# Patient Record
Sex: MALE | Race: OTHER | Employment: OTHER | URBAN - METROPOLITAN AREA
[De-identification: names, ages, dates, MRNs, and addresses within clinical notes are randomized per-mention and may not be internally consistent; named-entity substitution may affect disease eponyms.]

---

## 2017-01-31 ENCOUNTER — APPOINTMENT (OUTPATIENT)
Dept: GENERAL RADIOLOGY | Facility: HOSPITAL | Age: 37
End: 2017-01-31
Attending: EMERGENCY MEDICINE
Payer: MEDICAID

## 2017-01-31 ENCOUNTER — HOSPITAL ENCOUNTER (EMERGENCY)
Facility: HOSPITAL | Age: 37
Discharge: HOME | End: 2017-01-31
Attending: EMERGENCY MEDICINE
Payer: MEDICAID

## 2017-01-31 VITALS
HEIGHT: 68 IN | SYSTOLIC BLOOD PRESSURE: 104 MMHG | TEMPERATURE: 99 F | HEART RATE: 88 BPM | BODY MASS INDEX: 27.28 KG/M2 | WEIGHT: 180 LBS | OXYGEN SATURATION: 95 % | DIASTOLIC BLOOD PRESSURE: 75 MMHG | RESPIRATION RATE: 16 BRPM

## 2017-01-31 DIAGNOSIS — K21.00 ESOPHAGITIS, REFLUX: ICD-10-CM

## 2017-01-31 DIAGNOSIS — R06.6 SINGULTUS: ICD-10-CM

## 2017-01-31 DIAGNOSIS — R07.9 ACUTE CHEST PAIN: Primary | ICD-10-CM

## 2017-01-31 DIAGNOSIS — K29.20 CHRONIC ALCOHOLIC GASTRITIS WITHOUT HEMORRHAGE: ICD-10-CM

## 2017-01-31 LAB
ALBUMIN SERPL BCP-MCNC: 4.2 G/DL (ref 3.5–4.8)
ALBUMIN/GLOB SERPL: 1.2 {RATIO} (ref 1–2)
ALP SERPL-CCNC: 71 U/L (ref 32–100)
ALT SERPL-CCNC: 27 U/L (ref 17–63)
ANION GAP SERPL CALC-SCNC: 13 MMOL/L (ref 0–18)
AST SERPL-CCNC: 31 U/L (ref 15–41)
BASOPHILS # BLD: 0.1 K/UL (ref 0–0.2)
BASOPHILS NFR BLD: 1 %
BILIRUB SERPL-MCNC: 0.7 MG/DL (ref 0.3–1.2)
BUN SERPL-MCNC: 8 MG/DL (ref 8–20)
BUN/CREAT SERPL: 9.6 (ref 10–20)
CALCIUM SERPL-MCNC: 8.7 MG/DL (ref 8.5–10.5)
CHLORIDE SERPL-SCNC: 98 MMOL/L (ref 95–110)
CO2 SERPL-SCNC: 27 MMOL/L (ref 22–32)
CREAT SERPL-MCNC: 0.83 MG/DL (ref 0.5–1.5)
D DIMER PPP FEU-MCNC: 0.36 MCG/ML
EOSINOPHIL # BLD: 0 K/UL (ref 0–0.7)
EOSINOPHIL NFR BLD: 0 %
ERYTHROCYTE [DISTWIDTH] IN BLOOD BY AUTOMATED COUNT: 18.5 % (ref 11–15)
GLOBULIN PLAS-MCNC: 3.6 G/DL (ref 2.5–3.7)
GLUCOSE SERPL-MCNC: 136 MG/DL (ref 70–99)
HCT VFR BLD AUTO: 41.8 % (ref 41–52)
HGB BLD-MCNC: 13.9 G/DL (ref 13.5–17.5)
LIPASE SERPL-CCNC: 20 U/L (ref 22–51)
LYMPHOCYTES # BLD: 2.5 K/UL (ref 1–4)
LYMPHOCYTES NFR BLD: 27 %
MAGNESIUM SERPL-MCNC: 1.7 MG/DL (ref 1.8–2.5)
MCH RBC QN AUTO: 26.1 PG (ref 27–32)
MCHC RBC AUTO-ENTMCNC: 33.3 G/DL (ref 32–37)
MCV RBC AUTO: 78.4 FL (ref 80–100)
MONOCYTES # BLD: 0.5 K/UL (ref 0–1)
MONOCYTES NFR BLD: 6 %
NEUTROPHILS # BLD AUTO: 6 K/UL (ref 1.8–7.7)
NEUTROPHILS NFR BLD: 66 %
OSMOLALITY UR CALC.SUM OF ELEC: 286 MOSM/KG (ref 275–295)
PLATELET # BLD AUTO: 152 K/UL (ref 140–400)
PMV BLD AUTO: 8.9 FL (ref 7.4–10.3)
POTASSIUM SERPL-SCNC: 3.5 MMOL/L (ref 3.3–5.1)
PROT SERPL-MCNC: 7.8 G/DL (ref 5.9–8.4)
RBC # BLD AUTO: 5.33 M/UL (ref 4.5–5.9)
SODIUM SERPL-SCNC: 138 MMOL/L (ref 136–144)
TROPONIN I SERPL-MCNC: 0 NG/ML (ref ?–0.03)
WBC # BLD AUTO: 9.1 K/UL (ref 4–11)

## 2017-01-31 PROCEDURE — 96361 HYDRATE IV INFUSION ADD-ON: CPT

## 2017-01-31 PROCEDURE — 83690 ASSAY OF LIPASE: CPT | Performed by: EMERGENCY MEDICINE

## 2017-01-31 PROCEDURE — 71020 XR CHEST PA + LAT CHEST (CPT=71020): CPT

## 2017-01-31 PROCEDURE — 99285 EMERGENCY DEPT VISIT HI MDM: CPT

## 2017-01-31 PROCEDURE — 96375 TX/PRO/DX INJ NEW DRUG ADDON: CPT

## 2017-01-31 PROCEDURE — S0028 INJECTION, FAMOTIDINE, 20 MG: HCPCS | Performed by: EMERGENCY MEDICINE

## 2017-01-31 PROCEDURE — 93010 ELECTROCARDIOGRAM REPORT: CPT | Performed by: EMERGENCY MEDICINE

## 2017-01-31 PROCEDURE — 83735 ASSAY OF MAGNESIUM: CPT | Performed by: EMERGENCY MEDICINE

## 2017-01-31 PROCEDURE — 84484 ASSAY OF TROPONIN QUANT: CPT | Performed by: EMERGENCY MEDICINE

## 2017-01-31 PROCEDURE — 80053 COMPREHEN METABOLIC PANEL: CPT | Performed by: EMERGENCY MEDICINE

## 2017-01-31 PROCEDURE — 85379 FIBRIN DEGRADATION QUANT: CPT | Performed by: EMERGENCY MEDICINE

## 2017-01-31 PROCEDURE — 96374 THER/PROPH/DIAG INJ IV PUSH: CPT

## 2017-01-31 PROCEDURE — 93005 ELECTROCARDIOGRAM TRACING: CPT

## 2017-01-31 PROCEDURE — 85025 COMPLETE CBC W/AUTO DIFF WBC: CPT | Performed by: EMERGENCY MEDICINE

## 2017-01-31 RX ORDER — METOCLOPRAMIDE HYDROCHLORIDE 5 MG/ML
10 INJECTION INTRAMUSCULAR; INTRAVENOUS ONCE
Status: COMPLETED | OUTPATIENT
Start: 2017-01-31 | End: 2017-01-31

## 2017-01-31 RX ORDER — CHLORPROMAZINE HYDROCHLORIDE 25 MG/1
25 TABLET, FILM COATED ORAL ONCE
Status: COMPLETED | OUTPATIENT
Start: 2017-01-31 | End: 2017-01-31

## 2017-01-31 RX ORDER — DIPHENHYDRAMINE HYDROCHLORIDE 50 MG/ML
25 INJECTION INTRAMUSCULAR; INTRAVENOUS ONCE
Status: COMPLETED | OUTPATIENT
Start: 2017-01-31 | End: 2017-01-31

## 2017-01-31 RX ORDER — KETOROLAC TROMETHAMINE 30 MG/ML
15 INJECTION, SOLUTION INTRAMUSCULAR; INTRAVENOUS ONCE
Status: COMPLETED | OUTPATIENT
Start: 2017-01-31 | End: 2017-01-31

## 2017-01-31 RX ORDER — SUCRALFATE 1 G/1
1 TABLET ORAL
Qty: 120 TABLET | Refills: 0 | Status: SHIPPED | OUTPATIENT
Start: 2017-01-31 | End: 2017-03-02

## 2017-01-31 RX ORDER — ORPHENADRINE CITRATE 30 MG/ML
60 INJECTION INTRAMUSCULAR; INTRAVENOUS ONCE
Status: COMPLETED | OUTPATIENT
Start: 2017-01-31 | End: 2017-01-31

## 2017-01-31 RX ORDER — FAMOTIDINE 10 MG/ML
20 INJECTION, SOLUTION INTRAVENOUS ONCE
Status: COMPLETED | OUTPATIENT
Start: 2017-01-31 | End: 2017-01-31

## 2017-01-31 RX ORDER — FAMOTIDINE 20 MG/1
20 TABLET ORAL 2 TIMES DAILY PRN
Qty: 30 TABLET | Refills: 0 | Status: SHIPPED | OUTPATIENT
Start: 2017-01-31 | End: 2017-03-02

## 2017-01-31 NOTE — ED PROVIDER NOTES
Patient Seen in: [facility] AND [facility] Emergency Department    History   No chief complaint on file.     Stated Complaint: chest pain    HPI    20-year-old male with history of alcohol abuse, last alcohol intake was just prior to arrival, he states he has no Musculoskeletal: Negative. Skin: Negative. Neurological: Negative. All other systems reviewed and are negative. Positive for stated complaint: chest pain  Other systems are as noted in HPI. Constitutional and vital signs reviewed.       All for the following:     Magnesium 1.7 (*)     All other components within normal limits   COMP METABOLIC PANEL (14) - Abnormal; Notable for the following:     Glucose 136 (*)     BUN/CREA Ratio 9.6 (*)     All other components within normal limits   LIPASE home with follow-up and return precautions. He does not wish to quit drinking at this time. He is clinically sober. He is discharged home in the care of his mother. CBC and CMP were also unremarkable.   Chest x-ray revealed no acute pathology     Imagin needed      Medications Prescribed:  Current Discharge Medication List    START taking these medications    sucralfate (CARAFATE) 1 g Oral Tab  Take 1 tablet (1 g total) by mouth 4 (four) times daily before meals and nightly.   Qty: 120 tablet Refills: 0

## 2017-01-31 NOTE — ED INITIAL ASSESSMENT (HPI)
Pt woke up with anterior chest pain that started at 530am today. Having hiccups.  Denies nausea or sweating

## 2017-04-10 ENCOUNTER — HOSPITAL ENCOUNTER (EMERGENCY)
Facility: HOSPITAL | Age: 37
Discharge: HOME | End: 2017-04-10
Attending: EMERGENCY MEDICINE
Payer: MEDICAID

## 2017-04-10 VITALS
OXYGEN SATURATION: 96 % | BODY MASS INDEX: 25.18 KG/M2 | WEIGHT: 170 LBS | HEIGHT: 69 IN | DIASTOLIC BLOOD PRESSURE: 78 MMHG | HEART RATE: 100 BPM | RESPIRATION RATE: 16 BRPM | TEMPERATURE: 97 F | SYSTOLIC BLOOD PRESSURE: 131 MMHG

## 2017-04-10 DIAGNOSIS — F10.10 ALCOHOL ABUSE: ICD-10-CM

## 2017-04-10 DIAGNOSIS — R06.6 INTRACTABLE HICCUPS: Primary | ICD-10-CM

## 2017-04-10 PROCEDURE — 93005 ELECTROCARDIOGRAM TRACING: CPT

## 2017-04-10 PROCEDURE — 83690 ASSAY OF LIPASE: CPT | Performed by: EMERGENCY MEDICINE

## 2017-04-10 PROCEDURE — 96374 THER/PROPH/DIAG INJ IV PUSH: CPT

## 2017-04-10 PROCEDURE — 96375 TX/PRO/DX INJ NEW DRUG ADDON: CPT

## 2017-04-10 PROCEDURE — 80076 HEPATIC FUNCTION PANEL: CPT | Performed by: EMERGENCY MEDICINE

## 2017-04-10 PROCEDURE — 80048 BASIC METABOLIC PNL TOTAL CA: CPT

## 2017-04-10 PROCEDURE — 99284 EMERGENCY DEPT VISIT MOD MDM: CPT

## 2017-04-10 PROCEDURE — 85025 COMPLETE CBC W/AUTO DIFF WBC: CPT

## 2017-04-10 PROCEDURE — 96372 THER/PROPH/DIAG INJ SC/IM: CPT

## 2017-04-10 PROCEDURE — C9113 INJ PANTOPRAZOLE SODIUM, VIA: HCPCS | Performed by: EMERGENCY MEDICINE

## 2017-04-10 PROCEDURE — 93010 ELECTROCARDIOGRAM REPORT: CPT | Performed by: EMERGENCY MEDICINE

## 2017-04-10 RX ORDER — METOCLOPRAMIDE HYDROCHLORIDE 5 MG/ML
10 INJECTION INTRAMUSCULAR; INTRAVENOUS ONCE
Status: COMPLETED | OUTPATIENT
Start: 2017-04-10 | End: 2017-04-10

## 2017-04-10 RX ORDER — CHLORPROMAZINE HYDROCHLORIDE 25 MG/ML
50 INJECTION INTRAMUSCULAR ONCE
Status: COMPLETED | OUTPATIENT
Start: 2017-04-10 | End: 2017-04-10

## 2017-04-10 RX ORDER — DIPHENHYDRAMINE HYDROCHLORIDE 50 MG/ML
12.5 INJECTION INTRAMUSCULAR; INTRAVENOUS ONCE
Status: COMPLETED | OUTPATIENT
Start: 2017-04-10 | End: 2017-04-10

## 2017-04-10 NOTE — ED NOTES
Pt with hx alcoholism had 3 day drinking binge, last drink 0700 today. Present with c/o hiccups for 3 hours with stomach and chest pain. Hx of gerd, on medication.

## 2017-04-10 NOTE — ED INITIAL ASSESSMENT (HPI)
Pt reports that he has not been able to stop hiccupping for the past 3 hours   Pt also reports dizziness, abd pain and chest pain as well  Pt with a hx of gastritis and ulcer, recently admitted for similar symptoms  Pt reports last drink of alcohol was 2 h

## 2017-04-10 NOTE — DISCHARGE PLANNING
Spoke with Pt regarding out of network status of insurance. Pt states he understands his insurance is out of network. Pt has a primary care physician that is in network and is able to follow up with her.  Verified that Pt follows up with [facility]

## 2017-08-20 ENCOUNTER — APPOINTMENT (OUTPATIENT)
Dept: GENERAL RADIOLOGY | Facility: HOSPITAL | Age: 37
End: 2017-08-20
Attending: EMERGENCY MEDICINE
Payer: MEDICAID

## 2017-08-20 ENCOUNTER — APPOINTMENT (OUTPATIENT)
Dept: ULTRASOUND IMAGING | Facility: HOSPITAL | Age: 37
End: 2017-08-20
Attending: EMERGENCY MEDICINE
Payer: MEDICAID

## 2017-08-20 ENCOUNTER — HOSPITAL ENCOUNTER (EMERGENCY)
Facility: HOSPITAL | Age: 37
Discharge: HOME | End: 2017-08-20
Attending: EMERGENCY MEDICINE
Payer: MEDICAID

## 2017-08-20 VITALS
SYSTOLIC BLOOD PRESSURE: 139 MMHG | DIASTOLIC BLOOD PRESSURE: 88 MMHG | RESPIRATION RATE: 17 BRPM | TEMPERATURE: 98 F | HEART RATE: 85 BPM | BODY MASS INDEX: 27.92 KG/M2 | HEIGHT: 70 IN | WEIGHT: 195 LBS | OXYGEN SATURATION: 99 %

## 2017-08-20 DIAGNOSIS — S86.812A STRAIN OF CALF MUSCLE, LEFT, INITIAL ENCOUNTER: ICD-10-CM

## 2017-08-20 DIAGNOSIS — M77.12 LATERAL EPICONDYLITIS OF LEFT ELBOW: Primary | ICD-10-CM

## 2017-08-20 PROCEDURE — 99284 EMERGENCY DEPT VISIT MOD MDM: CPT

## 2017-08-20 PROCEDURE — 73080 X-RAY EXAM OF ELBOW: CPT | Performed by: EMERGENCY MEDICINE

## 2017-08-20 PROCEDURE — 93971 EXTREMITY STUDY: CPT | Performed by: EMERGENCY MEDICINE

## 2017-08-20 RX ORDER — IBUPROFEN 600 MG/1
600 TABLET ORAL EVERY 8 HOURS PRN
Qty: 30 TABLET | Refills: 0 | Status: SHIPPED | OUTPATIENT
Start: 2017-08-20 | End: 2017-08-27

## 2017-08-20 NOTE — ED NOTES
Pt feels like his left forearm is popping/numb. Feels like his left calf is week, just noticed today. Denies recent travel by plane or plane. No chest pain or sob.  No trauma

## 2017-08-20 NOTE — ED PROVIDER NOTES
Patient Seen in: [facility] AND [facility] Emergency Department    History   Patient presents with:  Muscle Pain    Stated Complaint: Patient c/o \"left foot vein popping out\" and \"cannot make a muscle in left\"    HPI    Patient is a 58-year-old male with no well-nourished in no acute distress  Head: Normocephalic, no swelling or tenderness  Eyes: Nonicteric sclera, no conjunctival injection  ENT: TMs are clear and flat bilaterally.   There is no posterior pharyngeal erythema  Chest: Clear to auscultation, no t

## 2017-08-20 NOTE — ED NOTES
Discharged home with plan to follow up with PCP as indicated. Alert and interactive. Hemodynamically stable. Agrees with pain management plan.  Ambulates to exit with steady gait

## 2017-08-20 NOTE — ED INITIAL ASSESSMENT (HPI)
Patient c/o pain to left forearm x 1 month with \"vein in left hand popping out more than norm. \" Patient also c/o vein in left foot \"popping out more than usual and unable to flex left calf. \" patient denies any injury.

## 2017-11-23 NOTE — ED PROVIDER NOTES
Patient Seen in: [facility] AND [facility] Emergency Department    History   Patient presents with:  Eval-P (psychiatric)      HPI    Patient presents stating that he has been drinking for the past 7 days and wants to stop drinking alcohol.   States he has a hist tracheal deviation present. Cardiovascular: Normal rate, regular rhythm and intact distal pulses. No murmur heard. Pulmonary/Chest: Effort normal and breath sounds normal. No stridor. No respiratory distress. Abdominal: Soft.  He exhibits no distens the patient and/or caregiver.     Condition upon leaving the department: Stable    Disposition and Plan     Clinical Impression:  Alcohol abuse  (primary encounter diagnosis)    Disposition:  Discharge    Follow-up:  Kalpana Patel  [address] [facility]

## 2018-01-28 ENCOUNTER — APPOINTMENT (OUTPATIENT)
Dept: GENERAL RADIOLOGY | Facility: HOSPITAL | Age: 38
End: 2018-01-28
Attending: EMERGENCY MEDICINE
Payer: MEDICAID

## 2018-01-28 ENCOUNTER — HOSPITAL ENCOUNTER (EMERGENCY)
Facility: HOSPITAL | Age: 38
Discharge: HOME | End: 2018-01-28
Attending: EMERGENCY MEDICINE
Payer: MEDICAID

## 2018-01-28 VITALS
SYSTOLIC BLOOD PRESSURE: 138 MMHG | OXYGEN SATURATION: 98 % | WEIGHT: 200 LBS | TEMPERATURE: 99 F | BODY MASS INDEX: 29.62 KG/M2 | DIASTOLIC BLOOD PRESSURE: 88 MMHG | RESPIRATION RATE: 18 BRPM | HEIGHT: 69 IN | HEART RATE: 82 BPM

## 2018-01-28 DIAGNOSIS — B34.9 VIRAL SYNDROME: Primary | ICD-10-CM

## 2018-01-28 DIAGNOSIS — R19.7 NAUSEA VOMITING AND DIARRHEA: ICD-10-CM

## 2018-01-28 DIAGNOSIS — R11.2 NAUSEA VOMITING AND DIARRHEA: ICD-10-CM

## 2018-01-28 PROCEDURE — 99283 EMERGENCY DEPT VISIT LOW MDM: CPT

## 2018-01-28 PROCEDURE — 71045 X-RAY EXAM CHEST 1 VIEW: CPT | Performed by: EMERGENCY MEDICINE

## 2018-01-28 RX ORDER — IBUPROFEN 600 MG/1
600 TABLET ORAL ONCE
Status: DISCONTINUED | OUTPATIENT
Start: 2018-01-28 | End: 2018-01-28

## 2018-01-28 RX ORDER — ACETAMINOPHEN 500 MG
1000 TABLET ORAL ONCE
Status: COMPLETED | OUTPATIENT
Start: 2018-01-28 | End: 2018-01-28

## 2018-01-28 RX ORDER — DICYCLOMINE HCL 20 MG
20 TABLET ORAL 4 TIMES DAILY PRN
Qty: 30 TABLET | Refills: 0 | Status: SHIPPED | OUTPATIENT
Start: 2018-01-28

## 2018-01-28 RX ORDER — ONDANSETRON 4 MG/1
4 TABLET, ORALLY DISINTEGRATING ORAL ONCE
Status: DISCONTINUED | OUTPATIENT
Start: 2018-01-28 | End: 2018-01-28

## 2018-01-28 RX ORDER — ONDANSETRON 4 MG/1
4 TABLET, ORALLY DISINTEGRATING ORAL EVERY 4 HOURS PRN
Qty: 15 TABLET | Refills: 0 | Status: SHIPPED | OUTPATIENT
Start: 2018-01-28

## 2018-01-28 RX ORDER — ONDANSETRON 4 MG/1
4 TABLET, ORALLY DISINTEGRATING ORAL ONCE
Status: COMPLETED | OUTPATIENT
Start: 2018-01-28 | End: 2018-01-28

## 2018-01-28 NOTE — ED PROVIDER NOTES
Patient Seen in: [facility] AND [facility] Emergency Department    History   Patient presents with:  Dyspnea NANO SOB (respiratory)      HPI    Patient presents to the ED complaining of a cough productive of yellow sputum for the past 4 days with associated diarr Right eye exhibits no discharge. Left eye exhibits no discharge. Neck: No tracheal deviation present. Cardiovascular: Normal rate and intact distal pulses. Pulmonary/Chest: Effort normal and breath sounds normal. No stridor. No respiratory distress. Monitor Interpretation:   normal sinus rhythm    Differential Diagnosis/ Diagnostic Considerations: Viral syndrome, dehydration, pneumonia, bronchitis    Medical Record Review: I personally reviewed available prior medical records for any recent pertin

## 2018-12-03 ENCOUNTER — HOSPITAL ENCOUNTER (EMERGENCY)
Facility: HOSPITAL | Age: 38
Discharge: HOME | End: 2018-12-03
Attending: EMERGENCY MEDICINE

## 2018-12-03 ENCOUNTER — APPOINTMENT (OUTPATIENT)
Dept: CT IMAGING | Facility: HOSPITAL | Age: 38
End: 2018-12-03
Attending: EMERGENCY MEDICINE

## 2018-12-03 VITALS
HEIGHT: 68 IN | DIASTOLIC BLOOD PRESSURE: 98 MMHG | OXYGEN SATURATION: 97 % | HEART RATE: 88 BPM | BODY MASS INDEX: 28.79 KG/M2 | WEIGHT: 190 LBS | RESPIRATION RATE: 17 BRPM | SYSTOLIC BLOOD PRESSURE: 137 MMHG | TEMPERATURE: 98 F

## 2018-12-03 DIAGNOSIS — K40.90 LEFT INGUINAL HERNIA: Primary | ICD-10-CM

## 2018-12-03 PROCEDURE — 74176 CT ABD & PELVIS W/O CONTRAST: CPT | Performed by: EMERGENCY MEDICINE

## 2018-12-03 PROCEDURE — 99284 EMERGENCY DEPT VISIT MOD MDM: CPT

## 2018-12-03 NOTE — ED INITIAL ASSESSMENT (HPI)
Pt reports to ED with complaints of hernia pain in left groin, pt unsure of type of hernia. Pt states the pain is too severe to handle, hernia diagnosed in September.

## 2018-12-03 NOTE — ED PROVIDER NOTES
Patient Seen in: [facility] Emergency Department    History   Patient presents with:  Hernia    Stated Complaint: hernia    HPI    Patient is a 18-year-old male patient is a 18-year-old male that complains of pain in his left groin for the last 3 breath sounds normal. No respiratory distress. Abdominal: Soft. Bowel sounds are normal. Exhibits no distension and no mass. There is no tenderness. There is no rebound and no guarding. There is a palpable left inguinal hernia when patient strains.   Scr

## 2019-03-13 ENCOUNTER — HOSPITAL ENCOUNTER (EMERGENCY)
Facility: HOSPITAL | Age: 39
Discharge: HOME | End: 2019-03-13
Attending: EMERGENCY MEDICINE

## 2019-03-13 ENCOUNTER — APPOINTMENT (OUTPATIENT)
Dept: GENERAL RADIOLOGY | Facility: HOSPITAL | Age: 39
End: 2019-03-13
Attending: EMERGENCY MEDICINE

## 2019-03-13 VITALS
RESPIRATION RATE: 18 BRPM | HEART RATE: 92 BPM | WEIGHT: 190 LBS | SYSTOLIC BLOOD PRESSURE: 144 MMHG | HEIGHT: 69 IN | BODY MASS INDEX: 28.14 KG/M2 | DIASTOLIC BLOOD PRESSURE: 94 MMHG | TEMPERATURE: 98 F | OXYGEN SATURATION: 98 %

## 2019-03-13 DIAGNOSIS — M79.672 LEFT FOOT PAIN: Primary | ICD-10-CM

## 2019-03-13 PROCEDURE — 99284 EMERGENCY DEPT VISIT MOD MDM: CPT

## 2019-03-13 PROCEDURE — 73630 X-RAY EXAM OF FOOT: CPT | Performed by: EMERGENCY MEDICINE

## 2019-03-13 RX ORDER — IBUPROFEN 600 MG/1
600 TABLET ORAL ONCE
Status: COMPLETED | OUTPATIENT
Start: 2019-03-13 | End: 2019-03-13

## 2019-03-13 NOTE — ED INITIAL ASSESSMENT (HPI)
Pt c/o left foot pain and swelling x 4 days. Pt states he has not been able to work d/t pain and swelling.

## 2019-03-15 NOTE — ED PROVIDER NOTES
Patient Seen in: [facility] AND [facility] Emergency Department    History   Patient presents with:  Lower Extremity Injury (musculoskeletal)    Stated Complaint: Left foot pain    HPI    39-year-old male presents for evaluation of foot pain.   Patient reports pa data to display       Imaging Results Available and Reviewed while in ED: XR FOOT, COMPLETE (MIN 3 VIEWS), LEFT (CPT=73630)   Final Result    PROCEDURE: XR FOOT, COMPLETE (MIN 3 VIEWS), LEFT (CPT=73630)         COMPARISON: [facility], X DELIA Sign-Outs: None    Impression:  After review and interpretation of the above emergency department workup, the patient was found to have Left foot pain  (primary encounter diagnosis)    Plan: Well-appearing patient unremarkable evaluation, unremarkable imag

## 2019-05-03 ENCOUNTER — APPOINTMENT (OUTPATIENT)
Dept: CT IMAGING | Facility: HOSPITAL | Age: 39
End: 2019-05-03
Attending: EMERGENCY MEDICINE

## 2019-05-03 ENCOUNTER — HOSPITAL ENCOUNTER (EMERGENCY)
Facility: HOSPITAL | Age: 39
Discharge: HOME | End: 2019-05-03
Attending: EMERGENCY MEDICINE

## 2019-05-03 VITALS
RESPIRATION RATE: 18 BRPM | DIASTOLIC BLOOD PRESSURE: 89 MMHG | BODY MASS INDEX: 29 KG/M2 | WEIGHT: 194 LBS | OXYGEN SATURATION: 100 % | TEMPERATURE: 99 F | SYSTOLIC BLOOD PRESSURE: 129 MMHG | HEART RATE: 80 BPM

## 2019-05-03 DIAGNOSIS — K57.92 ACUTE DIVERTICULITIS: Primary | ICD-10-CM

## 2019-05-03 PROCEDURE — 99284 EMERGENCY DEPT VISIT MOD MDM: CPT

## 2019-05-03 PROCEDURE — 85025 COMPLETE CBC W/AUTO DIFF WBC: CPT | Performed by: EMERGENCY MEDICINE

## 2019-05-03 PROCEDURE — 36415 COLL VENOUS BLD VENIPUNCTURE: CPT

## 2019-05-03 PROCEDURE — 74177 CT ABD & PELVIS W/CONTRAST: CPT | Performed by: EMERGENCY MEDICINE

## 2019-05-03 PROCEDURE — 80048 BASIC METABOLIC PNL TOTAL CA: CPT | Performed by: EMERGENCY MEDICINE

## 2019-05-03 PROCEDURE — 81001 URINALYSIS AUTO W/SCOPE: CPT | Performed by: EMERGENCY MEDICINE

## 2019-05-03 RX ORDER — CIPROFLOXACIN 500 MG/1
500 TABLET, FILM COATED ORAL 2 TIMES DAILY
Qty: 14 TABLET | Refills: 0 | Status: SHIPPED | OUTPATIENT
Start: 2019-05-03 | End: 2019-05-10

## 2019-05-03 RX ORDER — METRONIDAZOLE 500 MG/1
500 TABLET ORAL 3 TIMES DAILY
Qty: 21 TABLET | Refills: 0 | Status: SHIPPED | OUTPATIENT
Start: 2019-05-03 | End: 2019-05-10

## 2019-05-03 NOTE — ED PROVIDER NOTES
Patient Seen in: [facility] AND [facility] Emergency Department    History   Patient presents with:  Hernia  Constipation (gastrointestinal): x 1 wk    Stated Complaint: hernia    HPI    Patient is a 61-year-old male who states he has a left inguinal hernia.   He tenderness. There is no rebound and no guarding. No evidence of incarcerated left inguinal hernia. Musculoskeletal: Normal range of motion. Exhibits no edema or tenderness. Lymphadenopathy: No cervical adenopathy.    Neurological: Alert and oriented to measurable fluid collection. Follow-up CT imaging assessment in 7-10 days is recommended if patient's symptoms do not improve after initial treatment. 2.  Small left fat containing inguinal hernia, which is marginally larger since 12/3/18.   There is mild

## 2019-05-03 NOTE — ED INITIAL ASSESSMENT (HPI)
Pt states he was told he had a hernia last summer and it is more painful today with more protrusion. Pt states he feels nauseous and has been unable to have a bowel movement since Tuesday. When he bears down, the hernia feels more painful.  Denies other sym

## 2020-02-29 ENCOUNTER — APPOINTMENT (OUTPATIENT)
Dept: GENERAL RADIOLOGY | Facility: HOSPITAL | Age: 40
End: 2020-02-29
Attending: EMERGENCY MEDICINE
Payer: MEDICAID

## 2020-02-29 PROCEDURE — 71045 X-RAY EXAM CHEST 1 VIEW: CPT | Performed by: EMERGENCY MEDICINE

## 2020-03-01 NOTE — ED NOTES
Requested by MD to speak with pt regarding detox and substance abuse resources. Pt declined a full assessment for detox. Pt declined contacting [place] for possible placement this evening.      Pt given substance abuse resources including a substance

## 2020-03-01 NOTE — ED PROVIDER NOTES
Patient Seen in: [facility] AND [facility] Emergency Department    History   Patient presents with:  Eval-D    Stated Complaint: [id number]     HPI    40-year-old male past medical history of peptic ulcer disease, alcohol abuse presenting for evaluation of approxima Musculoskeletal: Negative for joint swelling and arthralgias. Positive for stated complaint: [id number]  Other systems are as noted in HPI. Constitutional and vital signs reviewed. All other systems reviewed and negative except as noted above. DIFFERENTIAL[[id number]]          Abnormal            Final result                 Please view results for these tests on the individual orders. EKG    Rate, intervals and axes as noted on EKG Report.   Rate: 84  Rhythm: Sinus Rhythm  Reading: Normal sin report and destroy any copies or printouts.          MDM     DIFFERENTIAL DIAGNOSIS: After history and physical exam differential diagnosis includes but is not limited to alcohol abuse, alcohol withdrawal, pancreatitis, alcoholic hepatitis/gastritis, pneumo (six) hours as needed for Nausea. , Ramón, Disp-12 tablet, R-0    Pantoprazole Sodium 40 MG Oral Tab EC  Take 1 tablet (40 mg total) by mouth daily. , Ramón, Disp-30 tablet, R-0    !! chlordiazePOXIDE HCl 25 MG Oral Cap  Take 1 capsule (25 mg total) by mouth

## 2020-03-01 NOTE — ED NOTES
Discharge instructions reviewed with pt. Pt denies any further questions at this time. Pt friend at bedside to take pt home.

## 2021-09-20 ENCOUNTER — APPOINTMENT (OUTPATIENT)
Dept: GENERAL RADIOLOGY | Facility: HOSPITAL | Age: 41
End: 2021-09-20
Attending: NURSE PRACTITIONER
Payer: MEDICAID

## 2021-09-20 ENCOUNTER — HOSPITAL ENCOUNTER (EMERGENCY)
Facility: HOSPITAL | Age: 41
Discharge: HOME | End: 2021-09-20
Payer: MEDICAID

## 2021-09-20 VITALS
TEMPERATURE: 99 F | HEART RATE: 88 BPM | SYSTOLIC BLOOD PRESSURE: 141 MMHG | DIASTOLIC BLOOD PRESSURE: 79 MMHG | HEIGHT: 69 IN | OXYGEN SATURATION: 99 % | BODY MASS INDEX: 25.92 KG/M2 | WEIGHT: 175 LBS | RESPIRATION RATE: 16 BRPM

## 2021-09-20 DIAGNOSIS — M25.572 ACUTE LEFT ANKLE PAIN: Primary | ICD-10-CM

## 2021-09-20 PROCEDURE — 73610 X-RAY EXAM OF ANKLE: CPT | Performed by: NURSE PRACTITIONER

## 2021-09-20 PROCEDURE — 99283 EMERGENCY DEPT VISIT LOW MDM: CPT

## 2021-09-20 RX ORDER — PREDNISONE 20 MG/1
40 TABLET ORAL DAILY
Qty: 10 TABLET | Refills: 0 | Status: SHIPPED | OUTPATIENT
Start: 2021-09-20 | End: 2021-09-25

## 2021-09-20 RX ORDER — IBUPROFEN 600 MG/1
600 TABLET ORAL EVERY 8 HOURS PRN
Qty: 30 TABLET | Refills: 0 | Status: SHIPPED | OUTPATIENT
Start: 2021-09-20 | End: 2021-09-27

## 2021-09-20 NOTE — ED QUICK NOTES
Pt reports atraumatic pain to left ankle x 2 weeks, and swelling which started 1 day ago. CMS intact.

## 2021-09-20 NOTE — ED INITIAL ASSESSMENT (HPI)
Pt reports atraumatic left ankle pain starting one week ago, pt reports this Saturday his left ankle he noticed his left ankle was swollen and was unable to work his job. Pt denies redness but pt reports his ankle is warm.

## 2021-09-20 NOTE — ED PROVIDER NOTES
Patient Seen in: [facility] AND [facility] Emergency Department      History   Patient presents with:   Ankle Pain    Stated Complaint: left ankle swelling     Subjective:   41yo/m with no chronic medical problems reports to the ED with complaints of left lateral regular rhythm. Heart sounds: Normal heart sounds. Pulmonary:      Effort: Pulmonary effort is normal.      Breath sounds: Normal breath sounds. Abdominal:      General: Bowel sounds are normal.      Palpations: Abdomen is soft.    Musculoskeletal: Disposition and Plan     Clinical Impression:  Acute left ankle pain  (primary encounter diagnosis)     Disposition:  Discharge  9/20/2021  3:32 pm    Follow-up:  Manuela Domingo, [address] [facility] [place] [address]  [phone]

## 2021-10-01 ENCOUNTER — HOSPITAL ENCOUNTER (EMERGENCY)
Facility: HOSPITAL | Age: 41
Discharge: HOME | End: 2021-10-01
Attending: EMERGENCY MEDICINE
Payer: MEDICAID

## 2021-10-01 VITALS
SYSTOLIC BLOOD PRESSURE: 125 MMHG | BODY MASS INDEX: 27 KG/M2 | OXYGEN SATURATION: 98 % | DIASTOLIC BLOOD PRESSURE: 88 MMHG | TEMPERATURE: 98 F | WEIGHT: 185 LBS | RESPIRATION RATE: 18 BRPM | HEART RATE: 95 BPM

## 2021-10-01 DIAGNOSIS — M25.572 LEFT ANKLE PAIN, UNSPECIFIED CHRONICITY: Primary | ICD-10-CM

## 2021-10-01 PROCEDURE — 85025 COMPLETE CBC W/AUTO DIFF WBC: CPT | Performed by: EMERGENCY MEDICINE

## 2021-10-01 PROCEDURE — 84550 ASSAY OF BLOOD/URIC ACID: CPT | Performed by: EMERGENCY MEDICINE

## 2021-10-01 PROCEDURE — 36415 COLL VENOUS BLD VENIPUNCTURE: CPT

## 2021-10-01 PROCEDURE — 99283 EMERGENCY DEPT VISIT LOW MDM: CPT

## 2021-10-01 PROCEDURE — 85652 RBC SED RATE AUTOMATED: CPT | Performed by: EMERGENCY MEDICINE

## 2021-10-01 PROCEDURE — 80048 BASIC METABOLIC PNL TOTAL CA: CPT | Performed by: EMERGENCY MEDICINE

## 2021-10-01 RX ORDER — INDOMETHACIN 50 MG/1
50 CAPSULE ORAL
Qty: 15 CAPSULE | Refills: 0 | Status: SHIPPED | OUTPATIENT
Start: 2021-10-01 | End: 2021-10-06

## 2021-10-01 NOTE — ED PROVIDER NOTES
Patient Seen in: [facility] AND [facility] Emergency Department      History   Patient presents with:  Leg or Foot Injury    Stated Complaint: Left foot pain     Subjective:   HPI    Patient is a 43-year-old male that complains of pain to his left ankle.   Is bee normal. Pupils are equal, round, and reactive to light. Neck: Neck supple. Cardiovascular: Normal rate, regular rhythm, normal heart sounds and intact distal pulses. Pulmonary/Chest: Effort normal and breath sounds normal. No respiratory distress. ---------                               -----------         ------                     CBC W/ DIFFERENTIAL[[id number]]          Abnormal            Final result                 Please view results for these tests on the individual orders.

## 2021-10-01 NOTE — ED INITIAL ASSESSMENT (HPI)
Left foot pain x 3 weeks, atraumatic. Seen here last week for same XR neg. Reports \"being on feet a lot\" for new job.

## 2022-02-18 ENCOUNTER — HOSPITAL ENCOUNTER (EMERGENCY)
Facility: HOSPITAL | Age: 42
Discharge: HOME | End: 2022-02-18
Attending: EMERGENCY MEDICINE
Payer: MEDICAID

## 2022-02-18 VITALS
HEIGHT: 69 IN | OXYGEN SATURATION: 100 % | HEART RATE: 92 BPM | WEIGHT: 175 LBS | RESPIRATION RATE: 16 BRPM | DIASTOLIC BLOOD PRESSURE: 89 MMHG | SYSTOLIC BLOOD PRESSURE: 140 MMHG | TEMPERATURE: 98 F | BODY MASS INDEX: 25.92 KG/M2

## 2022-02-18 DIAGNOSIS — M10.9 GOUTY ARTHRITIS OF RIGHT ANKLE: Primary | ICD-10-CM

## 2022-02-18 PROCEDURE — 99283 EMERGENCY DEPT VISIT LOW MDM: CPT

## 2022-02-18 RX ORDER — PREDNISONE 20 MG/1
40 TABLET ORAL ONCE
Status: COMPLETED | OUTPATIENT
Start: 2022-02-18 | End: 2022-02-18

## 2022-02-18 RX ORDER — INDOMETHACIN 50 MG/1
50 CAPSULE ORAL ONCE
Status: COMPLETED | OUTPATIENT
Start: 2022-02-18 | End: 2022-02-18

## 2022-02-18 RX ORDER — PREDNISONE 20 MG/1
40 TABLET ORAL DAILY
Qty: 8 TABLET | Refills: 0 | Status: SHIPPED | OUTPATIENT
Start: 2022-02-18 | End: 2022-02-22

## 2022-02-18 RX ORDER — INDOMETHACIN 25 MG/1
25 CAPSULE ORAL EVERY 6 HOURS
Qty: 20 CAPSULE | Refills: 0 | Status: SHIPPED | OUTPATIENT
Start: 2022-02-18

## 2022-02-19 NOTE — ED QUICK NOTES
Patient discharged home in no acute distress. A&Ox4, skin p/w/d, denies cp/sob. Ambulating with steady gait and verbalized understanding of d/c instructions and prescriptions.  Wheelchair provided to ED exit

## 2022-02-19 NOTE — ED INITIAL ASSESSMENT (HPI)
Pt present to the ER with right lateral ankle pain for 1 week. Stated that pain became progressively worse throughout the week.      Denies trauma

## 2022-03-03 ENCOUNTER — APPOINTMENT (OUTPATIENT)
Dept: GENERAL RADIOLOGY | Age: 42
End: 2022-03-03
Attending: NURSE PRACTITIONER
Payer: MEDICAID

## 2022-03-03 ENCOUNTER — HOSPITAL ENCOUNTER (OUTPATIENT)
Age: 42
Discharge: HOME | End: 2022-03-03
Payer: MEDICAID

## 2022-03-03 VITALS
OXYGEN SATURATION: 98 % | RESPIRATION RATE: 18 BRPM | TEMPERATURE: 97 F | SYSTOLIC BLOOD PRESSURE: 140 MMHG | DIASTOLIC BLOOD PRESSURE: 90 MMHG | HEART RATE: 92 BPM

## 2022-03-03 DIAGNOSIS — J06.9 UPPER RESPIRATORY TRACT INFECTION, UNSPECIFIED TYPE: Primary | ICD-10-CM

## 2022-03-03 LAB
POCT INFLUENZA A: NEGATIVE
POCT INFLUENZA B: NEGATIVE
S PYO AG THROAT QL: NEGATIVE

## 2022-03-03 PROCEDURE — 87880 STREP A ASSAY W/OPTIC: CPT

## 2022-03-03 PROCEDURE — 99214 OFFICE O/P EST MOD 30 MIN: CPT

## 2022-03-03 PROCEDURE — 71046 X-RAY EXAM CHEST 2 VIEWS: CPT | Performed by: NURSE PRACTITIONER

## 2022-03-03 PROCEDURE — 87502 INFLUENZA DNA AMP PROBE: CPT | Performed by: NURSE PRACTITIONER

## 2022-03-03 RX ORDER — ONDANSETRON 4 MG/1
4 TABLET, ORALLY DISINTEGRATING ORAL ONCE
Status: COMPLETED | OUTPATIENT
Start: 2022-03-03 | End: 2022-03-03

## 2022-03-03 NOTE — ED INITIAL ASSESSMENT (HPI)
2 days of sore throat, cough, nausea, and diarrhea. Negative covid test done yesterday. Possible fever, c/o night sweats and generalized weakness.

## 2022-07-01 ENCOUNTER — APPOINTMENT (OUTPATIENT)
Dept: GENERAL RADIOLOGY | Facility: HOSPITAL | Age: 42
End: 2022-07-01
Attending: EMERGENCY MEDICINE
Payer: MEDICAID

## 2022-07-01 ENCOUNTER — HOSPITAL ENCOUNTER (EMERGENCY)
Facility: HOSPITAL | Age: 42
Discharge: HOME | End: 2022-07-01
Attending: EMERGENCY MEDICINE
Payer: MEDICAID

## 2022-07-01 VITALS
RESPIRATION RATE: 20 BRPM | HEIGHT: 68 IN | TEMPERATURE: 97 F | HEART RATE: 104 BPM | SYSTOLIC BLOOD PRESSURE: 132 MMHG | OXYGEN SATURATION: 97 % | BODY MASS INDEX: 28.79 KG/M2 | DIASTOLIC BLOOD PRESSURE: 93 MMHG | WEIGHT: 190 LBS

## 2022-07-01 DIAGNOSIS — L03.011 CELLULITIS OF RIGHT THUMB: ICD-10-CM

## 2022-07-01 DIAGNOSIS — S67.01XA CRUSHING INJURY OF RIGHT THUMB, INITIAL ENCOUNTER: Primary | ICD-10-CM

## 2022-07-01 PROCEDURE — 73140 X-RAY EXAM OF FINGER(S): CPT | Performed by: EMERGENCY MEDICINE

## 2022-07-01 PROCEDURE — 99283 EMERGENCY DEPT VISIT LOW MDM: CPT

## 2022-07-01 RX ORDER — DOXYCYCLINE HYCLATE 100 MG/1
100 CAPSULE ORAL ONCE
Status: COMPLETED | OUTPATIENT
Start: 2022-07-01 | End: 2022-07-01

## 2022-07-01 RX ORDER — DOXYCYCLINE HYCLATE 100 MG/1
100 CAPSULE ORAL 2 TIMES DAILY
Qty: 20 CAPSULE | Refills: 0 | Status: SHIPPED | OUTPATIENT
Start: 2022-07-01 | End: 2022-07-11

## 2022-07-01 NOTE — ED INITIAL ASSESSMENT (HPI)
Pt reports closing a door onto his right thumb a month ago, c/o swelling that has progressed to his hand.

## 2022-07-17 ENCOUNTER — HOSPITAL ENCOUNTER (EMERGENCY)
Facility: HOSPITAL | Age: 42
Discharge: HOME | End: 2022-07-17
Attending: STUDENT IN AN ORGANIZED HEALTH CARE EDUCATION/TRAINING PROGRAM
Payer: MEDICAID

## 2022-07-17 VITALS
HEIGHT: 68 IN | HEART RATE: 72 BPM | WEIGHT: 180 LBS | DIASTOLIC BLOOD PRESSURE: 82 MMHG | OXYGEN SATURATION: 95 % | SYSTOLIC BLOOD PRESSURE: 103 MMHG | RESPIRATION RATE: 15 BRPM | BODY MASS INDEX: 27.28 KG/M2 | TEMPERATURE: 98 F

## 2022-07-17 DIAGNOSIS — T63.441A ALLERGIC REACTION TO BEE STING: Primary | ICD-10-CM

## 2022-07-17 PROCEDURE — S0028 INJECTION, FAMOTIDINE, 20 MG: HCPCS | Performed by: STUDENT IN AN ORGANIZED HEALTH CARE EDUCATION/TRAINING PROGRAM

## 2022-07-17 PROCEDURE — 96375 TX/PRO/DX INJ NEW DRUG ADDON: CPT

## 2022-07-17 PROCEDURE — 96374 THER/PROPH/DIAG INJ IV PUSH: CPT

## 2022-07-17 PROCEDURE — 99284 EMERGENCY DEPT VISIT MOD MDM: CPT

## 2022-07-17 RX ORDER — EPINEPHRINE 0.3 MG/.3ML
0.3 INJECTION SUBCUTANEOUS
Qty: 2 EACH | Refills: 0 | Status: SHIPPED | OUTPATIENT
Start: 2022-07-17 | End: 2022-08-16

## 2022-07-17 RX ORDER — DIPHENHYDRAMINE HYDROCHLORIDE 50 MG/ML
25 INJECTION INTRAMUSCULAR; INTRAVENOUS ONCE
Status: COMPLETED | OUTPATIENT
Start: 2022-07-17 | End: 2022-07-17

## 2022-07-17 RX ORDER — METHYLPREDNISOLONE SODIUM SUCCINATE 125 MG/2ML
125 INJECTION, POWDER, LYOPHILIZED, FOR SOLUTION INTRAMUSCULAR; INTRAVENOUS ONCE
Status: COMPLETED | OUTPATIENT
Start: 2022-07-17 | End: 2022-07-17

## 2022-07-17 RX ORDER — DIPHENHYDRAMINE HCL 25 MG
25 CAPSULE ORAL EVERY 6 HOURS PRN
Qty: 12 CAPSULE | Refills: 0 | Status: SHIPPED | OUTPATIENT
Start: 2022-07-17 | End: 2022-07-20

## 2022-07-17 RX ORDER — FAMOTIDINE 10 MG/ML
20 INJECTION, SOLUTION INTRAVENOUS ONCE
Status: COMPLETED | OUTPATIENT
Start: 2022-07-17 | End: 2022-07-17

## 2022-07-17 RX ORDER — PREDNISONE 50 MG/1
50 TABLET ORAL DAILY
Qty: 3 TABLET | Refills: 0 | Status: SHIPPED | OUTPATIENT
Start: 2022-07-17 | End: 2022-07-20

## 2022-07-17 NOTE — ED INITIAL ASSESSMENT (HPI)
Pt got stung by wasp on the right ankle causing an allergic rx. Pt has hives on extremities and swelling with itchiness to face and neck. Pt denies throat swelling.  Sting happened around 11a

## 2022-07-31 ENCOUNTER — HOSPITAL ENCOUNTER (EMERGENCY)
Facility: HOSPITAL | Age: 42
Discharge: LEFT BEFORE DISCHARGE | End: 2022-07-31
Payer: MEDICAID

## 2022-07-31 VITALS
WEIGHT: 190 LBS | BODY MASS INDEX: 28.79 KG/M2 | SYSTOLIC BLOOD PRESSURE: 134 MMHG | DIASTOLIC BLOOD PRESSURE: 90 MMHG | OXYGEN SATURATION: 97 % | HEIGHT: 68 IN | TEMPERATURE: 98 F | RESPIRATION RATE: 20 BRPM | HEART RATE: 119 BPM

## 2022-07-31 PROCEDURE — 93005 ELECTROCARDIOGRAM TRACING: CPT

## 2022-07-31 NOTE — ED INITIAL ASSESSMENT (HPI)
Pt c/o hiccups x 2 days. Per pt he is an alcoholic and get's hiccups. Last drink was 30mins. Pt states he has developed chest pain due to the hiccups.

## 2022-11-12 ENCOUNTER — HOSPITAL ENCOUNTER (EMERGENCY)
Facility: HOSPITAL | Age: 42
Discharge: HOME | End: 2022-11-12
Attending: EMERGENCY MEDICINE
Payer: OTHER MISCELLANEOUS

## 2022-11-12 VITALS
HEIGHT: 68 IN | TEMPERATURE: 99 F | WEIGHT: 180 LBS | HEART RATE: 107 BPM | SYSTOLIC BLOOD PRESSURE: 143 MMHG | DIASTOLIC BLOOD PRESSURE: 96 MMHG | BODY MASS INDEX: 27.28 KG/M2 | OXYGEN SATURATION: 98 % | RESPIRATION RATE: 20 BRPM

## 2022-11-12 DIAGNOSIS — S61.217A LACERATION OF LEFT LITTLE FINGER WITHOUT FOREIGN BODY, NAIL DAMAGE STATUS UNSPECIFIED, INITIAL ENCOUNTER: Primary | ICD-10-CM

## 2022-11-12 PROCEDURE — 12002 RPR S/N/AX/GEN/TRNK2.6-7.5CM: CPT

## 2022-11-12 PROCEDURE — 99283 EMERGENCY DEPT VISIT LOW MDM: CPT

## 2022-11-12 PROCEDURE — 99282 EMERGENCY DEPT VISIT SF MDM: CPT

## 2022-11-12 NOTE — ED INITIAL ASSESSMENT (HPI)
Pt states while at work roughly around [id number] was cleaning out vacuum at work and was cut by unknown object on left pinky. Pt still able to move pinky with full sensation. Roughly one inch deep laceration with bleeding controlled. Pt denies all other complaints at the moment.    A&O*4, steady gait

## 2022-11-23 ENCOUNTER — OFFICE VISIT (OUTPATIENT)
Dept: FAMILY MEDICINE CLINIC | Facility: CLINIC | Age: 42
End: 2022-11-23
Payer: MEDICAID

## 2022-11-23 VITALS
BODY MASS INDEX: 31.22 KG/M2 | DIASTOLIC BLOOD PRESSURE: 80 MMHG | WEIGHT: 206 LBS | HEART RATE: 105 BPM | HEIGHT: 68 IN | SYSTOLIC BLOOD PRESSURE: 122 MMHG

## 2022-11-23 DIAGNOSIS — Z48.02 ENCOUNTER FOR REMOVAL OF SUTURES: ICD-10-CM

## 2022-11-23 DIAGNOSIS — S61.217D LACERATION OF LEFT LITTLE FINGER WITHOUT FOREIGN BODY WITHOUT DAMAGE TO NAIL, SUBSEQUENT ENCOUNTER: Primary | ICD-10-CM

## 2022-11-23 PROCEDURE — 3074F SYST BP LT 130 MM HG: CPT | Performed by: PHYSICIAN ASSISTANT

## 2022-11-23 PROCEDURE — 99202 OFFICE O/P NEW SF 15 MIN: CPT | Performed by: PHYSICIAN ASSISTANT

## 2022-11-23 PROCEDURE — 3008F BODY MASS INDEX DOCD: CPT | Performed by: PHYSICIAN ASSISTANT

## 2022-11-23 PROCEDURE — 3079F DIAST BP 80-89 MM HG: CPT | Performed by: PHYSICIAN ASSISTANT

## 2022-11-23 NOTE — PROCEDURES
Encounter for removal of sutures     Site cleaned with alcohol before and after removal,. 4 Sutures removed on the left 5th finger, tolerated well. Site remained well approximated. No open areas or drainage, no signs of infection. Advise to keep wound clean and dry,may apply OTC abx cream to site until fully healed.

## 2022-12-19 ENCOUNTER — HOSPITAL ENCOUNTER (EMERGENCY)
Facility: HOSPITAL | Age: 42
Discharge: HOME | End: 2022-12-19
Attending: EMERGENCY MEDICINE
Payer: MEDICAID

## 2022-12-19 ENCOUNTER — APPOINTMENT (OUTPATIENT)
Dept: CT IMAGING | Facility: HOSPITAL | Age: 42
End: 2022-12-19
Attending: EMERGENCY MEDICINE
Payer: MEDICAID

## 2022-12-19 ENCOUNTER — OFFICE VISIT (OUTPATIENT)
Dept: FAMILY MEDICINE CLINIC | Facility: CLINIC | Age: 42
End: 2022-12-19
Payer: MEDICAID

## 2022-12-19 VITALS
BODY MASS INDEX: 29 KG/M2 | DIASTOLIC BLOOD PRESSURE: 83 MMHG | OXYGEN SATURATION: 99 % | TEMPERATURE: 98 F | SYSTOLIC BLOOD PRESSURE: 123 MMHG | HEART RATE: 90 BPM | WEIGHT: 190 LBS | RESPIRATION RATE: 18 BRPM

## 2022-12-19 VITALS
HEIGHT: 68 IN | DIASTOLIC BLOOD PRESSURE: 79 MMHG | HEART RATE: 108 BPM | WEIGHT: 206 LBS | RESPIRATION RATE: 17 BRPM | SYSTOLIC BLOOD PRESSURE: 122 MMHG | BODY MASS INDEX: 31.22 KG/M2

## 2022-12-19 DIAGNOSIS — K40.90 NON-RECURRENT UNILATERAL INGUINAL HERNIA WITHOUT OBSTRUCTION OR GANGRENE: Primary | ICD-10-CM

## 2022-12-19 DIAGNOSIS — R10.32 LEFT INGUINAL PAIN: ICD-10-CM

## 2022-12-19 DIAGNOSIS — N50.812 PAIN IN LEFT TESTICLE: Primary | ICD-10-CM

## 2022-12-19 LAB
ANION GAP SERPL CALC-SCNC: 6 MMOL/L (ref 0–18)
BASOPHILS # BLD AUTO: 0.06 X10(3) UL (ref 0–0.2)
BASOPHILS NFR BLD AUTO: 0.9 %
BUN BLD-MCNC: 6 MG/DL (ref 7–18)
BUN/CREAT SERPL: 6.1 (ref 10–20)
CALCIUM BLD-MCNC: 8.4 MG/DL (ref 8.5–10.1)
CHLORIDE SERPL-SCNC: 101 MMOL/L (ref 98–112)
CO2 SERPL-SCNC: 30 MMOL/L (ref 21–32)
CREAT BLD-MCNC: 0.98 MG/DL
DEPRECATED RDW RBC AUTO: 47.7 FL (ref 35.1–46.3)
EOSINOPHIL # BLD AUTO: 0.08 X10(3) UL (ref 0–0.7)
EOSINOPHIL NFR BLD AUTO: 1.2 %
ERYTHROCYTE [DISTWIDTH] IN BLOOD BY AUTOMATED COUNT: 17.7 % (ref 11–15)
GFR SERPLBLD BASED ON 1.73 SQ M-ARVRAT: 98 ML/MIN/1.73M2 (ref 60–?)
GLUCOSE BLD-MCNC: 114 MG/DL (ref 70–99)
HCT VFR BLD AUTO: 36.5 %
HGB BLD-MCNC: 10.6 G/DL
IMM GRANULOCYTES # BLD AUTO: 0.02 X10(3) UL (ref 0–1)
IMM GRANULOCYTES NFR BLD: 0.3 %
LACTATE SERPL-SCNC: 1.7 MMOL/L (ref 0.4–2)
LYMPHOCYTES # BLD AUTO: 1.78 X10(3) UL (ref 1–4)
LYMPHOCYTES NFR BLD AUTO: 27.2 %
MCH RBC QN AUTO: 21.9 PG (ref 26–34)
MCHC RBC AUTO-ENTMCNC: 29 G/DL (ref 31–37)
MCV RBC AUTO: 75.6 FL
MONOCYTES # BLD AUTO: 0.57 X10(3) UL (ref 0.1–1)
MONOCYTES NFR BLD AUTO: 8.7 %
NEUTROPHILS # BLD AUTO: 4.03 X10 (3) UL (ref 1.5–7.7)
NEUTROPHILS # BLD AUTO: 4.03 X10(3) UL (ref 1.5–7.7)
NEUTROPHILS NFR BLD AUTO: 61.7 %
OSMOLALITY SERPL CALC.SUM OF ELEC: 282 MOSM/KG (ref 275–295)
PLATELET # BLD AUTO: 239 10(3)UL (ref 150–450)
POTASSIUM SERPL-SCNC: 3.8 MMOL/L (ref 3.5–5.1)
RBC # BLD AUTO: 4.83 X10(6)UL
SODIUM SERPL-SCNC: 137 MMOL/L (ref 136–145)
WBC # BLD AUTO: 6.5 X10(3) UL (ref 4–11)

## 2022-12-19 PROCEDURE — 3008F BODY MASS INDEX DOCD: CPT | Performed by: PHYSICIAN ASSISTANT

## 2022-12-19 PROCEDURE — 99284 EMERGENCY DEPT VISIT MOD MDM: CPT

## 2022-12-19 PROCEDURE — 74177 CT ABD & PELVIS W/CONTRAST: CPT | Performed by: EMERGENCY MEDICINE

## 2022-12-19 PROCEDURE — 3074F SYST BP LT 130 MM HG: CPT | Performed by: PHYSICIAN ASSISTANT

## 2022-12-19 PROCEDURE — 96360 HYDRATION IV INFUSION INIT: CPT

## 2022-12-19 PROCEDURE — 96361 HYDRATE IV INFUSION ADD-ON: CPT

## 2022-12-19 PROCEDURE — 85025 COMPLETE CBC W/AUTO DIFF WBC: CPT | Performed by: EMERGENCY MEDICINE

## 2022-12-19 PROCEDURE — 80048 BASIC METABOLIC PNL TOTAL CA: CPT | Performed by: EMERGENCY MEDICINE

## 2022-12-19 PROCEDURE — 83605 ASSAY OF LACTIC ACID: CPT | Performed by: EMERGENCY MEDICINE

## 2022-12-19 PROCEDURE — 3078F DIAST BP <80 MM HG: CPT | Performed by: PHYSICIAN ASSISTANT

## 2022-12-19 NOTE — DISCHARGE INSTRUCTIONS
Follow-up with general surgery for further evaluation and treatment. Take Tylenol or ibuprofen as needed for pain. Return to the emergency department if repeated vomiting, increasing pain, or other new symptoms develop.

## 2022-12-28 ENCOUNTER — OFFICE VISIT (OUTPATIENT)
Dept: SURGERY | Facility: CLINIC | Age: 42
End: 2022-12-28
Payer: MEDICAID

## 2022-12-28 VITALS — WEIGHT: 190 LBS | HEIGHT: 68 IN | BODY MASS INDEX: 28.79 KG/M2

## 2022-12-28 DIAGNOSIS — K40.90 UNILATERAL INGUINAL HERNIA WITHOUT OBSTRUCTION OR GANGRENE, RECURRENCE NOT SPECIFIED: Primary | ICD-10-CM

## 2022-12-28 DIAGNOSIS — K40.90 NON-RECURRENT UNILATERAL INGUINAL HERNIA WITHOUT OBSTRUCTION OR GANGRENE: ICD-10-CM

## 2022-12-28 PROCEDURE — 3008F BODY MASS INDEX DOCD: CPT | Performed by: SURGERY

## 2022-12-28 PROCEDURE — 99204 OFFICE O/P NEW MOD 45 MIN: CPT | Performed by: SURGERY

## 2023-01-15 ENCOUNTER — LAB ENCOUNTER (OUTPATIENT)
Dept: LAB | Facility: HOSPITAL | Age: 43
End: 2023-01-15
Attending: SURGERY
Payer: MEDICAID

## 2023-01-15 DIAGNOSIS — Z01.818 PREOP TESTING: ICD-10-CM

## 2023-01-15 LAB — SARS-COV-2 RNA RESP QL NAA+PROBE: NOT DETECTED

## 2023-01-16 NOTE — DISCHARGE INSTRUCTIONS
HOME INSTRUCTIONS  OK to shower  Diet as tolerated  Athletic supporter and ice packs for 72 hours  Tylenol or motrin or norco as needed  Dr. Pooja Gibson office in 2 weeks. [facility] HOME CARE INSTRUCTIONS: POST-OP ANESTHESIA  The medication that you received for sedation or general anesthesia can last up to 24 hours. Your judgment and reflexes may be altered, even if you feel like your normal self. We Recommend:   Do not drive any motor vehicle or bicycle   Avoid mowing the lawn, playing sports, or working with power tools/applicances (power saws, electric knives or mixers)   That you have someone stay with you on your first night home   Do not drink alcohol or take sleeping pills or tranquilizers   Do not sign legal documents within 24 hours of your procedure   If you had a nerve block for your surgery, take extra care not to put any pressure on your arm or hand for 24 hours    It is normal:  For you to have a sore throat if you had a breathing tube during surgery (while you were asleep!). The sore throat should get better within 48 hours. You can gargle with warm salt water (1/2 tsp in 4 oz warm water) or use a throat lozenge for comfort  To feel muscle aches or soreness especially in the abdomen, chest or neck. The achy feeling should go away in the next 24 hours  To feel weak, sleepy or \"wiped out\". Your should start feeling better in the next 24 hours. To experience mild discomforts such as sore lip or tongue, headache, cramps, gas pains or a bloated feeling in your abdomen. To experience mild back pain or soreness for a day or two if you had spinal or epidural anesthesia. If you had laparoscopic surgery, to feel shoulder pain or discomfort on the day of surgery. For some patients to have nausea after surgery/anesthesia    If you feel nausea or experience vomiting:   Try to move around less.    Eat less than usual or drink only liquids until the next morning   Nausea should resolve in about 24 hours    If you have a problem when you are at home:    Call your surgeons office

## 2023-01-17 ENCOUNTER — ANESTHESIA EVENT (OUTPATIENT)
Dept: SURGERY | Facility: HOSPITAL | Age: 43
End: 2023-01-17
Payer: MEDICAID

## 2023-01-17 ENCOUNTER — HOSPITAL ENCOUNTER (OUTPATIENT)
Facility: HOSPITAL | Age: 43
Setting detail: HOSPITAL OUTPATIENT SURGERY
Discharge: HOME | End: 2023-01-17
Attending: SURGERY | Admitting: SURGERY
Payer: MEDICAID

## 2023-01-17 ENCOUNTER — ANESTHESIA (OUTPATIENT)
Dept: SURGERY | Facility: HOSPITAL | Age: 43
End: 2023-01-17
Payer: MEDICAID

## 2023-01-17 VITALS
HEIGHT: 68 IN | TEMPERATURE: 98 F | RESPIRATION RATE: 16 BRPM | WEIGHT: 200 LBS | BODY MASS INDEX: 30.31 KG/M2 | DIASTOLIC BLOOD PRESSURE: 81 MMHG | SYSTOLIC BLOOD PRESSURE: 127 MMHG | OXYGEN SATURATION: 94 % | HEART RATE: 78 BPM

## 2023-01-17 DIAGNOSIS — Z01.818 PREOP TESTING: Primary | ICD-10-CM

## 2023-01-17 PROCEDURE — 0YU60JZ SUPPLEMENT LEFT INGUINAL REGION WITH SYNTHETIC SUBSTITUTE, OPEN APPROACH: ICD-10-PCS | Performed by: SURGERY

## 2023-01-17 PROCEDURE — 49505 PRP I/HERN INIT REDUC >5 YR: CPT | Performed by: SURGERY

## 2023-01-17 DEVICE — POLYPROPLYLENE NONABSORBABLE SYNTHETIC SURGICAL MESH
Type: IMPLANTABLE DEVICE | Site: GROIN | Status: FUNCTIONAL
Brand: PROLENE

## 2023-01-17 RX ORDER — DEXAMETHASONE SODIUM PHOSPHATE 4 MG/ML
VIAL (ML) INJECTION AS NEEDED
Status: DISCONTINUED | OUTPATIENT
Start: 2023-01-17 | End: 2023-01-17 | Stop reason: SURG

## 2023-01-17 RX ORDER — HYDROCODONE BITARTRATE AND ACETAMINOPHEN 5; 325 MG/1; MG/1
1 TABLET ORAL EVERY 6 HOURS PRN
Qty: 20 TABLET | Refills: 0 | Status: SHIPPED | OUTPATIENT
Start: 2023-01-17

## 2023-01-17 RX ORDER — HYDROMORPHONE HYDROCHLORIDE 1 MG/ML
0.2 INJECTION, SOLUTION INTRAMUSCULAR; INTRAVENOUS; SUBCUTANEOUS EVERY 5 MIN PRN
Status: DISCONTINUED | OUTPATIENT
Start: 2023-01-17 | End: 2023-01-17

## 2023-01-17 RX ORDER — MORPHINE SULFATE 4 MG/ML
4 INJECTION, SOLUTION INTRAMUSCULAR; INTRAVENOUS EVERY 10 MIN PRN
Status: DISCONTINUED | OUTPATIENT
Start: 2023-01-17 | End: 2023-01-17

## 2023-01-17 RX ORDER — MORPHINE SULFATE 10 MG/ML
6 INJECTION, SOLUTION INTRAMUSCULAR; INTRAVENOUS EVERY 10 MIN PRN
Status: DISCONTINUED | OUTPATIENT
Start: 2023-01-17 | End: 2023-01-17

## 2023-01-17 RX ORDER — BUPIVACAINE HYDROCHLORIDE AND EPINEPHRINE 5; 5 MG/ML; UG/ML
INJECTION, SOLUTION PERINEURAL AS NEEDED
Status: DISCONTINUED | OUTPATIENT
Start: 2023-01-17 | End: 2023-01-17 | Stop reason: HOSPADM

## 2023-01-17 RX ORDER — SODIUM CHLORIDE, SODIUM LACTATE, POTASSIUM CHLORIDE, CALCIUM CHLORIDE 600; 310; 30; 20 MG/100ML; MG/100ML; MG/100ML; MG/100ML
INJECTION, SOLUTION INTRAVENOUS CONTINUOUS
Status: DISCONTINUED | OUTPATIENT
Start: 2023-01-17 | End: 2023-01-17

## 2023-01-17 RX ORDER — MORPHINE SULFATE 4 MG/ML
2 INJECTION, SOLUTION INTRAMUSCULAR; INTRAVENOUS EVERY 10 MIN PRN
Status: DISCONTINUED | OUTPATIENT
Start: 2023-01-17 | End: 2023-01-17

## 2023-01-17 RX ORDER — MIDAZOLAM HYDROCHLORIDE 1 MG/ML
INJECTION INTRAMUSCULAR; INTRAVENOUS AS NEEDED
Status: DISCONTINUED | OUTPATIENT
Start: 2023-01-17 | End: 2023-01-17 | Stop reason: SURG

## 2023-01-17 RX ORDER — NALOXONE HYDROCHLORIDE 0.4 MG/ML
80 INJECTION, SOLUTION INTRAMUSCULAR; INTRAVENOUS; SUBCUTANEOUS AS NEEDED
Status: DISCONTINUED | OUTPATIENT
Start: 2023-01-17 | End: 2023-01-17

## 2023-01-17 RX ORDER — HYDROMORPHONE HYDROCHLORIDE 1 MG/ML
0.6 INJECTION, SOLUTION INTRAMUSCULAR; INTRAVENOUS; SUBCUTANEOUS EVERY 5 MIN PRN
Status: DISCONTINUED | OUTPATIENT
Start: 2023-01-17 | End: 2023-01-17

## 2023-01-17 RX ORDER — LIDOCAINE HYDROCHLORIDE 10 MG/ML
INJECTION, SOLUTION EPIDURAL; INFILTRATION; INTRACAUDAL; PERINEURAL AS NEEDED
Status: DISCONTINUED | OUTPATIENT
Start: 2023-01-17 | End: 2023-01-17 | Stop reason: SURG

## 2023-01-17 RX ORDER — CEFAZOLIN SODIUM/WATER 2 G/20 ML
2 SYRINGE (ML) INTRAVENOUS ONCE
Status: COMPLETED | OUTPATIENT
Start: 2023-01-17 | End: 2023-01-17

## 2023-01-17 RX ORDER — HYDROMORPHONE HYDROCHLORIDE 1 MG/ML
0.4 INJECTION, SOLUTION INTRAMUSCULAR; INTRAVENOUS; SUBCUTANEOUS EVERY 5 MIN PRN
Status: DISCONTINUED | OUTPATIENT
Start: 2023-01-17 | End: 2023-01-17

## 2023-01-17 RX ORDER — ONDANSETRON 2 MG/ML
INJECTION INTRAMUSCULAR; INTRAVENOUS AS NEEDED
Status: DISCONTINUED | OUTPATIENT
Start: 2023-01-17 | End: 2023-01-17 | Stop reason: SURG

## 2023-01-17 RX ORDER — ACETAMINOPHEN 500 MG
1000 TABLET ORAL ONCE
Status: COMPLETED | OUTPATIENT
Start: 2023-01-17 | End: 2023-01-17

## 2023-01-17 RX ORDER — HYDROCODONE BITARTRATE AND ACETAMINOPHEN 5; 325 MG/1; MG/1
1 TABLET ORAL ONCE AS NEEDED
Status: COMPLETED | OUTPATIENT
Start: 2023-01-17 | End: 2023-01-17

## 2023-01-17 RX ORDER — ROCURONIUM BROMIDE 10 MG/ML
INJECTION, SOLUTION INTRAVENOUS AS NEEDED
Status: DISCONTINUED | OUTPATIENT
Start: 2023-01-17 | End: 2023-01-17 | Stop reason: SURG

## 2023-01-17 RX ORDER — GLYCOPYRROLATE 0.2 MG/ML
INJECTION, SOLUTION INTRAMUSCULAR; INTRAVENOUS AS NEEDED
Status: DISCONTINUED | OUTPATIENT
Start: 2023-01-17 | End: 2023-01-17 | Stop reason: SURG

## 2023-01-17 RX ADMIN — ROCURONIUM BROMIDE 20 MG: 10 INJECTION, SOLUTION INTRAVENOUS at 09:34:00

## 2023-01-17 RX ADMIN — SODIUM CHLORIDE, SODIUM LACTATE, POTASSIUM CHLORIDE, CALCIUM CHLORIDE: 600; 310; 30; 20 INJECTION, SOLUTION INTRAVENOUS at 09:19:00

## 2023-01-17 RX ADMIN — LIDOCAINE HYDROCHLORIDE 50 MG: 10 INJECTION, SOLUTION EPIDURAL; INFILTRATION; INTRACAUDAL; PERINEURAL at 09:26:00

## 2023-01-17 RX ADMIN — ONDANSETRON 4 MG: 2 INJECTION INTRAMUSCULAR; INTRAVENOUS at 10:19:00

## 2023-01-17 RX ADMIN — CEFAZOLIN SODIUM/WATER 2 G: 2 G/20 ML SYRINGE (ML) INTRAVENOUS at 09:21:00

## 2023-01-17 RX ADMIN — DEXAMETHASONE SODIUM PHOSPHATE 4 MG: 4 MG/ML VIAL (ML) INJECTION at 09:34:00

## 2023-01-17 RX ADMIN — MIDAZOLAM HYDROCHLORIDE 2 MG: 1 INJECTION INTRAMUSCULAR; INTRAVENOUS at 09:19:00

## 2023-01-17 RX ADMIN — ROCURONIUM BROMIDE 5 MG: 10 INJECTION, SOLUTION INTRAVENOUS at 10:09:00

## 2023-01-17 RX ADMIN — SODIUM CHLORIDE, SODIUM LACTATE, POTASSIUM CHLORIDE, CALCIUM CHLORIDE: 600; 310; 30; 20 INJECTION, SOLUTION INTRAVENOUS at 10:23:00

## 2023-01-17 RX ADMIN — GLYCOPYRROLATE 0.1 MG: 0.2 INJECTION, SOLUTION INTRAMUSCULAR; INTRAVENOUS at 09:19:00

## 2023-01-17 RX ADMIN — GLYCOPYRROLATE 0.1 MG: 0.2 INJECTION, SOLUTION INTRAMUSCULAR; INTRAVENOUS at 10:10:00

## 2023-01-17 NOTE — BRIEF OP NOTE
Pre-Operative Diagnosis: Unilateral inguinal hernia without obstruction or gangrene, recurrence not specified [K40.90]     Post-Operative Diagnosis: Unilateral inguinal hernia without obstruction or gangrene, recurrence not specified [K40.90]      Procedure Performed:   LEFT INGUINAL HERNIA REPAIR WITH MESH    Surgeon(s) and Role:     Horacio Simon MD - Primary    Assistant(s):  Surgical Assistant.: Kate Medrano     Surgical Findings: same     Specimen: none     Estimated Blood Loss: Blood Output: 5 mL (1/17/2023 10:23 AM)      Dictation Number:  [id number]    Claudette Mendoza MD  1/17/2023  10:39 AM

## 2023-01-17 NOTE — INTERVAL H&P NOTE
Pre-op Diagnosis: Unilateral inguinal hernia without obstruction or gangrene, recurrence not specified [K40.90]    The above referenced H&P was reviewed by Kalli Randall MD on 1/17/2023, the patient was examined and no significant changes have occurred in the patient's condition since the H&P was performed. I discussed with the patient and/or legal representative the potential benefits, risks and side effects of this procedure; the likelihood of the patient achieving goals; and potential problems that might occur during recuperation. I discussed reasonable alternatives to the procedure, including risks, benefits and side effects related to the alternatives and risks related to not receiving this procedure. We will proceed with procedure as planned.

## 2023-01-17 NOTE — OPERATIVE REPORT
[facility]    PATIENT'S NAME: NASIM TOBIAS PHYSICIAN: Nael Molina MD   OPERATING PHYSICIAN: Nael Molina MD   PATIENT ACCOUNT#:   [id number]    LOCATION:  [address]  MEDICAL RECORD #:   [id number]       YOB: 1980  ADMISSION DATE:       01/17/2023      OPERATION DATE:  01/17/2023    OPERATIVE REPORT      PREOPERATIVE DIAGNOSIS:  Left inguinal hernia. POSTOPERATIVE DIAGNOSIS:  Left inguinal hernia, large, direct. PROCEDURE:  Left inguinal hernia repair with mesh. ASSISTANT:  DENNISE Cerna     COMPLICATIONS:  None. ESTIMATED BLOOD LOSS:  5 mL. PATHOLOGY SPECIMENS:  None. INDICATIONS:  This 39-year-old man has had a very large and enlarging left inguinal hernia for many years. This was difficult to reduce in the office. He desires repair in hopes of relieving his symptoms and avoiding the complications from incarceration and strangulation. OPERATIVE TECHNIQUE:  With the patient in supine position, a general anesthetic was induced. The abdomen and inguinal areas were prepped and draped in the usual aseptic fashion. Skin and subcutaneous tissue in the left inguinal area was infiltrated with 0.5% Marcaine with epinephrine, and a transverse incision made along lines of skin tension. This was extended down through deep subcutaneous tissue using electrocautery for hemostasis. The external oblique aponeurosis was divided along the length of its fibers through the external ring and cord structures isolated at the pubic tubercle. There was a small amount of fatty tissue extruding through the internal ring, and this was excised and discarded. The large scrotal hernia was able to be reduced with the patient under general anesthesia, and then the hernia sac dissected away from cord structures. An extended PHS mesh was placed into the preperitoneal space medially and the posterior disc deployed.   Posterior wall fibers were then secured around the connector using 2-0 Vicryl suture. The onlay mesh was secured across the entire inguinal floor using interrupted 0 Vicryl suture. Good coverage of all potential defects was achieved and adequate hemostasis noted. The wound was closed in layers with 2-0, 3-0, on 4-0 Vicryl suture. Surgical glue was applied. Overall, the patient tolerated the procedure well. He was extubated and taken to the recovery room in stable condition.      Dictated By Ronita Najjar, MD  d: 01/17/2023 10:49:55  t: 01/17/2023 16:03:31  Job [id number]  [facility]/

## 2023-01-17 NOTE — ANESTHESIA PROCEDURE NOTES
Airway  Date/Time: 1/17/2023 9:28 AM  Urgency: Elective      General Information and Staff    Patient location during procedure: OR  Anesthesiologist: Lilli Perez MD  Resident/CRNA: Sara Gupta CRNA  Performed: CRNA     Indications and Patient Condition  Indications for airway management: anesthesia  Sedation level: deep  Preoxygenated: yes  Patient position: sniffing  Mask difficulty assessment: 1 - vent by mask    Final Airway Details  Final airway type: endotracheal airway      Successful airway: ETT  Cuffed: yes   Successful intubation technique: Video laryngoscopy  Endotracheal tube insertion site: oral  Blade: GlideScope  ETT size (mm): 8.0    Cormack-Lehane Classification: grade I - full view of glottis  Placement verified by: chest auscultation and capnometry   Measured from: lips  ETT to lips (cm): 22  Number of attempts at approach: 1    Additional Comments  Dental exam unchanged from pre op. Unable to visualize cords with MAC 4. Atraumatic intubation on 1st attempt with glide scope.   Vocal cords fully open at time of intubation

## 2023-01-25 ENCOUNTER — OFFICE VISIT (OUTPATIENT)
Dept: SURGERY | Facility: CLINIC | Age: 43
End: 2023-01-25

## 2023-01-25 DIAGNOSIS — K40.90 NON-RECURRENT UNILATERAL INGUINAL HERNIA WITHOUT OBSTRUCTION OR GANGRENE: Primary | ICD-10-CM

## 2023-01-25 PROCEDURE — 99024 POSTOP FOLLOW-UP VISIT: CPT | Performed by: SURGERY

## 2023-01-26 NOTE — PROGRESS NOTES
Postoperative Patient Follow-up      1/26/2023    Lalito Ayon. 38year old      HPI  Patient presents with:  Post-Op: Pt here for post op s/p LIH repair on 1/17/2023. Pt denies fever, dif. W/ bm's or urination. Pt c/o mild discomfort w/ long periods of standing. Still pretty sore. Exam  Abd soft and nt, mild swelling at incision, repair intact. Assessment/Plan  Assessment   Non-recurrent unilateral inguinal hernia without obstruction or gangrene  (primary encounter diagnosis)    Steady progress, early after mesh repair of a large L direct inguinal hernia. Limited activity for one month postop, note for work, routine medical screening.          Olivia Byrd MD

## 2023-04-23 ENCOUNTER — APPOINTMENT (OUTPATIENT)
Dept: GENERAL RADIOLOGY | Facility: HOSPITAL | Age: 43
End: 2023-04-23
Attending: EMERGENCY MEDICINE
Payer: MEDICAID

## 2023-04-23 ENCOUNTER — HOSPITAL ENCOUNTER (EMERGENCY)
Facility: HOSPITAL | Age: 43
Discharge: HOME | End: 2023-04-23
Attending: EMERGENCY MEDICINE
Payer: MEDICAID

## 2023-04-23 VITALS
OXYGEN SATURATION: 99 % | HEART RATE: 106 BPM | TEMPERATURE: 99 F | SYSTOLIC BLOOD PRESSURE: 122 MMHG | RESPIRATION RATE: 18 BRPM | DIASTOLIC BLOOD PRESSURE: 76 MMHG

## 2023-04-23 DIAGNOSIS — M10.9 GOUTY ARTHRITIS: Primary | ICD-10-CM

## 2023-04-23 PROCEDURE — 99284 EMERGENCY DEPT VISIT MOD MDM: CPT

## 2023-04-23 PROCEDURE — 73610 X-RAY EXAM OF ANKLE: CPT | Performed by: EMERGENCY MEDICINE

## 2023-04-23 PROCEDURE — 99283 EMERGENCY DEPT VISIT LOW MDM: CPT

## 2023-04-23 RX ORDER — COLCHICINE 0.6 MG/1
0.6 TABLET ORAL 2 TIMES DAILY
Qty: 6 TABLET | Refills: 0 | Status: SHIPPED | OUTPATIENT
Start: 2023-04-23 | End: 2023-04-26

## 2023-04-23 RX ORDER — HYDROCODONE BITARTRATE AND ACETAMINOPHEN 10; 325 MG/1; MG/1
0.5 TABLET ORAL EVERY 6 HOURS PRN
Qty: 5 TABLET | Refills: 0 | Status: SHIPPED | OUTPATIENT
Start: 2023-04-23 | End: 2023-05-03

## 2023-04-23 RX ORDER — METHYLPREDNISOLONE 4 MG/1
TABLET ORAL
Qty: 1 EACH | Refills: 0 | Status: SHIPPED | OUTPATIENT
Start: 2023-04-23

## 2023-04-23 NOTE — ED INITIAL ASSESSMENT (HPI)
Patient arrives ambulatory through triage with c/o of r. Ankle pain. Patient denies trauma, believes it is gout.

## 2023-04-24 NOTE — ED QUICK NOTES
Pt discharged home in stable condition. Pt is A&O x 4 speaking in complete coherent sentences. RR even and unlabored. Pt educated on new home medications to begin taking as well as f/u appointments needed. Pt denies any further questions or concerns and ambulated out of ED with even steady gait.

## 2023-05-31 ENCOUNTER — HOSPITAL ENCOUNTER (EMERGENCY)
Facility: HOSPITAL | Age: 43
Discharge: HOME | End: 2023-05-31
Attending: EMERGENCY MEDICINE
Payer: MEDICAID

## 2023-05-31 VITALS
BODY MASS INDEX: 28.04 KG/M2 | WEIGHT: 185 LBS | SYSTOLIC BLOOD PRESSURE: 122 MMHG | HEIGHT: 68.11 IN | TEMPERATURE: 98 F | RESPIRATION RATE: 19 BRPM | OXYGEN SATURATION: 96 % | DIASTOLIC BLOOD PRESSURE: 93 MMHG | HEART RATE: 84 BPM

## 2023-05-31 DIAGNOSIS — T67.8XXA: Primary | ICD-10-CM

## 2023-05-31 LAB
ALBUMIN SERPL-MCNC: 3.5 G/DL (ref 3.4–5)
ALBUMIN/GLOB SERPL: 0.8 {RATIO} (ref 1–2)
ALP LIVER SERPL-CCNC: 213 U/L
ALT SERPL-CCNC: 42 U/L
ANION GAP SERPL CALC-SCNC: 6 MMOL/L (ref 0–18)
AST SERPL-CCNC: 104 U/L (ref 15–37)
BASOPHILS # BLD AUTO: 0.07 X10(3) UL (ref 0–0.2)
BASOPHILS NFR BLD AUTO: 0.7 %
BILIRUB SERPL-MCNC: 0.8 MG/DL (ref 0.1–2)
BUN BLD-MCNC: 2 MG/DL (ref 7–18)
BUN/CREAT SERPL: 2.1 (ref 10–20)
CALCIUM BLD-MCNC: 8.6 MG/DL (ref 8.5–10.1)
CHLORIDE SERPL-SCNC: 101 MMOL/L (ref 98–112)
CO2 SERPL-SCNC: 29 MMOL/L (ref 21–32)
CREAT BLD-MCNC: 0.94 MG/DL
DEPRECATED RDW RBC AUTO: 52.3 FL (ref 35.1–46.3)
EOSINOPHIL # BLD AUTO: 0.1 X10(3) UL (ref 0–0.7)
EOSINOPHIL NFR BLD AUTO: 0.9 %
ERYTHROCYTE [DISTWIDTH] IN BLOOD BY AUTOMATED COUNT: 19.5 % (ref 11–15)
GFR SERPLBLD BASED ON 1.73 SQ M-ARVRAT: 103 ML/MIN/1.73M2 (ref 60–?)
GLOBULIN PLAS-MCNC: 4.4 G/DL (ref 2.8–4.4)
GLUCOSE BLD-MCNC: 124 MG/DL (ref 70–99)
HCT VFR BLD AUTO: 36.4 %
HGB BLD-MCNC: 11.1 G/DL
IMM GRANULOCYTES # BLD AUTO: 0.02 X10(3) UL (ref 0–1)
IMM GRANULOCYTES NFR BLD: 0.2 %
LYMPHOCYTES # BLD AUTO: 2.13 X10(3) UL (ref 1–4)
LYMPHOCYTES NFR BLD AUTO: 20.2 %
MCH RBC QN AUTO: 23 PG (ref 26–34)
MCHC RBC AUTO-ENTMCNC: 30.5 G/DL (ref 31–37)
MCV RBC AUTO: 75.4 FL
MONOCYTES # BLD AUTO: 0.64 X10(3) UL (ref 0.1–1)
MONOCYTES NFR BLD AUTO: 6.1 %
NEUTROPHILS # BLD AUTO: 7.61 X10 (3) UL (ref 1.5–7.7)
NEUTROPHILS # BLD AUTO: 7.61 X10(3) UL (ref 1.5–7.7)
NEUTROPHILS NFR BLD AUTO: 71.9 %
OSMOLALITY SERPL CALC.SUM OF ELEC: 280 MOSM/KG (ref 275–295)
PLATELET # BLD AUTO: 223 10(3)UL (ref 150–450)
POTASSIUM SERPL-SCNC: 3.7 MMOL/L (ref 3.5–5.1)
PROT SERPL-MCNC: 7.9 G/DL (ref 6.4–8.2)
RBC # BLD AUTO: 4.83 X10(6)UL
SODIUM SERPL-SCNC: 136 MMOL/L (ref 136–145)
WBC # BLD AUTO: 10.6 X10(3) UL (ref 4–11)

## 2023-05-31 PROCEDURE — 36415 COLL VENOUS BLD VENIPUNCTURE: CPT

## 2023-05-31 PROCEDURE — 85025 COMPLETE CBC W/AUTO DIFF WBC: CPT | Performed by: EMERGENCY MEDICINE

## 2023-05-31 PROCEDURE — 99283 EMERGENCY DEPT VISIT LOW MDM: CPT

## 2023-05-31 PROCEDURE — 93010 ELECTROCARDIOGRAM REPORT: CPT

## 2023-05-31 PROCEDURE — 93005 ELECTROCARDIOGRAM TRACING: CPT

## 2023-05-31 PROCEDURE — 80053 COMPREHEN METABOLIC PANEL: CPT | Performed by: EMERGENCY MEDICINE

## 2023-05-31 PROCEDURE — 99284 EMERGENCY DEPT VISIT MOD MDM: CPT

## 2023-06-01 LAB
ATRIAL RATE: 97 BPM
P AXIS: 30 DEGREES
P-R INTERVAL: 154 MS
Q-T INTERVAL: 344 MS
QRS DURATION: 84 MS
QTC CALCULATION (BEZET): 436 MS
R AXIS: 3 DEGREES
T AXIS: -4 DEGREES
VENTRICULAR RATE: 97 BPM

## 2023-06-01 NOTE — ED INITIAL ASSESSMENT (HPI)
Patient was at work today working in the heat and felt very dizzy / lightheaded; states \"was not all up there\". Denies LOC. Patient states just felt very weird and something was abnormal. Only c/o is a small headache at this time. Fast is negative.

## 2023-08-13 ENCOUNTER — APPOINTMENT (OUTPATIENT)
Dept: GENERAL RADIOLOGY | Facility: HOSPITAL | Age: 43
End: 2023-08-13
Attending: EMERGENCY MEDICINE
Payer: MEDICAID

## 2023-08-13 ENCOUNTER — APPOINTMENT (OUTPATIENT)
Dept: CT IMAGING | Facility: HOSPITAL | Age: 43
End: 2023-08-13
Attending: EMERGENCY MEDICINE
Payer: MEDICAID

## 2023-08-13 ENCOUNTER — HOSPITAL ENCOUNTER (INPATIENT)
Facility: HOSPITAL | Age: 43
LOS: 13 days | Discharge: HOME HEALTH | End: 2023-08-27
Attending: EMERGENCY MEDICINE | Admitting: INTERNAL MEDICINE
Payer: MEDICAID

## 2023-08-13 DIAGNOSIS — K92.2 UGIB (UPPER GASTROINTESTINAL BLEED): ICD-10-CM

## 2023-08-13 DIAGNOSIS — R07.9 CHEST PAIN OF UNCERTAIN ETIOLOGY: Primary | ICD-10-CM

## 2023-08-13 DIAGNOSIS — K44.9 HIATAL HERNIA: ICD-10-CM

## 2023-08-13 LAB
ALBUMIN SERPL-MCNC: 2.9 G/DL (ref 3.4–5)
ALP LIVER SERPL-CCNC: 216 U/L
ALT SERPL-CCNC: 48 U/L
ANION GAP SERPL CALC-SCNC: 7 MMOL/L (ref 0–18)
AST SERPL-CCNC: 132 U/L (ref 15–37)
BASOPHILS # BLD AUTO: 0.04 X10(3) UL (ref 0–0.2)
BASOPHILS NFR BLD AUTO: 0.3 %
BILIRUB DIRECT SERPL-MCNC: 1 MG/DL (ref 0–0.2)
BILIRUB SERPL-MCNC: 1.6 MG/DL (ref 0.1–2)
BUN BLD-MCNC: 2 MG/DL (ref 7–18)
BUN/CREAT SERPL: 2.4 (ref 10–20)
CALCIUM BLD-MCNC: 8.3 MG/DL (ref 8.5–10.1)
CHLORIDE SERPL-SCNC: 100 MMOL/L (ref 98–112)
CO2 SERPL-SCNC: 30 MMOL/L (ref 21–32)
CREAT BLD-MCNC: 0.82 MG/DL
D DIMER PPP FEU-MCNC: 0.4 UG/ML FEU (ref ?–0.5)
DEPRECATED RDW RBC AUTO: 68.8 FL (ref 35.1–46.3)
EGFRCR SERPLBLD CKD-EPI 2021: 111 ML/MIN/1.73M2 (ref 60–?)
EOSINOPHIL # BLD AUTO: 0.12 X10(3) UL (ref 0–0.7)
EOSINOPHIL NFR BLD AUTO: 0.9 %
ERYTHROCYTE [DISTWIDTH] IN BLOOD BY AUTOMATED COUNT: 22.8 % (ref 11–15)
GLUCOSE BLD-MCNC: 152 MG/DL (ref 70–99)
HCT VFR BLD AUTO: 30.4 %
HGB BLD-MCNC: 9.7 G/DL
IMM GRANULOCYTES # BLD AUTO: 0.07 X10(3) UL (ref 0–1)
IMM GRANULOCYTES NFR BLD: 0.5 %
INR BLD: 1.54 (ref 0.85–1.16)
LIPASE SERPL-CCNC: 63 U/L (ref 13–75)
LYMPHOCYTES # BLD AUTO: 1.67 X10(3) UL (ref 1–4)
LYMPHOCYTES NFR BLD AUTO: 12.1 %
MCH RBC QN AUTO: 26.8 PG (ref 26–34)
MCHC RBC AUTO-ENTMCNC: 31.9 G/DL (ref 31–37)
MCV RBC AUTO: 84 FL
MONOCYTES # BLD AUTO: 1.04 X10(3) UL (ref 0.1–1)
MONOCYTES NFR BLD AUTO: 7.5 %
NEUTROPHILS # BLD AUTO: 10.87 X10 (3) UL (ref 1.5–7.7)
NEUTROPHILS # BLD AUTO: 10.87 X10(3) UL (ref 1.5–7.7)
NEUTROPHILS NFR BLD AUTO: 78.7 %
OSMOLALITY SERPL CALC.SUM OF ELEC: 283 MOSM/KG (ref 275–295)
PLATELET # BLD AUTO: 220 10(3)UL (ref 150–450)
PLATELET MORPHOLOGY: NORMAL
POTASSIUM SERPL-SCNC: 3.1 MMOL/L (ref 3.5–5.1)
PROT SERPL-MCNC: 7.3 G/DL (ref 6.4–8.2)
PROTHROMBIN TIME: 18.3 SECONDS (ref 11.6–14.8)
RBC # BLD AUTO: 3.62 X10(6)UL
SODIUM SERPL-SCNC: 137 MMOL/L (ref 136–145)
TROPONIN I HIGH SENSITIVITY: 4 NG/L
WBC # BLD AUTO: 13.8 X10(3) UL (ref 4–11)

## 2023-08-13 PROCEDURE — 71275 CT ANGIOGRAPHY CHEST: CPT | Performed by: EMERGENCY MEDICINE

## 2023-08-13 PROCEDURE — 74175 CTA ABDOMEN W/CONTRAST: CPT | Performed by: EMERGENCY MEDICINE

## 2023-08-13 PROCEDURE — 71045 X-RAY EXAM CHEST 1 VIEW: CPT | Performed by: EMERGENCY MEDICINE

## 2023-08-13 RX ORDER — MAGNESIUM HYDROXIDE/ALUMINUM HYDROXICE/SIMETHICONE 120; 1200; 1200 MG/30ML; MG/30ML; MG/30ML
30 SUSPENSION ORAL ONCE
Status: COMPLETED | OUTPATIENT
Start: 2023-08-13 | End: 2023-08-13

## 2023-08-13 RX ORDER — FAMOTIDINE 10 MG/ML
20 INJECTION, SOLUTION INTRAVENOUS ONCE
Status: COMPLETED | OUTPATIENT
Start: 2023-08-13 | End: 2023-08-13

## 2023-08-14 PROBLEM — R07.9 CHEST PAIN OF UNCERTAIN ETIOLOGY: Status: ACTIVE | Noted: 2023-08-14

## 2023-08-14 PROBLEM — K92.2 UGIB (UPPER GASTROINTESTINAL BLEED): Status: ACTIVE | Noted: 2023-08-14

## 2023-08-14 PROBLEM — F39 EPISODIC MOOD DISORDER (HCC): Status: ACTIVE | Noted: 2023-08-14

## 2023-08-14 PROBLEM — F39 EPISODIC MOOD DISORDER: Status: ACTIVE | Noted: 2023-08-14

## 2023-08-14 PROBLEM — F10.20 ALCOHOL DEPENDENCE, CONTINUOUS (HCC): Status: ACTIVE | Noted: 2023-08-14

## 2023-08-14 PROBLEM — F10.932 ALCOHOL WITHDRAWAL SYNDROME WITH PERCEPTUAL DISTURBANCE (HCC): Status: ACTIVE | Noted: 2023-08-14

## 2023-08-14 PROBLEM — K20.90 ESOPHAGITIS: Status: ACTIVE | Noted: 2023-08-14

## 2023-08-14 PROBLEM — K44.9 HIATAL HERNIA: Status: ACTIVE | Noted: 2023-08-14

## 2023-08-14 PROBLEM — Z78.9 ALCOHOL USE: Status: ACTIVE | Noted: 2023-08-14

## 2023-08-14 PROBLEM — F10.90 ALCOHOL USE: Status: ACTIVE | Noted: 2023-08-14

## 2023-08-14 LAB
ALBUMIN SERPL-MCNC: 2.7 G/DL (ref 3.4–5)
ALBUMIN/GLOB SERPL: 0.8 {RATIO} (ref 1–2)
ALP LIVER SERPL-CCNC: 185 U/L
ALT SERPL-CCNC: 35 U/L
ANION GAP SERPL CALC-SCNC: 10 MMOL/L (ref 0–18)
ANTIBODY SCREEN: NEGATIVE
AST SERPL-CCNC: 115 U/L (ref 15–37)
ATRIAL RATE: 109 BPM
ATRIAL RATE: 110 BPM
BASOPHILS # BLD AUTO: 0.04 X10(3) UL (ref 0–0.2)
BASOPHILS NFR BLD AUTO: 0.4 %
BILIRUB SERPL-MCNC: 1.9 MG/DL (ref 0.1–2)
BUN BLD-MCNC: 5 MG/DL (ref 7–18)
BUN/CREAT SERPL: 7.2 (ref 10–20)
CALCIUM BLD-MCNC: 8 MG/DL (ref 8.5–10.1)
CHLORIDE SERPL-SCNC: 108 MMOL/L (ref 98–112)
CO2 SERPL-SCNC: 24 MMOL/L (ref 21–32)
CREAT BLD-MCNC: 0.69 MG/DL
DEPRECATED RDW RBC AUTO: 71.4 FL (ref 35.1–46.3)
EGFRCR SERPLBLD CKD-EPI 2021: 117 ML/MIN/1.73M2 (ref 60–?)
EOSINOPHIL # BLD AUTO: 0.11 X10(3) UL (ref 0–0.7)
EOSINOPHIL NFR BLD AUTO: 1 %
ERYTHROCYTE [DISTWIDTH] IN BLOOD BY AUTOMATED COUNT: 23.3 % (ref 11–15)
GLOBULIN PLAS-MCNC: 3.6 G/DL (ref 2.8–4.4)
GLUCOSE BLD-MCNC: 101 MG/DL (ref 70–99)
HCT VFR BLD AUTO: 28.8 %
HGB BLD-MCNC: 8.9 G/DL
IMM GRANULOCYTES # BLD AUTO: 0.04 X10(3) UL (ref 0–1)
IMM GRANULOCYTES NFR BLD: 0.4 %
LYMPHOCYTES # BLD AUTO: 1.07 X10(3) UL (ref 1–4)
LYMPHOCYTES NFR BLD AUTO: 9.7 %
MAGNESIUM SERPL-MCNC: 1.8 MG/DL (ref 1.6–2.6)
MAGNESIUM SERPL-MCNC: 2 MG/DL (ref 1.6–2.6)
MCH RBC QN AUTO: 26.3 PG (ref 26–34)
MCHC RBC AUTO-ENTMCNC: 30.9 G/DL (ref 31–37)
MCV RBC AUTO: 85.2 FL
MONOCYTES # BLD AUTO: 0.71 X10(3) UL (ref 0.1–1)
MONOCYTES NFR BLD AUTO: 6.4 %
NEUTROPHILS # BLD AUTO: 9.08 X10 (3) UL (ref 1.5–7.7)
NEUTROPHILS # BLD AUTO: 9.08 X10(3) UL (ref 1.5–7.7)
NEUTROPHILS NFR BLD AUTO: 82.1 %
OSMOLALITY SERPL CALC.SUM OF ELEC: 291 MOSM/KG (ref 275–295)
P AXIS: 33 DEGREES
P AXIS: 54 DEGREES
P-R INTERVAL: 118 MS
P-R INTERVAL: 132 MS
PHOSPHATE SERPL-MCNC: 2 MG/DL (ref 2.5–4.9)
PLATELET # BLD AUTO: 185 10(3)UL (ref 150–450)
POTASSIUM SERPL-SCNC: 3.5 MMOL/L (ref 3.5–5.1)
PROT SERPL-MCNC: 6.3 G/DL (ref 6.4–8.2)
Q-T INTERVAL: 334 MS
Q-T INTERVAL: 334 MS
QRS DURATION: 78 MS
QRS DURATION: 92 MS
QTC CALCULATION (BEZET): 449 MS
QTC CALCULATION (BEZET): 452 MS
R AXIS: -4 DEGREES
R AXIS: 25 DEGREES
RBC # BLD AUTO: 3.38 X10(6)UL
RH BLOOD TYPE: POSITIVE
SODIUM SERPL-SCNC: 142 MMOL/L (ref 136–145)
T AXIS: 14 DEGREES
T AXIS: 9 DEGREES
TROPONIN I HIGH SENSITIVITY: 4 NG/L
VENTRICULAR RATE: 109 BPM
VENTRICULAR RATE: 110 BPM
WBC # BLD AUTO: 11.1 X10(3) UL (ref 4–11)

## 2023-08-14 PROCEDURE — 99223 1ST HOSP IP/OBS HIGH 75: CPT | Performed by: INTERNAL MEDICINE

## 2023-08-14 PROCEDURE — 90792 PSYCH DIAG EVAL W/MED SRVCS: CPT | Performed by: OTHER

## 2023-08-14 PROCEDURE — HZ2ZZZZ DETOXIFICATION SERVICES FOR SUBSTANCE ABUSE TREATMENT: ICD-10-PCS | Performed by: INTERNAL MEDICINE

## 2023-08-14 PROCEDURE — 99291 CRITICAL CARE FIRST HOUR: CPT | Performed by: INTERNAL MEDICINE

## 2023-08-14 RX ORDER — PHENOBARBITAL SODIUM 65 MG/ML
60 INJECTION INTRAMUSCULAR 2 TIMES DAILY
Status: DISCONTINUED | OUTPATIENT
Start: 2023-08-14 | End: 2023-08-17

## 2023-08-14 RX ORDER — MELATONIN
100 DAILY
Status: DISCONTINUED | OUTPATIENT
Start: 2023-08-14 | End: 2023-08-14

## 2023-08-14 RX ORDER — LORAZEPAM 2 MG/ML
4 INJECTION INTRAMUSCULAR
Status: DISCONTINUED | OUTPATIENT
Start: 2023-08-14 | End: 2023-08-25

## 2023-08-14 RX ORDER — CHLORPROMAZINE HYDROCHLORIDE 25 MG/1
25 TABLET, FILM COATED ORAL 3 TIMES DAILY PRN
Status: DISCONTINUED | OUTPATIENT
Start: 2023-08-14 | End: 2023-08-15

## 2023-08-14 RX ORDER — DEXMEDETOMIDINE HYDROCHLORIDE 4 UG/ML
INJECTION, SOLUTION INTRAVENOUS CONTINUOUS PRN
Status: DISCONTINUED | OUTPATIENT
Start: 2023-08-14 | End: 2023-08-19

## 2023-08-14 RX ORDER — LORAZEPAM 2 MG/ML
3 INJECTION INTRAMUSCULAR EVERY 30 MIN PRN
Status: DISCONTINUED | OUTPATIENT
Start: 2023-08-14 | End: 2023-08-25

## 2023-08-14 RX ORDER — LORAZEPAM 2 MG/ML
1 INJECTION INTRAMUSCULAR
Status: DISCONTINUED | OUTPATIENT
Start: 2023-08-14 | End: 2023-08-14

## 2023-08-14 RX ORDER — LORAZEPAM 2 MG/1
2 TABLET ORAL
Status: DISCONTINUED | OUTPATIENT
Start: 2023-08-14 | End: 2023-08-14

## 2023-08-14 RX ORDER — THIAMINE HYDROCHLORIDE 100 MG/ML
100 INJECTION, SOLUTION INTRAMUSCULAR; INTRAVENOUS 3 TIMES DAILY
Status: DISCONTINUED | OUTPATIENT
Start: 2023-08-14 | End: 2023-08-21

## 2023-08-14 RX ORDER — METOPROLOL TARTRATE 5 MG/5ML
INJECTION INTRAVENOUS
Status: COMPLETED
Start: 2023-08-14 | End: 2023-08-14

## 2023-08-14 RX ORDER — LORAZEPAM 2 MG/ML
2 INJECTION INTRAMUSCULAR
Status: DISCONTINUED | OUTPATIENT
Start: 2023-08-14 | End: 2023-08-14

## 2023-08-14 RX ORDER — HALOPERIDOL 5 MG/ML
2 INJECTION INTRAMUSCULAR EVERY 4 HOURS
Status: DISCONTINUED | OUTPATIENT
Start: 2023-08-14 | End: 2023-08-15

## 2023-08-14 RX ORDER — LORAZEPAM 2 MG/ML
1 INJECTION INTRAMUSCULAR ONCE
Status: COMPLETED | OUTPATIENT
Start: 2023-08-14 | End: 2023-08-14

## 2023-08-14 RX ORDER — LORAZEPAM 1 MG/1
1 TABLET ORAL
Status: DISCONTINUED | OUTPATIENT
Start: 2023-08-14 | End: 2023-08-14

## 2023-08-14 RX ORDER — LORAZEPAM 2 MG/ML
1 INJECTION INTRAMUSCULAR
Status: DISCONTINUED | OUTPATIENT
Start: 2023-08-14 | End: 2023-08-25

## 2023-08-14 RX ORDER — PHENOBARBITAL SODIUM 65 MG/ML
65 INJECTION INTRAMUSCULAR 2 TIMES DAILY
Status: DISCONTINUED | OUTPATIENT
Start: 2023-08-14 | End: 2023-08-14

## 2023-08-14 RX ORDER — FOLIC ACID 1 MG/1
1 TABLET ORAL DAILY
Status: DISCONTINUED | OUTPATIENT
Start: 2023-08-14 | End: 2023-08-27

## 2023-08-14 RX ORDER — METOPROLOL TARTRATE 5 MG/5ML
5 INJECTION INTRAVENOUS EVERY 6 HOURS
Status: DISCONTINUED | OUTPATIENT
Start: 2023-08-14 | End: 2023-08-14

## 2023-08-14 RX ORDER — LORAZEPAM 2 MG/ML
2 INJECTION INTRAMUSCULAR EVERY 30 MIN PRN
Status: DISCONTINUED | OUTPATIENT
Start: 2023-08-14 | End: 2023-08-25

## 2023-08-14 RX ORDER — METOPROLOL TARTRATE 5 MG/5ML
5 INJECTION INTRAVENOUS ONCE
Status: COMPLETED | OUTPATIENT
Start: 2023-08-14 | End: 2023-08-14

## 2023-08-14 RX ORDER — CHLORPROMAZINE HYDROCHLORIDE 25 MG/ML
25 INJECTION INTRAMUSCULAR ONCE
Status: COMPLETED | OUTPATIENT
Start: 2023-08-14 | End: 2023-08-14

## 2023-08-14 RX ORDER — POTASSIUM CHLORIDE 20 MEQ/1
40 TABLET, EXTENDED RELEASE ORAL ONCE
Status: DISCONTINUED | OUTPATIENT
Start: 2023-08-14 | End: 2023-08-14

## 2023-08-14 RX ORDER — PANTOPRAZOLE SODIUM 40 MG/1
TABLET, DELAYED RELEASE ORAL
Qty: 30 TABLET | Refills: 0 | Status: SHIPPED | OUTPATIENT
Start: 2023-08-14 | End: 2023-08-14

## 2023-08-14 RX ORDER — LORAZEPAM 2 MG/ML
2 INJECTION INTRAMUSCULAR EVERY 4 HOURS
Status: DISCONTINUED | OUTPATIENT
Start: 2023-08-14 | End: 2023-08-17

## 2023-08-14 RX ORDER — HALOPERIDOL 2 MG/ML
2 SOLUTION ORAL EVERY 4 HOURS
Status: DISCONTINUED | OUTPATIENT
Start: 2023-08-14 | End: 2023-08-14

## 2023-08-14 RX ORDER — SODIUM CHLORIDE, SODIUM LACTATE, POTASSIUM CHLORIDE, CALCIUM CHLORIDE 600; 310; 30; 20 MG/100ML; MG/100ML; MG/100ML; MG/100ML
INJECTION, SOLUTION INTRAVENOUS CONTINUOUS
Status: DISCONTINUED | OUTPATIENT
Start: 2023-08-14 | End: 2023-08-18

## 2023-08-14 RX ORDER — POTASSIUM CHLORIDE 14.9 MG/ML
20 INJECTION INTRAVENOUS ONCE
Status: COMPLETED | OUTPATIENT
Start: 2023-08-14 | End: 2023-08-14

## 2023-08-14 NOTE — ED QUICK NOTES
Orders for admission, patient is aware of plan and ready to go upstairs. Any questions, please call ED RN Janis Galo at extension [phone]. Patient Covid vaccination status: Fully vaccinated     COVID Test Ordered in ED: None    COVID Suspicion at Admission: N/A    Running Infusions:  None    Mental Status/LOC at time of transport: a. ox4    Other pertinent information:   CIWA score: N/A   NIH score:  N/A

## 2023-08-14 NOTE — PLAN OF CARE
Admission and med rec completed with patient to best of ability. MD paged via [employer]! to obtain admission orders- awaiting call back. Care endorsed to primary RN.

## 2023-08-14 NOTE — SIGNIFICANT EVENT
RRT    *See RRT Documentation Record*    Reason the RRT was called: HR 140s sustaining  Assessment of patient leading up to RRT: detox protocol, IV ativan and bolus given  Interventions/Testing: EKG, labs, IV metoprolol  Patient Outcome/Disposition: stay on unit  Family Notified: yes  Name of family notified: life partner at bedside

## 2023-08-14 NOTE — PLAN OF CARE
Received patient to the unit from the ER. Admission navigator completed by the relief RN. Patient reports having withdrawal symptoms that started at home including hallucinations, nausea and vomiting, tremors, and sweating. Patient reports that he has been having hiccups frequently over the last month. Unable to reach Dr. Lew Barillas for admission orders. Hospitalist Dr Evan Paz provided orders for CIWA protocol and for thorazine. Fall and seizure precautions maintained for the patient. Problem: Patient Centered Care  Goal: Patient preferences are identified and integrated in the patient's plan of care  Description: Interventions:  - What would you like us to know as we care for you? I live at home with my parents and girlfriend.   - Provide timely, complete, and accurate information to patient/family  - Incorporate patient and family knowledge, values, beliefs, and cultural backgrounds into the planning and delivery of care  - Encourage patient/family to participate in care and decision-making at the level they choose  - Honor patient and family perspectives and choices  Outcome: Progressing     Problem: Patient/Family Goals  Goal: Patient/Family Long Term Goal  Description: Patient's Long Term Goal: To go home    Interventions:  - GI consult  - monitor CIWA scores and treat according to [facility]  - monitor vitals  - monitor oral intake tolera  - See additional Care Plan goals for specific interventions  Outcome: Progressing  Goal: Patient/Family Short Term Goal  Description: Patient's Short Term Goal: To feel better    Interventions:   - GI consult  - monitor CIWA scores and treat according to [facility]  - monitor vitals  - monitor oral intake tolerance  - Psych liaison consult  - See additional Care Plan goals for specific interventions  Outcome: Progressing     Problem: SAFETY ADULT - FALL  Goal: Free from fall injury  Description: INTERVENTIONS:  - Assess pt frequently for physical needs  - Identify cognitive and physical deficits and behaviors that affect risk of falls.   - [place] fall precautions as indicated by assessment.  - Educate pt/family on patient safety including physical limitations  - Instruct pt to call for assistance with activity based on assessment  - Modify environment to reduce risk of injury  - Provide assistive devices as appropriate  - Consider OT/PT consult to assist with strengthening/mobility  - Encourage toileting schedule  Outcome: Progressing     Problem: DISCHARGE PLANNING  Goal: Discharge to home or other facility with appropriate resources  Description: INTERVENTIONS:  - Identify barriers to discharge w/pt and caregiver  - Include patient/family/discharge partner in discharge planning  - Arrange for needed discharge resources and transportation as appropriate  - Identify discharge learning needs (meds, wound care, etc)  - Arrange for interpreters to assist at discharge as needed  - Consider post-discharge preferences of patient/family/discharge partner  - Complete POLST form as appropriate  - Assess patient's ability to be responsible for managing their own health  - Refer to Case Management Department for coordinating discharge planning if the patient needs post-hospital services based on physician/LIP order or complex needs related to functional status, cognitive ability or social support system  Outcome: Progressing     Problem: METABOLIC/FLUID AND ELECTROLYTES - ADULT  Goal: Electrolytes maintained within normal limits  Description: INTERVENTIONS:  - Monitor labs and rhythm and assess patient for signs and symptoms of electrolyte imbalances  - Administer electrolyte replacement as ordered  - Monitor response to electrolyte replacements, including rhythm and repeat lab results as appropriate  - Fluid restriction as ordered  - Instruct patient on fluid and nutrition restrictions as appropriate  Outcome: Progressing     Problem: NEUROLOGICAL - ADULT  Goal: Absence of seizures  Description: INTERVENTIONS  - Monitor for seizure activity  - Administer anti-seizure medications as ordered  - Monitor neurological status  Outcome: Progressing     Problem: GASTROINTESTINAL - ADULT  Goal: Minimal or absence of nausea and vomiting  Description: INTERVENTIONS:  - Maintain adequate hydration with IV or PO as ordered and tolerated  - Nasogastric tube to low intermittent suction as ordered  - Evaluate effectiveness of ordered antiemetic medications  - Provide nonpharmacologic comfort measures as appropriate  - Advance diet as tolerated, if ordered  - Obtain nutritional consult as needed  - Evaluate fluid balance  Outcome: Progressing

## 2023-08-14 NOTE — PLAN OF CARE
CIWA per order. Psych updated with new symptoms of withdrawal. Transferring to  CCU for closer monitoring d/t elevated CIWA and elevated heart rate. Pt partner at bedside updated on plan of care. Problem: Patient Centered Care  Goal: Patient preferences are identified and integrated in the patient's plan of care  Description: Interventions:  - What would you like us to know as we care for you? I live at home with my parents and girlfriend. - Provide timely, complete, and accurate information to patient/family  - Incorporate patient and family knowledge, values, beliefs, and cultural backgrounds into the planning and delivery of care  - Encourage patient/family to participate in care and decision-making at the level they choose  - Honor patient and family perspectives and choices  Outcome: Not Progressing     Problem: Patient/Family Goals  Goal: Patient/Family Long Term Goal  Description: Patient's Long Term Goal: To go home    Interventions:  - GI consult  - monitor CIWA scores and treat according to [facility]  - monitor vitals  - monitor oral intake tolera  - See additional Care Plan goals for specific interventions  Outcome: Not Progressing  Goal: Patient/Family Short Term Goal  Description: Patient's Short Term Goal: To feel better    Interventions:   - GI consult  - monitor CIWA scores and treat according to [facility]  - monitor vitals  - monitor oral intake tolerance  - Psych liaison consult  - See additional Care Plan goals for specific interventions  Outcome: Not Progressing     Problem: SAFETY ADULT - FALL  Goal: Free from fall injury  Description: INTERVENTIONS:  - Assess pt frequently for physical needs  - Identify cognitive and physical deficits and behaviors that affect risk of falls.   - [place] fall precautions as indicated by assessment.  - Educate pt/family on patient safety including physical limitations  - Instruct pt to call for assistance with activity based on assessment  - Modify environment to reduce risk of injury  - Provide assistive devices as appropriate  - Consider OT/PT consult to assist with strengthening/mobility  - Encourage toileting schedule  Outcome: Not Progressing     Problem: DISCHARGE PLANNING  Goal: Discharge to home or other facility with appropriate resources  Description: INTERVENTIONS:  - Identify barriers to discharge w/pt and caregiver  - Include patient/family/discharge partner in discharge planning  - Arrange for needed discharge resources and transportation as appropriate  - Identify discharge learning needs (meds, wound care, etc)  - Arrange for interpreters to assist at discharge as needed  - Consider post-discharge preferences of patient/family/discharge partner  - Complete POLST form as appropriate  - Assess patient's ability to be responsible for managing their own health  - Refer to Case Management Department for coordinating discharge planning if the patient needs post-hospital services based on physician/LIP order or complex needs related to functional status, cognitive ability or social support system  Outcome: Not Progressing     Problem: METABOLIC/FLUID AND ELECTROLYTES - ADULT  Goal: Electrolytes maintained within normal limits  Description: INTERVENTIONS:  - Monitor labs and rhythm and assess patient for signs and symptoms of electrolyte imbalances  - Administer electrolyte replacement as ordered  - Monitor response to electrolyte replacements, including rhythm and repeat lab results as appropriate  - Fluid restriction as ordered  - Instruct patient on fluid and nutrition restrictions as appropriate  Outcome: Not Progressing     Problem: NEUROLOGICAL - ADULT  Goal: Absence of seizures  Description: INTERVENTIONS  - Monitor for seizure activity  - Administer anti-seizure medications as ordered  - Monitor neurological status  Outcome: Not Progressing     Problem: GASTROINTESTINAL - ADULT  Goal: Minimal or absence of nausea and vomiting  Description: INTERVENTIONS:  - Maintain adequate hydration with IV or PO as ordered and tolerated  - Nasogastric tube to low intermittent suction as ordered  - Evaluate effectiveness of ordered antiemetic medications  - Provide nonpharmacologic comfort measures as appropriate  - Advance diet as tolerated, if ordered  - Obtain nutritional consult as needed  - Evaluate fluid balance  Outcome: Not Progressing

## 2023-08-14 NOTE — ED INITIAL ASSESSMENT (HPI)
Pt c/o 2.5 weeks of abdominal bloating, followed by shortness of breath and also reports pain radiating to L side of chest all for same duration

## 2023-08-14 NOTE — DISCHARGE INSTRUCTIONS
Sometimes managing your health at home requires assistance. The [place] team has recognized your preference to use [address]. They can be reached at [phone]. The fax number for your reference is [phone]. A representative from the home health agency will contact you or your family to schedule your first visit. The patient decline all services provided by this writer, but accepted business and resources. The  follow up with the nurse regarding outcome of the assessment. Lester Ortega, [address], [address]  [phone]/ work cell    [facility]  [phone]  [place], [facility]

## 2023-08-14 NOTE — ED QUICK NOTES
Pt to ED with c/o generalized abdominal discomfort and bloating for about 2 weeks. +intermittent belching and vomiting for last 2 weeks. Denies cough or fever. +intermittent dyspnea. No respiratory distress noted. Lungs clear bilaterally. Pt is alert and oriented x4. Pt skin and sclera appear jaundice in appearance. Pt ambulating by self with steady gait. Pt states +etoh every day with last intake prior to arrival. Pt states black stool when he drinks cherry mikes hard lemonade only. Abdomen appears distended. +active bowel sounds noted, IV established to LAC #[id number] infusing per order. Awaiting labs. Will continue to monitor.

## 2023-08-14 NOTE — SPIRITUAL CARE NOTE
Spiritual Care Visit Note    Visited With: Patient not available    Writer report responding to RRT. No need at this time. Follow up as requested.     Spiritual Care Taxonomy:    Interventions: Silent prayer     [address]   [id number]     On call  services [phone]

## 2023-08-15 LAB
AMPHET UR QL SCN: NEGATIVE
ANION GAP SERPL CALC-SCNC: 6 MMOL/L (ref 0–18)
BASOPHILS # BLD AUTO: 0.05 X10(3) UL (ref 0–0.2)
BASOPHILS NFR BLD AUTO: 0.5 %
BENZODIAZ UR QL SCN: NEGATIVE
BUN BLD-MCNC: 6 MG/DL (ref 7–18)
BUN/CREAT SERPL: 10.2 (ref 10–20)
CALCIUM BLD-MCNC: 8.2 MG/DL (ref 8.5–10.1)
CANNABINOIDS UR QL SCN: NEGATIVE
CHLORIDE SERPL-SCNC: 108 MMOL/L (ref 98–112)
CO2 SERPL-SCNC: 26 MMOL/L (ref 21–32)
COCAINE UR QL: NEGATIVE
CREAT BLD-MCNC: 0.59 MG/DL
CREAT UR-SCNC: 82 MG/DL
DEPRECATED RDW RBC AUTO: 69.4 FL (ref 35.1–46.3)
EGFRCR SERPLBLD CKD-EPI 2021: 123 ML/MIN/1.73M2 (ref 60–?)
EOSINOPHIL # BLD AUTO: 0.25 X10(3) UL (ref 0–0.7)
EOSINOPHIL NFR BLD AUTO: 2.6 %
ERYTHROCYTE [DISTWIDTH] IN BLOOD BY AUTOMATED COUNT: 23 % (ref 11–15)
GLUCOSE BLD-MCNC: 86 MG/DL (ref 70–99)
HAV AB SER QL IA: REACTIVE
HAV IGM SER QL: REACTIVE
HBV CORE AB SERPL QL IA: NONREACTIVE
HBV SURFACE AB SER QL: NONREACTIVE
HBV SURFACE AB SERPL IA-ACNC: <3.1 MIU/ML
HBV SURFACE AG SERPL QL IA: NONREACTIVE
HCT VFR BLD AUTO: 25.1 %
HCV AB SERPL QL IA: NONREACTIVE
HGB BLD-MCNC: 7.9 G/DL
IMM GRANULOCYTES # BLD AUTO: 0.04 X10(3) UL (ref 0–1)
IMM GRANULOCYTES NFR BLD: 0.4 %
LYMPHOCYTES # BLD AUTO: 1.37 X10(3) UL (ref 1–4)
LYMPHOCYTES NFR BLD AUTO: 14.1 %
MAGNESIUM SERPL-MCNC: 1.7 MG/DL (ref 1.6–2.6)
MCH RBC QN AUTO: 26.4 PG (ref 26–34)
MCHC RBC AUTO-ENTMCNC: 31.5 G/DL (ref 31–37)
MCV RBC AUTO: 83.9 FL
MDMA UR QL SCN: NEGATIVE
MONOCYTES # BLD AUTO: 0.58 X10(3) UL (ref 0.1–1)
MONOCYTES NFR BLD AUTO: 6 %
NEUTROPHILS # BLD AUTO: 7.44 X10 (3) UL (ref 1.5–7.7)
NEUTROPHILS # BLD AUTO: 7.44 X10(3) UL (ref 1.5–7.7)
NEUTROPHILS NFR BLD AUTO: 76.4 %
OPIATES UR QL SCN: NEGATIVE
OSMOLALITY SERPL CALC.SUM OF ELEC: 287 MOSM/KG (ref 275–295)
OXYCODONE UR QL SCN: NEGATIVE
PHOSPHATE SERPL-MCNC: 2.1 MG/DL (ref 2.5–4.9)
PLATELET # BLD AUTO: 184 10(3)UL (ref 150–450)
POTASSIUM SERPL-SCNC: 3.6 MMOL/L (ref 3.5–5.1)
RBC # BLD AUTO: 2.99 X10(6)UL
SODIUM SERPL-SCNC: 140 MMOL/L (ref 136–145)
WBC # BLD AUTO: 9.7 X10(3) UL (ref 4–11)

## 2023-08-15 PROCEDURE — 99233 SBSQ HOSP IP/OBS HIGH 50: CPT | Performed by: OTHER

## 2023-08-15 PROCEDURE — 99233 SBSQ HOSP IP/OBS HIGH 50: CPT | Performed by: INTERNAL MEDICINE

## 2023-08-15 RX ORDER — MAGNESIUM SULFATE HEPTAHYDRATE 40 MG/ML
2 INJECTION, SOLUTION INTRAVENOUS ONCE
Status: COMPLETED | OUTPATIENT
Start: 2023-08-15 | End: 2023-08-15

## 2023-08-15 RX ORDER — POTASSIUM CHLORIDE 14.9 MG/ML
20 INJECTION INTRAVENOUS ONCE
Status: COMPLETED | OUTPATIENT
Start: 2023-08-15 | End: 2023-08-15

## 2023-08-15 RX ORDER — HALOPERIDOL 5 MG/ML
1 INJECTION INTRAMUSCULAR EVERY 4 HOURS
Status: DISCONTINUED | OUTPATIENT
Start: 2023-08-15 | End: 2023-08-16

## 2023-08-15 RX ORDER — CLONIDINE 0.2 MG/24H
1 PATCH, EXTENDED RELEASE TRANSDERMAL WEEKLY
Status: DISCONTINUED | OUTPATIENT
Start: 2023-08-15 | End: 2023-08-17

## 2023-08-15 RX ORDER — CHLORPROMAZINE HYDROCHLORIDE 25 MG/1
25 TABLET, FILM COATED ORAL 3 TIMES DAILY PRN
Status: DISCONTINUED | OUTPATIENT
Start: 2023-08-15 | End: 2023-08-16

## 2023-08-15 NOTE — PLAN OF CARE
Patient transferred to PCCU at change of shift for tachycardia and elevated CIWA scores. Pt with reports of visual hallucinations, sweating, tachycardia, and slight agitation. Scheduled ativan and Haldol given. Urine drug screen negative. Patient still having continuous hiccups. Patient educated on safety precautions, bed alarm on, and call light within reach. Patient and partner at bedside updated on plan of care, all questions answered. Problem: Patient Centered Care  Goal: Patient preferences are identified and integrated in the patient's plan of care  Description: Interventions:  - What would you like us to know as we care for you? I live at home with my parents and girlfriend.   - Provide timely, complete, and accurate information to patient/family  - Incorporate patient and family knowledge, values, beliefs, and cultural backgrounds into the planning and delivery of care  - Encourage patient/family to participate in care and decision-making at the level they choose  - Honor patient and family perspectives and choices  Outcome: Progressing     Problem: Patient/Family Goals  Goal: Patient/Family Long Term Goal  Description: Patient's Long Term Goal: To go home    Interventions:  - GI consult  - monitor CIWA scores and treat according to [facility]  - monitor vitals  - monitor oral intake tolera  - See additional Care Plan goals for specific interventions  Outcome: Progressing  Goal: Patient/Family Short Term Goal  Description: Patient's Short Term Goal: To feel better    Interventions:   - GI consult  - monitor CIWA scores and treat according to [facility]  - monitor vitals  - monitor oral intake tolerance  - Psych liaison consult  - See additional Care Plan goals for specific interventions  Outcome: Progressing     Problem: SAFETY ADULT - FALL  Goal: Free from fall injury  Description: INTERVENTIONS:  - Assess pt frequently for physical needs  - Identify cognitive and physical deficits and behaviors that affect risk of falls.  - [place] fall precautions as indicated by assessment.  - Educate pt/family on patient safety including physical limitations  - Instruct pt to call for assistance with activity based on assessment  - Modify environment to reduce risk of injury  - Provide assistive devices as appropriate  - Consider OT/PT consult to assist with strengthening/mobility  - Encourage toileting schedule  Outcome: Progressing     Problem: DISCHARGE PLANNING  Goal: Discharge to home or other facility with appropriate resources  Description: INTERVENTIONS:  - Identify barriers to discharge w/pt and caregiver  - Include patient/family/discharge partner in discharge planning  - Arrange for needed discharge resources and transportation as appropriate  - Identify discharge learning needs (meds, wound care, etc)  - Arrange for interpreters to assist at discharge as needed  - Consider post-discharge preferences of patient/family/discharge partner  - Complete POLST form as appropriate  - Assess patient's ability to be responsible for managing their own health  - Refer to Case Management Department for coordinating discharge planning if the patient needs post-hospital services based on physician/LIP order or complex needs related to functional status, cognitive ability or social support system  Outcome: Progressing     Problem: METABOLIC/FLUID AND ELECTROLYTES - ADULT  Goal: Electrolytes maintained within normal limits  Description: INTERVENTIONS:  - Monitor labs and rhythm and assess patient for signs and symptoms of electrolyte imbalances  - Administer electrolyte replacement as ordered  - Monitor response to electrolyte replacements, including rhythm and repeat lab results as appropriate  - Fluid restriction as ordered  - Instruct patient on fluid and nutrition restrictions as appropriate  Outcome: Progressing     Problem: NEUROLOGICAL - ADULT  Goal: Absence of seizures  Description: INTERVENTIONS  - Monitor for seizure activity  - Administer anti-seizure medications as ordered  - Monitor neurological status  Outcome: Progressing     Problem: GASTROINTESTINAL - ADULT  Goal: Minimal or absence of nausea and vomiting  Description: INTERVENTIONS:  - Maintain adequate hydration with IV or PO as ordered and tolerated  - Nasogastric tube to low intermittent suction as ordered  - Evaluate effectiveness of ordered antiemetic medications  - Provide nonpharmacologic comfort measures as appropriate  - Advance diet as tolerated, if ordered  - Obtain nutritional consult as needed  - Evaluate fluid balance  Outcome: Progressing

## 2023-08-15 NOTE — PLAN OF CARE
Pt is lethargic and orientx3. Pt on RA. Pt started on precedex gtt for restlessness, see eMAR. Ativan given PRN for CIWA scores, see eMAR. Pt safety maintained, bed in lowest position and call light within reach. Problem: Patient Centered Care  Goal: Patient preferences are identified and integrated in the patient's plan of care  Description: Interventions:  - What would you like us to know as we care for you? I live at home with my parents and girlfriend. - Provide timely, complete, and accurate information to patient/family  - Incorporate patient and family knowledge, values, beliefs, and cultural backgrounds into the planning and delivery of care  - Encourage patient/family to participate in care and decision-making at the level they choose  - Honor patient and family perspectives and choices  Outcome: Progressing     Problem: Patient/Family Goals  Goal: Patient/Family Long Term Goal  Description: Patient's Long Term Goal: To go home    Interventions:  - GI consult  - monitor CIWA scores and treat according to [facility]  - monitor vitals  - monitor oral intake tolera  - See additional Care Plan goals for specific interventions  Outcome: Progressing  Goal: Patient/Family Short Term Goal  Description: Patient's Short Term Goal: To feel better    Interventions:   - GI consult  - monitor CIWA scores and treat according to [facility]  - monitor vitals  - monitor oral intake tolerance  - Psych liaison consult  - See additional Care Plan goals for specific interventions  Outcome: Progressing     Problem: SAFETY ADULT - FALL  Goal: Free from fall injury  Description: INTERVENTIONS:  - Assess pt frequently for physical needs  - Identify cognitive and physical deficits and behaviors that affect risk of falls.   - [place] fall precautions as indicated by assessment.  - Educate pt/family on patient safety including physical limitations  - Instruct pt to call for assistance with activity based on assessment  - Modify environment to reduce risk of injury  - Provide assistive devices as appropriate  - Consider OT/PT consult to assist with strengthening/mobility  - Encourage toileting schedule  Outcome: Progressing     Problem: DISCHARGE PLANNING  Goal: Discharge to home or other facility with appropriate resources  Description: INTERVENTIONS:  - Identify barriers to discharge w/pt and caregiver  - Include patient/family/discharge partner in discharge planning  - Arrange for needed discharge resources and transportation as appropriate  - Identify discharge learning needs (meds, wound care, etc)  - Arrange for interpreters to assist at discharge as needed  - Consider post-discharge preferences of patient/family/discharge partner  - Complete POLST form as appropriate  - Assess patient's ability to be responsible for managing their own health  - Refer to Case Management Department for coordinating discharge planning if the patient needs post-hospital services based on physician/LIP order or complex needs related to functional status, cognitive ability or social support system  Outcome: Progressing     Problem: METABOLIC/FLUID AND ELECTROLYTES - ADULT  Goal: Electrolytes maintained within normal limits  Description: INTERVENTIONS:  - Monitor labs and rhythm and assess patient for signs and symptoms of electrolyte imbalances  - Administer electrolyte replacement as ordered  - Monitor response to electrolyte replacements, including rhythm and repeat lab results as appropriate  - Fluid restriction as ordered  - Instruct patient on fluid and nutrition restrictions as appropriate  Outcome: Progressing     Problem: NEUROLOGICAL - ADULT  Goal: Absence of seizures  Description: INTERVENTIONS  - Monitor for seizure activity  - Administer anti-seizure medications as ordered  - Monitor neurological status  Outcome: Progressing     Problem: GASTROINTESTINAL - ADULT  Goal: Minimal or absence of nausea and vomiting  Description: INTERVENTIONS:  - Maintain adequate hydration with IV or PO as ordered and tolerated  - Nasogastric tube to low intermittent suction as ordered  - Evaluate effectiveness of ordered antiemetic medications  - Provide nonpharmacologic comfort measures as appropriate  - Advance diet as tolerated, if ordered  - Obtain nutritional consult as needed  - Evaluate fluid balance  Outcome: Progressing     Problem: Delirium  Goal: Minimize duration of delirium  Description: Interventions:  - Encourage use of hearing aids, eye glasses  - Promote highest level of mobility daily  - Provide frequent reorientation  - Promote wakefulness i.e. lights on, blinds open  - Promote sleep, encourage patient's normal rest cycle i.e. lights off, TV off, minimize noise and interruptions  - Encourage family to assist in orientation and promotion of home routines  Outcome: Progressing

## 2023-08-15 NOTE — H&P
[facility]    IM assessment and H& P    Marilyn Loots.  Patient Status:  Inpatient    10/11/1980 MRN [id number]   Location [facility] 2W/SW Attending Chloé Nava MD   Hosp Day # 0 PCP Terence Harris MD       Subjective:   Marilyn Loots. is a(n) 38year old male AMS, Alcohol withdrawal    Objective:      23  1845   BP: (!) 130/91   Pulse: (!) 125   Resp:    Temp:        Intake/Output Summary (Last 24 hours) at 2023 2144  Last data filed at 2023 1521  Gross per 24 hour   Intake 5255 ml   Output 400 ml   Net 4855 ml     Wt Readings from Last 2 Encounters:  23 : 202 lb 12.8 oz (92 kg)  23 : 185 lb (83.9 kg)      General appearance:  appears stated age and cooperative  Head: Normocephalic, without obvious abnormality, atraumatic  Eyes: negative  Neck: no adenopathy, no carotid bruit, no JVD, supple, symmetrical, trachea midline, and thyroid not enlarged, symmetric, no tenderness/mass/nodules  Pulmonary: clear to auscultation bilaterally  Cardiovascular: S1, S2 normal, no murmur, click, rub or gallop, regular rate and rhythm  Abdominal: soft, non-tender; bowel sounds normal; no masses,  no organomegaly  Extremities: extremities normal, atraumatic, no cyanosis or edema  Pulses: 2+ and symmetric  Neurologic: Grossly normal  Genital Exam: defer exam    Current Medications:    Current Facility-Administered Medications:     chlorproMAZINE (Thorazine) tab 25 mg, 25 mg, Oral, TID PRN    pantoprazole (Protonix) 40 mg in sodium chloride 0.9% PF 10 mL IV push, 40 mg, Intravenous, Q12H    metoprolol tartrate (Lopressor) tab 25 mg, 25 mg, Oral, BID PRN    lactated ringers infusion, , Intravenous, Continuous    thiamine (Vitamin B1) tab 100 mg, 100 mg, Oral, Daily    folic acid (Folvite) tab 1 mg, 1 mg, Oral, Daily    LORazepam (Ativan) 2 mg/mL injection 2 mg, 2 mg, Intravenous, q4h    haloperidol lactate (Haldol) 5 MG/ML injection 2 mg, 2 mg, Intravenous, q4h valproate (Depacon) 250 mg in sodium chloride 0.9% 50 mL IVPB, 250 mg, Intravenous, Q8H    dexmedeTOMIDine in sodium chloride 0.9% (Precedex) 400 mcg/100mL infusion premix, 0.2-1.5 mcg/kg/hr (Dosing Weight), Intravenous, Continuous PRN    LORazepam (Ativan) 2 mg/mL injection 1 mg, 1 mg, Intravenous, Q1H PRN    LORazepam (Ativan) 2 mg/mL injection 2 mg, 2 mg, Intravenous, Q30 Min PRN    LORazepam (Ativan) 2 mg/mL injection 3 mg, 3 mg, Intravenous, Q30 Min PRN    LORazepam (Ativan) 2 mg/mL injection 4 mg, 4 mg, Intravenous, Q15 Min PRN    Allergies    Morphine                SWELLING    Comment:SHORTNESS OF BREATH    Assessment and Plan:     Chest pain of uncertain etiology      Hiatal hernia      UGIB (upper gastrointestinal bleed)      Esophagitis      Alcohol use      Patient seen and evaluated, Full assessment dictated. Admitted with Alcohol withdrawal and is scoring high on CIWA scale. Transfer to ICU. DVT Prophylaxis:    GI Prophylaxis:    Discharge Plan:    Results:     Lab Results   Component Value Date    WBC 11.1 (H) 08/14/2023    HGB 8.9 (L) 08/14/2023    HCT 28.8 (L) 08/14/2023    .0 08/14/2023     08/14/2023    K 3.5 08/14/2023     08/14/2023    CO2 24.0 08/14/2023    CREATSERUM 0.69 (L) 08/14/2023    BUN 5 (L) 08/14/2023     (H) 08/14/2023    CA 8.0 (L) 08/14/2023    ALB 2.7 (L) 08/14/2023    ALKPHO 185 (H) 08/14/2023    BILT 1.9 08/14/2023    TP 6.3 (L) 08/14/2023     (H) 08/14/2023    ALT 35 08/14/2023    INR 1.54 (H) 08/13/2023    LIP 63 08/13/2023    DDIMER 0.40 08/13/2023    ESRML 27 (H) 10/01/2021    MG 1.8 08/14/2023    PHOS 2.0 (L) 08/13/2023    TROP 0.00 01/31/2017     No results found for this visit on 08/13/23. CTA CHEST+CTA ABDOMEN DISSECT SET (CPT=71275/81350)    Result Date: 8/14/2023  CONCLUSION:  1. No evidence of acute aortic dissection.   2. Nonspecific distal esophageal wall thickening with periesophageal infiltration and adjacent periesophageal lymph nodes, concerning for potential underlying esophagitis. Consider further endoscopic assessment if clinically warranted. 3. No acute intra-abdominal process is identified. The etiology of the patient's symptoms is unclear from this study. 4. Partially due distal colonic diverticulosis without CT evidence acute complication in the imaged volume. 5. Lesser incidental findings as above. A preliminary report was issued by the [address] [place] [address] Radiology teleradiology service. There are no major discrepancies. Dictated by (CST): Liana Nguyen MD on 8/14/2023 at 8:28 AM     Finalized by (CST): Liana Nguyen MD on 8/14/2023 at 8:36 AM          XR CHEST AP PORTABLE  (CPT=71045)    Result Date: 8/14/2023  CONCLUSION:  Negative for radiographically evident acute intrathoracic process. A preliminary report was issued by the [address] [place] [address] Radiology teleradiology service. There are no major discrepancies.    Dictated by (CST): Liana Nguyen MD on 8/14/2023 at 6:55 AM     Finalized by (CST): Liana Nguyen MD on 8/14/2023 at 6:55 AM         EKG 12 Lead    Result Date: 8/14/2023  Sinus tachycardia Inferior infarct , age undetermined Abnormal ECG When compared with ECG of 13-AUG-2023 20:58, No significant change was found Confirmed by Eder Salmeron DO, Asif ([id number]) on 8/14/2023 8:52:51 PM    EKG 12 Lead    Result Date: 8/14/2023  Sinus tachycardia T wave abnormality, consider inferior ischemia Abnormal ECG When compared with ECG of 31-MAY-2023 21:01, No significant change was found Confirmed by Army Surinder M.D., Raulito Diss ([id number]) on 8/14/2023 1:59:41 PM             Frankie Goldmann, MD  8/14/2023

## 2023-08-16 ENCOUNTER — APPOINTMENT (OUTPATIENT)
Dept: GENERAL RADIOLOGY | Facility: HOSPITAL | Age: 43
End: 2023-08-16
Attending: Other
Payer: MEDICAID

## 2023-08-16 LAB
EST. AVERAGE GLUCOSE BLD GHB EST-MCNC: 100 MG/DL (ref 68–126)
HBA1C MFR BLD: 5.1 % (ref ?–5.7)
MAGNESIUM SERPL-MCNC: 2.4 MG/DL (ref 1.6–2.6)
PHENOBARB SERPL-MCNC: 6.3 UG/ML (ref 15–40)
PHOSPHATE SERPL-MCNC: 3.3 MG/DL (ref 2.5–4.9)
POTASSIUM SERPL-SCNC: 4.4 MMOL/L (ref 3.5–5.1)
URATE SERPL-MCNC: 5.6 MG/DL

## 2023-08-16 PROCEDURE — 99233 SBSQ HOSP IP/OBS HIGH 50: CPT | Performed by: OTHER

## 2023-08-16 PROCEDURE — 99232 SBSQ HOSP IP/OBS MODERATE 35: CPT | Performed by: INTERNAL MEDICINE

## 2023-08-16 PROCEDURE — 71045 X-RAY EXAM CHEST 1 VIEW: CPT | Performed by: OTHER

## 2023-08-16 RX ORDER — HALOPERIDOL 5 MG/ML
2 INJECTION INTRAMUSCULAR EVERY 4 HOURS
Status: DISCONTINUED | OUTPATIENT
Start: 2023-08-16 | End: 2023-08-19

## 2023-08-16 RX ORDER — ONDANSETRON 2 MG/ML
4 INJECTION INTRAMUSCULAR; INTRAVENOUS EVERY 6 HOURS PRN
Status: DISCONTINUED | OUTPATIENT
Start: 2023-08-16 | End: 2023-08-27

## 2023-08-16 NOTE — H&P
[facility]    PATIENT'S NAME: NASIM TOBIAS JR.   ATTENDING PHYSICIAN: Raul Ivy MD   PATIENT ACCOUNT#:   [id number]    LOCATION:  [address] [place] [address] Avenue #:   [id number]       YOB: 1980  ADMISSION DATE:       08/13/2023    HISTORY AND PHYSICAL EXAMINATION    DATE OF SERVICE:  08/14/2023    CHIEF COMPLAINT:  Altered mental status, alcohol withdrawal, delirium tremens, sinus tachycardia. HISTORY OF PRESENT ILLNESS:  The patient is a 78-year-old gentleman who was admitted from the ER when he had originally presented with chest pain, abdominal pain, flank pain, and a variety of complaints to the abdomen with heartburn. The patient was evaluated and did not have any hematochezia or melena. The patient did not have any shortness of breath and denied any chest pain on exertion. Upon further evaluation, the patient was noted to have severe alcohol abuse history. PAST MEDICAL HISTORY:  Relevant for history of gastroesophageal reflux disease, history of obesity, history of hypertension, history of gout, history of hyperlipidemia. PAST SURGICAL HISTORY:  Relevant for EGD, history of elbow fracture surgery, history of inguinal hernia repair. MEDICATIONS:  Currently at the time of admission were none. ALLERGIES:  Morphine. FAMILY HISTORY:  Significant for father with diabetes and mother with liver cancer and father with hypertension. SOCIAL HISTORY:  The patient does not smoke. Drinks a significant amount. No history of illicit drug use but does use Cannibis. The patient is in a same-sex relationship with a partner who was present at the bedside. REVIEW OF SYSTEMS:  A 13-point review of systems was performed and was negative except as per HPI. PHYSICAL EXAMINATION:    VITAL SIGNS:  Blood pressure 133/79, pulse rate 119, respiratory rate 20, temperature 97.8, pulse ox 97%. NECK:  No JVD. No bruit. No lymphadenopathy.   LUNGS:  Clear to auscultation. HEART:  S1, S2 heard. No S3, no S4. No murmur, no rub, no gallop. ABDOMEN:  Soft, obese. No hepatosplenomegaly. EXTREMITIES:  No edema. NEUROLOGIC:  Alert. Appears slightly agitated. Otherwise no focal deficits. LABORATORY DATA:  Sodium 142, potassium 3.5, chloride 108, bicarb 24, BUN 5, creatinine 0.69, glucose 101, calcium 8, albumin 2.7, . Total protein 6.3. INR 1.54. Lipase 63. Magnesium 1.8, phosphorus 2.0. CT of the chest, abdomen, and pelvis was performed and shows no evidence of aortic dissection, nonspecific distal esophageal wall thickening with possible periesophageal infiltration and adjacent periesophageal lymph nodes, potential underlying esophagitis. EKG was performed and shows sinus tachycardia. No ST-T changes. ASSESSMENT AND PLAN:    1. Chest pain. The patient has chest pain which is gastrointestinal in origin and has CT evidence of esophagitis. The patient will be consulted by Gastroenterology. For the time being, the patient will be on PPI. We will monitor for any GI bleed in the making. 2.   Alcohol withdrawal and delirium tremens. The patient seems very agitated and has significant high alcohol intake history, which is likely causing him to enter into DTs. The patient will receive CIWA protocol, and we will appropriately consult to ICU as needed. 3.   Elevated liver enzymes. This is likely secondary to alcohol abuse. We will start on CIWA protocol and also supportive care. 4.   Hypophosphatemia. This is secondary to poor nutritional intake. We will correct as per protocol. 5.   Hyperuricemia. The patient has previous history of hyperuricemia. We will recheck his uric acid. Currently does not have any evidence of gout. 6.   Diverticulosis. The patient has diverticulosis, but does not have diverticulitis currently. We will manage conservatively. 7.   DVT prophylaxis. The patient will be on SCD boots. 8.   GI prophylaxis.   The patient will be on Protonix for GI bleed workup. Total time spent seeing the patient was 70 minutes. The patient was critically ill.     Dictated By Roderick Shelby MD  d: 08/16/2023 08:23:33  t: 08/16/2023 10:11:48  Job [id number]  MV/

## 2023-08-16 NOTE — PROGRESS NOTES
[facility]    Progress Note    Cameron Rees. Patient Status:  Inpatient    10/11/1980 MRN [id number]   Location [facility] 2W/SW Attending Denis Ko MD   [facility] Day # 1 PCP Rodell Skiff, MD       Subjective:   Cameron Rees. is a(n) 38year old male Alcohol withdrawal DT's Psychosis. Objective:      08/15/23  2300   BP: 95/66   Pulse: 78   Resp: 22   Temp:        Intake/Output Summary (Last 24 hours) at 8/15/2023 2314  Last data filed at 8/15/2023 1600  Gross per 24 hour   Intake 2314.7 ml   Output 1375 ml   Net 939.7 ml     Wt Readings from Last 2 Encounters:  08/15/23 : 206 lb 2.1 oz (93.5 kg)  23 : 185 lb (83.9 kg)      General appearance:  alert, appears stated age, and cooperative  Head: Normocephalic, without obvious abnormality, atraumatic  Eyes: conjunctivae/corneas clear. PERRL, EOM's intact. Fundi benign.   Neck: no adenopathy, no carotid bruit, no JVD, supple, symmetrical, trachea midline, and thyroid not enlarged, symmetric, no tenderness/mass/nodules  Pulmonary: clear to auscultation bilaterally  Cardiovascular: S1, S2 normal, no murmur, click, rub or gallop, regular rate and rhythm  Abdominal: soft, non-tender; bowel sounds normal; no masses,  no organomegaly  Extremities: extremities normal, atraumatic, no cyanosis or edema  Pulses: 2+ and symmetric  Neurologic: Grossly normal  Genital Exam: defer exam    Current Medications:    Current Facility-Administered Medications:     haloperidol lactate (Haldol) 5 MG/ML injection 1 mg, 1 mg, Intravenous, q4h    cloNIDine (Catapres) 0.2 MG/24HR patch 1 patch, 1 patch, Transdermal, Weekly    chlorproMAZINE (Thorazine) tab 25 mg, 25 mg, Oral, TID PRN    pantoprazole (Protonix) 40 mg in sodium chloride 0.9% PF 10 mL IV push, 40 mg, Intravenous, Q12H    metoprolol tartrate (Lopressor) tab 25 mg, 25 mg, Oral, BID PRN    lactated ringers infusion, , Intravenous, Continuous    folic acid (Folvite) tab 1 mg, 1 mg, Oral, Daily    LORazepam (Ativan) 2 mg/mL injection 2 mg, 2 mg, Intravenous, q4h    dexmedeTOMIDine in sodium chloride 0.9% (Precedex) 400 mcg/100mL infusion premix, 0.2-1.5 mcg/kg/hr (Dosing Weight), Intravenous, Continuous PRN    LORazepam (Ativan) 2 mg/mL injection 1 mg, 1 mg, Intravenous, Q1H PRN    LORazepam (Ativan) 2 mg/mL injection 2 mg, 2 mg, Intravenous, Q30 Min PRN    LORazepam (Ativan) 2 mg/mL injection 3 mg, 3 mg, Intravenous, Q30 Min PRN    LORazepam (Ativan) 2 mg/mL injection 4 mg, 4 mg, Intravenous, Q15 Min PRN    PHENobarbital (Luminal) 65 mg/mL injection 60 mg, 60 mg, Intravenous, BID    thiamine 100 mg/mL injection 100 mg, 100 mg, Intravenous, TID    Allergies    Morphine                SWELLING    Comment:SHORTNESS OF BREATH    Assessment and Plan:     Chest pain of uncertain etiology      Hiatal hernia      UGIB (upper gastrointestinal bleed)      Esophagitis      Alcohol use      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 Has been very somnolent and requiring large doses of IV Ativan. Discussed with both parents at bedside. Patient will need formalised alcohol rehab services once stable. Requiring ICU stabilisation and on benzos and barbiturates. Tachycardia: Due to alcohol withdrawal. Monitor and correct electrolytes as needed. Received IV fluid resuscitation. GI Bleed: Currently seems stable. Appreciate GI consult and await EGD once out of DT's and is clinically stable. DVT Prophylaxis: ON SCD's    GI Prophylaxis: ON PPI BID due to GI bleed    Discharge Plan: Transfer to floor when stable.     Results:     Lab Results   Component Value Date    WBC 9.7 08/15/2023    HGB 7.9 (L) 08/15/2023    HCT 25.1 (L) 08/15/2023    .0 08/15/2023     08/15/2023    K 3.6 08/15/2023     08/15/2023    CO2 26.0 08/15/2023    CREATSERUM 0.59 (L) 08/15/2023    BUN 6 (L) 08/15/2023    GLU 86 08/15/2023    CA 8.2 (L) 08/15/2023    ALB 2.7 (L) 08/14/2023    ALKPHO 185 (H) 08/14/2023    BILT 1.9 08/14/2023    TP 6.3 (L) 08/14/2023     (H) 08/14/2023    ALT 35 08/14/2023    INR 1.54 (H) 08/13/2023    LIP 63 08/13/2023    DDIMER 0.40 08/13/2023    ESRML 27 (H) 10/01/2021    MG 1.7 08/15/2023    PHOS 2.1 (L) 08/15/2023    TROP 0.00 01/31/2017     No results found for this visit on 08/13/23. No results found.   EKG 12 Lead    Result Date: 8/14/2023  Sinus tachycardia Inferior infarct , age undetermined Abnormal ECG When compared with ECG of 13-AUG-2023 20:58, No significant change was found Confirmed by Naldo Smiley DO, Gregg ([id number]) on 8/14/2023 8:52:51 PM             Sriram Clayton MD  8/15/2023

## 2023-08-16 NOTE — PLAN OF CARE
Problem: Patient Centered Care  Goal: Patient preferences are identified and integrated in the patient's plan of care  Description: Interventions:  - What would you like us to know as we care for you? I live at home with my parents and girlfriend. - Provide timely, complete, and accurate information to patient/family  - Incorporate patient and family knowledge, values, beliefs, and cultural backgrounds into the planning and delivery of care  - Encourage patient/family to participate in care and decision-making at the level they choose  - Honor patient and family perspectives and choices  Outcome: Progressing     Problem: Patient/Family Goals  Goal: Patient/Family Long Term Goal  Description: Patient's Long Term Goal: To go home    Interventions:  - GI consult  - monitor CIWA scores and treat according to [facility]  - monitor vitals  - monitor oral intake tolera  - See additional Care Plan goals for specific interventions  Outcome: Progressing  Goal: Patient/Family Short Term Goal  Description: Patient's Short Term Goal: To feel better    Interventions:   - GI consult  - monitor CIWA scores and treat according to [facility]  - monitor vitals  - monitor oral intake tolerance  - Psych liaison consult  - See additional Care Plan goals for specific interventions  Outcome: Progressing     Problem: SAFETY ADULT - FALL  Goal: Free from fall injury  Description: INTERVENTIONS:  - Assess pt frequently for physical needs  - Identify cognitive and physical deficits and behaviors that affect risk of falls.   - [place] fall precautions as indicated by assessment.  - Educate pt/family on patient safety including physical limitations  - Instruct pt to call for assistance with activity based on assessment  - Modify environment to reduce risk of injury  - Provide assistive devices as appropriate  - Consider OT/PT consult to assist with strengthening/mobility  - Encourage toileting schedule  Outcome: Progressing     Problem: DISCHARGE PLANNING  Goal: Discharge to home or other facility with appropriate resources  Description: INTERVENTIONS:  - Identify barriers to discharge w/pt and caregiver  - Include patient/family/discharge partner in discharge planning  - Arrange for needed discharge resources and transportation as appropriate  - Identify discharge learning needs (meds, wound care, etc)  - Arrange for interpreters to assist at discharge as needed  - Consider post-discharge preferences of patient/family/discharge partner  - Complete POLST form as appropriate  - Assess patient's ability to be responsible for managing their own health  - Refer to Case Management Department for coordinating discharge planning if the patient needs post-hospital services based on physician/LIP order or complex needs related to functional status, cognitive ability or social support system  Outcome: Progressing     Problem: METABOLIC/FLUID AND ELECTROLYTES - ADULT  Goal: Electrolytes maintained within normal limits  Description: INTERVENTIONS:  - Monitor labs and rhythm and assess patient for signs and symptoms of electrolyte imbalances  - Administer electrolyte replacement as ordered  - Monitor response to electrolyte replacements, including rhythm and repeat lab results as appropriate  - Fluid restriction as ordered  - Instruct patient on fluid and nutrition restrictions as appropriate  Outcome: Progressing     Problem: NEUROLOGICAL - ADULT  Goal: Absence of seizures  Description: INTERVENTIONS  - Monitor for seizure activity  - Administer anti-seizure medications as ordered  - Monitor neurological status  Outcome: Progressing     Problem: GASTROINTESTINAL - ADULT  Goal: Minimal or absence of nausea and vomiting  Description: INTERVENTIONS:  - Maintain adequate hydration with IV or PO as ordered and tolerated  - Nasogastric tube to low intermittent suction as ordered  - Evaluate effectiveness of ordered antiemetic medications  - Provide nonpharmacologic comfort measures as appropriate  - Advance diet as tolerated, if ordered  - Obtain nutritional consult as needed  - Evaluate fluid balance  Outcome: Progressing     Problem: Delirium  Goal: Minimize duration of delirium  Description: Interventions:  - Encourage use of hearing aids, eye glasses  - Promote highest level of mobility daily  - Provide frequent reorientation  - Promote wakefulness i.e. lights on, blinds open  - Promote sleep, encourage patient's normal rest cycle i.e. lights off, TV off, minimize noise and interruptions  - Encourage family to assist in orientation and promotion of home routines  Outcome: Progressing

## 2023-08-16 NOTE — PLAN OF CARE
Pt is somnolent and able to follow commands. Pt on 2L NC. Pt safety maintained, bed in lowest position and call light within reach. Pt family updated on plan care and all questions answered. Problem: Patient Centered Care  Goal: Patient preferences are identified and integrated in the patient's plan of care  Description: Interventions:  - What would you like us to know as we care for you? I live at home with my parents and girlfriend. - Provide timely, complete, and accurate information to patient/family  - Incorporate patient and family knowledge, values, beliefs, and cultural backgrounds into the planning and delivery of care  - Encourage patient/family to participate in care and decision-making at the level they choose  - Honor patient and family perspectives and choices  Outcome: Progressing     Problem: Patient/Family Goals  Goal: Patient/Family Long Term Goal  Description: Patient's Long Term Goal: To go home    Interventions:  - GI consult  - monitor CIWA scores and treat according to [facility]  - monitor vitals  - monitor oral intake tolera  - See additional Care Plan goals for specific interventions  Outcome: Progressing  Goal: Patient/Family Short Term Goal  Description: Patient's Short Term Goal: To feel better    Interventions:   - GI consult  - monitor CIWA scores and treat according to [facility]  - monitor vitals  - monitor oral intake tolerance  - Psych liaison consult  - See additional Care Plan goals for specific interventions  Outcome: Progressing     Problem: SAFETY ADULT - FALL  Goal: Free from fall injury  Description: INTERVENTIONS:  - Assess pt frequently for physical needs  - Identify cognitive and physical deficits and behaviors that affect risk of falls.   - [place] fall precautions as indicated by assessment.  - Educate pt/family on patient safety including physical limitations  - Instruct pt to call for assistance with activity based on assessment  - Modify environment to reduce risk of injury  - Provide assistive devices as appropriate  - Consider OT/PT consult to assist with strengthening/mobility  - Encourage toileting schedule  Outcome: Progressing     Problem: DISCHARGE PLANNING  Goal: Discharge to home or other facility with appropriate resources  Description: INTERVENTIONS:  - Identify barriers to discharge w/pt and caregiver  - Include patient/family/discharge partner in discharge planning  - Arrange for needed discharge resources and transportation as appropriate  - Identify discharge learning needs (meds, wound care, etc)  - Arrange for interpreters to assist at discharge as needed  - Consider post-discharge preferences of patient/family/discharge partner  - Complete POLST form as appropriate  - Assess patient's ability to be responsible for managing their own health  - Refer to Case Management Department for coordinating discharge planning if the patient needs post-hospital services based on physician/LIP order or complex needs related to functional status, cognitive ability or social support system  Outcome: Progressing     Problem: METABOLIC/FLUID AND ELECTROLYTES - ADULT  Goal: Electrolytes maintained within normal limits  Description: INTERVENTIONS:  - Monitor labs and rhythm and assess patient for signs and symptoms of electrolyte imbalances  - Administer electrolyte replacement as ordered  - Monitor response to electrolyte replacements, including rhythm and repeat lab results as appropriate  - Fluid restriction as ordered  - Instruct patient on fluid and nutrition restrictions as appropriate  Outcome: Progressing     Problem: NEUROLOGICAL - ADULT  Goal: Absence of seizures  Description: INTERVENTIONS  - Monitor for seizure activity  - Administer anti-seizure medications as ordered  - Monitor neurological status  Outcome: Progressing     Problem: GASTROINTESTINAL - ADULT  Goal: Minimal or absence of nausea and vomiting  Description: INTERVENTIONS:  - Maintain adequate hydration with IV or PO as ordered and tolerated  - Nasogastric tube to low intermittent suction as ordered  - Evaluate effectiveness of ordered antiemetic medications  - Provide nonpharmacologic comfort measures as appropriate  - Advance diet as tolerated, if ordered  - Obtain nutritional consult as needed  - Evaluate fluid balance  Outcome: Progressing     Problem: Delirium  Goal: Minimize duration of delirium  Description: Interventions:  - Encourage use of hearing aids, eye glasses  - Promote highest level of mobility daily  - Provide frequent reorientation  - Promote wakefulness i.e. lights on, blinds open  - Promote sleep, encourage patient's normal rest cycle i.e. lights off, TV off, minimize noise and interruptions  - Encourage family to assist in orientation and promotion of home routines  Outcome: Progressing

## 2023-08-17 LAB
ANION GAP SERPL CALC-SCNC: 7 MMOL/L (ref 0–18)
BUN BLD-MCNC: 7 MG/DL (ref 7–18)
BUN/CREAT SERPL: 8.9 (ref 10–20)
CALCIUM BLD-MCNC: 8.2 MG/DL (ref 8.5–10.1)
CHLORIDE SERPL-SCNC: 108 MMOL/L (ref 98–112)
CO2 SERPL-SCNC: 25 MMOL/L (ref 21–32)
CREAT BLD-MCNC: 0.79 MG/DL
DEPRECATED RDW RBC AUTO: 69.7 FL (ref 35.1–46.3)
EGFRCR SERPLBLD CKD-EPI 2021: 112 ML/MIN/1.73M2 (ref 60–?)
ERYTHROCYTE [DISTWIDTH] IN BLOOD BY AUTOMATED COUNT: 23.4 % (ref 11–15)
GLUCOSE BLD-MCNC: 119 MG/DL (ref 70–99)
HCT VFR BLD AUTO: 27.4 %
HGB BLD-MCNC: 8.7 G/DL
MCH RBC QN AUTO: 26.9 PG (ref 26–34)
MCHC RBC AUTO-ENTMCNC: 31.8 G/DL (ref 31–37)
MCV RBC AUTO: 84.6 FL
OSMOLALITY SERPL CALC.SUM OF ELEC: 289 MOSM/KG (ref 275–295)
PLATELET # BLD AUTO: 239 10(3)UL (ref 150–450)
POTASSIUM SERPL-SCNC: 4 MMOL/L (ref 3.5–5.1)
RBC # BLD AUTO: 3.24 X10(6)UL
SODIUM SERPL-SCNC: 140 MMOL/L (ref 136–145)
WBC # BLD AUTO: 10.3 X10(3) UL (ref 4–11)

## 2023-08-17 PROCEDURE — 76937 US GUIDE VASCULAR ACCESS: CPT | Performed by: NURSE PRACTITIONER

## 2023-08-17 PROCEDURE — 36410 VNPNXR 3YR/> PHY/QHP DX/THER: CPT | Performed by: NURSE PRACTITIONER

## 2023-08-17 PROCEDURE — 99232 SBSQ HOSP IP/OBS MODERATE 35: CPT | Performed by: INTERNAL MEDICINE

## 2023-08-17 PROCEDURE — 99233 SBSQ HOSP IP/OBS HIGH 50: CPT | Performed by: OTHER

## 2023-08-17 RX ORDER — DIAZEPAM 5 MG/ML
5 INJECTION, SOLUTION INTRAMUSCULAR; INTRAVENOUS EVERY 8 HOURS SCHEDULED
Status: DISCONTINUED | OUTPATIENT
Start: 2023-08-17 | End: 2023-08-20

## 2023-08-17 RX ORDER — QUETIAPINE FUMARATE 25 MG/1
50 TABLET, FILM COATED ORAL NIGHTLY
Status: DISCONTINUED | OUTPATIENT
Start: 2023-08-17 | End: 2023-08-21

## 2023-08-17 NOTE — PLAN OF CARE
Problem: Patient Centered Care  Goal: Patient preferences are identified and integrated in the patient's plan of care  Description: Interventions:  - What would you like us to know as we care for you? I live at home with my parents and girlfriend. - Provide timely, complete, and accurate information to patient/family  - Incorporate patient and family knowledge, values, beliefs, and cultural backgrounds into the planning and delivery of care  - Encourage patient/family to participate in care and decision-making at the level they choose  - Honor patient and family perspectives and choices  Outcome: Progressing     Problem: Patient/Family Goals  Goal: Patient/Family Long Term Goal  Description: Patient's Long Term Goal: To go home    Interventions:  - GI consult  - monitor CIWA scores and treat according to [facility]  - monitor vitals  - monitor oral intake tolera  - See additional Care Plan goals for specific interventions  Outcome: Progressing  Goal: Patient/Family Short Term Goal  Description: Patient's Short Term Goal: To feel better    Interventions:   - GI consult  - monitor CIWA scores and treat according to [facility]  - monitor vitals  - monitor oral intake tolerance  - Psych liaison consult  - See additional Care Plan goals for specific interventions  Outcome: Progressing     Problem: SAFETY ADULT - FALL  Goal: Free from fall injury  Description: INTERVENTIONS:  - Assess pt frequently for physical needs  - Identify cognitive and physical deficits and behaviors that affect risk of falls.   - [place] fall precautions as indicated by assessment.  - Educate pt/family on patient safety including physical limitations  - Instruct pt to call for assistance with activity based on assessment  - Modify environment to reduce risk of injury  - Provide assistive devices as appropriate  - Consider OT/PT consult to assist with strengthening/mobility  - Encourage toileting schedule  Outcome: Progressing     Problem: DISCHARGE PLANNING  Goal: Discharge to home or other facility with appropriate resources  Description: INTERVENTIONS:  - Identify barriers to discharge w/pt and caregiver  - Include patient/family/discharge partner in discharge planning  - Arrange for needed discharge resources and transportation as appropriate  - Identify discharge learning needs (meds, wound care, etc)  - Arrange for interpreters to assist at discharge as needed  - Consider post-discharge preferences of patient/family/discharge partner  - Complete POLST form as appropriate  - Assess patient's ability to be responsible for managing their own health  - Refer to Case Management Department for coordinating discharge planning if the patient needs post-hospital services based on physician/LIP order or complex needs related to functional status, cognitive ability or social support system  Outcome: Progressing     Problem: METABOLIC/FLUID AND ELECTROLYTES - ADULT  Goal: Electrolytes maintained within normal limits  Description: INTERVENTIONS:  - Monitor labs and rhythm and assess patient for signs and symptoms of electrolyte imbalances  - Administer electrolyte replacement as ordered  - Monitor response to electrolyte replacements, including rhythm and repeat lab results as appropriate  - Fluid restriction as ordered  - Instruct patient on fluid and nutrition restrictions as appropriate  Outcome: Progressing     Problem: NEUROLOGICAL - ADULT  Goal: Absence of seizures  Description: INTERVENTIONS  - Monitor for seizure activity  - Administer anti-seizure medications as ordered  - Monitor neurological status  Outcome: Progressing     Problem: GASTROINTESTINAL - ADULT  Goal: Minimal or absence of nausea and vomiting  Description: INTERVENTIONS:  - Maintain adequate hydration with IV or PO as ordered and tolerated  - Nasogastric tube to low intermittent suction as ordered  - Evaluate effectiveness of ordered antiemetic medications  - Provide nonpharmacologic comfort measures as appropriate  - Advance diet as tolerated, if ordered  - Obtain nutritional consult as needed  - Evaluate fluid balance  Outcome: Progressing     Problem: Delirium  Goal: Minimize duration of delirium  Description: Interventions:  - Encourage use of hearing aids, eye glasses  - Promote highest level of mobility daily  - Provide frequent reorientation  - Promote wakefulness i.e. lights on, blinds open  - Promote sleep, encourage patient's normal rest cycle i.e. lights off, TV off, minimize noise and interruptions  - Encourage family to assist in orientation and promotion of home routines  Outcome: Progressing     Problem: Safety Risk - Non-Violent Restraints  Goal: Patient will remain free from self-harm  Description: INTERVENTIONS:  - Apply the least restrictive restraint to prevent harm  - Notify patient and family of reasons restraints applied  - Assess for any contributing factors to confusion (electrolyte disturbances, delirium, medications)  - Discontinue any unnecessary medical devices as soon as possible  - Assess the patient's physical comfort, circulation, skin condition, hydration, nutrition and elimination needs   - Reorient and redirection as needed  - Assess for the need to continue restraints  8/17/2023 1833 by Cecy Madrid RN  Outcome: Progressing   Pt agitated this morning and removed IV line. New IV placed. Precedex gtt restarted. Soft wrist restraints placed. Pt's sister at bedside and notified of restraints. 2L NC. No home O2. Skin check with two RNs completed. No skin issues. Pt's family updated about plan of care.

## 2023-08-17 NOTE — CM/SW NOTE
08/17/23 1400   CM/SW Referral Data   Referral Source    Reason for Referral Discharge planning   Informant Clinical Staff Member;EMR   Medical Hx   Does patient have an established PCP? Yes  Elroy Copeland)   Patient Info   Patient's Current Mental Status at Time of Assessment Confused or unable to complete assessment   Patient's [address]   Patient lives with Spouse/Significant other;Parent(s)   Patient Status Prior to Admission   Independent with ADLs and Mobility Yes   Discharge Needs   Anticipated D/C needs No anticipated discharge needs     Pt discussed during nursing rounds. Dx GI bleed/esophagitis, hernia, ETOH w/withdrawal. From home w/parents and GF, independent and working at baseline. Precedex drip started in CCU. On 2L O2, no home O2. RN to wean pt off O2 prior to dc. No home care needs anticipated on dc. Plan: Home w/family pending medical clearance. / to remain available for support and/or discharge planning.      TRENT Sanchez    [phone]

## 2023-08-17 NOTE — PLAN OF CARE
Problem: ALTERED NUTRIENT INTAKE - ADULT  Goal: Nutrient intake appropriate for improving, restoring or maintaining nutritional needs  Description: INTERVENTIONS:  - Assess nutritional status and recommend course of action  - Monitor oral intake, labs, and treatment plans  - Recommend appropriate diets, oral nutritional supplements, and vitamin/mineral supplements  - Recommend, monitor, and adjust tube feedings  based on assessed needs  - Provide specific nutrition education as appropriate  Outcome: Not Progressing

## 2023-08-17 NOTE — PROGRESS NOTES
[facility]    Progress Note    Pattie Haynes. Patient Status:  Inpatient    10/11/1980 MRN [id number]   Location [facility] 2W/SW Attending Kahlil Aden MD   [address] Day # 2 PCP Mery Reich MD       Subjective:   Pattie Haynes. is a(n) 38year old male AMS, Alcohol withdrawal DT's.     Objective:      23  2100   BP: (!) 82/56   Pulse: 77   Resp: (!) 30   Temp:        Intake/Output Summary (Last 24 hours) at 2023 2152  Last data filed at 2023 1600  Gross per 24 hour   Intake 3580.3 ml   Output 3050 ml   Net 530.3 ml     Wt Readings from Last 2 Encounters:  08/15/23 : 206 lb 2.1 oz (93.5 kg)  23 : 185 lb (83.9 kg)      General appearance:  appears stated age and cooperative  Head: Normocephalic, without obvious abnormality, atraumatic  Eyes: negative  Neck: no adenopathy, no carotid bruit, no JVD, supple, symmetrical, trachea midline, and thyroid not enlarged, symmetric, no tenderness/mass/nodules  Pulmonary: clear to auscultation bilaterally  Cardiovascular: S1, S2 normal, no murmur, click, rub or gallop, regular rate and rhythm  Abdominal: soft, non-tender; bowel sounds normal; no masses,  no organomegaly  Extremities: extremities normal, atraumatic, no cyanosis or edema  Pulses: 2+ and symmetric  Neurologic: Grossly normal  Genital Exam: defer exam    Current Medications:    Current Facility-Administered Medications:     ondansetron (Zofran) 4 MG/2ML injection 4 mg, 4 mg, Intravenous, Q6H PRN    haloperidol lactate (Haldol) 5 MG/ML injection 2 mg, 2 mg, Intravenous, q4h    cloNIDine (Catapres) 0.2 MG/24HR patch 1 patch, 1 patch, Transdermal, Weekly    pantoprazole (Protonix) 40 mg in sodium chloride 0.9% PF 10 mL IV push, 40 mg, Intravenous, Q12H    metoprolol tartrate (Lopressor) tab 25 mg, 25 mg, Oral, BID PRN    lactated ringers infusion, , Intravenous, Continuous    folic acid (Folvite) tab 1 mg, 1 mg, Oral, Daily    LORazepam (Ativan) 2 mg/mL injection 2 mg, 2 mg, Intravenous, q4h    dexmedeTOMIDine in sodium chloride 0.9% (Precedex) 400 mcg/100mL infusion premix, 0.2-1.5 mcg/kg/hr (Dosing Weight), Intravenous, Continuous PRN    LORazepam (Ativan) 2 mg/mL injection 1 mg, 1 mg, Intravenous, Q1H PRN    LORazepam (Ativan) 2 mg/mL injection 2 mg, 2 mg, Intravenous, Q30 Min PRN    LORazepam (Ativan) 2 mg/mL injection 3 mg, 3 mg, Intravenous, Q30 Min PRN    LORazepam (Ativan) 2 mg/mL injection 4 mg, 4 mg, Intravenous, Q15 Min PRN    PHENobarbital (Luminal) 65 mg/mL injection 60 mg, 60 mg, Intravenous, BID    thiamine 100 mg/mL injection 100 mg, 100 mg, Intravenous, TID    Allergies    Morphine                SWELLING    Comment:SHORTNESS OF BREATH    Assessment and Plan:     Chest pain of uncertain etiology      Hiatal hernia      UGIB (upper gastrointestinal bleed)      Esophagitis      Alcohol use      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 Chest pain. The patient has chest pain which is gastrointestinal in origin and has CT evidence of esophagitis. The patient will be consulted by Gastroenterology. For the time being, the patient will be on PPI. We will monitor for any GI bleed in the making. 2.       Alcohol withdrawal and delirium tremens. The patient seems very agitated and has significant high alcohol intake history, which is likely causing him to enter into DTs. The patient will receive CIWA protocol, and we will appropriately consult to ICU as needed. 3.       Elevated liver enzymes. This is likely secondary to alcohol abuse. We will start on CIWA protocol and also supportive care. 4.       Hypophosphatemia. This is secondary to poor nutritional intake. We will correct as per protocol. 5.       Hyperuricemia. The patient has previous history of hyperuricemia. We will recheck his uric acid.   Currently does not have any evidence of gout. 6.       Diverticulosis. The patient has diverticulosis, but does not have diverticulitis currently. We will manage conservatively. 7.       DVT prophylaxis. The patient will be on SCD boots. 8.       GI prophylaxis. The patient will be on Protonix for GI bleed workup.    08/16/2023  Patient was in deep sedation. Met with multiple family members and also D/W RN and discontinued precedex. Has received multiple large doses of ativan and also was on scheduled halodol and phenobarbital. Ordered phenobarbital level. Patient also will receive RL bolus of 500 ml. Will transfer out of ICU when stable. DVT Prophylaxis:    GI Prophylaxis:    Discharge Plan:    Results:     Lab Results   Component Value Date    WBC 9.7 08/15/2023    HGB 7.9 (L) 08/15/2023    HCT 25.1 (L) 08/15/2023    .0 08/15/2023     08/15/2023    K 4.4 08/16/2023     08/15/2023    CO2 26.0 08/15/2023    CREATSERUM 0.59 (L) 08/15/2023    BUN 6 (L) 08/15/2023    GLU 86 08/15/2023    CA 8.2 (L) 08/15/2023    ALB 2.7 (L) 08/14/2023    ALKPHO 185 (H) 08/14/2023    BILT 1.9 08/14/2023    TP 6.3 (L) 08/14/2023     (H) 08/14/2023    ALT 35 08/14/2023    INR 1.54 (H) 08/13/2023    LIP 63 08/13/2023    DDIMER 0.40 08/13/2023    ESRML 27 (H) 10/01/2021    MG 2.4 08/16/2023    PHOS 3.3 08/16/2023    TROP 0.00 01/31/2017     No results found for this visit on 08/13/23. XR CHEST AP PORTABLE  (CPT=71045)    Result Date: 8/16/2023  CONCLUSION:  1. Bibasilar airspace opacification and/or superimposed atelectasis has developed. 2. Suspect bibasilar pneumonitis. Follow-up to resolution.     Dictated by (CST): Mile Lowe MD on 8/16/2023 at 12:43 PM     Finalized by (CST): Mile Lowe MD on 8/16/2023 at 12:45 PM                       Kyree Cool MD  8/16/2023

## 2023-08-17 NOTE — PLAN OF CARE
Problem: Patient Centered Care  Goal: Patient preferences are identified and integrated in the patient's plan of care  Description: Interventions:  - What would you like us to know as we care for you? I live at home with my parents and girlfriend. - Provide timely, complete, and accurate information to patient/family  - Incorporate patient and family knowledge, values, beliefs, and cultural backgrounds into the planning and delivery of care  - Encourage patient/family to participate in care and decision-making at the level they choose  - Honor patient and family perspectives and choices  Outcome: Progressing     Problem: Patient/Family Goals  Goal: Patient/Family Long Term Goal  Description: Patient's Long Term Goal: To go home    Interventions:  - GI consult  - monitor CIWA scores and treat according to [facility]  - monitor vitals  - monitor oral intake tolera  - See additional Care Plan goals for specific interventions  Outcome: Progressing  Goal: Patient/Family Short Term Goal  Description: Patient's Short Term Goal: To feel better    Interventions:   - GI consult  - monitor CIWA scores and treat according to [facility]  - monitor vitals  - monitor oral intake tolerance  - Psych liaison consult  - See additional Care Plan goals for specific interventions  Outcome: Progressing     Problem: SAFETY ADULT - FALL  Goal: Free from fall injury  Description: INTERVENTIONS:  - Assess pt frequently for physical needs  - Identify cognitive and physical deficits and behaviors that affect risk of falls.   - [place] fall precautions as indicated by assessment.  - Educate pt/family on patient safety including physical limitations  - Instruct pt to call for assistance with activity based on assessment  - Modify environment to reduce risk of injury  - Provide assistive devices as appropriate  - Consider OT/PT consult to assist with strengthening/mobility  - Encourage toileting schedule  Outcome: Progressing     Problem: DISCHARGE PLANNING  Goal: Discharge to home or other facility with appropriate resources  Description: INTERVENTIONS:  - Identify barriers to discharge w/pt and caregiver  - Include patient/family/discharge partner in discharge planning  - Arrange for needed discharge resources and transportation as appropriate  - Identify discharge learning needs (meds, wound care, etc)  - Arrange for interpreters to assist at discharge as needed  - Consider post-discharge preferences of patient/family/discharge partner  - Complete POLST form as appropriate  - Assess patient's ability to be responsible for managing their own health  - Refer to Case Management Department for coordinating discharge planning if the patient needs post-hospital services based on physician/LIP order or complex needs related to functional status, cognitive ability or social support system  Outcome: Progressing     Problem: METABOLIC/FLUID AND ELECTROLYTES - ADULT  Goal: Electrolytes maintained within normal limits  Description: INTERVENTIONS:  - Monitor labs and rhythm and assess patient for signs and symptoms of electrolyte imbalances  - Administer electrolyte replacement as ordered  - Monitor response to electrolyte replacements, including rhythm and repeat lab results as appropriate  - Fluid restriction as ordered  - Instruct patient on fluid and nutrition restrictions as appropriate  Outcome: Progressing     Problem: NEUROLOGICAL - ADULT  Goal: Absence of seizures  Description: INTERVENTIONS  - Monitor for seizure activity  - Administer anti-seizure medications as ordered  - Monitor neurological status  Outcome: Progressing     Problem: GASTROINTESTINAL - ADULT  Goal: Minimal or absence of nausea and vomiting  Description: INTERVENTIONS:  - Maintain adequate hydration with IV or PO as ordered and tolerated  - Nasogastric tube to low intermittent suction as ordered  - Evaluate effectiveness of ordered antiemetic medications  - Provide nonpharmacologic comfort measures as appropriate  - Advance diet as tolerated, if ordered  - Obtain nutritional consult as needed  - Evaluate fluid balance  Outcome: Progressing     Problem: Delirium  Goal: Minimize duration of delirium  Description: Interventions:  - Encourage use of hearing aids, eye glasses  - Promote highest level of mobility daily  - Provide frequent reorientation  - Promote wakefulness i.e. lights on, blinds open  - Promote sleep, encourage patient's normal rest cycle i.e. lights off, TV off, minimize noise and interruptions  - Encourage family to assist in orientation and promotion of home routines  Outcome: Progressing

## 2023-08-18 PROCEDURE — 99232 SBSQ HOSP IP/OBS MODERATE 35: CPT | Performed by: INTERNAL MEDICINE

## 2023-08-18 PROCEDURE — 99232 SBSQ HOSP IP/OBS MODERATE 35: CPT | Performed by: NURSE PRACTITIONER

## 2023-08-18 RX ORDER — SODIUM CHLORIDE 9 MG/ML
INJECTION, SOLUTION INTRAVENOUS CONTINUOUS
Status: DISCONTINUED | OUTPATIENT
Start: 2023-08-18 | End: 2023-08-19

## 2023-08-18 NOTE — PLAN OF CARE
Non-violent restraints applied as pt is confused and trying to remove equipment.  Frequent nursing rounds and all safety precautions in place  Problem: Safety Risk - Non-Violent Restraints  Goal: Patient will remain free from self-harm  Description: INTERVENTIONS:  - Apply the least restrictive restraint to prevent harm  - Notify patient and family of reasons restraints applied  - Assess for any contributing factors to confusion (electrolyte disturbances, delirium, medications)  - Discontinue any unnecessary medical devices as soon as possible  - Assess the patient's physical comfort, circulation, skin condition, hydration, nutrition and elimination needs   - Reorient and redirection as needed  - Assess for the need to continue restraints  Outcome: Progressing

## 2023-08-18 NOTE — PLAN OF CARE
Patient is A&Ox4, on 2L NC, lethargic. On restraints. Weaned off of precedex. Partner at bedside. Partner updated on care plan. Bed is locked and in lowest position. Safety measures maintained. Call light is within reach. Problem: Patient Centered Care  Goal: Patient preferences are identified and integrated in the patient's plan of care  Description: Interventions:  - What would you like us to know as we care for you? I live at home with my parents and girlfriend. - Provide timely, complete, and accurate information to patient/family  - Incorporate patient and family knowledge, values, beliefs, and cultural backgrounds into the planning and delivery of care  - Encourage patient/family to participate in care and decision-making at the level they choose  - Honor patient and family perspectives and choices  Outcome: Progressing     Problem: Patient/Family Goals  Goal: Patient/Family Long Term Goal  Description: Patient's Long Term Goal: To go home    Interventions:  - GI consult  - monitor CIWA scores and treat according to [facility]  - monitor vitals  - monitor oral intake tolera  - See additional Care Plan goals for specific interventions  Outcome: Progressing  Goal: Patient/Family Short Term Goal  Description: Patient's Short Term Goal: To feel better    Interventions:   - GI consult  - monitor CIWA scores and treat according to [facility]  - monitor vitals  - monitor oral intake tolerance  - Psych liaison consult  - See additional Care Plan goals for specific interventions  Outcome: Progressing     Problem: SAFETY ADULT - FALL  Goal: Free from fall injury  Description: INTERVENTIONS:  - Assess pt frequently for physical needs  - Identify cognitive and physical deficits and behaviors that affect risk of falls.   - [place] fall precautions as indicated by assessment.  - Educate pt/family on patient safety including physical limitations  - Instruct pt to call for assistance with activity based on assessment  - Modify environment to reduce risk of injury  - Provide assistive devices as appropriate  - Consider OT/PT consult to assist with strengthening/mobility  - Encourage toileting schedule  Outcome: Progressing     Problem: DISCHARGE PLANNING  Goal: Discharge to home or other facility with appropriate resources  Description: INTERVENTIONS:  - Identify barriers to discharge w/pt and caregiver  - Include patient/family/discharge partner in discharge planning  - Arrange for needed discharge resources and transportation as appropriate  - Identify discharge learning needs (meds, wound care, etc)  - Arrange for interpreters to assist at discharge as needed  - Consider post-discharge preferences of patient/family/discharge partner  - Complete POLST form as appropriate  - Assess patient's ability to be responsible for managing their own health  - Refer to Case Management Department for coordinating discharge planning if the patient needs post-hospital services based on physician/LIP order or complex needs related to functional status, cognitive ability or social support system  Outcome: Progressing     Problem: METABOLIC/FLUID AND ELECTROLYTES - ADULT  Goal: Electrolytes maintained within normal limits  Description: INTERVENTIONS:  - Monitor labs and rhythm and assess patient for signs and symptoms of electrolyte imbalances  - Administer electrolyte replacement as ordered  - Monitor response to electrolyte replacements, including rhythm and repeat lab results as appropriate  - Fluid restriction as ordered  - Instruct patient on fluid and nutrition restrictions as appropriate  Outcome: Progressing     Problem: NEUROLOGICAL - ADULT  Goal: Absence of seizures  Description: INTERVENTIONS  - Monitor for seizure activity  - Administer anti-seizure medications as ordered  - Monitor neurological status  Outcome: Progressing     Problem: GASTROINTESTINAL - ADULT  Goal: Minimal or absence of nausea and vomiting  Description: INTERVENTIONS:  - Maintain adequate hydration with IV or PO as ordered and tolerated  - Nasogastric tube to low intermittent suction as ordered  - Evaluate effectiveness of ordered antiemetic medications  - Provide nonpharmacologic comfort measures as appropriate  - Advance diet as tolerated, if ordered  - Obtain nutritional consult as needed  - Evaluate fluid balance  Outcome: Progressing     Problem: Delirium  Goal: Minimize duration of delirium  Description: Interventions:  - Encourage use of hearing aids, eye glasses  - Promote highest level of mobility daily  - Provide frequent reorientation  - Promote wakefulness i.e. lights on, blinds open  - Promote sleep, encourage patient's normal rest cycle i.e. lights off, TV off, minimize noise and interruptions  - Encourage family to assist in orientation and promotion of home routines  Outcome: Progressing     Problem: Safety Risk - Non-Violent Restraints  Goal: Patient will remain free from self-harm  Description: INTERVENTIONS:  - Apply the least restrictive restraint to prevent harm  - Notify patient and family of reasons restraints applied  - Assess for any contributing factors to confusion (electrolyte disturbances, delirium, medications)  - Discontinue any unnecessary medical devices as soon as possible  - Assess the patient's physical comfort, circulation, skin condition, hydration, nutrition and elimination needs   - Reorient and redirection as needed  - Assess for the need to continue restraints  Outcome: Progressing

## 2023-08-18 NOTE — PROGRESS NOTES
[facility]    Progress Note    Monique Resendiz. Patient Status:  Inpatient    10/11/1980 MRN [id number]   Location [facility] 2W/SW Attending Lauren Herron MD   [facility] Day # 3 PCP Myriam Damian MD       Subjective:   Monique Resendiz. is a(n) 38year old male AMS, alcohol withdrawal    Objective:      23  2200   BP: 95/57   Pulse: 74   Resp: 20   Temp:        Intake/Output Summary (Last 24 hours) at 2023 2346  Last data filed at 2023 1800  Gross per 24 hour   Intake 1721 ml   Output 675 ml   Net 1046 ml     Wt Readings from Last 2 Encounters:  08/15/23 : 206 lb 2.1 oz (93.5 kg)  23 : 185 lb (83.9 kg)        General appearance:  alert, appears stated age, and cooperative  Head: Normocephalic, without obvious abnormality, atraumatic  Eyes: conjunctivae/corneas clear. PERRL, EOM's intact. Fundi benign.   Neck: no adenopathy, no carotid bruit, no JVD, supple, symmetrical, trachea midline, and thyroid not enlarged, symmetric, no tenderness/mass/nodules  Pulmonary: clear to auscultation bilaterally  Cardiovascular: S1, S2 normal, no murmur, click, rub or gallop, regular rate and rhythm  Abdominal: soft, non-tender; bowel sounds normal; no masses,  no organomegaly  Extremities: extremities normal, atraumatic, no cyanosis or edema  Pulses: 2+ and symmetric  Neurologic: Grossly normal  Genital Exam: defer exam    Current Medications:    Current Facility-Administered Medications:     diazepam (Valium) 5 mg/mL injection 5 mg, 5 mg, Intravenous, Q8H SHARDA    valproate (Depacon) 250 mg in sodium chloride 0.9% 50 mL IVPB, 250 mg, Intravenous, Q8H    QUEtiapine (SEROquel) tab 50 mg, 50 mg, Oral, Nightly    ondansetron (Zofran) 4 MG/2ML injection 4 mg, 4 mg, Intravenous, Q6H PRN    haloperidol lactate (Haldol) 5 MG/ML injection 2 mg, 2 mg, Intravenous, q4h    pantoprazole (Protonix) 40 mg in sodium chloride 0.9% PF 10 mL IV push, 40 mg, Intravenous, Q12H    metoprolol tartrate (Lopressor) tab 25 mg, 25 mg, Oral, BID PRN    lactated ringers infusion, , Intravenous, Continuous    folic acid (Folvite) tab 1 mg, 1 mg, Oral, Daily    dexmedeTOMIDine in sodium chloride 0.9% (Precedex) 400 mcg/100mL infusion premix, 0.2-1.5 mcg/kg/hr (Dosing Weight), Intravenous, Continuous PRN    LORazepam (Ativan) 2 mg/mL injection 1 mg, 1 mg, Intravenous, Q1H PRN    LORazepam (Ativan) 2 mg/mL injection 2 mg, 2 mg, Intravenous, Q30 Min PRN    LORazepam (Ativan) 2 mg/mL injection 3 mg, 3 mg, Intravenous, Q30 Min PRN    LORazepam (Ativan) 2 mg/mL injection 4 mg, 4 mg, Intravenous, Q15 Min PRN    thiamine 100 mg/mL injection 100 mg, 100 mg, Intravenous, TID    Allergies    Morphine                SWELLING    Comment:SHORTNESS OF BREATH    Laboratory Results:     Lab Results   Component Value Date    WBC 10.3 08/17/2023    HGB 8.7 (L) 08/17/2023    HCT 27.4 (L) 08/17/2023    .0 08/17/2023     08/17/2023    K 4.0 08/17/2023     08/17/2023    CO2 25.0 08/17/2023    CREATSERUM 0.79 08/17/2023    BUN 7 08/17/2023     (H) 08/17/2023    CA 8.2 (L) 08/17/2023    ALB 2.7 (L) 08/14/2023    ALKPHO 185 (H) 08/14/2023    BILT 1.9 08/14/2023    TP 6.3 (L) 08/14/2023     (H) 08/14/2023    ALT 35 08/14/2023    INR 1.54 (H) 08/13/2023    LIP 63 08/13/2023    DDIMER 0.40 08/13/2023    ESRML 27 (H) 10/01/2021    MG 2.4 08/16/2023    PHOS 3.3 08/16/2023    TROP 0.00 01/31/2017     No results found for this visit on 08/13/23. 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 Chest pain. The patient has chest pain which is gastrointestinal in origin and has CT evidence of esophagitis.   The patient will be consulted by Gastroenterology. For the time being, the patient will be on PPI. We will monitor for any GI bleed in the making. 2.       Alcohol withdrawal and delirium tremens. The patient seems very agitated and has significant high alcohol intake history, which is likely causing him to enter into DTs. The patient will receive CIWA protocol, and we will appropriately consult to ICU as needed. 3.       Elevated liver enzymes. This is likely secondary to alcohol abuse. We will start on CIWA protocol and also supportive care. 4.       Hypophosphatemia. This is secondary to poor nutritional intake. We will correct as per protocol. 5.       Hyperuricemia. The patient has previous history of hyperuricemia. We will recheck his uric acid. Currently does not have any evidence of gout. 6.       Diverticulosis. The patient has diverticulosis, but does not have diverticulitis currently. We will manage conservatively. 7.       DVT prophylaxis. The patient will be on SCD boots. 8.       GI prophylaxis. The patient will be on Protonix for GI bleed workup.     08/16/2023  Patient was in deep sedation. Met with multiple family members and also D/W RN and discontinued precedex. Has received multiple large doses of ativan and also was on scheduled halodol and phenobarbital. Ordered phenobarbital level. Patient also will receive RL bolus of 500 ml. Will transfer out of ICU when stable. 08/17/23  Patient slightly more alert after tapering sedation a little. Still on precedex and ativan. Continue monitoring and supportive care. Imaging Results:   XR CHEST AP PORTABLE  (CPT=71045)    Result Date: 8/16/2023  CONCLUSION:  1. Bibasilar airspace opacification and/or superimposed atelectasis has developed. 2. Suspect bibasilar pneumonitis. Follow-up to resolution.     Dictated by (CST): Gerber Purcell MD on 8/16/2023 at 12:43 PM     Finalized by (CST): Gerber Purcell MD on 8/16/2023 at 12:45 PM Parish Nieves MD  8/17/2023  [email or web address]

## 2023-08-18 NOTE — PLAN OF CARE
Pt is A/O*3. Saturating well on 2L NC. Off precedex, was drowsy in the AM. Poor appetite. Family at bedside, participated in care, updated on plan of care. All safety measures in place, patient needs met at this time. Problem: Patient Centered Care  Goal: Patient preferences are identified and integrated in the patient's plan of care  Description: Interventions:  - What would you like us to know as we care for you? I live at home with my parents and girlfriend. - Provide timely, complete, and accurate information to patient/family  - Incorporate patient and family knowledge, values, beliefs, and cultural backgrounds into the planning and delivery of care  - Encourage patient/family to participate in care and decision-making at the level they choose  - Honor patient and family perspectives and choices  Outcome: Progressing     Problem: Patient/Family Goals  Goal: Patient/Family Long Term Goal  Description: Patient's Long Term Goal: To go home    Interventions:  - GI consult  - monitor CIWA scores and treat according to [facility]  - monitor vitals  - monitor oral intake tolera  - See additional Care Plan goals for specific interventions  Outcome: Progressing  Goal: Patient/Family Short Term Goal  Description: Patient's Short Term Goal: To feel better    Interventions:   - GI consult  - monitor CIWA scores and treat according to [facility]  - monitor vitals  - monitor oral intake tolerance  - Psych liaison consult  - See additional Care Plan goals for specific interventions  Outcome: Progressing     Problem: SAFETY ADULT - FALL  Goal: Free from fall injury  Description: INTERVENTIONS:  - Assess pt frequently for physical needs  - Identify cognitive and physical deficits and behaviors that affect risk of falls.   - [place] fall precautions as indicated by assessment.  - Educate pt/family on patient safety including physical limitations  - Instruct pt to call for assistance with activity based on assessment  - Modify environment to reduce risk of injury  - Provide assistive devices as appropriate  - Consider OT/PT consult to assist with strengthening/mobility  - Encourage toileting schedule  Outcome: Progressing     Problem: DISCHARGE PLANNING  Goal: Discharge to home or other facility with appropriate resources  Description: INTERVENTIONS:  - Identify barriers to discharge w/pt and caregiver  - Include patient/family/discharge partner in discharge planning  - Arrange for needed discharge resources and transportation as appropriate  - Identify discharge learning needs (meds, wound care, etc)  - Arrange for interpreters to assist at discharge as needed  - Consider post-discharge preferences of patient/family/discharge partner  - Complete POLST form as appropriate  - Assess patient's ability to be responsible for managing their own health  - Refer to Case Management Department for coordinating discharge planning if the patient needs post-hospital services based on physician/LIP order or complex needs related to functional status, cognitive ability or social support system  Outcome: Progressing     Problem: METABOLIC/FLUID AND ELECTROLYTES - ADULT  Goal: Electrolytes maintained within normal limits  Description: INTERVENTIONS:  - Monitor labs and rhythm and assess patient for signs and symptoms of electrolyte imbalances  - Administer electrolyte replacement as ordered  - Monitor response to electrolyte replacements, including rhythm and repeat lab results as appropriate  - Fluid restriction as ordered  - Instruct patient on fluid and nutrition restrictions as appropriate  Outcome: Progressing     Problem: NEUROLOGICAL - ADULT  Goal: Absence of seizures  Description: INTERVENTIONS  - Monitor for seizure activity  - Administer anti-seizure medications as ordered  - Monitor neurological status  Outcome: Progressing     Problem: GASTROINTESTINAL - ADULT  Goal: Minimal or absence of nausea and vomiting  Description: INTERVENTIONS:  - Maintain adequate hydration with IV or PO as ordered and tolerated  - Nasogastric tube to low intermittent suction as ordered  - Evaluate effectiveness of ordered antiemetic medications  - Provide nonpharmacologic comfort measures as appropriate  - Advance diet as tolerated, if ordered  - Obtain nutritional consult as needed  - Evaluate fluid balance  Outcome: Progressing     Problem: Delirium  Goal: Minimize duration of delirium  Description: Interventions:  - Encourage use of hearing aids, eye glasses  - Promote highest level of mobility daily  - Provide frequent reorientation  - Promote wakefulness i.e. lights on, blinds open  - Promote sleep, encourage patient's normal rest cycle i.e. lights off, TV off, minimize noise and interruptions  - Encourage family to assist in orientation and promotion of home routines  Outcome: Progressing     Problem: Safety Risk - Non-Violent Restraints  Goal: Patient will remain free from self-harm  Description: INTERVENTIONS:  - Apply the least restrictive restraint to prevent harm  - Notify patient and family of reasons restraints applied  - Assess for any contributing factors to confusion (electrolyte disturbances, delirium, medications)  - Discontinue any unnecessary medical devices as soon as possible  - Assess the patient's physical comfort, circulation, skin condition, hydration, nutrition and elimination needs   - Reorient and redirection as needed  - Assess for the need to continue restraints  Outcome: Progressing

## 2023-08-19 RX ORDER — HALOPERIDOL 5 MG/ML
1 INJECTION INTRAMUSCULAR EVERY 4 HOURS
Status: DISCONTINUED | OUTPATIENT
Start: 2023-08-19 | End: 2023-08-20

## 2023-08-19 NOTE — PLAN OF CARE
Patient is A&Ox4, on 2L NC, no bowel movements during this shift. No complaints of pain. Bed is locked and in lowest position. Safety measures maintained. Call light is within reach. Partner at bedside. Problem: Patient Centered Care  Goal: Patient preferences are identified and integrated in the patient's plan of care  Description: Interventions:  - What would you like us to know as we care for you? I live at home with my parents and girlfriend. - Provide timely, complete, and accurate information to patient/family  - Incorporate patient and family knowledge, values, beliefs, and cultural backgrounds into the planning and delivery of care  - Encourage patient/family to participate in care and decision-making at the level they choose  - Honor patient and family perspectives and choices  Outcome: Progressing     Problem: Patient/Family Goals  Goal: Patient/Family Long Term Goal  Description: Patient's Long Term Goal: To go home    Interventions:  - GI consult  - monitor CIWA scores and treat according to [facility]  - monitor vitals  - monitor oral intake tolera  - See additional Care Plan goals for specific interventions  Outcome: Progressing  Goal: Patient/Family Short Term Goal  Description: Patient's Short Term Goal: To feel better    Interventions:   - GI consult  - monitor CIWA scores and treat according to [facility]  - monitor vitals  - monitor oral intake tolerance  - Psych liaison consult  - See additional Care Plan goals for specific interventions  Outcome: Progressing     Problem: SAFETY ADULT - FALL  Goal: Free from fall injury  Description: INTERVENTIONS:  - Assess pt frequently for physical needs  - Identify cognitive and physical deficits and behaviors that affect risk of falls.   - [place] fall precautions as indicated by assessment.  - Educate pt/family on patient safety including physical limitations  - Instruct pt to call for assistance with activity based on assessment  - Modify environment to reduce risk of injury  - Provide assistive devices as appropriate  - Consider OT/PT consult to assist with strengthening/mobility  - Encourage toileting schedule  Outcome: Progressing     Problem: DISCHARGE PLANNING  Goal: Discharge to home or other facility with appropriate resources  Description: INTERVENTIONS:  - Identify barriers to discharge w/pt and caregiver  - Include patient/family/discharge partner in discharge planning  - Arrange for needed discharge resources and transportation as appropriate  - Identify discharge learning needs (meds, wound care, etc)  - Arrange for interpreters to assist at discharge as needed  - Consider post-discharge preferences of patient/family/discharge partner  - Complete POLST form as appropriate  - Assess patient's ability to be responsible for managing their own health  - Refer to Case Management Department for coordinating discharge planning if the patient needs post-hospital services based on physician/LIP order or complex needs related to functional status, cognitive ability or social support system  Outcome: Progressing     Problem: METABOLIC/FLUID AND ELECTROLYTES - ADULT  Goal: Electrolytes maintained within normal limits  Description: INTERVENTIONS:  - Monitor labs and rhythm and assess patient for signs and symptoms of electrolyte imbalances  - Administer electrolyte replacement as ordered  - Monitor response to electrolyte replacements, including rhythm and repeat lab results as appropriate  - Fluid restriction as ordered  - Instruct patient on fluid and nutrition restrictions as appropriate  Outcome: Progressing     Problem: NEUROLOGICAL - ADULT  Goal: Absence of seizures  Description: INTERVENTIONS  - Monitor for seizure activity  - Administer anti-seizure medications as ordered  - Monitor neurological status  Outcome: Progressing     Problem: GASTROINTESTINAL - ADULT  Goal: Minimal or absence of nausea and vomiting  Description: INTERVENTIONS:  - Maintain adequate hydration with IV or PO as ordered and tolerated  - Nasogastric tube to low intermittent suction as ordered  - Evaluate effectiveness of ordered antiemetic medications  - Provide nonpharmacologic comfort measures as appropriate  - Advance diet as tolerated, if ordered  - Obtain nutritional consult as needed  - Evaluate fluid balance  Outcome: Progressing     Problem: Delirium  Goal: Minimize duration of delirium  Description: Interventions:  - Encourage use of hearing aids, eye glasses  - Promote highest level of mobility daily  - Provide frequent reorientation  - Promote wakefulness i.e. lights on, blinds open  - Promote sleep, encourage patient's normal rest cycle i.e. lights off, TV off, minimize noise and interruptions  - Encourage family to assist in orientation and promotion of home routines  Outcome: Progressing     Problem: Safety Risk - Non-Violent Restraints  Goal: Patient will remain free from self-harm  Description: INTERVENTIONS:  - Apply the least restrictive restraint to prevent harm  - Notify patient and family of reasons restraints applied  - Assess for any contributing factors to confusion (electrolyte disturbances, delirium, medications)  - Discontinue any unnecessary medical devices as soon as possible  - Assess the patient's physical comfort, circulation, skin condition, hydration, nutrition and elimination needs   - Reorient and redirection as needed  - Assess for the need to continue restraints  Outcome: Progressing

## 2023-08-19 NOTE — PLAN OF CARE
Problem: SAFETY ADULT - FALL  Goal: Free from fall injury  Description: INTERVENTIONS:  - Assess pt frequently for physical needs  - Identify cognitive and physical deficits and behaviors that affect risk of falls.   - [place] fall precautions as indicated by assessment.  - Educate pt/family on patient safety including physical limitations  - Instruct pt to call for assistance with activity based on assessment  - Modify environment to reduce risk of injury  - Provide assistive devices as appropriate  - Consider OT/PT consult to assist with strengthening/mobility  - Encourage toileting schedule  Outcome: Progressing     Problem: METABOLIC/FLUID AND ELECTROLYTES - ADULT  Goal: Electrolytes maintained within normal limits  Description: INTERVENTIONS:  - Monitor labs and rhythm and assess patient for signs and symptoms of electrolyte imbalances  - Administer electrolyte replacement as ordered  - Monitor response to electrolyte replacements, including rhythm and repeat lab results as appropriate  - Fluid restriction as ordered  - Instruct patient on fluid and nutrition restrictions as appropriate  Outcome: Progressing     Problem: NEUROLOGICAL - ADULT  Goal: Absence of seizures  Description: INTERVENTIONS  - Monitor for seizure activity  - Administer anti-seizure medications as ordered  - Monitor neurological status  Outcome: Progressing     Problem: GASTROINTESTINAL - ADULT  Goal: Minimal or absence of nausea and vomiting  Description: INTERVENTIONS:  - Maintain adequate hydration with IV or PO as ordered and tolerated  - Nasogastric tube to low intermittent suction as ordered  - Evaluate effectiveness of ordered antiemetic medications  - Provide nonpharmacologic comfort measures as appropriate  - Advance diet as tolerated, if ordered  - Obtain nutritional consult as needed  - Evaluate fluid balance  Outcome: Progressing     Problem: Safety Risk - Non-Violent Restraints  Goal: Patient will remain free from self-harm  Description: INTERVENTIONS:  - Apply the least restrictive restraint to prevent harm  - Notify patient and family of reasons restraints applied  - Assess for any contributing factors to confusion (electrolyte disturbances, delirium, medications)  - Discontinue any unnecessary medical devices as soon as possible  - Assess the patient's physical comfort, circulation, skin condition, hydration, nutrition and elimination needs   - Reorient and redirection as needed  - Assess for the need to continue restraints  Outcome: Progressing

## 2023-08-19 NOTE — PROGRESS NOTES
[facility]    Progress Note    Janes Doan. Patient Status:  Inpatient    10/11/1980 MRN [id number]   Location [facility] 2W/SW Attending Anthony Liu, [address] [facility] Se Day # 4 PCP Shadi Argueta MD       Subjective:   Janes Doan. is a(n) 38year old male Alcohol withdrawal    Objective:      23  1900   BP: 106/64   Pulse: 98   Resp: (!) 28   Temp:        Intake/Output Summary (Last 24 hours) at 2023 2349  Last data filed at 2023 1700  Gross per 24 hour   Intake 1987.8 ml   Output 1900 ml   Net 87.8 ml     Wt Readings from Last 2 Encounters:  23 : 206 lb 2.1 oz (93.5 kg)  23 : 185 lb (83.9 kg)      General appearance:  alert, appears stated age, and cooperative  Head: Normocephalic, without obvious abnormality, atraumatic  Eyes: conjunctivae/corneas clear. PERRL, EOM's intact. Fundi benign.   Neck: no adenopathy, no carotid bruit, no JVD, supple, symmetrical, trachea midline, and thyroid not enlarged, symmetric, no tenderness/mass/nodules  Pulmonary: clear to auscultation bilaterally  Cardiovascular: S1, S2 normal, no murmur, click, rub or gallop, regular rate and rhythm  Abdominal: soft, non-tender; bowel sounds normal; no masses,  no organomegaly  Extremities: extremities normal, atraumatic, no cyanosis or edema  Pulses: 2+ and symmetric  Neurologic: Grossly normal  Genital Exam: defer exam    Current Medications:    Current Facility-Administered Medications:     sodium chloride 0.9% infusion, , Intravenous, Continuous    diazepam (Valium) 5 mg/mL injection 5 mg, 5 mg, Intravenous, Q8H SHARDA    valproate (Depacon) 250 mg in sodium chloride 0.9% 50 mL IVPB, 250 mg, Intravenous, Q8H    QUEtiapine (SEROquel) tab 50 mg, 50 mg, Oral, Nightly    ondansetron (Zofran) 4 MG/2ML injection 4 mg, 4 mg, Intravenous, Q6H PRN    haloperidol lactate (Haldol) 5 MG/ML injection 2 mg, 2 mg, Intravenous, q4h    pantoprazole (Protonix) 40 mg in sodium chloride 0.9% PF 10 mL IV push, 40 mg, Intravenous, Q12H    metoprolol tartrate (Lopressor) tab 25 mg, 25 mg, Oral, BID PRN    folic acid (Folvite) tab 1 mg, 1 mg, Oral, Daily    dexmedeTOMIDine in sodium chloride 0.9% (Precedex) 400 mcg/100mL infusion premix, 0.2-1.5 mcg/kg/hr (Dosing Weight), Intravenous, Continuous PRN    LORazepam (Ativan) 2 mg/mL injection 1 mg, 1 mg, Intravenous, Q1H PRN    LORazepam (Ativan) 2 mg/mL injection 2 mg, 2 mg, Intravenous, Q30 Min PRN    LORazepam (Ativan) 2 mg/mL injection 3 mg, 3 mg, Intravenous, Q30 Min PRN    LORazepam (Ativan) 2 mg/mL injection 4 mg, 4 mg, Intravenous, Q15 Min PRN    thiamine 100 mg/mL injection 100 mg, 100 mg, Intravenous, TID    Allergies    Morphine                SWELLING    Comment:SHORTNESS OF BREATH    Assessment and Plan:     Chest pain of uncertain etiology      Hiatal hernia      UGIB (upper gastrointestinal bleed)      Esophagitis      Alcohol use      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 Chest pain. The patient has chest pain which is gastrointestinal in origin and has CT evidence of esophagitis. The patient will be consulted by Gastroenterology. For the time being, the patient will be on PPI. We will monitor for any GI bleed in the making. 2.       Alcohol withdrawal and delirium tremens. The patient seems very agitated and has significant high alcohol intake history, which is likely causing him to enter into DTs. The patient will receive CIWA protocol, and we will appropriately consult to ICU as needed. 3.       Elevated liver enzymes. This is likely secondary to alcohol abuse. We will start on CIWA protocol and also supportive care. 4.       Hypophosphatemia. This is secondary to poor nutritional intake. We will correct as per protocol. 5.       Hyperuricemia. The patient has previous history of hyperuricemia.   We will recheck his uric acid. Currently does not have any evidence of gout. 6.       Diverticulosis. The patient has diverticulosis, but does not have diverticulitis currently. We will manage conservatively. 7.       DVT prophylaxis. The patient will be on SCD boots. 8.       GI prophylaxis. The patient will be on Protonix for GI bleed workup.     08/16/2023  Patient was in deep sedation. Met with multiple family members and also D/W RN and discontinued precedex. Has received multiple large doses of ativan and also was on scheduled halodol and phenobarbital. Ordered phenobarbital level. Patient also will receive RL bolus of 500 ml. Will transfer out of ICU when stable. 08/17/23  Patient slightly more alert after tapering sedation a little. Still on precedex and ativan. Continue monitoring and supportive care. 08/18/23  Patient is more alert. Titrating precedex and ativan. D/W partner and sister at bedside. Will continue supportive care. W/U GI bleed once stable and per GI service. DVT Prophylaxis:    GI Prophylaxis:    Discharge Plan:    Results:     Lab Results   Component Value Date    WBC 10.3 08/17/2023    HGB 8.7 (L) 08/17/2023    HCT 27.4 (L) 08/17/2023    .0 08/17/2023     08/17/2023    K 4.0 08/17/2023     08/17/2023    CO2 25.0 08/17/2023    CREATSERUM 0.79 08/17/2023    BUN 7 08/17/2023     (H) 08/17/2023    CA 8.2 (L) 08/17/2023    ALB 2.7 (L) 08/14/2023    ALKPHO 185 (H) 08/14/2023    BILT 1.9 08/14/2023    TP 6.3 (L) 08/14/2023     (H) 08/14/2023    ALT 35 08/14/2023    INR 1.54 (H) 08/13/2023    LIP 63 08/13/2023    DDIMER 0.40 08/13/2023    ESRML 27 (H) 10/01/2021    MG 2.4 08/16/2023    PHOS 3.3 08/16/2023    TROP 0.00 01/31/2017     No results found for this visit on 08/13/23. No results found.                 Kymberly Zarate MD  8/18/2023

## 2023-08-20 LAB
ALBUMIN SERPL-MCNC: 2.1 G/DL (ref 3.4–5)
ALBUMIN/GLOB SERPL: 0.6 {RATIO} (ref 1–2)
ALP LIVER SERPL-CCNC: 183 U/L
ALT SERPL-CCNC: 23 U/L
ANION GAP SERPL CALC-SCNC: 6 MMOL/L (ref 0–18)
AST SERPL-CCNC: 81 U/L (ref 15–37)
BASOPHILS # BLD AUTO: 0.03 X10(3) UL (ref 0–0.2)
BASOPHILS NFR BLD AUTO: 0.3 %
BILIRUB SERPL-MCNC: 1.7 MG/DL (ref 0.1–2)
BUN BLD-MCNC: 5 MG/DL (ref 7–18)
BUN/CREAT SERPL: 5.4 (ref 10–20)
CALCIUM BLD-MCNC: 8.1 MG/DL (ref 8.5–10.1)
CHLORIDE SERPL-SCNC: 109 MMOL/L (ref 98–112)
CO2 SERPL-SCNC: 26 MMOL/L (ref 21–32)
CREAT BLD-MCNC: 0.93 MG/DL
DEPRECATED HBV CORE AB SER IA-ACNC: 43.2 NG/ML
DEPRECATED RDW RBC AUTO: 67.7 FL (ref 35.1–46.3)
EGFRCR SERPLBLD CKD-EPI 2021: 104 ML/MIN/1.73M2 (ref 60–?)
EOSINOPHIL # BLD AUTO: 0.16 X10(3) UL (ref 0–0.7)
EOSINOPHIL NFR BLD AUTO: 1.5 %
ERYTHROCYTE [DISTWIDTH] IN BLOOD BY AUTOMATED COUNT: 23 % (ref 11–15)
GLOBULIN PLAS-MCNC: 3.6 G/DL (ref 2.8–4.4)
GLUCOSE BLD-MCNC: 86 MG/DL (ref 70–99)
HCT VFR BLD AUTO: 25.5 %
HGB BLD-MCNC: 8.2 G/DL
IMM GRANULOCYTES # BLD AUTO: 0.06 X10(3) UL (ref 0–1)
IMM GRANULOCYTES NFR BLD: 0.5 %
IRON SATN MFR SERPL: 9 %
IRON SERPL-MCNC: 23 UG/DL
LYMPHOCYTES # BLD AUTO: 1.18 X10(3) UL (ref 1–4)
LYMPHOCYTES NFR BLD AUTO: 10.7 %
MAGNESIUM SERPL-MCNC: 2 MG/DL (ref 1.6–2.6)
MCH RBC QN AUTO: 26.5 PG (ref 26–34)
MCHC RBC AUTO-ENTMCNC: 32.2 G/DL (ref 31–37)
MCV RBC AUTO: 82.5 FL
MONOCYTES # BLD AUTO: 1.08 X10(3) UL (ref 0.1–1)
MONOCYTES NFR BLD AUTO: 9.8 %
NEUTROPHILS # BLD AUTO: 8.48 X10 (3) UL (ref 1.5–7.7)
NEUTROPHILS # BLD AUTO: 8.48 X10(3) UL (ref 1.5–7.7)
NEUTROPHILS NFR BLD AUTO: 77.2 %
OSMOLALITY SERPL CALC.SUM OF ELEC: 289 MOSM/KG (ref 275–295)
PHOSPHATE SERPL-MCNC: 2.8 MG/DL (ref 2.5–4.9)
PLATELET # BLD AUTO: 289 10(3)UL (ref 150–450)
POTASSIUM SERPL-SCNC: 3.4 MMOL/L (ref 3.5–5.1)
POTASSIUM SERPL-SCNC: 3.9 MMOL/L (ref 3.5–5.1)
PROT SERPL-MCNC: 5.7 G/DL (ref 6.4–8.2)
RBC # BLD AUTO: 3.09 X10(6)UL
SODIUM SERPL-SCNC: 141 MMOL/L (ref 136–145)
TIBC SERPL-MCNC: 244 UG/DL (ref 240–450)
TRANSFERRIN SERPL-MCNC: 164 MG/DL (ref 200–360)
WBC # BLD AUTO: 11 X10(3) UL (ref 4–11)

## 2023-08-20 PROCEDURE — 99232 SBSQ HOSP IP/OBS MODERATE 35: CPT | Performed by: NURSE PRACTITIONER

## 2023-08-20 RX ORDER — DIAZEPAM 5 MG/ML
5 INJECTION, SOLUTION INTRAMUSCULAR; INTRAVENOUS EVERY 6 HOURS PRN
Status: DISCONTINUED | OUTPATIENT
Start: 2023-08-20 | End: 2023-08-27

## 2023-08-20 RX ORDER — POTASSIUM CHLORIDE 1.5 G/1.58G
40 POWDER, FOR SOLUTION ORAL EVERY 4 HOURS
Status: COMPLETED | OUTPATIENT
Start: 2023-08-20 | End: 2023-08-20

## 2023-08-20 RX ORDER — HALOPERIDOL 5 MG/ML
1 INJECTION INTRAMUSCULAR EVERY 4 HOURS PRN
Status: DISCONTINUED | OUTPATIENT
Start: 2023-08-20 | End: 2023-08-21

## 2023-08-20 NOTE — PLAN OF CARE
Problem: NEUROLOGICAL - ADULT  Goal: Absence of seizures  Description: INTERVENTIONS  - Monitor for seizure activity  - Administer anti-seizure medications as ordered  - Monitor neurological status  Outcome: Progressing     Problem: GASTROINTESTINAL - ADULT  Goal: Minimal or absence of nausea and vomiting  Description: INTERVENTIONS:  - Maintain adequate hydration with IV or PO as ordered and tolerated  - Nasogastric tube to low intermittent suction as ordered  - Evaluate effectiveness of ordered antiemetic medications  - Provide nonpharmacologic comfort measures as appropriate  - Advance diet as tolerated, if ordered  - Obtain nutritional consult as needed  - Evaluate fluid balance  Outcome: Progressing     Problem: SAFETY ADULT - FALL  Goal: Free from fall injury  Description: INTERVENTIONS:  - Assess pt frequently for physical needs  - Identify cognitive and physical deficits and behaviors that affect risk of falls.   - [place] fall precautions as indicated by assessment.  - Educate pt/family on patient safety including physical limitations  - Instruct pt to call for assistance with activity based on assessment  - Modify environment to reduce risk of injury  - Provide assistive devices as appropriate  - Consider OT/PT consult to assist with strengthening/mobility  - Encourage toileting schedule  Outcome: Progressing     Problem: Delirium  Goal: Minimize duration of delirium  Description: Interventions:  - Encourage use of hearing aids, eye glasses  - Promote highest level of mobility daily  - Provide frequent reorientation  - Promote wakefulness i.e. lights on, blinds open  - Promote sleep, encourage patient's normal rest cycle i.e. lights off, TV off, minimize noise and interruptions  - Encourage family to assist in orientation and promotion of home routines  Outcome: Progressing

## 2023-08-21 LAB
ALBUMIN SERPL-MCNC: 2.4 G/DL (ref 3.4–5)
ALBUMIN/GLOB SERPL: 0.6 {RATIO} (ref 1–2)
ALP LIVER SERPL-CCNC: 219 U/L
ALT SERPL-CCNC: 29 U/L
ANION GAP SERPL CALC-SCNC: 10 MMOL/L (ref 0–18)
AST SERPL-CCNC: 93 U/L (ref 15–37)
BASOPHILS # BLD AUTO: 0.08 X10(3) UL (ref 0–0.2)
BASOPHILS NFR BLD AUTO: 0.7 %
BILIRUB SERPL-MCNC: 2.2 MG/DL (ref 0.1–2)
BUN BLD-MCNC: 4 MG/DL (ref 7–18)
BUN/CREAT SERPL: 4.3 (ref 10–20)
CALCIUM BLD-MCNC: 8.5 MG/DL (ref 8.5–10.1)
CHLORIDE SERPL-SCNC: 110 MMOL/L (ref 98–112)
CO2 SERPL-SCNC: 20 MMOL/L (ref 21–32)
CREAT BLD-MCNC: 0.93 MG/DL
DEPRECATED RDW RBC AUTO: 69.1 FL (ref 35.1–46.3)
EGFRCR SERPLBLD CKD-EPI 2021: 104 ML/MIN/1.73M2 (ref 60–?)
EOSINOPHIL # BLD AUTO: 0.22 X10(3) UL (ref 0–0.7)
EOSINOPHIL NFR BLD AUTO: 1.9 %
ERYTHROCYTE [DISTWIDTH] IN BLOOD BY AUTOMATED COUNT: 23.1 % (ref 11–15)
GLOBULIN PLAS-MCNC: 4.1 G/DL (ref 2.8–4.4)
GLUCOSE BLD-MCNC: 92 MG/DL (ref 70–99)
HCT VFR BLD AUTO: 29.5 %
HGB BLD-MCNC: 9.3 G/DL
IMM GRANULOCYTES # BLD AUTO: 0.07 X10(3) UL (ref 0–1)
IMM GRANULOCYTES NFR BLD: 0.6 %
LYMPHOCYTES # BLD AUTO: 1.13 X10(3) UL (ref 1–4)
LYMPHOCYTES NFR BLD AUTO: 9.6 %
MCH RBC QN AUTO: 26.2 PG (ref 26–34)
MCHC RBC AUTO-ENTMCNC: 31.5 G/DL (ref 31–37)
MCV RBC AUTO: 83.1 FL
MONOCYTES # BLD AUTO: 1.17 X10(3) UL (ref 0.1–1)
MONOCYTES NFR BLD AUTO: 10 %
NEUTROPHILS # BLD AUTO: 9.04 X10 (3) UL (ref 1.5–7.7)
NEUTROPHILS # BLD AUTO: 9.04 X10(3) UL (ref 1.5–7.7)
NEUTROPHILS NFR BLD AUTO: 77.2 %
OSMOLALITY SERPL CALC.SUM OF ELEC: 287 MOSM/KG (ref 275–295)
PLATELET # BLD AUTO: 302 10(3)UL (ref 150–450)
PLATELET MORPHOLOGY: NORMAL
POTASSIUM SERPL-SCNC: 3.6 MMOL/L (ref 3.5–5.1)
POTASSIUM SERPL-SCNC: 3.7 MMOL/L (ref 3.5–5.1)
PROT SERPL-MCNC: 6.5 G/DL (ref 6.4–8.2)
RBC # BLD AUTO: 3.55 X10(6)UL
SODIUM SERPL-SCNC: 140 MMOL/L (ref 136–145)
WBC # BLD AUTO: 11.7 X10(3) UL (ref 4–11)

## 2023-08-21 PROCEDURE — 99233 SBSQ HOSP IP/OBS HIGH 50: CPT | Performed by: OTHER

## 2023-08-21 PROCEDURE — 99232 SBSQ HOSP IP/OBS MODERATE 35: CPT | Performed by: REGISTERED NURSE

## 2023-08-21 RX ORDER — CHLORDIAZEPOXIDE HYDROCHLORIDE 10 MG/1
10 CAPSULE, GELATIN COATED ORAL 3 TIMES DAILY
Status: DISCONTINUED | OUTPATIENT
Start: 2023-08-21 | End: 2023-08-22

## 2023-08-21 RX ORDER — DIVALPROEX SODIUM 250 MG/1
250 TABLET, DELAYED RELEASE ORAL EVERY 8 HOURS SCHEDULED
Status: DISCONTINUED | OUTPATIENT
Start: 2023-08-21 | End: 2023-08-23

## 2023-08-21 RX ORDER — MELATONIN
100 DAILY
Status: DISCONTINUED | OUTPATIENT
Start: 2023-08-21 | End: 2023-08-23

## 2023-08-21 RX ORDER — POTASSIUM CHLORIDE 20 MEQ/1
40 TABLET, EXTENDED RELEASE ORAL EVERY 4 HOURS
Status: COMPLETED | OUTPATIENT
Start: 2023-08-21 | End: 2023-08-21

## 2023-08-21 RX ORDER — HALOPERIDOL 5 MG/ML
2 INJECTION INTRAMUSCULAR EVERY 4 HOURS PRN
Status: DISCONTINUED | OUTPATIENT
Start: 2023-08-21 | End: 2023-08-27

## 2023-08-21 RX ORDER — CLONIDINE 0.2 MG/24H
1 PATCH, EXTENDED RELEASE TRANSDERMAL WEEKLY
Status: DISCONTINUED | OUTPATIENT
Start: 2023-08-21 | End: 2023-08-23

## 2023-08-21 RX ORDER — POTASSIUM CHLORIDE 20 MEQ/1
40 TABLET, EXTENDED RELEASE ORAL ONCE
Status: COMPLETED | OUTPATIENT
Start: 2023-08-21 | End: 2023-08-21

## 2023-08-21 RX ORDER — QUETIAPINE FUMARATE 25 MG/1
100 TABLET, FILM COATED ORAL NIGHTLY
Status: DISCONTINUED | OUTPATIENT
Start: 2023-08-21 | End: 2023-08-23

## 2023-08-21 NOTE — PLAN OF CARE
Problem: SAFETY ADULT - FALL  Goal: Free from fall injury  Description: INTERVENTIONS:  - Assess pt frequently for physical needs  - Identify cognitive and physical deficits and behaviors that affect risk of falls.   - [place] fall precautions as indicated by assessment.  - Educate pt/family on patient safety including physical limitations  - Instruct pt to call for assistance with activity based on assessment  - Modify environment to reduce risk of injury  - Provide assistive devices as appropriate  - Consider OT/PT consult to assist with strengthening/mobility  - Encourage toileting schedule  Outcome: Progressing     Problem: METABOLIC/FLUID AND ELECTROLYTES - ADULT  Goal: Electrolytes maintained within normal limits  Description: INTERVENTIONS:  - Monitor labs and rhythm and assess patient for signs and symptoms of electrolyte imbalances  - Administer electrolyte replacement as ordered  - Monitor response to electrolyte replacements, including rhythm and repeat lab results as appropriate  - Fluid restriction as ordered  - Instruct patient on fluid and nutrition restrictions as appropriate  Outcome: Progressing     Problem: NEUROLOGICAL - ADULT  Goal: Absence of seizures  Description: INTERVENTIONS  - Monitor for seizure activity  - Administer anti-seizure medications as ordered  - Monitor neurological status  Outcome: Progressing     Problem: GASTROINTESTINAL - ADULT  Goal: Minimal or absence of nausea and vomiting  Description: INTERVENTIONS:  - Maintain adequate hydration with IV or PO as ordered and tolerated  - Nasogastric tube to low intermittent suction as ordered  - Evaluate effectiveness of ordered antiemetic medications  - Provide nonpharmacologic comfort measures as appropriate  - Advance diet as tolerated, if ordered  - Obtain nutritional consult as needed  - Evaluate fluid balance  Outcome: Progressing     Problem: Delirium  Goal: Minimize duration of delirium  Description: Interventions:  - Encourage use of hearing aids, eye glasses  - Promote highest level of mobility daily  - Provide frequent reorientation  - Promote wakefulness i.e. lights on, blinds open  - Promote sleep, encourage patient's normal rest cycle i.e. lights off, TV off, minimize noise and interruptions  - Encourage family to assist in orientation and promotion of home routines  Outcome: Progressing    Received drowsy from CCU. On IV depacon,Valium prn. Plans for possible GI consult today. Bed alarm activated,side rails padded. Partner stayed overnight. Will continue to monitor patient.

## 2023-08-21 NOTE — PROGRESS NOTES
[facility]    Progress Note    Dominique Choi. Patient Status:  Inpatient    10/11/1980 MRN [id number]   Location [facility] 3W/SW Attending Michaela Galvez, [address] [facility] Se Day # 6 PCP Tyrese Martin MD       Subjective:   Dominique Choi. is a(n) 38year old male Alcohol abuse    Objective:      23   BP:    Pulse: 114   Resp:    Temp:        Intake/Output Summary (Last 24 hours) at 2023 231  Last data filed at 2023  Gross per 24 hour   Intake 240 ml   Output 1150 ml   Net -910 ml     Wt Readings from Last 2 Encounters:  23 : 206 lb 2.1 oz (93.5 kg)  23 : 185 lb (83.9 kg)      General appearance:  alert, appears stated age, and cooperative  Head: Normocephalic, without obvious abnormality, atraumatic  Eyes: conjunctivae/corneas clear. PERRL, EOM's intact. Fundi benign.   Neck: no adenopathy, no carotid bruit, no JVD, supple, symmetrical, trachea midline, and thyroid not enlarged, symmetric, no tenderness/mass/nodules  Pulmonary: clear to auscultation bilaterally  Cardiovascular: S1, S2 normal, no murmur, click, rub or gallop, regular rate and rhythm  Abdominal: soft, non-tender; bowel sounds normal; no masses,  no organomegaly  Extremities: extremities normal, atraumatic, no cyanosis or edema  Pulses: 2+ and symmetric  Neurologic: Grossly normal  Genital Exam: defer exam    Current Medications:    Current Facility-Administered Medications:     haloperidol lactate (Haldol) 5 MG/ML injection 1 mg, 1 mg, Intravenous, Q4H PRN    diazepam (Valium) 5 mg/mL injection 5 mg, 5 mg, Intravenous, Q6H PRN    valproate (Depacon) 250 mg in sodium chloride 0.9% 50 mL IVPB, 250 mg, Intravenous, Q8H    QUEtiapine (SEROquel) tab 50 mg, 50 mg, Oral, Nightly    ondansetron (Zofran) 4 MG/2ML injection 4 mg, 4 mg, Intravenous, Q6H PRN    pantoprazole (Protonix) 40 mg in sodium chloride 0.9% PF 10 mL IV push, 40 mg, Intravenous, Q12H    metoprolol tartrate (Lopressor) tab 25 mg, 25 mg, Oral, BID PRN    folic acid (Folvite) tab 1 mg, 1 mg, Oral, Daily    LORazepam (Ativan) 2 mg/mL injection 1 mg, 1 mg, Intravenous, Q1H PRN    LORazepam (Ativan) 2 mg/mL injection 2 mg, 2 mg, Intravenous, Q30 Min PRN    LORazepam (Ativan) 2 mg/mL injection 3 mg, 3 mg, Intravenous, Q30 Min PRN    LORazepam (Ativan) 2 mg/mL injection 4 mg, 4 mg, Intravenous, Q15 Min PRN    thiamine 100 mg/mL injection 100 mg, 100 mg, Intravenous, TID    Allergies    Morphine                SWELLING    Comment:SHORTNESS OF BREATH    Assessment and Plan:     Chest pain of uncertain etiology      Hiatal hernia      UGIB (upper gastrointestinal bleed)      Esophagitis      Alcohol use      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 Chest pain. The patient has chest pain which is gastrointestinal in origin and has CT evidence of esophagitis. The patient will be consulted by Gastroenterology. For the time being, the patient will be on PPI. We will monitor for any GI bleed in the making. 2.       Alcohol withdrawal and delirium tremens. The patient seems very agitated and has significant high alcohol intake history, which is likely causing him to enter into DTs. The patient will receive CIWA protocol, and we will appropriately consult to ICU as needed. 3.       Elevated liver enzymes. This is likely secondary to alcohol abuse. We will start on CIWA protocol and also supportive care. 4.       Hypophosphatemia. This is secondary to poor nutritional intake. We will correct as per protocol. 5.       Hyperuricemia. The patient has previous history of hyperuricemia. We will recheck his uric acid. Currently does not have any evidence of gout. 6.       Diverticulosis. The patient has diverticulosis, but does not have diverticulitis currently. We will manage conservatively.   7.       DVT prophylaxis. The patient will be on SCD boots. 8.       GI prophylaxis. The patient will be on Protonix for GI bleed workup.     08/16/2023  Patient was in deep sedation. Met with multiple family members and also D/W RN and discontinued precedex. Has received multiple large doses of ativan and also was on scheduled halodol and phenobarbital. Ordered phenobarbital level. Patient also will receive RL bolus of 500 ml. Will transfer out of ICU when stable. 08/17/23  Patient slightly more alert after tapering sedation a little. Still on precedex and ativan. Continue monitoring and supportive care. 08/18/23  Patient is more alert. Titrating precedex and ativan. D/W partner and sister at bedside. Will continue supportive care. W/U GI bleed once stable and per GI service. 08/19/23  Patient doing well. Now in cardiac chair, decreased sedation but still drowsy. No active DT's. No active GI bleed. Plans for discharge when cleared per GI. Transfer out of ICU once bed available. 08/20/23  Patient is more alert. Now moved to regular floor. D/W partner and encouraged oral intake. Discharge after evaluation by dietary  and GI. DVT Prophylaxis:    GI Prophylaxis:    Discharge Plan:    Results:     Lab Results   Component Value Date    WBC 11.0 08/19/2023    HGB 8.2 (L) 08/19/2023    HCT 25.5 (L) 08/19/2023    .0 08/19/2023     08/20/2023    K 3.9 08/20/2023     08/20/2023    CO2 26.0 08/20/2023    CREATSERUM 0.93 08/20/2023    BUN 5 (L) 08/20/2023    GLU 86 08/20/2023    CA 8.1 (L) 08/20/2023    ALB 2.1 (L) 08/20/2023    ALKPHO 183 (H) 08/20/2023    BILT 1.7 08/20/2023    TP 5.7 (L) 08/20/2023    AST 81 (H) 08/20/2023    ALT 23 08/20/2023    INR 1.54 (H) 08/13/2023    LIP 63 08/13/2023    DDIMER 0.40 08/13/2023    ESRML 27 (H) 10/01/2021    MG 2.0 08/20/2023    PHOS 2.8 08/20/2023    TROP 0.00 01/31/2017     No results found for this visit on 08/13/23.     No results found.                Julietta Dakin, MD  8/20/2023

## 2023-08-21 NOTE — OCCUPATIONAL THERAPY NOTE
OT order received, chart reviewed. Per RN, patient restless and just received haldol, requesting therapy re-attempt later today. OT will f/u as appropriate/able.       Suraj Close, [address] [facility].  #[phone]

## 2023-08-21 NOTE — PHYSICAL THERAPY NOTE
PHYSICAL THERAPY EVALUATION - INPATIENT     Room Number: 341/341-A  Evaluation Date: 8/21/2023  Type of Evaluation: Initial   Physician Order: PT Eval and Treat    Presenting Problem: chest pain  Reason for Therapy: Mobility Dysfunction and Discharge Planning    PHYSICAL THERAPY ASSESSMENT   Orders received and chart reviewed. NICOLASA Pesron approved participation in physical therapy. Session coordinated with OT, gait belt donned. PPE worn by therapist: gloves. Patient was not wearing a mask during session. Patient is a 38year old male admitted 8/13/2023 for Presenting Problem: chest pain. Patient presented in bed with 0/10 pain. Education provided on Physical therapy plan of care and physiological benefits of out of bed mobility. Patient with good carryover. Patient on room air. Heart rate 120, up to 141 after ambulation. Patient currently with min A x 2 for mobility during the session with rolling walker. Patient with cues and tactile assist provided for guiding the rolling walker. Patient is currently functioning below baseline with bed mobility, transfers, gait, and stair negotiation as a result of the following impairments: decreased functional strength and medical status. Next session anticipate to progress bed mobility, transfers, gait, and stair negotiation. Was given Haldol. Patient history and/or personal factors that may impact the plan of care include home accessibility concerns. Based on the physical therapy examination of the noted systems and functional activity/participation limitations, the patient presentation is evolving given the patient presents with symptoms that are unchanging/predictable and presents with unstable vital signs. Patient would benefit from continued skilled physical therapy interventions to maximize patient safety and functional independence. Updated nurse on results of session, patient left in bed with alarm set, all lines intact, all needs met with call light in reach. Bed mobility: Mod assist  Transfers: Min assist and assist of 2  Gait Assistance: Minimum assistance (assist of 2) wide base of support   Distance (ft): 20ft x 2  Assistive Device: Rolling walker             Patient was left in bed and alarm activated at end of session with all needs in reach. Patient's current functional deficits include bed mobility, transfers, gait and stair navigation. Patient may have the physical skills to return to prior living environment upon D/C from the hospital. Anticipate patient will benefit from continued skilled physical therapy while in the hospital and upon D/C from the hospital with home health versus [facility] pending progress. The patient's Approx Degree of Impairment: 54.16% has been calculated based on documentation in the [facility] '6 clicks' Inpatient Basic Mobility Short Form. Research supports that patients with this level of impairment may benefit from Subacute Rehab. RN aware of patient status post session. Patient will benefit from continued IP PT services to address these deficits in preparation for discharge. 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)    Alcohol dependence, continuous (HCC)    Episodic mood disorder (HCC)    Past Medical History  Past Medical History:   Diagnosis Date    Esophageal reflux     Gout     Hernia      Past Surgical History  Past Surgical History:   Procedure Laterality Date    EGD  02/15/2016    EH ND REPAIR COMPLEX SCALP ARMS LEGS 1.1 TO 2.5 CM     ELBOW FRACTURE SURGERY Right     HERNIA SURGERY      INGUINAL HERNIA REPAIR Left 2023     HOME SITUATION  Type of Home: House   Home Layout: Two level  Stairs to Enter : 2  Railing: Yes  Stairs to Bedroom: 12  Railing: No  Lives With: Significant other  Drives: Yes  Patient Owned Equipment: None  Patient Regularly Uses: None    Prior Level of [place]: Patient reports being independent in ADL's and ambulation with no assistive device prior to admission to the hospital.     SUBJECTIVE  \"I have to use the bathroom\"    PHYSICAL THERAPY EXAMINATION     OBJECTIVE  Precautions: None  Fall Risk: High fall risk    WEIGHT BEARING RESTRICTION  Weight Bearing Restriction: None                PAIN ASSESSMENT  Ratin          COGNITION  Overall Cognitive Status:  WFL - within functional limits    RANGE OF MOTION AND STRENGTH ASSESSMENT    Lower extremity ROM is within functional limits  BLE WNL    Lower extremity strength is within functional limits  BLE WNL    BALANCE  Static Sitting: Fair +  Dynamic Sitting: Fair  Static Standing: Fair -  Dynamic Standing: Poor +    AM-PAC '6-Clicks' INPATIENT SHORT FORM - BASIC MOBILITY  How much difficulty does the patient currently have. .. Patient Difficulty: Turning over in bed (including adjusting bedclothes, sheets and blankets)?: A Little   Patient Difficulty: Sitting down on and standing up from a chair with arms (e.g., wheelchair, bedside commode, etc.): A Little   Patient Difficulty: Moving from lying on back to sitting on the side of the bed?: A Little   How much help from another person does the patient currently need. ..    Help from Another: Moving to and from a bed to a chair (including a wheelchair)?: A Little   Help from Another: Need to walk in hospital room?: A Lot   Help from Another: Climbing 3-5 steps with a railing?: A Lot     AM-PAC Score:  Raw Score: 16   Approx Degree of Impairment: 54.16%   Standardized Score (AM-PAC Scale): 40.78   CMS Modifier (G-Code): CK    Exercise/Education Provided:  Bed mobility  Body mechanics  Gait training  Transfer training    Patient End of Session: Needs met;Call light within reach;RN aware of session/findings; All patient questions and concerns addressed; In bed;Family present; Alarm set    CURRENT GOALS  Goals to be met by: 8/30/23  Patient Goal Patient's self-stated goal is: to go home   Goal #1 Patient is able to demonstrate supine - sit EOB @ level: supervision     Goal #1   Current Status    Goal #2 Patient is able to demonstrate transfers Sit to/from Stand at assistance level: supervision with walker - rolling     Goal #2  Current Status    Goal #3 Patient is able to ambulate 100 feet with assist device: walker - rolling at assistance level: supervision   Goal #3   Current Status    Goal #4 Patient will negotiate 12 stairs/one curb w/ assistive device and supervision   Goal #4   Current Status    Goal #5 Patient to demonstrate independence with home activity/exercise instructions provided to patient in preparation for discharge.    Goal #5   Current Status    Goal #6    Goal #6  Current Status     Patient Evaluation Complexity Level:  History Low - no personal factors and/or co-morbidities   Examination of body systems Low - addressing 1-2 elements   Clinical Presentation Moderate - Evolving   Clinical Decision Making Low Complexity     Gait Training: 10 minutes  Therapeutic Activity: 13 minutes

## 2023-08-21 NOTE — PLAN OF CARE
Pt A/Ox4 but can be forgetful at times. Restless and anxious today- haldol and valium given. Psych aware- med adjustments. Depacon infusion switched to PO. Potassium replaced. HR's elevated in 120-130's- cards consulted. GI plan to do egd when withdrawal symptoms resolve and HR under control- plan NPO at midnight. PT/OT saw pt- recommendations will be determined based on withdrawal symptoms. Safety precautions in place and bed alarm on. Frequent rounding. Family updated. Problem: Patient Centered Care  Goal: Patient preferences are identified and integrated in the patient's plan of care  Description: Interventions:  - What would you like us to know as we care for you? I live at home with my parents and girlfriend.   - Provide timely, complete, and accurate information to patient/family  - Incorporate patient and family knowledge, values, beliefs, and cultural backgrounds into the planning and delivery of care  - Encourage patient/family to participate in care and decision-making at the level they choose  - Honor patient and family perspectives and choices  Outcome: Progressing     Problem: Patient/Family Goals  Goal: Patient/Family Long Term Goal  Description: Patient's Long Term Goal: To go home    Interventions:  - GI consult  - monitor CIWA scores and treat according to [facility]  - monitor vitals  - monitor oral intake tolera  - See additional Care Plan goals for specific interventions  Outcome: Progressing  Goal: Patient/Family Short Term Goal  Description: Patient's Short Term Goal: To feel better    Interventions:   - GI consult  - monitor CIWA scores and treat according to [facility]  - monitor vitals  - monitor oral intake tolerance  - Psych liaison consult  - See additional Care Plan goals for specific interventions  Outcome: Progressing     Problem: SAFETY ADULT - FALL  Goal: Free from fall injury  Description: INTERVENTIONS:  - Assess pt frequently for physical needs  - Identify cognitive and physical deficits and behaviors that affect risk of falls.   - [place] fall precautions as indicated by assessment.  - Educate pt/family on patient safety including physical limitations  - Instruct pt to call for assistance with activity based on assessment  - Modify environment to reduce risk of injury  - Provide assistive devices as appropriate  - Consider OT/PT consult to assist with strengthening/mobility  - Encourage toileting schedule  Outcome: Progressing     Problem: DISCHARGE PLANNING  Goal: Discharge to home or other facility with appropriate resources  Description: INTERVENTIONS:  - Identify barriers to discharge w/pt and caregiver  - Include patient/family/discharge partner in discharge planning  - Arrange for needed discharge resources and transportation as appropriate  - Identify discharge learning needs (meds, wound care, etc)  - Arrange for interpreters to assist at discharge as needed  - Consider post-discharge preferences of patient/family/discharge partner  - Complete POLST form as appropriate  - Assess patient's ability to be responsible for managing their own health  - Refer to Case Management Department for coordinating discharge planning if the patient needs post-hospital services based on physician/LIP order or complex needs related to functional status, cognitive ability or social support system  Outcome: Progressing     Problem: METABOLIC/FLUID AND ELECTROLYTES - ADULT  Goal: Electrolytes maintained within normal limits  Description: INTERVENTIONS:  - Monitor labs and rhythm and assess patient for signs and symptoms of electrolyte imbalances  - Administer electrolyte replacement as ordered  - Monitor response to electrolyte replacements, including rhythm and repeat lab results as appropriate  - Fluid restriction as ordered  - Instruct patient on fluid and nutrition restrictions as appropriate  Outcome: Progressing     Problem: NEUROLOGICAL - ADULT  Goal: Absence of seizures  Description: INTERVENTIONS  - Monitor for seizure activity  - Administer anti-seizure medications as ordered  - Monitor neurological status  Outcome: Progressing     Problem: GASTROINTESTINAL - ADULT  Goal: Minimal or absence of nausea and vomiting  Description: INTERVENTIONS:  - Maintain adequate hydration with IV or PO as ordered and tolerated  - Nasogastric tube to low intermittent suction as ordered  - Evaluate effectiveness of ordered antiemetic medications  - Provide nonpharmacologic comfort measures as appropriate  - Advance diet as tolerated, if ordered  - Obtain nutritional consult as needed  - Evaluate fluid balance  Outcome: Progressing     Problem: Delirium  Goal: Minimize duration of delirium  Description: Interventions:  - Encourage use of hearing aids, eye glasses  - Promote highest level of mobility daily  - Provide frequent reorientation  - Promote wakefulness i.e. lights on, blinds open  - Promote sleep, encourage patient's normal rest cycle i.e. lights off, TV off, minimize noise and interruptions  - Encourage family to assist in orientation and promotion of home routines  Outcome: Progressing     Problem: Safety Risk - Non-Violent Restraints  Goal: Patient will remain free from self-harm  Description: INTERVENTIONS:  - Apply the least restrictive restraint to prevent harm  - Notify patient and family of reasons restraints applied  - Assess for any contributing factors to confusion (electrolyte disturbances, delirium, medications)  - Discontinue any unnecessary medical devices as soon as possible  - Assess the patient's physical comfort, circulation, skin condition, hydration, nutrition and elimination needs   - Reorient and redirection as needed  - Assess for the need to continue restraints  Outcome: Progressing

## 2023-08-22 ENCOUNTER — APPOINTMENT (OUTPATIENT)
Dept: CV DIAGNOSTICS | Facility: HOSPITAL | Age: 43
End: 2023-08-22
Attending: INTERNAL MEDICINE
Payer: MEDICAID

## 2023-08-22 LAB
ALBUMIN SERPL-MCNC: 2.4 G/DL (ref 3.4–5)
ALBUMIN/GLOB SERPL: 0.6 {RATIO} (ref 1–2)
ALP LIVER SERPL-CCNC: 209 U/L
ALT SERPL-CCNC: 24 U/L
ANION GAP SERPL CALC-SCNC: 8 MMOL/L (ref 0–18)
AST SERPL-CCNC: 87 U/L (ref 15–37)
BILIRUB SERPL-MCNC: 2.2 MG/DL (ref 0.1–2)
BUN BLD-MCNC: 4 MG/DL (ref 7–18)
BUN/CREAT SERPL: 4.5 (ref 10–20)
CALCIUM BLD-MCNC: 8.7 MG/DL (ref 8.5–10.1)
CHLORIDE SERPL-SCNC: 109 MMOL/L (ref 98–112)
CO2 SERPL-SCNC: 23 MMOL/L (ref 21–32)
CREAT BLD-MCNC: 0.89 MG/DL
EGFRCR SERPLBLD CKD-EPI 2021: 108 ML/MIN/1.73M2 (ref 60–?)
GLOBULIN PLAS-MCNC: 3.9 G/DL (ref 2.8–4.4)
GLUCOSE BLD-MCNC: 91 MG/DL (ref 70–99)
GLUCOSE BLDC GLUCOMTR-MCNC: 87 MG/DL (ref 70–99)
MAGNESIUM SERPL-MCNC: 2.2 MG/DL (ref 1.6–2.6)
OSMOLALITY SERPL CALC.SUM OF ELEC: 286 MOSM/KG (ref 275–295)
POTASSIUM SERPL-SCNC: 4 MMOL/L (ref 3.5–5.1)
POTASSIUM SERPL-SCNC: 4 MMOL/L (ref 3.5–5.1)
PROT SERPL-MCNC: 6.3 G/DL (ref 6.4–8.2)
SODIUM SERPL-SCNC: 140 MMOL/L (ref 136–145)
TROPONIN I HIGH SENSITIVITY: 4 NG/L

## 2023-08-22 PROCEDURE — 93306 TTE W/DOPPLER COMPLETE: CPT | Performed by: INTERNAL MEDICINE

## 2023-08-22 PROCEDURE — 99233 SBSQ HOSP IP/OBS HIGH 50: CPT | Performed by: NURSE PRACTITIONER

## 2023-08-22 PROCEDURE — 99233 SBSQ HOSP IP/OBS HIGH 50: CPT | Performed by: OTHER

## 2023-08-22 RX ORDER — CHLORDIAZEPOXIDE HYDROCHLORIDE 5 MG/1
5 CAPSULE, GELATIN COATED ORAL 3 TIMES DAILY
Status: DISCONTINUED | OUTPATIENT
Start: 2023-08-23 | End: 2023-08-23

## 2023-08-22 NOTE — INTERVAL H&P NOTE
Pre-op Diagnosis: abnormal CT esophagus    The above referenced H&P was reviewed by Zulma Granados MD on 8/22/2023, the patient was examined and no significant changes have occurred in the patient's condition since the H&P was performed. I discussed with the patient and/or legal representative the potential benefits, risks and side effects of this procedure; the likelihood of the patient achieving goals; and potential problems that might occur during recuperation. I discussed reasonable alternatives to the procedure, including risks, benefits and side effects related to the alternatives and risks related to not receiving this procedure. We will proceed with procedure as planned.

## 2023-08-22 NOTE — PROGRESS NOTES
[facility]    Progress Note    Gilbert Camacho. Patient Status:  Inpatient    10/11/1980 MRN [id number]   Location [facility] 3W/SW Attending Ana Valles, [address] 7 PCP Julieth Dhillon MD       Subjective:   Gilbert Camacho. is a(n) 38year old male alcohol withdrawal    Objective:      23   BP:    Pulse: 112   Resp:    Temp:        Intake/Output Summary (Last 24 hours) at 2023 2347  Last data filed at 2023 1856  Gross per 24 hour   Intake 460 ml   Output 1200 ml   Net -740 ml     Wt Readings from Last 2 Encounters:  23 : 205 lb 3.2 oz (93.1 kg)  23 : 185 lb (83.9 kg)      General appearance:  alert, appears stated age, and cooperative  Head: Normocephalic, without obvious abnormality, atraumatic  Eyes: conjunctivae/corneas clear. PERRL, EOM's intact. Fundi benign.   Neck: no adenopathy, no carotid bruit, no JVD, supple, symmetrical, trachea midline, and thyroid not enlarged, symmetric, no tenderness/mass/nodules  Pulmonary: clear to auscultation bilaterally  Cardiovascular: S1, S2 normal, no murmur, click, rub or gallop, regular rate and rhythm  Abdominal: soft, non-tender; bowel sounds normal; no masses,  no organomegaly  Extremities: extremities normal, atraumatic, no cyanosis or edema  Pulses: 2+ and symmetric  Neurologic: Grossly normal  Genital Exam: defer exam    Current Medications:    Current Facility-Administered Medications:     QUEtiapine (SEROquel) tab 100 mg, 100 mg, Oral, Nightly    haloperidol lactate (Haldol) 5 MG/ML injection 2 mg, 2 mg, Intravenous, Q4H PRN    divalproex DR (Depakote) tab 250 mg, 250 mg, Oral, Q8H SHARDA    thiamine (Vitamin B1) tab 100 mg, 100 mg, Oral, Daily    cloNIDine (Catapres) 0.2 MG/24HR patch 1 patch, 1 patch, Transdermal, Weekly    metoprolol tartrate (Lopressor) partial tab 12.5 mg, 12.5 mg, Oral, 2x Daily(Beta Blocker)    chlordiazePOXIDE (Librium) cap 10 mg, 10 mg, Oral, TID    diazepam (Valium) 5 mg/mL injection 5 mg, 5 mg, Intravenous, Q6H PRN    ondansetron (Zofran) 4 MG/2ML injection 4 mg, 4 mg, Intravenous, Q6H PRN    pantoprazole (Protonix) 40 mg in sodium chloride 0.9% PF 10 mL IV push, 40 mg, Intravenous, Q12H    metoprolol tartrate (Lopressor) tab 25 mg, 25 mg, Oral, BID PRN    folic acid (Folvite) tab 1 mg, 1 mg, Oral, Daily    LORazepam (Ativan) 2 mg/mL injection 1 mg, 1 mg, Intravenous, Q1H PRN    LORazepam (Ativan) 2 mg/mL injection 2 mg, 2 mg, Intravenous, Q30 Min PRN    LORazepam (Ativan) 2 mg/mL injection 3 mg, 3 mg, Intravenous, Q30 Min PRN    LORazepam (Ativan) 2 mg/mL injection 4 mg, 4 mg, Intravenous, Q15 Min PRN    Allergies    Morphine                SWELLING    Comment:SHORTNESS OF BREATH    Assessment and Plan:     Chest pain of uncertain etiology      Hiatal hernia      UGIB (upper gastrointestinal bleed)      Esophagitis      Alcohol use      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 Chest pain. The patient has chest pain which is gastrointestinal in origin and has CT evidence of esophagitis. The patient will be consulted by Gastroenterology. For the time being, the patient will be on PPI. We will monitor for any GI bleed in the making. 2.       Alcohol withdrawal and delirium tremens. The patient seems very agitated and has significant high alcohol intake history, which is likely causing him to enter into DTs. The patient will receive CIWA protocol, and we will appropriately consult to ICU as needed. 3.       Elevated liver enzymes. This is likely secondary to alcohol abuse. We will start on CIWA protocol and also supportive care. 4.       Hypophosphatemia. This is secondary to poor nutritional intake. We will correct as per protocol. 5.       Hyperuricemia. The patient has previous history of hyperuricemia.   We will recheck his uric acid.  Currently does not have any evidence of gout. 6.       Diverticulosis. The patient has diverticulosis, but does not have diverticulitis currently. We will manage conservatively. 7.       DVT prophylaxis. The patient will be on SCD boots. 8.       GI prophylaxis. The patient will be on Protonix for GI bleed workup.     08/16/2023  Patient was in deep sedation. Met with multiple family members and also D/W RN and discontinued precedex. Has received multiple large doses of ativan and also was on scheduled halodol and phenobarbital. Ordered phenobarbital level. Patient also will receive RL bolus of 500 ml. Will transfer out of ICU when stable. 08/17/23  Patient slightly more alert after tapering sedation a little. Still on precedex and ativan. Continue monitoring and supportive care. 08/18/23  Patient is more alert. Titrating precedex and ativan. D/W partner and sister at bedside. Will continue supportive care. W/U GI bleed once stable and per GI service. 08/19/23  Patient doing well. Now in cardiac chair, decreased sedation but still drowsy. No active DT's. No active GI bleed. Plans for discharge when cleared per GI. Transfer out of ICU once bed available. 08/20/23  Patient is more alert. Now moved to regular floor. D/W partner and encouraged oral intake. Discharge after evaluation by dietary  and GI.       08/21/23  Patient is more alert remains tachycardic and on CIWA protocol. Continue supportive care. Tachycardia noted and cardiology consulted. Not stable for discharge.       DVT Prophylaxis:    GI Prophylaxis:    Discharge Plan:    Results:     Lab Results   Component Value Date    WBC 11.7 (H) 08/21/2023    HGB 9.3 (L) 08/21/2023    HCT 29.5 (L) 08/21/2023    .0 08/21/2023     08/21/2023    K 3.7 08/21/2023     08/21/2023    CO2 20.0 (L) 08/21/2023    CREATSERUM 0.93 08/21/2023    BUN 4 (L) 08/21/2023    GLU 92 08/21/2023    CA 8.5 08/21/2023    ALB 2.4 (L) 08/21/2023    ALKPHO 219 (H) 08/21/2023    BILT 2.2 (H) 08/21/2023    TP 6.5 08/21/2023    AST 93 (H) 08/21/2023    ALT 29 08/21/2023    INR 1.54 (H) 08/13/2023    LIP 63 08/13/2023    DDIMER 0.40 08/13/2023    ESRML 27 (H) 10/01/2021    MG 2.0 08/20/2023    PHOS 2.8 08/20/2023    TROP 0.00 01/31/2017     No results found for this visit on 08/13/23. No results found.                 Kymberly Zarate MD  8/21/2023

## 2023-08-22 NOTE — PLAN OF CARE
Patients CIWA's remain stable and low. EGD cancelled this morning as he was unable to give consent d/t being confused. Family present at bedside throughout the day. BP this morning was 88/53, 500cc 0.9NS bolus given and BP improved to 1 teens systolic. Echo done this morning, EF 55-60%. Problem: Patient Centered Care  Goal: Patient preferences are identified and integrated in the patient's plan of care  Description: Interventions:  - What would you like us to know as we care for you? I live at home with my parents and girlfriend. - Provide timely, complete, and accurate information to patient/family  - Incorporate patient and family knowledge, values, beliefs, and cultural backgrounds into the planning and delivery of care  - Encourage patient/family to participate in care and decision-making at the level they choose  - Honor patient and family perspectives and choices  Outcome: Progressing     Problem: Patient/Family Goals  Goal: Patient/Family Long Term Goal  Description: Patient's Long Term Goal: To go home    Interventions:  - GI consult  - monitor CIWA scores and treat according to [facility]  - monitor vitals  - monitor oral intake tolera  - See additional Care Plan goals for specific interventions  Outcome: Progressing  Goal: Patient/Family Short Term Goal  Description: Patient's Short Term Goal: To feel better    Interventions:   - GI consult  - monitor CIWA scores and treat according to [facility]  - monitor vitals  - monitor oral intake tolerance  - Psych liaison consult  - See additional Care Plan goals for specific interventions  Outcome: Progressing     Problem: SAFETY ADULT - FALL  Goal: Free from fall injury  Description: INTERVENTIONS:  - Assess pt frequently for physical needs  - Identify cognitive and physical deficits and behaviors that affect risk of falls.   - [place] fall precautions as indicated by assessment.  - Educate pt/family on patient safety including physical limitations  - Instruct pt to call for assistance with activity based on assessment  - Modify environment to reduce risk of injury  - Provide assistive devices as appropriate  - Consider OT/PT consult to assist with strengthening/mobility  - Encourage toileting schedule  Outcome: Progressing     Problem: DISCHARGE PLANNING  Goal: Discharge to home or other facility with appropriate resources  Description: INTERVENTIONS:  - Identify barriers to discharge w/pt and caregiver  - Include patient/family/discharge partner in discharge planning  - Arrange for needed discharge resources and transportation as appropriate  - Identify discharge learning needs (meds, wound care, etc)  - Arrange for interpreters to assist at discharge as needed  - Consider post-discharge preferences of patient/family/discharge partner  - Complete POLST form as appropriate  - Assess patient's ability to be responsible for managing their own health  - Refer to Case Management Department for coordinating discharge planning if the patient needs post-hospital services based on physician/LIP order or complex needs related to functional status, cognitive ability or social support system  Outcome: Progressing     Problem: METABOLIC/FLUID AND ELECTROLYTES - ADULT  Goal: Electrolytes maintained within normal limits  Description: INTERVENTIONS:  - Monitor labs and rhythm and assess patient for signs and symptoms of electrolyte imbalances  - Administer electrolyte replacement as ordered  - Monitor response to electrolyte replacements, including rhythm and repeat lab results as appropriate  - Fluid restriction as ordered  - Instruct patient on fluid and nutrition restrictions as appropriate  Outcome: Progressing     Problem: NEUROLOGICAL - ADULT  Goal: Absence of seizures  Description: INTERVENTIONS  - Monitor for seizure activity  - Administer anti-seizure medications as ordered  - Monitor neurological status  Outcome: Progressing     Problem: GASTROINTESTINAL - ADULT  Goal: Minimal or absence of nausea and vomiting  Description: INTERVENTIONS:  - Maintain adequate hydration with IV or PO as ordered and tolerated  - Nasogastric tube to low intermittent suction as ordered  - Evaluate effectiveness of ordered antiemetic medications  - Provide nonpharmacologic comfort measures as appropriate  - Advance diet as tolerated, if ordered  - Obtain nutritional consult as needed  - Evaluate fluid balance  Outcome: Progressing     Problem: Delirium  Goal: Minimize duration of delirium  Description: Interventions:  - Encourage use of hearing aids, eye glasses  - Promote highest level of mobility daily  - Provide frequent reorientation  - Promote wakefulness i.e. lights on, blinds open  - Promote sleep, encourage patient's normal rest cycle i.e. lights off, TV off, minimize noise and interruptions  - Encourage family to assist in orientation and promotion of home routines  Outcome: Progressing     Problem: Safety Risk - Non-Violent Restraints  Goal: Patient will remain free from self-harm  Description: INTERVENTIONS:  - Apply the least restrictive restraint to prevent harm  - Notify patient and family of reasons restraints applied  - Assess for any contributing factors to confusion (electrolyte disturbances, delirium, medications)  - Discontinue any unnecessary medical devices as soon as possible  - Assess the patient's physical comfort, circulation, skin condition, hydration, nutrition and elimination needs   - Reorient and redirection as needed  - Assess for the need to continue restraints  Outcome: Progressing

## 2023-08-22 NOTE — PLAN OF CARE
Problem: SAFETY ADULT - FALL  Goal: Free from fall injury  Description: INTERVENTIONS:  - Assess pt frequently for physical needs  - Identify cognitive and physical deficits and behaviors that affect risk of falls.   - [place] fall precautions as indicated by assessment.  - Educate pt/family on patient safety including physical limitations  - Instruct pt to call for assistance with activity based on assessment  - Modify environment to reduce risk of injury  - Provide assistive devices as appropriate  - Consider OT/PT consult to assist with strengthening/mobility  - Encourage toileting schedule  Outcome: Progressing     Problem: METABOLIC/FLUID AND ELECTROLYTES - ADULT  Goal: Electrolytes maintained within normal limits  Description: INTERVENTIONS:  - Monitor labs and rhythm and assess patient for signs and symptoms of electrolyte imbalances  - Administer electrolyte replacement as ordered  - Monitor response to electrolyte replacements, including rhythm and repeat lab results as appropriate  - Fluid restriction as ordered  - Instruct patient on fluid and nutrition restrictions as appropriate  Outcome: Progressing     Problem: NEUROLOGICAL - ADULT  Goal: Absence of seizures  Description: INTERVENTIONS  - Monitor for seizure activity  - Administer anti-seizure medications as ordered  - Monitor neurological status  Outcome: Progressing     Problem: GASTROINTESTINAL - ADULT  Goal: Minimal or absence of nausea and vomiting  Description: INTERVENTIONS:  - Maintain adequate hydration with IV or PO as ordered and tolerated  - Nasogastric tube to low intermittent suction as ordered  - Evaluate effectiveness of ordered antiemetic medications  - Provide nonpharmacologic comfort measures as appropriate  - Advance diet as tolerated, if ordered  - Obtain nutritional consult as needed  - Evaluate fluid balance  Outcome: Progressing     Problem: Delirium  Goal: Minimize duration of delirium  Description: Interventions:  - Encourage use of hearing aids, eye glasses  - Promote highest level of mobility daily  - Provide frequent reorientation  - Promote wakefulness i.e. lights on, blinds open  - Promote sleep, encourage patient's normal rest cycle i.e. lights off, TV off, minimize noise and interruptions  - Encourage family to assist in orientation and promotion of home routines  Outcome: Progressing    Received drowsy,oriented x 3. Slept fairly during the night. Plans for EGD,ECHO today. Kept NPO post midnight. No complaints of pain. HR in the low 100's. Will continue to monitor patient.

## 2023-08-23 ENCOUNTER — ANESTHESIA (OUTPATIENT)
Dept: ENDOSCOPY | Facility: HOSPITAL | Age: 43
End: 2023-08-23
Payer: MEDICAID

## 2023-08-23 ENCOUNTER — ANESTHESIA EVENT (OUTPATIENT)
Dept: ENDOSCOPY | Facility: HOSPITAL | Age: 43
End: 2023-08-23
Payer: MEDICAID

## 2023-08-23 LAB
ANION GAP SERPL CALC-SCNC: 6 MMOL/L (ref 0–18)
BASOPHILS # BLD AUTO: 0.04 X10(3) UL (ref 0–0.2)
BASOPHILS NFR BLD AUTO: 0.3 %
BUN BLD-MCNC: 8 MG/DL (ref 7–18)
BUN/CREAT SERPL: 9 (ref 10–20)
CALCIUM BLD-MCNC: 8 MG/DL (ref 8.5–10.1)
CHLORIDE SERPL-SCNC: 112 MMOL/L (ref 98–112)
CO2 SERPL-SCNC: 23 MMOL/L (ref 21–32)
CREAT BLD-MCNC: 0.89 MG/DL
DEPRECATED RDW RBC AUTO: 68.1 FL (ref 35.1–46.3)
EGFRCR SERPLBLD CKD-EPI 2021: 108 ML/MIN/1.73M2 (ref 60–?)
EOSINOPHIL # BLD AUTO: 0.27 X10(3) UL (ref 0–0.7)
EOSINOPHIL NFR BLD AUTO: 2.3 %
ERYTHROCYTE [DISTWIDTH] IN BLOOD BY AUTOMATED COUNT: 23.1 % (ref 11–15)
GLUCOSE BLD-MCNC: 110 MG/DL (ref 70–99)
HCT VFR BLD AUTO: 24.6 %
HGB BLD-MCNC: 7.8 G/DL
IMM GRANULOCYTES # BLD AUTO: 0.12 X10(3) UL (ref 0–1)
IMM GRANULOCYTES NFR BLD: 1 %
LYMPHOCYTES # BLD AUTO: 1.27 X10(3) UL (ref 1–4)
LYMPHOCYTES NFR BLD AUTO: 10.7 %
MCH RBC QN AUTO: 26.4 PG (ref 26–34)
MCHC RBC AUTO-ENTMCNC: 31.7 G/DL (ref 31–37)
MCV RBC AUTO: 83.1 FL
MONOCYTES # BLD AUTO: 1.04 X10(3) UL (ref 0.1–1)
MONOCYTES NFR BLD AUTO: 8.8 %
NEUTROPHILS # BLD AUTO: 9.1 X10 (3) UL (ref 1.5–7.7)
NEUTROPHILS # BLD AUTO: 9.1 X10(3) UL (ref 1.5–7.7)
NEUTROPHILS NFR BLD AUTO: 76.9 %
OSMOLALITY SERPL CALC.SUM OF ELEC: 291 MOSM/KG (ref 275–295)
PLATELET # BLD AUTO: 259 10(3)UL (ref 150–450)
POTASSIUM SERPL-SCNC: 3.6 MMOL/L (ref 3.5–5.1)
RBC # BLD AUTO: 2.96 X10(6)UL
SODIUM SERPL-SCNC: 141 MMOL/L (ref 136–145)
WBC # BLD AUTO: 11.8 X10(3) UL (ref 4–11)

## 2023-08-23 PROCEDURE — 99232 SBSQ HOSP IP/OBS MODERATE 35: CPT | Performed by: REGISTERED NURSE

## 2023-08-23 PROCEDURE — 43235 EGD DIAGNOSTIC BRUSH WASH: CPT | Performed by: INTERNAL MEDICINE

## 2023-08-23 PROCEDURE — 0DJ08ZZ INSPECTION OF UPPER INTESTINAL TRACT, VIA NATURAL OR ARTIFICIAL OPENING ENDOSCOPIC: ICD-10-PCS | Performed by: INTERNAL MEDICINE

## 2023-08-23 PROCEDURE — 99233 SBSQ HOSP IP/OBS HIGH 50: CPT | Performed by: OTHER

## 2023-08-23 RX ORDER — QUETIAPINE FUMARATE 25 MG/1
50 TABLET, FILM COATED ORAL NIGHTLY
Status: DISCONTINUED | OUTPATIENT
Start: 2023-08-23 | End: 2023-08-24

## 2023-08-23 RX ORDER — THIAMINE HYDROCHLORIDE 100 MG/ML
100 INJECTION, SOLUTION INTRAMUSCULAR; INTRAVENOUS 2 TIMES DAILY
Status: DISCONTINUED | OUTPATIENT
Start: 2023-08-23 | End: 2023-08-26

## 2023-08-23 RX ORDER — SODIUM CHLORIDE, SODIUM LACTATE, POTASSIUM CHLORIDE, CALCIUM CHLORIDE 600; 310; 30; 20 MG/100ML; MG/100ML; MG/100ML; MG/100ML
INJECTION, SOLUTION INTRAVENOUS CONTINUOUS PRN
Status: DISCONTINUED | OUTPATIENT
Start: 2023-08-23 | End: 2023-08-23 | Stop reason: SURG

## 2023-08-23 RX ORDER — LIDOCAINE HYDROCHLORIDE 10 MG/ML
INJECTION, SOLUTION EPIDURAL; INFILTRATION; INTRACAUDAL; PERINEURAL AS NEEDED
Status: DISCONTINUED | OUTPATIENT
Start: 2023-08-23 | End: 2023-08-23 | Stop reason: SURG

## 2023-08-23 RX ADMIN — LIDOCAINE HYDROCHLORIDE 100 MG: 10 INJECTION, SOLUTION EPIDURAL; INFILTRATION; INTRACAUDAL; PERINEURAL at 14:17:00

## 2023-08-23 RX ADMIN — SODIUM CHLORIDE, SODIUM LACTATE, POTASSIUM CHLORIDE, CALCIUM CHLORIDE: 600; 310; 30; 20 INJECTION, SOLUTION INTRAVENOUS at 14:16:00

## 2023-08-23 NOTE — PLAN OF CARE
Problem: Patient Centered Care  Goal: Patient preferences are identified and integrated in the patient's plan of care  Description: Interventions:  - What would you like us to know as we care for you? I live at home with my parents and girlfriend. - Provide timely, complete, and accurate information to patient/family  - Incorporate patient and family knowledge, values, beliefs, and cultural backgrounds into the planning and delivery of care  - Encourage patient/family to participate in care and decision-making at the level they choose  - Honor patient and family perspectives and choices  Outcome: Not Progressing     Problem: Patient/Family Goals  Goal: Patient/Family Long Term Goal  Description: Patient's Long Term Goal: To go home    Interventions:  - GI consult  - monitor CIWA scores and treat according to [facility]  - monitor vitals  - monitor oral intake tolera  - See additional Care Plan goals for specific interventions  Outcome: Not Progressing  Goal: Patient/Family Short Term Goal  Description: Patient's Short Term Goal: To feel better    Interventions:   - GI consult  - monitor CIWA scores and treat according to [facility]  - monitor vitals  - monitor oral intake tolerance  - Psych liaison consult  - See additional Care Plan goals for specific interventions  Outcome: Not Progressing     Problem: SAFETY ADULT - FALL  Goal: Free from fall injury  Description: INTERVENTIONS:  - Assess pt frequently for physical needs  - Identify cognitive and physical deficits and behaviors that affect risk of falls.   - [place] fall precautions as indicated by assessment.  - Educate pt/family on patient safety including physical limitations  - Instruct pt to call for assistance with activity based on assessment  - Modify environment to reduce risk of injury  - Provide assistive devices as appropriate  - Consider OT/PT consult to assist with strengthening/mobility  - Encourage toileting schedule  Outcome: Not Progressing     Problem: DISCHARGE PLANNING  Goal: Discharge to home or other facility with appropriate resources  Description: INTERVENTIONS:  - Identify barriers to discharge w/pt and caregiver  - Include patient/family/discharge partner in discharge planning  - Arrange for needed discharge resources and transportation as appropriate  - Identify discharge learning needs (meds, wound care, etc)  - Arrange for interpreters to assist at discharge as needed  - Consider post-discharge preferences of patient/family/discharge partner  - Complete POLST form as appropriate  - Assess patient's ability to be responsible for managing their own health  - Refer to Case Management Department for coordinating discharge planning if the patient needs post-hospital services based on physician/LIP order or complex needs related to functional status, cognitive ability or social support system  Outcome: Not Progressing     Problem: METABOLIC/FLUID AND ELECTROLYTES - ADULT  Goal: Electrolytes maintained within normal limits  Description: INTERVENTIONS:  - Monitor labs and rhythm and assess patient for signs and symptoms of electrolyte imbalances  - Administer electrolyte replacement as ordered  - Monitor response to electrolyte replacements, including rhythm and repeat lab results as appropriate  - Fluid restriction as ordered  - Instruct patient on fluid and nutrition restrictions as appropriate  Outcome: Not Progressing     Problem: NEUROLOGICAL - ADULT  Goal: Absence of seizures  Description: INTERVENTIONS  - Monitor for seizure activity  - Administer anti-seizure medications as ordered  - Monitor neurological status  Outcome: Not Progressing     Problem: GASTROINTESTINAL - ADULT  Goal: Minimal or absence of nausea and vomiting  Description: INTERVENTIONS:  - Maintain adequate hydration with IV or PO as ordered and tolerated  - Nasogastric tube to low intermittent suction as ordered  - Evaluate effectiveness of ordered antiemetic medications  - Provide nonpharmacologic comfort measures as appropriate  - Advance diet as tolerated, if ordered  - Obtain nutritional consult as needed  - Evaluate fluid balance  Outcome: Not Progressing     Problem: Delirium  Goal: Minimize duration of delirium  Description: Interventions:  - Encourage use of hearing aids, eye glasses  - Promote highest level of mobility daily  - Provide frequent reorientation  - Promote wakefulness i.e. lights on, blinds open  - Promote sleep, encourage patient's normal rest cycle i.e. lights off, TV off, minimize noise and interruptions  - Encourage family to assist in orientation and promotion of home routines  Outcome: Not Progressing     Problem: Safety Risk - Non-Violent Restraints  Goal: Patient will remain free from self-harm  Description: INTERVENTIONS:  - Apply the least restrictive restraint to prevent harm  - Notify patient and family of reasons restraints applied  - Assess for any contributing factors to confusion (electrolyte disturbances, delirium, medications)  - Discontinue any unnecessary medical devices as soon as possible  - Assess the patient's physical comfort, circulation, skin condition, hydration, nutrition and elimination needs   - Reorient and redirection as needed  - Assess for the need to continue restraints  Outcome: Not Progressing   Patient alert to self and place. CIWA's per protocol. Patient confused and mumbling nonsense at times. Call light within reach, fall and seizure precautions in place. Hypotensive this AM - MD paged and Cardiology aware. Clonidine patch removed and metoprolol held. IV bolus if SBP <80 per cardiology - see orders.

## 2023-08-23 NOTE — OPERATIVE REPORT
ESOPHAGOGASTRODUODENOSCOPY (EGD) REPORT    Jas Reilly.  10/11/1980 Age 38year old   PCP Freya Vincent MD Endoscopist Jennifer Rosario MD     Date of procedure: 23    Procedure: EGD     Pre-operative diagnosis: n/v, abnormal CT esophagus    Post-operative diagnosis: see impression    Medications: MAC    Complications: none    Procedure:  Informed consent was obtained from the patient after the risks of the procedure were discussed, including but not limited to bleeding, perforation, aspiration, infection, or possibility of a missed lesion. After discussions of the risks/benefits and alternatives to this procedure, as well as the planned sedation, the patient was placed in the left lateral decubitus position and begun on continuous blood pressure pulse oximetry and EKG monitoring and this was maintained throughout the procedure. Once an adequate level of sedation was obtained a bite block was placed. Then the lubricated tip of the [id number] diagnostic video upper endoscope was inserted and advanced using direct visualization into the posterior pharynx and ultimately into the esophagus, stomach, and duodenum. Complications: None    Findings:      1. Esophagus: The diaphragmatic pinch was noted noted at 39 cm from the incisors. The GE junction was at 35 cm with circumferential columnar extension proximally to 32 cm suspicious for Kimball's esophagus. Thus a 4 cm hiatal hernia with associated 3 cm Kimball's esophagus ([place] classification C3M3). LA grade C erosive esophagitis in the mid/distal esophagus. Normal appearing proximal esophagus. 2. Stomach: We then entered the stomach. Gastric mucosa appeared normal in the forward view with no evidence of erythema, erosions, or ulcerations. There was no evidence of any luminal or submucosal masses. A retroflexed view allowed examination of the angularis, cardia and fundus and these areas also appeared normal with a non-patulous cardia.  No hiatal hernia seen.     3. Duodenum: The duodenal mucosa appeared normal in the 1st and 2nd portion of the duodenum. We then withdrew the instrument from the patient who tolerated the procedure well. Impression:   1. LA grade C erosive esophagitis  2. 3 cm hiatal hernia  3. 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 Recommend:  1. Continue ppi 40mg BID  2. Etoh cessation  3. Repeat EGD in 8-12 weeks to assess healing and bx     >>>If tissue was sampled/removed and you have not received your pathology results either by phone or letter within 2 weeks, please call our office at .     Specimens:  none    Blood loss: <1 ml      Jennifer Rosario MD  [facility], [facility]

## 2023-08-23 NOTE — PHYSICAL THERAPY NOTE
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 RN Yancey Bamberger approved participation in physical therapy. Session coordinated with OT, gait belt donned. PPE worn by therapist: gloves. Patient was not wearing a mask during session. Patient presented in bed with 0/10 pain. Patient with fair  progress towards goals during this session. Education provided on Physical therapy plan of care and physiological benefits of out of bed mobility. Patient with fair carryover. Patient on room air. Patient currently with mod A x 1-2 for mobility during the session with rolling walker and without rolling walker. Patient more lethargic today, not completely oriented throughout the session. Patient able to ambulate about 3ft to bedside chair with rolling walker with mod A x 1-2, then back to bed, patient did march in place for a brief period of time and seemed to think he was taking steps. Patient with verbal and tactile cues for rolling walker use and guidance for transfer to bedside chair. Session focused on bed mobility, transfers, and gait training. Patient continues to function below baseline with bed mobility, transfers, and gait. Patient remains most limited by decreased functional strength, impaired motor planning, and medical status. Will continue to follow patient for duration of hospital stay per plan of care, next session anticipate to progress bed mobility, transfers, gait, and stair negotiation. Updated discharge recommendation to acute rehab based on functional deficits noted. Goals remain in progress. Updated nurse on results of session, patient left in bed with alarm set, all lines intact, all needs met with call light in reach. Bed mobility: Mod assist  Transfers: Mod assist  Gait Assistance: Moderate assistance  Distance (ft): 3ft x2  Assistive Device: Rolling walker;None             Patient was left in bed and alarm activated at end of session with all needs in reach. Patient does not have the physical skills to return to prior living environment upon D/C from the hospital. Anticipate patient will benefit from continued skilled physical therapy while in the hospital and upon D/C from the hospital at an acute rehab facility. The patient's Approx Degree of Impairment: 64.91% has been calculated based on documentation in the [facility] '6 clicks' Inpatient Basic Mobility Short Form. Research supports that patients with this level of impairment may benefit from Acute Rehab. RN aware of patient status post session. DISCHARGE RECOMMENDATIONS  PT Discharge Recommendations: Acute rehabilitation     PLAN  PT Treatment Plan: Bed mobility; Body mechanics; Patient education;Gait training;Transfer training;Balance training;Strengthening    SUBJECTIVE  \"They are wasting your time\"    OBJECTIVE  Precautions: Bed/chair alarm    WEIGHT BEARING RESTRICTION  Weight Bearing Restriction: None                PAIN ASSESSMENT   Ratin          BALANCE                                                                                                                       Static Sitting: Fair +  Dynamic Sitting: Fair           Static Standing: Poor +  Dynamic Standing: Poor    AM-PAC '6-Clicks' INPATIENT SHORT FORM - BASIC MOBILITY  How much difficulty does the patient currently have. ..   Patient Difficulty: Turning over in bed (including adjusting bedclothes, sheets and blankets)?: A Little   Patient Difficulty: Sitting down on and standing up from a chair with arms (e.g., wheelchair, bedside commode, etc.): A Lot   Patient Difficulty: Moving from lying on back to sitting on the side of the bed?: A Lot   How much help from another person does the patient currently need. .. Help from Another: Moving to and from a bed to a chair (including a wheelchair)?: A Lot   Help from Another: Need to walk in hospital room?: A Lot   Help from Another: Climbing 3-5 steps with a railing?: A Lot     AM-PAC Score:  Raw Score: 13   Approx Degree of Impairment: 64.91%   Standardized Score (AM-PAC Scale): 36.74   CMS Modifier (G-Code): CL    Patient End of Session: In bed;Needs met;Call light within reach;RN aware of session/findings; All patient questions and concerns addressed; Alarm set    CURRENT GOALS   Goals to be met by: 8/30/23  Patient Goal Patient's self-stated goal is: to go home   Goal #1 Patient is able to demonstrate supine - sit EOB @ level: supervision      Goal #1   Current Status Mod A x 1    Goal #2 Patient is able to demonstrate transfers Sit to/from Stand at assistance level: supervision with walker - rolling      Goal #2  Current Status Mod A x 1-2 posterior lean initially when standing    Goal #3 Patient is able to ambulate 100 feet with assist device: walker - rolling at assistance level: supervision   Goal #3   Current Status 3ft x 2 with rolling walker mod A x 1    Goal #4 Patient will negotiate 12 stairs/one curb w/ assistive device and supervision   Goal #4   Current Status Not tested    Goal #5 Patient to demonstrate independence with home activity/exercise instructions provided to patient in preparation for discharge.    Goal #5   Current Status In progress   Goal #6     Goal #6  Current Status       Therapeutic Activity: 10 minutes  Therapeutic Exercise: 13 minutes

## 2023-08-23 NOTE — PLAN OF CARE
Problem: Patient Centered Care  Goal: Patient preferences are identified and integrated in the patient's plan of care  Description: Interventions:  - What would you like us to know as we care for you? I live at home with my parents and girlfriend. - Provide timely, complete, and accurate information to patient/family  - Incorporate patient and family knowledge, values, beliefs, and cultural backgrounds into the planning and delivery of care  - Encourage patient/family to participate in care and decision-making at the level they choose  - Honor patient and family perspectives and choices  Outcome: Progressing     Problem: Patient/Family Goals  Goal: Patient/Family Long Term Goal  Description: Patient's Long Term Goal: To go home    Interventions:  - GI consult  - monitor CIWA scores and treat according to [facility]  - monitor vitals  - monitor oral intake tolera  - See additional Care Plan goals for specific interventions  Outcome: Progressing  Goal: Patient/Family Short Term Goal  Description: Patient's Short Term Goal: To feel better    Interventions:   - GI consult  - monitor CIWA scores and treat according to [facility]  - monitor vitals  - monitor oral intake tolerance  - Psych liaison consult  - See additional Care Plan goals for specific interventions  Outcome: Progressing     Problem: SAFETY ADULT - FALL  Goal: Free from fall injury  Description: INTERVENTIONS:  - Assess pt frequently for physical needs  - Identify cognitive and physical deficits and behaviors that affect risk of falls.   - [place] fall precautions as indicated by assessment.  - Educate pt/family on patient safety including physical limitations  - Instruct pt to call for assistance with activity based on assessment  - Modify environment to reduce risk of injury  - Provide assistive devices as appropriate  - Consider OT/PT consult to assist with strengthening/mobility  - Encourage toileting schedule  Outcome: Progressing     Problem: DISCHARGE PLANNING  Goal: Discharge to home or other facility with appropriate resources  Description: INTERVENTIONS:  - Identify barriers to discharge w/pt and caregiver  - Include patient/family/discharge partner in discharge planning  - Arrange for needed discharge resources and transportation as appropriate  - Identify discharge learning needs (meds, wound care, etc)  - Arrange for interpreters to assist at discharge as needed  - Consider post-discharge preferences of patient/family/discharge partner  - Complete POLST form as appropriate  - Assess patient's ability to be responsible for managing their own health  - Refer to Case Management Department for coordinating discharge planning if the patient needs post-hospital services based on physician/LIP order or complex needs related to functional status, cognitive ability or social support system  Outcome: Progressing     Problem: METABOLIC/FLUID AND ELECTROLYTES - ADULT  Goal: Electrolytes maintained within normal limits  Description: INTERVENTIONS:  - Monitor labs and rhythm and assess patient for signs and symptoms of electrolyte imbalances  - Administer electrolyte replacement as ordered  - Monitor response to electrolyte replacements, including rhythm and repeat lab results as appropriate  - Fluid restriction as ordered  - Instruct patient on fluid and nutrition restrictions as appropriate  Outcome: Progressing     Problem: NEUROLOGICAL - ADULT  Goal: Absence of seizures  Description: INTERVENTIONS  - Monitor for seizure activity  - Administer anti-seizure medications as ordered  - Monitor neurological status  Outcome: Progressing     Problem: GASTROINTESTINAL - ADULT  Goal: Minimal or absence of nausea and vomiting  Description: INTERVENTIONS:  - Maintain adequate hydration with IV or PO as ordered and tolerated  - Nasogastric tube to low intermittent suction as ordered  - Evaluate effectiveness of ordered antiemetic medications  - Provide nonpharmacologic comfort measures as appropriate  - Advance diet as tolerated, if ordered  - Obtain nutritional consult as needed  - Evaluate fluid balance  Outcome: Progressing     Problem: Delirium  Goal: Minimize duration of delirium  Description: Interventions:  - Encourage use of hearing aids, eye glasses  - Promote highest level of mobility daily  - Provide frequent reorientation  - Promote wakefulness i.e. lights on, blinds open  - Promote sleep, encourage patient's normal rest cycle i.e. lights off, TV off, minimize noise and interruptions  - Encourage family to assist in orientation and promotion of home routines  Outcome: Progressing     Problem: Safety Risk - Non-Violent Restraints  Goal: Patient will remain free from self-harm  Description: INTERVENTIONS:  - Apply the least restrictive restraint to prevent harm  - Notify patient and family of reasons restraints applied  - Assess for any contributing factors to confusion (electrolyte disturbances, delirium, medications)  - Discontinue any unnecessary medical devices as soon as possible  - Assess the patient's physical comfort, circulation, skin condition, hydration, nutrition and elimination needs   - Reorient and redirection as needed  - Assess for the need to continue restraints  Outcome: Progressing     Patient is alert and orientated x 4. Patient is on RA. Potassium replaced per protocol. CIWA protocol in place.

## 2023-08-23 NOTE — INTERVAL H&P NOTE
Pre-op Diagnosis: n/v, abnormal CT esophagus    The above referenced H&P was reviewed by Saint Stacks, MD on 8/23/2023, the patient was examined and no significant changes have occurred in the patient's condition since the H&P was performed. I discussed with the patient and/or legal representative the potential benefits, risks and side effects of this procedure; the likelihood of the patient achieving goals; and potential problems that might occur during recuperation. I discussed reasonable alternatives to the procedure, including risks, benefits and side effects related to the alternatives and risks related to not receiving this procedure. We will proceed with procedure as planned.

## 2023-08-24 ENCOUNTER — APPOINTMENT (OUTPATIENT)
Dept: GENERAL RADIOLOGY | Facility: HOSPITAL | Age: 43
End: 2023-08-24
Attending: INTERNAL MEDICINE
Payer: MEDICAID

## 2023-08-24 ENCOUNTER — TELEPHONE (OUTPATIENT)
Facility: CLINIC | Age: 43
End: 2023-08-24

## 2023-08-24 LAB
ALBUMIN SERPL-MCNC: 2.3 G/DL (ref 3.4–5)
ALBUMIN/GLOB SERPL: 0.6 {RATIO} (ref 1–2)
ALP LIVER SERPL-CCNC: 210 U/L
ALT SERPL-CCNC: 25 U/L
ANION GAP SERPL CALC-SCNC: 5 MMOL/L (ref 0–18)
AST SERPL-CCNC: 104 U/L (ref 15–37)
BASOPHILS # BLD AUTO: 0.08 X10(3) UL (ref 0–0.2)
BASOPHILS NFR BLD AUTO: 0.6 %
BILIRUB SERPL-MCNC: 1.9 MG/DL (ref 0.1–2)
BUN BLD-MCNC: 9 MG/DL (ref 7–18)
BUN/CREAT SERPL: 10.3 (ref 10–20)
CALCIUM BLD-MCNC: 8.3 MG/DL (ref 8.5–10.1)
CHLORIDE SERPL-SCNC: 110 MMOL/L (ref 98–112)
CO2 SERPL-SCNC: 24 MMOL/L (ref 21–32)
CREAT BLD-MCNC: 0.87 MG/DL
DEPRECATED RDW RBC AUTO: 67.9 FL (ref 35.1–46.3)
EGFRCR SERPLBLD CKD-EPI 2021: 109 ML/MIN/1.73M2 (ref 60–?)
EOSINOPHIL # BLD AUTO: 0.22 X10(3) UL (ref 0–0.7)
EOSINOPHIL NFR BLD AUTO: 1.6 %
ERYTHROCYTE [DISTWIDTH] IN BLOOD BY AUTOMATED COUNT: 22.9 % (ref 11–15)
GLOBULIN PLAS-MCNC: 4 G/DL (ref 2.8–4.4)
GLUCOSE BLD-MCNC: 90 MG/DL (ref 70–99)
HCT VFR BLD AUTO: 26.4 %
HGB BLD-MCNC: 8.5 G/DL
IMM GRANULOCYTES # BLD AUTO: 0.15 X10(3) UL (ref 0–1)
IMM GRANULOCYTES NFR BLD: 1.1 %
LYMPHOCYTES # BLD AUTO: 1.32 X10(3) UL (ref 1–4)
LYMPHOCYTES NFR BLD AUTO: 9.7 %
MCH RBC QN AUTO: 26.4 PG (ref 26–34)
MCHC RBC AUTO-ENTMCNC: 32.2 G/DL (ref 31–37)
MCV RBC AUTO: 82 FL
MONOCYTES # BLD AUTO: 1.31 X10(3) UL (ref 0.1–1)
MONOCYTES NFR BLD AUTO: 9.7 %
NEUTROPHILS # BLD AUTO: 10.48 X10 (3) UL (ref 1.5–7.7)
NEUTROPHILS # BLD AUTO: 10.48 X10(3) UL (ref 1.5–7.7)
NEUTROPHILS NFR BLD AUTO: 77.3 %
OSMOLALITY SERPL CALC.SUM OF ELEC: 286 MOSM/KG (ref 275–295)
PLATELET # BLD AUTO: 242 10(3)UL (ref 150–450)
POTASSIUM SERPL-SCNC: 3.8 MMOL/L (ref 3.5–5.1)
POTASSIUM SERPL-SCNC: 3.8 MMOL/L (ref 3.5–5.1)
PROT SERPL-MCNC: 6.3 G/DL (ref 6.4–8.2)
RBC # BLD AUTO: 3.22 X10(6)UL
SODIUM SERPL-SCNC: 139 MMOL/L (ref 136–145)
WBC # BLD AUTO: 13.6 X10(3) UL (ref 4–11)

## 2023-08-24 PROCEDURE — 99233 SBSQ HOSP IP/OBS HIGH 50: CPT | Performed by: NURSE PRACTITIONER

## 2023-08-24 PROCEDURE — 71045 X-RAY EXAM CHEST 1 VIEW: CPT | Performed by: INTERNAL MEDICINE

## 2023-08-24 PROCEDURE — 99233 SBSQ HOSP IP/OBS HIGH 50: CPT | Performed by: OTHER

## 2023-08-24 RX ORDER — ACETAMINOPHEN 325 MG/1
650 TABLET ORAL EVERY 6 HOURS PRN
Status: DISCONTINUED | OUTPATIENT
Start: 2023-08-24 | End: 2023-08-27

## 2023-08-24 RX ORDER — QUETIAPINE FUMARATE 25 MG/1
50 TABLET, FILM COATED ORAL ONCE
Status: COMPLETED | OUTPATIENT
Start: 2023-08-24 | End: 2023-08-24

## 2023-08-24 RX ORDER — QUETIAPINE FUMARATE 25 MG/1
100 TABLET, FILM COATED ORAL NIGHTLY
Status: DISCONTINUED | OUTPATIENT
Start: 2023-08-25 | End: 2023-08-27

## 2023-08-24 RX ORDER — POTASSIUM CHLORIDE 1.5 G/1.58G
40 POWDER, FOR SOLUTION ORAL ONCE
Status: COMPLETED | OUTPATIENT
Start: 2023-08-24 | End: 2023-08-24

## 2023-08-24 NOTE — PHYSICAL THERAPY NOTE
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 NICOLASA Campos approved participation in physical therapy. PPE worn by therapist: mask and gloves. Patient was not wearing a mask during session. Patient presented in  bathroom with RN  with 0/10 pain. Patient with good  progress towards goals during this session. Education provided on Physical therapy plan of care and physiological benefits of out of bed mobility. Patient with good carryover. Pt agreeable to therapy, gait belt donned for all mobility. Pt with gait progression this session, but demos unsteadiness at times, and highly distractible during ambulation, able to redirect. Benefits from simple one step commands to stay on task. Pt with poor safety awareness during session. Pt on track to discharge to Acute Rehab once medically cleared. Bed mobility:  NT  Transfers: Min assist stand to sit transfer with RW, cues to keep walker closer and to square up with vhair prior to sitting to prevent premature sit. Gait Assistance: Minimum assistance  Distance (ft): 200 ft  Assistive Device: Rolling walker  Pattern:  (variable gait speed and veering towards R, unsteady at times, tactlie cues for RW management)          Patient was left in bedside chair and alarm activated at end of session with all needs in reach. The patient's Approx Degree of Impairment: 54.16% has been calculated based on documentation in the [facility] '6 clicks' Inpatient Basic Mobility Short Form.   Research supports that patients with this level of impairment may benefit from Acute Rehab.     RN aware of patient status post session. DISCHARGE RECOMMENDATIONS  PT Discharge Recommendations: Acute rehabilitation     PLAN  PT Treatment Plan: Bed mobility; Body mechanics; Patient education;Gait training;Transfer training;Balance training;Strengthening    SUBJECTIVE  Does this walker have lights on it? OBJECTIVE  Precautions: Bed/chair alarm    WEIGHT BEARING RESTRICTION  Weight Bearing Restriction: None                PAIN ASSESSMENT   Ratin          BALANCE                                                                                                                       Static Sitting: Fair +  Dynamic Sitting: Fair           Static Standing: Poor +  Dynamic Standing: Poor      AM-PAC '6-Clicks' INPATIENT SHORT FORM - BASIC MOBILITY  How much difficulty does the patient currently have. .. Patient Difficulty: Turning over in bed (including adjusting bedclothes, sheets and blankets)?: A Little   Patient Difficulty: Sitting down on and standing up from a chair with arms (e.g., wheelchair, bedside commode, etc.): A Little   Patient Difficulty: Moving from lying on back to sitting on the side of the bed?: A Lot   How much help from another person does the patient currently need. .. Help from Another: Moving to and from a bed to a chair (including a wheelchair)?: A Little   Help from Another: Need to walk in hospital room?: A Little   Help from Another: Climbing 3-5 steps with a railing?: A Lot     AM-PAC Score:  Raw Score: 16   Approx Degree of Impairment: 54.16%   Standardized Score (AM-PAC Scale): 40.78   CMS Modifier (G-Code): CK        Patient End of Session: Up in chair;Needs met;Call light within reach;RN aware of session/findings; Alarm set    CURRENT GOALS   Goals to be met by: 23  Patient Goal Patient's self-stated goal is: to go home   Goal #1 Patient is able to demonstrate supine - sit EOB @ level: supervision      Goal #1   Current Status NT   Goal #2 Patient is able to demonstrate transfers Sit to/from Stand at assistance level: supervision with walker - rolling      Goal #2  Current Status Min A with RW stand to sit   Goal #3 Patient is able to ambulate 100 feet with assist device: walker - rolling at assistance level: supervision   Goal #3   Current Status 200 ft with RW, Min A    Goal #4 Patient will negotiate 12 stairs/one curb w/ assistive device and supervision   Goal #4   Current Status Not tested    Goal #5 Patient to demonstrate independence with home activity/exercise instructions provided to patient in preparation for discharge.    Goal #5   Current Status In progress   Goal #6     Goal #6  Current Status         Gait Training: 15 minutes  Therapeutic Activity: 8 minutes    [address] [place] [address]  [phone]

## 2023-08-24 NOTE — CM/SW NOTE
Social work received notification that PT/OT is recommending AR. Social work sent [facility] referrals in [facility]. Social work requested PMR consult to be put in the RN or physician. SW will follow up after PMR is complete. SW/CM to remain available for support and/or discharge planning.      Maury Fitzgerald MSW, [facility]  Discharge Planner [id number]

## 2023-08-24 NOTE — PLAN OF CARE
Patient alert and oriented. CIWA Q2. Up to chair and bathroom with one person assist and walker. Significant other at the bedside in the morning. MD notified for fever in the morning, Tylenol given PRN. Resting in bed with fall precautions in place, bed alarm on, and call light in reach. Declining acute rehab so plan to discharge with home therapy when medically clear. Problem: Patient Centered Care  Goal: Patient preferences are identified and integrated in the patient's plan of care  Description: Interventions:  - What would you like us to know as we care for you? I live at home with my parents and girlfriend. - Provide timely, complete, and accurate information to patient/family  - Incorporate patient and family knowledge, values, beliefs, and cultural backgrounds into the planning and delivery of care  - Encourage patient/family to participate in care and decision-making at the level they choose  - Honor patient and family perspectives and choices  Outcome: Progressing     Problem: Patient/Family Goals  Goal: Patient/Family Long Term Goal  Description: Patient's Long Term Goal: To go home    Interventions:  - GI consult  - monitor CIWA scores and treat according to [facility]  - monitor vitals  - monitor oral intake tolera  - See additional Care Plan goals for specific interventions  Outcome: Progressing  Goal: Patient/Family Short Term Goal  Description: Patient's Short Term Goal: To feel better    Interventions:   - GI consult  - monitor CIWA scores and treat according to [facility]  - monitor vitals  - monitor oral intake tolerance  - Psych liaison consult  - See additional Care Plan goals for specific interventions  Outcome: Progressing     Problem: SAFETY ADULT - FALL  Goal: Free from fall injury  Description: INTERVENTIONS:  - Assess pt frequently for physical needs  - Identify cognitive and physical deficits and behaviors that affect risk of falls. - [place] fall precautions as indicated by assessment.   - Educate pt/family on patient safety including physical limitations  - Instruct pt to call for assistance with activity based on assessment  - Modify environment to reduce risk of injury  - Provide assistive devices as appropriate  - Consider OT/PT consult to assist with strengthening/mobility  - Encourage toileting schedule  Outcome: Progressing     Problem: DISCHARGE PLANNING  Goal: Discharge to home or other facility with appropriate resources  Description: INTERVENTIONS:  - Identify barriers to discharge w/pt and caregiver  - Include patient/family/discharge partner in discharge planning  - Arrange for needed discharge resources and transportation as appropriate  - Identify discharge learning needs (meds, wound care, etc)  - Arrange for interpreters to assist at discharge as needed  - Consider post-discharge preferences of patient/family/discharge partner  - Complete POLST form as appropriate  - Assess patient's ability to be responsible for managing their own health  - Refer to Case Management Department for coordinating discharge planning if the patient needs post-hospital services based on physician/LIP order or complex needs related to functional status, cognitive ability or social support system  Outcome: Progressing     Problem: METABOLIC/FLUID AND ELECTROLYTES - ADULT  Goal: Electrolytes maintained within normal limits  Description: INTERVENTIONS:  - Monitor labs and rhythm and assess patient for signs and symptoms of electrolyte imbalances  - Administer electrolyte replacement as ordered  - Monitor response to electrolyte replacements, including rhythm and repeat lab results as appropriate  - Fluid restriction as ordered  - Instruct patient on fluid and nutrition restrictions as appropriate  Outcome: Progressing     Problem: NEUROLOGICAL - ADULT  Goal: Absence of seizures  Description: INTERVENTIONS  - Monitor for seizure activity  - Administer anti-seizure medications as ordered  - Monitor neurological status  Outcome: Progressing     Problem: GASTROINTESTINAL - ADULT  Goal: Minimal or absence of nausea and vomiting  Description: INTERVENTIONS:  - Maintain adequate hydration with IV or PO as ordered and tolerated  - Nasogastric tube to low intermittent suction as ordered  - Evaluate effectiveness of ordered antiemetic medications  - Provide nonpharmacologic comfort measures as appropriate  - Advance diet as tolerated, if ordered  - Obtain nutritional consult as needed  - Evaluate fluid balance  Outcome: Progressing     Problem: Delirium  Goal: Minimize duration of delirium  Description: Interventions:  - Encourage use of hearing aids, eye glasses  - Promote highest level of mobility daily  - Provide frequent reorientation  - Promote wakefulness i.e. lights on, blinds open  - Promote sleep, encourage patient's normal rest cycle i.e. lights off, TV off, minimize noise and interruptions  - Encourage family to assist in orientation and promotion of home routines  Outcome: Progressing

## 2023-08-24 NOTE — CM/SW NOTE
Social work met with the patient at bedside to discuss discharge planning. Social work advised the patient that the therapy team is recommending Acute Rehab. Social work explained to the patient what acute rehab consist of. The patient is declining acute rehab because he does not want to stay overnight in a facility. The patient is agreeable to [facility] PT/OT. Social work sent [facility] referrals in [address] [place] [address]. [place] referral is in incase the patient changes his mind. Social work to upload signed F2F once completed. SW/CM to remain available for support and/or discharge planning.      Mayte Hoff MSW, [facility]  Discharge Planner [id number]

## 2023-08-24 NOTE — TELEPHONE ENCOUNTER
Patient with etoh abuse and admitted for epigastric pain and abnormal CT. Underwent EGD while inpatient = severe esophagitis, possible jimenez's - not biopsied at that time. [address] home on BID PPI will need EGD in 8-12 weeks to assess healing and to biopsy for jimenez's.     DC clinic in 2 weeks please

## 2023-08-25 LAB
BILIRUB UR QL CFM: POSITIVE
CLARITY UR: CLEAR
COLOR UR: YELLOW
D DIMER PPP FEU-MCNC: 1.13 UG/ML FEU (ref ?–0.5)
GLUCOSE UR-MCNC: NORMAL MG/DL
HGB UR QL STRIP.AUTO: NEGATIVE
KETONES UR-MCNC: NEGATIVE MG/DL
LEUKOCYTE ESTERASE UR QL STRIP.AUTO: NEGATIVE
NITRITE UR QL STRIP.AUTO: NEGATIVE
PH UR: 5.5 [PH] (ref 5–8)
POTASSIUM SERPL-SCNC: 3.7 MMOL/L (ref 3.5–5.1)
PROT UR-MCNC: 50 MG/DL
SP GR UR STRIP: 1.02 (ref 1–1.03)
UROBILINOGEN UR STRIP-ACNC: 3

## 2023-08-25 PROCEDURE — 99233 SBSQ HOSP IP/OBS HIGH 50: CPT | Performed by: OTHER

## 2023-08-25 RX ORDER — POTASSIUM CHLORIDE 1.5 G/1.58G
40 POWDER, FOR SOLUTION ORAL ONCE
Status: COMPLETED | OUTPATIENT
Start: 2023-08-25 | End: 2023-08-25

## 2023-08-25 NOTE — PHYSICAL THERAPY NOTE
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 RN  approved participation in physical therapy. PPE worn by therapist: mask and gloves. Patient was not wearing a mask during session. Patient presented in bed with 0/10 pain. Patient with good  progress towards goals during this session. Education provided on Physical therapy plan of care, physiological benefits of out of bed mobility, and fall risk prevention , B AP , fxn mobility training with RW , standing balance and tolerance activity , general strengthening and DC planning  . Patient with fair carryover. Pt is alert oriented to person , place and time. Pt able to follow one steps directions. Pt is cooperative and motivated for participation. Pt with decrease safety awareness and decrease insight into current deficits. Bed mobility:  SBA log roll sequencing encouraged            Pt able to support self at EOB with SBA support for safety   Transfers: Contact guard assist   sit to stand from chair repeated x 5 for strengthening and to reinforce proper sequencing. In standing with use of RW for stability pt completed 10x heel raises and marching activity with need for close CGA to min support for safety. Pt able to stand with no AD close CGA support for  balance activity . Pt with eyes closed note increase sway with min support needed.    Gait Assistance: Minimum assistance  Distance (ft): 200 ft x 1  Assistive Device: Rolling walker  Pattern: R Steppage;L Steppage;R Foot flat;L Foot flat (unstead gait / decrease safety awareness ---vearing right with tactile cues for improvd safety proper use of AD)         Patient was left in bedside chair and alarm activated at end of session with all needs in reach. The patient's Approx Degree of Impairment: 50.57% has been calculated based on documentation in the [facility] '6 clicks' Inpatient Basic Mobility Short Form. Research supports that patients with this level of impairment may benefit from Acute Rehab. No family is present . Pt will require 24hr assisted and supported care at [place]. Pt with high fall risk , decrease safety awareness and decrease insight into current deficits. Pt currently using a RW for ambulation/ fxn mobility with decrease balance noted. Pt is improving . Therapy is recommending acute rehab as next level of care . Pt is motivated for participation. Decrease activity tolerance noted HR to 124-130 during activity . Psychiatry following this admission . No stair training has been completed. Pt admitted from home situation and stairs to bedroom . PMR consult is pending. RN aware of patient status post session. DISCHARGE RECOMMENDATIONS  PT Discharge Recommendations: Acute rehabilitation     PLAN  PT Treatment Plan: Bed mobility; Body mechanics; Energy conservation;Patient education; Family education;Gait training;Balance training;Transfer training;Stair training;Stoop training    SUBJECTIVE  Agreeable to mobility        OBJECTIVE  Precautions: Bed/chair alarm    WEIGHT BEARING RESTRICTION  Weight Bearing Restriction: None                PAIN ASSESSMENT   Ratin          BALANCE                                                                                                                       Static Sitting: Fair +  Dynamic Sitting: Fair           Static Standing: Poor +  Dynamic Standing: Poor    ACTIVITY TOLERANCE  Pulse: (!) 130 (HR with activity 124-130)        BP: 135/38 (post activity   resting HR  113/67)             O2 WALK  Oxygen Therapy  SPO2% Ambulation on Room Air: 869    AM-PAC '6-Clicks' INPATIENT SHORT FORM - BASIC MOBILITY  How much difficulty does the patient currently have. .. Patient Difficulty: Turning over in bed (including adjusting bedclothes, sheets and blankets)?: A Little   Patient Difficulty: Sitting down on and standing up from a chair with arms (e.g., wheelchair, bedside commode, etc.): A Little   Patient Difficulty: Moving from lying on back to sitting on the side of the bed?: A Little   How much help from another person does the patient currently need. .. Help from Another: Moving to and from a bed to a chair (including a wheelchair)?: A Little   Help from Another: Need to walk in hospital room?: A Little   Help from Another: Climbing 3-5 steps with a railing?: A Lot     AM-PAC Score:  Raw Score: 17   Approx Degree of Impairment: 50.57%   Standardized Score (AM-PAC Scale): 42.13   CMS Modifier (G-Code): CK    Patient End of Session: Up in chair;Needs met;Call light within reach;RN aware of session/findings; All patient questions and concerns addressed; Alarm set    CURRENT GOALS   Goals to be met by: 8/30/23  Patient Goal Patient's self-stated goal is: to go home   Goal #1 Patient is able to demonstrate supine - sit EOB @ level: supervision      Goal #1   Current Status NT   Goal #2 Patient is able to demonstrate transfers Sit to/from Stand at assistance level: supervision with walker - rolling      Goal #2  Current Status CGA with RW    Goal #3 Patient is able to ambulate 100 feet with assist device: walker - rolling at assistance level: supervision   Goal #3   Current Status 200 ft with RW, Min A    Goal #4 Patient will negotiate 12 stairs/one curb w/ assistive device and supervision   Goal #4   Current Status Not tested    Goal #5 Patient to demonstrate independence with home activity/exercise instructions provided to patient in preparation for discharge.    Goal #5   Current Status In progress   Goal #6     Goal #6  Current Status        Therapy activity 2 units

## 2023-08-25 NOTE — PLAN OF CARE
Pt remained afebrile; no acute changes overnight. Problem: Patient Centered Care  Goal: Patient preferences are identified and integrated in the patient's plan of care  Description: Interventions:  - What would you like us to know as we care for you? I live at home with my parents and girlfriend. - Provide timely, complete, and accurate information to patient/family  - Incorporate patient and family knowledge, values, beliefs, and cultural backgrounds into the planning and delivery of care  - Encourage patient/family to participate in care and decision-making at the level they choose  - Honor patient and family perspectives and choices  Outcome: Progressing     Problem: Patient/Family Goals  Goal: Patient/Family Long Term Goal  Description: Patient's Long Term Goal: To go home    Interventions:  - GI consult  - monitor CIWA scores and treat according to [facility]  - monitor vitals  - monitor oral intake tolera  - See additional Care Plan goals for specific interventions  Outcome: Progressing  Goal: Patient/Family Short Term Goal  Description: Patient's Short Term Goal: To feel better    Interventions:   - GI consult  - monitor CIWA scores and treat according to [facility]  - monitor vitals  - monitor oral intake tolerance  - Psych liaison consult  - See additional Care Plan goals for specific interventions  Outcome: Progressing     Problem: SAFETY ADULT - FALL  Goal: Free from fall injury  Description: INTERVENTIONS:  - Assess pt frequently for physical needs  - Identify cognitive and physical deficits and behaviors that affect risk of falls.   - [place] fall precautions as indicated by assessment.  - Educate pt/family on patient safety including physical limitations  - Instruct pt to call for assistance with activity based on assessment  - Modify environment to reduce risk of injury  - Provide assistive devices as appropriate  - Consider OT/PT consult to assist with strengthening/mobility  - Encourage toileting schedule  Outcome: Progressing     Problem: DISCHARGE PLANNING  Goal: Discharge to home or other facility with appropriate resources  Description: INTERVENTIONS:  - Identify barriers to discharge w/pt and caregiver  - Include patient/family/discharge partner in discharge planning  - Arrange for needed discharge resources and transportation as appropriate  - Identify discharge learning needs (meds, wound care, etc)  - Arrange for interpreters to assist at discharge as needed  - Consider post-discharge preferences of patient/family/discharge partner  - Complete POLST form as appropriate  - Assess patient's ability to be responsible for managing their own health  - Refer to Case Management Department for coordinating discharge planning if the patient needs post-hospital services based on physician/LIP order or complex needs related to functional status, cognitive ability or social support system  Outcome: Progressing     Problem: METABOLIC/FLUID AND ELECTROLYTES - ADULT  Goal: Electrolytes maintained within normal limits  Description: INTERVENTIONS:  - Monitor labs and rhythm and assess patient for signs and symptoms of electrolyte imbalances  - Administer electrolyte replacement as ordered  - Monitor response to electrolyte replacements, including rhythm and repeat lab results as appropriate  - Fluid restriction as ordered  - Instruct patient on fluid and nutrition restrictions as appropriate  Outcome: Progressing     Problem: NEUROLOGICAL - ADULT  Goal: Absence of seizures  Description: INTERVENTIONS  - Monitor for seizure activity  - Administer anti-seizure medications as ordered  - Monitor neurological status  Outcome: Progressing     Problem: GASTROINTESTINAL - ADULT  Goal: Minimal or absence of nausea and vomiting  Description: INTERVENTIONS:  - Maintain adequate hydration with IV or PO as ordered and tolerated  - Nasogastric tube to low intermittent suction as ordered  - Evaluate effectiveness of ordered antiemetic medications  - Provide nonpharmacologic comfort measures as appropriate  - Advance diet as tolerated, if ordered  - Obtain nutritional consult as needed  - Evaluate fluid balance  Outcome: Progressing     Problem: Delirium  Goal: Minimize duration of delirium  Description: Interventions:  - Encourage use of hearing aids, eye glasses  - Promote highest level of mobility daily  - Provide frequent reorientation  - Promote wakefulness i.e. lights on, blinds open  - Promote sleep, encourage patient's normal rest cycle i.e. lights off, TV off, minimize noise and interruptions  - Encourage family to assist in orientation and promotion of home routines  Outcome: Progressing

## 2023-08-25 NOTE — CM/SW NOTE
08/25/23 1200   Choice of Post-Acute Provider   Informed patient of right to choose their preferred provider Yes   List of appropriate post-acute services provided to patient/family with quality data Yes   Patient/family choice [facility]    Information given to Patient     [facility] with the [facility]. The [facility] decided to [facility] with [address] [place] [address]. Information is placed in the patients discharge instructions. The patient has no questions or concerns at this time. SW/CM to remain available for support and/or discharge planning.      Marlys LYNN, [place]  Discharge Planner [id number]

## 2023-08-25 NOTE — PROGRESS NOTES
Assessment Date 2023  Javad Suárez  10/11/1980  [id number]    Patient is currently at [facility] was referred to the  for suspected Alcohol Use Disorder by the Mental Health Liaison. The  met with the Patient for the purpose of introduction (identifier- Name &amp; ), assessment, and to provide information on Alcohol Use Disorder services. The patient reports being a 68-year-old  male. The patient decline all services provided by this writer, but accepted business and resources. The  follow up with the nurse regarding outcome of the assessment. Mireya Ornelas, [address], [address] [place] [address] Drive  [phone]/ work cell

## 2023-08-26 ENCOUNTER — APPOINTMENT (OUTPATIENT)
Dept: GENERAL RADIOLOGY | Facility: HOSPITAL | Age: 43
End: 2023-08-26
Attending: INTERNAL MEDICINE
Payer: MEDICAID

## 2023-08-26 LAB
ANION GAP SERPL CALC-SCNC: 8 MMOL/L (ref 0–18)
BASOPHILS # BLD AUTO: 0.06 X10(3) UL (ref 0–0.2)
BASOPHILS NFR BLD AUTO: 0.5 %
BUN BLD-MCNC: 8 MG/DL (ref 7–18)
BUN/CREAT SERPL: 9.3 (ref 10–20)
CALCIUM BLD-MCNC: 8.1 MG/DL (ref 8.5–10.1)
CHLORIDE SERPL-SCNC: 109 MMOL/L (ref 98–112)
CO2 SERPL-SCNC: 25 MMOL/L (ref 21–32)
CREAT BLD-MCNC: 0.86 MG/DL
DEPRECATED RDW RBC AUTO: 69.7 FL (ref 35.1–46.3)
EGFRCR SERPLBLD CKD-EPI 2021: 109 ML/MIN/1.73M2 (ref 60–?)
EOSINOPHIL # BLD AUTO: 0.18 X10(3) UL (ref 0–0.7)
EOSINOPHIL NFR BLD AUTO: 1.4 %
ERYTHROCYTE [DISTWIDTH] IN BLOOD BY AUTOMATED COUNT: 23.6 % (ref 11–15)
GLUCOSE BLD-MCNC: 93 MG/DL (ref 70–99)
HCT VFR BLD AUTO: 24 %
HGB BLD-MCNC: 7.7 G/DL
IMM GRANULOCYTES # BLD AUTO: 0.25 X10(3) UL (ref 0–1)
IMM GRANULOCYTES NFR BLD: 1.9 %
LYMPHOCYTES # BLD AUTO: 1.73 X10(3) UL (ref 1–4)
LYMPHOCYTES NFR BLD AUTO: 13.1 %
MCH RBC QN AUTO: 26.7 PG (ref 26–34)
MCHC RBC AUTO-ENTMCNC: 32.1 G/DL (ref 31–37)
MCV RBC AUTO: 83.3 FL
MONOCYTES # BLD AUTO: 1.38 X10(3) UL (ref 0.1–1)
MONOCYTES NFR BLD AUTO: 10.5 %
NEUTROPHILS # BLD AUTO: 9.56 X10 (3) UL (ref 1.5–7.7)
NEUTROPHILS # BLD AUTO: 9.56 X10(3) UL (ref 1.5–7.7)
NEUTROPHILS NFR BLD AUTO: 72.6 %
OSMOLALITY SERPL CALC.SUM OF ELEC: 292 MOSM/KG (ref 275–295)
PLATELET # BLD AUTO: 227 10(3)UL (ref 150–450)
POTASSIUM SERPL-SCNC: 3.8 MMOL/L (ref 3.5–5.1)
POTASSIUM SERPL-SCNC: 3.8 MMOL/L (ref 3.5–5.1)
RBC # BLD AUTO: 2.88 X10(6)UL
SODIUM SERPL-SCNC: 142 MMOL/L (ref 136–145)
WBC # BLD AUTO: 13.2 X10(3) UL (ref 4–11)

## 2023-08-26 PROCEDURE — 71045 X-RAY EXAM CHEST 1 VIEW: CPT | Performed by: INTERNAL MEDICINE

## 2023-08-26 RX ORDER — MELATONIN
100 DAILY
Status: DISCONTINUED | OUTPATIENT
Start: 2023-08-26 | End: 2023-08-27

## 2023-08-26 RX ORDER — POTASSIUM CHLORIDE 20 MEQ/1
40 TABLET, EXTENDED RELEASE ORAL ONCE
Status: COMPLETED | OUTPATIENT
Start: 2023-08-26 | End: 2023-08-26

## 2023-08-26 NOTE — PLAN OF CARE
Problem: Patient Centered Care  Goal: Patient preferences are identified and integrated in the patient's plan of care  Description: Interventions:  - What would you like us to know as we care for you? I live at home with my parents and girlfriend. - Provide timely, complete, and accurate information to patient/family  - Incorporate patient and family knowledge, values, beliefs, and cultural backgrounds into the planning and delivery of care  - Encourage patient/family to participate in care and decision-making at the level they choose  - Honor patient and family perspectives and choices  Outcome: Adequate for Discharge     Problem: Patient/Family Goals  Goal: Patient/Family Long Term Goal  Description: Patient's Long Term Goal: To go home    Interventions:  - GI consult  - monitor CIWA scores and treat according to [facility]  - monitor vitals  - monitor oral intake tolera  - See additional Care Plan goals for specific interventions  Outcome: Adequate for Discharge  Goal: Patient/Family Short Term Goal  Description: Patient's Short Term Goal: To feel better    Interventions:   - GI consult  - monitor CIWA scores and treat according to [facility]  - monitor vitals  - monitor oral intake tolerance  - Psych liaison consult  - See additional Care Plan goals for specific interventions  Outcome: Adequate for Discharge     Problem: SAFETY ADULT - FALL  Goal: Free from fall injury  Description: INTERVENTIONS:  - Assess pt frequently for physical needs  - Identify cognitive and physical deficits and behaviors that affect risk of falls.   - [place] fall precautions as indicated by assessment.  - Educate pt/family on patient safety including physical limitations  - Instruct pt to call for assistance with activity based on assessment  - Modify environment to reduce risk of injury  - Provide assistive devices as appropriate  - Consider OT/PT consult to assist with strengthening/mobility  - Encourage toileting schedule  Outcome: Adequate for Discharge     Problem: DISCHARGE PLANNING  Goal: Discharge to home or other facility with appropriate resources  Description: INTERVENTIONS:  - Identify barriers to discharge w/pt and caregiver  - Include patient/family/discharge partner in discharge planning  - Arrange for needed discharge resources and transportation as appropriate  - Identify discharge learning needs (meds, wound care, etc)  - Arrange for interpreters to assist at discharge as needed  - Consider post-discharge preferences of patient/family/discharge partner  - Complete POLST form as appropriate  - Assess patient's ability to be responsible for managing their own health  - Refer to Case Management Department for coordinating discharge planning if the patient needs post-hospital services based on physician/LIP order or complex needs related to functional status, cognitive ability or social support system  Outcome: Adequate for Discharge     Problem: METABOLIC/FLUID AND ELECTROLYTES - ADULT  Goal: Electrolytes maintained within normal limits  Description: INTERVENTIONS:  - Monitor labs and rhythm and assess patient for signs and symptoms of electrolyte imbalances  - Administer electrolyte replacement as ordered  - Monitor response to electrolyte replacements, including rhythm and repeat lab results as appropriate  - Fluid restriction as ordered  - Instruct patient on fluid and nutrition restrictions as appropriate  Outcome: Adequate for Discharge     Problem: NEUROLOGICAL - ADULT  Goal: Absence of seizures  Description: INTERVENTIONS  - Monitor for seizure activity  - Administer anti-seizure medications as ordered  - Monitor neurological status  Outcome: Adequate for Discharge   Home today

## 2023-08-26 NOTE — PLAN OF CARE
No acute changes overnight    Problem: Patient Centered Care  Goal: Patient preferences are identified and integrated in the patient's plan of care  Description: Interventions:  - What would you like us to know as we care for you? I live at home with my parents and girlfriend. - Provide timely, complete, and accurate information to patient/family  - Incorporate patient and family knowledge, values, beliefs, and cultural backgrounds into the planning and delivery of care  - Encourage patient/family to participate in care and decision-making at the level they choose  - Honor patient and family perspectives and choices  Outcome: Progressing     Problem: Patient/Family Goals  Goal: Patient/Family Long Term Goal  Description: Patient's Long Term Goal: To go home    Interventions:  - GI consult  - monitor CIWA scores and treat according to [facility]  - monitor vitals  - monitor oral intake tolera  - See additional Care Plan goals for specific interventions  Outcome: Progressing  Goal: Patient/Family Short Term Goal  Description: Patient's Short Term Goal: To feel better    Interventions:   - GI consult  - monitor CIWA scores and treat according to [facility]  - monitor vitals  - monitor oral intake tolerance  - Psych liaison consult  - See additional Care Plan goals for specific interventions  Outcome: Progressing     Problem: SAFETY ADULT - FALL  Goal: Free from fall injury  Description: INTERVENTIONS:  - Assess pt frequently for physical needs  - Identify cognitive and physical deficits and behaviors that affect risk of falls.   - [place] fall precautions as indicated by assessment.  - Educate pt/family on patient safety including physical limitations  - Instruct pt to call for assistance with activity based on assessment  - Modify environment to reduce risk of injury  - Provide assistive devices as appropriate  - Consider OT/PT consult to assist with strengthening/mobility  - Encourage toileting schedule  Outcome: Progressing Problem: DISCHARGE PLANNING  Goal: Discharge to home or other facility with appropriate resources  Description: INTERVENTIONS:  - Identify barriers to discharge w/pt and caregiver  - Include patient/family/discharge partner in discharge planning  - Arrange for needed discharge resources and transportation as appropriate  - Identify discharge learning needs (meds, wound care, etc)  - Arrange for interpreters to assist at discharge as needed  - Consider post-discharge preferences of patient/family/discharge partner  - Complete POLST form as appropriate  - Assess patient's ability to be responsible for managing their own health  - Refer to Case Management Department for coordinating discharge planning if the patient needs post-hospital services based on physician/LIP order or complex needs related to functional status, cognitive ability or social support system  Outcome: Progressing     Problem: METABOLIC/FLUID AND ELECTROLYTES - ADULT  Goal: Electrolytes maintained within normal limits  Description: INTERVENTIONS:  - Monitor labs and rhythm and assess patient for signs and symptoms of electrolyte imbalances  - Administer electrolyte replacement as ordered  - Monitor response to electrolyte replacements, including rhythm and repeat lab results as appropriate  - Fluid restriction as ordered  - Instruct patient on fluid and nutrition restrictions as appropriate  Outcome: Progressing     Problem: NEUROLOGICAL - ADULT  Goal: Absence of seizures  Description: INTERVENTIONS  - Monitor for seizure activity  - Administer anti-seizure medications as ordered  - Monitor neurological status  Outcome: Progressing     Problem: GASTROINTESTINAL - ADULT  Goal: Minimal or absence of nausea and vomiting  Description: INTERVENTIONS:  - Maintain adequate hydration with IV or PO as ordered and tolerated  - Nasogastric tube to low intermittent suction as ordered  - Evaluate effectiveness of ordered antiemetic medications  - Provide nonpharmacologic comfort measures as appropriate  - Advance diet as tolerated, if ordered  - Obtain nutritional consult as needed  - Evaluate fluid balance  Outcome: Progressing     Problem: Delirium  Goal: Minimize duration of delirium  Description: Interventions:  - Encourage use of hearing aids, eye glasses  - Promote highest level of mobility daily  - Provide frequent reorientation  - Promote wakefulness i.e. lights on, blinds open  - Promote sleep, encourage patient's normal rest cycle i.e. lights off, TV off, minimize noise and interruptions  - Encourage family to assist in orientation and promotion of home routines  Outcome: Progressing

## 2023-08-27 ENCOUNTER — APPOINTMENT (OUTPATIENT)
Dept: ULTRASOUND IMAGING | Facility: HOSPITAL | Age: 43
End: 2023-08-27
Attending: INTERNAL MEDICINE
Payer: MEDICAID

## 2023-08-27 VITALS
DIASTOLIC BLOOD PRESSURE: 71 MMHG | BODY MASS INDEX: 30.63 KG/M2 | RESPIRATION RATE: 18 BRPM | HEIGHT: 68 IN | SYSTOLIC BLOOD PRESSURE: 114 MMHG | TEMPERATURE: 99 F | WEIGHT: 202.13 LBS | OXYGEN SATURATION: 98 % | HEART RATE: 105 BPM

## 2023-08-27 LAB
ANION GAP SERPL CALC-SCNC: 7 MMOL/L (ref 0–18)
BASOPHILS # BLD AUTO: 0.09 X10(3) UL (ref 0–0.2)
BASOPHILS NFR BLD AUTO: 0.6 %
BUN BLD-MCNC: 8 MG/DL (ref 7–18)
BUN/CREAT SERPL: 8.7 (ref 10–20)
CALCIUM BLD-MCNC: 8.5 MG/DL (ref 8.5–10.1)
CHLORIDE SERPL-SCNC: 111 MMOL/L (ref 98–112)
CO2 SERPL-SCNC: 24 MMOL/L (ref 21–32)
CREAT BLD-MCNC: 0.92 MG/DL
DEPRECATED RDW RBC AUTO: 71.3 FL (ref 35.1–46.3)
EGFRCR SERPLBLD CKD-EPI 2021: 105 ML/MIN/1.73M2 (ref 60–?)
EOSINOPHIL # BLD AUTO: 0.26 X10(3) UL (ref 0–0.7)
EOSINOPHIL NFR BLD AUTO: 1.8 %
ERYTHROCYTE [DISTWIDTH] IN BLOOD BY AUTOMATED COUNT: 24 % (ref 11–15)
GLUCOSE BLD-MCNC: 94 MG/DL (ref 70–99)
HCT VFR BLD AUTO: 27.7 %
HGB BLD-MCNC: 8.7 G/DL
IMM GRANULOCYTES # BLD AUTO: 0.37 X10(3) UL (ref 0–1)
IMM GRANULOCYTES NFR BLD: 2.5 %
LYMPHOCYTES # BLD AUTO: 2.09 X10(3) UL (ref 1–4)
LYMPHOCYTES NFR BLD AUTO: 14.1 %
MCH RBC QN AUTO: 26.3 PG (ref 26–34)
MCHC RBC AUTO-ENTMCNC: 31.4 G/DL (ref 31–37)
MCV RBC AUTO: 83.7 FL
MONOCYTES # BLD AUTO: 1.49 X10(3) UL (ref 0.1–1)
MONOCYTES NFR BLD AUTO: 10.1 %
NEUTROPHILS # BLD AUTO: 10.48 X10 (3) UL (ref 1.5–7.7)
NEUTROPHILS # BLD AUTO: 10.48 X10(3) UL (ref 1.5–7.7)
NEUTROPHILS NFR BLD AUTO: 70.9 %
OSMOLALITY SERPL CALC.SUM OF ELEC: 292 MOSM/KG (ref 275–295)
PLATELET # BLD AUTO: 233 10(3)UL (ref 150–450)
POTASSIUM SERPL-SCNC: 3.5 MMOL/L (ref 3.5–5.1)
POTASSIUM SERPL-SCNC: 3.5 MMOL/L (ref 3.5–5.1)
POTASSIUM SERPL-SCNC: 4.9 MMOL/L (ref 3.5–5.1)
RBC # BLD AUTO: 3.31 X10(6)UL
SODIUM SERPL-SCNC: 142 MMOL/L (ref 136–145)
WBC # BLD AUTO: 14.8 X10(3) UL (ref 4–11)

## 2023-08-27 PROCEDURE — 93970 EXTREMITY STUDY: CPT | Performed by: INTERNAL MEDICINE

## 2023-08-27 RX ORDER — PANTOPRAZOLE SODIUM 40 MG/1
40 TABLET, DELAYED RELEASE ORAL
Qty: 60 TABLET | Refills: 1 | Status: SHIPPED | OUTPATIENT
Start: 2023-08-27

## 2023-08-27 RX ORDER — QUETIAPINE FUMARATE 100 MG/1
100 TABLET, FILM COATED ORAL NIGHTLY
Qty: 30 TABLET | Refills: 0 | Status: SHIPPED | OUTPATIENT
Start: 2023-08-27

## 2023-08-27 RX ORDER — FOLIC ACID 1 MG/1
1 TABLET ORAL DAILY
Qty: 30 TABLET | Refills: 0 | Status: SHIPPED | OUTPATIENT
Start: 2023-08-28

## 2023-08-27 RX ORDER — POTASSIUM CHLORIDE 20 MEQ/1
40 TABLET, EXTENDED RELEASE ORAL EVERY 4 HOURS
Status: COMPLETED | OUTPATIENT
Start: 2023-08-27 | End: 2023-08-27

## 2023-08-27 RX ORDER — MELATONIN
100 DAILY
Qty: 30 TABLET | Refills: 0 | Status: SHIPPED | OUTPATIENT
Start: 2023-08-28

## 2023-08-27 NOTE — PLAN OF CARE
Pt alert and oriented x4. Denies chest pain/SOB. On RA. Sinus in the monitor. Had fever overnight. Max temp 100.8F. Tylenol administered. Dr. Stout Edu notified. Stat chest xray, blood culture x2, zosyn started, CBC and Bmp ordered. Fall precautions in place. Plan for [facility] within [facility] upon discharge pending medical clearance. Problem: Patient Centered Care  Goal: Patient preferences are identified and integrated in the patient's plan of care  Description: Interventions:  - What would you like us to know as we care for you? I live at home with my parents and girlfriend. - Provide timely, complete, and accurate information to patient/family  - Incorporate patient and family knowledge, values, beliefs, and cultural backgrounds into the planning and delivery of care  - Encourage patient/family to participate in care and decision-making at the level they choose  - Honor patient and family perspectives and choices  Outcome: Progressing     Problem: SAFETY ADULT - FALL  Goal: Free from fall injury  Description: INTERVENTIONS:  - Assess pt frequently for physical needs  - Identify cognitive and physical deficits and behaviors that affect risk of falls.   - [place] fall precautions as indicated by assessment.  - Educate pt/family on patient safety including physical limitations  - Instruct pt to call for assistance with activity based on assessment  - Modify environment to reduce risk of injury  - Provide assistive devices as appropriate  - Consider OT/PT consult to assist with strengthening/mobility  - Encourage toileting schedule  Outcome: Progressing     Problem: METABOLIC/FLUID AND ELECTROLYTES - ADULT  Goal: Electrolytes maintained within normal limits  Description: INTERVENTIONS:  - Monitor labs and rhythm and assess patient for signs and symptoms of electrolyte imbalances  - Administer electrolyte replacement as ordered  - Monitor response to electrolyte replacements, including rhythm and repeat lab results as appropriate  - Fluid restriction as ordered  - Instruct patient on fluid and nutrition restrictions as appropriate  Outcome: Progressing     Problem: NEUROLOGICAL - ADULT  Goal: Absence of seizures  Description: INTERVENTIONS  - Monitor for seizure activity  - Administer anti-seizure medications as ordered  - Monitor neurological status  Outcome: Progressing     Problem: GASTROINTESTINAL - ADULT  Goal: Minimal or absence of nausea and vomiting  Description: INTERVENTIONS:  - Maintain adequate hydration with IV or PO as ordered and tolerated  - Nasogastric tube to low intermittent suction as ordered  - Evaluate effectiveness of ordered antiemetic medications  - Provide nonpharmacologic comfort measures as appropriate  - Advance diet as tolerated, if ordered  - Obtain nutritional consult as needed  - Evaluate fluid balance  Outcome: Progressing     Problem: DISCHARGE PLANNING  Goal: Discharge to home or other facility with appropriate resources  Description: INTERVENTIONS:  - Identify barriers to discharge w/pt and caregiver  - Include patient/family/discharge partner in discharge planning  - Arrange for needed discharge resources and transportation as appropriate  - Identify discharge learning needs (meds, wound care, etc)  - Arrange for interpreters to assist at discharge as needed  - Consider post-discharge preferences of patient/family/discharge partner  - Complete POLST form as appropriate  - Assess patient's ability to be responsible for managing their own health  - Refer to Case Management Department for coordinating discharge planning if the patient needs post-hospital services based on physician/LIP order or complex needs related to functional status, cognitive ability or social support system  Outcome: Progressing

## 2023-08-27 NOTE — PLAN OF CARE
RN and MD want patient to stay overnight for fever control and antibiotics, Patient adamant about going home today otherwise he will go AMA. Pt willing to stay for venous doppler, but wants to leave tonight. Discharge papers completed, just awaiting results. K+ replaced. IV abx given. Patient walking the halls most of the day. Tylenol given for a low grade fever, Dr. Girish Ko aware and notified. Family at bedside. 1604 Preliminary result negative for US doppler- Dr Girish Ko  aware and notified and okay with discharge. Problem: Patient Centered Care  Goal: Patient preferences are identified and integrated in the patient's plan of care  Description: Interventions:  - What would you like us to know as we care for you? I live at home with my parents and girlfriend.   - Provide timely, complete, and accurate information to patient/family  - Incorporate patient and family knowledge, values, beliefs, and cultural backgrounds into the planning and delivery of care  - Encourage patient/family to participate in care and decision-making at the level they choose  - Honor patient and family perspectives and choices  Outcome: Progressing     Problem: Patient/Family Goals  Goal: Patient/Family Long Term Goal  Description: Patient's Long Term Goal: To go home    Interventions:  - GI consult  - monitor CIWA scores and treat according to [facility]  - monitor vitals  - monitor oral intake tolera  - See additional Care Plan goals for specific interventions  Outcome: Progressing  Goal: Patient/Family Short Term Goal  Description: Patient's Short Term Goal: To feel better    Interventions:   - GI consult  - monitor CIWA scores and treat according to [facility]  - monitor vitals  - monitor oral intake tolerance  - Psych liaison consult  - See additional Care Plan goals for specific interventions  Outcome: Progressing     Problem: SAFETY ADULT - FALL  Goal: Free from fall injury  Description: INTERVENTIONS:  - Assess pt frequently for physical needs  - Identify cognitive and physical deficits and behaviors that affect risk of falls.   - [place] fall precautions as indicated by assessment.  - Educate pt/family on patient safety including physical limitations  - Instruct pt to call for assistance with activity based on assessment  - Modify environment to reduce risk of injury  - Provide assistive devices as appropriate  - Consider OT/PT consult to assist with strengthening/mobility  - Encourage toileting schedule  Outcome: Progressing     Problem: DISCHARGE PLANNING  Goal: Discharge to home or other facility with appropriate resources  Description: INTERVENTIONS:  - Identify barriers to discharge w/pt and caregiver  - Include patient/family/discharge partner in discharge planning  - Arrange for needed discharge resources and transportation as appropriate  - Identify discharge learning needs (meds, wound care, etc)  - Arrange for interpreters to assist at discharge as needed  - Consider post-discharge preferences of patient/family/discharge partner  - Complete POLST form as appropriate  - Assess patient's ability to be responsible for managing their own health  - Refer to Case Management Department for coordinating discharge planning if the patient needs post-hospital services based on physician/LIP order or complex needs related to functional status, cognitive ability or social support system  Outcome: Progressing     Problem: METABOLIC/FLUID AND ELECTROLYTES - ADULT  Goal: Electrolytes maintained within normal limits  Description: INTERVENTIONS:  - Monitor labs and rhythm and assess patient for signs and symptoms of electrolyte imbalances  - Administer electrolyte replacement as ordered  - Monitor response to electrolyte replacements, including rhythm and repeat lab results as appropriate  - Fluid restriction as ordered  - Instruct patient on fluid and nutrition restrictions as appropriate  Outcome: Progressing     Problem: NEUROLOGICAL - ADULT  Goal: Absence of seizures  Description: INTERVENTIONS  - Monitor for seizure activity  - Administer anti-seizure medications as ordered  - Monitor neurological status  Outcome: Progressing     Problem: GASTROINTESTINAL - ADULT  Goal: Minimal or absence of nausea and vomiting  Description: INTERVENTIONS:  - Maintain adequate hydration with IV or PO as ordered and tolerated  - Nasogastric tube to low intermittent suction as ordered  - Evaluate effectiveness of ordered antiemetic medications  - Provide nonpharmacologic comfort measures as appropriate  - Advance diet as tolerated, if ordered  - Obtain nutritional consult as needed  - Evaluate fluid balance  Outcome: Progressing     Problem: Delirium  Goal: Minimize duration of delirium  Description: Interventions:  - Encourage use of hearing aids, eye glasses  - Promote highest level of mobility daily  - Provide frequent reorientation  - Promote wakefulness i.e. lights on, blinds open  - Promote sleep, encourage patient's normal rest cycle i.e. lights off, TV off, minimize noise and interruptions  - Encourage family to assist in orientation and promotion of home routines  Outcome: Progressing

## 2023-08-27 NOTE — PROGRESS NOTES
Patient was provided with discharge instructions, education, and follow up information. Patient's mother and father present for discharge instructions with patient's consent. Prescriptions were already sent electronically to patient's pharmacy. Patient verbalizes understanding of follow up information, specifically taking medications and coming  back to ER if becomes febrile at home. Patient has no questions after reviewing all instructions and will be going Home .

## 2023-08-28 NOTE — TELEPHONE ENCOUNTER
Pt called back, name/ verified. Discussed with patient dc clinic appointment per Arlette Jeans below. Offered available appointment. Patient confirmed and accepted appointment. Date, time, office location and contact number verified. Instructed patient to arrive 15 minutes before appt time and bring insurance card. Patient verbalized understanding. Your appointments       Date & Time Appointment Department [facility])    Sep 06, 2023 10:30 AM CDT [facility] [address] [facility], [facility])      [address], [address], [address]  [address] [place] [id number] [phone]        No further action required, TE closed.

## 2023-09-06 ENCOUNTER — OFFICE VISIT (OUTPATIENT)
Facility: CLINIC | Age: 43
End: 2023-09-06

## 2023-09-06 ENCOUNTER — TELEPHONE (OUTPATIENT)
Facility: CLINIC | Age: 43
End: 2023-09-06

## 2023-09-06 VITALS
WEIGHT: 202 LBS | DIASTOLIC BLOOD PRESSURE: 63 MMHG | BODY MASS INDEX: 31.71 KG/M2 | HEART RATE: 85 BPM | HEIGHT: 67 IN | SYSTOLIC BLOOD PRESSURE: 100 MMHG

## 2023-09-06 DIAGNOSIS — K20.90 ESOPHAGITIS: ICD-10-CM

## 2023-09-06 DIAGNOSIS — Z12.11 COLON CANCER SCREENING: ICD-10-CM

## 2023-09-06 DIAGNOSIS — F10.20 ALCOHOL DEPENDENCE, CONTINUOUS (HCC): ICD-10-CM

## 2023-09-06 DIAGNOSIS — Z09 HOSPITAL DISCHARGE FOLLOW-UP: ICD-10-CM

## 2023-09-06 DIAGNOSIS — K21.00 GASTROESOPHAGEAL REFLUX DISEASE WITH ESOPHAGITIS WITHOUT HEMORRHAGE: Primary | ICD-10-CM

## 2023-09-06 DIAGNOSIS — Z12.11 SCREENING FOR COLON CANCER: Primary | ICD-10-CM

## 2023-09-06 PROCEDURE — 99213 OFFICE O/P EST LOW 20 MIN: CPT | Performed by: NURSE PRACTITIONER

## 2023-09-06 PROCEDURE — 3074F SYST BP LT 130 MM HG: CPT | Performed by: NURSE PRACTITIONER

## 2023-09-06 PROCEDURE — 3078F DIAST BP <80 MM HG: CPT | Performed by: NURSE PRACTITIONER

## 2023-09-06 PROCEDURE — 3008F BODY MASS INDEX DOCD: CPT | Performed by: NURSE PRACTITIONER

## 2023-09-06 RX ORDER — POLYETHYLENE GLYCOL 3350, SODIUM CHLORIDE, SODIUM BICARBONATE, POTASSIUM CHLORIDE 420; 11.2; 5.72; 1.48 G/4L; G/4L; G/4L; G/4L
POWDER, FOR SOLUTION ORAL
Qty: 1 EACH | Refills: 0 | Status: SHIPPED | OUTPATIENT
Start: 2023-09-06

## 2023-09-06 RX ORDER — PANTOPRAZOLE SODIUM 40 MG/1
40 TABLET, DELAYED RELEASE ORAL
Qty: 60 TABLET | Refills: 5 | Status: SHIPPED | OUTPATIENT
Start: 2023-09-06

## 2023-09-06 NOTE — PATIENT INSTRUCTIONS
1. Schedule colonoscopy/egd with MAC w/ first avail [Diagnosis: esophagitis, screen for colon cancer ]      2.  bowel prep from pharmacy ([facility] ) Golytely    3. Continue all medications prior to procedure    4. Read all bowel prep instructions carefully    5. AVOID seeds, nuts, popcorn, raw fruits and vegetables (cooked is okay) for 2-3 days before procedure    6. You MAY need to go for COVID testing 72 hours before procedure. The testing team will call you a few days before your procedure to discuss with you if testing is required. If you are asked to go for COVID testing and do not completed the test, the procedure cannot be performed. 7. If you start any NEW medication after your visit today, please notify us. Certain medications will need to be held before the procedure, or the procedure cannot be performed.     >>>Please note: if you were prescribed Suprep for the bowel prep and it is too expensive or not covered by insurance, it is okay to substitute Trilyte (or any similar generic prep). This can be done by notifying the pharmacy or calling our office.

## 2023-09-06 NOTE — TELEPHONE ENCOUNTER
Scheduled for:  Colonoscopy 92124/16611 EGD [id number]  Provider Name:  Dr Maddy Chicas  Date:  11/1/2023  Location:  [facility]  Sedation:  MAC  Time:  8:00 am. (pt is aware to arrive at 7:00 am)  Prep:  Split dose Golytely  Meds/Allergies Reconciled?:  Nancy ISIDRO reviewed  Diagnosis with codes:    Esophagitis  CRC screening Z12.11  Was patient informed to call insurance with codes (Y/N):  Yes   Referral sent?:  Referral was sent at the time of electronic surgical scheduling. [facility] or [address] notified?:  I sent an electronic request to Endo Scheduling and received a confirmation today. Medication Orders:  Pt is aware to NOT take iron pills, herbal meds and diet supplements for 7 days before exam. Also to NOT take any form of alcohol, recreational drugs and any forms of ED meds 24 hours before exam.   Misc Orders:  N/A   Further instructions given by staff:  I discussed the prep instructions with the patient which he verbally understood. I provided patient with prep instruction's sheet in office. Patient was informed about the new cancellation policy for his/her procedure. Patient was also given a copy of the cancellation policy at the time of the appointment and verbalized understanding.

## 2023-10-16 ENCOUNTER — HOSPITAL ENCOUNTER (EMERGENCY)
Facility: HOSPITAL | Age: 43
Discharge: LEFT BEFORE DISCHARGE | End: 2023-10-16
Payer: MEDICAID

## 2023-10-16 VITALS
RESPIRATION RATE: 22 BRPM | OXYGEN SATURATION: 99 % | BODY MASS INDEX: 27.58 KG/M2 | DIASTOLIC BLOOD PRESSURE: 89 MMHG | HEART RATE: 99 BPM | TEMPERATURE: 99 F | SYSTOLIC BLOOD PRESSURE: 138 MMHG | HEIGHT: 68 IN | WEIGHT: 182 LBS

## 2023-10-16 NOTE — ED INITIAL ASSESSMENT (HPI)
Pt c/o of anxiety and tremors. Pt states he is having alcohol withdrawals. Pt states he has been drinking beer for the last couple of weeks. Last drink was 30 mins ago. No headache. Denies hallucinations. Pt states he drinks about 4 cans of 24 oz a can a day. Denies CP.

## 2023-10-27 ENCOUNTER — TELEPHONE (OUTPATIENT)
Facility: CLINIC | Age: 43
End: 2023-10-27

## 2023-10-27 NOTE — TELEPHONE ENCOUNTER
Patient contacted, name/ verified. Prep instructions reviewed with patient in detail, pt stated she was writing down the information. I informed pt that I will be calling his pharmacy for his prep, he needs to follow up with them for med . All questions answered. Patient verbalized understanding, no further concerns, issues at this time.

## 2023-10-27 NOTE — TELEPHONE ENCOUNTER
[address] on file, spoke with Pamela Patricia, pharmacy tech, pt's name//rx verified, she stated they will dispense prep, they will contact patient for . No further action required, TE closed.

## 2023-11-01 ENCOUNTER — HOSPITAL ENCOUNTER (OUTPATIENT)
Facility: HOSPITAL | Age: 43
Setting detail: HOSPITAL OUTPATIENT SURGERY
Discharge: HOME | End: 2023-11-01
Attending: INTERNAL MEDICINE | Admitting: INTERNAL MEDICINE
Payer: MEDICAID

## 2023-11-01 ENCOUNTER — ANESTHESIA (OUTPATIENT)
Dept: ENDOSCOPY | Facility: HOSPITAL | Age: 43
End: 2023-11-01
Payer: MEDICAID

## 2023-11-01 ENCOUNTER — ANESTHESIA EVENT (OUTPATIENT)
Dept: ENDOSCOPY | Facility: HOSPITAL | Age: 43
End: 2023-11-01
Payer: MEDICAID

## 2023-11-01 VITALS
HEART RATE: 90 BPM | RESPIRATION RATE: 18 BRPM | BODY MASS INDEX: 27.28 KG/M2 | WEIGHT: 180 LBS | TEMPERATURE: 98 F | OXYGEN SATURATION: 100 % | DIASTOLIC BLOOD PRESSURE: 86 MMHG | HEIGHT: 68 IN | SYSTOLIC BLOOD PRESSURE: 115 MMHG

## 2023-11-01 DIAGNOSIS — Z12.11 SCREENING FOR COLON CANCER: ICD-10-CM

## 2023-11-01 DIAGNOSIS — K20.90 ESOPHAGITIS: ICD-10-CM

## 2023-11-01 PROCEDURE — 0DB38ZX EXCISION OF LOWER ESOPHAGUS, VIA NATURAL OR ARTIFICIAL OPENING ENDOSCOPIC, DIAGNOSTIC: ICD-10-PCS | Performed by: INTERNAL MEDICINE

## 2023-11-01 PROCEDURE — 0DBN8ZX EXCISION OF SIGMOID COLON, VIA NATURAL OR ARTIFICIAL OPENING ENDOSCOPIC, DIAGNOSTIC: ICD-10-PCS | Performed by: INTERNAL MEDICINE

## 2023-11-01 PROCEDURE — 0DBK8ZX EXCISION OF ASCENDING COLON, VIA NATURAL OR ARTIFICIAL OPENING ENDOSCOPIC, DIAGNOSTIC: ICD-10-PCS | Performed by: INTERNAL MEDICINE

## 2023-11-01 PROCEDURE — 43239 EGD BIOPSY SINGLE/MULTIPLE: CPT | Performed by: INTERNAL MEDICINE

## 2023-11-01 PROCEDURE — 45385 COLONOSCOPY W/LESION REMOVAL: CPT | Performed by: INTERNAL MEDICINE

## 2023-11-01 RX ORDER — SODIUM CHLORIDE, SODIUM LACTATE, POTASSIUM CHLORIDE, CALCIUM CHLORIDE 600; 310; 30; 20 MG/100ML; MG/100ML; MG/100ML; MG/100ML
INJECTION, SOLUTION INTRAVENOUS CONTINUOUS
Status: DISCONTINUED | OUTPATIENT
Start: 2023-11-01 | End: 2023-11-01

## 2023-11-01 RX ORDER — GLYCOPYRROLATE 0.2 MG/ML
INJECTION, SOLUTION INTRAMUSCULAR; INTRAVENOUS AS NEEDED
Status: DISCONTINUED | OUTPATIENT
Start: 2023-11-01 | End: 2023-11-01 | Stop reason: SURG

## 2023-11-01 RX ORDER — LIDOCAINE HYDROCHLORIDE 10 MG/ML
INJECTION, SOLUTION EPIDURAL; INFILTRATION; INTRACAUDAL; PERINEURAL AS NEEDED
Status: DISCONTINUED | OUTPATIENT
Start: 2023-11-01 | End: 2023-11-01 | Stop reason: SURG

## 2023-11-01 RX ORDER — EPHEDRINE SULFATE 50 MG/ML
INJECTION INTRAVENOUS AS NEEDED
Status: DISCONTINUED | OUTPATIENT
Start: 2023-11-01 | End: 2023-11-01 | Stop reason: SURG

## 2023-11-01 RX ORDER — NALOXONE HYDROCHLORIDE 0.4 MG/ML
80 INJECTION, SOLUTION INTRAMUSCULAR; INTRAVENOUS; SUBCUTANEOUS AS NEEDED
Status: DISCONTINUED | OUTPATIENT
Start: 2023-11-01 | End: 2023-11-01

## 2023-11-01 RX ADMIN — EPHEDRINE SULFATE 10 MG: 50 INJECTION INTRAVENOUS at 08:27:00

## 2023-11-01 RX ADMIN — SODIUM CHLORIDE, SODIUM LACTATE, POTASSIUM CHLORIDE, CALCIUM CHLORIDE: 600; 310; 30; 20 INJECTION, SOLUTION INTRAVENOUS at 08:27:00

## 2023-11-01 RX ADMIN — EPHEDRINE SULFATE 10 MG: 50 INJECTION INTRAVENOUS at 08:22:00

## 2023-11-01 RX ADMIN — GLYCOPYRROLATE 0.2 MG: 0.2 INJECTION, SOLUTION INTRAMUSCULAR; INTRAVENOUS at 08:01:00

## 2023-11-01 RX ADMIN — LIDOCAINE HYDROCHLORIDE 100 MG: 10 INJECTION, SOLUTION EPIDURAL; INFILTRATION; INTRACAUDAL; PERINEURAL at 08:02:00

## 2023-11-01 NOTE — OPERATIVE REPORT
ESOPHAGOGASTRODUODENOSCOPY (EGD) & COLONOSCOPY REPORT    Monique Resendiz.  10/11/1980 Age 37year old   PCP Jeannie Frey. Dell Castellanos MD Endoscopist Lesly Connell MD     Date of procedure: 23    Procedure: EGD w/biopsy & Colonoscopy w/snare polypectomy    Pre-operative diagnosis: h/o esophagitis, CRC screening    Post-operative diagnosis: see impression    Medications: MAC    Withdrawal time: 12 minutes    Complications: none    Procedure: Informed consent was obtained from the patient after the risks of the procedure were discussed, including but not limited to bleeding, perforation, aspiration, infection, or possibility of a missed lesion. We discussed the risks/benefits and alternatives to this procedure, as well as the planned sedation. EGD procedure: The patient was placed in the left lateral decubitus position and begun on continuous blood pressure pulse oximetry and EKG monitoring and this was maintained throughout the procedure. Once an adequate level of sedation was obtained a bite block was placed. Then the lubricated tip of the Enplbbz-IFX-649 diagnostic video upper endoscope was inserted and advanced using direct visualization into the posterior pharynx and ultimately into the esophagus. Colonoscopy procedure: Once an adequate level of sedation was obtained a digital rectal exam was completed. Then the lubricated tip of the Wylnwee-SWNBR-110 diagnostic video colonoscope was inserted and advanced without difficulty to the cecum using the CO2 insufflation technique. The cecum was identified by localizing the trifold, the appendix and the ileocecal valve. A routine second examination of the cecum/ascending colon was performed. Withdrawal was begun with thorough washing and careful examination of the colonic walls and folds. Photodocumentation was obtained. BBPS 9 (3/3/3). I then carefully withdrew the instrument from the patient who tolerated the procedure well.      Complications: None    EGD findings:      1. Esophagus: The GE junction was at 35 cm. The diaphragmatic pinch was noted noted at 38 cm from the incisors. Thus there was a 3 cm hiatal hernia. Proximal to the GE junction there was circumferential 3-4 cm salmon colored mucosal extension suggestive of Jimenez's mucosa, biopsied with cold forceps for histology. Remainder of the esophageal mucosa proximal to this was normal appearing. 2. Stomach: large amount of solid food in stomach, decision made to not attempt further assessment given risk of aspiration. Stomach or duodenum was not visualized. Colonoscopy findings:    AIYANA: normal rectal tone, no masses palpated. 2.  4 polyp(s) noted as follows:      A. #2 polyps in the ascending colon each measuring 2-3 mm in size; sessile morphology; cold snare polypectomy and retrieved. B. 3 mm polyp in the sigmoid colon; sessile morphology; cold snare polypectomy and retrieved. C. 10 mm polyp in the sigmoid colon; pedunculated morphology; hot snare polypectomy and retrieved. Two hemoclips placed over stalk base to reduce risk of post polypectomy bleeding. 3. Mild sigmoid diverticulosis  4. A retroflexed view of the rectum revealed no abnormalities. 5. The colonic mucosa throughout the colon showed normal vascular pattern, without evidence of angioectasias or inflammation. Impression:  3 cm hiatal hernia with 3-4 cm jimenez's mucosa suspected, biopsied  Food in stomach  4 polyps in colon resected and retrieved  diverticulosis    Recommend:  Await pathology. Continue pantoprazole 40mg twice daily  Repeat colonoscopy in 3 years pending pathology    >>>If tissue was obtained and you have not received your pathology results either by phone or letter within 2 weeks, please call our office at [phone].     Specimens: esophagus, polyps    Blood loss: <1 ml

## 2023-11-01 NOTE — DISCHARGE INSTRUCTIONS

## 2023-11-01 NOTE — H&P
History & Physical Examination    Patient Name: Felisa Nissen. MRN: [id number]  CSN: [id number]  YOB: 1980    Diagnosis: crc screening, h/o LA grade C erosive esopahgitis    chlordiazePOXIDE 25 MG Oral Cap, Take 1 capsule (25 mg total) by mouth 3 (three) times daily as needed for Anxiety. , Disp: 20 capsule, Rfl: 0  pantoprazole 40 MG Oral Tab EC, Take 1 tablet (40 mg total) by mouth 2 (two) times daily before meals. , Disp: 60 tablet, Rfl: 5  folic acid 1 MG Oral Tab, Take 1 tablet (1 mg total) by mouth daily. , Disp: 30 tablet, Rfl: 0  metoprolol tartrate 25 MG Oral Tab, Take 1 tablet (25 mg total) by mouth 2x Daily(Beta Blocker). , Disp: 60 tablet, Rfl: 0  QUEtiapine 100 MG Oral Tab, Take 1 tablet (100 mg total) by mouth nightly., Disp: 30 tablet, Rfl: 0  thiamine 100 MG Oral Tab, Take 1 tablet (100 mg total) by mouth daily. , Disp: 30 tablet, Rfl: 0  PEG 3350-KCl-Na Bicarb-NaCl 420 g Oral Recon Soln, Split dose, take as directed, Disp: 1 each, Rfl: 0  pantoprazole 40 MG Oral Tab EC, Take 1 tablet (40 mg total) by mouth 2 (two) times daily before meals. (Patient not taking: Reported on 10/30/2023), Disp: 60 tablet, Rfl: 1, Not Taking      No current facility-administered medications for this encounter.       Allergies:   Morphine                SWELLING    Comment:SHORTNESS OF BREATH    Past Medical History:   Diagnosis Date    Esophageal reflux     Gout     Hernia      Past Surgical History:   Procedure Laterality Date    EGD  02/15/2016    EH NH REPAIR COMPLEX SCALP ARMS LEGS 1.1 TO 2.5 CM  2014    ELBOW FRACTURE SURGERY Right 1991    HERNIA SURGERY      INGUINAL HERNIA REPAIR Left 01/17/2023     Family History   Problem Relation Age of Onset    Diabetes Father     Hypertension Father     Cancer Mother         liver     Social History    Tobacco Use      Smoking status: Former        Types: Cigarettes      Smokeless tobacco: Never    Alcohol use: Not Currently      Comment: per pt, DAILY HARD LEMONADE- Norm's hard lemonade 24oz cans, 6 cans daily        SYSTEM Check if Review is Normal Check if Physical Exam is Normal If not normal, please explain:   HEENT [employer] ] [ Ann Mariesarah Ornelas  [employer] ] [ X]    HEART [employer] ] [ Dominique Drought [employer] ] [ Raquel aJck [employer] ] [ Mando Moraes [employer] ] [ X]    OTHER        I have discussed the risks and benefits and alternatives of the procedure with the patient/family. They understand and agree to proceed with plan of care. I have reviewed the History and Physical done within the last 30 days. Any changes noted above.     Kristofer Okeefe MD  [facility], [facility] - Gastroenterology  11/1/2023  7:22 AM

## 2023-11-07 NOTE — PROGRESS NOTES
Colonoscopy recall 3 years   Dear Domnick Sicard,    I reviewed the pathology report from the polyp(s) we completely removed during your recent colonoscopy. The polyps removed were adenomas, which is a benign growth. However, these are the types of polyps that if not removed could become a colon cancer. All of your polyps were completely removed. The current health care guidelines recommend that patients with the size, number, and types of polyps you have should repeat their colonoscopy in 3 years to look for any new polyps that might have grown. The biopsies of the esophagus show benign tissue which is most likely secondary to acid reflux, I would recommend that you continue to take Protonix once a day for acid suppression. If you have further questions please call me at [phone] or message me through [phone]     Sincerely,   Presley Barksdale MD good, to achieve stated therapy goals

## 2023-11-08 ENCOUNTER — TELEPHONE (OUTPATIENT)
Facility: CLINIC | Age: 43
End: 2023-11-08

## 2023-11-08 NOTE — TELEPHONE ENCOUNTER
----- Message from Marli Lacey MD sent at 11/7/2023  9:38 AM CST -----  Colonoscopy recall 3 years   Dear Ang Torres,    I reviewed the pathology report from the polyp(s) we completely removed during your recent colonoscopy. The polyps removed were adenomas, which is a benign growth. However, these are the types of polyps that if not removed could become a colon cancer. All of your polyps were completely removed. The current health care guidelines recommend that patients with the size, number, and types of polyps you have should repeat their colonoscopy in 3 years to look for any new polyps that might have grown. The biopsies of the esophagus show benign tissue which is most likely secondary to acid reflux, I would recommend that you continue to take Protonix once a day for acid suppression. If you have further questions please call me at [phone] or message me through [phone]     Sincerely,   Robb Snow MD

## 2023-11-08 NOTE — TELEPHONE ENCOUNTER
3  year colonoscopy recall entered. Health maintenance updated. Colonoscopy done on 11/1/23 and next due on 11/1/26.

## 2023-12-21 ENCOUNTER — HOSPITAL ENCOUNTER (EMERGENCY)
Facility: HOSPITAL | Age: 43
Discharge: LEFT BEFORE DISCHARGE | End: 2023-12-22
Payer: MEDICAID

## 2023-12-22 VITALS
HEART RATE: 110 BPM | SYSTOLIC BLOOD PRESSURE: 134 MMHG | OXYGEN SATURATION: 100 % | DIASTOLIC BLOOD PRESSURE: 92 MMHG | RESPIRATION RATE: 20 BRPM | TEMPERATURE: 99 F

## 2023-12-22 LAB
ATRIAL RATE: 85 BPM
P AXIS: 15 DEGREES
P-R INTERVAL: 156 MS
Q-T INTERVAL: 384 MS
QRS DURATION: 94 MS
QTC CALCULATION (BEZET): 456 MS
R AXIS: 14 DEGREES
T AXIS: 15 DEGREES
VENTRICULAR RATE: 85 BPM

## 2023-12-22 PROCEDURE — 93005 ELECTROCARDIOGRAM TRACING: CPT

## 2023-12-22 NOTE — ED INITIAL ASSESSMENT (HPI)
Pt arrives ambulatory to the ED for c/o SOB, NVD, after attempting to detox ing from alcohol. Pt daily drinker of 4 Norm's Hard Lemonades, pt's last drink today at 2030pm. Pt actively dry heaving  in triage. CIWA 21. Hx of detox admission. Denies SI/HI. Aox4, speaking in full sentences.

## 2024-10-07 ENCOUNTER — APPOINTMENT (OUTPATIENT)
Dept: GENERAL RADIOLOGY | Facility: HOSPITAL | Age: 44
End: 2024-10-07
Attending: EMERGENCY MEDICINE
Payer: MEDICAID

## 2024-10-07 ENCOUNTER — HOSPITAL ENCOUNTER (EMERGENCY)
Facility: HOSPITAL | Age: 44
Discharge: HOME | End: 2024-10-07
Attending: EMERGENCY MEDICINE
Payer: MEDICAID

## 2024-10-07 VITALS
TEMPERATURE: 98 F | DIASTOLIC BLOOD PRESSURE: 68 MMHG | OXYGEN SATURATION: 93 % | RESPIRATION RATE: 20 BRPM | HEART RATE: 82 BPM | SYSTOLIC BLOOD PRESSURE: 102 MMHG

## 2024-10-07 DIAGNOSIS — R07.9 CHEST PAIN OF UNCERTAIN ETIOLOGY: ICD-10-CM

## 2024-10-07 DIAGNOSIS — R06.6 HICCUPS: Primary | ICD-10-CM

## 2024-10-07 LAB
ANION GAP SERPL CALC-SCNC: 6 MMOL/L (ref 0–18)
ATRIAL RATE: 97 BPM
BASOPHILS # BLD AUTO: 0.05 X10(3) UL (ref 0–0.2)
BASOPHILS NFR BLD AUTO: 1 %
BUN BLD-MCNC: 10 MG/DL (ref 9–23)
BUN/CREAT SERPL: 7.9 (ref 10–20)
CALCIUM BLD-MCNC: 9.3 MG/DL (ref 8.7–10.4)
CHLORIDE SERPL-SCNC: 102 MMOL/L (ref 98–112)
CO2 SERPL-SCNC: 32 MMOL/L (ref 21–32)
CREAT BLD-MCNC: 1.27 MG/DL
DEPRECATED RDW RBC AUTO: 61.8 FL (ref 35.1–46.3)
EGFRCR SERPLBLD CKD-EPI 2021: 71 ML/MIN/1.73M2 (ref 60–?)
EOSINOPHIL # BLD AUTO: 0.04 X10(3) UL (ref 0–0.7)
EOSINOPHIL NFR BLD AUTO: 0.8 %
ERYTHROCYTE [DISTWIDTH] IN BLOOD BY AUTOMATED COUNT: 23.4 % (ref 11–15)
GLUCOSE BLD-MCNC: 119 MG/DL (ref 70–99)
HCT VFR BLD AUTO: 29.4 %
HGB BLD-MCNC: 9.1 G/DL
IMM GRANULOCYTES # BLD AUTO: 0.02 X10(3) UL (ref 0–1)
IMM GRANULOCYTES NFR BLD: 0.4 %
LYMPHOCYTES # BLD AUTO: 1.76 X10(3) UL (ref 1–4)
LYMPHOCYTES NFR BLD AUTO: 35.3 %
MCH RBC QN AUTO: 22.8 PG (ref 26–34)
MCHC RBC AUTO-ENTMCNC: 31 G/DL (ref 31–37)
MCV RBC AUTO: 73.7 FL
MONOCYTES # BLD AUTO: 0.62 X10(3) UL (ref 0.1–1)
MONOCYTES NFR BLD AUTO: 12.4 %
NEUTROPHILS # BLD AUTO: 2.5 X10 (3) UL (ref 1.5–7.7)
NEUTROPHILS # BLD AUTO: 2.5 X10(3) UL (ref 1.5–7.7)
NEUTROPHILS NFR BLD AUTO: 50.1 %
OSMOLALITY SERPL CALC.SUM OF ELEC: 290 MOSM/KG (ref 275–295)
P AXIS: 75 DEGREES
P-R INTERVAL: 142 MS
PLATELET # BLD AUTO: 90 10(3)UL (ref 150–450)
PLATELET MORPHOLOGY: NORMAL
PLATELETS.RETICULATED NFR BLD AUTO: 10.9 % (ref 0–7)
POTASSIUM SERPL-SCNC: 3.5 MMOL/L (ref 3.5–5.1)
Q-T INTERVAL: 344 MS
QRS DURATION: 84 MS
QTC CALCULATION (BEZET): 436 MS
R AXIS: 71 DEGREES
RBC # BLD AUTO: 3.99 X10(6)UL
SODIUM SERPL-SCNC: 140 MMOL/L (ref 136–145)
T AXIS: 61 DEGREES
TROPONIN I SERPL HS-MCNC: <3 NG/L
VENTRICULAR RATE: 97 BPM
WBC # BLD AUTO: 5 X10(3) UL (ref 4–11)

## 2024-10-07 PROCEDURE — 84484 ASSAY OF TROPONIN QUANT: CPT | Performed by: EMERGENCY MEDICINE

## 2024-10-07 PROCEDURE — 85025 COMPLETE CBC W/AUTO DIFF WBC: CPT | Performed by: EMERGENCY MEDICINE

## 2024-10-07 PROCEDURE — 96374 THER/PROPH/DIAG INJ IV PUSH: CPT

## 2024-10-07 PROCEDURE — 99284 EMERGENCY DEPT VISIT MOD MDM: CPT

## 2024-10-07 PROCEDURE — 99285 EMERGENCY DEPT VISIT HI MDM: CPT

## 2024-10-07 PROCEDURE — S0028 INJECTION, FAMOTIDINE, 20 MG: HCPCS | Performed by: EMERGENCY MEDICINE

## 2024-10-07 PROCEDURE — 93010 ELECTROCARDIOGRAM REPORT: CPT

## 2024-10-07 PROCEDURE — 80048 BASIC METABOLIC PNL TOTAL CA: CPT | Performed by: EMERGENCY MEDICINE

## 2024-10-07 PROCEDURE — 93005 ELECTROCARDIOGRAM TRACING: CPT

## 2024-10-07 PROCEDURE — 96375 TX/PRO/DX INJ NEW DRUG ADDON: CPT

## 2024-10-07 PROCEDURE — 71045 X-RAY EXAM CHEST 1 VIEW: CPT | Performed by: EMERGENCY MEDICINE

## 2024-10-07 RX ORDER — PANTOPRAZOLE SODIUM 40 MG/1
TABLET, DELAYED RELEASE ORAL
Qty: 30 TABLET | Refills: 0 | Status: SHIPPED | OUTPATIENT
Start: 2024-10-07

## 2024-10-07 RX ORDER — FAMOTIDINE 10 MG/ML
20 INJECTION, SOLUTION INTRAVENOUS ONCE
Status: COMPLETED | OUTPATIENT
Start: 2024-10-07 | End: 2024-10-07

## 2024-10-07 RX ORDER — DIAZEPAM 10 MG/2ML
5 INJECTION, SOLUTION INTRAMUSCULAR; INTRAVENOUS ONCE
Status: COMPLETED | OUTPATIENT
Start: 2024-10-07 | End: 2024-10-07

## 2024-10-07 NOTE — DISCHARGE INSTRUCTIONS
Return to emergency department for any worsening symptoms including but not limited to worsening chest pain, shortness of breath, worsening symptoms with exertion, lower extremity swelling, weakness, numbness, abdominal pain, etc. Please follow with your primary care provider in the next few days for reevaluation of your symptoms.  The Emergency Department is not intended to be a substitute for an effort to provide complete medical care. The imaging, if any, have often been interpreted on a preliminary basis pending final reading by the radiologist. If your blood pressure was greater than 140/90, please have this blood pressure rechecked by your primary care provider in the next several days.

## 2024-10-07 NOTE — ED PROVIDER NOTES
Patient Seen in: [facility]         EMERGENCY DEPARTMENT NOTE    Dictated. Voice Transcription software has been utilized for this dictation (the reader should be aware that typographical errors are possible with voice transcription software and to please contact the dictating physician if there are questions.)         History     Chief Complaint   Patient presents with    Hiccups       There may be discrepancies from triage note.     HPI    History provided by patient.  43-year-old male, history of alcohol use, history of GERD complaining of intermittent hiccups for 4 days.  States that he now has mild chest pain from the amount of hiccups he has had.  No worsening symptoms with exertion      No personal history of ACS, PE, DVT.  Reports receiving intramuscular antipsychotic for his hiccups in the past which he did not like.  Reports daily drinking and reports having alcohol several hours prior to arrival.  Denies tremulousness.  Denies SI/HI.  No drugs.    No fevers, chills, nausea, vomiting, diarrhea, constipation, cough, cold symptoms, urinary complaints.  No headache, neck pain, neck stiffness, incontinence.  No changes in mentation, no changes in vision, no total/new extremity weakness, no total/new extremity paresthesia, no difficulty speaking.  No alleviating or exacerbating factors.  Denies orthopnea, pnd, change in exercise tolerance limited by chest pain/sob , lower extremity edema/asymmetry.   No sob   No pleuritic pain.  No worsening symptoms with exertion.    History reviewed.   Past Medical History:    Esophageal reflux    Gout    Hernia       History reviewed.   Past Surgical History:   Procedure Laterality Date    Colonoscopy N/A 11/1/2023    Procedure: COLONOSCOPY;  Surgeon: Vandana Austin MD;  Location: [facility] ENDOSCOPY    Egd  02/15/2016    Eh pr repair complex scalp arms legs 1.1 to 2.5 cm  2014    Elbow fracture surgery Right 1991    Hernia surgery      Inguinal hernia repair Left  01/17/2023         Medications :  (Not in a hospital admission)       Family History   Problem Relation Age of Onset    Diabetes Father     Hypertension Father     Cancer Mother         liver       Smoking Status:   Social History     Socioeconomic History    Marital status: Single    Number of children: 0   Occupational History    Occupation: Supervisor, car [employer]   Tobacco Use    Smoking status: Former     Types: Cigarettes    Smokeless tobacco: Never   Vaping Use    Vaping status: Some Days   Substance and Sexual Activity    Alcohol use: Yes     Comment: per pt, DAILY HARD LEMONADE- Norm's hard lemonade 24oz cans, 6 cans daily    Drug use: Yes     Types: Cannabis     Comment: last use, couple months ago per pt report       Review of Systems   Constitutional: Negative.    HENT: Negative.     Eyes: Negative.    Respiratory: Negative.  Negative for cough, hemoptysis, sputum production, shortness of breath and wheezing.    Cardiovascular:  Positive for chest pain. Negative for palpitations, claudication, leg swelling and PND.   Gastrointestinal: Negative.    Genitourinary: Negative.    Musculoskeletal: Negative.    Skin: Negative.    Neurological: Negative.    Endo/Heme/Allergies: Negative.    Psychiatric/Behavioral: Negative.     All other systems reviewed and are negative.    Pertinent positives as listed.  All other organ systems are reviewed and are negative.    Constitutional and vital signs reviewed.      Social History and Family History elements reviewed from today, pertinent positives to the presenting problem noted.    Physical Exam     ED Triage Vitals [10/07/24 0203]   /74   Pulse 106   Resp 20   Temp 98.4 °F (36.9 °C)   Temp src    SpO2 98 %   O2 Device None (Room air)       All measures to prevent infection transmission during my interaction with the patient were taken. The patient was already wearing a droplet mask on my arrival to the room. Personal protective equipment including droplet mask,  eye protection, and gloves were worn throughout the duration of the exam.  Handwashing was performed prior to and after the exam.  Stethoscope and any equipment used during my examination was cleaned with super sani-cloth germicidal wipes following the exam.     Physical Exam  Vitals and nursing note reviewed.   Constitutional:       General: He is not in acute distress.     Appearance: He is not ill-appearing or toxic-appearing.   HENT:      Head: Normocephalic and atraumatic.      Mouth/Throat:      Comments: No tongue fasciculations  Cardiovascular:      Rate and Rhythm: Normal rate and regular rhythm.      Comments: Radial pulses 2+ bilaterally      Pulmonary:      Effort: Pulmonary effort is normal. No respiratory distress.      Breath sounds: No stridor. No wheezing, rhonchi or rales.   Chest:      Chest wall: No tenderness.   Abdominal:      General: There is no distension.      Palpations: Abdomen is soft.      Tenderness: There is no abdominal tenderness. There is no guarding or rebound.   Musculoskeletal:      Cervical back: Normal range of motion.      Right lower leg: No edema.      Left lower leg: No edema.      Comments: No hand tremors   Skin:     Capillary Refill: Capillary refill takes less than 2 seconds.   Neurological:      Mental Status: He is alert.      Comments: Gross motor and sensory function intact symmetrically and bilaterally to upper extremities and lower extremities.   Psychiatric:         Mood and Affect: Mood normal.         Behavior: Behavior normal.           Review of prior notes in Care everywhere/Epic performed by myself:  Upon chart review, patient has seen GI in the past for grade C erosive esophagitis.  Patient had an EGD within the last year revealing probable acid reflux.  Biopsies of the esophagus showed benign tissue.          ED Course     If labs obtained, they are personally reviewed by myself:     Labs Reviewed   BASIC METABOLIC PANEL (8) - Abnormal; Notable for the  following components:       Result Value    Glucose 119 (*)     BUN/CREA Ratio 7.9 (*)     All other components within normal limits   CBC WITH DIFFERENTIAL WITH PLATELET - Abnormal; Notable for the following components:    RBC 3.99 (*)     HGB 9.1 (*)     HCT 29.4 (*)     MCV 73.7 (*)     MCH 22.8 (*)     RDW-SD 61.8 (*)     RDW 23.4 (*)     PLT 90.0 (*)     Immature Platelet Fraction 10.9 (*)     All other components within normal limits   RBC MORPHOLOGY SCAN - Abnormal; Notable for the following components:    RBC Morphology See morphology below (*)     Ovalocytes 2+ (*)     All other components within normal limits   TROPONIN I HIGH SENSITIVITY - Normal       If radiologic studies ordered during today's ER visit, my independent interpretation are seen directly below.  This is awaiting the radiologist's final interpretation.  Chest X-ray, independent interpretation completed by myself and awaiting formal radiology read:  No acute cardiopulmonary process, no obvious mass       Imaging Results read by radiology in ED:   CHEST X-ray (portable AP view)    Comparison: 8/26/2023    IMPRESSION:  No radiographic evidence of acute cardiopulmonary process.          Report finalized at 04:57 AM ET    Dr. Theodore Sampson MD  This report has been electronically signed and verified by the Radiologist whose name is printed above.      ED Medications Administered:   Medications   famotidine (Pepcid) 20 mg/2mL injection 20 mg (20 mg Intravenous Given 10/7/24 0250)   mylanta-dicyclomine-lidocaine 2% (G.I. Cocktail) oral liquid ( Oral Given 10/7/24 0251)   diazepam (Valium) 5 mg/mL injection 5 mg (5 mg Intravenous Given 10/7/24 0445)           Vitals:    10/07/24 0203 10/07/24 0251 10/07/24 0429   BP: 117/74 119/78 105/66   Pulse: 106 97 100   Resp: 20 13 19   Temp: 98.4 °F (36.9 °C)     SpO2: 98% 97% 100%     *I personally reviewed and interpreted all ED vitals.    Pulse Ox interpretation by myself: 98%, Room air, Normal      Monitor Interpretation by myself:   normal sinus rhythm    If Ekg obtained during today's visit, it is independently interpreted by myself directly below:  EKG    Rate, intervals and axes as noted on EKG Report.  Rate: 97  Rhythm: Sinus Rhythm  Reading: no st elevation, no st depression, normal t waves   Occasional PVC             Medical Record Review: I personally reviewed available prior medical records for any recent pertinent discharge summaries, testing, and procedures and reviewed those reports.      [facility]     Medical decision making/ED Course:   43-year-old male who presents to the emergency department for hiccups for 4 days now causing chest discomfort.  No shortness of breath.  No pleuritic pain.  His symptoms seem most consistent with hiccups from probable gastritis/esophagitis.  Reports daily alcohol use.  No signs of withdrawal/DTs today.  BMP normal.  CBC revealing stable hemoglobin of 9.1.  He denies bleeding from any orifice.  Platelet count low-likely from his chronic alcoholism.  Normal white blood cell count.  Troponin negative.  EKG sinus.  Status post Pepcid, GI cocktail and Valium-patient with persistent hiccups.  Patient kindly declines antipsychotic medication for hiccups as he had poor secondary sequelae from the aforementioned in the past.  Will prescribe PPI twice daily.  Patient encouraged to follow with an outpatient alcohol cessation education program.      Low heart score.  If this were true ACS, would anticipate a troponin elevation given duration of symptoms and the use of the high-sensitivity troponin.  No abdominal pain.  No abdominal tenderness elicited.  Extremely well-appearing.  Chest x-ray normal.    Atypical ACS, PE, cholecystitis, AAA, dissection, pancreatitis, mesenteric ischemia, volvulus, bowel obstruction, appendicitis, among other life-threatening medical conditions considered and seems unlikely given patient's history, exam, and appearance.  Strict return  instructions given.  Patient encouraged to follow-up with primary care provider in the next few days.  Advised to return to the emergency department for any worsening symptoms    Patient is non toxic appearing, is in no distress, hemodynamically stable.  Pt agrees and is aware of plan.         Differential Diagnosis:  as listed above in medical decision making.   *Please note that in the presenting to the emergency department, illness/injury that poses a threat to life or function is considered during this patient's initial evaluation.    The complexity of this visit is therefore inherently more complex given the need to consider life threatening pathology prior to any other etiology for this patient's visit.    The differential diagnosis and medical decision above exemplify this rationale.       Medical Decision Making  Problems Addressed:  Chest pain of uncertain etiology: acute illness or injury  Hiccups: acute illness or injury    Amount and/or Complexity of Data Reviewed  External Data Reviewed: notes.  Labs: ordered. Decision-making details documented in ED Course.  Radiology: ordered and independent interpretation performed. Decision-making details documented in ED Course.  ECG/medicine tests: ordered and independent interpretation performed. Decision-making details documented in ED Course.    Risk  Prescription drug management.               Vitals:    10/07/24 0203 10/07/24 0251 10/07/24 0429   BP: 117/74 119/78 105/66   Pulse: 106 97 100   Resp: 20 13 19   Temp: 98.4 °F (36.9 °C)     SpO2: 98% 97% 100%             Complicating Factors: Significant medical problems that contribute to the complexity of this emergency room evaluation is listed above.    Condition upon leaving the department: Stable    Disposition and Plan     Clinical Impression:  1. Hiccups    2. Chest pain of uncertain etiology        Disposition:  Discharge    Medications Prescribed:  Current Discharge Medication List        START taking  these medications    Details   !! pantoprazole 40 MG Oral Tab EC Please take 1 tablet 30 minutes before breakfast and 30 minutes before dinner for the next 14 days.  Qty: 30 tablet, Refills: 0       !! - Potential duplicate medications found. Please discuss with provider.          I have discussed the discharge plan with the patient and/or family or well wisher present in the room with the patient's permission.  They state that they understand and agree with the plan.  All questions regarding their care have been answered prior to discharge.  They are aware: Emergency Department is not intended to be a substitute for an effort to provide complete medical care. The imaging, if any, have often been interpreted on a preliminary basis pending final reading by the radiologist.  Instructed to return immediately to the ED if any changes or worsening of condition should occur.  If patient's blood pressure was greater than 140/90 today, patient encouraged to have this blood pressure rechecked with primary MD and blood pressure education provided.

## 2024-10-07 NOTE — ED INITIAL ASSESSMENT (HPI)
S: pt presents to ED with c/o persistent hiccups that have been going on for days now, but today states that his chest started to hurt because of them. Pt states that he's had this before due to his alcoholism.

## 2025-03-04 ENCOUNTER — APPOINTMENT (OUTPATIENT)
Dept: CT IMAGING | Facility: HOSPITAL | Age: 45
End: 2025-03-04
Attending: EMERGENCY MEDICINE
Payer: MEDICAID

## 2025-03-04 ENCOUNTER — HOSPITAL ENCOUNTER (INPATIENT)
Facility: HOSPITAL | Age: 45
LOS: 29 days | Discharge: NURSING, REHAB OR LONG-TERM CARE | End: 2025-04-02
Attending: EMERGENCY MEDICINE | Admitting: HOSPITALIST
Payer: MEDICAID

## 2025-03-04 DIAGNOSIS — K70.10 ALCOHOLIC HEPATITIS WITHOUT ASCITES (HCC): ICD-10-CM

## 2025-03-04 DIAGNOSIS — N17.9 AKI (ACUTE KIDNEY INJURY): Primary | ICD-10-CM

## 2025-03-04 DIAGNOSIS — F39 EPISODIC MOOD DISORDER: ICD-10-CM

## 2025-03-04 DIAGNOSIS — R17 SCLERAL ICTERUS: ICD-10-CM

## 2025-03-04 PROBLEM — D72.829 LEUKOCYTOSIS: Status: ACTIVE | Noted: 2025-03-04

## 2025-03-04 PROBLEM — F10.20 ALCOHOLIC (HCC): Status: ACTIVE | Noted: 2025-03-04

## 2025-03-04 PROBLEM — D69.6 THROMBOCYTOPENIA: Status: ACTIVE | Noted: 2025-03-04

## 2025-03-04 PROBLEM — E87.1 HYPONATREMIA: Status: ACTIVE | Noted: 2025-03-04

## 2025-03-04 PROBLEM — E87.5 HYPERKALEMIA: Status: ACTIVE | Noted: 2025-03-04

## 2025-03-04 PROBLEM — E87.20 METABOLIC ACIDOSIS: Status: ACTIVE | Noted: 2025-03-04

## 2025-03-04 PROBLEM — D68.9 COAGULOPATHY (HCC): Status: ACTIVE | Noted: 2025-03-04

## 2025-03-04 LAB
ALBUMIN SERPL-MCNC: 4.3 G/DL (ref 3.2–4.8)
ALBUMIN/GLOB SERPL: 1.3 {RATIO} (ref 1–2)
ALP LIVER SERPL-CCNC: 175 U/L
ALT SERPL-CCNC: 40 U/L
ANION GAP SERPL CALC-SCNC: 18 MMOL/L (ref 0–18)
ANION GAP SERPL CALC-SCNC: 26 MMOL/L (ref 0–18)
AST SERPL-CCNC: 105 U/L (ref ?–34)
ATRIAL RATE: 112 BPM
BASOPHILS # BLD AUTO: 0.01 X10(3) UL (ref 0–0.2)
BASOPHILS NFR BLD AUTO: 0.1 %
BILIRUB SERPL-MCNC: 10.5 MG/DL (ref 0.3–1.2)
BILIRUB UR QL: NEGATIVE
BUN BLD-MCNC: 29 MG/DL (ref 9–23)
BUN BLD-MCNC: 30 MG/DL (ref 9–23)
BUN/CREAT SERPL: 7.1 (ref 10–20)
BUN/CREAT SERPL: 7.3 (ref 10–20)
CALCIUM BLD-MCNC: 8.2 MG/DL (ref 8.7–10.4)
CALCIUM BLD-MCNC: 9.6 MG/DL (ref 8.7–10.4)
CHLORIDE SERPL-SCNC: 86 MMOL/L (ref 98–112)
CHLORIDE SERPL-SCNC: 94 MMOL/L (ref 98–112)
CO2 SERPL-SCNC: 15 MMOL/L (ref 21–32)
CO2 SERPL-SCNC: 17 MMOL/L (ref 21–32)
CREAT BLD-MCNC: 3.97 MG/DL
CREAT BLD-MCNC: 4.22 MG/DL
CREAT UR-SCNC: 111.8 MG/DL
DEPRECATED RDW RBC AUTO: 72.2 FL (ref 35.1–46.3)
EGFRCR SERPLBLD CKD-EPI 2021: 17 ML/MIN/1.73M2 (ref 60–?)
EGFRCR SERPLBLD CKD-EPI 2021: 18 ML/MIN/1.73M2 (ref 60–?)
EOSINOPHIL # BLD AUTO: 0 X10(3) UL (ref 0–0.7)
EOSINOPHIL NFR BLD AUTO: 0 %
ERYTHROCYTE [DISTWIDTH] IN BLOOD BY AUTOMATED COUNT: 26.3 % (ref 11–15)
GLOBULIN PLAS-MCNC: 3.2 G/DL (ref 2–3.5)
GLUCOSE BLD-MCNC: 58 MG/DL (ref 70–99)
GLUCOSE BLD-MCNC: 72 MG/DL (ref 70–99)
GLUCOSE BLDC GLUCOMTR-MCNC: 103 MG/DL (ref 70–99)
GLUCOSE UR-MCNC: 30 MG/DL
HCT VFR BLD AUTO: 34.4 %
HGB BLD-MCNC: 10.1 G/DL
HYALINE CASTS #/AREA URNS AUTO: PRESENT /LPF
IMM GRANULOCYTES # BLD AUTO: 0.13 X10(3) UL (ref 0–1)
IMM GRANULOCYTES NFR BLD: 0.7 %
INR BLD: 3.86 (ref 0.8–1.2)
KETONES UR-MCNC: 40 MG/DL
LACTATE SERPL-SCNC: 4.8 MMOL/L (ref 0.5–2)
LACTATE SERPL-SCNC: 6 MMOL/L (ref 0.5–2)
LACTATE SERPL-SCNC: 7.5 MMOL/L (ref 0.5–2)
LACTATE SERPL-SCNC: 7.6 MMOL/L (ref 0.5–2)
LACTATE SERPL-SCNC: 8.6 MMOL/L (ref 0.5–2)
LEUKOCYTE ESTERASE UR QL STRIP.AUTO: 500
LIPASE SERPL-CCNC: 287 U/L (ref 12–53)
LYMPHOCYTES # BLD AUTO: 0.91 X10(3) UL (ref 1–4)
LYMPHOCYTES NFR BLD AUTO: 5.2 %
MAGNESIUM SERPL-MCNC: 1.8 MG/DL (ref 1.6–2.6)
MCH RBC QN AUTO: 23.2 PG (ref 26–34)
MCHC RBC AUTO-ENTMCNC: 29.4 G/DL (ref 31–37)
MCV RBC AUTO: 78.9 FL
MONOCYTES # BLD AUTO: 0.94 X10(3) UL (ref 0.1–1)
MONOCYTES NFR BLD AUTO: 5.4 %
NEUTROPHILS # BLD AUTO: 15.55 X10 (3) UL (ref 1.5–7.7)
NEUTROPHILS # BLD AUTO: 15.55 X10(3) UL (ref 1.5–7.7)
NEUTROPHILS NFR BLD AUTO: 88.6 %
NITRITE UR QL STRIP.AUTO: NEGATIVE
OSMOLALITY SERPL CALC.SUM OF ELEC: 269 MOSM/KG (ref 275–295)
OSMOLALITY SERPL CALC.SUM OF ELEC: 272 MOSM/KG (ref 275–295)
OSMOLALITY UR: 382 MOSM/KG (ref 300–1300)
P AXIS: 51 DEGREES
P-R INTERVAL: 136 MS
PH UR: 5 [PH] (ref 5–8)
PHOSPHATE SERPL-MCNC: 5.9 MG/DL (ref 2.4–5.1)
PLATELET # BLD AUTO: 65 10(3)UL (ref 150–450)
PLATELET MORPHOLOGY: NORMAL
PLATELETS.RETICULATED NFR BLD AUTO: 17.9 % (ref 0–7)
POTASSIUM SERPL-SCNC: 4.9 MMOL/L (ref 3.5–5.1)
POTASSIUM SERPL-SCNC: 5.5 MMOL/L (ref 3.5–5.1)
PROT SERPL-MCNC: 7.5 G/DL (ref 5.7–8.2)
PROT UR-MCNC: 30 MG/DL
PROTHROMBIN TIME: 39.7 SECONDS (ref 11.6–14.8)
Q-T INTERVAL: 336 MS
QRS DURATION: 94 MS
QTC CALCULATION (BEZET): 458 MS
R AXIS: 40 DEGREES
RBC # BLD AUTO: 4.36 X10(6)UL
SODIUM SERPL-SCNC: 127 MMOL/L (ref 136–145)
SODIUM SERPL-SCNC: 129 MMOL/L (ref 136–145)
SODIUM SERPL-SCNC: 18 MMOL/L
SP GR UR STRIP: 1.02 (ref 1–1.03)
T AXIS: 38 DEGREES
UROBILINOGEN UR STRIP-ACNC: 4
VENTRICULAR RATE: 112 BPM
WBC # BLD AUTO: 17.5 X10(3) UL (ref 4–11)

## 2025-03-04 PROCEDURE — 99223 1ST HOSP IP/OBS HIGH 75: CPT | Performed by: INTERNAL MEDICINE

## 2025-03-04 PROCEDURE — 74176 CT ABD & PELVIS W/O CONTRAST: CPT | Performed by: EMERGENCY MEDICINE

## 2025-03-04 PROCEDURE — 99291 CRITICAL CARE FIRST HOUR: CPT | Performed by: INTERNAL MEDICINE

## 2025-03-04 PROCEDURE — 99223 1ST HOSP IP/OBS HIGH 75: CPT | Performed by: HOSPITALIST

## 2025-03-04 RX ORDER — DEXTROSE MONOHYDRATE AND SODIUM CHLORIDE 5; .9 G/100ML; G/100ML
INJECTION, SOLUTION INTRAVENOUS CONTINUOUS
Status: DISCONTINUED | OUTPATIENT
Start: 2025-03-04 | End: 2025-03-04

## 2025-03-04 RX ORDER — LORAZEPAM 2 MG/ML
3 INJECTION INTRAMUSCULAR
Status: DISCONTINUED | OUTPATIENT
Start: 2025-03-04 | End: 2025-03-12

## 2025-03-04 RX ORDER — LORAZEPAM 2 MG/ML
2 INJECTION INTRAMUSCULAR
Status: DISCONTINUED | OUTPATIENT
Start: 2025-03-04 | End: 2025-03-12

## 2025-03-04 RX ORDER — LORAZEPAM 2 MG/ML
6 INJECTION INTRAMUSCULAR EVERY 10 MIN PRN
Status: DISCONTINUED | OUTPATIENT
Start: 2025-03-04 | End: 2025-03-12

## 2025-03-04 RX ORDER — THIAMINE HYDROCHLORIDE 100 MG/ML
100 INJECTION, SOLUTION INTRAMUSCULAR; INTRAVENOUS ONCE
Status: COMPLETED | OUTPATIENT
Start: 2025-03-04 | End: 2025-03-04

## 2025-03-04 RX ORDER — LORAZEPAM 2 MG/ML
5 INJECTION INTRAMUSCULAR
Status: DISCONTINUED | OUTPATIENT
Start: 2025-03-04 | End: 2025-03-12

## 2025-03-04 RX ORDER — FAMOTIDINE 10 MG/ML
20 INJECTION, SOLUTION INTRAVENOUS ONCE
Status: COMPLETED | OUTPATIENT
Start: 2025-03-04 | End: 2025-03-04

## 2025-03-04 RX ORDER — BACLOFEN 10 MG/1
10 TABLET ORAL 3 TIMES DAILY
Status: DISCONTINUED | OUTPATIENT
Start: 2025-03-04 | End: 2025-03-19

## 2025-03-04 RX ORDER — FOLIC ACID 1 MG/1
1 TABLET ORAL DAILY
Status: DISCONTINUED | OUTPATIENT
Start: 2025-03-04 | End: 2025-04-02

## 2025-03-04 RX ORDER — ACETAMINOPHEN 500 MG
500 TABLET ORAL EVERY 4 HOURS PRN
Status: DISCONTINUED | OUTPATIENT
Start: 2025-03-04 | End: 2025-03-19

## 2025-03-04 RX ORDER — LORAZEPAM 2 MG/ML
1 INJECTION INTRAMUSCULAR
Status: DISCONTINUED | OUTPATIENT
Start: 2025-03-04 | End: 2025-03-18

## 2025-03-04 RX ORDER — CHLORDIAZEPOXIDE HYDROCHLORIDE 25 MG/1
25 CAPSULE, GELATIN COATED ORAL EVERY 8 HOURS
Status: DISCONTINUED | OUTPATIENT
Start: 2025-03-05 | End: 2025-03-05

## 2025-03-04 RX ORDER — CHLORDIAZEPOXIDE HYDROCHLORIDE 25 MG/1
25 CAPSULE, GELATIN COATED ORAL EVERY 12 HOURS
Status: DISCONTINUED | OUTPATIENT
Start: 2025-03-06 | End: 2025-03-05

## 2025-03-04 RX ORDER — MAGNESIUM SULFATE HEPTAHYDRATE 40 MG/ML
2 INJECTION, SOLUTION INTRAVENOUS ONCE
Status: COMPLETED | OUTPATIENT
Start: 2025-03-04 | End: 2025-03-04

## 2025-03-04 RX ORDER — LORAZEPAM 1 MG/1
1 TABLET ORAL
Status: DISCONTINUED | OUTPATIENT
Start: 2025-03-04 | End: 2025-03-18

## 2025-03-04 RX ORDER — CHLORDIAZEPOXIDE HYDROCHLORIDE 25 MG/1
25 CAPSULE, GELATIN COATED ORAL EVERY 24 HOURS
Status: DISCONTINUED | OUTPATIENT
Start: 2025-03-07 | End: 2025-03-05

## 2025-03-04 RX ORDER — DOXEPIN HYDROCHLORIDE 50 MG/1
1 CAPSULE ORAL DAILY
Status: DISCONTINUED | OUTPATIENT
Start: 2025-03-04 | End: 2025-04-02

## 2025-03-04 RX ORDER — PROCHLORPERAZINE EDISYLATE 5 MG/ML
5 INJECTION INTRAMUSCULAR; INTRAVENOUS EVERY 8 HOURS PRN
Status: DISCONTINUED | OUTPATIENT
Start: 2025-03-04 | End: 2025-04-02

## 2025-03-04 RX ORDER — SODIUM CHLORIDE 9 MG/ML
INJECTION, SOLUTION INTRAVENOUS CONTINUOUS
Status: DISCONTINUED | OUTPATIENT
Start: 2025-03-04 | End: 2025-03-04

## 2025-03-04 RX ORDER — MELATONIN
100 DAILY
Status: DISCONTINUED | OUTPATIENT
Start: 2025-03-05 | End: 2025-03-05

## 2025-03-04 RX ORDER — LORAZEPAM 1 MG/1
2 TABLET ORAL
Status: DISCONTINUED | OUTPATIENT
Start: 2025-03-04 | End: 2025-03-18

## 2025-03-04 RX ORDER — LORAZEPAM 2 MG/ML
1 INJECTION INTRAMUSCULAR ONCE
Status: COMPLETED | OUTPATIENT
Start: 2025-03-04 | End: 2025-03-04

## 2025-03-04 RX ORDER — ONDANSETRON 2 MG/ML
4 INJECTION INTRAMUSCULAR; INTRAVENOUS EVERY 6 HOURS PRN
Status: DISCONTINUED | OUTPATIENT
Start: 2025-03-04 | End: 2025-03-12

## 2025-03-04 RX ORDER — CHLORDIAZEPOXIDE HYDROCHLORIDE 25 MG/1
25 CAPSULE, GELATIN COATED ORAL EVERY 6 HOURS
Status: DISCONTINUED | OUTPATIENT
Start: 2025-03-04 | End: 2025-03-05

## 2025-03-04 RX ORDER — ONDANSETRON 2 MG/ML
4 INJECTION INTRAMUSCULAR; INTRAVENOUS ONCE
Status: COMPLETED | OUTPATIENT
Start: 2025-03-04 | End: 2025-03-04

## 2025-03-04 RX ORDER — LORAZEPAM 2 MG/ML
4 INJECTION INTRAMUSCULAR EVERY 30 MIN PRN
Status: DISCONTINUED | OUTPATIENT
Start: 2025-03-04 | End: 2025-03-12

## 2025-03-04 NOTE — PLAN OF CARE
Fluid boluses given, CIWA's in place, clear liquid diet, call light in place   Problem: Patient Centered Care  Goal: Patient preferences are identified and integrated in the patient's plan of care  Description: Interventions:  - What would you like us to know as we care for you?   - Provide timely, complete, and accurate information to patient/family  - Incorporate patient and family knowledge, values, beliefs, and cultural backgrounds into the planning and delivery of care  - Encourage patient/family to participate in care and decision-making at the level they choose  - Honor patient and family perspectives and choices  Outcome: Progressing     Problem: PAIN - ADULT  Goal: Verbalizes/displays adequate comfort level or patient's stated pain goal  Description: INTERVENTIONS:  - Encourage pt to monitor pain and request assistance  - Assess pain using appropriate pain scale  - Administer analgesics based on type and severity of pain and evaluate response  - Implement non-pharmacological measures as appropriate and evaluate response  - Consider cultural and social influences on pain and pain management  - Manage/alleviate anxiety  - Utilize distraction and/or relaxation techniques  - Monitor for opioid side effects  - Notify MD/LIP if interventions unsuccessful or patient reports new pain  - Anticipate increased pain with activity and pre-medicate as appropriate  Outcome: Progressing     Problem: RISK FOR INFECTION - ADULT  Goal: Absence of fever/infection during anticipated neutropenic period  Description: INTERVENTIONS  - Monitor WBC  - Administer growth factors as ordered  - Implement neutropenic guidelines  Outcome: Progressing     Problem: SAFETY ADULT - FALL  Goal: Free from fall injury  Description: INTERVENTIONS:  - Assess pt frequently for physical needs  - Identify cognitive and physical deficits and behaviors that affect risk of falls.  - [place] fall precautions as indicated by assessment.  - Educate  pt/family on patient safety including physical limitations  - Instruct pt to call for assistance with activity based on assessment  - Modify environment to reduce risk of injury  - Provide assistive devices as appropriate  - Consider OT/PT consult to assist with strengthening/mobility  - Encourage toileting schedule  Outcome: Progressing     Problem: DISCHARGE PLANNING  Goal: Discharge to home or other facility with appropriate resources  Description: INTERVENTIONS:  - Identify barriers to discharge w/pt and caregiver  - Include patient/family/discharge partner in discharge planning  - Arrange for needed discharge resources and transportation as appropriate  - Identify discharge learning needs (meds, wound care, etc)  - Arrange for interpreters to assist at discharge as needed  - Consider post-discharge preferences of patient/family/discharge partner  - Complete POLST form as appropriate  - Assess patient's ability to be responsible for managing their own health  - Refer to Case Management Department for coordinating discharge planning if the patient needs post-hospital services based on physician/LIP order or complex needs related to functional status, cognitive ability or social support system  Outcome: Progressing

## 2025-03-04 NOTE — ED PROVIDER NOTES
Patient Seen in: [facility] Emergency Department    History     Chief Complaint   Patient presents with    Vomiting    Hiccups    Eval-D       HPI    History is provided by patient/independent historian: patient, patient's son  44 year old male with history of GERD here with complaints of hiccups, nausea, vomiting for the past few days.  He drinks alcohol every day and has not drank since several days ago and is having insomnia and anxiety.  He does report generalized abdominal pain.  He is not vomiting any blood.  Stools are normal.  No dizziness or lightheadedness.  No cough cold symptoms or fevers.  Son denies any known history of cirrhosis    History reviewed.   Past Medical History:    Esophageal reflux    Gout    Hernia         History reviewed.   Past Surgical History:   Procedure Laterality Date    Colonoscopy N/A 11/1/2023    Procedure: COLONOSCOPY;  Surgeon: Vandana Austin MD;  Location: [facility] ENDOSCOPY    Egd  02/15/2016    Eh pr repair complex scalp arms legs 1.1 to 2.5 cm  2014    Elbow fracture surgery Right 1991    Hernia surgery      Inguinal hernia repair Left 01/17/2023         Home Medications reviewed :  Prescriptions Prior to Admission[1]      History reviewed.   Social History     Socioeconomic History    Marital status: Single    Number of children: 0   Occupational History    Occupation: Supervisor, car [employer]   Tobacco Use    Smoking status: Former     Types: Cigarettes    Smokeless tobacco: Never   Vaping Use    Vaping status: Some Days   Substance and Sexual Activity    Alcohol use: Yes     Comment: per pt, DAILY HARD LEMONADE- [employer] hard lemonade 24oz cans, 6 cans daily    Drug use: Yes     Types: Cannabis     Comment: last use, couple months ago per pt report         ROS  Review of Systems   Respiratory:  Negative for shortness of breath.    Cardiovascular:  Negative for chest pain.   Gastrointestinal:  Positive for abdominal pain, nausea and vomiting.   Psychiatric/Behavioral:   Positive for sleep disturbance. The patient is nervous/anxious.    All other systems reviewed and are negative.     All other pertinent organ systems are reviewed and are negative.      Physical Exam     ED Triage Vitals [03/04/25 0951]   BP 97/53   Pulse (!) 122   Resp 20   Temp 97.8 °F (36.6 °C)   Temp src Oral   SpO2 98 %   O2 Device None (Room air)     Vital signs reviewed.      Physical Exam  Vitals and nursing note reviewed.   Eyes:      General: Scleral icterus present.   Cardiovascular:      Rate and Rhythm: Tachycardia present.      Pulses: Normal pulses.   Pulmonary:      Effort: No respiratory distress.   Abdominal:      General: There is no distension.      Tenderness: There is no abdominal tenderness.   Neurological:      Mental Status: He is alert.         ED Course       Labs:     Labs Reviewed   CBC WITH DIFFERENTIAL WITH PLATELET - Abnormal; Notable for the following components:       Result Value    WBC 17.5 (*)     HGB 10.1 (*)     HCT 34.4 (*)     MCV 78.9 (*)     MCH 23.2 (*)     MCHC 29.4 (*)     RDW-SD 72.2 (*)     RDW 26.3 (*)     PLT 65.0 (*)     Immature Platelet Fraction 17.9 (*)     Neutrophil Absolute Prelim 15.55 (*)     Neutrophil Absolute 15.55 (*)     Lymphocyte Absolute 0.91 (*)     All other components within normal limits   COMP METABOLIC PANEL (14) - Abnormal; Notable for the following components:    Sodium 127 (*)     Potassium 5.5 (*)     Chloride 86 (*)     CO2 15.0 (*)     Anion Gap 26 (*)     BUN 30 (*)     Creatinine 4.22 (*)     BUN/CREA Ratio 7.1 (*)     Calculated Osmolality 269 (*)     eGFR-Cr 17 (*)      (*)     Alkaline Phosphatase 175 (*)     Bilirubin, Total 10.5 (*)     All other components within normal limits   URINALYSIS WITH CULTURE REFLEX - Abnormal; Notable for the following components:    Clarity Urine Turbid (*)     Glucose Urine 30 (*)     Ketones Urine 40 (*)     Blood Urine 1+ (*)     Protein Urine 30 (*)     Urobilinogen Urine 4 (*)     Leukocyte  Esterase Urine 500 (*)     WBC Urine 21-50 (*)     RBC Urine 6-10 (*)     Squamous Epi. Cells Few (*)     Transitional Cells Few (*)     Hyaline Casts Present (*)     All other components within normal limits   LIPASE - Abnormal; Notable for the following components:    Lipase 287 (*)     All other components within normal limits   RBC MORPHOLOGY SCAN - Abnormal; Notable for the following components:    RBC Morphology See morphology below (*)     All other components within normal limits   PROTHROMBIN TIME (PT) - Abnormal; Notable for the following components:    PT 39.7 (*)     INR 3.86 (*)     All other components within normal limits   LACTIC ACID, PLASMA - Abnormal; Notable for the following components:    Lactic Acid 8.6 (*)     All other components within normal limits   PHOSPHORUS - Abnormal; Notable for the following components:    Phosphorus 5.9 (*)     All other components within normal limits   MAGNESIUM - Normal   SODIUM, URINE, RANDOM   CREATININE, URINE, RANDOM   BASIC METABOLIC PANEL (8)   OSMOLALITY, URINE   LACTIC ACID REFLEX POST POSTIVE   LACTIC ACID, PLASMA   RAINBOW DRAW LAVENDER   RAINBOW DRAW LIGHT GREEN   RAINBOW DRAW GOLD   RAINBOW DRAW BLUE   URINE CULTURE, ROUTINE   BLOOD CULTURE   BLOOD CULTURE         My EKG Interpretation: EKG    Rate, intervals and axes as noted on EKG Report.  Rate: 112  Rhythm: Sinus Rhythm  Reading: abnormal for rate, normal for rhythm, no acute ST elevation           As reviewed and Interpreted by me      Imaging Results Available and Reviewed while in ED:   No results found.  My review and independent interpretation of CT images: no free air. Radiology report corroborates this in addition to other details as reported by them.      Decision rules/scores evaluated: none      Diagnostic labs/tests considered but not ordered: CT abd/pelvis    ED Medications Administered:   Medications   sodium chloride 0.9 % IV bolus 2,052 mL (0 mL Intravenous Stopped 3/4/25 8489)    sodium chloride 0.9% infusion (has no administration in time range)   acetaminophen (Tylenol Extra Strength) tab 500 mg (has no administration in time range)   ondansetron (Zofran) 4 MG/2ML injection 4 mg (has no administration in time range)   prochlorperazine (Compazine) 10 MG/2ML injection 5 mg (has no administration in time range)   thiamine 100 mg/mL injection 100 mg (has no administration in time range)   thiamine (Vitamin B1) tab 100 mg (has no administration in time range)   multivitamin (Tab-A-Yosef/Beta Carotene) tab 1 tablet (has no administration in time range)   folic acid (Folvite) tab 1 mg (has no administration in time range)   LORazepam (Ativan) tab 1 mg (has no administration in time range)     Or   LORazepam (Ativan) 2 mg/mL injection 1 mg (has no administration in time range)   LORazepam (Ativan) tab 2 mg (has no administration in time range)     Or   LORazepam (Ativan) 2 mg/mL injection 2 mg (has no administration in time range)   LORazepam (Ativan) tab 3 mg (has no administration in time range)     Or   LORazepam (Ativan) 2 mg/mL injection 3 mg (has no administration in time range)   LORazepam (Ativan) 2 mg/mL injection 4 mg (has no administration in time range)   LORazepam (Ativan) 2 mg/mL injection 5 mg (has no administration in time range)   LORazepam (Ativan) 2 mg/mL injection 6 mg (has no administration in time range)   baclofen (Lioresal) tab 10 mg (has no administration in time range)   phytonadione (Aqua-Mephton) 10 mg in sodium chloride 0.9% 50 mL IVPB (has no administration in time range)   pantoprazole (Protonix) 40 mg in sodium chloride 0.9% PF 10 mL IV push (has no administration in time range)   sodium chloride 0.9 % IV bolus 1,000 mL (1,000 mL Intravenous Handoff 3/4/25 1505)   sodium chloride 0.9 % IV bolus 2,000 mL (2,000 mL Intravenous Not Given 3/4/25 1545)   sodium chloride 0.9 % IV bolus 2,000 mL (2,000 mL Intravenous New Bag 3/4/25 1545)   famotidine (Pepcid) 20 mg/2mL  injection 20 mg (20 mg Intravenous Given 3/4/25 1112)   pantoprazole (Protonix) 40 mg in sodium chloride 0.9% PF 10 mL IV push (40 mg Intravenous Given 3/4/25 1113)   LORazepam (Ativan) 2 mg/mL injection 1 mg (1 mg Intravenous Given 3/4/25 1113)   sodium chloride 0.9 % IV bolus 1,000 mL (0 mL Intravenous Stopped 3/4/25 1333)   ondansetron (Zofran) 4 MG/2ML injection 4 mg (4 mg Intravenous Given 3/4/25 1112)   sodium zirconium cyclosilicate (Lokelma) oral packet 10 g (10 g Oral Given 3/4/25 1329)                MDM       Medical Decision Making      Differential Diagnosis: After obtaining the patient's history, performing the physical exam and reviewing the diagnostics, multiple initial diagnoses were considered based on the presenting problem including alcoholic gastritis, withdrawal, PUD, viral syndrome    External document review: I personally reviewed available external medical records for any recent pertinent discharge summaries, testing, and procedures - the findings are as follows: 10/7/24 visit with Dr. Guevara for  hiccups    Complicating Factors: The patient already  has a past medical history of Esophageal reflux, Gout, and Hernia. to contribute to the complexity of this ED evaluation.    Procedures performed: none    Discussed management with physician/appropriate source: Dr. Aftab Queen, Dr. Austin, Dr. Daugherty    Considered admission/deescalation of care for: none    Social determinants of health affecting patient care: none    Prescription medications considered: discussed continuing current medication regimen    The patient requires continuous monitoring for: vomiting, hiccups    Shared decision making: discussed possible admission    Critical Care Time:  Total critical care time for this patient was 30 minutes exclusive of separately billable procedures, but in obtaining history, performing a physical exam, bedside monitoring of interventions, collecting and interpreting test, and discussion with  consultants.        Disposition and Plan     Clinical Impression:  1. USMAN (acute kidney injury)    2. Scleral icterus        Disposition:  Admit    Follow-up:  No follow-up provider specified.    Medications Prescribed:  There are no discharge medications for this patient.      Hospital Problems       Present on Admission  Date Reviewed: 9/6/2023            ICD-10-CM Noted POA    * (Principal) USMAN (acute kidney injury) N17.9 3/4/2025 Unknown    Acute kidney failure N17.9 3/4/2025 Unknown    Alcoholic (HCC) F10.20 3/4/2025 Unknown    Coagulopathy (HCC) D68.9 3/4/2025 Unknown    Hyperkalemia E87.5 3/4/2025 Unknown    Hyponatremia E87.1 3/4/2025 Unknown    Leukocytosis D72.829 3/4/2025 Unknown    Metabolic acidosis E87.20 3/4/2025 Unknown    Scleral icterus R17 3/4/2025 Unknown    Thrombocytopenia D69.6 3/4/2025 Unknown                     [1]   No medications prior to admission.

## 2025-03-04 NOTE — H&P
[facility]    PATIENT'S NAME: NASIM TOBIAS JR.   ATTENDING PHYSICIAN: Guillermo Queen MD   PATIENT ACCOUNT#:   [id number]    LOCATION:  [address]  MEDICAL RECORD #:   [id number]       YOB: 1980  ADMISSION DATE:       03/04/2025    HISTORY AND PHYSICAL EXAMINATION    CHIEF COMPLAINT:  Acute kidney injury, hyperkalemia, and alcoholic hepatitis.    HISTORY OF PRESENT ILLNESS:  The patient is a 44-year-old  male who came in today to the emergency room for evaluation of intractable nausea and vomiting, poor appetite for the last few days, with hiccups.  Chemistry showed potassium 5.5, sodium 127, bicarb 15, chloride 86, anion gap 26, BUN and creatinine of 30 and 4.2, GFR 17, below his baseline.  ALT and AST of 40 and 105, alkaline phosphatase 175, and total bilirubin 10.5.  CBC showed white blood cell count of 17.5 with left shift, hemoglobin 10.1, and platelet count 65.  INR elevated at 3.86.  Urinalysis turbid and dark in color, leukocyte esterase positive.  Cultures were sent.  CT scan of the abdomen and pelvis showed severe fatty liver with subtle undulating contour, stable splenomegaly, tubular structures around the gastroesophageal junction suggestive of lower esophageal varices.  No ascites was identified.  The patient was started on IV fluids, and he will be admitted to the hospital for further management.  Also was given IV Ativan for alcohol withdrawal plus Lokelma.    PAST MEDICAL HISTORY:  Per the record, the patient has history of alcoholism, anxiety, gout, gastroesophageal reflux disease.    PAST SURGICAL HISTORY:  Left inguinal hernia repair and right elbow open reduction, internal fixation.     MEDICATIONS:  Please see medication reconciliation list.     ALLERGIES:  No known drug allergies.     FAMILY HISTORY:  Mother had liver cancer.  Father had diabetes mellitus type 2 and hypertension.    SOCIAL HISTORY:  Ex-tobacco user.  No current drug use.  Drinks alcohol  heavily on a daily basis, three 24-ounce beers daily.    REVIEW OF SYSTEMS:  The patient said he drinks heavily on a daily basis.  A few days ago started having nausea, vomiting, hiccups, and inability to tolerate any oral intake.  For the last 3 days, he did not have any solid meals.  Other 12-point review of systems negative.  Also, family noted that he had yellow discoloration of his eyes.  Denies any fever or chills.  He had low urine output but no dysuria.  The patient also reported tremors and diaphoresis from alcohol withdrawal.      PHYSICAL EXAMINATION:    GENERAL:  Alert, oriented to time, place, and person, anxious, jaundiced.   VITAL SIGNS:  Temperature 97.8.  Pulse 122, sinus tachycardia.  Respiratory rate 22.  Blood pressure 90/62.  Pulse ox 100% on room air.  HEENT:  Atraumatic.  Icteric sclerae.  Pupils equal, round, reactive.  NECK:  Supple.  No lymphadenopathy.  LUNGS:  Clear to auscultation bilaterally.  Normal respiratory effort.  HEART:  Regular rate and rhythm.  S1 and S2 auscultated.  No murmur.  ABDOMEN:  Soft, nondistended.  No tenderness.  Discomfort epigastric area but no guarding or rebound tenderness.  EXTREMITIES:  No peripheral edema, clubbing, or cyanosis.  NEUROLOGIC:  Motor and sensory intact.    ASSESSMENT:    1.   Acute kidney injury.  2.   Alcoholic hepatitis with possible underlying liver cirrhosis.  3.   Thrombocytopenia.  4.   Anion gap metabolic acidosis secondary to intravascular hypovolemia.    PLAN:  The patient will be admitted to general medical floor.  Continue aggressive IV fluid management.  Detox protocol.  IV Protonix.  Gastroenterology and Nephrology consults.  Clear liquid diet.  Monitor his hemodynamic status.  Monitor hemoglobin.  He had microcytosis suggestive of possible iron-deficiency anemia, rule out gastrointestinal bleed.  Also, follow up on urine culture and monitor temperature curve.  The fact that INR is elevated at 3.86 is suggestive of a very poor  prognosis.  Further recommendations to follow.     Dictated By Michael Daugherty MD  d: 03/04/2025 13:31:06  t: 03/04/2025 13:43:10  Job [id number]  FB/

## 2025-03-04 NOTE — CONSULTS
[facility]  part of [facility]    Report of Consultation    Date of Admission:  3/4/2025  Date of Consult:  3/4/2025   Reason for Consultation:     USMAN, hyperkalemia, hyponatremia    History of Present Illness:   Patient is a 44 year old male with history of alcoholism, anxiety, gout who presented to the emergency room for evaluation of intractable nausea and vomiting    Per patient continued to have vomiting since Friday-state last drink was on Saturday.  Continue to drink water and Gatorade.  Denies any abdominal pain or diarrhea.  States felt warm but no documented fever.  Denies any urinary complaint    In ED lab test showed BUNs/creatinine 30/4.2 with sodium 127 bicarb 15 with anion gap 26.  WBC 17.5.  Elevated liver function test      Past Medical History  Past Medical History:    Esophageal reflux    Gout    Hernia       Past Surgical History  Past Surgical History:   Procedure Laterality Date    Colonoscopy N/A 11/1/2023    Procedure: COLONOSCOPY;  Surgeon: Vandana Austin MD;  Location: [facility] ENDOSCOPY    Egd  02/15/2016    Eh pr repair complex scalp arms legs 1.1 to 2.5 cm  2014    Elbow fracture surgery Right 1991    Hernia surgery      Inguinal hernia repair Left 01/17/2023       Family History  Family History   Problem Relation Age of Onset    Diabetes Father     Hypertension Father     Cancer Mother         liver       Social History  Pediatric History   Patient Parents    Rama Grijalva (Mother)     Other Topics Concern    Not on file   Social History Narrative    Not on file           Current Medications:  Current Facility-Administered Medications   Medication Dose Route Frequency    sodium chloride 0.9 % IV bolus 2,052 mL  30 mL/kg (Ideal) Intravenous Continuous    baclofen (Lioresal) tab 10 mg  10 mg Oral TID    phytonadione (Aqua-Mephton) 10 mg in sodium chloride 0.9% 50 mL IVPB  10 mg Intravenous Daily       Allergies  Allergies[1]    Review of Systems:   Constitutional: Jittery  and fatigue  Eyes: negative for irritation, redness and visual disturbance  Ears, nose, mouth, throat, and face: negative for hearing loss and sore throat  Respiratory: negative for cough, hemoptysis and wheezing  Cardiovascular: negative for chest pain, exertional dyspnea, lower extremity edema  Gastrointestinal: see above  Genitourinary:negative for dysuria, frequency and hematuria  Hematologic/lymphatic: negative for bleeding and easy bruising  Musculoskeletal:negative for back pain, bone pain   Behavioral/Psych: +ve anxiety      Physical Exam:   Height: 5' 8\" (172.7 cm) (03/04 0951)  Weight: 179 lb (81.2 kg) (03/04 0951)  BSA (Calculated - sq m): 1.95 sq meters (03/04 0951)  Pulse: 110 (03/04 1315)  BP: 107/58 (03/04 1315)  Temp: 97.8 °F (36.6 °C) (03/04 0951)  Do Not Use - Resp Rate: --  SpO2: 100 % (03/04 1315)  Temp:  [97.8 °F (36.6 °C)] 97.8 °F (36.6 °C)  Pulse:  [] 110  Resp:  [20-28] 22  BP: ()/(53-62) 107/58  SpO2:  [98 %-100 %] 100 %  SpO2: 100 %     Intake/Output Summary (Last 24 hours) at 3/4/2025 1450  Last data filed at 3/4/2025 1333  Gross per 24 hour   Intake 1000 ml   Output --   Net 1000 ml     Wt Readings from Last 5 Encounters:   03/04/25 179 lb (81.2 kg)   03/27/24 170 lb (77.1 kg)   12/26/23 198 lb (89.8 kg)   11/01/23 180 lb (81.6 kg)   10/16/23 180 lb (81.6 kg)       General appearance: ill appearing and jittery  Head: Normocephalic, atraumatic  Eyes: conjunctivae/corneas clear  Throat: lips, mucosa, and tongue normal; teeth and gums normal  Neck:  no JVD, supple, symmetrical  Abdominal: soft, non-tender; non distended  Extremities: extremities normal, no edema  Skin: No rashes or lesions  Lymph nodes: Cervical, supraclavicular normal  Neurologic: Grossly normal-  Psychiatric: calm    Results:     Laboratory Data:  Recent Labs   Lab 03/04/25  1111   RBC 4.36   HGB 10.1*   HCT 34.4*   MCV 78.9*   NEPRELIM 15.55*   WBC 17.5*   PLT 65.0*     Recent Labs   Lab 03/04/25  1112   GLU  72   BUN 30*   CREATSERUM 4.22*   CA 9.6   ALB 4.3   *   K 5.5*   CL 86*   CO2 15.0*   ALKPHO 175*   *   ALT 40   BILT 10.5*   TP 7.5     INR   Date Value Ref Range Status   03/04/2025 3.86 (H) 0.80 - 1.20 Final     Comment:     Therapeutic INR range for patients on warfarin:  2.0-3.0 for most medical conditions and surgical prophylaxis   2.5-3.5 for mechanical heart valves and recurrent thromboembolism    Direct thrombin inhibitors (e.g. argatroban, bivalirudin), factor Xa inhibitors (e.g. apixaban, rivaroxaban), and conditions such as coagulation factor deficiency, vitamin K deficiency, and liver disease will prolong the prothrombin time/INR.    Unfractionated heparin and low molecular weight heparin do not affect the prothrombin time/INR up to a concentration of 1 IU/mL and 1.5 IU/mL, respectively.         No results for input(s): \"BNP\" in the last 168 hours.  Lab Results   Component Value Date    COLORUR Dark-Yellow 03/04/2025    CLARITY Turbid 03/04/2025    SPECGRAVITY 1.021 03/04/2025    GLUUR 30 03/04/2025    BILUR Negative 03/04/2025    KETUR 40 03/04/2025    BLOODURINE 1+ 03/04/2025    PHURINE 5.0 03/04/2025    PROUR 30 03/04/2025    UROBILINOGEN 4 03/04/2025    NITRITE Negative 03/04/2025    LEUUR 500 03/04/2025       Imaging:  No results found.       Impression:     Patient is a 44 year old male with history of alcoholism, anxiety, gout who presented to the emergency room for evaluation of intractable nausea and vomiting    1.USMAN:  Last creatinine 1.27 mg/dL in October 2024.  Serum creatinine 4.22 mg/dL  UA positive for hyaline cast and ketones suggestive of reduced renal perfusion  S/p IV fluid bolus with normal saline-continue normal saline at 100 mL/h  Strict I's and O's  CT abdomen and pelvis unremarkable for any obstructive uropathy  Urine culture pending  Check urine sodium and urine creatinine    2.hyperkalemia:  Secondary to USMAN and metabolic acidosis  S/p medical management in  ED  Repeat later today    3.hyponatremia: Check urine sodium and urine osmolality  Likely secondary to reduced renal perfusion in light of UA positive for ketones and hyaline cast  Recheck later today    4.metabolic acidosis:  In light of hypotension check lactic acid.  Positive anion gap likely secondary to ketoacidosis    5.elevated liver function test and hyperbilirubinemia:  Elevated INR and thrombocytopenia  CT suggestive of esophageal varices  GI input noted-vitamin K and PPI  Liver ultrasound with Doppler    6.alcoholism: Last drink on Saturday  Start alcohol withdrawal protocol    7.leukocytosis:  Lactic acid and blood culture and urine culture pending  Patient was tachycardic and hypotensive-improved BP and heart rate with fluids    Discussed with nursing and Dr. Bowers    Total time >35 mins     Thank you for allowing me to participate in the care of your patient.    Abundio Queen MD           [1]   Allergies  Allergen Reactions    Morphine SWELLING     SHORTNESS OF BREATH

## 2025-03-04 NOTE — PROGRESS NOTES
Assessment Date:3/4/2025  Sami Grijalva  10/11/1980  MRN:[id number]    The patient is currently in the emergency room 503-A at [facility]. The patient was referred to the Care Coordinator for suspected Alcohol Use Disorder by the Mental Health Liaison. The Care Coordinator met with the patient for the purpose of Introduction, assessment, and to provide information on Alcohol Use Disorder services. The patient is currently admitted to the hospital for Scieral icterus.    The patient reports being a 44-year old  male. The patient reports his primary choice of drug is Alcohol. The patient reports drinking three 24 oz beers daily. The patient reports his last drink was a couple of beers on 3/1/2025 at 4pm. The patient has a previous psychiatric diagnosis of Anxiety. The patient has a previous medical diagnosis of; Esophageal reflux, Gout, Hernia, ETOH abuse, Acute Kidney Injury, Hyperkalemia, Alcoholic hepatitis with possible underlying liver cirrhosis, Thrombocytopenia, and Anion gap metabolic acidosis secondary to intravascular hypovolemia. The patient denies any suicidal/homicidal ideation.     The patient Meets ASAM criteria and DSM-V criteria for Alcohol Use Disorder, severe.    The patient declined a referral for Inpatient, but accepted a resource list. The patient reports wanting more time to consider his treatment options. The Care Coordinator will follow up with the patient regarding admission into treatment on 3/5/2025. The patient accepted the Care Coordinators business card and folder with a list of treatment facilities to review. Update:3/21/2025-Pt requested referral for Intensive Outpatient (IOP). The Care Coordinator is currently faxing a referral to [facility], at [address], [address]. [place] [address]. Rep Radha, [phone].     [facility]  Care Coordinator  [address] [place] [address]  [phone]/Work Cell

## 2025-03-04 NOTE — CONSULTS
Is this a shared or split note between Advanced Practice Provider and Physician? Yes       [facility]   Gastroenterology Consultation Note    Sami Grijalva Jr.  Patient Status:    Emergency  Date of Admission:         3/4/2025, Hospital day #0  Attending:   Neetu Bowers MD  PCP:     Kerri Medina MD    Reason for Consultation:  Elevated LFTs, ETOH     History of Present Illness:  Sami Grijalva Jr. is a 44 year old male w/ PMHx of longstanding alcohol use, cannabis use, GERD, who presents with generalized abdominal pain, hiccups and vomiting. GI consulted for acute alcohol hepatitis.  Patient states 4 days ago he started to have nausea, vomiting and hiccups. At first was able to tolerate liquids, however as the days continued was unable to tolerate liquids and that prompted him to come to the ER. He notes his hiccups have been continuous for 3 days and all episodes of emesis are liquid filled no hematemesis or coffee ground emesis. He notes appetite has been decreased. Denies melena, hematochezia. He continues to drink heavily on a daily basis.     In ER, , ALT 40, alk phos 175, t bili 10.5.   Cr 4.22, BUN 30.     WBC 17.5, hgb 10.1. Platelets 65.     Pertinent Family Hx:  - No known history of esophageal, gastric or colon cancers  - No known IBD  - No known liver pathologies    Endoscopy Hx:  -11/1/2023 EGD/cln  Impression:  3 cm hiatal hernia with 3-4 cm jimenez's mucosa suspected, biopsied  Food in stomach  4 polyps in colon resected and retrieved  diverticulosis     Recommend:  Await pathology.  Continue pantoprazole 40mg twice daily  Repeat colonoscopy in 3 years pending pathology  Final Diagnosis:      A. Esophagus; biopsy:   Columnar type mucosa with chronic inflammation.  Negative for intestinal metaplasia.     B. Ascending colon polyps x2:   Tubular adenoma fragments x2.     C. Sigmoid colon polyps x2:   Inflammatory polyp x1.  Hyperplastic polyp x1.          - 8/23/2023  EGD  Impression:   1. LA grade C erosive esophagitis  2. 3 cm hiatal hernia  3. 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     Recommend:  1. Continue ppi 40mg BID  2. Etoh cessation  3. Repeat EGD in 8-12 weeks to assess healing and bx     Social Hx:  - No tobacco use/daily ETOH  - Denies illicit drug use  - Lives with: family     History:  Past Medical History:    Esophageal reflux    Gout    Hernia     Past Surgical History:   Procedure Laterality Date    Colonoscopy N/A 11/1/2023    Procedure: COLONOSCOPY;  Surgeon: Vandana Austin MD;  Location: [facility] ENDOSCOPY    Egd  02/15/2016    Eh pr repair complex scalp arms legs 1.1 to 2.5 cm  2014    Elbow fracture surgery Right 1991    Hernia surgery      Inguinal hernia repair Left 01/17/2023     Family History   Problem Relation Age of Onset    Diabetes Father     Hypertension Father     Cancer Mother         liver      reports that he has quit smoking. His smoking use included cigarettes. He has never used smokeless tobacco. He reports current alcohol use. He reports current drug use. Drug: Cannabis.    Allergies:  Allergies[1]    Medications:    Current Facility-Administered Medications:     sodium chloride 0.9 % IV bolus 2,052 mL, 30 mL/kg (Ideal), Intravenous, Continuous    baclofen (Lioresal) tab 10 mg, 10 mg, Oral, TID    phytonadione (Aqua-Mephton) 10 mg in sodium chloride 0.9% 50 mL IVPB, 10 mg, Intravenous, Daily    Review of Systems:   CONSTITUTIONAL:  negative for fevers, chills, unintentional weight loss   EYES:  negative for diplopia or change in vision   RESPIRATORY:  negative for severe shortness of breath  CARDIOVASCULAR:  negative for crushing sub-sternal chest pain  GASTROINTESTINAL:  see HPI  GENITOURINARY:  negative for dysuria or gross hematuria  INTEGUMENT/BREAST:  SKIN:  negative for jaundice or new rash   ALLERGIC/IMMUNOLOGIC:  negative for hay fever   ENDOCRINE:  negative for cold intolerance and heat  intolerance  MUSCULOSKELETAL:  negative for joint effusion/severe erythema  NEURO: negative for new loss of consciousness or dizziness   BEHAVIOR/PSYCH:  negative for psychotic behavior    Physical Exam:    Blood pressure 107/58, pulse 110, temperature 97.8 °F (36.6 °C), temperature source Oral, resp. rate 22, height 5' 8\" (1.727 m), weight 179 lb (81.2 kg), SpO2 100%. Body mass index is 27.22 kg/m².    Gen: awake, alert patient, NAD  HEENT: EOMI, the sclera appears icteric, oropharynx clear, mucus membranes appear moist  CV: RRR  Lung: no conversational dyspnea   Abdomen: soft NTND abdomen with NABS appreciated   Back: No CVA tenderness   Skin: dry, warm, + jaundice  Ext: no LE edema is evident  Neuro: Alert and interactive  Psych: calm, cooperative    Laboratory Data:  Lab Results   Component Value Date    WBC 17.5 03/04/2025    HGB 10.1 03/04/2025    HCT 34.4 03/04/2025    PLT 65.0 03/04/2025    CREATSERUM 4.22 03/04/2025    BUN 30 03/04/2025     03/04/2025    K 5.5 03/04/2025    CL 86 03/04/2025    CO2 15.0 03/04/2025    GLU 72 03/04/2025    CA 9.6 03/04/2025    ALB 4.3 03/04/2025    ALKPHO 175 03/04/2025    BILT 10.5 03/04/2025    TP 7.5 03/04/2025     03/04/2025    ALT 40 03/04/2025    INR 3.86 03/04/2025     03/04/2025       Imaging:  COMPARISON:   [facility], CT ABDOMEN + PELVIS (CONTRAST ONLY) (CPT=74177), 12/19/2022, 1:25 PM.  [facility], CTA CHEST+CTA ABDOMEN DISSECT SET (CPT=71275/18010), 8/13/2023, 10:16 PM.      INDICATIONS:   n/v, unable to stop hiccups      TECHNIQUE: CT images of the abdomen and pelvis were obtained without intravenous contrast material.  Automated exposure control for dose reduction was used. Adjustment of the mA and/or kV was done based on the patient's size. Use of iterative   reconstruction technique for dose reduction was used.  Dose information is transmitted to the ACR (American College of Radiology) NRDR (National Radiology  Data Registry) which includes the Dose Index Registry.      FINDINGS:   Small hiatal hernia with mildly dilated fluid-filled lower esophagus.  No lung base aspiration      Severely fatty liver with subtle undulating contour.  Normal gallbladder and pancreas      Stable splenomegaly 14.5-15 centimeter.Multiple small tubular structures around the lower esophagus      Normal adrenals.  Normal-size unobstructed kidneys with no calculi      Normal size aorta.  No ascites      Unobstructed bowel.  Normal appendix.  Uncomplicated diverticulosis.  Tiny fat containing umbilical hernia      Urinary bladder is completely decompressed      No aggressive bone lesion.         CONCLUSION:      Severely fatty liver with subtle undulating contour.  Stable splenomegaly.  Tubular structures around the GE junction are suggestive of lower esophageal varices.  No ascites     Assessment & Plan   Sami Grijalva Jr. is a 44 year old male w/ PMHx of longstanding alcohol use, cannabis use, GERD, who presents with generalized abdominal pain, hiccups and vomiting. GI consulted for acute alcohol hepatitis.    #acute alcohol hepatitis   #cirrhosis?- ultrasound ordered to evaluate, like ETOH use   -last drink 3 days ago, has been drinking heavily over last 10 years   -noted daily emesis and poor PO intake, denies overt bleeding such as melena, hematochezia, hematemesis and coffee ground emesis   -T bili upon arrival to ER 10.5, platelet 65, cr 4.22  -CT a/p in ER severe fatty liver, tubular structure around GE junction suggestive of esophageal varices  -last EGD 11/2023 without varices   -Maddrey score of 131.3, plan to rule out infection and likely initiate steroid therapy for acute alcohol hepatitis   -plan at this time, US with dopplers ordered, infectious work up for WBC 17, baclofen 10 mg TID for hiccups, empiric PPI, likely plan for EGD during admission     #USMAN  -nephrology following       Recommend:  -monitor for ETOH withdrawal   -stressed  importance of complete ETOH cessation   -vitamin K 10 mg x 3 days  -empiric PPI BID  -ultrasound with doppler ordered   -baclofen 10 mg TID   -infectious work up  -possible IV prednisolone for acute alc hep pending infectious work up   -nephrology consulted   -EGD during admission pending clinical course      Thank you for the opportunity to participate in the care of this patient.    Case discussed with Vandana De La Garza PA-C  [facility]  3/4/2025         [1]   Allergies  Allergen Reactions    Morphine SWELLING     SHORTNESS OF BREATH

## 2025-03-04 NOTE — PROGRESS NOTES
[facility].  Patient is appropriate for consultation with contracted substance use disorder .    [facility] discussed case with [place] , Rosa Maria , who will see for EVA tx options.

## 2025-03-04 NOTE — ED INITIAL ASSESSMENT (HPI)
Pt states that he has been having hiccups, nausea and vomiting since Friday.  Per pt he has Mild generalized abdominal pain.  Pt is A/Ox 4, breathing unlabored.  History of Esophageal reflux.  Per pt he is alcoholic , last alcohol drink was Saturday around 4 PM.

## 2025-03-04 NOTE — ED QUICK NOTES
Orders for admission, patient is aware of plan and ready to go upstairs. Any questions, please call ED RN Kimberly at extension [phone].     Patient Covid vaccination status: Fully vaccinated     COVID Test Ordered in ED: None    COVID Suspicion at Admission: N/A    Running Infusions:      Mental Status/LOC at time of transport: a/ox3    Other pertinent information:   CIWA score:   NIH score:  N/A

## 2025-03-05 ENCOUNTER — APPOINTMENT (OUTPATIENT)
Dept: ULTRASOUND IMAGING | Facility: HOSPITAL | Age: 45
End: 2025-03-05
Attending: PHYSICIAN ASSISTANT
Payer: MEDICAID

## 2025-03-05 PROBLEM — F10.931 ALCOHOL WITHDRAWAL DELIRIUM (HCC): Status: ACTIVE | Noted: 2025-03-05

## 2025-03-05 PROBLEM — F10.90 ALCOHOL USE DISORDER: Status: ACTIVE | Noted: 2025-03-05

## 2025-03-05 LAB
AFP-TM SERPL-MCNC: 2.7 NG/ML
ALBUMIN SERPL-MCNC: 2.9 G/DL (ref 3.2–4.8)
ALBUMIN/GLOB SERPL: 1.3 {RATIO} (ref 1–2)
ALP LIVER SERPL-CCNC: 115 U/L
ALT SERPL-CCNC: 46 U/L
AMMONIA PLAS-MCNC: 121 UMOL/L (ref 11–32)
ANION GAP SERPL CALC-SCNC: 11 MMOL/L (ref 0–18)
AST SERPL-CCNC: 112 U/L (ref ?–34)
BASE EXCESS BLD CALC-SCNC: -2.3 MMOL/L (ref ?–2)
BASOPHILS # BLD AUTO: 0.01 X10(3) UL (ref 0–0.2)
BASOPHILS NFR BLD AUTO: 0.1 %
BILIRUB SERPL-MCNC: 6.8 MG/DL (ref 0.3–1.2)
BUN BLD-MCNC: 36 MG/DL (ref 9–23)
BUN/CREAT SERPL: 9.7 (ref 10–20)
CALCIUM BLD-MCNC: 7.8 MG/DL (ref 8.7–10.4)
CHLORIDE SERPL-SCNC: 98 MMOL/L (ref 98–112)
CO2 SERPL-SCNC: 21 MMOL/L (ref 21–32)
CREAT BLD-MCNC: 3.7 MG/DL
DEPRECATED RDW RBC AUTO: 68.6 FL (ref 35.1–46.3)
EGFRCR SERPLBLD CKD-EPI 2021: 20 ML/MIN/1.73M2 (ref 60–?)
EOSINOPHIL # BLD AUTO: 0.02 X10(3) UL (ref 0–0.7)
EOSINOPHIL NFR BLD AUTO: 0.3 %
ERYTHROCYTE [DISTWIDTH] IN BLOOD BY AUTOMATED COUNT: 25.2 % (ref 11–15)
ETHANOL SERPL-MCNC: <3 MG/DL (ref ?–3)
GLOBULIN PLAS-MCNC: 2.3 G/DL (ref 2–3.5)
GLUCOSE BLD-MCNC: 98 MG/DL (ref 70–99)
HAV AB SER QL IA: REACTIVE
HAV IGM SER QL: NONREACTIVE
HBV CORE AB SERPL QL IA: NONREACTIVE
HBV SURFACE AB SER QL: NONREACTIVE
HBV SURFACE AB SERPL IA-ACNC: <3.1 MIU/ML
HBV SURFACE AG SERPL QL IA: NONREACTIVE
HCO3 BLDA-SCNC: 23.1 MEQ/L (ref 21–27)
HCT VFR BLD AUTO: 25.6 %
HCV AB SERPL QL IA: NONREACTIVE
HGB BLD-MCNC: 7.8 G/DL
IGA SERPL-MCNC: 327.3 MG/DL (ref 40–350)
IGM SERPL-MCNC: 109.7 MG/DL (ref 50–300)
IMM GRANULOCYTES # BLD AUTO: 0.08 X10(3) UL (ref 0–1)
IMM GRANULOCYTES NFR BLD: 1 %
IMMUNOGLOBULIN PNL SER-MCNC: 1202 MG/DL (ref 650–1600)
INR BLD: 3.75 (ref 0.8–1.2)
IRON SATN MFR SERPL: 102 %
IRON SERPL-MCNC: 376 UG/DL
LACTATE SERPL-SCNC: 1.8 MMOL/L (ref 0.5–2)
LACTATE SERPL-SCNC: 2.3 MMOL/L (ref 0.5–2)
LYMPHOCYTES # BLD AUTO: 0.93 X10(3) UL (ref 1–4)
LYMPHOCYTES NFR BLD AUTO: 11.6 %
MAGNESIUM SERPL-MCNC: 2.1 MG/DL (ref 1.6–2.6)
MCH RBC QN AUTO: 23.8 PG (ref 26–34)
MCHC RBC AUTO-ENTMCNC: 30.5 G/DL (ref 31–37)
MCV RBC AUTO: 78 FL
MODIFIED ALLEN TEST: POSITIVE
MONOCYTES # BLD AUTO: 0.54 X10(3) UL (ref 0.1–1)
MONOCYTES NFR BLD AUTO: 6.8 %
NEUTROPHILS # BLD AUTO: 6.41 X10 (3) UL (ref 1.5–7.7)
NEUTROPHILS # BLD AUTO: 6.41 X10(3) UL (ref 1.5–7.7)
NEUTROPHILS NFR BLD AUTO: 80.2 %
O2 CT BLD-SCNC: 10.9 VOL% (ref 15–23)
OSMOLALITY SERPL CALC.SUM OF ELEC: 278 MOSM/KG (ref 275–295)
OXYGEN LITERS/MINUTE: 3 L/MIN
PCO2 BLDA: 42 MM HG (ref 35–45)
PH BLDA: 7.35 [PH] (ref 7.35–7.45)
PLATELET # BLD AUTO: 37 10(3)UL (ref 150–450)
PLATELETS.RETICULATED NFR BLD AUTO: 12.9 % (ref 0–7)
PO2 BLDA: 73 MM HG (ref 80–100)
POTASSIUM SERPL-SCNC: 4.2 MMOL/L (ref 3.5–5.1)
PROT SERPL-MCNC: 5.2 G/DL (ref 5.7–8.2)
PROTHROMBIN TIME: 38.8 SECONDS (ref 11.6–14.8)
PUNCTURE CHARGE: YES
RBC # BLD AUTO: 3.28 X10(6)UL
SAO2 % BLDA: 96.3 % (ref 94–100)
SODIUM SERPL-SCNC: 130 MMOL/L (ref 136–145)
TOTAL IRON BINDING CAPACITY: 369 UG/DL (ref 250–425)
TRANSFERRIN SERPL-MCNC: 286 MG/DL (ref 215–365)
WBC # BLD AUTO: 8 X10(3) UL (ref 4–11)

## 2025-03-05 PROCEDURE — 99233 SBSQ HOSP IP/OBS HIGH 50: CPT | Performed by: INTERNAL MEDICINE

## 2025-03-05 PROCEDURE — 76705 ECHO EXAM OF ABDOMEN: CPT | Performed by: PHYSICIAN ASSISTANT

## 2025-03-05 PROCEDURE — 99232 SBSQ HOSP IP/OBS MODERATE 35: CPT | Performed by: REGISTERED NURSE

## 2025-03-05 PROCEDURE — 90792 PSYCH DIAG EVAL W/MED SRVCS: CPT | Performed by: NURSE PRACTITIONER

## 2025-03-05 RX ORDER — QUETIAPINE FUMARATE 25 MG/1
50 TABLET, FILM COATED ORAL NIGHTLY
Status: DISCONTINUED | OUTPATIENT
Start: 2025-03-05 | End: 2025-03-08

## 2025-03-05 RX ORDER — LORAZEPAM 2 MG/ML
2 INJECTION INTRAMUSCULAR EVERY 6 HOURS
Status: DISCONTINUED | OUTPATIENT
Start: 2025-03-05 | End: 2025-03-07

## 2025-03-05 RX ORDER — DEXTROSE MONOHYDRATE AND SODIUM CHLORIDE 5; .9 G/100ML; G/100ML
INJECTION, SOLUTION INTRAVENOUS CONTINUOUS
Status: DISCONTINUED | OUTPATIENT
Start: 2025-03-05 | End: 2025-03-08

## 2025-03-05 RX ORDER — LACTULOSE 10 G/15ML
20 SOLUTION ORAL 3 TIMES DAILY
Status: DISCONTINUED | OUTPATIENT
Start: 2025-03-05 | End: 2025-03-05

## 2025-03-05 RX ORDER — THIAMINE HYDROCHLORIDE 100 MG/ML
100 INJECTION, SOLUTION INTRAMUSCULAR; INTRAVENOUS DAILY
Status: DISCONTINUED | OUTPATIENT
Start: 2025-03-05 | End: 2025-03-07

## 2025-03-05 RX ORDER — HALOPERIDOL 5 MG/ML
2 INJECTION INTRAMUSCULAR EVERY 6 HOURS PRN
Status: DISCONTINUED | OUTPATIENT
Start: 2025-03-05 | End: 2025-03-08

## 2025-03-05 NOTE — CONSULTS
[facility]  part of [facility]    Report of Consultation    Sami Grijalva Jr. Patient Status:  Inpatient    10/11/1980 MRN [id number]   Location [facility]5W Attending Alex Diaz MD   Hosp Day # 1 PCP Kerri Medina MD     Date of Admission:  3/4/2025  Date of Consult:  25   Reason for Consultation:   Patient presented with alcohol withdrawal, Alex Tavarez MD requested psychiatric consult for evaluation and advice.    Consult Duration     The patient seen for initial psychiatric consult evaluation.   Record reviewed, communication with attending, communication with RN and patient seen face to face evaluation.    History of Present Illness:   Patient is a 44 year old , single, male with past medical history of GERD, alcohol use disroder, who was admitted to the hospital for USMAN (acute kidney injury): The patient has been demonstrating symptoms of withdrawal.   Patient indicated for psych consult for evaluation and advise.    Per chart review, the patient presented to the hospital with complaint of nausea and vomiting. It was reported that patient drinks alcohol daily otherwise has not had a drink in several days.     The patient received the following psychotropic medications: ativan per [facility] protocol.     Labs and imaging reviewed: mag 2.1, ,  ALT 46  BAL negative    Most recent [facility] 17  Vital signs /66     The patient is well known to the psychiatry team from multiple previous consults. The patient was last seen  for alcohol withdrawal.     The patient seen today laying in hospital bed. Patient is in restraints. He was sleeping prior to my arrival.    The patient opened his eyes to the sound of his name.  The patient otherwise is anxious, restless and confused.    He is not able to answer questions or engage in conversation. Speech is mumbled and incomprehensible.     The patient has been demonstrating severe  withdrawals with confusion, restlessness, agitation and response to internal stimuli.     Past Psychiatric/Medication History:  1. Prior diagnoses: alcohol use disorder  2. Past psychiatric inpatient: Denies, the patients partner reports he has been to rehab 3 times.   3. Past outpatient history: Denies  4. Past suicide history: The patient reports history of attempt 5 years ago. He could not elaborate  5. Medication history: Denies     Social History:   The patient lives at home with his parents and his partner of 10 years. He works at a car wash.   He drinks alcohol daily. Occasional cannabis use. No tobacco or illicit substance abuse.     Family History:  No family history reported      Medical History:   Past Medical History  Past Medical History:    Esophageal reflux    Gout    Hernia       Past Surgical History  Past Surgical History:   Procedure Laterality Date    Colonoscopy N/A 11/1/2023    Procedure: COLONOSCOPY;  Surgeon: Vandana Austin MD;  Location: [facility] ENDOSCOPY    Egd  02/15/2016    Eh pr repair complex scalp arms legs 1.1 to 2.5 cm  2014    Elbow fracture surgery Right 1991    Hernia surgery      Inguinal hernia repair Left 01/17/2023       Family History  Family History   Problem Relation Age of Onset    Diabetes Father     Hypertension Father     Cancer Mother         liver       Social History  Social History     Socioeconomic History    Marital status: Single    Number of children: 0   Occupational History    Occupation: Supervisor, car wash   Tobacco Use    Smoking status: Former     Types: Cigarettes    Smokeless tobacco: Never   Vaping Use    Vaping status: Some Days   Substance and Sexual Activity    Alcohol use: Yes     Comment: per pt, DAILY HARD LEMONADE- [employer] hard lemonade 24oz cans, 6 cans daily    Drug use: Yes     Types: Cannabis     Comment: last use, couple months ago per pt report     Social Drivers of Health     Food Insecurity: No Food Insecurity (3/4/2025)    NCSS - Food  Insecurity     Worried About Running Out of Food in the Last Year: No     Ran Out of Food in the Last Year: No   Transportation Needs: No Transportation Needs (3/4/2025)    NCSS - Transportation     Lack of Transportation: No   Housing Stability: Not At Risk (3/4/2025)    NCSS - Housing/Utilities     Has Housing: Yes     Worried About Losing Housing: No     Unable to Get Utilities: No           Current Medications:  Current Facility-Administered Medications   Medication Dose Route Frequency    acetaminophen (Tylenol Extra Strength) tab 500 mg  500 mg Oral Q4H PRN    ondansetron (Zofran) 4 MG/2ML injection 4 mg  4 mg Intravenous Q6H PRN    prochlorperazine (Compazine) 10 MG/2ML injection 5 mg  5 mg Intravenous Q8H PRN    thiamine (Vitamin B1) tab 100 mg  100 mg Oral Daily    multivitamin (Tab-A-Yosef/Beta Carotene) tab 1 tablet  1 tablet Oral Daily    folic acid (Folvite) tab 1 mg  1 mg Oral Daily    LORazepam (Ativan) tab 1 mg  1 mg Oral Q1H PRN    Or    LORazepam (Ativan) 2 mg/mL injection 1 mg  1 mg Intravenous Q1H PRN    LORazepam (Ativan) tab 2 mg  2 mg Oral Q1H PRN    Or    LORazepam (Ativan) 2 mg/mL injection 2 mg  2 mg Intravenous Q1H PRN    LORazepam (Ativan) tab 3 mg  3 mg Oral Q1H PRN    Or    LORazepam (Ativan) 2 mg/mL injection 3 mg  3 mg Intravenous Q1H PRN    LORazepam (Ativan) 2 mg/mL injection 4 mg  4 mg Intravenous Q30 Min PRN    LORazepam (Ativan) 2 mg/mL injection 5 mg  5 mg Intravenous Q15 Min PRN    LORazepam (Ativan) 2 mg/mL injection 6 mg  6 mg Intravenous Q10 Min PRN    baclofen (Lioresal) tab 10 mg  10 mg Oral TID    phytonadione (Aqua-Mephton) 10 mg in sodium chloride 0.9% 50 mL IVPB  10 mg Intravenous Daily    pantoprazole (Protonix) 40 mg in sodium chloride 0.9% PF 10 mL IV push  40 mg Intravenous Q12H    sodium chloride 0.9 % IV bolus 2,000 mL  2,000 mL Intravenous Once    chlordiazePOXIDE (Librium) cap 25 mg  25 mg Oral Q6H    Followed by    chlordiazePOXIDE (Librium) cap 25 mg  25 mg  Oral Q8H    Followed by    [START ON 3/6/2025] chlordiazePOXIDE (Librium) cap 25 mg  25 mg Oral Q12H    Followed by    [START ON 3/7/2025] chlordiazePOXIDE (Librium) cap 25 mg  25 mg Oral Q24H    sodium bicarbonate 75 mEq in sodium chloride 0.45% 1,075 mL infusion  75 mEq Intravenous Continuous     No medications prior to admission.       Allergies  Allergies[1]    Review of Systems:   As by Admitting/Attending    Results:   Laboratory Data:  Lab Results   Component Value Date    WBC 8.0 03/05/2025    HGB 7.8 (L) 03/05/2025    HCT 25.6 (L) 03/05/2025    PLT 37.0 (L) 03/05/2025    CREATSERUM 3.70 (H) 03/05/2025    BUN 36 (H) 03/05/2025     (L) 03/05/2025    K 4.2 03/05/2025    CL 98 03/05/2025    CO2 21.0 03/05/2025    GLU 98 03/05/2025    CA 7.8 (L) 03/05/2025    ALB 2.9 (L) 03/05/2025    ALKPHO 115 03/05/2025    TP 5.2 (L) 03/05/2025     (H) 03/05/2025    ALT 46 03/05/2025    INR 3.86 (H) 03/04/2025    PTP 39.7 (H) 03/04/2025     (H) 03/04/2025    DDIMER 1.13 (H) 08/25/2023    ESRML 27 (H) 10/01/2021    MG 2.1 03/05/2025    PHOS 5.9 (H) 03/04/2025    TROP 0.00 01/31/2017    ETOH 350 (H) 03/27/2024         Imaging:  US LIVER (CPT=76705)    Result Date: 3/5/2025  CONCLUSION:   Hepatic steatosis.  Gallbladder sludge and cholelithiasis without sonographic evidence of acute cholecystitis.      Dictated by (CST): Elvis Aguilar MD on 3/05/2025 at 8:16 AM     Finalized by (CST): Elvis Aguilar MD on 3/05/2025 at 8:18 AM           Vital Signs:   Blood pressure 106/66, pulse 113, temperature 98.3 °F (36.8 °C), temperature source Axillary, resp. rate 22, height 5' 8\" (1.727 m), weight 82.9 kg (182 lb 12.8 oz), SpO2 92%.    Mental Status Exam:   Appearance: Stated age male, in hospital gown, laying down in hospital bed.  Psychomotor: anxious, restless, agitated, tremulous.   Orientation: Alert and oriented to person. Patient is confused.   Gait: Not evaluated.  Attitude/Coorperation: patient is not able to  cooperate with interview.   Behavior: anxious, restless, agitated  Speech: mumbled and incomprehensible.  Mood: anxious  Affect: restricted  Thought process: confused  Thought content: no reports of suicidal or homicidal ideation.  Perceptions: Patient demonstrating response to internal stimuli  Concentration: impaired  Memory: impaired  Intellect: Average.  Judgment and Insight: Questionable.     Impression:     Alcohol withdrawal Syndrome with delirium.  Episodic mood disorder.  Alcohol Dependence, continuous.    USMAN (acute kidney injury)    Metabolic acidosis    Hyperkalemia    Leukocytosis    Thrombocytopenia    Coagulopathy (HCC)    Hyponatremia    Alcoholic (HCC)    Scleral icterus    Acute kidney failure      Patient is a 44 year old , single, male with past medical history of GERD, alcohol use disroder, who was admitted to the hospital for USMAN (acute kidney injury): The patient has been demonstrating symptoms of withdrawal.     The patient has been demonstrating severe withdrawals with confusion, restlessness, agitation and response to internal stimuli.     Discussed risk and benefit, acknowledging the current symptom and severity.  At this point, I would recommend the following approach:     Focus on safety  Focus on education and support.  Focus on insight orientation helping the patient understand diagnosis and treatment plan.  Continue ativan per ciwa protocol  Start ativan 2 mg IV q 6 hours scheduled  Utilize haldol 2 mg IV q 6 hours PRN agitation  Start Seroquel 50 mg nightly  Start IV thiamine 100 mg Daily  Processed with patient at length, the initiation of the above psychotropic medications I advised the patient of the risks, benefits, alternatives and potential side effects. The patient consents to administration of the medications and understands the right to refuse medications at any time. The patient verbalized understanding.   Coordinate plan with team    Orders This Visit:  Orders  Placed This Encounter   Procedures    CBC With Differential With Platelet    Comp Metabolic Panel (14)    Urinalysis with Culture Reflex    Lipase    RBC Morphology Scan    Prothrombin Time (PT)    Lactic Acid, Plasma    Comp Metabolic Panel (14)    CBC With Differential With Platelet    Magnesium    Sodium, Urine, Random    Creatinine, Urine, Random    Phosphorus    Basic Metabolic Panel (8)    Osmolality, Urine    Lactic Acid Reflex Post Positive    Lactic Acid, Plasma    Lactic Acid Post Positive    Lactic Acid, Plasma    Magnesium    Lactic Acid Reflex Post Positive    Lactic Acid Post Positive    Urine Culture, Routine    Blood Culture       Meds This Visit:  Requested Prescriptions      No prescriptions requested or ordered in this encounter       Kathryn Do, APRN  3/5/2025    Note to Patient: The 21st Century Cures Act makes medical notes like these available to patients in the interest of transparency. However, be advised this is a medical document. It is intended as peer to peer communication. It is written in medical language and may contain abbreviations or verbiage that are unfamiliar. It may appear blunt or direct. Medical documents are intended to carry relevant information, facts as evident, and the clinical opinion of the practitioner. This note may have been transcribed using a voice dictation system. Voice recognition errors may occur. This should not be taken to alter the content or meaning of this note.           [1]   Allergies  Allergen Reactions    Morphine SWELLING     SHORTNESS OF BREATH

## 2025-03-05 NOTE — PAYOR COMM NOTE
--------------  ADMISSION REVIEW     Payor: [facility]  Subscriber #:  [id number]  Authorization Number: [id number]    Admit date: 3/4/25  Admit time:  3:11 PM       Patient Seen in: [facility] [place] Emergency Department    History     Chief Complaint   Patient presents with    Vomiting    Hiccups    Eval-D     History is provided by patient/independent historian: patient, patient's son  44 year old male with history of GERD here with complaints of hiccups, nausea, vomiting for the past few days.  He drinks alcohol every day and has not drank since several days ago and is having insomnia and anxiety.  He does report generalized abdominal pain.  He is not vomiting any blood.  Stools are normal.  No dizziness or lightheadedness.  No cough cold symptoms or fevers.  Son denies any known history of cirrhosis    History reviewed.   Past Medical History:    Esophageal reflux    Gout    Hernia     History reviewed.   Past Surgical History:   Procedure Laterality Date    Colonoscopy N/A 11/1/2023    Procedure: COLONOSCOPY;  Surgeon: Vandana Austin MD;  Location: [facility] ENDOSCOPY    Egd  02/15/2016    Eh pr repair complex scalp arms legs 1.1 to 2.5 cm  2014    Elbow fracture surgery Right 1991    Hernia surgery      Inguinal hernia repair Left 01/17/2023     Physical Exam     ED Triage Vitals [03/04/25 0951]   BP 97/53   Pulse (!) 122   Resp 20   Temp 97.8 °F (36.6 °C)   Temp src Oral   SpO2 98 %   O2 Device None (Room air)     Physical Exam  Vitals and nursing note reviewed.   Eyes:      General: Scleral icterus present.   Cardiovascular:      Rate and Rhythm: Tachycardia present.      Pulses: Normal pulses.   Pulmonary:      Effort: No respiratory distress.   Abdominal:      General: There is no distension.      Tenderness: There is no abdominal tenderness.   Neurological:      Mental Status: He is alert.      Labs Reviewed   CBC WITH DIFFERENTIAL WITH PLATELET - Abnormal; Notable for the  following components:       Result Value    WBC 17.5 (*)     HGB 10.1 (*)     HCT 34.4 (*)     MCV 78.9 (*)     MCH 23.2 (*)     MCHC 29.4 (*)     RDW-SD 72.2 (*)     RDW 26.3 (*)     PLT 65.0 (*)     Immature Platelet Fraction 17.9 (*)     Neutrophil Absolute Prelim 15.55 (*)     Neutrophil Absolute 15.55 (*)     Lymphocyte Absolute 0.91 (*)     All other components within normal limits   COMP METABOLIC PANEL (14) - Abnormal; Notable for the following components:    Sodium 127 (*)     Potassium 5.5 (*)     Chloride 86 (*)     CO2 15.0 (*)     Anion Gap 26 (*)     BUN 30 (*)     Creatinine 4.22 (*)     BUN/CREA Ratio 7.1 (*)     Calculated Osmolality 269 (*)     eGFR-Cr 17 (*)      (*)     Alkaline Phosphatase 175 (*)     Bilirubin, Total 10.5 (*)     All other components within normal limits   URINALYSIS WITH CULTURE REFLEX - Abnormal; Notable for the following components:    Clarity Urine Turbid (*)     Glucose Urine 30 (*)     Ketones Urine 40 (*)     Blood Urine 1+ (*)     Protein Urine 30 (*)     Urobilinogen Urine 4 (*)     Leukocyte Esterase Urine 500 (*)     WBC Urine 21-50 (*)     RBC Urine 6-10 (*)     Squamous Epi. Cells Few (*)     Transitional Cells Few (*)     Hyaline Casts Present (*)     All other components within normal limits   LIPASE - Abnormal; Notable for the following components:    Lipase 287 (*)     All other components within normal limits    PT 39.7 (*)     INR 3.86 (*)     All other components within normal limits   LACTIC ACID, PLASMA - Abnormal; Notable for the following components:    Lactic Acid 8.6 (*)     All other components within normal limits   PHOSPHORUS - Abnormal; Notable for the following components:    Phosphorus 5.9 (*)     All other components within normal limits   MAGNESIUM - Normal   SODIUM, URINE, RANDOM   CREATININE, URINE, RANDOM   BASIC METABOLIC PANEL (8)   OSMOLALITY, URINE   LACTIC ACID REFLEX POST POSTIVE   LACTIC ACID, PLASMA   URINE CULTURE, ROUTINE    BLOOD CULTURE   BLOOD CULTURE        Sinus Rhythm  Reading: abnormal for rate, normal for rhythm, no acute ST elevation      ED Medications Administered:   Medications   sodium chloride 0.9 % IV bolus 2,052 mL (0 mL Intravenous Stopped 3/4/25 1458)   sodium chloride 0.9 % IV bolus 1,000 mL (1,000 mL Intravenous Handoff 3/4/25 1505)   sodium chloride 0.9 % IV bolus 2,000 mL (2,000 mL Intravenous Not Given 3/4/25 1545)   sodium chloride 0.9 % IV bolus 2,000 mL (2,000 mL Intravenous New Bag 3/4/25 1545)   famotidine (Pepcid) 20 mg/2mL injection 20 mg (20 mg Intravenous Given 3/4/25 1112)   pantoprazole (Protonix) 40 mg in sodium chloride 0.9% PF 10 mL IV push (40 mg Intravenous Given 3/4/25 1113)   LORazepam (Ativan) 2 mg/mL injection 1 mg (1 mg Intravenous Given 3/4/25 1113)   sodium chloride 0.9 % IV bolus 1,000 mL (0 mL Intravenous Stopped 3/4/25 1333)   ondansetron (Zofran) 4 MG/2ML injection 4 mg (4 mg Intravenous Given 3/4/25 1112)   sodium zirconium cyclosilicate (Lokelma) oral packet 10 g (10 g Oral Given 3/4/25 1329)         Medical Decision Making    Differential Diagnosis: After obtaining the patient's history, performing the physical exam and reviewing the diagnostics, multiple initial diagnoses were considered based on the presenting problem including alcoholic gastritis, withdrawal, PUD, viral syndrome    Complicating Factors: The patient already  has a past medical history of Esophageal reflux, Gout, and Hernia. to contribute to the complexity of this ED evaluation.    Discussed management with physician/appropriate source: Dr. Aftab Queen, Dr. Austin, Dr. Daugherty    Disposition and Plan     Clinical Impression:  1. USMAN (acute kidney injury)    2. Scleral icterus         Present on Admission  Date Reviewed: 9/6/2023            ICD-10-CM Noted POA    * (Principal) USMAN (acute kidney injury) N17.9 3/4/2025 Unknown    Acute kidney failure N17.9 3/4/2025 Unknown    Alcoholic (HCC) F10.20 3/4/2025 Unknown     Coagulopathy (HCC) D68.9 3/4/2025 Unknown    Hyperkalemia E87.5 3/4/2025 Unknown    Hyponatremia E87.1 3/4/2025 Unknown    Leukocytosis D72.829 3/4/2025 Unknown    Metabolic acidosis E87.20 3/4/2025 Unknown    Scleral icterus R17 3/4/2025 Unknown    Thrombocytopenia D69.6 3/4/2025 Unknown          History and Physical     CHIEF COMPLAINT:  Acute kidney injury, hyperkalemia, and alcoholic hepatitis.     HISTORY OF PRESENT ILLNESS:  The patient is a 44-year-old  male who came in today to the emergency room for evaluation of intractable nausea and vomiting, poor appetite for the last few days, with hiccups.  Chemistry showed potassium 5.5, sodium 127, bicarb 15, chloride 86, anion gap 26, BUN and creatinine of 30 and 4.2, GFR 17, below his baseline.  ALT and AST of 40 and 105, alkaline phosphatase 175, and total bilirubin 10.5.  CBC showed white blood cell count of 17.5 with left shift, hemoglobin 10.1, and platelet count 65.  INR elevated at 3.86.  Urinalysis turbid and dark in color, leukocyte esterase positive.  Cultures were sent.  CT scan of the abdomen and pelvis showed severe fatty liver with subtle undulating contour, stable splenomegaly, tubular structures around the gastroesophageal junction suggestive of lower esophageal varices.  No ascites was identified.  The patient was started on IV fluids, and he will be admitted to the hospital for further management.  Also was given IV Ativan for alcohol withdrawal plus Lokelma.     PAST MEDICAL HISTORY:  Per the record, the patient has history of alcoholism, anxiety, gout, gastroesophageal reflux disease.     PAST SURGICAL HISTORY:  Left inguinal hernia repair and right elbow open reduction, internal fixation.      MEDICATIONS:  Please see medication reconciliation list.      ALLERGIES:  No known drug allergies.      FAMILY HISTORY:  Mother had liver cancer.  Father had diabetes mellitus type 2 and hypertension.     SOCIAL HISTORY:  Ex-tobacco user.  No  current drug use.  Drinks alcohol heavily on a daily basis, three 24-ounce beers daily.     REVIEW OF SYSTEMS:  The patient said he drinks heavily on a daily basis.  A few days ago started having nausea, vomiting, hiccups, and inability to tolerate any oral intake.  For the last 3 days, he did not have any solid meals.  Other 12-point review of systems negative.  Also, family noted that he had yellow discoloration of his eyes.  Denies any fever or chills.  He had low urine output but no dysuria.  The patient also reported tremors and diaphoresis from alcohol withdrawal.       PHYSICAL EXAMINATION:    GENERAL:  Alert, oriented to time, place, and person, anxious, jaundiced.   VITAL SIGNS:  Temperature 97.8.  Pulse 122, sinus tachycardia.  Respiratory rate 22.  Blood pressure 90/62.  Pulse ox 100% on room air.  HEENT:  Atraumatic.  Icteric sclerae.  Pupils equal, round, reactive.  NECK:  Supple.  No lymphadenopathy.  LUNGS:  Clear to auscultation bilaterally.  Normal respiratory effort.  HEART:  Regular rate and rhythm.  S1 and S2 auscultated.  No murmur.  ABDOMEN:  Soft, nondistended.  No tenderness.  Discomfort epigastric area but no guarding or rebound tenderness.  EXTREMITIES:  No peripheral edema, clubbing, or cyanosis.  NEUROLOGIC:  Motor and sensory intact.     ASSESSMENT:    1.       Acute kidney injury.  2.       Alcoholic hepatitis with possible underlying liver cirrhosis.  3.       Thrombocytopenia.  4.       Anion gap metabolic acidosis secondary to intravascular hypovolemia.     PLAN:  The patient will be admitted to general medical floor.  Continue aggressive IV fluid management.  Detox protocol.  IV Protonix.  Gastroenterology and Nephrology consults.  Clear liquid diet.  Monitor his hemodynamic status.  Monitor hemoglobin.  He had microcytosis suggestive of possible iron-deficiency anemia, rule out gastrointestinal bleed.  Also, follow up on urine culture and monitor temperature curve.  The fact that  INR is elevated at 3.86 is suggestive of a very poor prognosis.  Further recommendations to follow.          GI:      Reason for Consultation:  Elevated LFTs, ETOH      History of Present Illness:  Sami Grijalva Jr. is a 44 year old male w/ PMHx of longstanding alcohol use, cannabis use, GERD, who presents with generalized abdominal pain, hiccups and vomiting. GI consulted for acute alcohol hepatitis.  Patient states 4 days ago he started to have nausea, vomiting and hiccups. At first was able to tolerate liquids, however as the days continued was unable to tolerate liquids and that prompted him to come to the ER. He notes his hiccups have been continuous for 3 days and all episodes of emesis are liquid filled no hematemesis or coffee ground emesis. He notes appetite has been decreased. Denies melena, hematochezia. He continues to drink heavily on a daily basis.      In ER, , ALT 40, alk phos 175, t bili 10.5.   Cr 4.22, BUN 30.      WBC 17.5, hgb 10.1. Platelets 65.   '  Pertinent Family Hx:  - No known history of esophageal, gastric or colon cancers  - No known IBD  - No known liver pathologies      Current Facility-Administered Medications:     sodium chloride 0.9 % IV bolus 2,052 mL, 30 mL/kg (Ideal), Intravenous, Continuous    baclofen (Lioresal) tab 10 mg, 10 mg, Oral, TID    phytonadione (Aqua-Mephton) 10 mg in sodium chloride 0.9% 50 mL IVPB, 10 mg, Intravenous, Daily        Physical Exam:    Blood pressure 107/58, pulse 110, temperature 97.8 °F (36.6 °C), temperature source Oral, resp. rate 22, height 5' 8\" (1.727 m), weight 179 lb (81.2 kg), SpO2 100%. Body mass index is 27.22 kg/m².     Gen: awake, alert patient, NAD  HEENT: EOMI, the sclera appears icteric, oropharynx clear, mucus membranes appear moist  CV: RRR  Lung: no conversational dyspnea   Abdomen: soft NTND abdomen with NABS appreciated   Back: No CVA tenderness   Skin: dry, warm, + jaundice  Ext: no LE edema is evident  Neuro: Alert and  interactive  Psych: calm, cooperative    CT ABDOMEN + PELVIS     Severely fatty liver with subtle undulating contour.  Stable splenomegaly.  Tubular structures around the GE junction are suggestive of lower esophageal varices.  No ascites      Assessment & Plan   Sami Grijalva Jr. is a 44 year old male w/ PMHx of longstanding alcohol use, cannabis use, GERD, who presents with generalized abdominal pain, hiccups and vomiting. GI consulted for acute alcohol hepatitis.     #acute alcohol hepatitis   #cirrhosis?- ultrasound ordered to evaluate, like ETOH use   -last drink 3 days ago, has been drinking heavily over last 10 years   -noted daily emesis and poor PO intake, denies overt bleeding such as melena, hematochezia, hematemesis and coffee ground emesis   -T bili upon arrival to ER 10.5, platelet 65, cr 4.22  -CT a/p in ER severe fatty liver, tubular structure around GE junction suggestive of esophageal varices  -last EGD 11/2023 without varices   -Maddrey score of 131.3, plan to rule out infection and likely initiate steroid therapy for acute alcohol hepatitis   -plan at this time, US with dopplers ordered, infectious work up for WBC 17, baclofen 10 mg TID for hiccups, empiric PPI, likely plan for EGD during admission      #USMAN  -nephrology following         Recommend:  -monitor for ETOH withdrawal   -stressed importance of complete ETOH cessation   -vitamin K 10 mg x 3 days  -empiric PPI BID  -ultrasound with doppler ordered   -baclofen 10 mg TID   -infectious work up  -possible IV prednisolone for acute alc hep pending infectious work up   -nephrology consulted   -EGD during admission pending clinical course      Stephania De La Garza PA-C           Attestation signed by Vandana Austin MD at 3/4/2025  3:29 PM     Attending Attestation  I have personally seen and examined the patient independently, reviewed the APPs history, exam. Notes, labs, imaging, chart reviewed.  I agree with the NELIDA's note above with the  following additions.     ETOH use disorder, ETOH liver disease with probable cirrhosis who presents with ETOH intoxication, non bloody emesis. Labs show significant hyperbilirubinemia, INR 3.86. lactate 8.6, CR 4.22.  USMAN likely pre-renal, he is getting IVF. Alcohol ketoacidosis, correct electrolytes. CIWA protocol. From liver stance, suspected acute etoh hepatitis and etoh cirrhosis overlap. Infectious work-up ongoing if negative consider steroids. ETOH cessation re-enforced. Will need EGD for variceal screening at some point.                 RENAL:          USMAN, hyperkalemia, hyponatremia     History of Present Illness:   Patient is a 44 year old male with history of alcoholism, anxiety, gout who presented to the emergency room for evaluation of intractable nausea and vomiting     Per patient continued to have vomiting since Friday-state last drink was on Saturday.  Continue to drink water and Gatorade.  Denies any abdominal pain or diarrhea.  States felt warm but no documented fever.  Denies any urinary complaint     In ED lab test showed BUNs/creatinine 30/4.2 with sodium 127 bicarb 15 with anion gap 26.  WBC 17.5.  Elevated liver function test     Impression:      Patient is a 44 year old male with history of alcoholism, anxiety, gout who presented to the emergency room for evaluation of intractable nausea and vomiting     1.USMAN:  Last creatinine 1.27 mg/dL in October 2024.  Serum creatinine 4.22 mg/dL  UA positive for hyaline cast and ketones suggestive of reduced renal perfusion  S/p IV fluid bolus with normal saline-continue normal saline at 100 mL/h  Strict I's and O's  CT abdomen and pelvis unremarkable for any obstructive uropathy  Urine culture pending  Check urine sodium and urine creatinine     2.hyperkalemia:  Secondary to USMAN and metabolic acidosis  S/p medical management in ED  Repeat later today     3.hyponatremia: Check urine sodium and urine osmolality  Likely secondary to reduced renal perfusion  in light of UA positive for ketones and hyaline cast  Recheck later today     4.metabolic acidosis:  In light of hypotension check lactic acid.  Positive anion gap likely secondary to ketoacidosis     5.elevated liver function test and hyperbilirubinemia:  Elevated INR and thrombocytopenia  CT suggestive of esophageal varices  GI input noted-vitamin K and PPI  Liver ultrasound with Doppler     6.alcoholism: Last drink on Saturday  Start alcohol withdrawal protocol     7.leukocytosis:  Lactic acid and blood culture and urine culture pending  Patient was tachycardic and hypotensive-improved BP and heart rate with fluids    NURSING:    Fluid boluses given, CIWA's in place, clear liquid diet,     3/5:    NURSING:    Aox2  Impulsive, unable to follow directions, confused and difficult to redirect.   Multiple attempts to get OOB unassisted. MD notified- see orders.    Status: Final result        Component  Ref Range & Units 03/05/25 0358   Glucose  70 - 99 mg/dL 98   Sodium  136 - 145 mmol/L 130 Low    Potassium  3.5 - 5.1 mmol/L 4.2   Chloride  98 - 112 mmol/L 98   CO2  21.0 - 32.0 mmol/L 21.0   Anion Gap  0 - 18 mmol/L 11   BUN  9 - 23 mg/dL 36 High    Creatinine  0.70 - 1.30 mg/dL 3.70 High    BUN/CREA Ratio  10.0 - 20.0 9.7 Low    Calcium, Total  8.7 - 10.4 mg/dL 7.8 Low    Calculated Osmolality  275 - 295 mOsm/kg 278   eGFR-Cr  >=60 mL/min/1.73m2 20 Low    ALT  10 - 49 U/L 46   AST  <34 U/L 112 High    Alkaline Phosphatase  45 - 117 U/L 115   Bilirubin, Total  0.3 - 1.2 mg/dL 6.8 High    Total Protein  5.7 - 8.2 g/dL 5.2 Low    Albumin  3.2 - 4.8 g/dL 2.9 Low    Globulin  2.0 - 3.5 g/dL 2.3   A/G Ratio  1.0 - 2.0 1.3              Component  Ref Range & Units 03/05/25 0358   WBC  4.0 - 11.0 x10(3) uL 8.0   RBC  4.30 - 5.70 x10(6)uL 3.28 Low    HGB  13.0 - 17.5 g/dL 7.8 Low    HCT  39.0 - 53.0 % 25.6 Low    MCV  80.0 - 100.0 fL 78.0 Low    MCH  26.0 - 34.0 pg 23.8 Low    MCHC  31.0 - 37.0 g/dL 30.5 Low    RDW-SD  35.1  - 46.3 fL 68.6 High    RDW  11.0 - 15.0 % 25.2 High    PLT  150.0 - 450.0 10(3)uL 37.0 Low    Immature Platelet Fraction  0.0 - 7.0 % 12.9 High    Neutrophil Absolute Prelim  1.50 - 7.70 x10 (3) uL 6.41   Neutrophil Absolute  1.50 - 7.70 x10(3) uL 6.41   Lymphocyte Absolute  1.00 - 4.00 x10(3) uL 0.93 Low    Monocyte Absolute  0.10 - 1.00 x10(3) uL 0.54   Eosinophil Absolute  0.00 - 0.70 x10(3) uL 0.02   Basophil Absolute  0.00 - 0.20 x10(3) uL 0.01   Immature Granulocyte Absolute  0.00 - 1.00 x10(3) uL 0.08   Neutrophil %  % 80.2   Lymphocyte %  % 11.6   Monocyte %  % 6.8   Eosinophil %  % 0.3   Basophil %  % 0.1   Immature Granulocyte %  % 1.0            MEDICATIONS ADMINISTERED IN LAST 1 DAY:  baclofen (Lioresal) tab 10 mg       Date Action Dose Route User    3/4/2025 1557 Given 10 mg Oral Maya, Guido          famotidine (Pepcid) 20 mg/2mL injection 20 mg       Date Action Dose Route User    3/4/2025 1112 Given 20 mg Intravenous Kimberly Roldan RN          folic acid (Folvite) tab 1 mg       Date Action Dose Route User    3/4/2025 1557 Given 1 mg Oral Maya, Guido          LORazepam (Ativan) tab 2 mg       Date Action Dose Route User    3/4/2025 1902 Given 2 mg Oral Maya, Guido    3/4/2025 1803 Given 2 mg Oral Maya, Guido          LORazepam (Ativan) 2 mg/mL injection 1 mg       Date Action Dose Route User    3/4/2025 1113 Given 1 mg Intravenous Kimberly Roldan RN          LORazepam (Ativan) 2 mg/mL injection 1 mg       Date Action Dose Route User    3/5/2025 0804 Given 1 mg Intravenous Maya, Guido    3/5/2025 0600 Given 1 mg Intravenous Victor Manuel Rebolledo RN    3/5/2025 0306 Given 1 mg Intravenous Victor Manuel Rebolledo RN    3/5/2025 0015 Given 1 mg Intravenous Victor Manuel Rebolledo RN    3/4/2025 2201 Given 1 mg Intravenous Victor Manuel Rebolledo RN          LORazepam (Ativan) 2 mg/mL injection 2 mg       Date Action Dose Route User    3/5/2025 1006 Given 2 mg Intravenous Guido Maya    3/5/2025 0605 Given 2 mg  Intravenous Victor Manuel Rebolledo RN    3/4/2025 2000 Given 2 mg Intravenous Victor Manuel Rebolledo RN          LORazepam (Ativan) 2 mg/mL injection 3 mg       Date Action Dose Route User    3/5/2025 0903 Given 3 mg Intravenous Maya, Guido          magnesium sulfate in sterile water for injection 2 g/50mL IVPB premix 2 g       Date Action Dose Route User    3/4/2025 2100 New Bag 2 g Intravenous Victor Manuel Rebolledo RN          ondansetron (Zofran) 4 MG/2ML injection 4 mg       Date Action Dose Route User    3/4/2025 1112 Given 4 mg Intravenous Kimberly Roldan RN          pantoprazole (Protonix) 40 mg in sodium chloride 0.9% PF 10 mL IV push       Date Action Dose Route User    3/4/2025 1113 Given 40 mg Intravenous Kimberly Roldan RN          pantoprazole (Protonix) 40 mg in sodium chloride 0.9% PF 10 mL IV push       Date Action Dose Route User    3/4/2025 2201 Given 40 mg Intravenous Victor Manuel Rebolledo RN          sodium bicarbonate 75 mEq in sodium chloride 0.45% 1,075 mL infusion       Date Action Dose Route User    3/4/2025 2317 New Bag 75 mEq Intravenous Victor Manuel Rebolledo RN          sodium chloride 0.9% infusion       Date Action Dose Route User    3/4/2025 1558 New Bag (none) Intravenous Maya, Guido          sodium chloride 0.9 % IV bolus 1,000 mL       Date Action Dose Route User    3/4/2025 1112 New Bag 1,000 mL Intravenous Kimberly Roldan RN          sodium chloride 0.9 % IV bolus 2,052 mL       Date Action Dose Route User    3/4/2025 1333 New Bag 1,052 mL Intravenous Kimberly Roldan RN          sodium chloride 0.9 % IV bolus 1,000 mL       Date Action Dose Route User    3/4/2025 1459 New Bag 1,000 mL Intravenous Kimberly Roldan RN          sodium chloride 0.9 % IV bolus 2,000 mL       Date Action Dose Route User    3/4/2025 1545 New Bag 2,000 mL Intravenous Maya, Guido          sodium chloride 0.9 % IV bolus 2,487 mL       Date Action Dose Route User    3/4/2025 1800 New Bag 2,487 mL Intravenous Maya, Guido           sodium zirconium cyclosilicate (Lokelma) oral packet 10 g       Date Action Dose Route User    3/4/2025 1329 Given 10 g Oral Kimberly Roldan RN          multivitamin (Tab-A-Yosef/Beta Carotene) tab 1 tablet       Date Action Dose Route User    3/4/2025 1557 Given 1 tablet Oral Maya, Guido          thiamine 100 mg/mL injection 100 mg       Date Action Dose Route User    3/4/2025 1557 Given 100 mg Intravenous Maya, Guido            Vitals (last day)       Date/Time Temp Pulse Resp BP SpO2 Weight O2 Device O2 Flow Rate (L/min) [facility]    03/05/25 1000 -- 108 -- 103/59 -- -- -- --     03/05/25 0900 -- 113 -- 106/66 -- -- -- --     03/05/25 0800 -- 111 -- 104/60 -- -- -- --     03/05/25 0700 -- 108 22 95/58 92 % -- Nasal cannula 5 L/min     03/05/25 0600 -- 117 -- 111/76 -- -- -- --     03/05/25 0500 98.3 °F (36.8 °C) -- 16 112/56 99 % -- None (Room air) --     03/05/25 0400 -- 114 -- 100/58 -- -- -- --     03/05/25 0300 -- 117 -- 98/76 -- -- -- --     03/05/25 0200 -- 112 -- 108/62 -- -- -- --     03/05/25 0100 -- 115 -- 100/59 -- -- -- --     03/05/25 0000 -- 118 -- 96/51 -- -- -- --     03/04/25 2300 97.8 °F (36.6 °C) 110 18 102/45 100 % -- None (Room air) --     03/04/25 2200 -- 116 -- 94/37 -- -- -- --     03/04/25 2100 98.3 °F (36.8 °C) 114 20 91/52 -- -- -- --     03/04/25 2000 -- 117 -- -- -- -- -- -- MP    03/04/25 1900 -- 110 -- 98/46 -- -- -- -- BD    03/04/25 1800 -- 114 -- 89/47 -- -- -- -- BD    03/04/25 1611 -- 111 -- -- -- -- -- -- ST    03/04/25 1600 -- 113 -- 98/55 -- 182 lb 12.8 oz (82.9 kg) -- -- BD    03/04/25 1534 98 °F (36.7 °C) 114 22 113/60 100 % -- None (Room air) -- BD    03/04/25 1500 -- 113 -- 103/49 -- -- -- -- BD    03/04/25 1457 -- 114 22 103/49 100 % -- None (Room air) -- RR    03/04/25 1315 -- 110 22 107/58 100 % -- None (Room air) -- RR    03/04/25 1230 -- 107 24 90/62 99 % -- None (Room air) -- RR    03/04/25 1215 -- 84 28 94/56 99 % -- None (Room  air) -- RR    03/04/25 0951 97.8 °F (36.6 °C) 122 20 97/53 98 % 179 lb (81.2 kg) None (Room air) -- KB          CIWA Scores (since admission)       Date/Time CIWA-Ar Total Who    03/05/25 1000 10 BD    03/05/25 0900 17 BD    03/05/25 0800 9 BD    03/05/25 0700 13 MP    03/05/25 0600 29 MP    03/05/25 0500 7 MP    03/05/25 0400 6 MP    03/05/25 0300 10 MP    03/05/25 0200 6 MP    03/05/25 0100 8 MP    03/05/25 0000 14 MP    03/04/25 2300 8 MP    03/04/25 2200 13 MP    03/04/25 2100 8 MP    03/04/25 2000 24 MP    03/04/25 1900 12 BD    03/04/25 1800 12 BD    03/04/25 1600 5 BD    03/04/25 1500 1 RR    03/04/25 1315 0 RR    03/04/25 1200 1 RR    03/04/25 0951 17 KB

## 2025-03-05 NOTE — PROGRESS NOTES
[facility]  part of [facility]    Progress Note      Subjective:     Patient was transferred from floor to PCCU for high CIWA score.  Patient currently under restrain and groggy      Review of Systems:     Unable to obtain-patient groggy.  Denies any chest pain or shortness of breath    Objective:   Temp:  [97.8 °F (36.6 °C)-98.3 °F (36.8 °C)] 98 °F (36.7 °C)  Pulse:  [101-118] 116  Resp:  [16-26] 20  BP: ()/(37-76) 114/70  SpO2:  [92 %-100 %] 95 %  SpO2: 95 %     Intake/Output Summary (Last 24 hours) at 3/5/2025 1354  Last data filed at 3/5/2025 0700  Gross per 24 hour   Intake 8602.2 ml   Output 200 ml   Net 8402.2 ml     Wt Readings from Last 3 Encounters:   03/04/25 182 lb 12.8 oz (82.9 kg)   03/27/24 170 lb (77.1 kg)   12/26/23 198 lb (89.8 kg)       General appearance: ill appearing and groggy.  Currently under restrain  Head: Normocephalic, atraumatic  Neck:  no JVD, supple, symmetrical  Abdominal: soft, non-tender; -no grimace on palpation  Extremities: extremities normal, no edema  Skin: No rashes or lesions  Neurologic: Unable to examine  Psychiatric: calm    Medications:  Current Facility-Administered Medications   Medication Dose Route Frequency    LORazepam (Ativan) 2 mg/mL injection 2 mg  2 mg Intravenous q6h    haloperidol lactate (Haldol) 5 MG/ML injection 2 mg  2 mg Intravenous Q6H PRN    lactulose (CHRONULAC) 10 GM/15ML 200 g in Sterile Water for Irrigation 1,000 mL retention enema  200 g Rectal 4 times per day    thiamine 100 mg/mL injection 100 mg  100 mg Intravenous Daily    QUEtiapine (SEROquel) tab 50 mg  50 mg Oral Nightly    acetaminophen (Tylenol Extra Strength) tab 500 mg  500 mg Oral Q4H PRN    ondansetron (Zofran) 4 MG/2ML injection 4 mg  4 mg Intravenous Q6H PRN    prochlorperazine (Compazine) 10 MG/2ML injection 5 mg  5 mg Intravenous Q8H PRN    multivitamin (Tab-A-Yosef/Beta Carotene) tab 1 tablet  1 tablet Oral Daily    folic acid (Folvite) tab 1 mg  1 mg  Oral Daily    LORazepam (Ativan) tab 1 mg  1 mg Oral Q1H PRN    Or    LORazepam (Ativan) 2 mg/mL injection 1 mg  1 mg Intravenous Q1H PRN    LORazepam (Ativan) tab 2 mg  2 mg Oral Q1H PRN    Or    LORazepam (Ativan) 2 mg/mL injection 2 mg  2 mg Intravenous Q1H PRN    LORazepam (Ativan) tab 3 mg  3 mg Oral Q1H PRN    Or    LORazepam (Ativan) 2 mg/mL injection 3 mg  3 mg Intravenous Q1H PRN    LORazepam (Ativan) 2 mg/mL injection 4 mg  4 mg Intravenous Q30 Min PRN    LORazepam (Ativan) 2 mg/mL injection 5 mg  5 mg Intravenous Q15 Min PRN    LORazepam (Ativan) 2 mg/mL injection 6 mg  6 mg Intravenous Q10 Min PRN    baclofen (Lioresal) tab 10 mg  10 mg Oral TID    phytonadione (Aqua-Mephton) 10 mg in sodium chloride 0.9% 50 mL IVPB  10 mg Intravenous Daily    pantoprazole (Protonix) 40 mg in sodium chloride 0.9% PF 10 mL IV push  40 mg Intravenous Q12H    sodium chloride 0.9 % IV bolus 2,000 mL  2,000 mL Intravenous Once    sodium bicarbonate 75 mEq in sodium chloride 0.45% 1,075 mL infusion  75 mEq Intravenous Continuous          Results:     Recent Labs   Lab 03/04/25  1111 03/05/25  0358   RBC 4.36 3.28*   HGB 10.1* 7.8*   HCT 34.4* 25.6*   MCV 78.9* 78.0*   NEPRELIM 15.55* 6.41   WBC 17.5* 8.0   PLT 65.0* 37.0*     Recent Labs   Lab 03/04/25  1112 03/04/25  1645 03/05/25  0358   GLU 72 58* 98   BUN 30* 29* 36*   CREATSERUM 4.22* 3.97* 3.70*   CA 9.6 8.2* 7.8*   ALB 4.3  --  2.9*   * 129* 130*   K 5.5* 4.9 4.2   CL 86* 94* 98   CO2 15.0* 17.0* 21.0   ALKPHO 175*  --  115   *  --  112*   ALT 40  --  46   BILT 10.5*  --  6.8*   TP 7.5  --  5.2*     INR   Date Value Ref Range Status   03/04/2025 3.86 (H) 0.80 - 1.20 Final     Comment:     Therapeutic INR range for patients on warfarin:  2.0-3.0 for most medical conditions and surgical prophylaxis   2.5-3.5 for mechanical heart valves and recurrent thromboembolism    Direct thrombin inhibitors (e.g. argatroban, bivalirudin), factor Xa inhibitors (e.g.  apixaban, rivaroxaban), and conditions such as coagulation factor deficiency, vitamin K deficiency, and liver disease will prolong the prothrombin time/INR.    Unfractionated heparin and low molecular weight heparin do not affect the prothrombin time/INR up to a concentration of 1 IU/mL and 1.5 IU/mL, respectively.         No results for input(s): \"BNP\" in the last 168 hours.  Recent Labs   Lab 03/04/25  1112 03/05/25  0358   MG 1.8 2.1   PHOS 5.9*  --         Recent Labs   Lab 03/04/25  1112 03/05/25  0358   PHOS 5.9*  --    ALB 4.3 2.9*       US LIVER (CPT=76705)    Result Date: 3/5/2025  CONCLUSION:   Hepatic steatosis.  Gallbladder sludge and cholelithiasis without sonographic evidence of acute cholecystitis.      Dictated by (CST): Elvis Aguilar MD on 3/05/2025 at 8:16 AM     Finalized by (CST): Elvis Aguilar MD on 3/05/2025 at 8:18 AM         EKG 12 Lead    Result Date: 3/4/2025  Sinus tachycardia Otherwise normal ECG When compared with ECG of 07-OCT-2024 02:11, Nonspecific T wave abnormality no longer evident in Lateral leads Confirmed by KWABENA CALLEJAS ([id number]) on 3/4/2025 12:17:47 PM     Assessment and Plan:     Patient is a 44 year old male with history of alcoholism, anxiety, gout who presented to the emergency room for evaluation of intractable nausea and vomiting     1.USMAN:  Last creatinine 1.27 mg/dL in October 2024.    Serum creatinine 4.22 mg/dL on admit and currently declining-monitor recovery.  Bladder scan at bedside today discussed with nursing  UA positive for hyaline cast and ketones suggestive of reduced renal perfusion  S/p IV fluid bolus with normal saline-currently on bicarb gtt.  Continue till this bag is completed and will transition to D5/0.9  CT abdomen and pelvis unremarkable for any obstructive uropathy  Urine culture negative so far.  FENA 0.5%      2.hyperkalemia:  Secondary to USMAN and metabolic acidosis  S/p medical management in ED  Repeat wnl     3.hyponatremia:   Urine Na low -  urine osm appropriate. FENA <1%   secondary to reduced renal perfusion in light of UA positive for ketones and hyaline cast  Recheck 127 ->130. Cont.to monitor      4.metabolic acidosis:  In light of hypotension check lactic acid.  Positive anion gap likely secondary to ketoacidosis  Improve acidosis-currently on bicarb gtt.  Will continue for current drip     5.alcoholic hepatitis: Elevated liver function test and hyperbilirubinemia:  Elevated INR and thrombocytopenia  CT suggestive of esophageal varices  GI input noted-vitamin K and PPI  Liver ultrasound with Doppler showed hepatic steatosis     6.alcoholism: Last drink on Saturday   alcohol withdrawal with high CIWA score requiring transfer to PCCU      7.leukocytosis: resolved  Urine CS NGT. Blood CS pending    Discussed with nursing.  Will discuss with Dr.Schainker Abundio Queen MD

## 2025-03-05 NOTE — PAYOR COMM NOTE
--------------  3/5 CONTINUED STAY REVIEW    Payor: [facility]  Subscriber #:  [id number]  Authorization Number: [id number]    TRANSFERRED TO ICU    GI:    Pt sedated and restrained with high CIWAs requiring ativan per protocol  Pt's partner at bedside  Objective:   Blood pressure 106/66, pulse 113, temperature 98.3 °F (36.8 °C), temperature source Axillary, resp. rate 22, height 5' 8\" (1.727 m), weight 182 lb 12.8 oz (82.9 kg), SpO2 92%. Body mass index is 27.79 kg/m².     Gen: Sleeping, lethargic patient, NAD  HEENT: EOMI, the sclera appears icteric, oropharynx clear, mucus membranes appear moist  CV: RRR  Lung: no conversational dyspnea   Abdomen: soft NTND abdomen with NABS appreciated   Skin: dry, warm, + jaundice  Ext: no LE edema is evident  Neuro: Drowsy, unable to assess orientation and not interactive  Psych: calm, cooperative     Assessment and Plan:   Sami Grijalva Jr. is a 44 year old male w/ PMHx of longstanding alcohol use, cannabis use, GERD, who presents with generalized abdominal pain, hiccups and vomiting. GI consulted for acute alcohol hepatitis.     #Acute alcohol hepatitis   -last drink 4 days ago, has been drinking heavily over last 10 years   -noted daily emesis and poor PO intake, denies overt bleeding   -T bili upon arrival to ER 10.5, platelet 65, cr 4.22, both improved today  -CT a/p in ER severe fatty liver with subtle undulating contour  -US liver with hepatic steatosis, GB sludge and cholelithiasis without cholecystitis or biliary ductal dilation, hepatic and portal vein patent  -last EGD 11/2023 without varices   -Maddrey score of 131, Bcx in process, urine with leukocytes+, no plans to start steroids  -CIWA high with active withdrawal and AMS, increase acuity of care with transfer to ICU.     #Likely early cirrhosis, ETOH abuse, chronic w/u in process  MELD 3.0 on admission: 44  -PSE: Lethargic/drowsy in setting of ETOH withdrawal treated with CIWA  protocol, Ammonia 121, pt unable to tolerate PO given AMS, initiate lactulose enemas  -Ascites: none on exam or US  -EV: None on last EGD 11/2023, tubular structure around GE junction suggestive of EV.  Will need repeat EGD  -HCC: No lesions on US or CT, AFP in process     #USMAN  -nephrology following      Recommend:  -NPO for AMS  -CIWA per protocol given ETOH withdrawal  -Lactulose enemas until able to take PO  -Vitamin K 10mg x3days  -Daily CMP, CBC, INR  -Chronic liver w/u in process  -Empiric PPI BID  -Bcx in process  -Nephrology following, appreciate recs  -EGD during admission pending clinical course  -ETOH use cessation, consider ETOH abuse counselor once awake and oriented              Lab Results   Component Value Date     WBC 8.0 03/05/2025     HGB 7.8 (L) 03/05/2025     HCT 25.6 (L) 03/05/2025     PLT 37.0 (L) 03/05/2025     CREATSERUM 3.70 (H) 03/05/2025     BUN 36 (H) 03/05/2025      (L) 03/05/2025     K 4.2 03/05/2025     CL 98 03/05/2025     CO2 21.0 03/05/2025     GLU 98 03/05/2025     CA 7.8 (L) 03/05/2025     ALB 2.9 (L) 03/05/2025     ALKPHO 115 03/05/2025     BILT 6.8 (H) 03/05/2025     TP 5.2 (L) 03/05/2025      (H) 03/05/2025     ALT 46 03/05/2025       WBC 8.0 03/05/2025     HGB 7.8 (L) 03/05/2025     HCT 25.6 (L) 03/05/2025     PLT 37.0 (L) 03/05/2025     CREATSERUM 3.70 (H) 03/05/2025     BUN 36 (H) 03/05/2025      (L) 03/05/2025     K 4.2 03/05/2025     CL 98 03/05/2025     CO2 21.0 03/05/2025     GLU 98 03/05/2025     CA 7.8 (L) 03/05/2025     ALB 2.9 (L) 03/05/2025     ALKPHO 115 03/05/2025     BILT 6.8 (H) 03/05/2025     TP 5.2 (L) 03/05/2025      (H) 03/05/2025     ALT 46 03/05/2025       Scheduled Medications    thiamine  100 mg Oral Daily    multivitamin  1 tablet Oral Daily    folic acid  1 mg Oral Daily    baclofen  10 mg Oral TID    phytonadione (Aqua-Mephton) 10 mg in sodium chloride 0.9% 50 mL IVPB  10 mg Intravenous Daily    pantoprazole  40 mg  Intravenous Q12H    sodium chloride  2,000 mL Intravenous Once    chlordiazePOXIDE  25 mg Oral Q6H     Followed by    chlordiazePOXIDE  25 mg Oral Q8H     Followed by    [START ON 3/6/2025] chlordiazePOXIDE  25 mg Oral Q12H     Followed by    [START ON 3/7/2025] chlordiazePOXIDE  25 mg Oral Q24H          dextrose 5%-sodium chloride 0.9% infusion  Intravenous,   50 mL/hr,   Continuous               HOSPITALIST:    Assessment & Plan:  Acute alcohol hepatitis   -last drink reportedly 4 days ago  -Reported daily emesis and poor PO intake, without overt bleeding   -T bili upon arrival to ER 10.5, platelet 65, cr 4.22, both improved today  -CT a/p reviewed, noted severe fatty liver with subtle undulating contour  -US liver reviewed, noted hepatic steatosis, GB sludge and cholelithiasis without cholecystitis or biliary ductal dilation, hepatic and portal vein patent  -Maddrey score of 131, Bcx in process, urine with leukocytes+, no plans to start steroids  -GI consulted, recommend vitamin K, empiric PPI and plan for EGD during admission pending clinical progress.  -Appreciate further recommendations     Alcohol Abuse with withdrawal   -CIWA monitoring with alcohol withdrawal protocol  -thiamine, folic acid, multivitamin  -Psychiatry consulted, appreciate recommendations     Altered mental status  -Likely multifactorial contributing to metabolic encephalopathy  -Elevated ammonia levels noted consistent with hepatic encephalopathy.  -Starting lactulose enemas as patient remains n.p.o.  -Patient also with USMAN and electrolyte abnormalities.  -Continue treating underlying conditions  -Continue to monitor     Acute Kidney Injury   -Continue on bicarb drip  -Plan to transition to D5/0.9  - Avoiding Nephrotoxic agents  - Cont to monitor  -Nephrology on consult, appreciate further recommendations      Discussed management/test result(s) with Rn and GI consultant     Addendum: Patient remains with waxing and waning mental status.   Transferring to higher level of care.       MEDICATIONS ADMINISTERED IN LAST 1 DAY:  dextrose 5%-sodium chloride 0.9% infusion       Date Action Dose Route User    3/5/2025 1548 New Bag (none) Intravenous Leidy Marquez RN          haloperidol lactate (Haldol) 5 MG/ML injection 2 mg       Date Action Dose Route User    3/5/2025 1328 Given 2 mg Intravenous Leidy Marquez RN          lactulose (CHRONULAC) 10 GM/15ML 200 g in Sterile Water for Irrigation 1,000 mL retention enema       Date Action Dose Route User    3/5/2025 1400 Given 200 g Rectal Leidy Marquez RN          LORazepam (Ativan) tab 2 mg       Date Action Dose Route User    3/4/2025 1902 Given 2 mg Oral Maya, Guido    3/4/2025 1803 Given 2 mg Oral Maya, Guido          LORazepam (Ativan) 2 mg/mL injection 1 mg       Date Action Dose Route User    3/5/2025 1423 Given 1 mg Intravenous Leidy Marquez RN    3/5/2025 0804 Given 1 mg Intravenous Maya, Guido    3/5/2025 0600 Given 1 mg Intravenous RebolledoVictor Manuel lopez RN    3/5/2025 0306 Given 1 mg Intravenous Victor Manuel Rebolledo RN    3/5/2025 0015 Given 1 mg Intravenous RebolledoVictor Manuel RN    3/4/2025 2201 Given 1 mg Intravenous RebolledoVictor Manuel RN          LORazepam (Ativan) 2 mg/mL injection 2 mg       Date Action Dose Route User    3/5/2025 1213 Given 2 mg Intravenous Maya, Guido    3/5/2025 1006 Given 2 mg Intravenous Maya, Guido    3/5/2025 0605 Given 2 mg Intravenous RebolledoVictor Manuel RN    3/4/2025 2000 Given 2 mg Intravenous Victor Manuel Rebolledo RN          LORazepam (Ativan) 2 mg/mL injection 3 mg       Date Action Dose Route User    3/5/2025 1545 Given 3 mg Intravenous Leidy Marquez RN    3/5/2025 1316 Given 3 mg Intravenous Leidy Marquez RN    3/5/2025 0903 Given 3 mg Intravenous Maya, Guido          LORazepam (Ativan) 2 mg/mL injection 2 mg       Date Action Dose Route User    3/5/2025 1100 Given 2 mg Intravenous Guido Maya          magnesium sulfate in sterile water for injection 2  g/50mL IVPB premix 2 g       Date Action Dose Route User    3/4/2025 2100 New Bag 2 g Intravenous Victor Manuel Rebolledo RN          pantoprazole (Protonix) 40 mg in sodium chloride 0.9% PF 10 mL IV push       Date Action Dose Route User    3/5/2025 1123 Given 40 mg Intravenous Maya, Guido    3/4/2025 2201 Given 40 mg Intravenous Victor Manuel Rebolledo RN          phytonadione (Aqua-Mephton) 10 mg in sodium chloride 0.9% 50 mL IVPB       Date Action Dose Route User    3/5/2025 1123 New Bag 10 mg Intravenous Maya, Guido          sodium bicarbonate 75 mEq in sodium chloride 0.45% 1,075 mL infusion       Date Action Dose Route User    3/4/2025 2317 New Bag 75 mEq Intravenous Victor Manuel Rebolledo RN          sodium chloride 0.9 % IV bolus 2,487 mL       Date Action Dose Route User    3/4/2025 1800 New Bag 2,487 mL Intravenous Maya, Guido          thiamine 100 mg/mL injection 100 mg       Date Action Dose Route User    3/5/2025 1254 Given 100 mg Intravenous Leidy Marquez, NICOLASA            Vitals (last day)       Date/Time Temp Pulse Resp BP SpO2 Weight O2 Device O2 Flow Rate (L/min) [facility]    03/05/25 1516 -- 86 -- 132/66 -- -- -- --     03/05/25 1416 -- 80 -- 106/67 -- -- -- --     03/05/25 1257 -- 116 -- 114/70 -- -- -- --     03/05/25 1244 -- 101 20 115/68 95 % -- Nasal cannula 3 L/min     03/05/25 1200 -- 102 -- 116/62 97 % -- Nasal cannula 3 L/min     03/05/25 1100 -- 117 26 113/64 96 % -- Nasal cannula 3 L/min     03/05/25 1000 -- 108 -- 103/59 -- -- -- --     03/05/25 0900 98 °F (36.7 °C) 113 24 106/66 95 % -- Nasal cannula 5 L/min     03/05/25 0800 -- 111 -- 104/60 -- -- -- --     03/05/25 0700 -- 108 22 95/58 92 % -- Nasal cannula 5 L/min     03/05/25 0600 -- 117 -- 111/76 -- -- -- --     03/05/25 0500 98.3 °F (36.8 °C) -- 16 112/56 99 % -- None (Room air) --     03/05/25 0400 -- 114 -- 100/58 -- -- -- --     03/05/25 0300 -- 117 -- 98/76 -- -- -- --     03/05/25 0200 -- 112 -- 108/62 -- -- -- --  MP    03/05/25 0100 -- 115 -- 100/59 -- -- -- -- MP    03/05/25 0000 -- 118 -- 96/51 -- -- -- -- MP    03/04/25 2300 97.8 °F (36.6 °C) 110 18 102/45 100 % -- None (Room air) -- MP    03/04/25 2200 -- 116 -- 94/37 -- -- -- -- MP    03/04/25 2100 98.3 °F (36.8 °C) 114 20 91/52 -- -- -- -- MP    03/04/25 2000 -- 117 -- -- -- -- -- -- MP    03/04/25 1900 -- 110 -- 98/46 -- -- -- -- BD    03/04/25 1800 -- 114 -- 89/47 -- -- -- -- BD    03/04/25 1611 -- 111 -- -- -- -- -- -- ST    03/04/25 1600 -- 113 -- 98/55 -- 182 lb 12.8 oz (82.9 kg) -- -- BD    03/04/25 1534 98 °F (36.7 °C) 114 22 113/60 100 % -- None (Room air) -- BD    03/04/25 1500 -- 113 -- 103/49 -- -- -- -- BD    03/04/25 1457 -- 114 22 103/49 100 % -- None (Room air) -- RR    03/04/25 1315 -- 110 22 107/58 100 % -- None (Room air) -- RR    03/04/25 1230 -- 107 24 90/62 99 % -- None (Room air) -- RR    03/04/25 1215 -- 84 28 94/56 99 % -- None (Room air) -- RR    03/04/25 0951 97.8 °F (36.6 °C) 122 20 97/53 98 % 179 lb (81.2 kg) None (Room air) -- KB          CIWA Scores (since admission)       Date/Time CIWA-Ar Total Who    03/05/25 1541 19 LN    03/05/25 1516 6 LN    03/05/25 1416 9 LN    03/05/25 1257 18 LN    03/05/25 1200 14 BD    03/05/25 1100 13 BD    03/05/25 1000 10 BD    03/05/25 0900 17 BD    03/05/25 0800 9 BD    03/05/25 0700 13 MP    03/05/25 0600 29 MP    03/05/25 0500 7 MP    03/05/25 0400 6 MP    03/05/25 0300 10 MP    03/05/25 0200 6 MP    03/05/25 0100 8 MP    03/05/25 0000 14 MP    03/04/25 2300 8 MP    03/04/25 2200 13 MP    03/04/25 2100 8 MP    03/04/25 2000 24 MP    03/04/25 1900 12 BD    03/04/25 1800 12 BD    03/04/25 1600 5 BD    03/04/25 1500 1 RR    03/04/25 1315 0 RR    03/04/25 1200 1 RR    03/04/25 0951 17 KB

## 2025-03-05 NOTE — PLAN OF CARE
Problem: Safety Risk - Non-Violent Restraints  Goal: Patient will remain free from self-harm  Description: INTERVENTIONS:  - Apply the least restrictive restraint to prevent harm  - Notify patient and family of reasons restraints applied  - Assess for any contributing factors to confusion (electrolyte disturbances, delirium, medications)  - Discontinue any unnecessary medical devices as soon as possible  - Assess the patient's physical comfort, circulation, skin condition, hydration, nutrition and elimination needs   - Reorient and redirection as needed  - Assess for the need to continue restraints  Outcome: Not Progressing     Patient continues to require restraints for safety

## 2025-03-05 NOTE — PLAN OF CARE
Problem: Safety Risk - Non-Violent Restraints  Goal: Patient will remain free from self-harm  Description: INTERVENTIONS:  - Apply the least restrictive restraint to prevent harm  - Notify patient and family of reasons restraints applied  - Assess for any contributing factors to confusion (electrolyte disturbances, delirium, medications)  - Discontinue any unnecessary medical devices as soon as possible  - Assess the patient's physical comfort, circulation, skin condition, hydration, nutrition and elimination needs   - Reorient and redirection as needed  - Assess for the need to continue restraints  Outcome: Progressing     Problem: DISCHARGE PLANNING  Goal: Discharge to home or other facility with appropriate resources  Description: INTERVENTIONS:  - Identify barriers to discharge w/pt and caregiver  - Include patient/family/discharge partner in discharge planning  - Arrange for needed discharge resources and transportation as appropriate  - Identify discharge learning needs (meds, wound care, etc)  - Arrange for interpreters to assist at discharge as needed  - Consider post-discharge preferences of patient/family/discharge partner  - Complete POLST form as appropriate  - Assess patient's ability to be responsible for managing their own health  - Refer to Case Management Department for coordinating discharge planning if the patient needs post-hospital services based on physician/LIP order or complex needs related to functional status, cognitive ability or social support system  Outcome: Progressing     Problem: SAFETY ADULT - FALL  Goal: Free from fall injury  Description: INTERVENTIONS:  - Assess pt frequently for physical needs  - Identify cognitive and physical deficits and behaviors that affect risk of falls.  - [place] fall precautions as indicated by assessment.  - Educate pt/family on patient safety including physical limitations  - Instruct pt to call for assistance with activity based on assessment  -  Modify environment to reduce risk of injury  - Provide assistive devices as appropriate  - Consider OT/PT consult to assist with strengthening/mobility  - Encourage toileting schedule  Outcome: Progressing    Aox2  Impulsive, unable to follow directions, confused and difficult to redirect.   Multiple attempts to get OOB unassisted. MD notified- see orders.

## 2025-03-05 NOTE — PROGRESS NOTES
[facility]     Gastroenterology Progress Note    Sami Grijalva Jr. Patient Status:  Inpatient    10/11/1980 MRN [id number]   Location [facility]5W Attending Alex Diaz MD   Hosp Day # 1 PCP Kerri Medina MD       Subjective:   Pt sedated and restrained with high CIWAs requiring ativan per protocol  Pt's partner at bedside  Objective:   Blood pressure 106/66, pulse 113, temperature 98.3 °F (36.8 °C), temperature source Axillary, resp. rate 22, height 5' 8\" (1.727 m), weight 182 lb 12.8 oz (82.9 kg), SpO2 92%. Body mass index is 27.79 kg/m².    Gen: Sleeping, lethargic patient, NAD  HEENT: EOMI, the sclera appears icteric, oropharynx clear, mucus membranes appear moist  CV: RRR  Lung: no conversational dyspnea   Abdomen: soft NTND abdomen with NABS appreciated   Skin: dry, warm, + jaundice  Ext: no LE edema is evident  Neuro: Drowsy, unable to assess orientation and not interactive  Psych: calm, cooperative    Assessment and Plan:   Sami Grijalva Jr. is a 44 year old male w/ PMHx of longstanding alcohol use, cannabis use, GERD, who presents with generalized abdominal pain, hiccups and vomiting. GI consulted for acute alcohol hepatitis.     #Acute alcohol hepatitis   -last drink 4 days ago, has been drinking heavily over last 10 years   -noted daily emesis and poor PO intake, denies overt bleeding   -T bili upon arrival to ER 10.5, platelet 65, cr 4.22, both improved today  -CT a/p in ER severe fatty liver with subtle undulating contour  -US liver with hepatic steatosis, GB sludge and cholelithiasis without cholecystitis or biliary ductal dilation, hepatic and portal vein patent  -last EGD 2023 without varices   -Maddrey score of 131, Bcx in process, urine with leukocytes+, no plans to start steroids  -CIWA high with active withdrawal and AMS, increase acuity of care with transfer to ICU.    #Likely early cirrhosis, ETOH abuse, chronic w/u in process  MELD 3.0 on  admission: 44  -PSE: Lethargic/drowsy in setting of ETOH withdrawal treated with CIWA protocol, Ammonia 121, pt unable to tolerate PO given AMS, initiate lactulose enemas  -Ascites: none on exam or US  -EV: None on last EGD 11/2023, tubular structure around GE junction suggestive of EV.  Will need repeat EGD  -HCC: No lesions on US or CT, AFP in process     #USMAN  -nephrology following      Recommend:  -NPO for AMS  -CIWA per protocol given ETOH withdrawal  -Lactulose enemas until able to take PO  -Vitamin K 10mg x3days  -Daily CMP, CBC, INR  -Chronic liver w/u in process  -Empiric PPI BID  -Bcx in process  -Nephrology following, appreciate recs  -EGD during admission pending clinical course  -ETOH use cessation, consider ETOH abuse counselor once awake and oriented     Case discussed with Vandana Austin MD and Guido BALDWIN.    Annie Morrison DNP, FNP-BC  [facility]enter[facility]  3/5/2025      Results:     Lab Results   Component Value Date    WBC 8.0 03/05/2025    HGB 7.8 (L) 03/05/2025    HCT 25.6 (L) 03/05/2025    PLT 37.0 (L) 03/05/2025    CREATSERUM 3.70 (H) 03/05/2025    BUN 36 (H) 03/05/2025     (L) 03/05/2025    K 4.2 03/05/2025    CL 98 03/05/2025    CO2 21.0 03/05/2025    GLU 98 03/05/2025    CA 7.8 (L) 03/05/2025    ALB 2.9 (L) 03/05/2025    ALKPHO 115 03/05/2025    BILT 6.8 (H) 03/05/2025    TP 5.2 (L) 03/05/2025     (H) 03/05/2025    ALT 46 03/05/2025    INR 3.86 (H) 03/04/2025     (H) 03/04/2025    DDIMER 1.13 (H) 08/25/2023    ESRML 27 (H) 10/01/2021    MG 2.1 03/05/2025    PHOS 5.9 (H) 03/04/2025    TROP 0.00 01/31/2017    ETOH 350 (H) 03/27/2024       US LIVER (CPT=76705)    Result Date: 3/5/2025  CONCLUSION:   Hepatic steatosis.  Gallbladder sludge and cholelithiasis without sonographic evidence of acute cholecystitis.      Dictated by (CST): Elvis Aguilar MD on 3/05/2025 at 8:16 AM     Finalized by (CST): Elvis Aguilar MD on 3/05/2025 at 8:18 AM         EKG 12 Lead    Result Date:  3/4/2025  Sinus tachycardia Otherwise normal ECG When compared with ECG of 07-OCT-2024 02:11, Nonspecific T wave abnormality no longer evident in Lateral leads Confirmed by KWABENA CALLEJAS ([id number]) on 3/4/2025 12:17:47 PM

## 2025-03-05 NOTE — PROGRESS NOTES
Transferred to CCU due to CIWA scores, Report provided to NICOLASA Forbes in CCU, nurse escort provided for transfer, restraints in place

## 2025-03-05 NOTE — PROGRESS NOTES
[facility]  part of [facility] Hospitalist Progress Note     Sami Grijalva Jr. Patient Status:  Inpatient    10/11/1980 MRN [id number]   Location [facility]5W Attending Alex Diaz MD   Hosp Day # 1 PCP Kerri Medina MD     Chief Complaint:   Chief Complaint   Patient presents with    Vomiting    Hiccups    Eval-D        Subjective:     Patient seen lying in bed.  Remains very lethargic.  Minimally responsive.  Attempts to awaken to voice.  Quickly falls back asleep.    Objective:      Vital signs:  Vitals:    25 0700 25 0800 25 0900 25 1000   BP: 95/58 104/60 106/66 103/59   BP Location:       Pulse: 108 111 113 108   Resp: 22      Temp:       TempSrc:       SpO2: 92%      Weight:       Height:           Intake/Output Summary (Last 24 hours) at 3/5/2025 1007  Last data filed at 3/5/2025 0700  Gross per 24 hour   Intake 9602.2 ml   Output 200 ml   Net 9402.2 ml           Physical Exam:    GENERAL: Lethargic, briefly awakens to voice.  In no acute distress.  HEART:  Regular rhythm, regular rate  LUNGS:  Air entry was go minimally decreased od.  No increased work of breathing or wheezes   ABDOMEN: Soft and non-tender.    NEUROLOGICAL: Lethargic, briefly awakens to voice.  Able to state his name, but unable to provide further information.    PSYCHIATRIC: Intermittently agitated.    Diagnostic Data:    Labs:    Recent Labs   Lab 25  1111 25  0358   WBC 17.5* 8.0   HGB 10.1* 7.8*   MCV 78.9* 78.0*   PLT 65.0* 37.0*   INR 3.86*  --        Recent Labs   Lab 25  1112 25  1645 25  0358   GLU 72 58* 98   BUN 30* 29* 36*   CREATSERUM 4.22* 3.97* 3.70*   CA 9.6 8.2* 7.8*   ALB 4.3  --  2.9*   * 129* 130*   K 5.5* 4.9 4.2   CL 86* 94* 98   CO2 15.0* 17.0* 21.0   ALKPHO 175*  --  115   *  --  112*   ALT 40  --  46   BILT 10.5*  --  6.8*   TP 7.5  --  5.2*           Estimated Creatinine Clearance: 24.1 mL/min (A)  (based on SCr of 3.7 mg/dL (H)).    Recent Labs   Lab 03/04/25  1111   PTP 39.7*   INR 3.86*            COVID-19  Lab Results   Component Value Date    COVID19 Not Detected 12/26/2023    COVID19 Not Detected 01/15/2023       Pro-Calcitonin  No results for input(s): \"PCT\" in the last 168 hours.    Cardiac  No results for input(s): \"TROP\", \"PBNP\" in the last 168 hours.    Inflammatory Markers  No results for input(s): \"CRP\", \"CANDICE\", \"LDH\", \"DDIMER\" in the last 168 hours.    Culture:  Hospital Encounter on 03/04/25   1. Urine Culture, Routine     Status: None    Collection Time: 03/04/25 11:11 AM    Specimen: Urine, clean catch   Result Value Ref Range    Urine Culture No Growth at 18-24 hrs. N/A       US LIVER (CPT=76705)    Result Date: 3/5/2025  CONCLUSION:   Hepatic steatosis.  Gallbladder sludge and cholelithiasis without sonographic evidence of acute cholecystitis.      Dictated by (CST): Elvis Aguilar MD on 3/05/2025 at 8:16 AM     Finalized by (CST): Elivs Aguilar MD on 3/05/2025 at 8:18 AM           EKG 12 Lead    Result Date: 3/4/2025  Sinus tachycardia Otherwise normal ECG When compared with ECG of 07-OCT-2024 02:11, Nonspecific T wave abnormality no longer evident in Lateral leads Confirmed by KWABENA CALLEJAS ([id number]) on 3/4/2025 12:17:47 PM     Medications:    thiamine  100 mg Oral Daily    multivitamin  1 tablet Oral Daily    folic acid  1 mg Oral Daily    baclofen  10 mg Oral TID    phytonadione (Aqua-Mephton) 10 mg in sodium chloride 0.9% 50 mL IVPB  10 mg Intravenous Daily    pantoprazole  40 mg Intravenous Q12H    sodium chloride  2,000 mL Intravenous Once    chlordiazePOXIDE  25 mg Oral Q6H    Followed by    chlordiazePOXIDE  25 mg Oral Q8H    Followed by    [START ON 3/6/2025] chlordiazePOXIDE  25 mg Oral Q12H    Followed by    [START ON 3/7/2025] chlordiazePOXIDE  25 mg Oral Q24H       Assessment & Plan:      Acute alcohol hepatitis   -last drink reportedly 4 days ago  -Reported daily emesis and poor  PO intake, without overt bleeding   -T bili upon arrival to ER 10.5, platelet 65, cr 4.22, both improved today  -CT a/p reviewed, noted severe fatty liver with subtle undulating contour  -US liver reviewed, noted hepatic steatosis, GB sludge and cholelithiasis without cholecystitis or biliary ductal dilation, hepatic and portal vein patent  -Maddrey score of 131, Bcx in process, urine with leukocytes+, no plans to start steroids  -GI consulted, recommend vitamin K, empiric PPI and plan for EGD during admission pending clinical progress.  -Appreciate further recommendations    Alcohol Abuse with withdrawal   -CIWA monitoring with alcohol withdrawal protocol  -thiamine, folic acid, multivitamin  -Psychiatry consulted, appreciate recommendations    Altered mental status  -Likely multifactorial contributing to metabolic encephalopathy  -Elevated ammonia levels noted consistent with hepatic encephalopathy.  -Starting lactulose enemas as patient remains n.p.o.  -Patient also with USMAN and electrolyte abnormalities.  -Continue treating underlying conditions  -Continue to monitor    Acute Kidney Injury   -Continue on bicarb drip  -Plan to transition to D5/0.9  - Avoiding Nephrotoxic agents  - Cont to monitor  -Nephrology on consult, appreciate further recommendations     Discussed management/test result(s) with Rn and GI consultant    Addendum: Patient remains with waxing and waning mental status.  Transferring to higher level of care.    Quality:  DVT Prophylaxis: SCDs  CODE status: Full  Estimated date of discharge: TBD  Discharge is dependent on: clinical stability    Alex Diaz MD          This note was prepared using Dragon Medical voice recognition dictation software. As a result errors may occur. When identified these errors have been corrected. While every attempt is made to correct errors during dictation discrepancies may still exist

## 2025-03-06 LAB
A-1-ANTITRYPSIN: 115 MG/DL
ACTIN SMOOTH MUSCLE AB: 3 UNITS
ALBUMIN SERPL-MCNC: 3 G/DL (ref 3.2–4.8)
ALP LIVER SERPL-CCNC: 120 U/L
ALT SERPL-CCNC: 82 U/L
ANION GAP SERPL CALC-SCNC: 9 MMOL/L (ref 0–18)
AST SERPL-CCNC: 139 U/L (ref ?–34)
BASOPHILS # BLD AUTO: 0.01 X10(3) UL (ref 0–0.2)
BASOPHILS NFR BLD AUTO: 0.2 %
BILIRUB DIRECT SERPL-MCNC: 5.4 MG/DL (ref ?–0.3)
BILIRUB SERPL-MCNC: 6.7 MG/DL (ref 0.3–1.2)
BUN BLD-MCNC: 51 MG/DL (ref 9–23)
BUN/CREAT SERPL: 23.7 (ref 10–20)
CALCIUM BLD-MCNC: 7.9 MG/DL (ref 8.7–10.4)
CERULOPLASMIN SERPL-MCNC: 14 MG/DL (ref 20–60)
CHLORIDE SERPL-SCNC: 102 MMOL/L (ref 98–112)
CK SERPL-CCNC: 249 U/L
CO2 SERPL-SCNC: 25 MMOL/L (ref 21–32)
CREAT BLD-MCNC: 2.15 MG/DL
DEPRECATED RDW RBC AUTO: 67.4 FL (ref 35.1–46.3)
EGFRCR SERPLBLD CKD-EPI 2021: 38 ML/MIN/1.73M2 (ref 60–?)
EOSINOPHIL # BLD AUTO: 0.04 X10(3) UL (ref 0–0.7)
EOSINOPHIL NFR BLD AUTO: 0.9 %
ERYTHROCYTE [DISTWIDTH] IN BLOOD BY AUTOMATED COUNT: 25.4 % (ref 11–15)
GLUCOSE BLD-MCNC: 109 MG/DL (ref 70–99)
HCT VFR BLD AUTO: 26.2 %
HGB BLD-MCNC: 8 G/DL
IMM GRANULOCYTES # BLD AUTO: 0.04 X10(3) UL (ref 0–1)
IMM GRANULOCYTES NFR BLD: 0.9 %
LIVER-KIDNEY MICRO AB: <1 UNITS
LYMPHOCYTES # BLD AUTO: 0.49 X10(3) UL (ref 1–4)
LYMPHOCYTES NFR BLD AUTO: 10.9 %
M2 MITOCHONDRIAL AB: <20 UNITS
MCH RBC QN AUTO: 23.3 PG (ref 26–34)
MCHC RBC AUTO-ENTMCNC: 30.5 G/DL (ref 31–37)
MCV RBC AUTO: 76.2 FL
MONOCYTES # BLD AUTO: 0.63 X10(3) UL (ref 0.1–1)
MONOCYTES NFR BLD AUTO: 14 %
MRSA DNA SPEC QL NAA+PROBE: NEGATIVE
NEUTROPHILS # BLD AUTO: 3.3 X10 (3) UL (ref 1.5–7.7)
NEUTROPHILS # BLD AUTO: 3.3 X10(3) UL (ref 1.5–7.7)
NEUTROPHILS NFR BLD AUTO: 73.1 %
OSMOLALITY SERPL CALC.SUM OF ELEC: 296 MOSM/KG (ref 275–295)
PLATELET # BLD AUTO: 29 10(3)UL (ref 150–450)
PLATELETS.RETICULATED NFR BLD AUTO: 13.3 % (ref 0–7)
POTASSIUM SERPL-SCNC: 3.4 MMOL/L (ref 3.5–5.1)
POTASSIUM SERPL-SCNC: 3.8 MMOL/L (ref 3.5–5.1)
PROT SERPL-MCNC: 5.3 G/DL (ref 5.7–8.2)
RBC # BLD AUTO: 3.44 X10(6)UL
SODIUM SERPL-SCNC: 136 MMOL/L (ref 136–145)
WBC # BLD AUTO: 4.5 X10(3) UL (ref 4–11)

## 2025-03-06 PROCEDURE — 99233 SBSQ HOSP IP/OBS HIGH 50: CPT | Performed by: INTERNAL MEDICINE

## 2025-03-06 PROCEDURE — 99232 SBSQ HOSP IP/OBS MODERATE 35: CPT | Performed by: NURSE PRACTITIONER

## 2025-03-06 RX ORDER — METOPROLOL TARTRATE 1 MG/ML
5 INJECTION, SOLUTION INTRAVENOUS EVERY 4 HOURS PRN
Status: DISCONTINUED | OUTPATIENT
Start: 2025-03-06 | End: 2025-03-12

## 2025-03-06 RX ORDER — DIGOXIN 0.25 MG/ML
125 INJECTION INTRAMUSCULAR; INTRAVENOUS ONCE
Status: COMPLETED | OUTPATIENT
Start: 2025-03-06 | End: 2025-03-06

## 2025-03-06 NOTE — PROGRESS NOTES
Patient is a 44 year old , single, male with past medical history of GERD, alcohol use disroder, who was admitted to the hospital for USMAN (acute kidney injury): The patient has been demonstrating symptoms of withdrawal.   Patient indicated for psych consult for evaluation and advise.    Consult Duration   The patient seen for over 50-minute, follow-up evaluation, over 50% counseling and coordinating care addressing alcohol withdrawal..    Record reviewed, communication with attending, communication with RN and patient seen face to face evaluation.    History of Present Illness:     Communicating with the team, patient was transferred to CCU due to elevated CIWA scores. Patient somewhat sedated/lethargic today.     The patient received the following psychotropic medications: ativan per [facility] protocol. Ativan 2 mg IV q 6 hours scheduled.     Labs and imaging reviewed: mag 2.1, ,  ALT 46, ammonia 121.   BAL negative    Most recent CIWA 6  Vital signs BP 94/54     The patient seen today laying in hospital bed.   Patient is in restraints. He was sleeping prior to my arrival.    The patient did not respond to verbal or painful stimuli.     He is not able to answer questions or engage in conversation due to lethargy.     The patient has been demonstrating severe withdrawals with confusion, restlessness, agitation and response to internal stimuli.     Past Psychiatric/Medication History:  1. Prior diagnoses: alcohol use disorder  2. Past psychiatric inpatient: Denies, the patients partner reports he has been to rehab 3 times.   3. Past outpatient history: Denies  4. Past suicide history: The patient reports history of attempt 5 years ago. He could not elaborate  5. Medication history: Denies     Social History:   The patient lives at home with his parents and his partner of 10 years. He works at a car wash.   He drinks alcohol daily. Occasional cannabis use. No tobacco or illicit substance abuse.      Family History:  No family history reported      Medical History:   Past Medical History  Past Medical History:    Esophageal reflux    Gout    Hernia       Past Surgical History  Past Surgical History:   Procedure Laterality Date    Colonoscopy N/A 11/1/2023    Procedure: COLONOSCOPY;  Surgeon: Vandana Austin MD;  Location: [facility] ENDOSCOPY    Egd  02/15/2016    Eh pr repair complex scalp arms legs 1.1 to 2.5 cm  2014    Elbow fracture surgery Right 1991    Hernia surgery      Inguinal hernia repair Left 01/17/2023       Family History  Family History   Problem Relation Age of Onset    Diabetes Father     Hypertension Father     Cancer Mother         liver       Social History  Social History     Socioeconomic History    Marital status: Single    Number of children: 0   Occupational History    Occupation: Supervisor, car wash   Tobacco Use    Smoking status: Former     Types: Cigarettes    Smokeless tobacco: Never   Vaping Use    Vaping status: Some Days   Substance and Sexual Activity    Alcohol use: Yes     Comment: per pt, DAILY HARD LEMONADE- [employer] hard lemonade 24oz cans, 6 cans daily    Drug use: Yes     Types: Cannabis     Comment: last use, couple months ago per pt report     Social Drivers of Health     Food Insecurity: No Food Insecurity (3/4/2025)    NCSS - Food Insecurity     Worried About Running Out of Food in the Last Year: No     Ran Out of Food in the Last Year: No   Transportation Needs: No Transportation Needs (3/4/2025)    NCSS - Transportation     Lack of Transportation: No   Housing Stability: Not At Risk (3/4/2025)    NCSS - Housing/Utilities     Has Housing: Yes     Worried About Losing Housing: No     Unable to Get Utilities: No           Current Medications:  Current Facility-Administered Medications   Medication Dose Route Frequency    potassium chloride 40 mEq in 250mL sodium chloride 0.9% IVPB premix  40 mEq Intravenous Once    LORazepam (Ativan) 2 mg/mL injection 2 mg  2 mg  Intravenous q6h    haloperidol lactate (Haldol) 5 MG/ML injection 2 mg  2 mg Intravenous Q6H PRN    lactulose (CHRONULAC) 10 GM/15ML 200 g in Sterile Water for Irrigation 1,000 mL retention enema  200 g Rectal 4 times per day    thiamine 100 mg/mL injection 100 mg  100 mg Intravenous Daily    QUEtiapine (SEROquel) tab 50 mg  50 mg Oral Nightly    dextrose 5%-sodium chloride 0.9% infusion   Intravenous Continuous    acetaminophen (Tylenol Extra Strength) tab 500 mg  500 mg Oral Q4H PRN    ondansetron (Zofran) 4 MG/2ML injection 4 mg  4 mg Intravenous Q6H PRN    prochlorperazine (Compazine) 10 MG/2ML injection 5 mg  5 mg Intravenous Q8H PRN    multivitamin (Tab-A-Yosef/Beta Carotene) tab 1 tablet  1 tablet Oral Daily    folic acid (Folvite) tab 1 mg  1 mg Oral Daily    LORazepam (Ativan) tab 1 mg  1 mg Oral Q1H PRN    Or    LORazepam (Ativan) 2 mg/mL injection 1 mg  1 mg Intravenous Q1H PRN    LORazepam (Ativan) tab 2 mg  2 mg Oral Q1H PRN    Or    LORazepam (Ativan) 2 mg/mL injection 2 mg  2 mg Intravenous Q1H PRN    LORazepam (Ativan) tab 3 mg  3 mg Oral Q1H PRN    Or    LORazepam (Ativan) 2 mg/mL injection 3 mg  3 mg Intravenous Q1H PRN    LORazepam (Ativan) 2 mg/mL injection 4 mg  4 mg Intravenous Q30 Min PRN    LORazepam (Ativan) 2 mg/mL injection 5 mg  5 mg Intravenous Q15 Min PRN    LORazepam (Ativan) 2 mg/mL injection 6 mg  6 mg Intravenous Q10 Min PRN    baclofen (Lioresal) tab 10 mg  10 mg Oral TID    phytonadione (Aqua-Mephton) 10 mg in sodium chloride 0.9% 50 mL IVPB  10 mg Intravenous Daily    pantoprazole (Protonix) 40 mg in sodium chloride 0.9% PF 10 mL IV push  40 mg Intravenous Q12H    sodium chloride 0.9 % IV bolus 2,000 mL  2,000 mL Intravenous Once     No medications prior to admission.       Allergies  Allergies[1]    Review of Systems:   As by Admitting/Attending    Results:   Laboratory Data:  Lab Results   Component Value Date    WBC 4.5 03/06/2025    HGB 8.0 (L) 03/06/2025    HCT 26.2 (L)  03/06/2025    PLT 29.0 (L) 03/06/2025    CREATSERUM 2.15 (H) 03/06/2025    BUN 51 (H) 03/06/2025     03/06/2025    K 3.4 (L) 03/06/2025     03/06/2025    CO2 25.0 03/06/2025     (H) 03/06/2025    CA 7.9 (L) 03/06/2025    ALB 3.0 (L) 03/06/2025    ALKPHO 120 (H) 03/06/2025    TP 5.3 (L) 03/06/2025     (H) 03/06/2025    ALT 82 (H) 03/06/2025    INR 3.75 (H) 03/05/2025    PTP 38.8 (H) 03/05/2025     (H) 03/04/2025    DDIMER 1.13 (H) 08/25/2023    ESRML 27 (H) 10/01/2021    MG 2.1 03/05/2025    PHOS 5.9 (H) 03/04/2025    TROP 0.00 01/31/2017    ETOH <3 03/04/2025         Imaging:  US LIVER (CPT=76705)    Result Date: 3/5/2025  CONCLUSION:   Hepatic steatosis.  Gallbladder sludge and cholelithiasis without sonographic evidence of acute cholecystitis.      Dictated by (CST): Elvis Aguilar MD on 3/05/2025 at 8:16 AM     Finalized by (CST): Elvis Aguilar MD on 3/05/2025 at 8:18 AM           Vital Signs:   Blood pressure 95/54, pulse 110, temperature 97.7 °F (36.5 °C), temperature source Temporal, resp. rate 23, height 5' 8\" (1.727 m), weight 82.9 kg (182 lb 12.8 oz), SpO2 95%.    Mental Status Exam:   Appearance: Stated age male, in hospital gown, laying down in hospital bed.  Psychomotor: anxious, restless, agitated, tremulous.   Orientation: Alert and oriented to person. Patient is confused.   Gait: Not evaluated.  Attitude/Coorperation: patient is not able to cooperate with interview.   Behavior: anxious, restless, agitated otherwise lethargic this morning.  Speech: mumbled and incomprehensible.  Mood: anxious  Affect: restricted  Thought process: confused  Thought content: no reports of suicidal or homicidal ideation.  Perceptions: Patient demonstrating response to internal stimuli  Concentration: impaired  Memory: impaired  Intellect: Average.  Judgment and Insight: Questionable.     Impression:     Alcohol withdrawal Syndrome with delirium.  Episodic mood disorder.  Alcohol Dependence,  continuous.    USMAN (acute kidney injury)    Metabolic acidosis    Hyperkalemia    Leukocytosis    Thrombocytopenia    Coagulopathy (HCC)    Hyponatremia    Alcoholic (HCC)    Scleral icterus    Acute kidney failure      Patient is a 44 year old , single, male with past medical history of GERD, alcohol use disroder, who was admitted to the hospital for USMAN (acute kidney injury): The patient has been demonstrating symptoms of withdrawal.     The patient has been demonstrating severe withdrawals with confusion, restlessness, agitation and response to internal stimuli.     3/6/2025: patient was transferred to CCU due to increased CIWA scores. Patient lethargic this morning.     Discussed risk and benefit, acknowledging the current symptom and severity.  At this point, I would recommend the following approach:     Focus on safety  Focus on education and support.  Focus on insight orientation helping the patient understand diagnosis and treatment plan.  Continue ativan per ciwa protocol  Continue ativan 2 mg IV q 6 hours scheduled  Utilize haldol 2 mg IV q 6 hours PRN agitation  Continue Seroquel 50 mg nightly  Continue IV thiamine 100 mg Daily  Processed with patient at length, the initiation of the above psychotropic medications I advised the patient of the risks, benefits, alternatives and potential side effects. The patient consents to administration of the medications and understands the right to refuse medications at any time. The patient verbalized understanding.   Coordinate plan with team    Orders This Visit:  Orders Placed This Encounter   Procedures    CBC With Differential With Platelet    Comp Metabolic Panel (14)    Urinalysis with Culture Reflex    Lipase    RBC Morphology Scan    Prothrombin Time (PT)    Lactic Acid, Plasma    Comp Metabolic Panel (14)    CBC With Differential With Platelet    Magnesium    Sodium, Urine, Random    Creatinine, Urine, Random    Phosphorus    Basic Metabolic Panel  (8)    Osmolality, Urine    Lactic Acid Reflex Post Positive    Lactic Acid, Plasma    Lactic Acid Post Positive    Lactic Acid, Plasma    Magnesium    Lactic Acid Reflex Post Positive    Lactic Acid Post Positive    Ethyl Alcohol    Ammonia, Plasma    CBC With Differential With Platelet    Basic Metabolic Panel (8)    Hepatic Function Panel (7)    Arterial blood gas    Prothrombin Time (PT)    AFP, Tumor Marker, Serum    Actin (Smooth Muscle) Antibody    Alpha-1-antirypsin, serum    Ceruloplasmin    Hepatitis A B + C profile    Immunoglobulin A/G/M, Quant    Iron And Tibc    Liver-Kidney Microsomal Antibody    Mitochondrial (M2) Antibody    Hep A AB, IGM    Scan slide    MD BLOOD SMEAR CONSULT    Potassium    Magnesium    Phosphorus    CK (Creatine Kinase) (Not Creatinine)    Urine Culture, Routine    Blood Culture    Clostridium difficile(toxigenic)PCR    MRSA Screen by PCR       Meds This Visit:  Requested Prescriptions      No prescriptions requested or ordered in this encounter       Kathryn Do, MARIA ISABEL  3/6/2025    Note to Patient: The 21st Century Cures Act makes medical notes like these available to patients in the interest of transparency. However, be advised this is a medical document. It is intended as peer to peer communication. It is written in medical language and may contain abbreviations or verbiage that are unfamiliar. It may appear blunt or direct. Medical documents are intended to carry relevant information, facts as evident, and the clinical opinion of the practitioner. This note may have been transcribed using a voice dictation system. Voice recognition errors may occur. This should not be taken to alter the content or meaning of this note.           [1]   Allergies  Allergen Reactions    Morphine SWELLING     SHORTNESS OF BREATH

## 2025-03-06 NOTE — PROGRESS NOTES
[facility]     Gastroenterology Progress Note    Sami Grijalva Jr. Patient Status:  Inpatient    10/11/1980 MRN [id number]   Location [facility]5W Attending Alex Diaz MD   Hosp Day # 2 PCP Kerri Medina MD       Subjective:   Pt sedated and restrained with high CIWAs requiring ativan per protocol    Objective:   Blood pressure 121/68, pulse 108, temperature 97.8 °F (36.6 °C), temperature source Oral, resp. rate (!) 30, height 5' 8\" (1.727 m), weight 182 lb 12.8 oz (82.9 kg), SpO2 90%. Body mass index is 27.79 kg/m².    Gen: Sleeping, lethargic patient, NAD  HEENT: EOMI, the sclera appears icteric, oropharynx clear, mucus membranes appear moist  CV: RRR  Lung: no conversational dyspnea   Abdomen: soft NTND abdomen with NABS appreciated   Skin: dry, warm, + jaundice  Ext: no LE edema is evident  Neuro: Drowsy, unable to assess orientation and not interactive  Psych: lethargic    Assessment and Plan:   Sami Grijalva Jr. is a 44 year old male w/ PMHx of longstanding alcohol use, cannabis use, GERD, who presents with generalized abdominal pain, hiccups and vomiting. GI consulted for acute alcohol hepatitis.     #Acute alcohol hepatitis   -last drink 4 days ago, has been drinking heavily over last 10 years   -noted daily emesis and poor PO intake, denies overt bleeding   -T bili upon arrival to ER 10.5, platelet 65, cr 4.22, both improved today  -CT a/p in ER severe fatty liver with subtle undulating contour  -US liver with hepatic steatosis, GB sludge and cholelithiasis without cholecystitis or biliary ductal dilation, hepatic and portal vein patent  -last EGD 2023 without varices   -Maddrey score of 131, Bcx in process, urine with leukocytes+, no plans to start steroids  -CIWA high with active withdrawal and AMS, increase acuity of care with transfer to ICU.    #Likely early cirrhosis, ETOH abuse, chronic w/u in process  MELD 3.0 on admission: 44  -PSE:  Lethargic/drowsy in setting of ETOH withdrawal treated with CIWA protocol, Ammonia 121, pt unable to tolerate PO given AMS, initiate lactulose enemas  -Ascites: none on exam or US  -EV: None on last EGD 11/2023, tubular structure around GE junction suggestive of EV.  Will need repeat EGD  -HCC: No lesions on US or CT, AFP in process     #USMAN  -nephrology following      Recommend:  -NPO for AMS  -CIWA per protocol given ETOH withdrawal  -Lactulose enemas until able to take PO, goal 3-4 bm/d  -Vitamin K 10mg x3days  -Daily CMP, CBC, INR  -Chronic liver w/u in process  -Empiric PPI BID  -Bcx in process  -Nephrology following, appreciate recs  -EGD during admission pending clinical course  -ETOH use cessation, consider ETOH abuse counselor once awake and oriented     Vandana Austin MD      Results:     Lab Results   Component Value Date    WBC 4.5 03/06/2025    HGB 8.0 (L) 03/06/2025    HCT 26.2 (L) 03/06/2025    PLT 29.0 (L) 03/06/2025    CREATSERUM 2.15 (H) 03/06/2025    BUN 51 (H) 03/06/2025     03/06/2025    K 3.4 (L) 03/06/2025     03/06/2025    CO2 25.0 03/06/2025     (H) 03/06/2025    CA 7.9 (L) 03/06/2025    ALB 3.0 (L) 03/06/2025    ALKPHO 120 (H) 03/06/2025    BILT 6.7 (H) 03/06/2025    TP 5.3 (L) 03/06/2025     (H) 03/06/2025    ALT 82 (H) 03/06/2025    INR 3.75 (H) 03/05/2025     (H) 03/04/2025    DDIMER 1.13 (H) 08/25/2023    ESRML 27 (H) 10/01/2021    MG 2.1 03/05/2025    PHOS 5.9 (H) 03/04/2025    TROP 0.00 01/31/2017     (H) 03/06/2025    ETOH <3 03/04/2025       US LIVER (CPT=76705)    Result Date: 3/5/2025  CONCLUSION:   Hepatic steatosis.  Gallbladder sludge and cholelithiasis without sonographic evidence of acute cholecystitis.      Dictated by (CST): Elvis Aguilar MD on 3/05/2025 at 8:16 AM     Finalized by (CST): Elvis Aguilar MD on 3/05/2025 at 8:18 AM

## 2025-03-06 NOTE — PLAN OF CARE
Problem: PAIN - ADULT  Goal: Verbalizes/displays adequate comfort level or patient's stated pain goal  Description: INTERVENTIONS:  - Encourage pt to monitor pain and request assistance  - Assess pain using appropriate pain scale  - Administer analgesics based on type and severity of pain and evaluate response  - Implement non-pharmacological measures as appropriate and evaluate response  - Consider cultural and social influences on pain and pain management  - Manage/alleviate anxiety  - Utilize distraction and/or relaxation techniques  - Monitor for opioid side effects  - Notify MD/LIP if interventions unsuccessful or patient reports new pain  - Anticipate increased pain with activity and pre-medicate as appropriate  Outcome: Progressing     Problem: RISK FOR INFECTION - ADULT  Goal: Absence of fever/infection during anticipated neutropenic period  Description: INTERVENTIONS  - Monitor WBC  - Administer growth factors as ordered  - Implement neutropenic guidelines  Outcome: Progressing     Problem: SAFETY ADULT - FALL  Goal: Free from fall injury  Description: INTERVENTIONS:  - Assess pt frequently for physical needs  - Identify cognitive and physical deficits and behaviors that affect risk of falls.  - [place] fall precautions as indicated by assessment.  - Educate pt/family on patient safety including physical limitations  - Instruct pt to call for assistance with activity based on assessment  - Modify environment to reduce risk of injury  - Provide assistive devices as appropriate  - Consider OT/PT consult to assist with strengthening/mobility  - Encourage toileting schedule  Outcome: Progressing     Problem: DISCHARGE PLANNING  Goal: Discharge to home or other facility with appropriate resources  Description: INTERVENTIONS:  - Identify barriers to discharge w/pt and caregiver  - Include patient/family/discharge partner in discharge planning  - Arrange for needed discharge resources and transportation as  appropriate  - Identify discharge learning needs (meds, wound care, etc)  - Arrange for interpreters to assist at discharge as needed  - Consider post-discharge preferences of patient/family/discharge partner  - Complete POLST form as appropriate  - Assess patient's ability to be responsible for managing their own health  - Refer to Case Management Department for coordinating discharge planning if the patient needs post-hospital services based on physician/LIP order or complex needs related to functional status, cognitive ability or social support system  Outcome: Progressing     Problem: Safety Risk - Non-Violent Restraints  Goal: Patient will remain free from self-harm  Description: INTERVENTIONS:  - Apply the least restrictive restraint to prevent harm  - Notify patient and family of reasons restraints applied  - Assess for any contributing factors to confusion (electrolyte disturbances, delirium, medications)  - Discontinue any unnecessary medical devices as soon as possible  - Assess the patient's physical comfort, circulation, skin condition, hydration, nutrition and elimination needs   - Reorient and redirection as needed  - Assess for the need to continue restraints  Outcome: Not Progressing

## 2025-03-06 NOTE — PROGRESS NOTES
[facility]  part of [facility] Hospitalist Progress Note     Sami Grijalva  Patient Status:  Inpatient    10/11/1980 MRN [id number]   Location [facility]5W Attending Alex Diaz MD   Hosp Day # 2 PCP Kerri Medina MD     Chief Complaint:   Chief Complaint   Patient presents with    Vomiting    Hiccups    Eval-D        Subjective:     Patient seen lying in bed.  Remains very lethargic.   Attempts to awaken to voice, but unable to open eyes or answer questions.    Objective:      Vital signs:  Vitals:    25 0500 25 0600 25 0800 25 1000   BP: 130/62 123/66 95/54 109/67   BP Location: Right arm Right arm Right arm Right arm   Pulse: 109 111 110 110   Resp:  (!) 28   Temp:   97.7 °F (36.5 °C)    TempSrc:   Temporal    SpO2: 97% 97% 95% 98%   Weight:       Height:           Intake/Output Summary (Last 24 hours) at 3/6/2025 1158  Last data filed at 3/6/2025 0600  Gross per 24 hour   Intake 2619 ml   Output 4500 ml   Net -1881 ml           Physical Exam:    GENERAL: Lethargic.  In no acute distress.  HEART:  Regular rhythm, regular rate  LUNGS:  Air entry was go minimally decreased od.  No increased work of breathing or wheezes   ABDOMEN: Soft and non-tender.    NEUROLOGICAL: Lethargic, attempts to open eyes and move extremities.      Diagnostic Data:    Labs:    Recent Labs   Lab 25  1111 25  0358 25  1426 25  0335   WBC 17.5* 8.0  --  4.5   HGB 10.1* 7.8*  --  8.0*   MCV 78.9* 78.0*  --  76.2*   PLT 65.0* 37.0*  --  29.0*   INR 3.86*  --  3.75*  --        Recent Labs   Lab 25  1112 25  1645 25  0358 25  0335   GLU 72 58* 98 109*   BUN 30* 29* 36* 51*   CREATSERUM 4.22* 3.97* 3.70* 2.15*   CA 9.6 8.2* 7.8* 7.9*   ALB 4.3  --  2.9* 3.0*   * 129* 130* 136   K 5.5* 4.9 4.2 3.4*   CL 86* 94* 98 102   CO2 15.0* 17.0* 21.0 25.0   ALKPHO 175*  --  115 120*   *  --  112* 139*   ALT 40   --  46 82*   BILT 10.5*  --  6.8* 6.7*   TP 7.5  --  5.2* 5.3*           Estimated Creatinine Clearance: 41.5 mL/min (A) (based on SCr of 2.15 mg/dL (H)).    Recent Labs   Lab 03/04/25  1111 03/05/25  1426   PTP 39.7* 38.8*   INR 3.86* 3.75*            COVID-19  Lab Results   Component Value Date    COVID19 Not Detected 12/26/2023    COVID19 Not Detected 01/15/2023       Pro-Calcitonin  No results for input(s): \"PCT\" in the last 168 hours.    Cardiac  No results for input(s): \"TROP\", \"PBNP\" in the last 168 hours.    Inflammatory Markers  No results for input(s): \"CRP\", \"CANDICE\", \"LDH\", \"DDIMER\" in the last 168 hours.    Culture:  Hospital Encounter on 03/04/25   1. Blood Culture     Status: None (Preliminary result)    Collection Time: 03/04/25  2:10 PM    Specimen: Blood,peripheral   Result Value Ref Range    Blood Culture Result No Growth 1 Day N/A   2. Urine Culture, Routine     Status: None    Collection Time: 03/04/25 11:11 AM    Specimen: Urine, clean catch   Result Value Ref Range    Urine Culture No Growth at 18-24 hrs. N/A        LIVER (CPT=76705)    Result Date: 3/5/2025  CONCLUSION:   Hepatic steatosis.  Gallbladder sludge and cholelithiasis without sonographic evidence of acute cholecystitis.      Dictated by (CST): Elvis Aguilar MD on 3/05/2025 at 8:16 AM     Finalized by (CST): Elvis Aguilar MD on 3/05/2025 at 8:18 AM                 Medications:    LORazepam  2 mg Intravenous q6h    lactulose (CHRONULAC) 10 GM/15ML 200 g in Sterile Water for Irrigation 1,000 mL retention enema  200 g Rectal 4 times per day    thiamine  100 mg Intravenous Daily    QUEtiapine  50 mg Oral Nightly    multivitamin  1 tablet Oral Daily    folic acid  1 mg Oral Daily    baclofen  10 mg Oral TID    phytonadione (Aqua-Mephton) 10 mg in sodium chloride 0.9% 50 mL IVPB  10 mg Intravenous Daily    pantoprazole  40 mg Intravenous Q12H    sodium chloride  2,000 mL Intravenous Once       Assessment & Plan:      Acute alcohol hepatitis    -last drink reportedly 4 days ago  -Reported daily emesis and poor PO intake, without overt bleeding   -T bili upon arrival to ER 10.5, platelet 65, cr 4.22, both improved today  -CT a/p reviewed, noted severe fatty liver with subtle undulating contour  -US liver reviewed, noted hepatic steatosis, GB sludge and cholelithiasis without cholecystitis or biliary ductal dilation, hepatic and portal vein patent  -Maddrey score of 131, Bcx in process, urine with leukocytes+, no plans to start steroids  -GI consulted, recommend vitamin K, empiric PPI and plan for EGD during admission pending clinical progress.  -Appreciate further recommendations    Alcohol Abuse with withdrawal   -CIWA monitoring with alcohol withdrawal protocol  -thiamine, folic acid, multivitamin  -Psychiatry consulted, appreciate recommendations    Altered mental status  -Likely multifactorial contributing to metabolic encephalopathy  -Elevated ammonia levels noted consistent with hepatic encephalopathy.  -Continue lactulose enemas as patient remains n.p.o.  -Patient also with USMAN and electrolyte abnormalities.  -Continue treating underlying conditions  -Continue to monitor    Acute Kidney Injury   -Completed bicarb drip  -Continue on D5/0.9  - Avoiding Nephrotoxic agents  - Cont to monitor  -Nephrology on consult, appreciate further recommendations     Discussed management/test result(s) with Rn     Quality:  DVT Prophylaxis: SCDs  CODE status: Full  Estimated date of discharge: TBD  Discharge is dependent on: clinical stability    Alex Diaz MD          This note was prepared using Dragon Medical voice recognition dictation software. As a result errors may occur. When identified these errors have been corrected. While every attempt is made to correct errors during dictation discrepancies may still exist

## 2025-03-06 NOTE — DIETARY NOTE
ADULT NUTRITION INITIAL ASSESSMENT    Pt is at moderate nutrition risk.  Pt does not meet malnutrition criteria.      RECOMMENDATIONS TO MD:   Diet initiation vs nutrition support within 24-48 hrs. NPO / liquid diet x5 days.   See Nutrition Intervention    ADMITTING DIAGNOSIS:  Scleral icterus [R17]  USMAN (acute kidney injury) [N17.9]  PERTINENT PAST MEDICAL HISTORY:   Past Medical History:    Esophageal reflux    Gout    Hernia     PATIENT STATUS:   Initial 03/06/25: Pt assessed r/t screened at risk on initial MST due to unintentional wt loss and decreased/poor appetite/intake. Presented to hospital with intractable N/V and poor appetite for the last few days - ETOH hepatitis and USMAN. PMH as listed above including ETOH abuse. Transferred to CCU for high CIWA scores. Scheduled ativan, soft wrist restraints in place. Very lethargic, minimally responsive. Sleeping but restless at time of visit. Not appropriate for diet hx with no family at bedside. Per H&P pt reported N/V, hiccups and inability to tolerate any solid foods x 3 days PTA. Typically consumes 3 24-oz  beers daily. Appears well nourished.     FOOD/NUTRITION RELATED HISTORY:  Appetite: NPO  Intake:  NPO/CL day 2 this admission, poor PO (no solid meals) x3 days PTA.   Intake Meeting Needs: NPO  Percent Meals Eaten (last 6 days)       Date/Time Percent Meals Eaten (%)    03/04/25 1800 60 %        Food Allergies: No Known Food Allergies (NKFA)  Cultural/Ethnic/Quaker Preferences: Not Obtained    GASTROINTESTINAL: +BM watery, green stools with lactulose enemas QID, rectal tube, and 2650 ml output from rectal tube; abdomen is soft, rounded, non-tender with bowel sounds present  CT A/P 3/4: \"Severely fatty liver with subtle undulating contour.  Stable splenomegaly.  Tubular structures around the GE junction are suggestive of lower esophageal varices.  No ascites \"    UO: 1850 ml output over the past 24 hrs  I/Os: +7,521 ml total this admission    MEDICATIONS:  reviewed; Noted non-cardiac electrolyte replacement protocol ordered; IVF provides 1800 ml, 90 g dextrose and 306 kcal; IV thiamin; PO MVI ordered - held;     dextrose 5%-sodium chloride 0.9% 75 mL/hr at 03/06/25 0931      LORazepam  2 mg Intravenous q6h    lactulose (CHRONULAC) 10 GM/15ML 200 g in Sterile Water for Irrigation 1,000 mL retention enema  200 g Rectal 4 times per day    thiamine  100 mg Intravenous Daily    QUEtiapine  50 mg Oral Nightly    multivitamin  1 tablet Oral Daily    folic acid  1 mg Oral Daily    baclofen  10 mg Oral TID    phytonadione (Aqua-Mephton) 10 mg in sodium chloride 0.9% 50 mL IVPB  10 mg Intravenous Daily    pantoprazole  40 mg Intravenous Q12H    sodium chloride  2,000 mL Intravenous Once     LABS: reviewed; noted hypokalemia today with replacement ordered per protocol; elevated creatinine improving/trending down since admission; BUN elevated/trending up;   Recent Labs     03/04/25  1112 03/04/25  1645 03/05/25  0358 03/06/25  0335   GLU 72 58* 98 109*   BUN 30* 29* 36* 51*   CREATSERUM 4.22* 3.97* 3.70* 2.15*   CA 9.6 8.2* 7.8* 7.9*   MG 1.8  --  2.1  --    * 129* 130* 136   K 5.5* 4.9 4.2 3.4*   CL 86* 94* 98 102   CO2 15.0* 17.0* 21.0 25.0   PHOS 5.9*  --   --   --    OSMOCALC 269* 272* 278 296*     WEIGHT HISTORY:  Patient Weight(s) for the past 336 hrs:   Weight   03/04/25 1600 82.9 kg (182 lb 12.8 oz)   03/04/25 0951 81.2 kg (179 lb)     Wt Readings from Last 10 Encounters:   03/04/25 82.9 kg (182 lb 12.8 oz)   03/27/24 77.1 kg (170 lb)   12/26/23 89.8 kg (198 lb)   11/01/23 81.6 kg (180 lb)   10/16/23 81.6 kg (180 lb)   10/16/23 82.6 kg (182 lb)   09/06/23 91.6 kg (202 lb)   08/27/23 91.7 kg (202 lb 1.6 oz)   05/31/23 83.9 kg (185 lb)   01/17/23 90.7 kg (200 lb)     ANTHROPOMETRICS:  HT: 172.7 cm (5' 8\")  Wt Readings from Last 1 Encounters:   03/04/25 82.9 kg (182 lb 12.8 oz)   Last weight: Likely accurate  Dosing Weight: 82.9 kg (183 lbs) - admission weight taken on  3/4/25, utilized for anthropometric calculations  BMI: Body mass index is 27.79 kg/m².  BMI CLASSIFICATION: 25-29.9 kg/m2 - overweight  IBW/lbs (Calculated) Male: 154 lbs           119% IBW  Usual Body Wt: 170 lbs (per EMR 3/27/24)      108% UBW    NUTRITION RELATED PHYSICAL FINDINGS:  - Nutrition Focused Physical Exam (NFPE): no wasting noted  - Fluid Accumulation: none . See RN documentation for details  - Skin Integrity: at risk and intact. See RN documentation for details    NUTRITION DIAGNOSIS/PROBLEM:   Inadequate protein energy intake related to Decreased ability to consume sufficient energy in the setting of ETOH withdrawals as evidenced by decreased/poor intake x5 days.     NUTRITION INTERVENTION:     NUTRITION PRESCRIPTION:   Estimated Nutrition needs: --dosing wt of 82.9 kg - wt taken on 3/4/25  Calories: 1860 -2025 calories/day (MSJ REE = 1689 kcal x1(AF) x1.1-1.2(SF) or 22 - 24 calories per kg Dosing wt)  Protein:  g protein/day (1.2-1.6 g protein/kg Dosing wt)  Fluid Needs: 2900 ml/day (35 ml/kg chronological age method) - adjust for clinical status (increased losses from high stool output)    - Diet:       Procedures    NPO      - Nutrition Care Plan: Recommend EN (enteral nutrition) support if unable to take adequate PO safely within 1-2 days  - ONS (Oral Nutrition Supplements)/Meals/Snacks: NPO   - Vitamin and mineral supplements: multivitamin/mineral and thiamin  - Feeding assistance: NPO  - Nutrition education: not appropriate at this time  - Coordination of nutrition care: collaboration with other providers  - Discharge and transfer of nutrition care to new setting or provider: monitor plans    MONITOR AND EVALUATE/NUTRITION GOALS:  - Food and Nutrient Intake:      Monitor: for PO initiation  - Food and Nutrient Administration:      Monitor: need for temporary nutrition support  - Anthropometric Measurement:    Monitor weight  - Nutrition Goals:      diet initiation vs nutrition support  within 24-48 hrs and minimize lean body mass loss    DIETITIAN FOLLOW UP: RD to follow and monitor nutrition status    Gilma Martínez MS, RD, LDN, CNSC  [id number]

## 2025-03-06 NOTE — PROGRESS NOTES
[facility]  part of [facility]    Progress Note      Subjective:     Patient remain lethargic-on Ativan per protocol.    Review of Systems:     Unable to obtain-patient groggy.      Objective:   Temp:  [97.1 °F (36.2 °C)-98.2 °F (36.8 °C)] 97.8 °F (36.6 °C)  Pulse:  [] 108  Resp:  [17-32] 30  BP: ()/() 121/68  SpO2:  [89 %-99 %] 90 %  SpO2: 90 %     Intake/Output Summary (Last 24 hours) at 3/6/2025 1352  Last data filed at 3/6/2025 0600  Gross per 24 hour   Intake 2619 ml   Output 4500 ml   Net -1881 ml     Wt Readings from Last 3 Encounters:   03/04/25 182 lb 12.8 oz (82.9 kg)   03/27/24 170 lb (77.1 kg)   12/26/23 198 lb (89.8 kg)       General appearance: ill appearing and groggy.  Currently under restrain  Head: Normocephalic, atraumatic  Neck:  no JVD, supple, symmetrical  Abdominal: soft, non-tender; -no grimace on palpation  Extremities: extremities normal, no edema  Skin: No rashes or lesions  Neurologic: Unable to examine  Psychiatric: calm    Medications:  Current Facility-Administered Medications   Medication Dose Route Frequency    LORazepam (Ativan) 2 mg/mL injection 2 mg  2 mg Intravenous q6h    haloperidol lactate (Haldol) 5 MG/ML injection 2 mg  2 mg Intravenous Q6H PRN    lactulose (CHRONULAC) 10 GM/15ML 200 g in Sterile Water for Irrigation 1,000 mL retention enema  200 g Rectal 4 times per day    thiamine 100 mg/mL injection 100 mg  100 mg Intravenous Daily    QUEtiapine (SEROquel) tab 50 mg  50 mg Oral Nightly    dextrose 5%-sodium chloride 0.9% infusion   Intravenous Continuous    acetaminophen (Tylenol Extra Strength) tab 500 mg  500 mg Oral Q4H PRN    ondansetron (Zofran) 4 MG/2ML injection 4 mg  4 mg Intravenous Q6H PRN    prochlorperazine (Compazine) 10 MG/2ML injection 5 mg  5 mg Intravenous Q8H PRN    multivitamin (Tab-A-Yosef/Beta Carotene) tab 1 tablet  1 tablet Oral Daily    folic acid (Folvite) tab 1 mg  1 mg Oral Daily    LORazepam (Ativan) tab 1  mg  1 mg Oral Q1H PRN    Or    LORazepam (Ativan) 2 mg/mL injection 1 mg  1 mg Intravenous Q1H PRN    LORazepam (Ativan) tab 2 mg  2 mg Oral Q1H PRN    Or    LORazepam (Ativan) 2 mg/mL injection 2 mg  2 mg Intravenous Q1H PRN    LORazepam (Ativan) tab 3 mg  3 mg Oral Q1H PRN    Or    LORazepam (Ativan) 2 mg/mL injection 3 mg  3 mg Intravenous Q1H PRN    LORazepam (Ativan) 2 mg/mL injection 4 mg  4 mg Intravenous Q30 Min PRN    LORazepam (Ativan) 2 mg/mL injection 5 mg  5 mg Intravenous Q15 Min PRN    LORazepam (Ativan) 2 mg/mL injection 6 mg  6 mg Intravenous Q10 Min PRN    baclofen (Lioresal) tab 10 mg  10 mg Oral TID    phytonadione (Aqua-[place]ep[place]) 10 mg in sodium chloride 0.9% 50 mL IVPB  10 mg Intravenous Daily    pantoprazole (Protonix) 40 mg in sodium chloride 0.9% PF 10 mL IV push  40 mg Intravenous Q12H    sodium chloride 0.9 % IV bolus 2,000 mL  2,000 mL Intravenous Once          Results:     Recent Labs   Lab 03/04/25  1111 03/05/25  0358 03/06/25  0335   RBC 4.36 3.28* 3.44*   HGB 10.1* 7.8* 8.0*   HCT 34.4* 25.6* 26.2*   MCV 78.9* 78.0* 76.2*   NEPRELIM 15.55* 6.41 3.30   WBC 17.5* 8.0 4.5   PLT 65.0* 37.0* 29.0*     Recent Labs   Lab 03/04/25  1112 03/04/25  1645 03/05/25  0358 03/06/25  0335 03/06/25  1253   GLU 72 58* 98 109*  --    BUN 30* 29* 36* 51*  --    CREATSERUM 4.22* 3.97* 3.70* 2.15*  --    CA 9.6 8.2* 7.8* 7.9*  --    ALB 4.3  --  2.9* 3.0*  --    * 129* 130* 136  --    K 5.5* 4.9 4.2 3.4* 3.8   CL 86* 94* 98 102  --    CO2 15.0* 17.0* 21.0 25.0  --    ALKPHO 175*  --  115 120*  --    *  --  112* 139*  --    ALT 40  --  46 82*  --    BILT 10.5*  --  6.8* 6.7*  --    TP 7.5  --  5.2* 5.3*  --      INR   Date Value Ref Range Status   03/05/2025 3.75 (H) 0.80 - 1.20 Final     Comment:     Therapeutic INR range for patients on warfarin:  2.0-3.0 for most medical conditions and surgical prophylaxis   2.5-3.5 for mechanical heart valves and recurrent thromboembolism    Direct thrombin  inhibitors (e.g. argatroban, bivalirudin), factor Xa inhibitors (e.g. apixaban, rivaroxaban), and conditions such as coagulation factor deficiency, vitamin K deficiency, and liver disease will prolong the prothrombin time/INR.    Unfractionated heparin and low molecular weight heparin do not affect the prothrombin time/INR up to a concentration of 1 IU/mL and 1.5 IU/mL, respectively.         No results for input(s): \"BNP\" in the last 168 hours.  Recent Labs   Lab 03/04/25  1112 03/05/25  0358   MG 1.8 2.1   PHOS 5.9*  --         Recent Labs   Lab 03/04/25  1112 03/05/25  0358 03/06/25  0335   PHOS 5.9*  --   --    ALB 4.3 2.9* 3.0*       US LIVER (CPT=76705)    Result Date: 3/5/2025  CONCLUSION:   Hepatic steatosis.  Gallbladder sludge and cholelithiasis without sonographic evidence of acute cholecystitis.      Dictated by (CST): Elvis Augilar MD on 3/05/2025 at 8:16 AM     Finalized by (CST): Elvis Aguilar MD on 3/05/2025 at 8:18 AM               Assessment and Plan:     Patient is a 44 year old male with history of alcoholism, anxiety, gout who presented to the emergency room for evaluation of intractable nausea and vomiting     1.USMAN:  Last creatinine 1.27 mg/dL in October 2024.    Serum creatinine 4.22 mg/dL on admit and 2.15 today - monitor recovery.    UA positive for hyaline cast and ketones suggestive of reduced renal perfusion  S/p IV fluid bolus with normal saline  -on D5/0.9 IV fluid-continue  CT abdomen and pelvis unremarkable for any obstructive uropathy  Urine culture negative so far.  FENA 0.5%  Check serum phosphorus and magnesium in a.m.     2.hyperkalemia:  Secondary to USMAN and metabolic acidosis  S/p medical management in ED  Repeat wnl     3.hyponatremia:   Urine Na low - urine osm appropriate. FENA <1%   secondary to reduced renal perfusion in light of UA positive for ketones and hyaline cast  Recheck 127 ->130 ->136.  Appropriately raised      4.metabolic acidosis:  In light of hypotension check  lactic acid.  Positive anion gap likely secondary to ketoacidosis  Acidosis resolved.  Off of bicarb drip     5.alcoholic hepatitis: Elevated liver function test and hyperbilirubinemia: Improved bilirubin level but worsening transaminase level  Elevated INR and thrombocytopenia  CT suggestive of esophageal varices  GI input noted-vitamin K and PPI  Liver ultrasound with Doppler showed hepatic steatosis     6.alcoholism: Last drink on Saturday   alcohol withdrawal with high CIWA score requiring transfer to PCCU      7.leukocytosis: resolved  Urine CS NGT. Blood CS NGT    Discussed with nursing.  Will discuss with Dr.Schainker Abundio Queen MD

## 2025-03-07 ENCOUNTER — APPOINTMENT (OUTPATIENT)
Dept: CT IMAGING | Facility: HOSPITAL | Age: 45
End: 2025-03-07
Attending: INTERNAL MEDICINE
Payer: MEDICAID

## 2025-03-07 ENCOUNTER — APPOINTMENT (OUTPATIENT)
Dept: GENERAL RADIOLOGY | Facility: HOSPITAL | Age: 45
End: 2025-03-07
Attending: NURSE PRACTITIONER
Payer: MEDICAID

## 2025-03-07 ENCOUNTER — APPOINTMENT (OUTPATIENT)
Dept: GENERAL RADIOLOGY | Facility: HOSPITAL | Age: 45
End: 2025-03-07
Attending: INTERNAL MEDICINE
Payer: MEDICAID

## 2025-03-07 ENCOUNTER — APPOINTMENT (OUTPATIENT)
Dept: GENERAL RADIOLOGY | Facility: HOSPITAL | Age: 45
End: 2025-03-07
Attending: STUDENT IN AN ORGANIZED HEALTH CARE EDUCATION/TRAINING PROGRAM
Payer: MEDICAID

## 2025-03-07 PROBLEM — E51.2 WERNICKE'S SYNDROME: Status: ACTIVE | Noted: 2025-03-07

## 2025-03-07 PROBLEM — F10.931 DTS (DELIRIUM TREMENS) (HCC): Status: ACTIVE | Noted: 2025-03-05

## 2025-03-07 PROBLEM — J96.01 ACUTE RESPIRATORY FAILURE WITH HYPOXIA (HCC): Status: ACTIVE | Noted: 2025-03-07

## 2025-03-07 LAB
AMMONIA PLAS-MCNC: 41 UMOL/L (ref 11–32)
ANION GAP SERPL CALC-SCNC: 8 MMOL/L (ref 0–18)
BASE EXCESS BLD CALC-SCNC: 2.4 MMOL/L (ref ?–2)
BASOPHILS # BLD: 0.06 X10(3) UL (ref 0–0.2)
BASOPHILS NFR BLD: 1 %
BUN BLD-MCNC: 49 MG/DL (ref 9–23)
BUN/CREAT SERPL: 32.2 (ref 10–20)
CALCIUM BLD-MCNC: 7.7 MG/DL (ref 8.7–10.4)
CHLORIDE SERPL-SCNC: 108 MMOL/L (ref 98–112)
CO2 SERPL-SCNC: 26 MMOL/L (ref 21–32)
CREAT BLD-MCNC: 1.52 MG/DL
DEPRECATED RDW RBC AUTO: 69.4 FL (ref 35.1–46.3)
EGFRCR SERPLBLD CKD-EPI 2021: 57 ML/MIN/1.73M2 (ref 60–?)
EOSINOPHIL # BLD: 0.06 X10(3) UL (ref 0–0.7)
EOSINOPHIL NFR BLD: 1 %
ERYTHROCYTE [DISTWIDTH] IN BLOOD BY AUTOMATED COUNT: 26.5 % (ref 11–15)
GLUCOSE BLD-MCNC: 127 MG/DL (ref 70–99)
GLUCOSE BLDC GLUCOMTR-MCNC: 107 MG/DL (ref 70–99)
GLUCOSE BLDC GLUCOMTR-MCNC: 116 MG/DL (ref 70–99)
HCO3 BLDA-SCNC: 26.8 MEQ/L (ref 21–27)
HCT VFR BLD AUTO: 23.6 %
HGB BLD-MCNC: 7.6 G/DL
IRON SATN MFR SERPL: 46 %
IRON SERPL-MCNC: 123 UG/DL
L PNEUMO AG UR QL: NEGATIVE
LYMPHOCYTES NFR BLD: 0.34 X10(3) UL (ref 1–4)
LYMPHOCYTES NFR BLD: 6 %
MAGNESIUM SERPL-MCNC: 2.2 MG/DL (ref 1.6–2.6)
MCH RBC QN AUTO: 24.5 PG (ref 26–34)
MCHC RBC AUTO-ENTMCNC: 32.2 G/DL (ref 31–37)
MCV RBC AUTO: 76.1 FL
MODIFIED ALLEN TEST: POSITIVE
MONOCYTES # BLD: 0.63 X10(3) UL (ref 0.1–1)
MONOCYTES NFR BLD: 11 %
MRSA DNA SPEC QL NAA+PROBE: NEGATIVE
NEUTROPHILS # BLD AUTO: 3.85 X10 (3) UL (ref 1.5–7.7)
NEUTROPHILS NFR BLD: 74 %
NEUTS BAND NFR BLD: 7 %
NEUTS HYPERSEG # BLD: 4.62 X10(3) UL (ref 1.5–7.7)
NRBC BLD MANUAL-RTO: 1 %
O2 CT BLD-SCNC: 12.4 VOL% (ref 15–23)
O2/TOTAL GAS SETTING VFR VENT: 85 %
OSMOLALITY SERPL CALC.SUM OF ELEC: 309 MOSM/KG (ref 275–295)
OXYGEN LITERS/MINUTE: 40 L/MIN
PCO2 BLDA: 37 MM HG (ref 35–45)
PH BLDA: 7.46 [PH] (ref 7.35–7.45)
PHOSPHATE SERPL-MCNC: 1.1 MG/DL (ref 2.4–5.1)
PLATELET # BLD AUTO: 34 10(3)UL (ref 150–450)
PLATELET MORPHOLOGY: NORMAL
PLATELETS.RETICULATED NFR BLD AUTO: 9.9 % (ref 0–7)
PO2 BLDA: 87 MM HG (ref 80–100)
POTASSIUM SERPL-SCNC: 3.5 MMOL/L (ref 3.5–5.1)
PUNCTURE CHARGE: YES
RBC # BLD AUTO: 3.1 X10(6)UL
SAO2 % BLDA: >99 % (ref 94–100)
SODIUM SERPL-SCNC: 142 MMOL/L (ref 136–145)
STREP PNEUMO ANTIGEN, URINE: NEGATIVE
TOTAL CELLS COUNTED BLD: 100
TOTAL IRON BINDING CAPACITY: 270 UG/DL (ref 250–425)
TRANSFERRIN SERPL-MCNC: 196 MG/DL (ref 215–365)
WBC # BLD AUTO: 5.7 X10(3) UL (ref 4–11)

## 2025-03-07 PROCEDURE — 71045 X-RAY EXAM CHEST 1 VIEW: CPT | Performed by: NURSE PRACTITIONER

## 2025-03-07 PROCEDURE — 71045 X-RAY EXAM CHEST 1 VIEW: CPT | Performed by: STUDENT IN AN ORGANIZED HEALTH CARE EDUCATION/TRAINING PROGRAM

## 2025-03-07 PROCEDURE — 99291 CRITICAL CARE FIRST HOUR: CPT | Performed by: INTERNAL MEDICINE

## 2025-03-07 PROCEDURE — 99233 SBSQ HOSP IP/OBS HIGH 50: CPT | Performed by: INTERNAL MEDICINE

## 2025-03-07 PROCEDURE — 71250 CT THORAX DX C-: CPT | Performed by: INTERNAL MEDICINE

## 2025-03-07 PROCEDURE — 99233 SBSQ HOSP IP/OBS HIGH 50: CPT | Performed by: OTHER

## 2025-03-07 PROCEDURE — 70450 CT HEAD/BRAIN W/O DYE: CPT | Performed by: INTERNAL MEDICINE

## 2025-03-07 PROCEDURE — 71045 X-RAY EXAM CHEST 1 VIEW: CPT | Performed by: INTERNAL MEDICINE

## 2025-03-07 RX ORDER — LACTULOSE 10 G/15ML
20 SOLUTION ORAL EVERY 6 HOURS SCHEDULED
Status: DISCONTINUED | OUTPATIENT
Start: 2025-03-07 | End: 2025-03-14

## 2025-03-07 RX ORDER — THIAMINE HYDROCHLORIDE 100 MG/ML
200 INJECTION, SOLUTION INTRAMUSCULAR; INTRAVENOUS 3 TIMES DAILY
Status: DISCONTINUED | OUTPATIENT
Start: 2025-03-07 | End: 2025-03-13

## 2025-03-07 RX ORDER — MORPHINE SULFATE 2 MG/ML
1 INJECTION, SOLUTION INTRAMUSCULAR; INTRAVENOUS ONCE
Status: DISCONTINUED | OUTPATIENT
Start: 2025-03-07 | End: 2025-03-07

## 2025-03-07 RX ORDER — SODIUM BICARBONATE 650 MG/1
650 TABLET ORAL AS NEEDED
Status: DISCONTINUED | OUTPATIENT
Start: 2025-03-07 | End: 2025-03-19

## 2025-03-07 RX ORDER — FUROSEMIDE 10 MG/ML
INJECTION INTRAMUSCULAR; INTRAVENOUS
Status: COMPLETED
Start: 2025-03-07 | End: 2025-03-07

## 2025-03-07 RX ORDER — METOCLOPRAMIDE HYDROCHLORIDE 5 MG/ML
5 INJECTION INTRAMUSCULAR; INTRAVENOUS 3 TIMES DAILY PRN
Status: DISCONTINUED | OUTPATIENT
Start: 2025-03-07 | End: 2025-03-07

## 2025-03-07 RX ORDER — LORAZEPAM 2 MG/ML
1 INJECTION INTRAMUSCULAR EVERY 6 HOURS
Status: DISCONTINUED | OUTPATIENT
Start: 2025-03-07 | End: 2025-03-08

## 2025-03-07 RX ORDER — FUROSEMIDE 10 MG/ML
40 INJECTION INTRAMUSCULAR; INTRAVENOUS ONCE
Status: COMPLETED | OUTPATIENT
Start: 2025-03-07 | End: 2025-03-07

## 2025-03-07 RX ORDER — VANCOMYCIN HYDROCHLORIDE 125 MG/1
125 CAPSULE ORAL DAILY
Status: DISCONTINUED | OUTPATIENT
Start: 2025-03-07 | End: 2025-03-08

## 2025-03-07 NOTE — PROGRESS NOTES
[facility]     Gastroenterology Progress Note    Sami Grijalva Jr. Patient Status:  Inpatient    10/11/1980 MRN [id number]   Location [facility]5W Attending Alex Diaz MD   Hosp Day # 3 PCP Kerri Medina MD       Subjective:    Remains lethargic and in restraints. Tachypneic.  Worsening hypoxemia.     Objective:   Blood pressure 105/61, pulse 99, temperature 96.6 °F (35.9 °C), temperature source Temporal, resp. rate (!) 28, height 5' 8\" (1.727 m), weight 182 lb 12.8 oz (82.9 kg), SpO2 96%. Body mass index is 27.79 kg/m².    Gen: Sleeping, lethargic patient, NAD  HEENT: EOMI, the sclera appears icteric, oropharynx clear, mucus membranes appear moist  CV: RRR  Lung: HFNC  Abdomen: soft NTND abdomen with NABS appreciated   Skin: dry, warm, + jaundice  Ext: no LE edema is evident  Neuro: Drowsy, unable to assess orientation and not interactive  Psych: lethargic    Assessment and Plan:   Sami Grijalva Jr. is a 44 year old male w/ PMHx of longstanding alcohol use, cannabis use, GERD, who presents with generalized abdominal pain, hiccups and vomiting. GI consulted for acute alcohol hepatitis.     #Acute alcohol hepatitis with probable underlying cirrhosis  -CT a/p in ER severe fatty liver with subtle undulating contour  -US liver with hepatic steatosis, GB sludge and cholelithiasis without cholecystitis or biliary ductal dilation, hepatic and portal vein patent  -last EGD 2023 without varices   -high maddrey; holding off on steroids since concern for infection   -CIWA high with active withdrawal and AMS   -nutrition  -etoh cessation    #Likely early cirrhosis, ETOH abuse, chronic w/u in process  MELD 3.0 on admission: 44  -PSE: Lethargic/drowsy in setting of ETOH withdrawal treated with CIWA protocol, Ammonia 121, pt unable to tolerate PO given AMS, initiate lactulose enemas  -Ascites: none on exam or US  -EV: None on last EGD 2023, tubular structure around GE  junction suggestive of EV.  Will need repeat EGD electively  -HCC: No lesions on US or CT, AFP in process     #USMAN  -nephrology following      Recommend:  -CIWA per protocol; ETOH withdrawal management  -Lactulose enemas until able to take PO, goal 3-4 bm/d  -Vitamin K 10mg x3days  -Daily CMP, CBC, INR  -Chronic liver w/u in process  -Empiric PPI BID    Vandana Austin MD      Results:     Lab Results   Component Value Date    WBC 5.7 03/07/2025    HGB 7.6 (L) 03/07/2025    HCT 23.6 (L) 03/07/2025    PLT 34.0 (L) 03/07/2025    CREATSERUM 1.52 (H) 03/07/2025    BUN 49 (H) 03/07/2025     03/07/2025    K 3.5 03/07/2025     03/07/2025    CO2 26.0 03/07/2025     (H) 03/07/2025    CA 7.7 (L) 03/07/2025    ALB 3.0 (L) 03/06/2025    ALKPHO 120 (H) 03/06/2025    BILT 6.7 (H) 03/06/2025    TP 5.3 (L) 03/06/2025     (H) 03/06/2025    ALT 82 (H) 03/06/2025    INR 3.75 (H) 03/05/2025     (H) 03/04/2025    DDIMER 1.13 (H) 08/25/2023    ESRML 27 (H) 10/01/2021    MG 2.2 03/07/2025    PHOS 1.1 (L) 03/07/2025    TROP 0.00 01/31/2017     (H) 03/06/2025    ETOH <3 03/04/2025       CT CHEST (FCA=17255)    Result Date: 3/7/2025  CONCLUSION:  1. Extensive bilateral airspace disease, concerning for potential multifocal pneumonia. Continued surveillance is advised.  2. Dense bilateral lower airspace consolidation is evident; aspiration pneumonitis is a differential diagnostic possibility. Follow-up is recommended to document resolution.   3. Dilatation of the main pulmonary artery trunk may relate to underlying pulmonary hypertension.   4. Small hiatal hernia with imaging manifestations that may be indicative underlying esophagitis.  5. Marked hepatic steatosis.  6. Lesser incidental findings as above.    Dictated by (CST): Pavel Lara MD on 3/07/2025 at 11:38 AM     Finalized by (CST): Pavel Lara MD on 3/07/2025 at 11:44 AM          CT BRAIN OR HEAD (CPT=70450)    Result Date:  3/7/2025  CONCLUSION:   Motion limits evaluation.  No evidence of acute intracranial abnormality.    Dictated by (CST): Vishnu Tuttle MD on 3/07/2025 at 11:32 AM     Finalized by (CST): Vishnu Tuttle MD on 3/07/2025 at 11:33 AM          XR CHEST AP PORTABLE  (CPT=71045)    Result Date: 3/7/2025  CONCLUSION:  1. Cardiomegaly.  Tortuous aorta. 2. Extensive multifocal airspace opacification in the bilateral perihilar and basilar regions with left-sided effusion.  Differential includes pulmonary edema congestive failure versus multifocal pneumonitis.    Dictated by (CST): Nolan Dooley MD on 3/07/2025 at 7:49 AM     Finalized by (CST): Nolan Dooley MD on 3/07/2025 at 7:51 AM

## 2025-03-07 NOTE — DISCHARGE INSTRUCTIONS
You have been seen by the Care Coordinator from [facility]. You are being referred to Inpatient treatment at;    [address] [address] [phone]    Check Potassium, Magnesium and cbc in one week

## 2025-03-07 NOTE — PROGRESS NOTES
[facility]  part of [facility]    Progress Note      Subjective:     Patient remain on Ativan-minimally responsive.  Breathing more erratic and requiring more oxygen    Review of Systems:     Unable to obtain-patient groggy.      Objective:   Temp:  [96.6 °F (35.9 °C)-98.2 °F (36.8 °C)] 96.6 °F (35.9 °C)  Pulse:  [] 99  Resp:  [22-51] 28  BP: ()/(50-83) 105/61  SpO2:  [88 %-100 %] 96 %  FiO2 (%):  [75 %-85 %] 75 %  SpO2: 96 %     Intake/Output Summary (Last 24 hours) at 3/7/2025 1152  Last data filed at 3/7/2025 0600  Gross per 24 hour   Intake 3785.17 ml   Output 3050 ml   Net 735.17 ml     Wt Readings from Last 3 Encounters:   03/04/25 182 lb 12.8 oz (82.9 kg)   03/27/24 170 lb (77.1 kg)   12/26/23 198 lb (89.8 kg)       General appearance: ill appearing and groggy.    Head: Normocephalic, atraumatic  Neck:  no JVD, supple, symmetrical  Extremities: extremities normal, no edema  Skin: No rashes or lesions  Neurologic: Unable to examine  Psychiatric: on Ativan    Medications:  Current Facility-Administered Medications   Medication Dose Route Frequency    piperacillin-tazobactam (Zosyn) 3.375 g in dextrose 5% 100 mL IVPB-ADDV  3.375 g Intravenous Q8H    doxycycline hyclate (Vibramycin) 100 mg in sodium chloride 0.9% 100 mL IVPB  100 mg Intravenous Q12H    metoprolol (Lopressor) 5 mg/5mL injection 5 mg  5 mg Intravenous Q4H PRN    LORazepam (Ativan) 2 mg/mL injection 2 mg  2 mg Intravenous q6h    haloperidol lactate (Haldol) 5 MG/ML injection 2 mg  2 mg Intravenous Q6H PRN    lactulose (CHRONULAC) 10 GM/15ML 200 g in Sterile Water for Irrigation 1,000 mL retention enema  200 g Rectal 4 times per day    thiamine 100 mg/mL injection 100 mg  100 mg Intravenous Daily    QUEtiapine (SEROquel) tab 50 mg  50 mg Oral Nightly    dextrose 5%-sodium chloride 0.9% infusion   Intravenous Continuous    acetaminophen (Tylenol Extra Strength) tab 500 mg  500 mg Oral Q4H PRN    ondansetron (Zofran) 4  MG/2ML injection 4 mg  4 mg Intravenous Q6H PRN    prochlorperazine (Compazine) 10 MG/2ML injection 5 mg  5 mg Intravenous Q8H PRN    multivitamin (Tab-A-Yosef/Beta Carotene) tab 1 tablet  1 tablet Oral Daily    folic acid (Folvite) tab 1 mg  1 mg Oral Daily    LORazepam (Ativan) tab 1 mg  1 mg Oral Q1H PRN    Or    LORazepam (Ativan) 2 mg/mL injection 1 mg  1 mg Intravenous Q1H PRN    LORazepam (Ativan) tab 2 mg  2 mg Oral Q1H PRN    Or    LORazepam (Ativan) 2 mg/mL injection 2 mg  2 mg Intravenous Q1H PRN    LORazepam (Ativan) tab 3 mg  3 mg Oral Q1H PRN    Or    LORazepam (Ativan) 2 mg/mL injection 3 mg  3 mg Intravenous Q1H PRN    LORazepam (Ativan) 2 mg/mL injection 4 mg  4 mg Intravenous Q30 Min PRN    LORazepam (Ativan) 2 mg/mL injection 5 mg  5 mg Intravenous Q15 Min PRN    LORazepam (Ativan) 2 mg/mL injection 6 mg  6 mg Intravenous Q10 Min PRN    baclofen (Lioresal) tab 10 mg  10 mg Oral TID    pantoprazole (Protonix) 40 mg in sodium chloride 0.9% PF 10 mL IV push  40 mg Intravenous Q12H          Results:     Recent Labs   Lab 03/05/25 0358 03/06/25  0335 03/07/25  0414   RBC 3.28* 3.44* 3.10*   HGB 7.8* 8.0* 7.6*   HCT 25.6* 26.2* 23.6*   MCV 78.0* 76.2* 76.1*   NEPRELIM 6.41 3.30 3.85   WBC 8.0 4.5 5.7   PLT 37.0* 29.0* 34.0*     Recent Labs   Lab 03/04/25  1112 03/04/25  1645 03/05/25  0358 03/06/25  0335 03/06/25  1253 03/07/25  0414   GLU 72   < > 98 109*  --  127*   BUN 30*   < > 36* 51*  --  49*   CREATSERUM 4.22*   < > 3.70* 2.15*  --  1.52*   CA 9.6   < > 7.8* 7.9*  --  7.7*   ALB 4.3  --  2.9* 3.0*  --   --    *   < > 130* 136  --  142   K 5.5*   < > 4.2 3.4* 3.8 3.5   CL 86*   < > 98 102  --  108   CO2 15.0*   < > 21.0 25.0  --  26.0   ALKPHO 175*  --  115 120*  --   --    *  --  112* 139*  --   --    ALT 40  --  46 82*  --   --    BILT 10.5*  --  6.8* 6.7*  --   --    TP 7.5  --  5.2* 5.3*  --   --     < > = values in this interval not displayed.     INR   Date Value Ref Range  Status   03/05/2025 3.75 (H) 0.80 - 1.20 Final     Comment:     Therapeutic INR range for patients on warfarin:  2.0-3.0 for most medical conditions and surgical prophylaxis   2.5-3.5 for mechanical heart valves and recurrent thromboembolism    Direct thrombin inhibitors (e.g. argatroban, bivalirudin), factor Xa inhibitors (e.g. apixaban, rivaroxaban), and conditions such as coagulation factor deficiency, vitamin K deficiency, and liver disease will prolong the prothrombin time/INR.    Unfractionated heparin and low molecular weight heparin do not affect the prothrombin time/INR up to a concentration of 1 IU/mL and 1.5 IU/mL, respectively.         No results for input(s): \"BNP\" in the last 168 hours.  Recent Labs   Lab 03/04/25  1112 03/05/25  0358 03/07/25  0414   MG 1.8 2.1 2.2   PHOS 5.9*  --  1.1*        Recent Labs   Lab 03/04/25  1112 03/05/25  0358 03/06/25  0335 03/07/25  0414   PHOS 5.9*  --   --  1.1*   ALB 4.3 2.9* 3.0*  --        CT CHEST (RQW=79708)    Result Date: 3/7/2025  CONCLUSION:  1. Extensive bilateral airspace disease, concerning for potential multifocal pneumonia. Continued surveillance is advised.  2. Dense bilateral lower airspace consolidation is evident; aspiration pneumonitis is a differential diagnostic possibility. Follow-up is recommended to document resolution.   3. Dilatation of the main pulmonary artery trunk may relate to underlying pulmonary hypertension.   4. Small hiatal hernia with imaging manifestations that may be indicative underlying esophagitis.  5. Marked hepatic steatosis.  6. Lesser incidental findings as above.    Dictated by (CST): Pavel Lara MD on 3/07/2025 at 11:38 AM     Finalized by (CST): Pavel Lara MD on 3/07/2025 at 11:44 AM          CT BRAIN OR HEAD (CPT=70450)    Result Date: 3/7/2025  CONCLUSION:   Motion limits evaluation.  No evidence of acute intracranial abnormality.    Dictated by (CST): Vishnu Tuttle MD on 3/07/2025 at 11:32 AM      Finalized by (CST): Vishnu Tuttle MD on 3/07/2025 at 11:33 AM          XR CHEST AP PORTABLE  (CPT=71045)    Result Date: 3/7/2025  CONCLUSION:  1. Cardiomegaly.  Tortuous aorta. 2. Extensive multifocal airspace opacification in the bilateral perihilar and basilar regions with left-sided effusion.  Differential includes pulmonary edema congestive failure versus multifocal pneumonitis.    Dictated by (CST): Nolan Dooley MD on 3/07/2025 at 7:49 AM     Finalized by (CST): Nolan Dooley MD on 3/07/2025 at 7:51 AM               Assessment and Plan:     Patient is a 44 year old male with history of alcoholism, anxiety, gout who presented to the emergency room for evaluation of intractable nausea and vomiting     1.USMAN:  Last creatinine 1.27 mg/dL in October 2024.    Serum creatinine 4.22 mg/dL on admit and 1.5 today - monitor recovery.    UA positive for hyaline cast and ketones suggestive of reduced renal perfusion  S/p IV fluid bolus with normal saline  -on D5/0.9 IV fluid-continue  CT abdomen and pelvis unremarkable for any obstructive uropathy  Urine culture negative so far.  FENA 0.5%  Hypophosphatemia-replace.  Repeat in a.m.  Magnesium within normal limit.     3.hyponatremia: resolved  Urine Na low - urine osm appropriate. FENA <1%   secondary to reduced renal perfusion in light of UA positive for ketones and hyaline cast     4.metabolic acidosis:  In light of hypotension check lactic acid.  Positive anion gap likely secondary to ketoacidosis  Acidosis resolved.  Off of bicarb drip     5.alcoholic hepatitis: Elevated liver function test and hyperbilirubinemia: Improved bilirubin level but worsening transaminase level  Elevated INR and thrombocytopenia  CT suggestive of esophageal varices  GI input noted-vitamin K and PPI  Liver ultrasound with Doppler showed hepatic steatosis     6.alcoholism: Last drink on Saturday   alcohol withdrawal with high CIWA score requiring transfer to PCCU   Remain on  ativan      7.  Pneumonia: CT chest noted for extensive bilateral airspace disease  Requiring higher oxygen-hypoxic respiratory failure.  S/p 1 dose of furosemide  On Zosyn and doxy.  Workup order  Pulmonary input noted    Discussed with nursing and  and Dr. Gregg Queen MD

## 2025-03-07 NOTE — CM/SW NOTE
03/07/25 1700   CM/SW Referral Data   Referral Source    Reason for Referral Discharge planning   Informant Spouse/Significant Other   Medical Hx   Does patient have an established PCP? Yes  (Kerri Medina)   Patient Info   Patient's Current Mental Status at Time of Assessment Confused or unable to complete assessment   Patient's Home Environment House   Number of Levels in Home 2   Number of Stair in Home 6 steps to bedroom   Patient lives with Parent(s);Sibling;Spouse/Significant other   Patient Status Prior to Admission   Independent with ADLs and Mobility Yes   Discharge Needs   Anticipated D/C needs To be determined     Pt discussed during nursing rounds. Dx acute pancreatitis, ETOH hepatitis w/withdrawal, probable aspiration PNA, now on [id number]/50%, PULM now following, psych also consulted. Home w/family and life partner Yazdanism. Pt completely independent and driving prior to admission. PT/OT evals will be needed once appropriate. Psych team will be needed for dc planning.    3/11/2025 at 0945: Pt now on 7L O2, RA baseline. PT/OT evals remains pending at this time.    3/14/2025: Pt now intubated on sedation. Requiring pressor support x 1. On NG tube feeds.    Plan: TBD    / to remain available for support and/or discharge planning.     TRENT Andrade    [phone]

## 2025-03-07 NOTE — CONSULTS
Initial Pulmonary/ICU/Critical Care Consultation        Reason for Consultation: resp failure  Referring Physician: Dr. Diaz      Pt is unable to provide info  HPI: 45 yo male with hx of gout, GERD, admitted with acute ETOH hepatitis, ETOH use, emesis, USMAN. Seen by GI.  On CIWA protocol, thiamine, folic acid, MVI, BZDs, lactulose  Has been in the ICU for 2 days  ICU consulted this am for worsening hypoxemia  CXR this am with b/l infiltrates concerning for PNA      REVIEW OF SYSTEMS:  Positives and negatives as noted in HPI. All other review of systems otherwise are either limited (due to pt/family inability to provide) or negative.      PAST MEDICAL HISTORY:  Past Medical History:   Diagnosis Date    Esophageal reflux     Gout     Hernia          PAST SURGICAL HISTORY:  Past Surgical History:   Procedure Laterality Date    Colonoscopy N/A 11/1/2023    Procedure: COLONOSCOPY;  Surgeon: Vandana Austin MD;  Location: [facility] ENDOSCOPY    Egd  02/15/2016    Eh pr repair complex scalp arms legs 1.1 to 2.5 cm  2014    Elbow fracture surgery Right 1991    Hernia surgery      Inguinal hernia repair Left 01/17/2023         PAST SOCIAL HISTORY:  Social History     Socioeconomic History    Marital status: Single    Number of children: 0   Occupational History    Occupation: Supervisor, car wash   Tobacco Use    Smoking status: Former     Types: Cigarettes    Smokeless tobacco: Never   Vaping Use    Vaping status: Some Days   Substance and Sexual Activity    Alcohol use: Yes     Comment: per pt, DAILY HARD LEMONADE- [employer] hard lemonade 24oz cans, 6 cans daily    Drug use: Yes     Types: Cannabis     Comment: last use, couple months ago per pt report         PAST FAMILY HISTORY:  Family History   Problem Relation Age of Onset    Diabetes Father     Hypertension Father     Cancer Mother         liver       ALLERGIES:  Allergies[1]      MEDS:  Home Medications:  Medications Taking[2]    Scheduled Medication:   sodium phosphate   30 mmol Intravenous Once    LORazepam  2 mg Intravenous q6h    lactulose (CHRONULAC) 10 GM/15ML 200 g in Sterile Water for Irrigation 1,000 mL retention enema  200 g Rectal 4 times per day    thiamine  100 mg Intravenous Daily    QUEtiapine  50 mg Oral Nightly    multivitamin  1 tablet Oral Daily    folic acid  1 mg Oral Daily    baclofen  10 mg Oral TID    phytonadione ([place]) 10 mg in sodium chloride 0.9% 50 mL IVPB  10 mg Intravenous Daily    pantoprazole  40 mg Intravenous Q12H     Continuous Infusing Medication:   dextrose 5%-sodium chloride 0.9% 75 mL/hr at 03/07/25 0041     PRN Medications:    metoprolol    haloperidol lactate    acetaminophen    ondansetron    prochlorperazine    LORazepam **OR** LORazepam    LORazepam **OR** LORazepam    LORazepam **OR** LORazepam    LORazepam    LORazepam    LORazepam       PHYSICAL EXAM:  /61 (BP Location: Right arm)   Pulse 99   Temp 96.6 °F (35.9 °C) (Temporal)   Resp 25   Ht 5' 8\" (1.727 m)   Wt 182 lb 12.8 oz (82.9 kg)   SpO2 98%   BMI 27.79 kg/m²   FiO2 (%):  [85 %] 85 %  CONSTITUTIONAL: somnolent min resp  HEENT: atraumatic normocephalic  MOUTH: mucous membranes are moist. No OP exudates  NECK/THROAT: no JVD. Trachea midline. No obvious thyromegaly  LUNG: clear b/l no wheezing, + b/l crackles. Chest symmetric with respiratory motion  HEART: regular rate and rhythm, no obvious murmers or gallops noted  ABD: soft non tender. + bowel sounds. No organomegaly noted  EXT: no clubbing, cyanosis, or edema noted. Pulses intact grossly  NEURO/MUSCULOSKELETAL: somnolent, withdraws  SKIN: warm, dry. No obvious lesions noted  LYMPH: no obvious LAD      IMAGES:   CXR this am with multifocal infiltrates, possible effusion on the left  CONCLUSION:   1. Cardiomegaly.  Tortuous aorta.   2. Extensive multifocal airspace opacification in the bilateral perihilar and basilar regions with left-sided effusion.  Differential includes pulmonary edema congestive failure  versus multifocal pneumonitis.      CT abd/pelvis 3/4/25  CONCLUSION:   Severely fatty liver with subtle undulating contour.  Stable splenomegaly.  Tubular structures around the GE junction are suggestive of lower esophageal varices.  No ascites       LABS:  Recent Labs   Lab 03/05/25 0358 03/06/25  0335 03/07/25  0414   RBC 3.28* 3.44* 3.10*   HGB 7.8* 8.0* 7.6*   HCT 25.6* 26.2* 23.6*   MCV 78.0* 76.2* 76.1*   MCH 23.8* 23.3* 24.5*   MCHC 30.5* 30.5* 32.2   RDW 25.2* 25.4* 26.5*   NEPRELIM 6.41 3.30 3.85   WBC 8.0 4.5 5.7   PLT 37.0* 29.0* 34.0*       Recent Labs   Lab 03/04/25  1112 03/04/25  1645 03/05/25  0358 03/06/25  0335 03/06/25  1253 03/07/25  0414   GLU 72   < > 98 109*  --  127*   BUN 30*   < > 36* 51*  --  49*   CREATSERUM 4.22*   < > 3.70* 2.15*  --  1.52*   EGFRCR 17*   < > 20* 38*  --  57*   CA 9.6   < > 7.8* 7.9*  --  7.7*   ALB 4.3  --  2.9* 3.0*  --   --    *   < > 130* 136  --  142   K 5.5*   < > 4.2 3.4* 3.8 3.5   CL 86*   < > 98 102  --  108   CO2 15.0*   < > 21.0 25.0  --  26.0   ALKPHO 175*  --  115 120*  --   --    *  --  112* 139*  --   --    ALT 40  --  46 82*  --   --    BILT 10.5*  --  6.8* 6.7*  --   --    TP 7.5  --  5.2* 5.3*  --   --     < > = values in this interval not displayed.       ASSESSMENT/PLAN:  Acute hypoxemic resp failure  -secondary to multifocal infiltrates. Given hx of emesis, concern for aspiration  -check non contrast chest CT  -start zosyn, doxy. Check urine leg, strep pneumo, mrsa nares, blood cx  -continue airvo at this time. Wean as able  -aspiration precautions  -keep NPO    Acute ETOH hepatitis  -GI following. Imaging as above  -vit K, PPI, lactulose    ETOH withdrawal  -CIWA protocol  -BZD  -somnolence maybe related to meds and ETOH withdrawal. However, given his neuro exam, low PLTs, and coagulopathy, will also check a CT head today     USMAN  -s/p bicarb infusion  -on D5 0.9 NS  -renal following    Proph  -DVT: elevated INR low PLT. On  SCDS    Dispo  -Full code    Upon my evaluation, this patient had a high probability of imminent or life-threatening deterioration due to respiratory failure, which required my direct attention, intervention, and personal management.    I have personally provided 35 minutes of critical care time, exclusive of time spent on separately billable procedures.  Time includes review of all pertinent laboratory/radiology results, discussion with consultants, and monitoring for potential decompensation.  Performed interventions included  high flow oxygen support (airvo) .      Thank you for the opportunity to care for Sami Grijalva Jr..      JOLYNN Escobedo DO, MPH  Pulmonary Critical Care Medicine  [facility]                         [1]   Allergies  Allergen Reactions    Morphine SWELLING     SHORTNESS OF BREATH   [2]   No outpatient medications have been marked as taking for the 3/4/25 encounter (Hospital Encounter).

## 2025-03-07 NOTE — PROGRESS NOTES
[facility]  part of [facility]     Hospitalist Progress Note     Sami Grijalva . Patient Status:  Inpatient    10/11/1980 MRN [id number]   Location [facility]5W Attending Alex Diaz MD   Hosp Day # 3 PCP Kerri Medina MD     Chief Complaint:   Chief Complaint   Patient presents with    Vomiting    Hiccups    Eval-D        Subjective:     Patient seen lying in bed.  Multiple family numbers at bedside.  Patient remains lethargic.   Attempts to raise head off bed and move extremities, but not opening eyes, following commands or answer questions.    Per sister at bedside, patient reportedly with previous similar episode in past.  Sister states patient at that time required around 1 month of hospitalization.    Objective:      Vital signs:  Vitals:    25 0500 25 0600 25 0725 25 1139   BP: 110/62 105/61     BP Location: Right arm Right arm     Pulse: 112 99     Resp: 26 (!) 30 25 (!) 28   Temp:       TempSrc:       SpO2: 93% 100% 98% 96%   Weight:       Height:           Intake/Output Summary (Last 24 hours) at 3/7/2025 1245  Last data filed at 3/7/2025 0600  Gross per 24 hour   Intake 3785.17 ml   Output 3050 ml   Net 735.17 ml           Physical Exam:    GENERAL: Lethargic.  In no acute distress.  HEART:  Regular rhythm, regular rate  LUNGS:  Air entry was decreased.  Mild increased work of breathing. No wheezes   ABDOMEN: Soft and non-tender.    NEUROLOGICAL: Lethargic, attempts to raise head and move extremities, but does not open eyes .      Diagnostic Data:    Labs:    Recent Labs   Lab 25  1111 25  0358 25  1426 25  0335 25  0414   WBC 17.5* 8.0  --  4.5 5.7   HGB 10.1* 7.8*  --  8.0* 7.6*   MCV 78.9* 78.0*  --  76.2* 76.1*   PLT 65.0* 37.0*  --  29.0* 34.0*   BAND  --   --   --   --  7   INR 3.86*  --  3.75*  --   --        Recent Labs   Lab 25  1112 25  1645 25  0358 25  0335  03/06/25  1253 03/07/25  0414   GLU 72   < > 98 109*  --  127*   BUN 30*   < > 36* 51*  --  49*   CREATSERUM 4.22*   < > 3.70* 2.15*  --  1.52*   CA 9.6   < > 7.8* 7.9*  --  7.7*   ALB 4.3  --  2.9* 3.0*  --   --    *   < > 130* 136  --  142   K 5.5*   < > 4.2 3.4* 3.8 3.5   CL 86*   < > 98 102  --  108   CO2 15.0*   < > 21.0 25.0  --  26.0   ALKPHO 175*  --  115 120*  --   --    *  --  112* 139*  --   --    ALT 40  --  46 82*  --   --    BILT 10.5*  --  6.8* 6.7*  --   --    TP 7.5  --  5.2* 5.3*  --   --     < > = values in this interval not displayed.           Estimated Creatinine Clearance: 58.8 mL/min (A) (based on SCr of 1.52 mg/dL (H)).    Recent Labs   Lab 03/04/25  1111 03/05/25  1426   PTP 39.7* 38.8*   INR 3.86* 3.75*            COVID-19  Lab Results   Component Value Date    COVID19 Not Detected 12/26/2023    COVID19 Not Detected 01/15/2023       Pro-Calcitonin  No results for input(s): \"PCT\" in the last 168 hours.    Cardiac  No results for input(s): \"TROP\", \"PBNP\" in the last 168 hours.    Inflammatory Markers  No results for input(s): \"CRP\", \"CANDICE\", \"LDH\", \"DDIMER\" in the last 168 hours.    Culture:  Hospital Encounter on 03/04/25   1. Blood Culture     Status: None (Preliminary result)    Collection Time: 03/04/25  2:10 PM    Specimen: Blood,peripheral   Result Value Ref Range    Blood Culture Result No Growth 2 Days N/A   2. Urine Culture, Routine     Status: None    Collection Time: 03/04/25 11:11 AM    Specimen: Urine, clean catch   Result Value Ref Range    Urine Culture No Growth at 18-24 hrs. N/A       CT CHEST (SAT=[id number])    Result Date: 3/7/2025  CONCLUSION:  1. Extensive bilateral airspace disease, concerning for potential multifocal pneumonia. Continued surveillance is advised.  2. Dense bilateral lower airspace consolidation is evident; aspiration pneumonitis is a differential diagnostic possibility. Follow-up is recommended to document resolution.   3. Dilatation of the main  pulmonary artery trunk may relate to underlying pulmonary hypertension.   4. Small hiatal hernia with imaging manifestations that may be indicative underlying esophagitis.  5. Marked hepatic steatosis.  6. Lesser incidental findings as above.    Dictated by (CST): Pavel Lara MD on 3/07/2025 at 11:38 AM     Finalized by (CST): Pavel Lara MD on 3/07/2025 at 11:44 AM          CT BRAIN OR HEAD (CPT=70450)    Result Date: 3/7/2025  CONCLUSION:   Motion limits evaluation.  No evidence of acute intracranial abnormality.    Dictated by (CST): Vishnu Tuttle MD on 3/07/2025 at 11:32 AM     Finalized by (CST): Vishnu Tuttle MD on 3/07/2025 at 11:33 AM          XR CHEST AP PORTABLE  (CPT=71045)    Result Date: 3/7/2025  CONCLUSION:  1. Cardiomegaly.  Tortuous aorta. 2. Extensive multifocal airspace opacification in the bilateral perihilar and basilar regions with left-sided effusion.  Differential includes pulmonary edema congestive failure versus multifocal pneumonitis.    Dictated by (CST): Nolan Dooley MD on 3/07/2025 at 7:49 AM     Finalized by (CST): Nolan Dooley MD on 3/07/2025 at 7:51 AM          US LIVER (CPT=76705)    Result Date: 3/5/2025  CONCLUSION:   Hepatic steatosis.  Gallbladder sludge and cholelithiasis without sonographic evidence of acute cholecystitis.      Dictated by (CST): Elvis Aguilar MD on 3/05/2025 at 8:16 AM     Finalized by (CST): Elvis Aguilar MD on 3/05/2025 at 8:18 AM                 Medications:    piperacillin-tazobactam  3.375 g Intravenous Q8H    doxycycline  100 mg Intravenous Q12H    LORazepam  2 mg Intravenous q6h    lactulose (CHRONULAC) 10 GM/15ML 200 g in Sterile Water for Irrigation 1,000 mL retention enema  200 g Rectal 4 times per day    thiamine  100 mg Intravenous Daily    QUEtiapine  50 mg Oral Nightly    multivitamin  1 tablet Oral Daily    folic acid  1 mg Oral Daily    baclofen  10 mg Oral TID    pantoprazole  40 mg Intravenous Q12H        Assessment & Plan:      Acute alcohol hepatitis   -last drink reportedly 4 days prior to admission  -Reported emesis and poor PO intake, without overt bleeding   -T bili upon arrival to ER 10.5, platelet 65, cr 4.22  -CT a/p reviewed, noted severe fatty liver with subtle undulating contour  -US liver reviewed, noted hepatic steatosis, GB sludge and cholelithiasis without cholecystitis or biliary ductal dilation, hepatic and portal vein patent  -Maddrey score of 131, Bcx in process, urine with leukocytes+, no plans to start steroids  -GI consulted, recommend vitamin K, empiric PPI and recommend EGD in future.    -Appreciate further recommendations    Alcohol Abuse with withdrawal   -CIWA monitoring with alcohol withdrawal protocol  -thiamine, folic acid, multivitamin  -Psychiatry consulted, appreciate recommendations    Altered mental status  -Likely multifactorial contributing to metabolic encephalopathy  -Elevated ammonia levels noted consistent with hepatic encephalopathy.  -Continue lactulose enemas as patient remains n.p.o.  -Patient also with USMAN and electrolyte abnormalities.  -Continue treating underlying conditions  -Continue to monitor    Acute hypoxemic resp failure  Possible pneumonia/aspiration  -Likely multifactorial.  -X-ray reviewed.  Noted multifocal infiltrates.   -Concern for possible aspiration given hx of emesis and altered mental status.   -Check non contrast chest CT  -Started on zosyn, doxy.   -Continue supplemental O2, currently on airvo at this time. Wean as able  -aspiration precautions  -keep NPO  -Pulmonology on consult, appreciate further recommendations    Acute Kidney Injury   -Improvement noted  -Completed bicarb drip  -Continue on D5/0.9  - Avoiding Nephrotoxic agents  - Cont to monitor  -Nephrology on consult, appreciate further recommendations    Plan of care discussed with family at bedside.  Discussed management/test result(s) with Rn and nephrology  consultant.    Quality:  DVT Prophylaxis: SCDs  CODE status: Full  Estimated date of discharge: TBD  Discharge is dependent on: clinical stability    Alex Diaz MD          This note was prepared using Dragon Medical voice recognition dictation software. As a result errors may occur. When identified these errors have been corrected. While every attempt is made to correct errors during dictation discrepancies may still exist

## 2025-03-07 NOTE — DIETARY NOTE
ADULT NUTRITION UPDATE NOTE    RECOMMENDATIONS TO MD:   RD ordered EN feeds and FWF per protocol to initiate once GI access in place. High refeeding risk d/t prolonged NPO, ETOH abuse and electrolyte abnormalities. Slow advancement of feeds. Appropriate labs ordered for monitoring.   See Nutrition Intervention for enteral nutrition (EN) and free water flushes (FWF) specifics     PATIENT STATUS:   03/07/25 UPDATE: Pt discussed with MD on unit - plan to initiate EN feeds. Consult received for RD to initiate and manage tube feeds. See full assessment from 3/6. Pt remains minimally responsive on ativan and not appropriate to take PO. NPO/CL x6 days this admission. Increasing O2 requirements. Stable on HFNC at 40 L/min. RN to place NGT. Remains on lactulose x4 daily with rectal pouch in place 1650 ml recorded output over the 24 hrs however noted no documentation from day shift on 3/6. Pt with good UO and improving creatinine level. Noted hypophosphotemia with replacement ordered 30 mmol NaPhos rider per protocol. IVF of D5W 0.9NS @ 75 ml/hr provides 1.8 L, 90 g dextrose and 306 kcal. Lasix x1 today. Messaged nephrology regarding plan for IVF and free water from EN - awaiting response.     NUTRITION INTERVENTION:     NUTRITION PRESCRIPTION:   Estimated Nutrition needs: --dosing wt of 82.9 kg - wt taken on 3/4/25  Calories: 1860 -2025 calories/day (MSJ REE = 1689 kcal x1(AF) x1.1-1.2(SF) or 22 - 24 calories per kg Dosing wt)   2891-3158 kcal/day = 80% estimated requirements 5-7 days critical illness  Protein:  g protein/day (1.2-1.6 g protein/kg Dosing wt)  Fluid Needs: 2900 ml/day (35 ml/kg chronological age method) - adjust for clinical status (increased losses from high stool output)    - Diet:       Procedures    NPO      - Enteral Nutrition:   Vital AF 1.2 at 20 ml/hr per NG tube.   Recommend advance by 10 ml q 12 hrs to goal rate of 60 ml/hr. Based on average 22 hour infusion time Goal rate provides 1320 total  TF volume, 1584 kcal, 99 grams protein, 1069 ml total free water, and 100% RDI's.    Flush with 30 ml H2O q 4 hrs (to provide 180 ml total H2O from FWF daily and 1249 ml total fluids daily with EN).  Meets 100% of estimated energy and 100% of estimated protein needs.      RD to follow per protocol.     Gilma Martínez MS, RD, LDN, CNSC  [phone]

## 2025-03-07 NOTE — PLAN OF CARE
Problem: PAIN - ADULT  Goal: Verbalizes/displays adequate comfort level or patient's stated pain goal  Description: INTERVENTIONS:  - Encourage pt to monitor pain and request assistance  - Assess pain using appropriate pain scale  - Administer analgesics based on type and severity of pain and evaluate response  - Implement non-pharmacological measures as appropriate and evaluate response  - Consider cultural and social influences on pain and pain management  - Manage/alleviate anxiety  - Utilize distraction and/or relaxation techniques  - Monitor for opioid side effects  - Notify MD/LIP if interventions unsuccessful or patient reports new pain  - Anticipate increased pain with activity and pre-medicate as appropriate  Outcome: Progressing     Problem: RISK FOR INFECTION - ADULT  Goal: Absence of fever/infection during anticipated neutropenic period  Description: INTERVENTIONS  - Monitor WBC  - Administer growth factors as ordered  - Implement neutropenic guidelines  Outcome: Progressing     Problem: SAFETY ADULT - FALL  Goal: Free from fall injury  Description: INTERVENTIONS:  - Assess pt frequently for physical needs  - Identify cognitive and physical deficits and behaviors that affect risk of falls.  - Switzer fall precautions as indicated by assessment.  - Educate pt/family on patient safety including physical limitations  - Instruct pt to call for assistance with activity based on assessment  - Modify environment to reduce risk of injury  - Provide assistive devices as appropriate  - Consider OT/PT consult to assist with strengthening/mobility  - Encourage toileting schedule  Outcome: Progressing     Problem: DISCHARGE PLANNING  Goal: Discharge to home or other facility with appropriate resources  Description: INTERVENTIONS:  - Identify barriers to discharge w/pt and caregiver  - Include patient/family/discharge partner in discharge planning  - Arrange for needed discharge resources and transportation as  appropriate  - Identify discharge learning needs (meds, wound care, etc)  - Arrange for interpreters to assist at discharge as needed  - Consider post-discharge preferences of patient/family/discharge partner  - Complete POLST form as appropriate  - Assess patient's ability to be responsible for managing their own health  - Refer to Case Management Department for coordinating discharge planning if the patient needs post-hospital services based on physician/LIP order or complex needs related to functional status, cognitive ability or social support system  Outcome: Progressing     Problem: Safety Risk - Non-Violent Restraints  Goal: Patient will remain free from self-harm  Description: INTERVENTIONS:  - Apply the least restrictive restraint to prevent harm  - Notify patient and family of reasons restraints applied  - Assess for any contributing factors to confusion (electrolyte disturbances, delirium, medications)  - Discontinue any unnecessary medical devices as soon as possible  - Assess the patient's physical comfort, circulation, skin condition, hydration, nutrition and elimination needs   - Reorient and redirection as needed  - Assess for the need to continue restraints  Outcome: Not Progressing

## 2025-03-08 PROBLEM — E83.39 HYPOPHOSPHATEMIA: Status: ACTIVE | Noted: 2025-03-08

## 2025-03-08 PROBLEM — E87.0 HYPERNATREMIA: Status: ACTIVE | Noted: 2025-03-08

## 2025-03-08 LAB
ALBUMIN SERPL-MCNC: 3 G/DL (ref 3.2–4.8)
ALP LIVER SERPL-CCNC: 145 U/L
ALT SERPL-CCNC: 94 U/L
ANION GAP SERPL CALC-SCNC: 9 MMOL/L (ref 0–18)
AST SERPL-CCNC: 122 U/L (ref ?–34)
BASOPHILS # BLD AUTO: 0.03 X10(3) UL (ref 0–0.2)
BASOPHILS NFR BLD AUTO: 0.4 %
BILIRUB DIRECT SERPL-MCNC: 8.7 MG/DL (ref ?–0.3)
BILIRUB SERPL-MCNC: 10.6 MG/DL (ref 0.3–1.2)
BUN BLD-MCNC: 42 MG/DL (ref 9–23)
BUN/CREAT SERPL: 30 (ref 10–20)
CALCIUM BLD-MCNC: 7.9 MG/DL (ref 8.7–10.4)
CHLORIDE SERPL-SCNC: 111 MMOL/L (ref 98–112)
CO2 SERPL-SCNC: 27 MMOL/L (ref 21–32)
CREAT BLD-MCNC: 1.4 MG/DL
DEPRECATED RDW RBC AUTO: 70.1 FL (ref 35.1–46.3)
EGFRCR SERPLBLD CKD-EPI 2021: 63 ML/MIN/1.73M2 (ref 60–?)
EOSINOPHIL # BLD AUTO: 0.02 X10(3) UL (ref 0–0.7)
EOSINOPHIL NFR BLD AUTO: 0.3 %
ERYTHROCYTE [DISTWIDTH] IN BLOOD BY AUTOMATED COUNT: 27.7 % (ref 11–15)
GLUCOSE BLD-MCNC: 123 MG/DL (ref 70–99)
GLUCOSE BLDC GLUCOMTR-MCNC: 112 MG/DL (ref 70–99)
GLUCOSE BLDC GLUCOMTR-MCNC: 118 MG/DL (ref 70–99)
GLUCOSE BLDC GLUCOMTR-MCNC: 119 MG/DL (ref 70–99)
GLUCOSE BLDC GLUCOMTR-MCNC: 127 MG/DL (ref 70–99)
HCT VFR BLD AUTO: 23.6 %
HGB BLD-MCNC: 7.5 G/DL
IMM GRANULOCYTES # BLD AUTO: 0.1 X10(3) UL (ref 0–1)
IMM GRANULOCYTES NFR BLD: 1.3 %
INR BLD: 1.95 (ref 0.8–1.2)
LYMPHOCYTES # BLD AUTO: 0.67 X10(3) UL (ref 1–4)
LYMPHOCYTES NFR BLD AUTO: 8.5 %
MAGNESIUM SERPL-MCNC: 2 MG/DL (ref 1.6–2.6)
MCH RBC QN AUTO: 23.6 PG (ref 26–34)
MCHC RBC AUTO-ENTMCNC: 31.8 G/DL (ref 31–37)
MCV RBC AUTO: 74.2 FL
MONOCYTES # BLD AUTO: 1.14 X10(3) UL (ref 0.1–1)
MONOCYTES NFR BLD AUTO: 14.5 %
NEUTROPHILS # BLD AUTO: 5.89 X10 (3) UL (ref 1.5–7.7)
NEUTROPHILS # BLD AUTO: 5.89 X10(3) UL (ref 1.5–7.7)
NEUTROPHILS NFR BLD AUTO: 75 %
OSMOLALITY SERPL CALC.SUM OF ELEC: 316 MOSM/KG (ref 275–295)
PHOSPHATE SERPL-MCNC: 1 MG/DL (ref 2.4–5.1)
PLATELET # BLD AUTO: 40 10(3)UL (ref 150–450)
PLATELETS.RETICULATED NFR BLD AUTO: 10.2 % (ref 0–7)
POTASSIUM SERPL-SCNC: 3.3 MMOL/L (ref 3.5–5.1)
POTASSIUM SERPL-SCNC: 3.3 MMOL/L (ref 3.5–5.1)
PROT SERPL-MCNC: 5.3 G/DL (ref 5.7–8.2)
PROTHROMBIN TIME: 23.3 SECONDS (ref 11.6–14.8)
RBC # BLD AUTO: 3.18 X10(6)UL
SODIUM SERPL-SCNC: 147 MMOL/L (ref 136–145)
WBC # BLD AUTO: 7.9 X10(3) UL (ref 4–11)

## 2025-03-08 PROCEDURE — 99232 SBSQ HOSP IP/OBS MODERATE 35: CPT | Performed by: OTHER

## 2025-03-08 PROCEDURE — 99233 SBSQ HOSP IP/OBS HIGH 50: CPT | Performed by: INTERNAL MEDICINE

## 2025-03-08 RX ORDER — HALOPERIDOL 5 MG/ML
2 INJECTION INTRAMUSCULAR EVERY 6 HOURS PRN
Status: DISCONTINUED | OUTPATIENT
Start: 2025-03-08 | End: 2025-03-12

## 2025-03-08 RX ORDER — VANCOMYCIN HYDROCHLORIDE 50 MG/ML
125 KIT ORAL DAILY
Status: DISCONTINUED | OUTPATIENT
Start: 2025-03-08 | End: 2025-03-31

## 2025-03-08 RX ORDER — POTASSIUM CHLORIDE 1.5 G/1.58G
40 POWDER, FOR SOLUTION ORAL ONCE
Status: COMPLETED | OUTPATIENT
Start: 2025-03-08 | End: 2025-03-08

## 2025-03-08 NOTE — PROGRESS NOTES
[facility]  [facility] Progress [facility]     Sami Grijalva  Patient Status:  Inpatient    10/11/1980 MRN [id number]   Location [facility]5W Attending Alex Diaz MD   Hosp Day # 4 PCP Kerri Medina MD     Chief Complaint:   Chief Complaint   Patient presents with    Vomiting    Hiccups    Eval-D        Subjective:     Patient seen lying in bed.  No family at bedside.  Patient remains lethargic, but is attempting to open eyes and move extremities.  Patient not following commands or answer questions.      Objective:      Vital signs:  Vitals:    25 0900 25 1000 25 1100 25 1200   BP: 106/64 120/74 120/74 90/55   BP Location:    Right arm   Pulse: 102 109 113 95   Resp: 24 (!) 28 20 23   Temp:    97.4 °F (36.3 °C)   TempSrc:    Temporal   SpO2: 96% 95% 90% 100%   Weight:       Height:           Intake/Output Summary (Last 24 hours) at 3/8/2025 1422  Last data filed at 3/8/2025 1210  Gross per 24 hour   Intake  ml   Output 2950 ml   Net -923 ml           Physical Exam:    GENERAL: Lethargic.  In no acute distress.  HEART:  Regular rhythm, regular rate  LUNGS:  Air entry was decreased.  No increased work of breathing. No wheezes   ABDOMEN: Soft and non-tender.    NEUROLOGICAL: Lethargic, attempts to raise head and move extremities, but does not open eyes .      Diagnostic Data:    Labs:    Recent Labs   Lab 25  1111 25  0358 25  1426 25  0335 25  0414 25  0356 25  1025   WBC 17.5*   < >  --  4.5 5.7 7.9  --    HGB 10.1*   < >  --  8.0* 7.6* 7.5*  --    MCV 78.9*   < >  --  76.2* 76.1* 74.2*  --    PLT 65.0*   < >  --  29.0* 34.0* 40.0*  --    BAND  --   --   --   --  7  --   --    INR 3.86*  --  3.75*  --   --   --  1.95*    < > = values in this interval not displayed.       Recent Labs   Lab 25  0358 25  0335 25  1253 25  0414 25  0356 25  1025    GLU 98 109*  --  127* 123*  --    BUN 36* 51*  --  49* 42*  --    CREATSERUM 3.70* 2.15*  --  1.52* 1.40*  --    CA 7.8* 7.9*  --  7.7* 7.9*  --    ALB 2.9* 3.0*  --   --   --  3.0*   * 136  --  142 147*  --    K 4.2 3.4*   < > 3.5 3.3* 3.3*   CL 98 102  --  108 111  --    CO2 21.0 25.0  --  26.0 27.0  --    ALKPHO 115 120*  --   --   --  145*   * 139*  --   --   --  122*   ALT 46 82*  --   --   --  94*   BILT 6.8* 6.7*  --   --   --  10.6*   TP 5.2* 5.3*  --   --   --  5.3*    < > = values in this interval not displayed.           Estimated Creatinine Clearance: 63.8 mL/min (A) (based on SCr of 1.4 mg/dL (H)).    Recent Labs   Lab 03/04/25  1111 03/05/25  1426 03/08/25  1025   PTP 39.7* 38.8* 23.3*   INR 3.86* 3.75* 1.95*            COVID-19  Lab Results   Component Value Date    COVID19 Not Detected 12/26/2023    COVID19 Not Detected 01/15/2023       Pro-Calcitonin  No results for input(s): \"PCT\" in the last 168 hours.    Cardiac  No results for input(s): \"TROP\", \"PBNP\" in the last 168 hours.    Inflammatory Markers  No results for input(s): \"CRP\", \"CANDICE\", \"LDH\", \"DDIMER\" in the last 168 hours.    Culture:  Hospital Encounter on 03/04/25   1. Blood Culture     Status: None (Preliminary result)    Collection Time: 03/07/25 10:28 AM    Specimen: Blood,peripheral   Result Value Ref Range    Blood Culture Result No Growth 1 Day N/A   2. Urine Culture, Routine     Status: None    Collection Time: 03/04/25 11:11 AM    Specimen: Urine, clean catch   Result Value Ref Range    Urine Culture No Growth at 18-24 hrs. N/A       XR CHEST AP PORTABLE  (CPT=71045)    Result Date: 3/7/2025  CONCLUSION:  1. Enteric tube extends well beyond the gastroesophageal junction; the distal tip is partially coiled but projects at the expected location of the gastric fundus/body (good position). 2. Stable extensive left greater than right basilar opacities, which are concerning for infection/pneumonia in the appropriate clinical  setting.   elm-remote  Dictated by (CST): Arcenio Norman MD on 3/07/2025 at 8:54 PM     Finalized by (CST): Arcenio Norman MD on 3/07/2025 at 8:56 PM          XR CHEST AP PORTABLE  (CPT=71045)    Result Date: 3/7/2025  PROCEDURE: XR CHEST AP PORTABLE  (CPT=71045) TIME: 1332.   COMPARISON: [facility], XR CHEST AP PORTABLE (CPT=71045), 3/07/2025, 7:06 AM.  INDICATIONS: Verify NG tube placement  TECHNIQUE:   Single view.   FINDINGS/IMPRESSION:    1. There is an enteric feeding tube coiled within the gastric fundus in good position.  2. There is redemonstration of patchy interstitial and alveolar opacities throughout both lungs (left worse than right).  The findings could represent a multifocal infectious process, atypical interstitial edema, or a combination.  There is a small to moderate-sized left-sided pleural effusion with probable underlying atelectasis.  3. The heart mediastinal structures are minimally enlarged.      Dictated by (CST): Prateek Mcclelland MD on 3/07/2025 at 1:40 PM     Finalized by (CST): Prateek Mcclelland MD on 3/07/2025 at 1:42 PM          CT CHEST (ENX=52212)    Result Date: 3/7/2025  CONCLUSION:  1. Extensive bilateral airspace disease, concerning for potential multifocal pneumonia. Continued surveillance is advised.  2. Dense bilateral lower airspace consolidation is evident; aspiration pneumonitis is a differential diagnostic possibility. Follow-up is recommended to document resolution.   3. Dilatation of the main pulmonary artery trunk may relate to underlying pulmonary hypertension.   4. Small hiatal hernia with imaging manifestations that may be indicative underlying esophagitis.  5. Marked hepatic steatosis.  6. Lesser incidental findings as above.    Dictated by (CST): Pavel Lara MD on 3/07/2025 at 11:38 AM     Finalized by (CST): Pavel Lara MD on 3/07/2025 at 11:44 AM          CT BRAIN OR HEAD (CPT=70450)    Result Date: 3/7/2025  CONCLUSION:   Motion limits  evaluation.  No evidence of acute intracranial abnormality.    Dictated by (CST): Vishnu Tuttle MD on 3/07/2025 at 11:32 AM     Finalized by (CST): Vishnu Tuttle MD on 3/07/2025 at 11:33 AM          XR CHEST AP PORTABLE  (CPT=71045)    Result Date: 3/7/2025  CONCLUSION:  1. Cardiomegaly.  Tortuous aorta. 2. Extensive multifocal airspace opacification in the bilateral perihilar and basilar regions with left-sided effusion.  Differential includes pulmonary edema congestive failure versus multifocal pneumonitis.    Dictated by (CST): Nolan Dooley MD on 3/07/2025 at 7:49 AM     Finalized by (CST): Nolan Dooley MD on 3/07/2025 at 7:51 AM                 Medications:    sodium phosphate  15 mmol Intravenous Once    vancomycin  125 mg Per NG Tube Daily    potassium chloride  40 mEq Oral Once    piperacillin-tazobactam  3.375 g Intravenous Q8H    doxycycline  100 mg Intravenous Q12H    thiamine  200 mg Intravenous TID    lactulose  20 g Oral 4 times per day    multivitamin  1 tablet Oral Daily    folic acid  1 mg Oral Daily    baclofen  10 mg Oral TID    pantoprazole  40 mg Intravenous Q12H       Assessment & Plan:      Acute alcohol hepatitis   -last drink reportedly 4 days prior to admission  -Reported emesis and poor PO intake, without overt bleeding   -T bili upon arrival to ER 10.5, platelet 65, cr 4.22  -CT a/p reviewed, noted severe fatty liver with subtle undulating contour  -US liver reviewed, noted hepatic steatosis, GB sludge and cholelithiasis without cholecystitis or biliary ductal dilation, hepatic and portal vein patent  -Maddrey score of 131, Bcx in process, urine with leukocytes+  -GI consulted, recommend vitamin K, empiric PPI and recommend EGD in future.    -Appreciate further recommendations    Alcohol Abuse with withdrawal   -CIWA monitoring with alcohol withdrawal protocol  -thiamine, folic acid, multivitamin  -Psychiatry consulted, appreciate recommendations    Altered mental  status  -Likely multifactorial contributing to metabolic encephalopathy  -Initially noted to have elevated ammonia levels noted consistent with hepatic encephalopathy.  -Continue lactulose   -Patient also with USMAN and electrolyte abnormalities.  -Continue treating underlying conditions  -Continue to monitor    Acute hypoxemic resp failure  Possible pneumonia/aspiration  -Likely multifactorial.  -X-ray reviewed.  Noted multifocal infiltrates.   -Concern for possible aspiration given hx of emesis and altered mental status.   -Check non contrast chest CT  -Continue on zosyn, doxy.   -Continue supplemental O2, currently on airvo at this time. Wean as able  -aspiration precautions  -keep NPO  -Pulmonology on consult, appreciate further recommendations    Acute Kidney Injury   -Improvement noted  -IV fluids discontinued as patient was started on tube feeds  - Avoiding Nephrotoxic agents  - Cont to monitor  -Nephrology on consult, appreciate further recommendations      Discussed management/test result(s) with Rn   Quality:  DVT Prophylaxis: SCDs  CODE status: Full  Estimated date of discharge: TBD  Discharge is dependent on: clinical stability    54 minutes spent discussing with other providers, examining patient, obtaining history, reviewing medical records, interpreting and communicating test results/imaging, ordering tests/medications, discussing plan of care and documenting information.      Alex Diaz MD          This note was prepared using Dragon Medical voice recognition dictation software. As a result errors may occur. When identified these errors have been corrected. While every attempt is made to correct errors during dictation discrepancies may still exist

## 2025-03-08 NOTE — RESPIRATORY THERAPY NOTE
Pt received on High Flow: 40L  FiO2: 40%     Pt tolerating well. No acute events overnight. RT to continue to monitor.        03/08/25 0412   Oxygen Utilization   O2 Device High flow/High humidity   O2 Flow Rate (L/min) 40 L/min   FiO2 (%) 40 %   Heater Temperature 87.8 °F (31 °C)   SpO2 100 %

## 2025-03-08 NOTE — PROGRESS NOTES
Patient is a 44 year old , single, male with past medical history of GERD, alcohol use disroder, who was admitted to the hospital for USMAN (acute kidney injury): The patient has been demonstrating symptoms of withdrawal.   Patient indicated for psych consult for evaluation and advise.    Consult Duration   The patient seen for over 50-minute, follow-up evaluation, over 50% counseling and coordinating care addressing alcohol withdrawal..    Record reviewed, communication with attending, communication with RN and patient seen face to face evaluation.    History of Present Illness:     According to the team the patient continued demonstrate lethargy, some confusion with limited intake.    The patient is seen today in his room.  The patient is jaundice and lethargic with difficulty communicating.  Blood pressure 109/67 with heart rate of 113.    Patient is well-known to the service with longstanding alcohol dependency and withdrawal syndrome.  Patient according to the team has been demonstrating altered mental status with confusion and disorientation.  Ammonia level 121 with elevated liver enzyme.  Magnesium level is 2.2 today.    The patient seen today laying in hospital bed.  Patient is a sleepy and restraint.    The patient did not respond to verbal or painful stimuli.     He is not able to answer questions or engage in conversation due to lethargy.     The patient has been demonstrating severe withdrawals with confusion, restlessness, agitation and response to internal stimuli.     Past Psychiatric/Medication History:  1. Prior diagnoses: alcohol use disorder  2. Past psychiatric inpatient: Denies, the patients partner reports he has been to rehab 3 times.   3. Past outpatient history: Denies  4. Past suicide history: The patient reports history of attempt 5 years ago. He could not elaborate  5. Medication history: Denies     Social History:   The patient lives at home with his parents and his partner of 10  years. He works at a car wash.   He drinks alcohol daily. Occasional cannabis use. No tobacco or illicit substance abuse.     Family History:  No family history reported      Medical History:   Past Medical History  Past Medical History:    Esophageal reflux    Gout    Hernia       Past Surgical History  Past Surgical History:   Procedure Laterality Date    Colonoscopy N/A 11/1/2023    Procedure: COLONOSCOPY;  Surgeon: Vandana Austin MD;  Location: [facility] ENDOSCOPY    Egd  02/15/2016    Eh pr repair complex scalp arms legs 1.1 to 2.5 cm  2014    Elbow fracture surgery Right 1991    Hernia surgery      Inguinal hernia repair Left 01/17/2023       Family History  Family History   Problem Relation Age of Onset    Diabetes Father     Hypertension Father     Cancer Mother         liver       Social History  Social History     Socioeconomic History    Marital status: Single    Number of children: 0   Occupational History    Occupation: Supervisor, car wash   Tobacco Use    Smoking status: Former     Types: Cigarettes    Smokeless tobacco: Never   Vaping Use    Vaping status: Some Days   Substance and Sexual Activity    Alcohol use: Yes     Comment: per pt, DAILY HARD LEMONADE- [employer] hard lemonade 24oz cans, 6 cans daily    Drug use: Yes     Types: Cannabis     Comment: last use, couple months ago per pt report     Social Drivers of Health     Food Insecurity: No Food Insecurity (3/4/2025)    NCSS - Food Insecurity     Worried About Running Out of Food in the Last Year: No     Ran Out of Food in the Last Year: No   Transportation Needs: No Transportation Needs (3/4/2025)    NCSS - Transportation     Lack of Transportation: No   Housing Stability: Not At Risk (3/4/2025)    NCSS - Housing/Utilities     Has Housing: Yes     Worried About Losing Housing: No     Unable to Get Utilities: No           Current Medications:  Current Facility-Administered Medications   Medication Dose Route Frequency    piperacillin-tazobactam  (Zosyn) 3.375 g in dextrose 5% 100 mL IVPB-ADDV  3.375 g Intravenous Q8H    doxycycline hyclate (Vibramycin) 100 mg in sodium chloride 0.9% 100 mL IVPB  100 mg Intravenous Q12H    vancomycin (Vancocin) cap 125 mg  125 mg Oral Daily    dextrose 10% infusion (TPN no rate)   Intravenous Continuous PRN    sodium bicarbonate tab 650 mg  650 mg Per G Tube PRN    And    pancrelipase (Lip-Prot-Amyl) (Zenpep) DR particles cap 10,000 Units  10,000 Units Per G Tube PRN    metoprolol (Lopressor) 5 mg/5mL injection 5 mg  5 mg Intravenous Q4H PRN    LORazepam (Ativan) 2 mg/mL injection 2 mg  2 mg Intravenous q6h    haloperidol lactate (Haldol) 5 MG/ML injection 2 mg  2 mg Intravenous Q6H PRN    lactulose (CHRONULAC) 10 GM/15ML 200 g in Sterile Water for Irrigation 1,000 mL retention enema  200 g Rectal 4 times per day    thiamine 100 mg/mL injection 100 mg  100 mg Intravenous Daily    QUEtiapine (SEROquel) tab 50 mg  50 mg Oral Nightly    dextrose 5%-sodium chloride 0.9% infusion   Intravenous Continuous    acetaminophen (Tylenol Extra Strength) tab 500 mg  500 mg Oral Q4H PRN    ondansetron (Zofran) 4 MG/2ML injection 4 mg  4 mg Intravenous Q6H PRN    prochlorperazine (Compazine) 10 MG/2ML injection 5 mg  5 mg Intravenous Q8H PRN    multivitamin (Tab-A-Yosef/Beta Carotene) tab 1 tablet  1 tablet Oral Daily    folic acid (Folvite) tab 1 mg  1 mg Oral Daily    LORazepam (Ativan) tab 1 mg  1 mg Oral Q1H PRN    Or    LORazepam (Ativan) 2 mg/mL injection 1 mg  1 mg Intravenous Q1H PRN    LORazepam (Ativan) tab 2 mg  2 mg Oral Q1H PRN    Or    LORazepam (Ativan) 2 mg/mL injection 2 mg  2 mg Intravenous Q1H PRN    LORazepam (Ativan) tab 3 mg  3 mg Oral Q1H PRN    Or    LORazepam (Ativan) 2 mg/mL injection 3 mg  3 mg Intravenous Q1H PRN    LORazepam (Ativan) 2 mg/mL injection 4 mg  4 mg Intravenous Q30 Min PRN    LORazepam (Ativan) 2 mg/mL injection 5 mg  5 mg Intravenous Q15 Min PRN    LORazepam (Ativan) 2 mg/mL injection 6 mg  6 mg  Intravenous Q10 Min PRN    baclofen (Lioresal) tab 10 mg  10 mg Oral TID    pantoprazole (Protonix) 40 mg in sodium chloride 0.9% PF 10 mL IV push  40 mg Intravenous Q12H     No medications prior to admission.       Allergies  Allergies[1]    Review of Systems:   As by Admitting/Attending    Results:   Laboratory Data:  Lab Results   Component Value Date    WBC 5.7 03/07/2025    HGB 7.6 (L) 03/07/2025    HCT 23.6 (L) 03/07/2025    PLT 34.0 (L) 03/07/2025    CREATSERUM 1.52 (H) 03/07/2025    BUN 49 (H) 03/07/2025     03/07/2025    K 3.5 03/07/2025     03/07/2025    CO2 26.0 03/07/2025     (H) 03/07/2025    CA 7.7 (L) 03/07/2025    ALB 3.0 (L) 03/06/2025    ALKPHO 120 (H) 03/06/2025    TP 5.3 (L) 03/06/2025     (H) 03/06/2025    ALT 82 (H) 03/06/2025    INR 3.75 (H) 03/05/2025    PTP 38.8 (H) 03/05/2025     (H) 03/04/2025    DDIMER 1.13 (H) 08/25/2023    ESRML 27 (H) 10/01/2021    MG 2.2 03/07/2025    PHOS 1.1 (L) 03/07/2025    TROP 0.00 01/31/2017     (H) 03/06/2025    ETOH <3 03/04/2025         Imaging:  XR CHEST AP PORTABLE  (CPT=71045)    Result Date: 3/7/2025  PROCEDURE: XR CHEST AP PORTABLE  (CPT=71045) TIME: 1332.   COMPARISON: [facility], XR CHEST AP PORTABLE (CPT=71045), 3/07/2025, 7:06 AM.  INDICATIONS: Verify NG tube placement  TECHNIQUE:   Single view.   FINDINGS/IMPRESSION:    1. There is an enteric feeding tube coiled within the gastric fundus in good position.  2. There is redemonstration of patchy interstitial and alveolar opacities throughout both lungs (left worse than right).  The findings could represent a multifocal infectious process, atypical interstitial edema, or a combination.  There is a small to moderate-sized left-sided pleural effusion with probable underlying atelectasis.  3. The heart mediastinal structures are minimally enlarged.      Dictated by (CST): Prateek Mcclelland MD on 3/07/2025 at 1:40 PM     Finalized by (CST): Krystin  MD Prateek on 3/07/2025 at 1:42 PM          CT CHEST (MRM=[id number])    Result Date: 3/7/2025  CONCLUSION:  1. Extensive bilateral airspace disease, concerning for potential multifocal pneumonia. Continued surveillance is advised.  2. Dense bilateral lower airspace consolidation is evident; aspiration pneumonitis is a differential diagnostic possibility. Follow-up is recommended to document resolution.   3. Dilatation of the main pulmonary artery trunk may relate to underlying pulmonary hypertension.   4. Small hiatal hernia with imaging manifestations that may be indicative underlying esophagitis.  5. Marked hepatic steatosis.  6. Lesser incidental findings as above.    Dictated by (CST): Pavel Lara MD on 3/07/2025 at 11:38 AM     Finalized by (CST): Pavel aLra MD on 3/07/2025 at 11:44 AM          CT BRAIN OR HEAD (CPT=70450)    Result Date: 3/7/2025  CONCLUSION:   Motion limits evaluation.  No evidence of acute intracranial abnormality.    Dictated by (CST): Vishnu Tuttle MD on 3/07/2025 at 11:32 AM     Finalized by (CST): Vishnu Tuttle MD on 3/07/2025 at 11:33 AM          XR CHEST AP PORTABLE  (CPT=71045)    Result Date: 3/7/2025  CONCLUSION:  1. Cardiomegaly.  Tortuous aorta. 2. Extensive multifocal airspace opacification in the bilateral perihilar and basilar regions with left-sided effusion.  Differential includes pulmonary edema congestive failure versus multifocal pneumonitis.    Dictated by (CST): Nolan Dooley MD on 3/07/2025 at 7:49 AM     Finalized by (CST): Nolan Dooley MD on 3/07/2025 at 7:51 AM          US LIVER (CPT=76705)    Result Date: 3/5/2025  CONCLUSION:   Hepatic steatosis.  Gallbladder sludge and cholelithiasis without sonographic evidence of acute cholecystitis.      Dictated by (CST): Elvis Aguilar MD on 3/05/2025 at 8:16 AM     Finalized by (CST): Elvis Aguilar MD on 3/05/2025 at 8:18 AM           Vital Signs:   Blood pressure 106/60, pulse 107, temperature 97.6 °F  (36.4 °C), temperature source Temporal, resp. rate 25, height 68\", weight 82.9 kg (182 lb 12.8 oz), SpO2 97%.    Mental Status Exam:   Appearance: Stated age male, in hospital gown, laying down in hospital bed.  Psychomotor: Patient seen today sedated with no tremor or agitation otherwise patient with restraint with episode of confusion, impulsivity and occasional agitation.  Orientation: Alert and oriented to person. Patient is confused.   Gait: Not evaluated.  Attitude/Coorperation: patient is not able to cooperate with interview.   Behavior: anxious, restless, agitated otherwise lethargic this morning.  Speech: mumbled and incomprehensible.  Mood: Apathy with difficulty expressing emotion.  Affect: Restricted and flat.  Thought process: confused  Thought content: no reports of suicidal or homicidal ideation.  Perceptions: Patient demonstrating response to internal stimuli  Concentration: impaired  Memory: impaired  Intellect: Average.  Judgment and Insight: Impaired as an evidenced by recurrent drinking with deterioration in his physical function.    Impression:     Alcohol withdrawal Syndrome with delirium.  Episodic mood disorder.  Alcohol Dependence, continuous.  Rule out Wernicke's syndrome    USMAN (acute kidney injury)    Metabolic acidosis    Hyperkalemia    Leukocytosis    Thrombocytopenia    Coagulopathy (HCC)    Hyponatremia    Alcoholic (HCC)    Scleral icterus    Acute kidney failure      Patient is a 44 year old , single, male with past medical history of GERD, alcohol use disroder, who was admitted to the hospital for USMAN (acute kidney injury): The patient has been demonstrating symptoms of withdrawal.     The patient has been demonstrating severe withdrawals with confusion, restlessness, agitation and response to internal stimuli.     3/6/2025: patient was transferred to CCU due to increased CIWA scores. Patient lethargic this morning.     3/7/2025: Patient has been demonstrating increased  confusion, lethargy with excessive sedation with episode of agitation.  No tremors has been observed.    Discussed risk and benefit, acknowledging the current symptom and severity.  At this point, I would recommend the following approach:     Focus on safety  Focus on education and support.  Focus on insight orientation helping the patient understand diagnosis and treatment plan.  Continue ativan per ciwa protocol  Lower Ativan to 1 mg IV q 6 hours scheduled  Increase thiamine to 200 mg IV 3 times daily.  Utilize haldol 2 mg IV q 6 hours PRN agitation  Continue Seroquel 50 mg nightly  Processed with patient at length, the initiation of the above psychotropic medications I advised the patient of the risks, benefits, alternatives and potential side effects. The patient consents to administration of the medications and understands the right to refuse medications at any time. The patient verbalized understanding.   Coordinate plan with team    Orders This Visit:  Orders Placed This Encounter   Procedures    CBC With Differential With Platelet    Comp Metabolic Panel (14)    Urinalysis with Culture Reflex    Lipase    RBC Morphology Scan    Prothrombin Time (PT)    Lactic Acid, Plasma    Comp Metabolic Panel (14)    CBC With Differential With Platelet    Magnesium    Sodium, Urine, Random    Creatinine, Urine, Random    Phosphorus    Basic Metabolic Panel (8)    Osmolality, Urine    Lactic Acid Reflex Post Positive    Lactic Acid, Plasma    Lactic Acid Post Positive    Lactic Acid, Plasma    Magnesium    Lactic Acid Reflex Post Positive    Lactic Acid Post Positive    Ethyl Alcohol    Ammonia, Plasma    CBC With Differential With Platelet    Basic Metabolic Panel (8)    Hepatic Function Panel (7)    Arterial blood gas    Prothrombin Time (PT)    AFP, Tumor Marker, Serum    Actin (Smooth Muscle) Antibody    Alpha-1-antirypsin, serum    Ceruloplasmin    Hepatitis A B + C profile    Immunoglobulin A/G/M, Quant    Iron And  Tibc    Liver-Kidney Microsomal Antibody    Mitochondrial (M2) Antibody    Hep A AB, IGM    Scan slide    MD BLOOD SMEAR CONSULT    Potassium    Magnesium    Phosphorus    CK (Creatine Kinase) (Not Creatinine)    Scan slide    Manual differential    Phosphorus    Iron And Tibc    Arterial blood gas    Legionella urine Ag serogrp 1    Streptococcus Pneumoniae Ag, Urine    Ammonia, Plasma    Basic Metabolic Panel (8)    Magnesium    Urine Culture, Routine    Blood Culture    MRSA Screen by PCR    Blood Culture    Emergency MRSA Screen by PCR       Meds This Visit:  Requested Prescriptions      No prescriptions requested or ordered in this encounter       Felix Davila MD  3/7/2025    Note to Patient: The 21st Century Cures Act makes medical notes like these available to patients in the interest of transparency. However, be advised this is a medical document. It is intended as peer to peer communication. It is written in medical language and may contain abbreviations or verbiage that are unfamiliar. It may appear blunt or direct. Medical documents are intended to carry relevant information, facts as evident, and the clinical opinion of the practitioner. This note may have been transcribed using a voice dictation system. Voice recognition errors may occur. This should not be taken to alter the content or meaning of this note.           [1]   Allergies  Allergen Reactions    Morphine SWELLING     SHORTNESS OF BREATH

## 2025-03-08 NOTE — PROGRESS NOTES
Pulmonary/ICU/Critical Care Progress Note        Reason for Consultation: resp failure  Referring Physician: Dr. Diaz      Subjective:  Lethargic on airvo  Has hiccups  Electrolytes being replaced        From the initial consultation  Pt is unable to provide info  HPI: 43 yo male with hx of gout, GERD, admitted with acute ETOH hepatitis, ETOH use, emesis, USMAN. Seen by GI.  On CIWA protocol, thiamine, folic acid, MVI, BZDs, lactulose  Has been in the ICU for 2 days  ICU consulted this am for worsening hypoxemia  CXR this am with b/l infiltrates concerning for PNA      REVIEW OF SYSTEMS:  Positives and negatives as noted in HPI. All other review of systems otherwise are either limited (due to pt/family inability to provide) or negative.      PAST MEDICAL HISTORY:  Past Medical History:   Diagnosis Date    Esophageal reflux     Gout     Hernia          PAST SURGICAL HISTORY:  Past Surgical History:   Procedure Laterality Date    Colonoscopy N/A 11/1/2023    Procedure: COLONOSCOPY;  Surgeon: Vandana Austin MD;  Location: [facility] ENDOSCOPY    Egd  02/15/2016    Eh pr repair complex scalp arms legs 1.1 to 2.5 cm  2014    Elbow fracture surgery Right 1991    Hernia surgery      Inguinal hernia repair Left 01/17/2023         PAST SOCIAL HISTORY:  Social History     Socioeconomic History    Marital status: Single    Number of children: 0   Occupational History    Occupation: Supervisor, car wash   Tobacco Use    Smoking status: Former     Types: Cigarettes    Smokeless tobacco: Never   Vaping Use    Vaping status: Some Days   Substance and Sexual Activity    Alcohol use: Yes     Comment: per pt, DAILY HARD LEMONADE- [employer] hard lemonade 24oz cans, 6 cans daily    Drug use: Yes     Types: Cannabis     Comment: last use, couple months ago per pt report         PAST FAMILY HISTORY:  Family History   Problem Relation Age of Onset    Diabetes Father     Hypertension Father     Cancer Mother         liver        ALLERGIES:  Allergies[1]      MEDS:  Home Medications:  Medications Taking[2]    Scheduled Medication:   potassium phosphate dibasic 30 mmol in sodium chloride 0.9% 250 mL IVPB  30 mmol Intravenous Once    Followed by    sodium phosphate  15 mmol Intravenous Once    piperacillin-tazobactam  3.375 g Intravenous Q8H    doxycycline  100 mg Intravenous Q12H    vancomycin  125 mg Oral Daily    LORazepam  1 mg Intravenous q6h    thiamine  200 mg Intravenous TID    lactulose  20 g Oral 4 times per day    QUEtiapine  50 mg Oral Nightly    multivitamin  1 tablet Oral Daily    folic acid  1 mg Oral Daily    baclofen  10 mg Oral TID    pantoprazole  40 mg Intravenous Q12H     Continuous Infusing Medication:   dextrose 10%       PRN Medications:    dextrose 10%    sodium bicarbonate **AND** lipase-protease-amylase (Lip-Prot-Amyl)    metoprolol    haloperidol lactate    acetaminophen    ondansetron    prochlorperazine    LORazepam **OR** LORazepam    LORazepam **OR** LORazepam    LORazepam **OR** LORazepam    LORazepam    LORazepam    LORazepam       PHYSICAL EXAM:  /74 (BP Location: Right arm)   Pulse 102   Temp 97.5 °F (36.4 °C) (Temporal)   Resp 21   Ht 5' 8\" (1.727 m)   Wt 182 lb 12.8 oz (82.9 kg)   SpO2 100%   BMI 27.79 kg/m²   FiO2 (%):  [40 %-75 %] 40 %  CONSTITUTIONAL: somnolent min response  HEENT: atraumatic normocephalic  MOUTH: mucous membranes are moist. No OP exudates  NECK/THROAT: no JVD. Trachea midline. No obvious thyromegaly  LUNG: clear b/l no wheezing, + b/l crackles. + hiccups. Chest symmetric with respiratory motion  HEART: regular rate and rhythm, no obvious murmers or gallops noted  ABD: soft non tender. + bowel sounds. No organomegaly noted  EXT: no clubbing, cyanosis, or edema noted. Pulses intact grossly  NEURO/MUSCULOSKELETAL: somnolent, withdraws  SKIN: warm, dry. No obvious lesions noted  LYMPH: no obvious LAD      IMAGES:   CT head 3/7/25  CONCLUSION:   Motion limits evaluation.   No evidence of acute intracranial abnormality.     CT chest 3/7/25  CONCLUSION:   1. Extensive bilateral airspace disease, concerning for potential multifocal pneumonia. Continued surveillance is advised.   2. Dense bilateral lower airspace consolidation is evident; aspiration pneumonitis is a differential diagnostic possibility. Follow-up is recommended to document resolution.    3. Dilatation of the main pulmonary artery trunk may relate to underlying pulmonary hypertension.    4. Small hiatal hernia with imaging manifestations that may be indicative underlying esophagitis.   5. Marked hepatic steatosis.   6. Lesser incidental findings as above.     CXR 3/7/25 with multifocal infiltrates, possible effusion on the left  CONCLUSION:   1. Cardiomegaly.  Tortuous aorta.   2. Extensive multifocal airspace opacification in the bilateral perihilar and basilar regions with left-sided effusion.  Differential includes pulmonary edema congestive failure versus multifocal pneumonitis.      CT abd/pelvis 3/4/25  CONCLUSION:   Severely fatty liver with subtle undulating contour.  Stable splenomegaly.  Tubular structures around the GE junction are suggestive of lower esophageal varices.  No ascites       LABS:  Recent Labs   Lab 03/06/25  0335 03/07/25  0414 03/08/25  0356   RBC 3.44* 3.10* 3.18*   HGB 8.0* 7.6* 7.5*   HCT 26.2* 23.6* 23.6*   MCV 76.2* 76.1* 74.2*   MCH 23.3* 24.5* 23.6*   MCHC 30.5* 32.2 31.8   RDW 25.4* 26.5* 27.7*   NEPRELIM 3.30 3.85 5.89   WBC 4.5 5.7 7.9   PLT 29.0* 34.0* 40.0*       Recent Labs   Lab 03/04/25  1112 03/04/25  1645 03/05/25  0358 03/06/25  0335 03/06/25  1253 03/07/25  0414 03/08/25  0356   GLU 72   < > 98 109*  --  127* 123*   BUN 30*   < > 36* 51*  --  49* 42*   CREATSERUM 4.22*   < > 3.70* 2.15*  --  1.52* 1.40*   EGFRCR 17*   < > 20* 38*  --  57* 63   CA 9.6   < > 7.8* 7.9*  --  7.7* 7.9*   ALB 4.3  --  2.9* 3.0*  --   --   --    *   < > 130* 136  --  142 147*   K 5.5*   < > 4.2  3.4* 3.8 3.5 3.3*   CL 86*   < > 98 102  --  108 111   CO2 15.0*   < > 21.0 25.0  --  26.0 27.0   ALKPHO 175*  --  115 120*  --   --   --    *  --  112* 139*  --   --   --    ALT 40  --  46 82*  --   --   --    BILT 10.5*  --  6.8* 6.7*  --   --   --    TP 7.5  --  5.2* 5.3*  --   --   --     < > = values in this interval not displayed.       ASSESSMENT/PLAN:  Acute hypoxemic resp failure  -secondary to multifocal infiltrates. Given hx of emesis, concern for aspiration  -checked non contrast chest CT showed b/l infiltrates  -on zosyn, doxy. Checking urine leg, strep pneumo, mrsa nares, blood cx  -continue airvo at this time. Wean as able  -aspiration precautions  -keep NPO-has TF via NGT  -restart baclofen for hiccups    Acute ETOH hepatitis  -GI following. Imaging as above  -vit K, PPI, lactulose  -NGT with TF    ETOH withdrawal  -CIWA protocol  -BZD  -CT head neg for acute changes    USMAN  -s/p bicarb infusion  -on D5 0.9 NS  -renal following    Proph  -DVT: elevated INR low PLT. On SCDS    Dispo  -Full code      Thank you for the opportunity to care for Sami Escobedo DO, MPH  Pulmonary Critical Care Medicine  [place] [facility]                       [1]   Allergies  Allergen Reactions    Morphine SWELLING     SHORTNESS OF BREATH   [2]   No outpatient medications have been marked as taking for the 3/4/25 encounter (Hospital Encounter).

## 2025-03-08 NOTE — PROGRESS NOTES
[facility]  part of Located within [facility]    Progress Note      Subjective:     Patient remains minimally responsive.  On Airvo.  Continue to have hiccups.    Review of Systems:     Unable to obtain-patient minimally responsive    Objective:   Temp:  [97.1 °F (36.2 °C)-99.7 °F (37.6 °C)] 97.4 °F (36.3 °C)  Pulse:  [] 108  Resp:  [16-29] 28  BP: ()/(53-80) 117/68  SpO2:  [90 %-100 %] 99 %  FiO2 (%):  [40 %-75 %] 40 %  SpO2: 99 %     Intake/Output Summary (Last 24 hours) at 3/8/2025 1511  Last data filed at 3/8/2025 1210  Gross per 24 hour   Intake 2027 ml   Output 1300 ml   Net 727 ml     Wt Readings from Last 3 Encounters:   03/04/25 182 lb 12.8 oz (82.9 kg)   03/27/24 170 lb (77.1 kg)   12/26/23 198 lb (89.8 kg)       General appearance: ill appearing and groggy.    Head: Normocephalic, atraumatic  Neck:  no JVD, supple, symmetrical  Extremities: extremities normal, no edema  Skin: No rashes or lesions  Neurologic: Unable to examine  Psychiatric: on Ativan    Medications:  Current Facility-Administered Medications   Medication Dose Route Frequency    sodium phosphate 15 mmol in 0.9% NaCl 100mL IVPB premix  15 mmol Intravenous Once    vancomycin (Firvanq) 50 mg/mL oral solution 125 mg  125 mg Per NG Tube Daily    haloperidol lactate (Haldol) 5 MG/ML injection 2 mg  2 mg Intravenous Q6H PRN    piperacillin-tazobactam (Zosyn) 3.375 g in dextrose 5% 100 mL IVPB-ADDV  3.375 g Intravenous Q8H    doxycycline hyclate (Vibramycin) 100 mg in sodium chloride 0.9% 100 mL IVPB  100 mg Intravenous Q12H    dextrose 10% infusion (TPN no rate)   Intravenous Continuous PRN    sodium bicarbonate tab 650 mg  650 mg Per G Tube PRN    And    pancrelipase (Lip-Prot-Amyl) (Zenpep) DR particles cap 10,000 Units  10,000 Units Per G Tube PRN    thiamine 100 mg/mL injection 200 mg  200 mg Intravenous TID    lactulose (CHRONULAC) 10 GM/15ML solution 20 g  20 g Oral 4 times per day    metoprolol (Lopressor) 5 mg/5mL  injection 5 mg  5 mg Intravenous Q4H PRN    acetaminophen (Tylenol Extra Strength) tab 500 mg  500 mg Oral Q4H PRN    ondansetron (Zofran) 4 MG/2ML injection 4 mg  4 mg Intravenous Q6H PRN    prochlorperazine (Compazine) 10 MG/2ML injection 5 mg  5 mg Intravenous Q8H PRN    multivitamin (Tab-A-Yosef/Beta Carotene) tab 1 tablet  1 tablet Oral Daily    folic acid (Folvite) tab 1 mg  1 mg Oral Daily    LORazepam (Ativan) tab 1 mg  1 mg Oral Q1H PRN    Or    LORazepam (Ativan) 2 mg/mL injection 1 mg  1 mg Intravenous Q1H PRN    LORazepam (Ativan) tab 2 mg  2 mg Oral Q1H PRN    Or    LORazepam (Ativan) 2 mg/mL injection 2 mg  2 mg Intravenous Q1H PRN    LORazepam (Ativan) tab 3 mg  3 mg Oral Q1H PRN    Or    LORazepam (Ativan) 2 mg/mL injection 3 mg  3 mg Intravenous Q1H PRN    LORazepam (Ativan) 2 mg/mL injection 4 mg  4 mg Intravenous Q30 Min PRN    LORazepam (Ativan) 2 mg/mL injection 5 mg  5 mg Intravenous Q15 Min PRN    LORazepam (Ativan) 2 mg/mL injection 6 mg  6 mg Intravenous Q10 Min PRN    baclofen (Lioresal) tab 10 mg  10 mg Oral TID    pantoprazole (Protonix) 40 mg in sodium chloride 0.9% PF 10 mL IV push  40 mg Intravenous Q12H          Results:     Recent Labs   Lab 03/06/25  0335 03/07/25  0414 03/08/25  0356   RBC 3.44* 3.10* 3.18*   HGB 8.0* 7.6* 7.5*   HCT 26.2* 23.6* 23.6*   MCV 76.2* 76.1* 74.2*   NEPRELIM 3.30 3.85 5.89   WBC 4.5 5.7 7.9   PLT 29.0* 34.0* 40.0*     Recent Labs   Lab 03/05/25  0358 03/06/25  0335 03/06/25  1253 03/07/25  0414 03/08/25  0356 03/08/25  1025   GLU 98 109*  --  127* 123*  --    BUN 36* 51*  --  49* 42*  --    CREATSERUM 3.70* 2.15*  --  1.52* 1.40*  --    CA 7.8* 7.9*  --  7.7* 7.9*  --    ALB 2.9* 3.0*  --   --   --  3.0*   * 136  --  142 147*  --    K 4.2 3.4*   < > 3.5 3.3* 3.3*   CL 98 102  --  108 111  --    CO2 21.0 25.0  --  26.0 27.0  --    ALKPHO 115 120*  --   --   --  145*   * 139*  --   --   --  122*   ALT 46 82*  --   --   --  94*   BILT 6.8* 6.7*   --   --   --  10.6*   TP 5.2* 5.3*  --   --   --  5.3*    < > = values in this interval not displayed.     INR   Date Value Ref Range Status   03/08/2025 1.95 (H) 0.80 - 1.20 Final     Comment:     Therapeutic INR range for patients on warfarin:  2.0-3.0 for most medical conditions and surgical prophylaxis   2.5-3.5 for mechanical heart valves and recurrent thromboembolism    Direct thrombin inhibitors (e.g. argatroban, bivalirudin), factor Xa inhibitors (e.g. apixaban, rivaroxaban), and conditions such as coagulation factor deficiency, vitamin K deficiency, and liver disease will prolong the prothrombin time/INR.    Unfractionated heparin and low molecular weight heparin do not affect the prothrombin time/INR up to a concentration of 1 IU/mL and 1.5 IU/mL, respectively.         No results for input(s): \"BNP\" in the last 168 hours.  Recent Labs   Lab 03/04/25  1112 03/05/25  0358 03/07/25  0414 03/08/25  0356   MG 1.8 2.1 2.2 2.0   PHOS 5.9*  --  1.1* 1.0*        Recent Labs   Lab 03/04/25  1112 03/05/25  0358 03/06/25  0335 03/07/25  0414 03/08/25  0356 03/08/25  1025   PHOS 5.9*  --   --  1.1* 1.0*  --    ALB 4.3 2.9* 3.0*  --   --  3.0*       XR CHEST AP PORTABLE  (CPT=71045)    Result Date: 3/7/2025  CONCLUSION:  1. Enteric tube extends well beyond the gastroesophageal junction; the distal tip is partially coiled but projects at the expected location of the gastric fundus/body (good position). 2. Stable extensive left greater than right basilar opacities, which are concerning for infection/pneumonia in the appropriate clinical setting.   elm-remote  Dictated by (CST): Arcenio Norman MD on 3/07/2025 at 8:54 PM     Finalized by (CST): Arcenio Norman MD on 3/07/2025 at 8:56 PM          XR CHEST AP PORTABLE  (CPT=71045)    Result Date: 3/7/2025  PROCEDURE: XR CHEST AP PORTABLE  (CPT=71045) TIME: 1332.   COMPARISON: [facility], XR CHEST AP PORTABLE (CPT=71045), 3/07/2025, 7:06 AM.   INDICATIONS: Verify NG tube placement  TECHNIQUE:   Single view.   FINDINGS/IMPRESSION:    1. There is an enteric feeding tube coiled within the gastric fundus in good position.  2. There is redemonstration of patchy interstitial and alveolar opacities throughout both lungs (left worse than right).  The findings could represent a multifocal infectious process, atypical interstitial edema, or a combination.  There is a small to moderate-sized left-sided pleural effusion with probable underlying atelectasis.  3. The heart mediastinal structures are minimally enlarged.      Dictated by (CST): Prateek Mcclelland MD on 3/07/2025 at 1:40 PM     Finalized by (CST): Prateek Mcclelland MD on 3/07/2025 at 1:42 PM          CT CHEST (QCG=20198)    Result Date: 3/7/2025  CONCLUSION:  1. Extensive bilateral airspace disease, concerning for potential multifocal pneumonia. Continued surveillance is advised.  2. Dense bilateral lower airspace consolidation is evident; aspiration pneumonitis is a differential diagnostic possibility. Follow-up is recommended to document resolution.   3. Dilatation of the main pulmonary artery trunk may relate to underlying pulmonary hypertension.   4. Small hiatal hernia with imaging manifestations that may be indicative underlying esophagitis.  5. Marked hepatic steatosis.  6. Lesser incidental findings as above.    Dictated by (CST): Pavel Lara MD on 3/07/2025 at 11:38 AM     Finalized by (CST): Pavel Lara MD on 3/07/2025 at 11:44 AM          CT BRAIN OR HEAD (CPT=70450)    Result Date: 3/7/2025  CONCLUSION:   Motion limits evaluation.  No evidence of acute intracranial abnormality.    Dictated by (CST): Vishnu Tuttle MD on 3/07/2025 at 11:32 AM     Finalized by (CST): Vsihnu Tuttle MD on 3/07/2025 at 11:33 AM          XR CHEST AP PORTABLE  (CPT=71045)    Result Date: 3/7/2025  CONCLUSION:  1. Cardiomegaly.  Tortuous aorta. 2. Extensive multifocal airspace opacification in the  bilateral perihilar and basilar regions with left-sided effusion.  Differential includes pulmonary edema congestive failure versus multifocal pneumonitis.    Dictated by (CST): Nolan Dooley MD on 3/07/2025 at 7:49 AM     Finalized by (CST): Nolan Dooley MD on 3/07/2025 at 7:51 AM               Assessment and Plan:     Patient is a 44 year old male with history of alcoholism, anxiety, gout who presented to the emergency room for evaluation of intractable nausea and vomiting     1.USMAN: non oliguric   Last creatinine 1.27 mg/dL in October 2024.    Serum creatinine 4.22 mg/dL on admit and 1.4 today - monitor recovery.    UA positive for hyaline cast and ketones suggestive of reduced renal perfusion  S/p IV fluid bolus with normal saline- DC IVF - TF started  CT abdomen and pelvis unremarkable for any obstructive uropathy  Urine culture negative so far.  FENA 0.5%  Hypophosphatemia-replace.  Repeat in a.m.  Magnesium within normal limit.     3.hyponatremia: resolved and patient now hypernatremic with TF - add FWF     4.metabolic acidosis:  In light of hypotension check lactic acid.  Positive anion gap likely secondary to ketoacidosis  Acidosis resolved.  Off of bicarb drip     5.alcoholic hepatitis: Elevated liver function test and hyperbilirubinemia: Improved bilirubin level but worsening transaminase level  Elevated INR and thrombocytopenia  CT suggestive of esophageal varices  GI input noted-vitamin K and PPI  Liver ultrasound with Doppler showed hepatic steatosis     6.alcoholism: Last drink on Saturday   alcohol withdrawal with high CIWA score requiring transfer to P[facility]U   Remain on ativan   CT head negative for acute changes     7.  Pneumonia: CT chest noted for extensive bilateral airspace disease  Requiring higher oxygen-hypoxic respiratory failure.  S/p 1 dose of furosemide  On Zosyn and doxy.  Workup order  Pulmonary input noted    Discussed with nursing     Abundio Queen MD

## 2025-03-08 NOTE — PROGRESS NOTES
Patient is a 44 year old , single, male with past medical history of GERD, alcohol use disroder, who was admitted to the hospital for USMAN (acute kidney injury): The patient has been demonstrating symptoms of withdrawal.   Patient indicated for psych consult for evaluation and advise.    Consult Duration     The patient seen for over 50-minute, follow-up evaluation, over 50% counseling and coordinating care addressing alcohol withdrawal..  Record reviewed, communication with attending, communication with RN and patient seen face to face evaluation.    History of Present Illness:     According to the team, the patient continued demonstrate significant lethargy and stupor.     The patient is seen today in his room. The patient is jaundiced and lethargic, could not communicate with patient due to sedation.   Blood pressure 90/55 with heart rate of 95.    Patient is well-known to the service with longstanding alcohol dependency and withdrawal syndrome.  Patient according to the team has been demonstrating altered mental status with confusion and disorientation.  Ammonia level 41 with elevated liver enzymes.  Magnesium level is 2.0 today.    The patient seen today laying in hospital bed.  Patient is stuporous and restrained, on bipap.    The patient did not respond to verbal or painful stimuli.     He is not able to answer questions or engage in conversation due to lethargy.     The patient has been demonstrating severe withdrawals with confusion, restlessness, agitation and response to internal stimuli.     Past Psychiatric/Medication History:  1. Prior diagnoses: alcohol use disorder  2. Past psychiatric inpatient: Denies, the patients partner reports he has been to rehab 3 times.   3. Past outpatient history: Denies  4. Past suicide history: The patient reports history of attempt 5 years ago. He could not elaborate  5. Medication history: Denies     Social History:   The patient lives at home with his parents  and his partner of 10 years. He works at a car wash.   He drinks alcohol daily. Occasional cannabis use. No tobacco or illicit substance abuse.     Family History:  No family history reported      Medical History:   Past Medical History  Past Medical History:    Esophageal reflux    Gout    Hernia       Past Surgical History  Past Surgical History:   Procedure Laterality Date    Colonoscopy N/A 11/1/2023    Procedure: COLONOSCOPY;  Surgeon: Vandana Austin MD;  Location: [facility] ENDOSCOPY    Egd  02/15/2016    Eh pr repair complex scalp arms legs 1.1 to 2.5 cm  2014    Elbow fracture surgery Right 1991    Hernia surgery      Inguinal hernia repair Left 01/17/2023       Family History  Family History   Problem Relation Age of Onset    Diabetes Father     Hypertension Father     Cancer Mother         liver       Social History  Social History     Socioeconomic History    Marital status: Single    Number of children: 0   Occupational History    Occupation: Supervisor, car wash   Tobacco Use    Smoking status: Former     Types: Cigarettes    Smokeless tobacco: Never   Vaping Use    Vaping status: Some Days   Substance and Sexual Activity    Alcohol use: Yes     Comment: per pt, DAILY HARD LEMONADE- [employer] hard lemonade 24oz cans, 6 cans daily    Drug use: Yes     Types: Cannabis     Comment: last use, couple months ago per pt report     Social Drivers of Health     Food Insecurity: No Food Insecurity (3/4/2025)    NCSS - Food Insecurity     Worried About Running Out of Food in the Last Year: No     Ran Out of Food in the Last Year: No   Transportation Needs: No Transportation Needs (3/4/2025)    NCSS - Transportation     Lack of Transportation: No   Housing Stability: Not At Risk (3/4/2025)    NCSS - Housing/Utilities     Has Housing: Yes     Worried About Losing Housing: No     Unable to Get Utilities: No           Current Medications:  Current Facility-Administered Medications   Medication Dose Route Frequency    sodium  phosphate 15 mmol in 0.9% NaCl 100mL IVPB premix  15 mmol Intravenous Once    vancomycin (Firvanq) 50 mg/mL oral solution 125 mg  125 mg Per NG Tube Daily    haloperidol lactate (Haldol) 5 MG/ML injection 2 mg  2 mg Intravenous Q6H PRN    potassium chloride (Klor-Con) 20 MEQ oral powder 40 mEq  40 mEq Oral Once    piperacillin-tazobactam (Zosyn) 3.375 g in dextrose 5% 100 mL IVPB-ADDV  3.375 g Intravenous Q8H    doxycycline hyclate (Vibramycin) 100 mg in sodium chloride 0.9% 100 mL IVPB  100 mg Intravenous Q12H    dextrose 10% infusion (TPN no rate)   Intravenous Continuous PRN    sodium bicarbonate tab 650 mg  650 mg Per G Tube PRN    And    pancrelipase (Lip-Prot-Amyl) (Zenpep) DR particles cap 10,000 Units  10,000 Units Per G Tube PRN    thiamine 100 mg/mL injection 200 mg  200 mg Intravenous TID    lactulose (CHRONULAC) 10 GM/15ML solution 20 g  20 g Oral 4 times per day    metoprolol (Lopressor) 5 mg/5mL injection 5 mg  5 mg Intravenous Q4H PRN    acetaminophen (Tylenol Extra Strength) tab 500 mg  500 mg Oral Q4H PRN    ondansetron (Zofran) 4 MG/2ML injection 4 mg  4 mg Intravenous Q6H PRN    prochlorperazine (Compazine) 10 MG/2ML injection 5 mg  5 mg Intravenous Q8H PRN    multivitamin (Tab-A-Yosef/Beta Carotene) tab 1 tablet  1 tablet Oral Daily    folic acid (Folvite) tab 1 mg  1 mg Oral Daily    LORazepam (Ativan) tab 1 mg  1 mg Oral Q1H PRN    Or    LORazepam (Ativan) 2 mg/mL injection 1 mg  1 mg Intravenous Q1H PRN    LORazepam (Ativan) tab 2 mg  2 mg Oral Q1H PRN    Or    LORazepam (Ativan) 2 mg/mL injection 2 mg  2 mg Intravenous Q1H PRN    LORazepam (Ativan) tab 3 mg  3 mg Oral Q1H PRN    Or    LORazepam (Ativan) 2 mg/mL injection 3 mg  3 mg Intravenous Q1H PRN    LORazepam (Ativan) 2 mg/mL injection 4 mg  4 mg Intravenous Q30 Min PRN    LORazepam (Ativan) 2 mg/mL injection 5 mg  5 mg Intravenous Q15 Min PRN    LORazepam (Ativan) 2 mg/mL injection 6 mg  6 mg Intravenous Q10 Min PRN    baclofen  (Lioresal) tab 10 mg  10 mg Oral TID    pantoprazole (Protonix) 40 mg in sodium chloride 0.9% PF 10 mL IV push  40 mg Intravenous Q12H     No medications prior to admission.       Allergies  Allergies[1]    Review of Systems:   As by Admitting/Attending    Results:   Laboratory Data:  Lab Results   Component Value Date    WBC 7.9 03/08/2025    HGB 7.5 (L) 03/08/2025    HCT 23.6 (L) 03/08/2025    PLT 40.0 (L) 03/08/2025    CREATSERUM 1.40 (H) 03/08/2025    BUN 42 (H) 03/08/2025     (H) 03/08/2025    K 3.3 (L) 03/08/2025     03/08/2025    CO2 27.0 03/08/2025     (H) 03/08/2025    CA 7.9 (L) 03/08/2025    ALB 3.0 (L) 03/08/2025    ALKPHO 145 (H) 03/08/2025    TP 5.3 (L) 03/08/2025     (H) 03/08/2025    ALT 94 (H) 03/08/2025    INR 1.95 (H) 03/08/2025    PTP 23.3 (H) 03/08/2025     (H) 03/04/2025    DDIMER 1.13 (H) 08/25/2023    ESRML 27 (H) 10/01/2021    MG 2.0 03/08/2025    PHOS 1.0 (LL) 03/08/2025    TROP 0.00 01/31/2017     (H) 03/06/2025    ETOH <3 03/04/2025         Imaging:  XR CHEST AP PORTABLE  (CPT=71045)    Result Date: 3/7/2025  CONCLUSION:  1. Enteric tube extends well beyond the gastroesophageal junction; the distal tip is partially coiled but projects at the expected location of the gastric fundus/body (good position). 2. Stable extensive left greater than right basilar opacities, which are concerning for infection/pneumonia in the appropriate clinical setting.   elm-remote  Dictated by (CST): Arcenio Norman MD on 3/07/2025 at 8:54 PM     Finalized by (CST): Arcenio Norman MD on 3/07/2025 at 8:56 PM          XR CHEST AP PORTABLE  (CPT=71045)    Result Date: 3/7/2025  PROCEDURE: XR CHEST AP PORTABLE  (CPT=71045) TIME: 1332.   COMPARISON: [facility], XR CHEST AP PORTABLE (CPT=71045), 3/07/2025, 7:06 AM.  INDICATIONS: Verify NG tube placement  TECHNIQUE:   Single view.   FINDINGS/IMPRESSION:    1. There is an enteric feeding tube coiled within the  gastric fundus in good position.  2. There is redemonstration of patchy interstitial and alveolar opacities throughout both lungs (left worse than right).  The findings could represent a multifocal infectious process, atypical interstitial edema, or a combination.  There is a small to moderate-sized left-sided pleural effusion with probable underlying atelectasis.  3. The heart mediastinal structures are minimally enlarged.      Dictated by (CST): Prateek Mcclelland MD on 3/07/2025 at 1:40 PM     Finalized by (CST): Prateek Mcclelland MD on 3/07/2025 at 1:42 PM          CT CHEST (ETB=57754)    Result Date: 3/7/2025  CONCLUSION:  1. Extensive bilateral airspace disease, concerning for potential multifocal pneumonia. Continued surveillance is advised.  2. Dense bilateral lower airspace consolidation is evident; aspiration pneumonitis is a differential diagnostic possibility. Follow-up is recommended to document resolution.   3. Dilatation of the main pulmonary artery trunk may relate to underlying pulmonary hypertension.   4. Small hiatal hernia with imaging manifestations that may be indicative underlying esophagitis.  5. Marked hepatic steatosis.  6. Lesser incidental findings as above.    Dictated by (CST): Pavel Lara MD on 3/07/2025 at 11:38 AM     Finalized by (CST): Pavel Lara MD on 3/07/2025 at 11:44 AM          CT BRAIN OR HEAD (CPT=70450)    Result Date: 3/7/2025  CONCLUSION:   Motion limits evaluation.  No evidence of acute intracranial abnormality.    Dictated by (CST): Vishnu Tuttle MD on 3/07/2025 at 11:32 AM     Finalized by (CST): Vishnu Tuttle MD on 3/07/2025 at 11:33 AM          XR CHEST AP PORTABLE  (CPT=71045)    Result Date: 3/7/2025  CONCLUSION:  1. Cardiomegaly.  Tortuous aorta. 2. Extensive multifocal airspace opacification in the bilateral perihilar and basilar regions with left-sided effusion.  Differential includes pulmonary edema congestive failure versus multifocal  pneumonitis.    Dictated by (CST): Nolan Dooley MD on 3/07/2025 at 7:49 AM     Finalized by (CST): Nolan Dooley MD on 3/07/2025 at 7:51 AM           Vital Signs:   Blood pressure 90/55, pulse 95, temperature 97.4 °F (36.3 °C), temperature source Temporal, resp. rate 23, height 68\", weight 82.9 kg (182 lb 12.8 oz), SpO2 100%.    Mental Status Exam:   Appearance: Stated age male, in hospital gown, laying down in hospital bed. On bipap  Psychomotor: Patient seen today sedated with no tremor or agitation otherwise patient with restraint with episode of confusion, impulsivity and occasional agitation.  Orientation: Patient sedated, minimal reactivity  Gait: Not evaluated.  Attitude/Coorperation: patient is not able to cooperate with interview.   Behavior: anxious, restless, agitated otherwise lethargic this morning.  Speech: mumbled and incomprehensible.  Mood: Apathy with difficulty expressing emotion.  Affect: Restricted and flat.  Thought process: confused  Thought content: no reports of suicidal or homicidal ideation.  Perceptions: Patient demonstrating response to internal stimuli  Concentration: impaired  Memory: impaired  Intellect: Average.  Judgment and Insight: Impaired as an evidenced by recurrent drinking with deterioration in his physical function.    Impression:     Alcohol withdrawal Syndrome with delirium.  Episodic mood disorder.  Alcohol Dependence, continuous.  Rule out Wernicke's syndrome    USMAN (acute kidney injury)    Metabolic acidosis    Hyperkalemia    Leukocytosis    Thrombocytopenia    Coagulopathy (HCC)    Hyponatremia    Alcoholic (HCC)    Scleral icterus    Acute kidney failure    Patient is a 44 year old , single, male with past medical history of GERD, alcohol use disroder, who was admitted to the hospital for USMAN (acute kidney injury): The patient has been demonstrating symptoms of withdrawal.     The patient has been demonstrating severe withdrawals with confusion,  restlessness, agitation and response to internal stimuli.     3/6/2025: patient was transferred to CCU due to increased CIWA scores. Patient lethargic this morning.     3/7/2025: Patient has been demonstrating increased confusion, lethargy with excessive sedation with episode of agitation.  No tremors has been observed.    3/8/2025: Patient has been demonstrating stupor and lethargy with excessive sedation. Could not interview patient directly today. No tremor were observed.    Discussed risk and benefit, acknowledging the current symptom and severity.  At this point, I would recommend the following approach:     Focus on safety  Focus on education and support.  Focus on insight orientation helping the patient understand diagnosis and treatment plan.  D/c Seroquel 50 mg nightly  D/c Ativan 1 mg IV q 6 hours scheduled  Continue Ativan per [facility] protocol  Continue thiamine 200 mg IV 3 times daily.  Continue Haldol 2 mg IV q 6 hours PRN agitation  Processed with patient at length, the initiation of the above psychotropic medications I advised the patient of the risks, benefits, alternatives and potential side effects. The patient consents to administration of the medications and understands the right to refuse medications at any time. The patient verbalized understanding.   Coordinate plan with team    Orders This Visit:  Orders Placed This Encounter   Procedures    CBC With Differential With Platelet    Comp Metabolic Panel (14)    Urinalysis with Culture Reflex    Lipase    RBC Morphology Scan    Prothrombin Time (PT)    Lactic Acid, Plasma    Comp Metabolic Panel (14)    CBC With Differential With Platelet    Magnesium    Sodium, Urine, Random    Creatinine, Urine, Random    Phosphorus    Basic Metabolic Panel (8)    Osmolality, Urine    Lactic Acid Reflex Post Positive    Lactic Acid, Plasma    Lactic Acid Post Positive    Lactic Acid, Plasma    Magnesium    Lactic Acid Reflex Post Positive    Lactic Acid Post  Positive    Ethyl Alcohol    Ammonia, Plasma    CBC With Differential With Platelet    Basic Metabolic Panel (8)    Hepatic Function Panel (7)    Arterial blood gas    Prothrombin Time (PT)    AFP, Tumor Marker, Serum    Actin (Smooth Muscle) Antibody    Alpha-1-antirypsin, serum    Ceruloplasmin    Hepatitis A B + C profile    Immunoglobulin A/G/M, Quant    Iron And Tibc    Liver-Kidney Microsomal Antibody    Mitochondrial (M2) Antibody    Hep A AB, IGM    Scan slide    MD BLOOD SMEAR CONSULT    Potassium    Magnesium    Phosphorus    CK (Creatine Kinase) (Not Creatinine)    Scan slide    Manual differential    Phosphorus    Iron And Tibc    Arterial blood gas    Legionella urine Ag serogrp 1    Streptococcus Pneumoniae Ag, Urine    Ammonia, Plasma    Magnesium    Phosphorus    Potassium    Scan slide    Hepatic Function Panel (7)    Prothrombin Time (PT)    Potassium    Urine Culture, Routine    Blood Culture    MRSA Screen by PCR    Blood Culture    Emergency MRSA Screen by PCR       Meds This Visit:  Requested Prescriptions      No prescriptions requested or ordered in this encounter       Felix Davila MD  3/8/2025    Note to Patient: The 21st Century Cures Act makes medical notes like these available to patients in the interest of transparency. However, be advised this is a medical document. It is intended as peer to peer communication. It is written in medical language and may contain abbreviations or verbiage that are unfamiliar. It may appear blunt or direct. Medical documents are intended to carry relevant information, facts as evident, and the clinical opinion of the practitioner. This note may have been transcribed using a voice dictation system. Voice recognition errors may occur. This should not be taken to alter the content or meaning of this note.           [1]   Allergies  Allergen Reactions    Morphine SWELLING     SHORTNESS OF BREATH

## 2025-03-08 NOTE — PROGRESS NOTES
Pt placed on HHFNC this AM. Initially on 40L, 85%, abg performed. FiO2 weaned per bedside SpO2, pt tolerating well.   Pt remains on the following HHFNC settings:   03/07/25 1653   Oxygen Utilization   O2 Device High flow/High humidity   O2 Flow Rate (L/min) 40 L/min   FiO2 (%) (S)  65 %   Heater Temperature 88 °F (31.1 °C)   SpO2 97 %     RT to continue to monitor.

## 2025-03-08 NOTE — PLAN OF CARE
Problem: Patient Centered Care  Goal: Patient preferences are identified and integrated in the patient's plan of care  Description: Interventions:  - What would you like us to know as we care for you?   - Provide timely, complete, and accurate information to patient/family  - Incorporate patient and family knowledge, values, beliefs, and cultural backgrounds into the planning and delivery of care  - Encourage patient/family to participate in care and decision-making at the level they choose  - Honor patient and family perspectives and choices  Outcome: Progressing     Problem: PAIN - ADULT  Goal: Verbalizes/displays adequate comfort level or patient's stated pain goal  Description: INTERVENTIONS:  - Encourage pt to monitor pain and request assistance  - Assess pain using appropriate pain scale  - Administer analgesics based on type and severity of pain and evaluate response  - Implement non-pharmacological measures as appropriate and evaluate response  - Consider cultural and social influences on pain and pain management  - Manage/alleviate anxiety  - Utilize distraction and/or relaxation techniques  - Monitor for opioid side effects  - Notify MD/LIP if interventions unsuccessful or patient reports new pain  - Anticipate increased pain with activity and pre-medicate as appropriate  Outcome: Progressing     Problem: RISK FOR INFECTION - ADULT  Goal: Absence of fever/infection during anticipated neutropenic period  Description: INTERVENTIONS  - Monitor WBC  - Administer growth factors as ordered  - Implement neutropenic guidelines  Outcome: Progressing     Problem: SAFETY ADULT - FALL  Goal: Free from fall injury  Description: INTERVENTIONS:  - Assess pt frequently for physical needs  - Identify cognitive and physical deficits and behaviors that affect risk of falls.  - Wallace fall precautions as indicated by assessment.  - Educate pt/family on patient safety including physical limitations  - Instruct pt to call  for assistance with activity based on assessment  - Modify environment to reduce risk of injury  - Provide assistive devices as appropriate  - Consider OT/PT consult to assist with strengthening/mobility  - Encourage toileting schedule  Outcome: Progressing     Problem: DISCHARGE PLANNING  Goal: Discharge to home or other facility with appropriate resources  Description: INTERVENTIONS:  - Identify barriers to discharge w/pt and caregiver  - Include patient/family/discharge partner in discharge planning  - Arrange for needed discharge resources and transportation as appropriate  - Identify discharge learning needs (meds, wound care, etc)  - Arrange for interpreters to assist at discharge as needed  - Consider post-discharge preferences of patient/family/discharge partner  - Complete POLST form as appropriate  - Assess patient's ability to be responsible for managing their own health  - Refer to Case Management Department for coordinating discharge planning if the patient needs post-hospital services based on physician/LIP order or complex needs related to functional status, cognitive ability or social support system  Outcome: Progressing     Problem: Safety Risk - Non-Violent Restraints  Goal: Patient will remain free from self-harm  Description: INTERVENTIONS:  - Apply the least restrictive restraint to prevent harm  - Notify patient and family of reasons restraints applied  - Assess for any contributing factors to confusion (electrolyte disturbances, delirium, medications)  - Discontinue any unnecessary medical devices as soon as possible  - Assess the patient's physical comfort, circulation, skin condition, hydration, nutrition and elimination needs   - Reorient and redirection as needed  - Assess for the need to continue restraints  Outcome: Progressing

## 2025-03-08 NOTE — PLAN OF CARE
Patient is lethargic, incomprehensible words, withdraws from pain. CIWA protocol in place. Soft wrist restraints in place. Cedeño and rectal tube draining to gravity. NPO, NG tube in place, tube feeds infusing. Lactulose administered orally. Frequent turning, patient kept clean and dry.   Fall precautions in place. Call light in reach.    Problem: Patient Centered Care  Goal: Patient preferences are identified and integrated in the patient's plan of care  Description: Interventions:  - What would you like us to know as we care for you? None stated  - Provide timely, complete, and accurate information to patient/family  - Incorporate patient and family knowledge, values, beliefs, and cultural backgrounds into the planning and delivery of care  - Encourage patient/family to participate in care and decision-making at the level they choose  - Honor patient and family perspectives and choices  Outcome: Progressing     Problem: Patient/Family Goals  Goal: Patient/Family Long Term Goal  Description: Patient's Long Term Goal: Free of pain    Interventions:  - monitor pain score  - See additional Care Plan goals for specific interventions  Outcome: Progressing  Goal: Patient/Family Short Term Goal  Description: Patient's Short Term Goal: free of falls     Interventions:   - fall precautions in place.   - See additional Care Plan goals for specific interventions  Outcome: Progressing     Problem: PAIN - ADULT  Goal: Verbalizes/displays adequate comfort level or patient's stated pain goal  Description: INTERVENTIONS:  - Encourage pt to monitor pain and request assistance  - Assess pain using appropriate pain scale  - Administer analgesics based on type and severity of pain and evaluate response  - Implement non-pharmacological measures as appropriate and evaluate response  - Consider cultural and social influences on pain and pain management  - Manage/alleviate anxiety  - Utilize distraction and/or relaxation techniques  -  Monitor for opioid side effects  - Notify MD/LIP if interventions unsuccessful or patient reports new pain  - Anticipate increased pain with activity and pre-medicate as appropriate  Outcome: Progressing     Problem: RISK FOR INFECTION - ADULT  Goal: Absence of fever/infection during anticipated neutropenic period  Description: INTERVENTIONS  - Monitor WBC  - Administer growth factors as ordered  - Implement neutropenic guidelines  Outcome: Progressing     Problem: SAFETY ADULT - FALL  Goal: Free from fall injury  Description: INTERVENTIONS:  - Assess pt frequently for physical needs  - Identify cognitive and physical deficits and behaviors that affect risk of falls.  - [place] fall precautions as indicated by assessment.  - Educate pt/family on patient safety including physical limitations  - Instruct pt to call for assistance with activity based on assessment  - Modify environment to reduce risk of injury  - Provide assistive devices as appropriate  - Consider OT/PT consult to assist with strengthening/mobility  - Encourage toileting schedule  Outcome: Progressing     Problem: DISCHARGE PLANNING  Goal: Discharge to home or other facility with appropriate resources  Description: INTERVENTIONS:  - Identify barriers to discharge w/pt and caregiver  - Include patient/family/discharge partner in discharge planning  - Arrange for needed discharge resources and transportation as appropriate  - Identify discharge learning needs (meds, wound care, etc)  - Arrange for interpreters to assist at discharge as needed  - Consider post-discharge preferences of patient/family/discharge partner  - Complete POLST form as appropriate  - Assess patient's ability to be responsible for managing their own health  - Refer to Case Management Department for coordinating discharge planning if the patient needs post-hospital services based on physician/LIP order or complex needs related to functional status, cognitive ability or social  support system  Outcome: Progressing     Problem: Safety Risk - Non-Violent Restraints  Goal: Patient will remain free from self-harm  Description: INTERVENTIONS:  - Apply the least restrictive restraint to prevent harm  - Notify patient and family of reasons restraints applied  - Assess for any contributing factors to confusion (electrolyte disturbances, delirium, medications)  - Discontinue any unnecessary medical devices as soon as possible  - Assess the patient's physical comfort, circulation, skin condition, hydration, nutrition and elimination needs   - Reorient and redirection as needed  - Assess for the need to continue restraints  Outcome: Progressing

## 2025-03-08 NOTE — PROGRESS NOTES
[facility]     Gastroenterology Progress Note    Sami Grijalva Jr. Patient Status:  Inpatient    10/11/1980 MRN [id number]   Location [facility]5W Attending Alex Diaz MD   Hosp Day # 4 PCP Kerri Medina MD       Subjective:    Remains lethargic and in restraints.  Lethargic.     Objective:   Blood pressure 90/55, pulse 95, temperature 97.4 °F (36.3 °C), temperature source Temporal, resp. rate 23, height 5' 8\" (1.727 m), weight 182 lb 12.8 oz (82.9 kg), SpO2 100%. Body mass index is 27.79 kg/m².    Gen: Sleeping, lethargic patient, NAD  HEENT: EOMI, the sclera appears icteric, oropharynx clear, mucus membranes appear moist  CV: RRR  Lung: HFNC  Abdomen: soft NTND abdomen with NABS appreciated   Skin: dry, warm, + jaundice  Ext: no LE edema is evident  Neuro: Drowsy, unable to assess orientation and not interactive  Psych: lethargic    Assessment and Plan:   Sami Grijalva Jr. is a 44 year old male w/ PMHx of longstanding alcohol use, cannabis use, GERD, who presents with generalized abdominal pain, hiccups and vomiting. GI consulted for acute alcohol hepatitis.     #Acute alcohol hepatitis with probable underlying cirrhosis  -CT a/p in ER severe fatty liver with subtle undulating contour  -US liver with hepatic steatosis, GB sludge and cholelithiasis without cholecystitis or biliary ductal dilation, hepatic and portal vein patent  -last EGD 2023 without varices   -high maddrey; holding off on steroids since concern for infection   -CIWA high with active withdrawal and AMS   -nutrition  -etoh cessation    #Likely early cirrhosis, ETOH abuse, chronic w/u in process  MELD 3.0 on admission: 44  -PSE: Lethargic/drowsy in setting of ETOH withdrawal treated with CIWA protocol, Ammonia 121, pt unable to tolerate PO given AMS, initiate lactulose enemas  -Ascites: none on exam or US  -EV: None on last EGD 2023, tubular structure around GE junction suggestive of EV.  Will  need repeat EGD electively  -HCC: No lesions on US or CT, AFP in process     #USMAN  -nephrology following      Recommend:  -CIWA per protocol; ETOH withdrawal management  -Lactulose enemas until able to take PO, goal 3-4 bm/d  -Vitamin K 10mg x3days  -Daily CMP, CBC, INR  -Chronic liver w/u in process  -Empiric PPI BID  -ok for baclofen for singultus suspected from phrenic nerve irritation    Vandana Austin MD      Results:     Lab Results   Component Value Date    WBC 7.9 03/08/2025    HGB 7.5 (L) 03/08/2025    HCT 23.6 (L) 03/08/2025    PLT 40.0 (L) 03/08/2025    CREATSERUM 1.40 (H) 03/08/2025    BUN 42 (H) 03/08/2025     (H) 03/08/2025    K 3.3 (L) 03/08/2025     03/08/2025    CO2 27.0 03/08/2025     (H) 03/08/2025    CA 7.9 (L) 03/08/2025    ALB 3.0 (L) 03/08/2025    ALKPHO 145 (H) 03/08/2025    BILT 10.6 (H) 03/08/2025    TP 5.3 (L) 03/08/2025     (H) 03/08/2025    ALT 94 (H) 03/08/2025    INR 1.95 (H) 03/08/2025     (H) 03/04/2025    DDIMER 1.13 (H) 08/25/2023    ESRML 27 (H) 10/01/2021    MG 2.0 03/08/2025    PHOS 1.0 (LL) 03/08/2025    TROP 0.00 01/31/2017     (H) 03/06/2025    ETOH <3 03/04/2025       XR CHEST AP PORTABLE  (CPT=71045)    Result Date: 3/7/2025  CONCLUSION:  1. Enteric tube extends well beyond the gastroesophageal junction; the distal tip is partially coiled but projects at the expected location of the gastric fundus/body (good position). 2. Stable extensive left greater than right basilar opacities, which are concerning for infection/pneumonia in the appropriate clinical setting.   el-remote  Dictated by (CST): Arcenio Norman MD on 3/07/2025 at 8:54 PM     Finalized by (CST): Arcenio Norman MD on 3/07/2025 at 8:56 PM          XR CHEST AP PORTABLE  (CPT=71045)    Result Date: 3/7/2025  PROCEDURE: XR CHEST AP PORTABLE  (CPT=71045) TIME: 1332.   COMPARISON: [facility], XR CHEST AP PORTABLE (CPT=71045), 3/07/2025, 7:06 AM.  INDICATIONS: Verify  NG tube placement  TECHNIQUE:   Single view.   FINDINGS/IMPRESSION:    1. There is an enteric feeding tube coiled within the gastric fundus in good position.  2. There is redemonstration of patchy interstitial and alveolar opacities throughout both lungs (left worse than right).  The findings could represent a multifocal infectious process, atypical interstitial edema, or a combination.  There is a small to moderate-sized left-sided pleural effusion with probable underlying atelectasis.  3. The heart mediastinal structures are minimally enlarged.      Dictated by (CST): Prateek Mcclelland MD on 3/07/2025 at 1:40 PM     Finalized by (CST): Prateek Mcclelland MD on 3/07/2025 at 1:42 PM          CT CHEST (PRO=09300)    Result Date: 3/7/2025  CONCLUSION:  1. Extensive bilateral airspace disease, concerning for potential multifocal pneumonia. Continued surveillance is advised.  2. Dense bilateral lower airspace consolidation is evident; aspiration pneumonitis is a differential diagnostic possibility. Follow-up is recommended to document resolution.   3. Dilatation of the main pulmonary artery trunk may relate to underlying pulmonary hypertension.   4. Small hiatal hernia with imaging manifestations that may be indicative underlying esophagitis.  5. Marked hepatic steatosis.  6. Lesser incidental findings as above.    Dictated by (CST): Pavel Lara MD on 3/07/2025 at 11:38 AM     Finalized by (CST): Pavel Lara MD on 3/07/2025 at 11:44 AM          CT BRAIN OR HEAD (CPT=70450)    Result Date: 3/7/2025  CONCLUSION:   Motion limits evaluation.  No evidence of acute intracranial abnormality.    Dictated by (CST): Vishnu Tuttle MD on 3/07/2025 at 11:32 AM     Finalized by (CST): Vishnu Tuttle MD on 3/07/2025 at 11:33 AM          XR CHEST AP PORTABLE  (CPT=71045)    Result Date: 3/7/2025  CONCLUSION:  1. Cardiomegaly.  Tortuous aorta. 2. Extensive multifocal airspace opacification in the bilateral perihilar and  basilar regions with left-sided effusion.  Differential includes pulmonary edema congestive failure versus multifocal pneumonitis.    Dictated by (CST): Nolan Dooley MD on 3/07/2025 at 7:49 AM     Finalized by (CST): Nolan Dooley MD on 3/07/2025 at 7:51 AM

## 2025-03-09 LAB
ALBUMIN SERPL-MCNC: 3.1 G/DL (ref 3.2–4.8)
ALP LIVER SERPL-CCNC: 177 U/L
ALT SERPL-CCNC: 98 U/L
ANION GAP SERPL CALC-SCNC: 10 MMOL/L (ref 0–18)
AST SERPL-CCNC: 135 U/L (ref ?–34)
BASOPHILS # BLD AUTO: 0.03 X10(3) UL (ref 0–0.2)
BASOPHILS NFR BLD AUTO: 0.4 %
BILIRUB DIRECT SERPL-MCNC: 8.8 MG/DL (ref ?–0.3)
BILIRUB SERPL-MCNC: 11.4 MG/DL (ref 0.3–1.2)
BUN BLD-MCNC: 32 MG/DL (ref 9–23)
BUN/CREAT SERPL: 24.4 (ref 10–20)
CALCIUM BLD-MCNC: 8.4 MG/DL (ref 8.7–10.4)
CHLORIDE SERPL-SCNC: 117 MMOL/L (ref 98–112)
CO2 SERPL-SCNC: 27 MMOL/L (ref 21–32)
CREAT BLD-MCNC: 1.31 MG/DL
DEPRECATED RDW RBC AUTO: 70.5 FL (ref 35.1–46.3)
EGFRCR SERPLBLD CKD-EPI 2021: 68 ML/MIN/1.73M2 (ref 60–?)
EOSINOPHIL # BLD AUTO: 0.14 X10(3) UL (ref 0–0.7)
EOSINOPHIL NFR BLD AUTO: 1.8 %
ERYTHROCYTE [DISTWIDTH] IN BLOOD BY AUTOMATED COUNT: 28.6 % (ref 11–15)
GLUCOSE BLD-MCNC: 121 MG/DL (ref 70–99)
GLUCOSE BLDC GLUCOMTR-MCNC: 117 MG/DL (ref 70–99)
GLUCOSE BLDC GLUCOMTR-MCNC: 121 MG/DL (ref 70–99)
GLUCOSE BLDC GLUCOMTR-MCNC: 123 MG/DL (ref 70–99)
GLUCOSE BLDC GLUCOMTR-MCNC: 127 MG/DL (ref 70–99)
HCT VFR BLD AUTO: 23.7 %
HGB BLD-MCNC: 7.6 G/DL
IMM GRANULOCYTES # BLD AUTO: 0.3 X10(3) UL (ref 0–1)
IMM GRANULOCYTES NFR BLD: 3.8 %
LYMPHOCYTES # BLD AUTO: 0.74 X10(3) UL (ref 1–4)
LYMPHOCYTES NFR BLD AUTO: 9.3 %
MAGNESIUM SERPL-MCNC: 1.9 MG/DL (ref 1.6–2.6)
MCH RBC QN AUTO: 23.8 PG (ref 26–34)
MCHC RBC AUTO-ENTMCNC: 32.1 G/DL (ref 31–37)
MCV RBC AUTO: 74.3 FL
MONOCYTES # BLD AUTO: 1.04 X10(3) UL (ref 0.1–1)
MONOCYTES NFR BLD AUTO: 13 %
NEUTROPHILS # BLD AUTO: 5.74 X10 (3) UL (ref 1.5–7.7)
NEUTROPHILS # BLD AUTO: 5.74 X10(3) UL (ref 1.5–7.7)
NEUTROPHILS NFR BLD AUTO: 71.7 %
OSMOLALITY SERPL CALC.SUM OF ELEC: 326 MOSM/KG (ref 275–295)
PHOSPHATE SERPL-MCNC: 1.4 MG/DL (ref 2.4–5.1)
PLATELET # BLD AUTO: 53 10(3)UL (ref 150–450)
PLATELETS.RETICULATED NFR BLD AUTO: 9.4 % (ref 0–7)
POTASSIUM SERPL-SCNC: 3.3 MMOL/L (ref 3.5–5.1)
POTASSIUM SERPL-SCNC: 3.3 MMOL/L (ref 3.5–5.1)
POTASSIUM SERPL-SCNC: 3.4 MMOL/L (ref 3.5–5.1)
PROT SERPL-MCNC: 5.6 G/DL (ref 5.7–8.2)
RBC # BLD AUTO: 3.19 X10(6)UL
SODIUM SERPL-SCNC: 154 MMOL/L (ref 136–145)
WBC # BLD AUTO: 8 X10(3) UL (ref 4–11)

## 2025-03-09 PROCEDURE — 99233 SBSQ HOSP IP/OBS HIGH 50: CPT | Performed by: INTERNAL MEDICINE

## 2025-03-09 RX ORDER — POTASSIUM CHLORIDE 1500 MG/1
40 TABLET, EXTENDED RELEASE ORAL ONCE
Status: DISCONTINUED | OUTPATIENT
Start: 2025-03-09 | End: 2025-03-09

## 2025-03-09 RX ORDER — DEXTROSE MONOHYDRATE 50 MG/ML
INJECTION, SOLUTION INTRAVENOUS CONTINUOUS
Status: DISCONTINUED | OUTPATIENT
Start: 2025-03-09 | End: 2025-03-10

## 2025-03-09 NOTE — PROGRESS NOTES
Pulmonary/ICU/Critical Care Progress Note        Reason for Consultation: resp failure  Referring Physician: Dr. Diaz      Subjective:  A little more awake  Still has hiccups   CIWA are low so not getting as much meds overnight       From the initial consultation  Pt is unable to provide info  HPI: 45 yo male with hx of gout, GERD, admitted with acute ETOH hepatitis, ETOH use, emesis, USMAN. Seen by GI.  On CIWA protocol, thiamine, folic acid, MVI, BZDs, lactulose  Has been in the ICU for 2 days  ICU consulted this am for worsening hypoxemia  CXR this am with b/l infiltrates concerning for PNA      REVIEW OF SYSTEMS:  Positives and negatives as noted in HPI. All other review of systems otherwise are either limited (due to pt/family inability to provide) or negative.      PAST MEDICAL HISTORY:  Past Medical History:   Diagnosis Date    Esophageal reflux     Gout     Hernia          PAST SURGICAL HISTORY:  Past Surgical History:   Procedure Laterality Date    Colonoscopy N/A 11/1/2023    Procedure: COLONOSCOPY;  Surgeon: Vandana Austin MD;  Location: [facility] ENDOSCOPY    Egd  02/15/2016    Eh pr repair complex scalp arms legs 1.1 to 2.5 cm  2014    Elbow fracture surgery Right 1991    Hernia surgery      Inguinal hernia repair Left 01/17/2023         PAST SOCIAL HISTORY:  Social History     Socioeconomic History    Marital status: Single    Number of children: 0   Occupational History    Occupation: Supervisor, car [employer]   Tobacco Use    Smoking status: Former     Types: Cigarettes    Smokeless tobacco: Never   Vaping Use    Vaping status: Some Days   Substance and Sexual Activity    Alcohol use: Yes     Comment: per pt, DAILY HARD LEMONADE- [employer] hard lemonade 24oz cans, 6 cans daily    Drug use: Yes     Types: Cannabis     Comment: last use, couple months ago per pt report         PAST FAMILY HISTORY:  Family History   Problem Relation Age of Onset    Diabetes Father     Hypertension Father     Cancer Mother          liver       ALLERGIES:  Allergies[1]      MEDS:  Home Medications:  Medications Taking[2]    Scheduled Medication:   potassium phosphate dibasic 30 mmol in sodium chloride 0.9% 250 mL IVPB  30 mmol Intravenous Once    vancomycin  125 mg Per NG Tube Daily    piperacillin-tazobactam  3.375 g Intravenous Q8H    doxycycline  100 mg Intravenous Q12H    thiamine  200 mg Intravenous TID    lactulose  20 g Oral 4 times per day    multivitamin  1 tablet Oral Daily    folic acid  1 mg Oral Daily    baclofen  10 mg Oral TID    pantoprazole  40 mg Intravenous Q12H     Continuous Infusing Medication:   dextrose 10%       PRN Medications:    haloperidol lactate    dextrose 10%    sodium bicarbonate **AND** lipase-protease-amylase (Lip-Prot-Amyl)    metoprolol    acetaminophen    ondansetron    prochlorperazine    LORazepam **OR** LORazepam    LORazepam **OR** LORazepam    LORazepam **OR** LORazepam    LORazepam    LORazepam    LORazepam       PHYSICAL EXAM:  /81 (BP Location: Right arm)   Pulse 110   Temp 98.8 °F (37.1 °C) (Temporal)   Resp (!) 27   Ht 5' 8\" (1.727 m)   Wt 182 lb 12.8 oz (82.9 kg)   SpO2 98%   BMI 27.79 kg/m²   FiO2 (%):  [40 %] 40 %  CONSTITUTIONAL: somnolent a little more responsive but still confused  HEENT: atraumatic normocephalic  MOUTH: mucous membranes are moist. No OP exudates  NECK/THROAT: no JVD. Trachea midline. No obvious thyromegaly  LUNG: clear b/l no wheezing, + b/l crackles. + hiccups. Chest symmetric with respiratory motion  HEART: regular rate and rhythm, no obvious murmers or gallops noted  ABD: soft non tender. + bowel sounds. No organomegaly noted  EXT: no clubbing, cyanosis, or edema noted. Pulses intact grossly  NEURO/MUSCULOSKELETAL: somnolent, withdraws  SKIN: warm, dry. No obvious lesions noted  LYMPH: no obvious LAD      IMAGES:   CT head 3/7/25  CONCLUSION:   Motion limits evaluation.  No evidence of acute intracranial abnormality.     CT chest 3/7/25  CONCLUSION:   1.  Extensive bilateral airspace disease, concerning for potential multifocal pneumonia. Continued surveillance is advised.   2. Dense bilateral lower airspace consolidation is evident; aspiration pneumonitis is a differential diagnostic possibility. Follow-up is recommended to document resolution.    3. Dilatation of the main pulmonary artery trunk may relate to underlying pulmonary hypertension.    4. Small hiatal hernia with imaging manifestations that may be indicative underlying esophagitis.   5. Marked hepatic steatosis.   6. Lesser incidental findings as above.     CXR 3/7/25 with multifocal infiltrates, possible effusion on the left  CONCLUSION:   1. Cardiomegaly.  Tortuous aorta.   2. Extensive multifocal airspace opacification in the bilateral perihilar and basilar regions with left-sided effusion.  Differential includes pulmonary edema congestive failure versus multifocal pneumonitis.      CT abd/pelvis 3/4/25  CONCLUSION:   Severely fatty liver with subtle undulating contour.  Stable splenomegaly.  Tubular structures around the GE junction are suggestive of lower esophageal varices.  No ascites       LABS:  Recent Labs   Lab 03/07/25  0414 03/08/25  0356 03/09/25  0359   RBC 3.10* 3.18* 3.19*   HGB 7.6* 7.5* 7.6*   HCT 23.6* 23.6* 23.7*   MCV 76.1* 74.2* 74.3*   MCH 24.5* 23.6* 23.8*   MCHC 32.2 31.8 32.1   RDW 26.5* 27.7* 28.6*   NEPRELIM 3.85 5.89 5.74   WBC 5.7 7.9 8.0   PLT 34.0* 40.0* 53.0*       Recent Labs   Lab 03/06/25  0335 03/06/25  1253 03/07/25  0414 03/08/25  0356 03/08/25  1025 03/09/25  0359   *  --  127* 123*  --  121*   BUN 51*  --  49* 42*  --  32*   CREATSERUM 2.15*  --  1.52* 1.40*  --  1.31*   EGFRCR 38*  --  57* 63  --  68   CA 7.9*  --  7.7* 7.9*  --  8.4*   ALB 3.0*  --   --   --  3.0* 3.1*     --  142 147*  --  154*   K 3.4*   < > 3.5 3.3* 3.3* 3.3*  3.3*     --  108 111  --  117*   CO2 25.0  --  26.0 27.0  --  27.0   ALKPHO 120*  --   --   --  145* 177*   AST  139*  --   --   --  122* 135*   ALT 82*  --   --   --  94* 98*   BILT 6.7*  --   --   --  10.6* 11.4*   TP 5.3*  --   --   --  5.3* 5.6*    < > = values in this interval not displayed.       ASSESSMENT/PLAN:  Acute hypoxemic resp failure  -secondary to multifocal infiltrates. Given hx of emesis, concern for aspiration  -checked non contrast chest CT showed b/l infiltrates  -on zosyn, doxy. Checked urine leg, strep pneumo, mrsa nares, blood cx neg thus far  -weaned from airvo to HFNC @ 10 L now   -aspiration precautions  -keep NPO-has TF via NGT  -restarted baclofen for hiccups    Acute ETOH hepatitis  -GI following. Imaging as above  -vit K, PPI, lactulose  -NGT with TF    ETOH withdrawal  -CIWA protocol  -psych following   -BZD/haldol PRN  -CT head neg for acute changes    USMAN and hypernatremia  -s/p bicarb infusion  -was on D5 0.9 NS  -renal following    Proph  -DVT: elevated INR low PLT. On SCDS    Dispo  -Full code      Thank you for the opportunity to care for Sami Escobedo DO, MPH  Pulmonary Critical Care Medicine  [facility]                       [1]   Allergies  Allergen Reactions    Morphine SWELLING     SHORTNESS OF BREATH   [2]   No outpatient medications have been marked as taking for the 3/4/25 encounter (Hospital Encounter).

## 2025-03-09 NOTE — PLAN OF CARE
Patient lethargic, opens eyes and mumbles. CIWA protocol and soft wrist restraints in place for safety. Cedeño and rectal tube in place. NPO, NG tube, tube feeds infusing, lactulose administered per schedule.   Fall precautions in place. Call light in reach.    Problem: Patient Centered Care  Goal: Patient preferences are identified and integrated in the patient's plan of care  Description: Interventions:  - What would you like us to know as we care for you? None stated  - Provide timely, complete, and accurate information to patient/family  - Incorporate patient and family knowledge, values, beliefs, and cultural backgrounds into the planning and delivery of care  - Encourage patient/family to participate in care and decision-making at the level they choose  - Honor patient and family perspectives and choices  Outcome: Progressing     Problem: Patient/Family Goals  Goal: Patient/Family Long Term Goal  Description: Patient's Long Term Goal: Free of falls     Interventions:  - Fall precautions in place.  - See additional Care Plan goals for specific interventions  Outcome: Progressing  Goal: Patient/Family Short Term Goal  Description: Patient's Short Term Goal: Free of skin breakdown    Interventions:   - turned frequently, patient kept clean and dry.   - See additional Care Plan goals for specific interventions  Outcome: Progressing     Problem: PAIN - ADULT  Goal: Verbalizes/displays adequate comfort level or patient's stated pain goal  Description: INTERVENTIONS:  - Encourage pt to monitor pain and request assistance  - Assess pain using appropriate pain scale  - Administer analgesics based on type and severity of pain and evaluate response  - Implement non-pharmacological measures as appropriate and evaluate response  - Consider cultural and social influences on pain and pain management  - Manage/alleviate anxiety  - Utilize distraction and/or relaxation techniques  - Monitor for opioid side effects  - Notify  MD/LIP if interventions unsuccessful or patient reports new pain  - Anticipate increased pain with activity and pre-medicate as appropriate  Outcome: Progressing     Problem: RISK FOR INFECTION - ADULT  Goal: Absence of fever/infection during anticipated neutropenic period  Description: INTERVENTIONS  - Monitor WBC  - Administer growth factors as ordered  - Implement neutropenic guidelines  Outcome: Progressing     Problem: SAFETY ADULT - FALL  Goal: Free from fall injury  Description: INTERVENTIONS:  - Assess pt frequently for physical needs  - Identify cognitive and physical deficits and behaviors that affect risk of falls.  - [place] fall precautions as indicated by assessment.  - Educate pt/family on patient safety including physical limitations  - Instruct pt to call for assistance with activity based on assessment  - Modify environment to reduce risk of injury  - Provide assistive devices as appropriate  - Consider OT/PT consult to assist with strengthening/mobility  - Encourage toileting schedule  Outcome: Progressing     Problem: DISCHARGE PLANNING  Goal: Discharge to home or other facility with appropriate resources  Description: INTERVENTIONS:  - Identify barriers to discharge w/pt and caregiver  - Include patient/family/discharge partner in discharge planning  - Arrange for needed discharge resources and transportation as appropriate  - Identify discharge learning needs (meds, wound care, etc)  - Arrange for interpreters to assist at discharge as needed  - Consider post-discharge preferences of patient/family/discharge partner  - Complete POLST form as appropriate  - Assess patient's ability to be responsible for managing their own health  - Refer to Case Management Department for coordinating discharge planning if the patient needs post-hospital services based on physician/LIP order or complex needs related to functional status, cognitive ability or social support system  Outcome: Progressing      Problem: Safety Risk - Non-Violent Restraints  Goal: Patient will remain free from self-harm  Description: INTERVENTIONS:  - Apply the least restrictive restraint to prevent harm  - Notify patient and family of reasons restraints applied  - Assess for any contributing factors to confusion (electrolyte disturbances, delirium, medications)  - Discontinue any unnecessary medical devices as soon as possible  - Assess the patient's physical comfort, circulation, skin condition, hydration, nutrition and elimination needs   - Reorient and redirection as needed  - Assess for the need to continue restraints  Outcome: Progressing     Problem: Delirium  Goal: Minimize duration of delirium  Description: Interventions:  - Encourage use of hearing aids, eye glasses  - Promote highest level of mobility daily  - Provide frequent reorientation  - Promote wakefulness i.e. lights on, blinds open  - Promote sleep, encourage patient's normal rest cycle i.e. lights off, TV off, minimize noise and interruptions  - Encourage family to assist in orientation and promotion of home routines  Outcome: Progressing

## 2025-03-09 NOTE — PLAN OF CARE
Problem: Patient Centered Care  Goal: Patient preferences are identified and integrated in the patient's plan of care  Description: Interventions:  - What would you like us to know as we care for you?   - Provide timely, complete, and accurate information to patient/family  - Incorporate patient and family knowledge, values, beliefs, and cultural backgrounds into the planning and delivery of care  - Encourage patient/family to participate in care and decision-making at the level they choose  - Honor patient and family perspectives and choices  Outcome: Progressing     Problem: PAIN - ADULT  Goal: Verbalizes/displays adequate comfort level or patient's stated pain goal  Description: INTERVENTIONS:  - Encourage pt to monitor pain and request assistance  - Assess pain using appropriate pain scale  - Administer analgesics based on type and severity of pain and evaluate response  - Implement non-pharmacological measures as appropriate and evaluate response  - Consider cultural and social influences on pain and pain management  - Manage/alleviate anxiety  - Utilize distraction and/or relaxation techniques  - Monitor for opioid side effects  - Notify MD/LIP if interventions unsuccessful or patient reports new pain  - Anticipate increased pain with activity and pre-medicate as appropriate  Outcome: Progressing     Problem: RISK FOR INFECTION - ADULT  Goal: Absence of fever/infection during anticipated neutropenic period  Description: INTERVENTIONS  - Monitor WBC  - Administer growth factors as ordered  - Implement neutropenic guidelines  Outcome: Progressing     Problem: SAFETY ADULT - FALL  Goal: Free from fall injury  Description: INTERVENTIONS:  - Assess pt frequently for physical needs  - Identify cognitive and physical deficits and behaviors that affect risk of falls.  - [place] fall precautions as indicated by assessment.  - Educate pt/family on patient safety including physical limitations  - Instruct pt to call  for assistance with activity based on assessment  - Modify environment to reduce risk of injury  - Provide assistive devices as appropriate  - Consider OT/PT consult to assist with strengthening/mobility  - Encourage toileting schedule  Outcome: Progressing     Problem: DISCHARGE PLANNING  Goal: Discharge to home or other facility with appropriate resources  Description: INTERVENTIONS:  - Identify barriers to discharge w/pt and caregiver  - Include patient/family/discharge partner in discharge planning  - Arrange for needed discharge resources and transportation as appropriate  - Identify discharge learning needs (meds, wound care, etc)  - Arrange for interpreters to assist at discharge as needed  - Consider post-discharge preferences of patient/family/discharge partner  - Complete POLST form as appropriate  - Assess patient's ability to be responsible for managing their own health  - Refer to Case Management Department for coordinating discharge planning if the patient needs post-hospital services based on physician/LIP order or complex needs related to functional status, cognitive ability or social support system  Outcome: Progressing     Problem: Safety Risk - Non-Violent Restraints  Goal: Patient will remain free from self-harm  Description: INTERVENTIONS:  - Apply the least restrictive restraint to prevent harm  - Notify patient and family of reasons restraints applied  - Assess for any contributing factors to confusion (electrolyte disturbances, delirium, medications)  - Discontinue any unnecessary medical devices as soon as possible  - Assess the patient's physical comfort, circulation, skin condition, hydration, nutrition and elimination needs   - Reorient and redirection as needed  - Assess for the need to continue restraints  Outcome: Progressing     Problem: Delirium  Goal: Minimize duration of delirium  Description: Interventions:  - Encourage use of hearing aids, eye glasses  - Promote highest level of  mobility daily  - Provide frequent reorientation  - Promote wakefulness i.e. lights on, blinds open  - Promote sleep, encourage patient's normal rest cycle i.e. lights off, TV off, minimize noise and interruptions  - Encourage family to assist in orientation and promotion of home routines  Outcome: Progressing   Patient lethargic, oriented to person and place. Last CIWA score 1. Lactulose q6h. Patient is on 6L HFNC. No home O2. IV abx continued for pneumonia. Tube feeds paused x12 hours per nephrology. NG tube in place - clamped. Accuchecks q6h - stable. Turn q2. No signs of skin breakdown. Soft wrist restraints in place.

## 2025-03-09 NOTE — PROGRESS NOTES
[facility]     Gastroenterology Progress Note    Sami Grijalva Jr. Patient Status:  Inpatient    10/11/1980 MRN [id number]   Location [facility]5W Attending Alex Diaz MD   Hosp Day # 5 PCP Kerri Medina MD       Subjective:    Remains lethargic and in restraints.  Seems a little more awake and slightly opening eyes to command.     Objective:   Blood pressure 105/65, pulse 95, temperature 98.5 °F (36.9 °C), temperature source Temporal, resp. rate 22, height 5' 8\" (1.727 m), weight 182 lb 12.8 oz (82.9 kg), SpO2 99%. Body mass index is 27.79 kg/m².    Gen: lethargic patient, NAD  HEENT: EOMI, the sclera appears icteric, oropharynx clear, mucus membranes appear moist  CV: RRR  Lung: HFNC  Abdomen: soft NTND abdomen with NABS appreciated   Skin: dry, warm, + jaundice  Ext: no LE edema is evident  Neuro: Drowsy  Psych: lethargic    Assessment and Plan:   Sami Grijalva Jr. is a 44 year old male w/ PMHx of longstanding alcohol use, cannabis use, GERD, who presents with generalized abdominal pain, hiccups and vomiting. GI consulted for acute alcohol hepatitis.     #Acute alcohol hepatitis with probable underlying cirrhosis  -CT a/p in ER severe fatty liver with subtle undulating contour  -US liver with hepatic steatosis, GB sludge and cholelithiasis without cholecystitis or biliary ductal dilation, hepatic and portal vein patent  -last EGD 2023 without varices   -high maddrey; holding off on steroids since concern for infection   -CIWA high with active withdrawal and AMS   -nutrition  -etoh cessation    #Likely early cirrhosis, ETOH abuse, chronic w/u in process  MELD 3.0 on admission: 44  -PSE: Lethargic/drowsy in setting of ETOH withdrawal treated with CIWA protocol, Ammonia 121, pt unable to tolerate PO given AMS, initiate lactulose enemas  -Ascites: none on exam or US  -EV: None on last EGD 2023, tubular structure around GE junction suggestive of EV.  Will need  repeat EGD electively  -HCC: No lesions on US or CT, AFP in process     #USMAN  -nephrology following      Recommend:  -CIWA per protocol; ETOH withdrawal management  -Lactulose goal 3-4 bm/d  -Daily CMP, CBC, INR  -Chronic liver w/u in process  -Empiric PPI BID  -ok for baclofen for singultus suspected from phrenic nerve irritation    Vandana Austin MD      Results:     Lab Results   Component Value Date    WBC 8.0 03/09/2025    HGB 7.6 (L) 03/09/2025    HCT 23.7 (L) 03/09/2025    PLT 53.0 (L) 03/09/2025    CREATSERUM 1.31 (H) 03/09/2025    BUN 32 (H) 03/09/2025     (H) 03/09/2025    K 3.3 (L) 03/09/2025    K 3.3 (L) 03/09/2025     (H) 03/09/2025    CO2 27.0 03/09/2025     (H) 03/09/2025    CA 8.4 (L) 03/09/2025    ALB 3.1 (L) 03/09/2025    ALKPHO 177 (H) 03/09/2025    BILT 11.4 (H) 03/09/2025    TP 5.6 (L) 03/09/2025     (H) 03/09/2025    ALT 98 (H) 03/09/2025    INR 1.95 (H) 03/08/2025     (H) 03/04/2025    DDIMER 1.13 (H) 08/25/2023    ESRML 27 (H) 10/01/2021    MG 1.9 03/09/2025    PHOS 1.4 (L) 03/09/2025    TROP 0.00 01/31/2017     (H) 03/06/2025    ETOH <3 03/04/2025       XR CHEST AP PORTABLE  (CPT=71045)    Result Date: 3/7/2025  CONCLUSION:  1. Enteric tube extends well beyond the gastroesophageal junction; the distal tip is partially coiled but projects at the expected location of the gastric fundus/body (good position). 2. Stable extensive left greater than right basilar opacities, which are concerning for infection/pneumonia in the appropriate clinical setting.   elm-remote  Dictated by (CST): Arcenio Norman MD on 3/07/2025 at 8:54 PM     Finalized by (CST): Acrenio Norman MD on 3/07/2025 at 8:56 PM          XR CHEST AP PORTABLE  (CPT=71045)    Result Date: 3/7/2025  PROCEDURE: XR CHEST AP PORTABLE  (CPT=71045) TIME: 1332.   COMPARISON: [facility], XR CHEST AP PORTABLE (CPT=71045), 3/07/2025, 7:06 AM.  INDICATIONS: Verify NG tube placement  TECHNIQUE:    Single view.   FINDINGS/IMPRESSION:    1. There is an enteric feeding tube coiled within the gastric fundus in good position.  2. There is redemonstration of patchy interstitial and alveolar opacities throughout both lungs (left worse than right).  The findings could represent a multifocal infectious process, atypical interstitial edema, or a combination.  There is a small to moderate-sized left-sided pleural effusion with probable underlying atelectasis.  3. The heart mediastinal structures are minimally enlarged.      Dictated by (CST): Prateek Mcclelland MD on 3/07/2025 at 1:40 PM     Finalized by (CST): Prateek Mcclelland MD on 3/07/2025 at 1:42 PM

## 2025-03-09 NOTE — PROGRESS NOTES
[facility]  part of [facility]    Progress Note      Subjective:     Remain minimally responsive-but slightly more awake today.  On 8 L nasal cannula.  Reduced requirement of Ativan    Review of Systems:     Unable to obtain-patient minimally responsive    Objective:   Temp:  [97.2 °F (36.2 °C)-98.8 °F (37.1 °C)] 98.1 °F (36.7 °C)  Pulse:  [] 109  Resp:  [20-32] 24  BP: (103-138)/(61-88) 108/78  SpO2:  [98 %-100 %] 99 %  SpO2: 99 %     Intake/Output Summary (Last 24 hours) at 3/9/2025 1234  Last data filed at 3/9/2025 1000  Gross per 24 hour   Intake 1879 ml   Output 2775 ml   Net -896 ml     Wt Readings from Last 3 Encounters:   03/04/25 182 lb 12.8 oz (82.9 kg)   03/27/24 170 lb (77.1 kg)   12/26/23 198 lb (89.8 kg)       General appearance: ill appearing and groggy.    Head: Normocephalic, atraumatic  Neck:  no JVD, supple, symmetrical  Extremities: extremities normal, no edema  Skin: No rashes or lesions  Neurologic: Unable to examine  Psychiatric: on Ativan    Medications:  Current Facility-Administered Medications   Medication Dose Route Frequency    dextrose 5% infusion   Intravenous Continuous    vancomycin (Firvanq) 50 mg/mL oral solution 125 mg  125 mg Per NG Tube Daily    haloperidol lactate (Haldol) 5 MG/ML injection 2 mg  2 mg Intravenous Q6H PRN    piperacillin-tazobactam (Zosyn) 3.375 g in dextrose 5% 100 mL IVPB-ADDV  3.375 g Intravenous Q8H    doxycycline hyclate (Vibramycin) 100 mg in sodium chloride 0.9% 100 mL IVPB  100 mg Intravenous Q12H    dextrose 10% infusion (TPN no rate)   Intravenous Continuous PRN    sodium bicarbonate tab 650 mg  650 mg Per G Tube PRN    And    pancrelipase (Lip-Prot-Amyl) (Zenpep) DR particles cap 10,000 Units  10,000 Units Per G Tube PRN    thiamine 100 mg/mL injection 200 mg  200 mg Intravenous TID    lactulose (CHRONULAC) 10 GM/15ML solution 20 g  20 g Oral 4 times per day    metoprolol (Lopressor) 5 mg/5mL injection 5 mg  5 mg Intravenous  Q4H PRN    acetaminophen (Tylenol Extra Strength) tab 500 mg  500 mg Oral Q4H PRN    ondansetron (Zofran) 4 MG/2ML injection 4 mg  4 mg Intravenous Q6H PRN    prochlorperazine (Compazine) 10 MG/2ML injection 5 mg  5 mg Intravenous Q8H PRN    multivitamin (Tab-A-Yosef/Beta Carotene) tab 1 tablet  1 tablet Oral Daily    folic acid (Folvite) tab 1 mg  1 mg Oral Daily    LORazepam (Ativan) tab 1 mg  1 mg Oral Q1H PRN    Or    LORazepam (Ativan) 2 mg/mL injection 1 mg  1 mg Intravenous Q1H PRN    LORazepam (Ativan) tab 2 mg  2 mg Oral Q1H PRN    Or    LORazepam (Ativan) 2 mg/mL injection 2 mg  2 mg Intravenous Q1H PRN    LORazepam (Ativan) tab 3 mg  3 mg Oral Q1H PRN    Or    LORazepam (Ativan) 2 mg/mL injection 3 mg  3 mg Intravenous Q1H PRN    LORazepam (Ativan) 2 mg/mL injection 4 mg  4 mg Intravenous Q30 Min PRN    LORazepam (Ativan) 2 mg/mL injection 5 mg  5 mg Intravenous Q15 Min PRN    LORazepam (Ativan) 2 mg/mL injection 6 mg  6 mg Intravenous Q10 Min PRN    baclofen (Lioresal) tab 10 mg  10 mg Oral TID    pantoprazole (Protonix) 40 mg in sodium chloride 0.9% PF 10 mL IV push  40 mg Intravenous Q12H          Results:     Recent Labs   Lab 03/07/25  0414 03/08/25  0356 03/09/25  0359   RBC 3.10* 3.18* 3.19*   HGB 7.6* 7.5* 7.6*   HCT 23.6* 23.6* 23.7*   MCV 76.1* 74.2* 74.3*   NEPRELIM 3.85 5.89 5.74   WBC 5.7 7.9 8.0   PLT 34.0* 40.0* 53.0*     Recent Labs   Lab 03/06/25  0335 03/06/25  1253 03/07/25  0414 03/08/25  0356 03/08/25  1025 03/09/25  0359   *  --  127* 123*  --  121*   BUN 51*  --  49* 42*  --  32*   CREATSERUM 2.15*  --  1.52* 1.40*  --  1.31*   CA 7.9*  --  7.7* 7.9*  --  8.4*   ALB 3.0*  --   --   --  3.0* 3.1*     --  142 147*  --  154*   K 3.4*   < > 3.5 3.3* 3.3* 3.3*  3.3*     --  108 111  --  117*   CO2 25.0  --  26.0 27.0  --  27.0   ALKPHO 120*  --   --   --  145* 177*   *  --   --   --  122* 135*   ALT 82*  --   --   --  94* 98*   BILT 6.7*  --   --   --  10.6*  11.4*   TP 5.3*  --   --   --  5.3* 5.6*    < > = values in this interval not displayed.     INR   Date Value Ref Range Status   03/08/2025 1.95 (H) 0.80 - 1.20 Final     Comment:     Therapeutic INR range for patients on warfarin:  2.0-3.0 for most medical conditions and surgical prophylaxis   2.5-3.5 for mechanical heart valves and recurrent thromboembolism    Direct thrombin inhibitors (e.g. argatroban, bivalirudin), factor Xa inhibitors (e.g. apixaban, rivaroxaban), and conditions such as coagulation factor deficiency, vitamin K deficiency, and liver disease will prolong the prothrombin time/INR.    Unfractionated heparin and low molecular weight heparin do not affect the prothrombin time/INR up to a concentration of 1 IU/mL and 1.5 IU/mL, respectively.         No results for input(s): \"BNP\" in the last 168 hours.  Recent Labs   Lab 03/07/25  0414 03/08/25  0356 03/09/25  0359   MG 2.2 2.0 1.9   PHOS 1.1* 1.0* 1.4*        Recent Labs   Lab 03/06/25  0335 03/07/25  0414 03/08/25  0356 03/08/25  1025 03/09/25  0359   PHOS  --  1.1* 1.0*  --  1.4*   ALB 3.0*  --   --  3.0* 3.1*       XR CHEST AP PORTABLE  (CPT=71045)    Result Date: 3/7/2025  CONCLUSION:  1. Enteric tube extends well beyond the gastroesophageal junction; the distal tip is partially coiled but projects at the expected location of the gastric fundus/body (good position). 2. Stable extensive left greater than right basilar opacities, which are concerning for infection/pneumonia in the appropriate clinical setting.   elm-remote  Dictated by (CST): Arcenio Norman MD on 3/07/2025 at 8:54 PM     Finalized by (CST): Arcenio Norman MD on 3/07/2025 at 8:56 PM          XR CHEST AP PORTABLE  (CPT=71045)    Result Date: 3/7/2025  PROCEDURE: XR CHEST AP PORTABLE  (CPT=71045) TIME: 1332.   COMPARISON: [facility], XR CHEST AP PORTABLE (CPT=71045), 3/07/2025, 7:06 AM.  INDICATIONS: Verify NG tube placement  TECHNIQUE:   Single view.    FINDINGS/IMPRESSION:    1. There is an enteric feeding tube coiled within the gastric fundus in good position.  2. There is redemonstration of patchy interstitial and alveolar opacities throughout both lungs (left worse than right).  The findings could represent a multifocal infectious process, atypical interstitial edema, or a combination.  There is a small to moderate-sized left-sided pleural effusion with probable underlying atelectasis.  3. The heart mediastinal structures are minimally enlarged.      Dictated by (CST): Prateek Mcclelland MD on 3/07/2025 at 1:40 PM     Finalized by (CST): Prateek Mcclelland MD on 3/07/2025 at 1:42 PM               Assessment and Plan:     Patient is a 44 year old male with history of alcoholism, anxiety, gout who presented to the emergency room for evaluation of intractable nausea and vomiting     1.USMAN: non oliguric   Last creatinine 1.27 mg/dL in October 2024.    Serum creatinine 4.22 mg/dL on admit and 1.3 today    UA positive for hyaline cast and ketones suggestive of reduced renal perfusion  S/p IV fluid bolus with normal saline-  CT abdomen and pelvis unremarkable for any obstructive uropathy  Urine culture negative so far.  FENA 0.5%  Hypophosphatemia-replace.  Repeat in a.m.  Hypokalemia-replace  Magnesium within normal limit.     3. hypernatremic with TF -hold tube feed for a few hours.  Will start patient on dextrose-repeat in a.m.    4.metabolic acidosis:  In light of hypotension check lactic acid.  Positive anion gap likely secondary to ketoacidosis  Acidosis resolved.  Off of bicarb drip     5.alcoholic hepatitis: Elevated liver function test and hyperbilirubinemia: Improved bilirubin level but worsening transaminase level  Elevated INR and thrombocytopenia  CT suggestive of esophageal varices  GI input noted-vitamin K and PPI  Liver ultrasound with Doppler showed hepatic steatosis     6.alcoholism: Last drink on Saturday   alcohol withdrawal with high CIWA score  requiring transfer to PCCU   Remain on ativan   CT head negative for acute changes     7.  Pneumonia: CT chest noted for extensive bilateral airspace disease  Requiring higher oxygen-hypoxic respiratory failure.  S/p 1 dose of furosemide  On Zosyn and doxy.    Pulmonary input noted    Discussed with nursing and Dr. Joe Queen MD

## 2025-03-09 NOTE — PROGRESS NOTES
[facility]  [facility] of [facility]     Sami Grijalva Jr. Patient Status:  Inpatient    10/11/1980 MRN [id number]   Location [facility]5W Attending Alex Diaz MD   Hosp Day # 5 PCP Kerri Medina MD     Chief Complaint:   Chief Complaint   Patient presents with    Vomiting    Hiccups    Eval-D        Subjective:     Patient seen lying in bed.  No family at bedside.  Patient remains lethargic, but more alert today.  Attempted to open eyes.  Is responding to some simple questions.  Able to state his name and that he is in the hospital.        Objective:      Vital signs:  Vitals:    25 0600 25 0625 25 0800 25 1000   BP: 103/61  134/88 105/65   BP Location: Right arm  Right arm Right arm   Pulse: 96  116 95   Resp: (!) 29 20 (!) 32 22   Temp:   98.5 °F (36.9 °C)    TempSrc:   Temporal    SpO2: 100%  99% 99%   Weight:       Height:           Intake/Output Summary (Last 24 hours) at 3/9/2025 1103  Last data filed at 3/9/2025 1000  Gross per 24 hour   Intake 1979 ml   Output 2775 ml   Net -796 ml           Physical Exam:    GENERAL: Lethargic, but more alert than previous.  In no acute distress.  HEART:  Regular rhythm, regular rate  LUNGS:  Air entry was decreased.  No increased work of breathing. No wheezes   ABDOMEN: Soft and non-tender.    NEUROLOGICAL:  Lethargic, but more alert than previous.  Oriented to person and hospital.    Diagnostic Data:    Labs:    Recent Labs   Lab 25  1111 25  0358 25  1426 25  0335 25  0414 25  0356 25  1025 25  0359   WBC 17.5*   < >  --    < > 5.7 7.9  --  8.0   HGB 10.1*   < >  --    < > 7.6* 7.5*  --  7.6*   MCV 78.9*   < >  --    < > 76.1* 74.2*  --  74.3*   PLT 65.0*   < >  --    < > 34.0* 40.0*  --  53.0*   BAND  --   --   --   --  7  --   --   --    INR 3.86*  --  3.75*  --   --   --  1.95*  --     < > = values in this interval not  displayed.       Recent Labs   Lab 03/06/25  0335 03/06/25  1253 03/07/25  0414 03/08/25  0356 03/08/25  1025 03/09/25  0359   *  --  127* 123*  --  121*   BUN 51*  --  49* 42*  --  32*   CREATSERUM 2.15*  --  1.52* 1.40*  --  1.31*   CA 7.9*  --  7.7* 7.9*  --  8.4*   ALB 3.0*  --   --   --  3.0* 3.1*     --  142 147*  --  154*   K 3.4*   < > 3.5 3.3* 3.3* 3.3*  3.3*     --  108 111  --  117*   CO2 25.0  --  26.0 27.0  --  27.0   ALKPHO 120*  --   --   --  145* 177*   *  --   --   --  122* 135*   ALT 82*  --   --   --  94* 98*   BILT 6.7*  --   --   --  10.6* 11.4*   TP 5.3*  --   --   --  5.3* 5.6*    < > = values in this interval not displayed.           Estimated Creatinine Clearance: 68.2 mL/min (A) (based on SCr of 1.31 mg/dL (H)).    Recent Labs   Lab 03/04/25  1111 03/05/25  1426 03/08/25  1025   PTP 39.7* 38.8* 23.3*   INR 3.86* 3.75* 1.95*            COVID-19  Lab Results   Component Value Date    COVID19 Not Detected 12/26/2023    COVID19 Not Detected 01/15/2023       Pro-Calcitonin  No results for input(s): \"PCT\" in the last 168 hours.    Cardiac  No results for input(s): \"TROP\", \"PBNP\" in the last 168 hours.    Inflammatory Markers  No results for input(s): \"CRP\", \"CANDICE\", \"LDH\", \"DDIMER\" in the last 168 hours.    Culture:  Hospital Encounter on 03/04/25   1. Blood Culture     Status: None (Preliminary result)    Collection Time: 03/07/25 10:28 AM    Specimen: Blood,peripheral   Result Value Ref Range    Blood Culture Result No Growth 1 Day N/A   2. Urine Culture, Routine     Status: None    Collection Time: 03/04/25 11:11 AM    Specimen: Urine, clean catch   Result Value Ref Range    Urine Culture No Growth at 18-24 hrs. N/A       XR CHEST AP PORTABLE  (CPT=71045)    Result Date: 3/7/2025  CONCLUSION:  1. Enteric tube extends well beyond the gastroesophageal junction; the distal tip is partially coiled but projects at the expected location of the gastric fundus/body (good  position). 2. Stable extensive left greater than right basilar opacities, which are concerning for infection/pneumonia in the appropriate clinical setting.   [facility]  Dictated by (CST): Arcenio Norman MD on 3/07/2025 at 8:54 PM     Finalized by (CST): Arcenio Norman MD on 3/07/2025 at 8:56 PM          XR CHEST AP PORTABLE  (CPT=71045)    Result Date: 3/7/2025  PROCEDURE: XR CHEST AP PORTABLE  (CPT=71045) TIME: 1332.   COMPARISON: [facility], XR CHEST AP PORTABLE (CPT=71045), 3/07/2025, 7:06 AM.  INDICATIONS: Verify NG tube placement  TECHNIQUE:   Single view.   FINDINGS/IMPRESSION:    1. There is an enteric feeding tube coiled within the gastric fundus in good position.  2. There is redemonstration of patchy interstitial and alveolar opacities throughout both lungs (left worse than right).  The findings could represent a multifocal infectious process, atypical interstitial edema, or a combination.  There is a small to moderate-sized left-sided pleural effusion with probable underlying atelectasis.  3. The heart mediastinal structures are minimally enlarged.      Dictated by (CST): Prateek Mcclelland MD on 3/07/2025 at 1:40 PM     Finalized by (CST): Prateek Mcclelland MD on 3/07/2025 at 1:42 PM          CT CHEST (HMM=70752)    Result Date: 3/7/2025  CONCLUSION:  1. Extensive bilateral airspace disease, concerning for potential multifocal pneumonia. Continued surveillance is advised.  2. Dense bilateral lower airspace consolidation is evident; aspiration pneumonitis is a differential diagnostic possibility. Follow-up is recommended to document resolution.   3. Dilatation of the main pulmonary artery trunk may relate to underlying pulmonary hypertension.   4. Small hiatal hernia with imaging manifestations that may be indicative underlying esophagitis.  5. Marked hepatic steatosis.  6. Lesser incidental findings as above.    Dictated by (CST): Pavel Lara MD on 3/07/2025 at 11:38 AM     Finalized  by (CST): Pavel Lara MD on 3/07/2025 at 11:44 AM          CT BRAIN OR HEAD (CPT=70450)    Result Date: 3/7/2025  CONCLUSION:   Motion limits evaluation.  No evidence of acute intracranial abnormality.    Dictated by (CST): Vishnu Tuttle MD on 3/07/2025 at 11:32 AM     Finalized by (CST): Vishnu Tuttle MD on 3/07/2025 at 11:33 AM          XR CHEST AP PORTABLE  (CPT=71045)    Result Date: 3/7/2025  CONCLUSION:  1. Cardiomegaly.  Tortuous aorta. 2. Extensive multifocal airspace opacification in the bilateral perihilar and basilar regions with left-sided effusion.  Differential includes pulmonary edema congestive failure versus multifocal pneumonitis.    Dictated by (CST): Nolan Dooley MD on 3/07/2025 at 7:49 AM     Finalized by (CST): Nolan Dooley MD on 3/07/2025 at 7:51 AM                 Medications:    vancomycin  125 mg Per NG Tube Daily    piperacillin-tazobactam  3.375 g Intravenous Q8H    doxycycline  100 mg Intravenous Q12H    thiamine  200 mg Intravenous TID    lactulose  20 g Oral 4 times per day    multivitamin  1 tablet Oral Daily    folic acid  1 mg Oral Daily    baclofen  10 mg Oral TID    pantoprazole  40 mg Intravenous Q12H       Assessment & Plan:      Acute alcohol hepatitis   -last drink reportedly 4 days prior to admission  -Reported emesis and poor PO intake, without overt bleeding   -T bili upon arrival to ER 10.5, platelet 65, cr 4.22  -CT a/p reviewed, noted severe fatty liver with subtle undulating contour  -US liver reviewed, noted hepatic steatosis, GB sludge and cholelithiasis without cholecystitis or biliary ductal dilation, hepatic and portal vein patent  -MELD 3.0 on admission: 44   -GI consulted, appreciate further recommendations    Alcohol Abuse with withdrawal   -CIWA monitoring with alcohol withdrawal protocol  -thiamine, folic acid, multivitamin  -Psychiatry consulted, appreciate recommendations    Altered mental status  -Slowly improving  -Likely  metabolic encephalopathy  -Initially noted to have elevated ammonia levels noted consistent with hepatic encephalopathy.  -Continue lactulose   -Patient also with USMAN and electrolyte abnormalities.  -Continue treating underlying conditions  -Continue to monitor    Acute hypoxemic resp failure  Possible pneumonia/aspiration  -Likely multifactorial.  -X-ray reviewed.  Noted multifocal infiltrates.   -Concern for possible aspiration given hx of emesis and altered mental status.   -Continue on zosyn, doxy.   -Continue supplemental O2. Wean as able  -aspiration precautions  -keep NPO  -Pulmonology on consult, appreciate further recommendations    Acute Kidney Injury   -Improvement noted  -Avoiding Nephrotoxic agents  - Cont to monitor  -Nephrology on consult, appreciate further recommendations    Hypernatremia  -Elevations noted  -Nephrology on consult, recommend holding tube feeds and starting patient on D5 IV fluids.  -Continue to monitor      Discussed management/test result(s) with Rn and nephrology consultant  Quality:  DVT Prophylaxis: SCDs  CODE status: Full  Estimated date of discharge: TBD  Discharge is dependent on: clinical stability    52 minutes spent discussing with other providers, examining patient, obtaining history, reviewing medical records, interpreting and communicating test results/imaging, ordering tests/medications, discussing plan of care and documenting information.      Alex Diaz MD          This note was prepared using Dragon Medical voice recognition dictation software. As a result errors may occur. When identified these errors have been corrected. While every attempt is made to correct errors during dictation discrepancies may still exist

## 2025-03-10 ENCOUNTER — APPOINTMENT (OUTPATIENT)
Dept: GENERAL RADIOLOGY | Facility: HOSPITAL | Age: 45
End: 2025-03-10
Attending: INTERNAL MEDICINE
Payer: MEDICAID

## 2025-03-10 LAB
ALBUMIN SERPL-MCNC: 3 G/DL (ref 3.2–4.8)
ALP LIVER SERPL-CCNC: 166 U/L
ALT SERPL-CCNC: 103 U/L
ANION GAP SERPL CALC-SCNC: 10 MMOL/L (ref 0–18)
AST SERPL-CCNC: 153 U/L (ref ?–34)
BASOPHILS # BLD AUTO: 0.04 X10(3) UL (ref 0–0.2)
BASOPHILS NFR BLD AUTO: 0.4 %
BILIRUB DIRECT SERPL-MCNC: 9.2 MG/DL (ref ?–0.3)
BILIRUB SERPL-MCNC: 11.4 MG/DL (ref 0.3–1.2)
BUN BLD-MCNC: 25 MG/DL (ref 9–23)
BUN/CREAT SERPL: 20.8 (ref 10–20)
CALCIUM BLD-MCNC: 8 MG/DL (ref 8.7–10.4)
CHLORIDE SERPL-SCNC: 117 MMOL/L (ref 98–112)
CO2 SERPL-SCNC: 27 MMOL/L (ref 21–32)
CREAT BLD-MCNC: 1.2 MG/DL
DEPRECATED RDW RBC AUTO: 77.2 FL (ref 35.1–46.3)
EGFRCR SERPLBLD CKD-EPI 2021: 76 ML/MIN/1.73M2 (ref 60–?)
EOSINOPHIL # BLD AUTO: 0.2 X10(3) UL (ref 0–0.7)
EOSINOPHIL NFR BLD AUTO: 1.9 %
ERYTHROCYTE [DISTWIDTH] IN BLOOD BY AUTOMATED COUNT: 29.7 % (ref 11–15)
GLUCOSE BLD-MCNC: 119 MG/DL (ref 70–99)
GLUCOSE BLDC GLUCOMTR-MCNC: 113 MG/DL (ref 70–99)
GLUCOSE BLDC GLUCOMTR-MCNC: 117 MG/DL (ref 70–99)
GLUCOSE BLDC GLUCOMTR-MCNC: 117 MG/DL (ref 70–99)
GLUCOSE BLDC GLUCOMTR-MCNC: 126 MG/DL (ref 70–99)
HCT VFR BLD AUTO: 24.9 %
HGB BLD-MCNC: 7.9 G/DL
IMM GRANULOCYTES # BLD AUTO: 0.47 X10(3) UL (ref 0–1)
IMM GRANULOCYTES NFR BLD: 4.5 %
LYMPHOCYTES # BLD AUTO: 1.21 X10(3) UL (ref 1–4)
LYMPHOCYTES NFR BLD AUTO: 11.5 %
MCH RBC QN AUTO: 24.8 PG (ref 26–34)
MCHC RBC AUTO-ENTMCNC: 31.7 G/DL (ref 31–37)
MCV RBC AUTO: 78.1 FL
MONOCYTES # BLD AUTO: 1.01 X10(3) UL (ref 0.1–1)
MONOCYTES NFR BLD AUTO: 9.6 %
NEUTROPHILS # BLD AUTO: 7.61 X10 (3) UL (ref 1.5–7.7)
NEUTROPHILS # BLD AUTO: 7.61 X10(3) UL (ref 1.5–7.7)
NEUTROPHILS NFR BLD AUTO: 72.1 %
OSMOLALITY SERPL CALC.SUM OF ELEC: 324 MOSM/KG (ref 275–295)
PHOSPHATE SERPL-MCNC: 1.8 MG/DL (ref 2.4–5.1)
PLATELET # BLD AUTO: 70 10(3)UL (ref 150–450)
PLATELETS.RETICULATED NFR BLD AUTO: 9 % (ref 0–7)
POTASSIUM SERPL-SCNC: 3.5 MMOL/L (ref 3.5–5.1)
POTASSIUM SERPL-SCNC: 3.5 MMOL/L (ref 3.5–5.1)
PROT SERPL-MCNC: 5.5 G/DL (ref 5.7–8.2)
RBC # BLD AUTO: 3.19 X10(6)UL
SODIUM SERPL-SCNC: 154 MMOL/L (ref 136–145)
WBC # BLD AUTO: 10.5 X10(3) UL (ref 4–11)

## 2025-03-10 PROCEDURE — 99233 SBSQ HOSP IP/OBS HIGH 50: CPT | Performed by: INTERNAL MEDICINE

## 2025-03-10 PROCEDURE — 71045 X-RAY EXAM CHEST 1 VIEW: CPT | Performed by: INTERNAL MEDICINE

## 2025-03-10 PROCEDURE — 99232 SBSQ HOSP IP/OBS MODERATE 35: CPT | Performed by: OTHER

## 2025-03-10 RX ORDER — POTASSIUM CHLORIDE 14.9 MG/ML
20 INJECTION INTRAVENOUS ONCE
Status: COMPLETED | OUTPATIENT
Start: 2025-03-10 | End: 2025-03-10

## 2025-03-10 NOTE — PROGRESS NOTES
Patient is a 44 year old , single, male with past medical history of GERD, alcohol use disroder, who was admitted to the hospital for USMAN (acute kidney injury): The patient has been demonstrating symptoms of withdrawal.   Patient indicated for psych consult for evaluation and advise.    Consult Duration     The patient seen for over 50-minute, follow-up evaluation, over 50% counseling and coordinating care addressing alcohol withdrawal..  Record reviewed, communication with attending, communication with RN and patient seen face to face evaluation.    History of Present Illness:     According to the team, the patient continued demonstrate significant lethargy and stupor but is more responsive than over the weekend.     The patient is seen today in his room. Family was present in room with patient. The patient is jaundiced and lethargic, could not communicate with patient due to sedation. Blood pressure 129/76,with heart rate of 106, 96% 6L nasal cannula.     Patient is well-known to the service with longstanding alcohol dependency and withdrawal syndrome.  Patient according to the team has been demonstrating altered mental status with confusion and disorientation.  Ammonia level 41 with elevated liver enzymes.  Magnesium level is 1.9 today.    The patient seen today laying in hospital bed.  Patient is stuporous and restrained, on nasal cannula.    The patient did not respond to verbal or painful stimuli.     He is not able to answer questions or engage in conversation due to lethargy.     The patient has been demonstrating severe withdrawals with confusion, restlessness, agitation and response to internal stimuli.     Past Psychiatric/Medication History:  1. Prior diagnoses: alcohol use disorder  2. Past psychiatric inpatient: Denies, the patients partner reports he has been to rehab 3 times.   3. Past outpatient history: Denies  4. Past suicide history: The patient reports history of attempt 5 years ago. He  could not elaborate  5. Medication history: Denies     Social History:   The patient lives at home with his parents and his partner of 10 years. He works at a car wash.   He drinks alcohol daily. Occasional cannabis use. No tobacco or illicit substance abuse.     Family History:  No family history reported      Medical History:   Past Medical History  Past Medical History:    Esophageal reflux    Gout    Hernia       Past Surgical History  Past Surgical History:   Procedure Laterality Date    Colonoscopy N/A 11/1/2023    Procedure: COLONOSCOPY;  Surgeon: Vandana Austin MD;  Location: [facility] ENDOSCOPY    Egd  02/15/2016    Eh pr repair complex scalp arms legs 1.1 to 2.5 cm  2014    Elbow fracture surgery Right 1991    Hernia surgery      Inguinal hernia repair Left 01/17/2023       Family History  Family History   Problem Relation Age of Onset    Diabetes Father     Hypertension Father     Cancer Mother         liver       Social History  Social History     Socioeconomic History    Marital status: Single    Number of children: 0   Occupational History    Occupation: Supervisor, car wash   Tobacco Use    Smoking status: Former     Types: Cigarettes    Smokeless tobacco: Never   Vaping Use    Vaping status: Some Days   Substance and Sexual Activity    Alcohol use: Yes     Comment: per pt, DAILY HARD LEMONADE- [employer] hard lemonade 24oz cans, 6 cans daily    Drug use: Yes     Types: Cannabis     Comment: last use, couple months ago per pt report     Social Drivers of Health     Food Insecurity: No Food Insecurity (3/4/2025)    NCSS - Food Insecurity     Worried About Running Out of Food in the Last Year: No     Ran Out of Food in the Last Year: No   Transportation Needs: No Transportation Needs (3/4/2025)    NCSS - Transportation     Lack of Transportation: No   Housing Stability: Not At Risk (3/4/2025)    NCSS - Housing/Utilities     Has Housing: Yes     Worried About Losing Housing: No     Unable to Get Utilities: No            Current Medications:  Current Facility-Administered Medications   Medication Dose Route Frequency    dextrose 5% infusion   Intravenous Continuous    vancomycin (Firvanq) 50 mg/mL oral solution 125 mg  125 mg Per NG Tube Daily    haloperidol lactate (Haldol) 5 MG/ML injection 2 mg  2 mg Intravenous Q6H PRN    piperacillin-tazobactam (Zosyn) 3.375 g in dextrose 5% 100 mL IVPB-ADDV  3.375 g Intravenous Q8H    doxycycline hyclate (Vibramycin) 100 mg in sodium chloride 0.9% 100 mL IVPB  100 mg Intravenous Q12H    dextrose 10% infusion (TPN no rate)   Intravenous Continuous PRN    sodium bicarbonate tab 650 mg  650 mg Per G Tube PRN    And    pancrelipase (Lip-Prot-Amyl) (Zenpep) DR particles cap 10,000 Units  10,000 Units Per G Tube PRN    thiamine 100 mg/mL injection 200 mg  200 mg Intravenous TID    lactulose (CHRONULAC) 10 GM/15ML solution 20 g  20 g Oral 4 times per day    metoprolol (Lopressor) 5 mg/5mL injection 5 mg  5 mg Intravenous Q4H PRN    acetaminophen (Tylenol Extra Strength) tab 500 mg  500 mg Oral Q4H PRN    ondansetron (Zofran) 4 MG/2ML injection 4 mg  4 mg Intravenous Q6H PRN    prochlorperazine (Compazine) 10 MG/2ML injection 5 mg  5 mg Intravenous Q8H PRN    multivitamin (Tab-A-Yosef/Beta Carotene) tab 1 tablet  1 tablet Oral Daily    folic acid (Folvite) tab 1 mg  1 mg Oral Daily    LORazepam (Ativan) tab 1 mg  1 mg Oral Q1H PRN    Or    LORazepam (Ativan) 2 mg/mL injection 1 mg  1 mg Intravenous Q1H PRN    LORazepam (Ativan) tab 2 mg  2 mg Oral Q1H PRN    Or    LORazepam (Ativan) 2 mg/mL injection 2 mg  2 mg Intravenous Q1H PRN    LORazepam (Ativan) tab 3 mg  3 mg Oral Q1H PRN    Or    LORazepam (Ativan) 2 mg/mL injection 3 mg  3 mg Intravenous Q1H PRN    LORazepam (Ativan) 2 mg/mL injection 4 mg  4 mg Intravenous Q30 Min PRN    LORazepam (Ativan) 2 mg/mL injection 5 mg  5 mg Intravenous Q15 Min PRN    LORazepam (Ativan) 2 mg/mL injection 6 mg  6 mg Intravenous Q10 Min PRN    baclofen  (Lioresal) tab 10 mg  10 mg Oral TID    pantoprazole (Protonix) 40 mg in sodium chloride 0.9% PF 10 mL IV push  40 mg Intravenous Q12H     No medications prior to admission.       Allergies  Allergies[1]    Review of Systems:   As by Admitting/Attending    Results:   Laboratory Data:  Lab Results   Component Value Date    WBC 10.5 03/10/2025    HGB 7.9 (L) 03/10/2025    HCT 24.9 (L) 03/10/2025    PLT 70.0 (L) 03/10/2025    CREATSERUM 1.20 03/10/2025    BUN 25 (H) 03/10/2025     (H) 03/10/2025    K 3.5 03/10/2025    K 3.5 03/10/2025     (H) 03/10/2025    CO2 27.0 03/10/2025     (H) 03/10/2025    CA 8.0 (L) 03/10/2025    ALB 3.0 (L) 03/10/2025    ALKPHO 166 (H) 03/10/2025    TP 5.5 (L) 03/10/2025     (H) 03/10/2025     (H) 03/10/2025    INR 1.95 (H) 03/08/2025    PTP 23.3 (H) 03/08/2025     (H) 03/04/2025    DDIMER 1.13 (H) 08/25/2023    ESRML 27 (H) 10/01/2021    MG 1.9 03/09/2025    PHOS 1.8 (L) 03/10/2025    TROP 0.00 01/31/2017     (H) 03/06/2025    ETOH <3 03/04/2025         Imaging:  XR CHEST AP PORTABLE  (CPT=71045)    Result Date: 3/7/2025  CONCLUSION:  1. Enteric tube extends well beyond the gastroesophageal junction; the distal tip is partially coiled but projects at the expected location of the gastric fundus/body (good position). 2. Stable extensive left greater than right basilar opacities, which are concerning for infection/pneumonia in the appropriate clinical setting.   [facility]  Dictated by (CST): Arcenio Norman MD on 3/07/2025 at 8:54 PM     Finalized by (CST): Arcenio Norman MD on 3/07/2025 at 8:56 PM          XR CHEST AP PORTABLE  (CPT=71045)    Result Date: 3/7/2025  PROCEDURE: XR CHEST AP PORTABLE  (CPT=71045) TIME: 1332.   COMPARISON: [facility], XR CHEST AP PORTABLE (CPT=71045), 3/07/2025, 7:06 AM.  INDICATIONS: Verify NG tube placement  TECHNIQUE:   Single view.   FINDINGS/IMPRESSION:    1. There is an enteric feeding tube  coiled within the gastric fundus in good position.  2. There is redemonstration of patchy interstitial and alveolar opacities throughout both lungs (left worse than right).  The findings could represent a multifocal infectious process, atypical interstitial edema, or a combination.  There is a small to moderate-sized left-sided pleural effusion with probable underlying atelectasis.  3. The heart mediastinal structures are minimally enlarged.      Dictated by (CST): Prateek Mcclelland MD on 3/07/2025 at 1:40 PM     Finalized by (CST): Prateek Mcclelland MD on 3/07/2025 at 1:42 PM           Vital Signs:   Blood pressure 140/82, pulse 114, temperature 98 °F (36.7 °C), temperature source Temporal, resp. rate 25, height 68\", weight 82.9 kg (182 lb 12.8 oz), SpO2 96%.    Mental Status Exam:   Appearance: Stated age male, in hospital gown, laying down in hospital bed. On nasal cannula  Psychomotor: Patient seen today sedated with no tremor or agitation otherwise patient with restraint with episode of confusion, impulsivity and occasional agitation.  Orientation: Patient sedated, minimal reactivity  Gait: Not evaluated.  Attitude/Coorperation: patient is not able to cooperate with interview.   Behavior: anxious, restless, agitated otherwise lethargic this morning.  Speech: mumbled and incomprehensible.  Mood: Apathy with difficulty expressing emotion.  Affect: Restricted and flat.  Thought process: confused  Thought content: no reports of suicidal or homicidal ideation.  Perceptions: Patient demonstrating response to internal stimuli  Concentration: impaired  Memory: impaired  Intellect: Average.  Judgment and Insight: Impaired as an evidenced by recurrent drinking with deterioration in his physical function.    Impression:     Alcohol withdrawal Syndrome with delirium.  Episodic mood disorder.  Alcohol Dependence, continuous.  Rule out Wernicke's syndrome    USMAN (acute kidney injury)    Metabolic acidosis    Hyperkalemia     Leukocytosis    Thrombocytopenia    Coagulopathy (HCC)    Hyponatremia    Alcoholic (HCC)    Scleral icterus    Acute kidney failure    Patient is a 44 year old , single, male with past medical history of GERD, alcohol use disroder, who was admitted to the hospital for USMAN (acute kidney injury): The patient has been demonstrating symptoms of withdrawal.     The patient has been demonstrating severe withdrawals with confusion, restlessness, agitation and response to internal stimuli.     3/6/2025: patient was transferred to CCU due to increased CIWA scores. Patient lethargic this morning.     3/7/2025: Patient has been demonstrating increased confusion, lethargy with excessive sedation with episode of agitation.  No tremors has been observed.    3/8/2025: Patient has been demonstrating stupor and lethargy with excessive sedation. Could not interview patient directly today. No tremor were observed.    3/10/2025: Patient has been slightly more reactive today but still demonstrating stupor and lethargy with excessive sedation. Could not interview patient directly today. No tremors were observed.       Discussed risk and benefit, acknowledging the current symptom and severity.  At this point, I would recommend the following approach:     Focus on safety  Focus on education and support.  Focus on insight orientation helping the patient understand diagnosis and treatment plan.  Discontinue Haldol 2 mg IV q 6 hours PRN agitation  Continue Ativan per CIWA protocol  Continue thiamine 200 mg IV 3 times daily  Processed with patient at length, the initiation of the above psychotropic medications I advised the patient of the risks, benefits, alternatives and potential side effects. The patient consents to administration of the medications and understands the right to refuse medications at any time. The patient verbalized understanding.   Coordinate plan with team    Orders This Visit:  Orders Placed This Encounter    Procedures    CBC With Differential With Platelet    Comp Metabolic Panel (14)    Urinalysis with Culture Reflex    Lipase    RBC Morphology Scan    Prothrombin Time (PT)    Lactic Acid, Plasma    Comp Metabolic Panel (14)    CBC With Differential With Platelet    Magnesium    Sodium, Urine, Random    Creatinine, Urine, Random    Phosphorus    Basic Metabolic Panel (8)    Osmolality, Urine    Lactic Acid Reflex Post Positive    Lactic Acid, Plasma    Lactic Acid Post Positive    Lactic Acid, Plasma    Magnesium    Lactic Acid Reflex Post Positive    Lactic Acid Post Positive    Ethyl Alcohol    Ammonia, Plasma    CBC With Differential With Platelet    Basic Metabolic Panel (8)    Hepatic Function Panel (7)    Arterial blood gas    Prothrombin Time (PT)    AFP, Tumor Marker, Serum    Actin (Smooth Muscle) Antibody    Alpha-1-antirypsin, serum    Ceruloplasmin    Hepatitis A B + C profile    Immunoglobulin A/G/M, Quant    Iron And Tibc    Liver-Kidney Microsomal Antibody    Mitochondrial (M2) Antibody    Hep A AB, IGM    Scan slide    MD BLOOD SMEAR CONSULT    Potassium    Magnesium    Phosphorus    CK (Creatine Kinase) (Not Creatinine)    Scan slide    Manual differential    Phosphorus    Iron And Tibc    Arterial blood gas    Legionella urine Ag serogrp 1    Streptococcus Pneumoniae Ag, Urine    Ammonia, Plasma    Magnesium    Phosphorus    Potassium    Scan slide    Hepatic Function Panel (7)    Prothrombin Time (PT)    Potassium    CBC With Differential With Platelet    Hepatic Function Panel (7)    Basic Metabolic Panel (8)    Magnesium    Phosphorus    Potassium    Hepatic Function Panel (7)    Potassium    Hepatic Function Panel (7)    Urine Culture, Routine    Blood Culture    MRSA Screen by PCR    Blood Culture    Emergency MRSA Screen by PCR       Meds This Visit:  Requested Prescriptions      No prescriptions requested or ordered in this encounter       Felix Davila MD  3/10/2025    Note to Patient:  The 21st Century Cures Act makes medical notes like these available to patients in the interest of transparency. However, be advised this is a medical document. It is intended as peer to peer communication. It is written in medical language and may contain abbreviations or verbiage that are unfamiliar. It may appear blunt or direct. Medical documents are intended to carry relevant information, facts as evident, and the clinical opinion of the practitioner. This note may have been transcribed using a voice dictation system. Voice recognition errors may occur. This should not be taken to alter the content or meaning of this note.           [1]   Allergies  Allergen Reactions    Morphine SWELLING     SHORTNESS OF BREATH

## 2025-03-10 NOTE — PAYOR COMM NOTE
--------------  CONTINUED STAY REVIEW  3/9-3/10  Payor: [facility]  Subscriber #:  [id number]  Authorization Number: [id number]    Admit date: 3/4/25  Admit time:  3:11 PM    REVIEW DOCUMENTATION:    3/9 Pulm    Subjective:  A little more awake  Still has hiccups   CIWA are low so not getting as much meds overnight         From the initial consultation  Pt is unable to provide info  HPI: 45 yo male with hx of gout, GERD, admitted with acute ETOH hepatitis, ETOH use, emesis, USMAN. Seen by GI.  On CIWA protocol, thiamine, folic acid, MVI, BZDs, lactulose  Has been in the ICU for 2 days  ICU consulted this am for worsening hypoxemia  CXR this am with b/l infiltrates concerning for PNA      ASSESSMENT/PLAN:  Acute hypoxemic resp failure  -secondary to multifocal infiltrates. Given hx of emesis, concern for aspiration  -checked non contrast chest CT showed b/l infiltrates  -on zosyn, doxy. Checked urine leg, strep pneumo, mrsa nares, blood cx neg thus far  -weaned from airvo to HFNC @ 10 L now   -aspiration precautions  -keep NPO-has TF via NGT  -restarted baclofen for hiccups     Acute ETOH hepatitis  -GI following. Imaging as above  -vit K, PPI, lactulose  -NGT with TF     ETOH withdrawal  -CIWA protocol  -psych following   -BZD/haldol PRN  -CT head neg for acute changes     USMAN and hypernatremia  -s/p bicarb infusion  -was on D5 0.9 NS  -renal following     Proph  -DVT: elevated INR low PLT. On SCDS     Dispo  -Full code        3/9 IM        Chief Complaint   Patient presents with    Vomiting    Hiccups    Eval-D      Patient seen lying in bed.  No family at bedside.  Patient remains lethargic, but more alert today.  Attempted to open eyes.  Is responding to some simple questions.  Able to state his name and that he is in the hospital.       GENERAL: Lethargic, but more alert than previous.  In no acute distress.  HEART:  Regular rhythm, regular rate  LUNGS:  Air entry was decreased.  No  increased work of breathing. No wheezes   ABDOMEN: Soft and non-tender.    NEUROLOGICAL:  Lethargic, but more alert than previous.  Oriented to person and hospital.     Diagnostic Data:    Labs:                Recent Labs   Lab 03/04/25  1111 03/05/25  0358 03/05/25  1426 03/06/25  0335 03/07/25  0414 03/08/25  0356 03/08/25  1025 03/09/25  0359   WBC 17.5*   < >  --    < > 5.7 7.9  --  8.0   HGB 10.1*   < >  --    < > 7.6* 7.5*  --  7.6*   MCV 78.9*   < >  --    < > 76.1* 74.2*  --  74.3*   PLT 65.0*   < >  --    < > 34.0* 40.0*  --  53.0*   BAND  --   --   --   --  7  --   --   --    INR 3.86*  --  3.75*  --   --   --  1.95*  --     < > = values in this interval not displayed.                  Recent Labs   Lab 03/06/25  0335 03/06/25  1253 03/07/25  0414 03/08/25  0356 03/08/25  1025 03/09/25  0359   *  --  127* 123*  --  121*   BUN 51*  --  49* 42*  --  32*   CREATSERUM 2.15*  --  1.52* 1.40*  --  1.31*   CA 7.9*  --  7.7* 7.9*  --  8.4*   ALB 3.0*  --   --   --  3.0* 3.1*     --  142 147*  --  154*   K 3.4*   < > 3.5 3.3* 3.3* 3.3*  3.3*     --  108 111  --  117*   CO2 25.0  --  26.0 27.0  --  27.0   ALKPHO 120*  --   --   --  145* 177*   *  --   --   --  122* 135*   ALT 82*  --   --   --  94* 98*   BILT 6.7*  --   --   --  10.6* 11.4*   TP 5.3*  --   --   --  5.3* 5.6*     Scheduled Medications    vancomycin  125 mg Per NG Tube Daily    piperacillin-tazobactam  3.375 g Intravenous Q8H    doxycycline  100 mg Intravenous Q12H    thiamine  200 mg Intravenous TID    lactulose  20 g Oral 4 times per day    multivitamin  1 tablet Oral Daily    folic acid  1 mg Oral Daily    baclofen  10 mg Oral TID    pantoprazole  40 mg Intravenous Q12H               Assessment & Plan:  Acute alcohol hepatitis   -last drink reportedly 4 days prior to admission  -Reported emesis and poor PO intake, without overt bleeding   -T bili upon arrival to ER 10.5, platelet 65, cr 4.22  -CT a/p reviewed, noted  severe fatty liver with subtle undulating contour  -US liver reviewed, noted hepatic steatosis, GB sludge and cholelithiasis without cholecystitis or biliary ductal dilation, hepatic and portal vein patent  -MELD 3.0 on admission: 44   -GI consulted, appreciate further recommendations     Alcohol Abuse with withdrawal   -CIWA monitoring with alcohol withdrawal protocol  -thiamine, folic acid, multivitamin  -Psychiatry consulted, appreciate recommendations     Altered mental status  -Slowly improving  -Likely metabolic encephalopathy  -Initially noted to have elevated ammonia levels noted consistent with hepatic encephalopathy.  -Continue lactulose   -Patient also with USMAN and electrolyte abnormalities.  -Continue treating underlying conditions  -Continue to monitor     Acute hypoxemic resp failure  Possible pneumonia/aspiration  -Likely multifactorial.  -X-ray reviewed.  Noted multifocal infiltrates.   -Concern for possible aspiration given hx of emesis and altered mental status.   -Continue on zosyn, doxy.   -Continue supplemental O2. Wean as able  -aspiration precautions  -keep NPO  -Pulmonology on consult, appreciate further recommendations     Acute Kidney Injury   -Improvement noted  -Avoiding Nephrotoxic agents  - Cont to monitor  -Nephrology on consult, appreciate further recommendations     Hypernatremia  -Elevations noted  -Nephrology on consult, recommend holding tube feeds and starting patient on D5 IV fluids.  -Continue to monitor        3/10 Pulmonary/ICU/Critical Care Progress Note       Subjective:  More awake and agitated  Still has hiccups   CIWA are back up-about to get ativan 3 mg  Partner in room-reports pt recognizes him     From the initial consultation  Pt is unable to provide info  HPI: 43 yo male with hx of gout, GERD, admitted with acute ETOH hepatitis, ETOH use, emesis, USMAN. Seen by GI.  On CIWA protocol, thiamine, folic acid, MVI, BZDs, lactulose  Has been in the ICU for 2 days  ICU  consulted this am for worsening hypoxemia  CXR this am with b/l infiltrates concerning for PNA    Scheduled Medications    vancomycin  125 mg Per NG Tube Daily    piperacillin-tazobactam  3.375 g Intravenous Q8H    doxycycline  100 mg Intravenous Q12H    thiamine  200 mg Intravenous TID    lactulose  20 g Oral 4 times per day    multivitamin  1 tablet Oral Daily    folic acid  1 mg Oral Daily    baclofen  10 mg Oral TID    pantoprazole  40 mg Intravenous Q12H         Continuous Infusing Medication:  Medication Infusions    dextrose 50 mL/hr at 03/09/25 2345          Lab 03/08/25  0356 03/09/25  0359 03/10/25  0510   RBC 3.18* 3.19* 3.19*   HGB 7.5* 7.6* 7.9*   HCT 23.6* 23.7* 24.9*   MCV 74.2* 74.3* 78.1*   MCH 23.6* 23.8* 24.8*   MCHC 31.8 32.1 31.7   RDW 27.7* 28.6* 29.7*   NEPRELIM 5.89 5.74 7.61   WBC 7.9 8.0 10.5   PLT 40.0* 53.0* 70.0*                 Recent Labs   Lab 03/08/25  0356 03/08/25  1025 03/09/25  0359 03/09/25  1427 03/10/25  0510   *  --  121*  --  119*   BUN 42*  --  32*  --  25*   CREATSERUM 1.40*  --  1.31*  --  1.20   EGFRCR 63  --  68  --  76   CA 7.9*  --  8.4*  --  8.0*   ALB  --  3.0* 3.1*  --  3.0*   *  --  154*  --  154*   K 3.3* 3.3* 3.3*  3.3* 3.4* 3.5  3.5     --  117*  --  117*   CO2 27.0  --  27.0  --  27.0   ALKPHO  --  145* 177*  --  166*   AST  --  122* 135*  --  153*   ALT  --  94* 98*  --  103*   BILT  --  10.6* 11.4*  --  11.4*   TP  --  5.3* 5.6*  --  5.5*         ASSESSMENT/PLAN:  Acute hypoxemic resp failure  -secondary to multifocal infiltrates. Given hx of emesis, concern for aspiration  -checked non contrast chest CT showed b/l infiltrates  -on zosyn, doxy. Checked urine leg, strep pneumo, mrsa nares, blood cx neg thus far  -weaned from airvo to 6 LNC now   -aspiration precautions  -keep NPO-has TF via NGT  -restarted baclofen for hiccups     Acute ETOH hepatitis  -GI following. Imaging as above  -vit K, PPI, lactulose  -NGT with TF     ETOH  withdrawal  -CIWA protocol  -psych following   -BZD/haldol PRN  -CT head neg for acute changes     USMAN and hypernatremia  -s/p bicarb infusion  -on D5 0.9 NS  -renal following. Consider increasing FWF?     Proph  -DVT: elevated INR low PLT. On SCDS     Dispo  -Full code      3/10 Nephrology    Little bit more responsive today-reduced oxygen requirement and currently on 6 L.  NG tube in place and tube feeds continues.  High output through rectal tube     Review of Systems:      Unable to obtain-patient minimally responsive      Patient is a 44 year old male with history of alcoholism, anxiety, gout who presented to the emergency room for evaluation of intractable nausea and vomiting     1.USMAN: non oliguric   Last creatinine 1.27 mg/dL in October 2024.    Serum creatinine 4.22 mg/dL on admit and 1.2 today    UA positive for hyaline cast and ketones suggestive of reduced renal perfusion  S/p IV fluid bolus with normal saline-  CT abdomen and pelvis unremarkable for any obstructive uropathy  Urine culture negative so far.  FENA 0.5%  Hypophosphatemia-replace.  Repeat in a.m.  Hypokalemia-replace  Magnesium within normal limit.     3. hypernatremic with TF : Large amount of stool output and patient continued to have insensible loss through mouth breathing  On D5 at 100 mL/h and on tube feed.  Increase free water flushes through tube feed  Sodium unchanged in last 24 hours and stayed at 154-changed the drips to dextrose     4.metabolic acidosis:  In light of hypotension check lactic acid.  Positive anion gap likely secondary to ketoacidosis  Acidosis resolved.  Off of bicarb drip     5.alcoholic hepatitis: Elevated liver function test and hyperbilirubinemia: Improved bilirubin level but worsening transaminase level  Elevated INR and thrombocytopenia  CT suggestive of esophageal varices  GI input noted-vitamin K and PPI  Liver ultrasound with Doppler showed hepatic steatosis     6.alcoholism:    alcohol withdrawal with  high CIWA score requiring transfer to PCCU   Remain on ativan-reduce requirement now  CT head negative for acute changes     7.  Pneumonia: CT chest noted for extensive bilateral airspace disease  Requiring higher oxygen-hypoxic respiratory failure.  S/p 1 dose of furosemide  On Zosyn and doxy.    Pulmonary input noted    MEDICATIONS ADMINISTERED IN LAST 1 DAY:  acetaminophen (Tylenol Extra Strength) tab 500 mg       Date Action Dose Route User    3/9/2025 2343 Given 500 mg Oral Mima Delcid RN          baclofen (Lioresal) tab 10 mg       Date Action Dose Route User    3/10/2025 0859 Given 10 mg Oral Mayte Duncan RN    3/9/2025 2144 Given 10 mg Oral Rl Roldan RN    3/9/2025 1616 Given 10 mg Oral Reena Gomez RN          dextrose 5% infusion       Date Action Dose Route User    3/10/2025 1215 New Bag (none) Intravenous Mayte Duncan RN    3/9/2025 2345 Rate/Dose Change (none) Intravenous Mima Delcid RN          doxycycline hyclate (Vibramycin) 100 mg in sodium chloride 0.9% 100 mL IVPB       Date Action Dose Route User    3/10/2025 1116 New Bag 100 mg Intravenous Mayte Duncan RN    3/9/2025 2341 New Bag 100 mg Intravenous Mima Delcid RN          folic acid (Folvite) tab 1 mg       Date Action Dose Route User    3/10/2025 0859 Given 1 mg Oral Mayte Duncan RN          lactulose (CHRONULAC) 10 GM/15ML solution 20 g       Date Action Dose Route User    3/10/2025 1115 Given 20 g Oral Mayte Duncan RN    3/10/2025 0424 Given 20 g Oral Mima Delcid RN    3/9/2025 2144 Given 20 g Oral Rl Roldan RN          LORazepam (Ativan) 2 mg/mL injection 2 mg       Date Action Dose Route User    3/10/2025 0421 Given 2 mg Intravenous Mima Delcid RN    3/9/2025 2338 Given 2 mg Intravenous Mima Delcid RN    3/9/2025 2142 Given 2 mg Intravenous Rl Roldan RN          LORazepam (Ativan) 2 mg/mL injection 3 mg       Date Action Dose  Route User    3/10/2025 0837 Given 3 mg Intravenous Mayte Duncan RN    3/10/2025 0648 Given 3 mg Intravenous Mima Delcid RN          pantoprazole (Protonix) 40 mg in sodium chloride 0.9% PF 10 mL IV push       Date Action Dose Route User    3/10/2025 1215 Given 40 mg Intravenous Mayte Duncan RN    3/10/2025 0038 Given 40 mg Intravenous Mima Delcid RN          piperacillin-tazobactam (Zosyn) 3.375 g in dextrose 5% 100 mL IVPB-ADDV       Date Action Dose Route User    3/10/2025 1215 New Bag 3.375 g Intravenous Mayte Duncan RN    3/10/2025 0423 New Bag 3.375 g Intravenous Mima Delcid RN    3/9/2025 2143 New Bag 3.375 g Intravenous Rl Roldan RN          potassium chloride 40 mEq in 250mL sodium chloride 0.9% IVPB premix       Date Action Dose Route User    3/9/2025 1632 New Bag 40 mEq Intravenous Reena Gomez RN          multivitamin (Tab-A-Yosef/Beta Carotene) tab 1 tablet       Date Action Dose Route User    3/10/2025 0858 Given 1 tablet Oral Mayte Duncan RN          thiamine 100 mg/mL injection 200 mg       Date Action Dose Route User    3/10/2025 0833 Given 200 mg Intravenous Mayte Duncan RN    3/9/2025 2143 Given 200 mg Intravenous Rl Roldan RN    3/9/2025 1616 Given 200 mg Intravenous Reena Gomez RN          vancomycin (Firvanq) 50 mg/mL oral solution 125 mg       Date Action Dose Route User    3/10/2025 0859 Given 125 mg Per NG Tube Mayte Duncan RN            Vitals (last day)       Date/Time Temp Pulse Resp BP SpO2 Weight O2 Device O2 Flow Rate (L/min) Who    03/10/25 0846 -- 114 -- -- 96 % -- Nasal cannula 6 L/min NR    03/10/25 0800 -- 99 -- 140/82 -- -- -- -- NR    03/10/25 0641 -- 110 -- 129/88 -- -- -- -- EDI    03/10/25 0600 -- 87 25 110/68 96 % -- Nasal cannula 6 L/min     03/10/25 0500 -- 103 -- 130/89 -- -- -- --     03/10/25 0400 98 °F (36.7 °C) 102 25 117/80 96 % -- High flow nasal cannula 6 L/min     03/10/25 0200  -- 84 21 113/68 96 % -- High flow nasal cannula 6 L/min     03/10/25 0000 98.8 °F (37.1 °C) 87 26 114/67 98 % -- High flow nasal cannula 6 L/min     03/09/25 2300 -- 108 19 140/84 95 % -- High flow nasal cannula 6 L/min     03/09/25 2200 -- 108 -- 136/82 -- -- -- --     03/09/25 2130 -- 107 26 142/72 95 % -- High flow nasal cannula 6 L/min     03/09/25 2000 98.1 °F (36.7 °C) 84 19 101/63 98 % -- High flow nasal cannula 6 L/min     03/09/25 1800 -- 109 24 124/78 96 % -- High flow nasal cannula 6 L/min     03/09/25 1600 98 °F (36.7 °C) 87 24 127/72 94 % -- High flow nasal cannula 6 L/min     03/09/25 1400 -- 110 24 131/70 95 % -- High flow nasal cannula 8 L/min     03/09/25 1230 -- -- -- -- 99 % -- High flow nasal cannula 8 L/min     03/09/25 1200 98.1 °F (36.7 °C) 109 24 108/78 98 % -- High flow nasal cannula 10 L/min     03/09/25 1000 -- 95 22 105/65 99 % -- -- --     03/09/25 0800 98.5 °F (36.9 °C) 116 32 134/88 99 % -- High flow nasal cannula 10 L/min     03/09/25 0625 -- -- 20 -- -- -- -- --     03/09/25 0600 -- 96 29 103/61 100 % -- High flow nasal cannula 10 L/min     03/09/25 0400 98.8 °F (37.1 °C) 110 27 138/81 98 % -- High flow nasal cannula 10 L/min     03/09/25 0300 -- 112 20 128/76 98 % -- High flow nasal cannula 10 L/min     03/09/25 0000 97.2 °F (36.2 °C) 104 26 110/74 98 % -- High flow nasal cannula 10 L/min EDI          CIWA Scores (since admission)       Date/Time CIWA-Ar Total Who    03/10/25 0800 18 NR    03/10/25 0641 16 EDI    03/10/25 0600 6 EDI    03/10/25 0500 6 EDI    03/10/25 0400 13 EDI    03/10/25 0200 4 EDI    03/10/25 0000 4 EDI    03/09/25 2300 13 EDI    03/09/25 2200 6 EDI    03/09/25 2130 11 EDI    03/09/25 2000 6 EDI    03/09/25 1800 1 AH    03/09/25 1400 2 AH    03/09/25 0600 4 EDI    03/09/25 0400 5 EID    03/09/25 0300 5 EDI    03/09/25 0000 5 EDI    03/08/25 2200 4 EDI    03/08/25 2000 4 EDI    03/08/25 1200 5 ES    03/08/25 1000 5 ES    03/08/25 0800 5 ES     03/08/25 0600 6 EDI    03/08/25 0400 5 EDI    03/08/25 0200 5 EDI    03/08/25 0000 5 EDI    03/07/25 2200 5 EDI    03/07/25 2000 5 EDI    03/07/25 0600 5 CT    03/07/25 0400 5 CT    03/07/25 0200 5 CT    03/07/25 0000 8 CT    03/06/25 2200 8 CT    03/06/25 2000 8 CT    03/06/25 1800 5 AW    03/06/25 1600 8 AW    03/06/25 1400 4 AW    03/06/25 1200 4 AW    03/06/25 1000 5 AW    03/06/25 0800 6 AW    03/06/25 0600 14 CT    03/06/25 0500 6 CT    03/06/25 0400 20 CT    03/06/25 0300 17 CT    03/06/25 0200 20 CT    03/06/25 0100 17 CT    03/06/25 0000 5 CT    03/05/25 2300 17 CT    03/05/25 2000 5 CT    03/05/25 1846 5 LN    03/05/25 1742 13 LN    03/05/25 1642 16 LN    03/05/25 1541 19 LN    03/05/25 1516 6 LN    03/05/25 1416 9 LN    03/05/25 1257 18 LN    03/05/25 1200 14 BD    03/05/25 1100 13 BD    03/05/25 1000 10 BD    03/05/25 0900 17 BD    03/05/25 0800 9 BD    03/05/25 0700 13 MP    03/05/25 0600 29 MP    03/05/25 0500 7 MP    03/05/25 0400 6 MP    03/05/25 0300 10 MP    03/05/25 0200 6 MP    03/05/25 0100 8 MP    03/05/25 0000 14 MP    03/04/25 2300 8 MP    03/04/25 2200 13 MP    03/04/25 2100 8 MP    03/04/25 2000 24 MP    03/04/25 1900 12 BD    03/04/25 1800 12 BD    03/04/25 1600 5 BD    03/04/25 1500 1 RR    03/04/25 1315 0 RR    03/04/25 1200 1 RR    03/04/25 0951 17 KB

## 2025-03-10 NOTE — PLAN OF CARE
Patient lethargic, NG tube in place, tube feeds restarted and IV fluids decreased per order from Dr. Aftab Queen. Cedeño and rectal tube in place. CIWA protocol followed, iv ativan administered as needed, soft wrist restraints in place for safety.     Fall precautions in place. Call light in reach.    Family member staying at bedside.     Problem: Patient Centered Care  Goal: Patient preferences are identified and integrated in the patient's plan of care  Description: Interventions:  - What would you like us to know as we care for you? None stated  - Provide timely, complete, and accurate information to patient/family  - Incorporate patient and family knowledge, values, beliefs, and cultural backgrounds into the planning and delivery of care  - Encourage patient/family to participate in care and decision-making at the level they choose  - Honor patient and family perspectives and choices  Outcome: Progressing     Problem: Patient/Family Goals  Goal: Patient/Family Long Term Goal  Description: Patient's Long Term Goal: Free of pain    Interventions:  - monitor pain score  - See additional Care Plan goals for specific interventions  Outcome: Progressing  Goal: Patient/Family Short Term Goal  Description: Patient's Short Term Goal: free of falls    Interventions:   - fall precautions in place  - See additional Care Plan goals for specific interventions  Outcome: Progressing     Problem: PAIN - ADULT  Goal: Verbalizes/displays adequate comfort level or patient's stated pain goal  Description: INTERVENTIONS:  - Encourage pt to monitor pain and request assistance  - Assess pain using appropriate pain scale  - Administer analgesics based on type and severity of pain and evaluate response  - Implement non-pharmacological measures as appropriate and evaluate response  - Consider cultural and social influences on pain and pain management  - Manage/alleviate anxiety  - Utilize distraction and/or relaxation techniques  - Monitor  for opioid side effects  - Notify MD/LIP if interventions unsuccessful or patient reports new pain  - Anticipate increased pain with activity and pre-medicate as appropriate  Outcome: Progressing     Problem: RISK FOR INFECTION - ADULT  Goal: Absence of fever/infection during anticipated neutropenic period  Description: INTERVENTIONS  - Monitor WBC  - Administer growth factors as ordered  - Implement neutropenic guidelines  Outcome: Progressing     Problem: SAFETY ADULT - FALL  Goal: Free from fall injury  Description: INTERVENTIONS:  - Assess pt frequently for physical needs  - Identify cognitive and physical deficits and behaviors that affect risk of falls.  - [place] fall precautions as indicated by assessment.  - Educate pt/family on patient safety including physical limitations  - Instruct pt to call for assistance with activity based on assessment  - Modify environment to reduce risk of injury  - Provide assistive devices as appropriate  - Consider OT/PT consult to assist with strengthening/mobility  - Encourage toileting schedule  Outcome: Progressing     Problem: DISCHARGE PLANNING  Goal: Discharge to home or other facility with appropriate resources  Description: INTERVENTIONS:  - Identify barriers to discharge w/pt and caregiver  - Include patient/family/discharge partner in discharge planning  - Arrange for needed discharge resources and transportation as appropriate  - Identify discharge learning needs (meds, wound care, etc)  - Arrange for interpreters to assist at discharge as needed  - Consider post-discharge preferences of patient/family/discharge partner  - Complete POLST form as appropriate  - Assess patient's ability to be responsible for managing their own health  - Refer to Case Management Department for coordinating discharge planning if the patient needs post-hospital services based on physician/LIP order or complex needs related to functional status, cognitive ability or social support  system  Outcome: Progressing     Problem: Safety Risk - Non-Violent Restraints  Goal: Patient will remain free from self-harm  Description: INTERVENTIONS:  - Apply the least restrictive restraint to prevent harm  - Notify patient and family of reasons restraints applied  - Assess for any contributing factors to confusion (electrolyte disturbances, delirium, medications)  - Discontinue any unnecessary medical devices as soon as possible  - Assess the patient's physical comfort, circulation, skin condition, hydration, nutrition and elimination needs   - Reorient and redirection as needed  - Assess for the need to continue restraints  Outcome: Progressing     Problem: Delirium  Goal: Minimize duration of delirium  Description: Interventions:  - Encourage use of hearing aids, eye glasses  - Promote highest level of mobility daily  - Provide frequent reorientation  - Promote wakefulness i.e. lights on, blinds open  - Promote sleep, encourage patient's normal rest cycle i.e. lights off, TV off, minimize noise and interruptions  - Encourage family to assist in orientation and promotion of home routines  Outcome: Progressing

## 2025-03-10 NOTE — PROGRESS NOTES
[facility]     Gastroenterology Progress Note    Sami Grijalva Jr. Patient Status:  Inpatient    10/11/1980 MRN [id number]   Location [facility]5W Attending Alex Diaz MD   Hosp Day # 6 PCP Kerri Medina MD       Subjective:    Remains lethargic and in restraints.  Seems a little more awake and slightly opening eyes to command.     Objective:   Blood pressure 140/82, pulse 114, temperature 98 °F (36.7 °C), temperature source Temporal, resp. rate 25, height 5' 8\" (1.727 m), weight 182 lb 12.8 oz (82.9 kg), SpO2 96%. Body mass index is 27.79 kg/m².    Gen: lethargic patient, NAD  HEENT: EOMI, the sclera appears icteric, oropharynx clear, mucus membranes appear moist  CV: RRR  Lung: HFNC  Abdomen: soft NTND abdomen with NABS appreciated   Skin: dry, warm, + jaundice  Ext: no LE edema is evident  Neuro: Drowsy  Psych: lethargic    Assessment and Plan:   Sami Grijalva Jr. is a 44 year old male w/ PMHx of longstanding alcohol use, cannabis use, GERD, who presents with generalized abdominal pain, hiccups and vomiting. GI consulted for acute alcohol hepatitis.     #Acute alcohol hepatitis with probable underlying cirrhosis  -CT a/p in ER severe fatty liver with subtle undulating contour  -US liver with hepatic steatosis, GB sludge and cholelithiasis without cholecystitis or biliary ductal dilation, hepatic and portal vein patent  -last EGD 2023 without varices   -high maddrey; holding off on steroids since concern for infection   -CIWA high with active withdrawal and AMS   -nutrition  -etoh cessation    #Likely early cirrhosis, ETOH abuse, chronic w/u in process  MELD 3.0 on admission: 44  -PSE: Lethargic/drowsy in setting of ETOH withdrawal treated with CIWA protocol, Ammonia 121, pt unable to tolerate PO given AMS, initiate lactulose enemas  -Ascites: none on exam or US  -EV: None on last EGD 2023, tubular structure around GE junction suggestive of EV.  Will need  repeat EGD electively  -HCC: No lesions on US or CT, AFP in process     #USMAN  -nephrology following      Recommend:  -CIWA per protocol; ETOH withdrawal management  -Lactulose goal 3-4 bm/d  -Daily CMP, CBC, INR  -Chronic liver w/u in process  -Empiric PPI BID  -ok for baclofen for singultus suspected from phrenic nerve irritation    Vandana Austin MD      Results:     Lab Results   Component Value Date    WBC 10.5 03/10/2025    HGB 7.9 (L) 03/10/2025    HCT 24.9 (L) 03/10/2025    PLT 70.0 (L) 03/10/2025    CREATSERUM 1.20 03/10/2025    BUN 25 (H) 03/10/2025     (H) 03/10/2025    K 3.5 03/10/2025    K 3.5 03/10/2025     (H) 03/10/2025    CO2 27.0 03/10/2025     (H) 03/10/2025    CA 8.0 (L) 03/10/2025    ALB 3.0 (L) 03/10/2025    ALKPHO 166 (H) 03/10/2025    BILT 11.4 (H) 03/10/2025    TP 5.5 (L) 03/10/2025     (H) 03/10/2025     (H) 03/10/2025    INR 1.95 (H) 03/08/2025     (H) 03/04/2025    DDIMER 1.13 (H) 08/25/2023    ESRML 27 (H) 10/01/2021    MG 1.9 03/09/2025    PHOS 1.8 (L) 03/10/2025    TROP 0.00 01/31/2017     (H) 03/06/2025    ETOH <3 03/04/2025       No results found.

## 2025-03-10 NOTE — PROGRESS NOTES
[facility]  part of [facility] Critical Care Hospitalist Progress Note     Sami Grijalva . Patient Status:  Inpatient    10/11/1980 MRN [id number]   Location [facility]5W Attending Alex Diaz MD   Hosp Day # 6 PCP Kerri Medina MD     Chief Complaint:   Chief Complaint   Patient presents with    Vomiting    Hiccups    Eval-D        Subjective:     Patient seen lying in bed.  Sister and partner at bedside.  Patient remains lethargic.  Per family, patient previously attempting to open eyes and was speaking some words.  Remains confused.  Agitated at times.         Objective:      Vital signs:  Vitals:    03/10/25 0600 03/10/25 0641 03/10/25 0800 03/10/25 0846   BP: 110/68 129/88 140/82    BP Location: Right arm      Pulse: 87 110 99 114   Resp: 25      Temp:       TempSrc:    Oral   SpO2: 96%   96%   Weight:       Height:           Intake/Output Summary (Last 24 hours) at 3/10/2025 1208  Last data filed at 3/10/2025 1145  Gross per 24 hour   Intake 2528.3 ml   Output 2500 ml   Net 28.3 ml           Physical Exam:    GENERAL: Lethargic.  In no acute distress.  HEART:  Regular rhythm, tachycardia  LUNGS:  Air entry was decreased.  No increased work of breathing. No wheezes   ABDOMEN: Soft and non-tender.    NEUROLOGICAL:  Lethargic, remains confused, agitated at times    Diagnostic Data:    Labs:    Recent Labs   Lab 25  1111 25  0358 25  1426 25  0335 25  0414 25  0356 25  1025 25  0359 03/10/25  0510   WBC 17.5*   < >  --    < > 5.7 7.9  --  8.0 10.5   HGB 10.1*   < >  --    < > 7.6* 7.5*  --  7.6* 7.9*   MCV 78.9*   < >  --    < > 76.1* 74.2*  --  74.3* 78.1*   PLT 65.0*   < >  --    < > 34.0* 40.0*  --  53.0* 70.0*   BAND  --   --   --   --  7  --   --   --   --    INR 3.86*  --  3.75*  --   --   --  1.95*  --   --     < > = values in this interval not displayed.       Recent Labs   Lab 25  0356 25  1025  03/09/25  0359 03/09/25  1427 03/10/25  0510   *  --  121*  --  119*   BUN 42*  --  32*  --  25*   CREATSERUM 1.40*  --  1.31*  --  1.20   CA 7.9*  --  8.4*  --  8.0*   ALB  --  3.0* 3.1*  --  3.0*   *  --  154*  --  154*   K 3.3* 3.3* 3.3*  3.3* 3.4* 3.5  3.5     --  117*  --  117*   CO2 27.0  --  27.0  --  27.0   ALKPHO  --  145* 177*  --  166*   AST  --  122* 135*  --  153*   ALT  --  94* 98*  --  103*   BILT  --  10.6* 11.4*  --  11.4*   TP  --  5.3* 5.6*  --  5.5*           Estimated Creatinine Clearance: 74.4 mL/min (based on SCr of 1.2 mg/dL).    Recent Labs   Lab 03/04/25  1111 03/05/25  1426 03/08/25  1025   PTP 39.7* 38.8* 23.3*   INR 3.86* 3.75* 1.95*            COVID-19  Lab Results   Component Value Date    COVID19 Not Detected 12/26/2023    COVID19 Not Detected 01/15/2023       Pro-Calcitonin  No results for input(s): \"PCT\" in the last 168 hours.    Cardiac  No results for input(s): \"TROP\", \"PBNP\" in the last 168 hours.    Inflammatory Markers  No results for input(s): \"CRP\", \"CANDICE\", \"LDH\", \"DDIMER\" in the last 168 hours.    Culture:  Hospital Encounter on 03/04/25   1. Blood Culture     Status: None (Preliminary result)    Collection Time: 03/07/25 10:28 AM    Specimen: Blood,peripheral   Result Value Ref Range    Blood Culture Result No Growth 3 Days N/A   2. Urine Culture, Routine     Status: None    Collection Time: 03/04/25 11:11 AM    Specimen: Urine, clean catch   Result Value Ref Range    Urine Culture No Growth at 18-24 hrs. N/A       XR CHEST AP PORTABLE  (CPT=71045)    Result Date: 3/7/2025  CONCLUSION:  1. Enteric tube extends well beyond the gastroesophageal junction; the distal tip is partially coiled but projects at the expected location of the gastric fundus/body (good position). 2. Stable extensive left greater than right basilar opacities, which are concerning for infection/pneumonia in the appropriate clinical setting.   elm-remote  Dictated by (CST): Emerson  MD Arcenio on 3/07/2025 at 8:54 PM     Finalized by (CST): Arcenio Norman MD on 3/07/2025 at 8:56 PM          XR CHEST AP PORTABLE  (CPT=71045)    Result Date: 3/7/2025  PROCEDURE: XR CHEST AP PORTABLE  (CPT=71045) TIME: 1332.   COMPARISON: [facility], XR CHEST AP PORTABLE (CPT=71045), 3/07/2025, 7:06 AM.  INDICATIONS: Verify NG tube placement  TECHNIQUE:   Single view.   FINDINGS/IMPRESSION:    1. There is an enteric feeding tube coiled within the gastric fundus in good position.  2. There is redemonstration of patchy interstitial and alveolar opacities throughout both lungs (left worse than right).  The findings could represent a multifocal infectious process, atypical interstitial edema, or a combination.  There is a small to moderate-sized left-sided pleural effusion with probable underlying atelectasis.  3. The heart mediastinal structures are minimally enlarged.      Dictated by (CST): Prateek Mcclelland MD on 3/07/2025 at 1:40 PM     Finalized by (CST): Prateek Mcclelland MD on 3/07/2025 at 1:42 PM                 Medications:    vancomycin  125 mg Per NG Tube Daily    piperacillin-tazobactam  3.375 g Intravenous Q8H    doxycycline  100 mg Intravenous Q12H    thiamine  200 mg Intravenous TID    lactulose  20 g Oral 4 times per day    multivitamin  1 tablet Oral Daily    folic acid  1 mg Oral Daily    baclofen  10 mg Oral TID    pantoprazole  40 mg Intravenous Q12H       Assessment & Plan:      Acute alcohol hepatitis   -last drink reportedly 4 days prior to admission  -Reported emesis and poor PO intake, without overt bleeding   -T bili upon arrival to ER 10.5, platelet 65, cr 4.22  -CT a/p reviewed, noted severe fatty liver with subtle undulating contour  -US liver reviewed, noted hepatic steatosis, GB sludge and cholelithiasis without cholecystitis or biliary ductal dilation, hepatic and portal vein patent  -MELD 3.0 on admission: 44   -GI consulted, appreciate further  recommendations    Alcohol Abuse with withdrawal   -CIWA monitoring with alcohol withdrawal protocol  -thiamine, folic acid, multivitamin  -Psychiatry consulted, appreciate recommendations    Altered mental status  -Slowly improving  -Likely metabolic encephalopathy  -Initially noted to have elevated ammonia levels noted consistent with hepatic encephalopathy.  -Continue lactulose   -Patient also with USMAN and electrolyte abnormalities.  -Continue treating underlying conditions  -Continue to monitor    Acute hypoxemic resp failure  Possible pneumonia/aspiration  -Likely multifactorial.  -X-ray reviewed.  Noted multifocal infiltrates.   -Concern for possible aspiration given hx of emesis and altered mental status.   -Continue on zosyn, doxy.   -Continue supplemental O2.  Weaning as able  -aspiration precautions  -keep NPO until more alert and passes swallow evaluation.  -Pulmonology on consult, appreciate further recommendations    Acute Kidney Injury   -Continue improvement noted  -Avoiding Nephrotoxic agents  - Cont to monitor  -Nephrology on consult, appreciate further recommendations    Hypernatremia  -Elevations noted  -Nephrology on consult, recommend continue D5 IV fluids and increased free water flushes.  -Continue to monitor    Plan of care discussed with patient's sister and partner at bedside.  Discussed management/test result(s) with Rn and nephrology consultant    Quality:  DVT Prophylaxis: SCDs  CODE status: Full  Estimated date of discharge: TBD  Discharge is dependent on: clinical stability    55 minutes spent discussing with other providers, examining patient, obtaining history, reviewing medical records, interpreting and communicating test results/imaging, ordering tests/medications, discussing plan of care and documenting information.      Alex Diaz MD          This note was prepared using Dragon Medical voice recognition dictation software. As a result errors may occur. When identified  these errors have been corrected. While every attempt is made to correct errors during dictation discrepancies may still exist

## 2025-03-10 NOTE — PROGRESS NOTES
[facility]  part of [facility]    Progress Note      Subjective:     Little bit more responsive today-reduced oxygen requirement and currently on 6 L.  NG tube in place and tube feeds continues.  High output through rectal tube    Review of Systems:     Unable to obtain-patient minimally responsive    Objective:   Temp:  [98 °F (36.7 °C)-98.8 °F (37.1 °C)] 98 °F (36.7 °C)  Pulse:  [] 114  Resp:  [19-26] 25  BP: (101-142)/(63-89) 140/82  SpO2:  [94 %-99 %] 96 %  SpO2: 96 %     Intake/Output Summary (Last 24 hours) at 3/10/2025 1207  Last data filed at 3/10/2025 1145  Gross per 24 hour   Intake 2528.3 ml   Output 2500 ml   Net 28.3 ml     Wt Readings from Last 3 Encounters:   03/04/25 182 lb 12.8 oz (82.9 kg)   03/27/24 170 lb (77.1 kg)   12/26/23 198 lb (89.8 kg)       General appearance: ill appearing and groggy.    Head: Normocephalic, atraumatic  Neck:  no JVD, supple, symmetrical  Extremities: extremities normal, no edema  Skin: No rashes or lesions  Neurologic: Unable to examine  Psychiatric: on Ativan    Medications:  Current Facility-Administered Medications   Medication Dose Route Frequency    dextrose 5% infusion   Intravenous Continuous    vancomycin (Firvanq) 50 mg/mL oral solution 125 mg  125 mg Per NG Tube Daily    haloperidol lactate (Haldol) 5 MG/ML injection 2 mg  2 mg Intravenous Q6H PRN    piperacillin-tazobactam (Zosyn) 3.375 g in dextrose 5% 100 mL IVPB-ADDV  3.375 g Intravenous Q8H    doxycycline hyclate (Vibramycin) 100 mg in sodium chloride 0.9% 100 mL IVPB  100 mg Intravenous Q12H    dextrose 10% infusion (TPN no rate)   Intravenous Continuous PRN    sodium bicarbonate tab 650 mg  650 mg Per G Tube PRN    And    pancrelipase (Lip-Prot-Amyl) (Zenpep) DR particles cap 10,000 Units  10,000 Units Per G Tube PRN    thiamine 100 mg/mL injection 200 mg  200 mg Intravenous TID    lactulose (CHRONULAC) 10 GM/15ML solution 20 g  20 g Oral 4 times per day    metoprolol  (Lopressor) 5 mg/5mL injection 5 mg  5 mg Intravenous Q4H PRN    acetaminophen (Tylenol Extra Strength) tab 500 mg  500 mg Oral Q4H PRN    ondansetron (Zofran) 4 MG/2ML injection 4 mg  4 mg Intravenous Q6H PRN    prochlorperazine (Compazine) 10 MG/2ML injection 5 mg  5 mg Intravenous Q8H PRN    multivitamin (Tab-A-Yosef/Beta Carotene) tab 1 tablet  1 tablet Oral Daily    folic acid (Folvite) tab 1 mg  1 mg Oral Daily    LORazepam (Ativan) tab 1 mg  1 mg Oral Q1H PRN    Or    LORazepam (Ativan) 2 mg/mL injection 1 mg  1 mg Intravenous Q1H PRN    LORazepam (Ativan) tab 2 mg  2 mg Oral Q1H PRN    Or    LORazepam (Ativan) 2 mg/mL injection 2 mg  2 mg Intravenous Q1H PRN    LORazepam (Ativan) tab 3 mg  3 mg Oral Q1H PRN    Or    LORazepam (Ativan) 2 mg/mL injection 3 mg  3 mg Intravenous Q1H PRN    LORazepam (Ativan) 2 mg/mL injection 4 mg  4 mg Intravenous Q30 Min PRN    LORazepam (Ativan) 2 mg/mL injection 5 mg  5 mg Intravenous Q15 Min PRN    LORazepam (Ativan) 2 mg/mL injection 6 mg  6 mg Intravenous Q10 Min PRN    baclofen (Lioresal) tab 10 mg  10 mg Oral TID    pantoprazole (Protonix) 40 mg in sodium chloride 0.9% PF 10 mL IV push  40 mg Intravenous Q12H          Results:     Recent Labs   Lab 03/08/25  0356 03/09/25  0359 03/10/25  0510   RBC 3.18* 3.19* 3.19*   HGB 7.5* 7.6* 7.9*   HCT 23.6* 23.7* 24.9*   MCV 74.2* 74.3* 78.1*   NEPRELIM 5.89 5.74 7.61   WBC 7.9 8.0 10.5   PLT 40.0* 53.0* 70.0*     Recent Labs   Lab 03/08/25  0356 03/08/25  1025 03/09/25  0359 03/09/25  1427 03/10/25  0510   *  --  121*  --  119*   BUN 42*  --  32*  --  25*   CREATSERUM 1.40*  --  1.31*  --  1.20   CA 7.9*  --  8.4*  --  8.0*   ALB  --  3.0* 3.1*  --  3.0*   *  --  154*  --  154*   K 3.3* 3.3* 3.3*  3.3* 3.4* 3.5  3.5     --  117*  --  117*   CO2 27.0  --  27.0  --  27.0   ALKPHO  --  145* 177*  --  166*   AST  --  122* 135*  --  153*   ALT  --  94* 98*  --  103*   BILT  --  10.6* 11.4*  --  11.4*   TP  --   5.3* 5.6*  --  5.5*     INR   Date Value Ref Range Status   03/08/2025 1.95 (H) 0.80 - 1.20 Final     Comment:     Therapeutic INR range for patients on warfarin:  2.0-3.0 for most medical conditions and surgical prophylaxis   2.5-3.5 for mechanical heart valves and recurrent thromboembolism    Direct thrombin inhibitors (e.g. argatroban, bivalirudin), factor Xa inhibitors (e.g. apixaban, rivaroxaban), and conditions such as coagulation factor deficiency, vitamin K deficiency, and liver disease will prolong the prothrombin time/INR.    Unfractionated heparin and low molecular weight heparin do not affect the prothrombin time/INR up to a concentration of 1 IU/mL and 1.5 IU/mL, respectively.         No results for input(s): \"BNP\" in the last 168 hours.  Recent Labs   Lab 03/07/25  0414 03/08/25  0356 03/09/25  0359 03/10/25  0510   MG 2.2 2.0 1.9  --    PHOS 1.1* 1.0* 1.4* 1.8*        Recent Labs   Lab 03/08/25  0356 03/08/25  1025 03/09/25  0359 03/10/25  0510   PHOS 1.0*  --  1.4* 1.8*   ALB  --  3.0* 3.1* 3.0*       No results found.        Assessment and Plan:     Patient is a 44 year old male with history of alcoholism, anxiety, gout who presented to the emergency room for evaluation of intractable nausea and vomiting     1.USMAN: non oliguric   Last creatinine 1.27 mg/dL in October 2024.    Serum creatinine 4.22 mg/dL on admit and 1.2 today    UA positive for hyaline cast and ketones suggestive of reduced renal perfusion  S/p IV fluid bolus with normal saline-  CT abdomen and pelvis unremarkable for any obstructive uropathy  Urine culture negative so far.  FENA 0.5%  Hypophosphatemia-replace.  Repeat in a.m.  Hypokalemia-replace  Magnesium within normal limit.     3. hypernatremic with TF : Large amount of stool output and patient continued to have insensible loss through mouth breathing  On D5 at 100 mL/h and on tube feed.  Increase free water flushes through tube feed  Sodium unchanged in last 24 hours and  stayed at 154-changed the drips to dextrose    4.metabolic acidosis:  In light of hypotension check lactic acid.  Positive anion gap likely secondary to ketoacidosis  Acidosis resolved.  Off of bicarb drip     5.alcoholic hepatitis: Elevated liver function test and hyperbilirubinemia: Improved bilirubin level but worsening transaminase level  Elevated INR and thrombocytopenia  CT suggestive of esophageal varices  GI input noted-vitamin K and PPI  Liver ultrasound with Doppler showed hepatic steatosis     6.alcoholism:    alcohol withdrawal with high CIWA score requiring transfer to PCCU   Remain on ativan-reduce requirement now  CT head negative for acute changes     7.  Pneumonia: CT chest noted for extensive bilateral airspace disease  Requiring higher oxygen-hypoxic respiratory failure.  S/p 1 dose of furosemide  On Zosyn and doxy.    Pulmonary input noted    Discussed with nursing and Dr. Diaz    Note sent to pharmacist to change the IV drips to dextrose    Abundio Queen MD

## 2025-03-10 NOTE — DIETARY NOTE
ADULT NUTRITION REASSESSMENT    Pt is at high nutrition risk.  Pt does not meet malnutrition criteria.      RECOMMENDATIONS TO MD:   RD ordered to hold EN advancement and increased the FWF due to hypernatremia. High refeeding risk d/t prolonged NPO, ETOH abuse and electrolyte abnormalities. Slow advancement of feeds. Appropriate labs ordered for monitoring.   See Nutrition Intervention for enteral nutrition (EN) and free water flushes (FWF) specifics     ADMITTING DIAGNOSIS:  Scleral icterus [R17]  USMAN (acute kidney injury) [N17.9]  PERTINENT PAST MEDICAL HISTORY:   Past Medical History:    Esophageal reflux    Gout    Hernia     PATIENT STATUS:   Initial 03/06/25: Pt assessed r/t screened at risk on initial MST due to unintentional wt loss and decreased/poor appetite/intake. Presented to hospital with intractable N/V and poor appetite for the last few days - ETOH hepatitis and USMAN. PMH as listed above including ETOH abuse. Transferred to CCU for high CIWA scores. Scheduled ativan, soft wrist restraints in place. Very lethargic, minimally responsive. Sleeping but restless at time of visit. Not appropriate for diet hx with no family at bedside. Per H&P pt reported N/V, hiccups and inability to tolerate any solid foods x 3 days PTA. Typically consumes 3 24-oz  beers daily. Appears well nourished.  03/07/25 UPDATE: Pt discussed with MD on unit - plan to initiate EN feeds. Consult received for RD to initiate and manage tube feeds. See full assessment from 3/6. Pt remains minimally responsive on ativan and not appropriate to take PO. NPO/CL x6 days this admission. Increasing O2 requirements. Stable on HFNC at 40 L/min. RN to place NGT. Remains on lactulose x4 daily with rectal pouch in place 1650 ml recorded output over the 24 hrs however noted no documentation from day shift on 3/6. Pt with good UO and improving creatinine level. Noted hypophosphotemia with replacement ordered 30 mmol NaPhos rider per protocol. IVF of  D5W 0.9NS @ 75 ml/hr provides 1.8 L, 90 g dextrose and 306 kcal. Lasix x1 today. Messaged nephrology regarding plan for IVF and free water from EN - awaiting response.    03/10/25 UPDATE: Pt lethargic. Remains inappropriate for PO intake. Pt pulled out NG DHT this afternoon. Now replaced by RN with feeds resuming. EN feeds at 30 ml/hr (50% of goal rate). FWF increased to 200 ml q 4 hrs on 3/8 and EN held by MD on 3/9 due to worsening hypernatremia. Feeds resumed overnight 3/10. No improvement in hypernatremia. Pt with large amount of stool output, lactulose QID.      FOOD/NUTRITION RELATED HISTORY:  Appetite: NPO  Intake:  EN feeds initiated on 3/7  Intake Meeting Needs: NPO  Percent Meals Eaten (last 6 days)       Date/Time Percent Meals Eaten (%)    03/04/25 1800 60 %    03/07/25 2200 0 %    03/08/25 0600 0 %    03/08/25 2100 0 %    03/09/25 0000 0 %    03/09/25 0300 0 %    03/09/25 0429 0 %    03/09/25 2000 0 %    03/10/25 0000 0 %    03/10/25 0450 0 %        Food Allergies: No Known Food Allergies (NKFA)  Cultural/Ethnic/Jehovah's witness Preferences: Not Obtained    GASTROINTESTINAL: +BM watery, brown stools with lactulose enemas QID, rectal tube, and 1100 ml output from rectal tube; abdomen distended, soft, rounded, non-tender  CT A/P 3/4: \"Severely fatty liver with subtle undulating contour.  Stable splenomegaly.  Tubular structures around the GE junction are suggestive of lower esophageal varices.  No ascites \"    UO: 1100 ml output over the past 24 hrs  I/Os: +7041 ml total this admission    MEDICATIONS: reviewed; Noted non-cardiac electrolyte replacement protocol ordered; IVF provides 2.4L, 120 g dextrose and 408 kcal; IV thiamin; PO MVI & folic acid; lactulose QID     dextrose 100 mL/hr at 03/10/25 1215    dextrose 10%        vancomycin  125 mg Per NG Tube Daily    piperacillin-tazobactam  3.375 g Intravenous Q8H    doxycycline  100 mg Intravenous Q12H    thiamine  200 mg Intravenous TID    lactulose  20 g Oral 4  times per day    multivitamin  1 tablet Oral Daily    folic acid  1 mg Oral Daily    baclofen  10 mg Oral TID    pantoprazole  40 mg Intravenous Q12H     LABS: reviewed; noted hypophosphatemia today with no replacement ordered - sarina TREADWELL; Creatinine and BUN improving; hypernatremia with calculated free water deficit 5L   Recent Labs     03/07/25  0414 03/08/25  0356 03/08/25  1025 03/09/25  0359 03/09/25  1427 03/10/25  0510   * 123*  --  121*  --  119*   BUN 49* 42*  --  32*  --  25*   CREATSERUM 1.52* 1.40*  --  1.31*  --  1.20   CA 7.7* 7.9*  --  8.4*  --  8.0*   MG 2.2 2.0  --  1.9  --   --     147*  --  154*  --  154*   K 3.5 3.3*   < > 3.3*  3.3* 3.4* 3.5  3.5    111  --  117*  --  117*   CO2 26.0 27.0  --  27.0  --  27.0   PHOS 1.1* 1.0*  --  1.4*  --  1.8*   OSMOCALC 309* 316*  --  326*  --  324*    < > = values in this interval not displayed.     WEIGHT HISTORY:  Patient Weight(s) for the past 336 hrs:   Weight   03/04/25 1600 82.9 kg (182 lb 12.8 oz)   03/04/25 0951 81.2 kg (179 lb)     Wt Readings from Last 10 Encounters:   03/04/25 82.9 kg (182 lb 12.8 oz)   03/27/24 77.1 kg (170 lb)   12/26/23 89.8 kg (198 lb)   11/01/23 81.6 kg (180 lb)   10/16/23 81.6 kg (180 lb)   10/16/23 82.6 kg (182 lb)   09/06/23 91.6 kg (202 lb)   08/27/23 91.7 kg (202 lb 1.6 oz)   05/31/23 83.9 kg (185 lb)   01/17/23 90.7 kg (200 lb)     ANTHROPOMETRICS:  HT: 172.7 cm (5' 8\")  Wt Readings from Last 1 Encounters:   03/04/25 82.9 kg (182 lb 12.8 oz)   Last weight: Likely accurate  Dosing Weight: 82.9 kg (183 lbs) - admission weight taken on 3/4/25, utilized for anthropometric calculations  BMI: Body mass index is 27.79 kg/m².  BMI CLASSIFICATION: 25-29.9 kg/m2 - overweight  IBW/lbs (Calculated) Male: 154 lbs           119% IBW  Usual Body Wt: 170 lbs (per EMR 3/27/24)      108% UBW    NUTRITION RELATED PHYSICAL FINDINGS:  - Nutrition Focused Physical Exam (NFPE): no wasting noted  - Fluid Accumulation:  none . See RN documentation for details  - Skin Integrity: at risk and intact. See RN documentation for details    NUTRITION DIAGNOSIS/PROBLEM:   Inadequate protein energy intake related to Decreased ability to consume sufficient energy in the setting of ETOH withdrawals as evidenced by decreased/poor intake x5 days.     NUTRITION DIAGNOSIS PROGRESS:  Improvement (unresolved) - EN feeds @ 50% of goal rate.     NUTRITION INTERVENTION:     NUTRITION PRESCRIPTION:   Estimated Nutrition needs: --dosing wt of 82.9 kg - wt taken on 3/4/25  Calories: 1860 -2025 calories/day (MSJ REE = 1689 kcal x1(AF) x1.1-1.2(SF) or 22 - 24 calories per kg Dosing wt)  Protein:  g protein/day (1.2-1.6 g protein/kg Dosing wt)  Fluid Needs: 2900 ml/day (35 ml/kg chronological age method) - adjust for clinical status (increased losses from high stool output)    - Diet:       Procedures    NPO      - Enteral Nutrition:   Vital AF 1.2 at 30 ml/hr per NG tube.   Recommend hold advancement until hypernatremia improves. When appropriate advance feeds by 8 ml q 12 hrs to goal rate of 60 ml/hr. Based on average 22 hour infusion time Goal rate provides 1320 total TF volume, 1584 kcal, 99 grams protein, 1069 ml total free water, and 100% RDI's. Additional calories from  kcal from dextrose.   Flush with 300 ml H2O q 3 hrs (to provide 2400 ml total H2O from FWF daily and 3469 ml total fluids daily with EN) increased for hypernatremia. Additional 2.4 L from IVF.  Meets 100% of estimated energy and 100% of estimated protein needs.      - Nutrition Care Plan:  EN feeds to provide >80% of estimated requirements within first week  - ONS (Oral Nutrition Supplements)/Meals/Snacks: NPO   - Vitamin and mineral supplements: folic acid, multivitamin/mineral, and thiamin  - Feeding assistance: NPO  - Nutrition education: not appropriate at this time  - Coordination of nutrition care: collaboration with other providers - discussed with RN on unit and MD  via Perfect Serv  - Discharge and transfer of nutrition care to new setting or provider: monitor plans    MONITOR AND EVALUATE/NUTRITION GOALS:  - Food and Nutrient Intake:      Monitor: for PO initiation  - Food and Nutrient Administration:      Monitor: tolerance to enteral nutrition, adequacy of enteral nutrition, for enteral nutrition adjustment, and FWF adequacy and adjustment  - Anthropometric Measurement:    Monitor weight  - Nutrition Goals:      TF meet >80% of goal within first week , labs within acceptable limits, minimize lean body mass loss, support body systems, and monitor fluid status    DIETITIAN FOLLOW UP: RD to follow and monitor nutrition status    Gilma Martínez MS, RD, LDN, [facility]  [id number]

## 2025-03-10 NOTE — PROGRESS NOTES
Pulmonary/ICU/Critical Care Progress Note        Reason for Consultation: resp failure  Referring Physician: Dr. Diaz      Subjective:  More awake and agitated  Still has hiccups   CIWA are back up-about to get ativan 3 mg  Partner in room-reports pt recognizes him    From the initial consultation  Pt is unable to provide info  HPI: 43 yo male with hx of gout, GERD, admitted with acute ETOH hepatitis, ETOH use, emesis, USMAN. Seen by GI.  On CIWA protocol, thiamine, folic acid, MVI, BZDs, lactulose  Has been in the ICU for 2 days  ICU consulted this am for worsening hypoxemia  CXR this am with b/l infiltrates concerning for PNA      REVIEW OF SYSTEMS:  Positives and negatives as noted in HPI. All other review of systems otherwise are either limited (due to pt/family inability to provide) or negative.      PAST MEDICAL HISTORY:  Past Medical History:   Diagnosis Date    Esophageal reflux     Gout     Hernia          PAST SURGICAL HISTORY:  Past Surgical History:   Procedure Laterality Date    Colonoscopy N/A 11/1/2023    Procedure: COLONOSCOPY;  Surgeon: Vandana Austin MD;  Location: [facility] ENDOSCOPY    Egd  02/15/2016    Eh pr repair complex scalp arms legs 1.1 to 2.5 cm  2014    Elbow fracture surgery Right 1991    Hernia surgery      Inguinal hernia repair Left 01/17/2023         PAST SOCIAL HISTORY:  Social History     Socioeconomic History    Marital status: Single    Number of children: 0   Occupational History    Occupation: Supervisor, car [employer]   Tobacco Use    Smoking status: Former     Types: Cigarettes    Smokeless tobacco: Never   Vaping Use    Vaping status: Some Days   Substance and Sexual Activity    Alcohol use: Yes     Comment: per pt, DAILY HARD LEMONADE- [employer] hard lemonade 24oz cans, 6 cans daily    Drug use: Yes     Types: Cannabis     Comment: last use, couple months ago per pt report         PAST FAMILY HISTORY:  Family History   Problem Relation Age of Onset    Diabetes Father      Hypertension Father     Cancer Mother         liver       ALLERGIES:  Allergies[1]      MEDS:  Home Medications:  Medications Taking[2]    Scheduled Medication:   vancomycin  125 mg Per NG Tube Daily    piperacillin-tazobactam  3.375 g Intravenous Q8H    doxycycline  100 mg Intravenous Q12H    thiamine  200 mg Intravenous TID    lactulose  20 g Oral 4 times per day    multivitamin  1 tablet Oral Daily    folic acid  1 mg Oral Daily    baclofen  10 mg Oral TID    pantoprazole  40 mg Intravenous Q12H     Continuous Infusing Medication:   dextrose 50 mL/hr at 03/09/25 2345    dextrose 10%       PRN Medications:    haloperidol lactate    dextrose 10%    sodium bicarbonate **AND** lipase-protease-amylase (Lip-Prot-Amyl)    metoprolol    acetaminophen    ondansetron    prochlorperazine    LORazepam **OR** LORazepam    LORazepam **OR** LORazepam    LORazepam **OR** LORazepam    LORazepam    LORazepam    LORazepam       PHYSICAL EXAM:  /82   Pulse 99   Temp 98 °F (36.7 °C) (Temporal)   Resp 25   Ht 5' 8\" (1.727 m)   Wt 182 lb 12.8 oz (82.9 kg)   SpO2 96%   BMI 27.79 kg/m²      CONSTITUTIONAL: more awake but intermittently following commands  HEENT: atraumatic normocephalic  MOUTH: mucous membranes are moist. No OP exudates  NECK/THROAT: no JVD. Trachea midline. No obvious thyromegaly  LUNG: clear b/l no wheezing, + b/l crackles. + hiccups. Chest symmetric with respiratory motion  HEART: regular rate and rhythm, no obvious murmers or gallops noted  ABD: soft non tender. + bowel sounds. No organomegaly noted  EXT: no clubbing, cyanosis, or edema noted. Pulses intact grossly  NEURO/MUSCULOSKELETAL: a little more awake, moves extremities, intermittently following commands  SKIN: warm, dry. No obvious lesions noted  LYMPH: no obvious LAD      IMAGES:   CT head 3/7/25  CONCLUSION:   Motion limits evaluation.  No evidence of acute intracranial abnormality.     CT chest 3/7/25  CONCLUSION:   1. Extensive bilateral  airspace disease, concerning for potential multifocal pneumonia. Continued surveillance is advised.   2. Dense bilateral lower airspace consolidation is evident; aspiration pneumonitis is a differential diagnostic possibility. Follow-up is recommended to document resolution.    3. Dilatation of the main pulmonary artery trunk may relate to underlying pulmonary hypertension.    4. Small hiatal hernia with imaging manifestations that may be indicative underlying esophagitis.   5. Marked hepatic steatosis.   6. Lesser incidental findings as above.     CXR 3/7/25 with multifocal infiltrates, possible effusion on the left  CONCLUSION:   1. Cardiomegaly.  Tortuous aorta.   2. Extensive multifocal airspace opacification in the bilateral perihilar and basilar regions with left-sided effusion.  Differential includes pulmonary edema congestive failure versus multifocal pneumonitis.      CT abd/pelvis 3/4/25  CONCLUSION:   Severely fatty liver with subtle undulating contour.  Stable splenomegaly.  Tubular structures around the GE junction are suggestive of lower esophageal varices.  No ascites       LABS:  Recent Labs   Lab 03/08/25  0356 03/09/25  0359 03/10/25  0510   RBC 3.18* 3.19* 3.19*   HGB 7.5* 7.6* 7.9*   HCT 23.6* 23.7* 24.9*   MCV 74.2* 74.3* 78.1*   MCH 23.6* 23.8* 24.8*   MCHC 31.8 32.1 31.7   RDW 27.7* 28.6* 29.7*   NEPRELIM 5.89 5.74 7.61   WBC 7.9 8.0 10.5   PLT 40.0* 53.0* 70.0*       Recent Labs   Lab 03/08/25  0356 03/08/25  1025 03/09/25  0359 03/09/25  1427 03/10/25  0510   *  --  121*  --  119*   BUN 42*  --  32*  --  25*   CREATSERUM 1.40*  --  1.31*  --  1.20   EGFRCR 63  --  68  --  76   CA 7.9*  --  8.4*  --  8.0*   ALB  --  3.0* 3.1*  --  3.0*   *  --  154*  --  154*   K 3.3* 3.3* 3.3*  3.3* 3.4* 3.5  3.5     --  117*  --  117*   CO2 27.0  --  27.0  --  27.0   ALKPHO  --  145* 177*  --  166*   AST  --  122* 135*  --  153*   ALT  --  94* 98*  --  103*   BILT  --  10.6* 11.4*  --   11.4*   TP  --  5.3* 5.6*  --  5.5*       ASSESSMENT/PLAN:  Acute hypoxemic resp failure  -secondary to multifocal infiltrates. Given hx of emesis, concern for aspiration  -checked non contrast chest CT showed b/l infiltrates  -on zosyn, doxy. Checked urine leg, strep pneumo, mrsa nares, blood cx neg thus far  -weaned from airvo to 6 LNC now   -aspiration precautions  -keep NPO-has TF via NGT  -restarted baclofen for hiccups    Acute ETOH hepatitis  -GI following. Imaging as above  -vit K, PPI, lactulose  -NGT with TF    ETOH withdrawal  -CIWA protocol  -psych following   -BZD/haldol PRN  -CT head neg for acute changes    USMAN and hypernatremia  -s/p bicarb infusion  -on D5 0.9 NS  -renal following. Consider increasing FWF?    Proph  -DVT: elevated INR low PLT. On SCDS    Dispo  -Full code    Thank you for the opportunity to care for Sami Escobedo DO, MPH  Pulmonary Critical Care Medicine  [facility]                         [1]   Allergies  Allergen Reactions    Morphine SWELLING     SHORTNESS OF BREATH   [2]   No outpatient medications have been marked as taking for the 3/4/25 encounter (Hospital Encounter).

## 2025-03-11 ENCOUNTER — APPOINTMENT (OUTPATIENT)
Dept: GENERAL RADIOLOGY | Facility: HOSPITAL | Age: 45
End: 2025-03-11
Attending: NURSE PRACTITIONER
Payer: MEDICAID

## 2025-03-11 LAB
ALBUMIN SERPL-MCNC: 3.1 G/DL (ref 3.2–4.8)
ALP LIVER SERPL-CCNC: 171 U/L
ALT SERPL-CCNC: 105 U/L
ANION GAP SERPL CALC-SCNC: 9 MMOL/L (ref 0–18)
AST SERPL-CCNC: 175 U/L (ref ?–34)
BASOPHILS # BLD: 0.31 X10(3) UL (ref 0–0.2)
BASOPHILS NFR BLD: 2 %
BILIRUB DIRECT SERPL-MCNC: 9.5 MG/DL (ref ?–0.3)
BILIRUB SERPL-MCNC: 12.2 MG/DL (ref 0.3–1.2)
BUN BLD-MCNC: 23 MG/DL (ref 9–23)
BUN/CREAT SERPL: 19.2 (ref 10–20)
CALCIUM BLD-MCNC: 7.9 MG/DL (ref 8.7–10.4)
CHLORIDE SERPL-SCNC: 119 MMOL/L (ref 98–112)
CO2 SERPL-SCNC: 25 MMOL/L (ref 21–32)
CREAT BLD-MCNC: 1.2 MG/DL
DEPRECATED RDW RBC AUTO: 75.3 FL (ref 35.1–46.3)
EGFRCR SERPLBLD CKD-EPI 2021: 76 ML/MIN/1.73M2 (ref 60–?)
EOSINOPHIL # BLD: 0.31 X10(3) UL (ref 0–0.7)
EOSINOPHIL NFR BLD: 2 %
ERYTHROCYTE [DISTWIDTH] IN BLOOD BY AUTOMATED COUNT: 30.9 % (ref 11–15)
GLUCOSE BLD-MCNC: 100 MG/DL (ref 70–99)
GLUCOSE BLDC GLUCOMTR-MCNC: 117 MG/DL (ref 70–99)
HCT VFR BLD AUTO: 23.7 %
HGB BLD-MCNC: 7.5 G/DL
LYMPHOCYTES NFR BLD: 0.47 X10(3) UL (ref 1–4)
LYMPHOCYTES NFR BLD: 3 %
MCH RBC QN AUTO: 24.1 PG (ref 26–34)
MCHC RBC AUTO-ENTMCNC: 31.6 G/DL (ref 31–37)
MCV RBC AUTO: 76.2 FL
MONOCYTES # BLD: 0.31 X10(3) UL (ref 0.1–1)
MONOCYTES NFR BLD: 2 %
NEUTROPHILS # BLD AUTO: 12.32 X10 (3) UL (ref 1.5–7.7)
NEUTROPHILS NFR BLD: 89 %
NEUTS BAND NFR BLD: 2 %
NEUTS HYPERSEG # BLD: 14.29 X10(3) UL (ref 1.5–7.7)
OSMOLALITY SERPL CALC.SUM OF ELEC: 320 MOSM/KG (ref 275–295)
PHOSPHATE SERPL-MCNC: 1.9 MG/DL (ref 2.4–5.1)
PLATELET # BLD AUTO: 86 10(3)UL (ref 150–450)
PLATELET MORPHOLOGY: NORMAL
PLATELETS.RETICULATED NFR BLD AUTO: 10.9 % (ref 0–7)
POTASSIUM SERPL-SCNC: 3.6 MMOL/L (ref 3.5–5.1)
PROT SERPL-MCNC: 5.5 G/DL (ref 5.7–8.2)
RBC # BLD AUTO: 3.11 X10(6)UL
SODIUM SERPL-SCNC: 153 MMOL/L (ref 136–145)
TOTAL CELLS COUNTED BLD: 100
WBC # BLD AUTO: 15.7 X10(3) UL (ref 4–11)

## 2025-03-11 PROCEDURE — 99233 SBSQ HOSP IP/OBS HIGH 50: CPT | Performed by: INTERNAL MEDICINE

## 2025-03-11 PROCEDURE — 71045 X-RAY EXAM CHEST 1 VIEW: CPT | Performed by: NURSE PRACTITIONER

## 2025-03-11 RX ORDER — FUROSEMIDE 10 MG/ML
40 INJECTION INTRAMUSCULAR; INTRAVENOUS ONCE
Status: COMPLETED | OUTPATIENT
Start: 2025-03-11 | End: 2025-03-11

## 2025-03-11 RX ORDER — DEXTROSE MONOHYDRATE 50 MG/ML
INJECTION, SOLUTION INTRAVENOUS CONTINUOUS
Status: DISCONTINUED | OUTPATIENT
Start: 2025-03-11 | End: 2025-03-12

## 2025-03-11 RX ORDER — FUROSEMIDE 10 MG/ML
20 INJECTION INTRAMUSCULAR; INTRAVENOUS
Status: DISCONTINUED | OUTPATIENT
Start: 2025-03-11 | End: 2025-03-11

## 2025-03-11 RX ORDER — FUROSEMIDE 10 MG/ML
20 INJECTION INTRAMUSCULAR; INTRAVENOUS 3 TIMES DAILY
Status: DISCONTINUED | OUTPATIENT
Start: 2025-03-11 | End: 2025-03-16

## 2025-03-11 NOTE — PROGRESS NOTES
[facility]  part of [facility]     Progress Note    Sami Grijalva JrKushal Patient Status:  Inpatient    10/11/1980 MRN [id number]   Location [facility] 2W/SW Attending Alex Diaz MD   Hosp Day # 7 PCP Kerri Medina MD       Subjective:   Patient seen and examined.  Resting in bed.  Restless.  Not consistently following commands.  Tmax 100.6 degrees over last 24 hours.  Currently on 7 L oxygen    Objective:   Blood pressure 136/85, pulse 117, temperature 99.2 °F (37.3 °C), resp. rate (!) 30, height 5' 8\" (1.727 m), weight 182 lb 12.8 oz (82.9 kg), SpO2 92%.  Intake/Output:   Last 3 shifts: I/O last 3 completed shifts:  In: 4422 [I.V.:2281; NG/GT:1741; IV PIGGYBACK:400]  Out: 2600 [Urine:1100; Stool:1500]   This shift: I/O this shift:  In: -   Out: 800 [Urine:300; Stool:500]     Vent Settings:      Hemodynamic parameters (last 24 hours):      Scheduled Meds:   Current Facility-Administered Medications   Medication Dose Route Frequency    vancomycin (Firvanq) 50 mg/mL oral solution 125 mg  125 mg Per NG Tube Daily    haloperidol lactate (Haldol) 5 MG/ML injection 2 mg  2 mg Intravenous Q6H PRN    piperacillin-tazobactam (Zosyn) 3.375 g in dextrose 5% 100 mL IVPB-ADDV  3.375 g Intravenous Q8H    doxycycline hyclate (Vibramycin) 100 mg in sodium chloride 0.9% 100 mL IVPB  100 mg Intravenous Q12H    dextrose 10% infusion (TPN no rate)   Intravenous Continuous PRN    sodium bicarbonate tab 650 mg  650 mg Per G Tube PRN    And    pancrelipase (Lip-Prot-Amyl) (Zenpep) DR particles cap 10,000 Units  10,000 Units Per G Tube PRN    thiamine 100 mg/mL injection 200 mg  200 mg Intravenous TID    lactulose (CHRONULAC) 10 GM/15ML solution 20 g  20 g Oral 4 times per day    metoprolol (Lopressor) 5 mg/5mL injection 5 mg  5 mg Intravenous Q4H PRN    acetaminophen (Tylenol Extra Strength) tab 500 mg  500 mg Oral Q4H PRN    ondansetron (Zofran) 4 MG/2ML injection 4 mg  4 mg Intravenous Q6H  PRN    prochlorperazine (Compazine) 10 MG/2ML injection 5 mg  5 mg Intravenous Q8H PRN    multivitamin (Tab-A-Yosef/Beta Carotene) tab 1 tablet  1 tablet Oral Daily    folic acid (Folvite) tab 1 mg  1 mg Oral Daily    LORazepam (Ativan) tab 1 mg  1 mg Oral Q1H PRN    Or    LORazepam (Ativan) 2 mg/mL injection 1 mg  1 mg Intravenous Q1H PRN    LORazepam (Ativan) tab 2 mg  2 mg Oral Q1H PRN    Or    LORazepam (Ativan) 2 mg/mL injection 2 mg  2 mg Intravenous Q1H PRN    LORazepam (Ativan) tab 3 mg  3 mg Oral Q1H PRN    Or    LORazepam (Ativan) 2 mg/mL injection 3 mg  3 mg Intravenous Q1H PRN    LORazepam (Ativan) 2 mg/mL injection 4 mg  4 mg Intravenous Q30 Min PRN    LORazepam (Ativan) 2 mg/mL injection 5 mg  5 mg Intravenous Q15 Min PRN    LORazepam (Ativan) 2 mg/mL injection 6 mg  6 mg Intravenous Q10 Min PRN    baclofen (Lioresal) tab 10 mg  10 mg Oral TID    pantoprazole (Protonix) 40 mg in sodium chloride 0.9% PF 10 mL IV push  40 mg Intravenous Q12H       Continuous Infusions:    dextrose 10%         Physical Exam  Constitutional: no acute distress  Eyes: PERRL, scleral icterus  ENT: nares pateint  Neck: supple, no JVD  Cardio: RRR, S1 S2  Respiratory: Basilar crackles  GI: abdomen soft, non tender, active bowel sounds, no organomegaly  Extremities: no clubbing, cyanosis, edema  Neurologic: no gross motor deficits  Skin: warm, dry, jaundice      Results:     Lab Results   Component Value Date    WBC 15.7 03/11/2025    HGB 7.5 03/11/2025    HCT 23.7 03/11/2025    PLT 86.0 03/11/2025    CREATSERUM 1.20 03/11/2025    BUN 23 03/11/2025     03/11/2025    K 3.6 03/11/2025     03/11/2025    CO2 25.0 03/11/2025     03/11/2025    CA 7.9 03/11/2025    ALB 3.1 03/11/2025    ALKPHO 171 03/11/2025    BILT 12.2 03/11/2025    TP 5.5 03/11/2025     03/11/2025     03/11/2025    PHOS 1.9 03/11/2025       XR CHEST AP PORTABLE  (CPT=71045)    Result Date: 3/10/2025  CONCLUSION:   Interval Tsrauss min  enteric tube which is now in the mid to distal stomach.  Left greater than right patchy bilateral lung opacities mildly increased on the right.  Small left pleural effusion.    Dictated by (CST): Vishnu Tuttle MD on 3/10/2025 at 4:03 PM     Finalized by (CST): Vishnu Tuttle MD on 3/10/2025 at 4:04 PM                 Assessment   1.  Alcoholic hepatitis  2.  Cirrhosis  3.  Acute hypoxemic respiratory failure  4.  EtOH withdrawal  5.  Thrombocytopenia  6.  Anemia  7.  Hiccups       Plan   -Patient presents with evidence of abdominal pain concern for acute alcoholic hepatitis now with hypoxemic respiratory failure.  -CT abdomen pelvis with fatty liver seen.  Ultrasound abdomen with no evidence of significant biliary duct dilatation seen.  -Chest x-ray with ongoing infiltrate seen.  On antibiotic therapy on Zosyn and doxycycline at this time.  -Currently on 7 L oxygen.  Wean as tolerated  -CIWA protocol  -Tube feedings as tolerated  -Further rec close neuromonitoring.  Continue lactulose at this time  -Continue PPI therapy  -DVT prophylaxis: SCDs  -Discussed with family.  Remains full code at this time    Akin Bobo, DO  Pulmonary Critical Care Medicine  [facility]

## 2025-03-11 NOTE — PROGRESS NOTES
[facility]     Gastroenterology Progress Note    Sami Grijalva Jr. Patient Status:  Inpatient    10/11/1980 MRN [id number]   Location [facility]5W Attending Alex Diaz MD   Hosp Day # 7 PCP Kerri Medina MD       Subjective:    Remains lethargic and in restraints.  Seems a little more awake and slightly opening eyes to command. Remains on 7L HFNC. No further hiccups.     Objective:   Blood pressure 136/85, pulse 117, temperature 99.2 °F (37.3 °C), resp. rate (!) 30, height 5' 8\" (1.727 m), weight 182 lb 12.8 oz (82.9 kg), SpO2 92%. Body mass index is 27.79 kg/m².    Gen: lethargic patient, NAD  HEENT: EOMI, the sclera appears icteric, oropharynx clear, mucus membranes appear moist  CV: RRR  Lung: HFNC  Abdomen: soft NTND abdomen with NABS appreciated   Skin: dry, warm, + jaundice  Ext: no LE edema is evident  Neuro: Drowsy  Psych: lethargic    Assessment and Plan:   Sami Grijalva Jr. is a 44 year old male w/ PMHx of longstanding alcohol use, cannabis use, GERD, who presents with generalized abdominal pain, hiccups and vomiting. GI consulted for acute alcohol hepatitis.     #Acute alcohol hepatitis with probable underlying cirrhosis  -CT a/p in ER severe fatty liver with subtle undulating contour  -US liver with hepatic steatosis, GB sludge and cholelithiasis without cholecystitis or biliary ductal dilation, hepatic and portal vein patent  -last EGD 2023 without varices   -high maddrey; holding off on steroids since concern for infection   -CIWA high with active withdrawal and AMS   -nutrition  -etoh cessation    #Likely early cirrhosis, ETOH abuse, chronic w/u in process  MELD 3.0 on admission: 44  -PSE: Lethargic/drowsy in setting of ETOH withdrawal treated with CIWA protocol, Ammonia 121, pt unable to tolerate PO given AMS, initiate lactulose enemas  -Ascites: none on exam or US  -EV: None on last EGD 2023, tubular structure around GE junction suggestive of  EV.  Will need repeat EGD electively  -HCC: No lesions on US or CT, AFP in process     #USMAN  -nephrology following      Recommend:  -CIWA per protocol; ETOH withdrawal management  -Lactulose goal 3-4 bm/d  -Daily CMP, CBC, INR  -Chronic liver w/u in process  -Empiric PPI BID     Vandana Austin MD      Results:     Lab Results   Component Value Date    WBC 15.7 (H) 03/11/2025    HGB 7.5 (L) 03/11/2025    HCT 23.7 (L) 03/11/2025    PLT 86.0 (L) 03/11/2025    CREATSERUM 1.20 03/11/2025    BUN 23 03/11/2025     (H) 03/11/2025    K 3.6 03/11/2025     (H) 03/11/2025    CO2 25.0 03/11/2025     (H) 03/11/2025    CA 7.9 (L) 03/11/2025    ALB 3.1 (L) 03/11/2025    ALKPHO 171 (H) 03/11/2025    BILT 12.2 (H) 03/11/2025    TP 5.5 (L) 03/11/2025     (H) 03/11/2025     (H) 03/11/2025    INR 1.95 (H) 03/08/2025     (H) 03/04/2025    DDIMER 1.13 (H) 08/25/2023    ESRML 27 (H) 10/01/2021    MG 1.9 03/09/2025    PHOS 1.9 (L) 03/11/2025    TROP 0.00 01/31/2017     (H) 03/06/2025    ETOH <3 03/04/2025       XR CHEST AP PORTABLE  (CPT=71045)    Result Date: 3/10/2025  CONCLUSION:   Interval Strauss min enteric tube which is now in the mid to distal stomach.  Left greater than right patchy bilateral lung opacities mildly increased on the right.  Small left pleural effusion.    Dictated by (CST): Vishnu Tuttle MD on 3/10/2025 at 4:03 PM     Finalized by (CST): Vishnu Tuttle MD on 3/10/2025 at 4:04 PM

## 2025-03-11 NOTE — PROGRESS NOTES
[facility]  part of [facility]    Progress Note    Sami Grijalva Jr. Patient Status:  Inpatient    10/11/1980 MRN [id number]   Location [facility] 2W/SW Attending Alex Diaz MD   Hosp Day # 7 PCP Kerri Medina MD       Subjective:   Sami Grijalva Jr. is a(n) 44 year old male     ROS:     Constitutional not really feeling real good today lying in bed short of breath tachypneic  ENMT:  Negative for ear drainage, hearing loss and nasal drainage  Eyes:  Negative for eye discharge and vision loss  Cardiovascular:  Negative for chest pain, sob, irregular heartbeat/palpitations  Respiratory: Positive shortness of breath weakness  Gastrointestinal:  Negative for abdominal pain, constipation, decreased appetite, diarrhea and vomiting  Genitourinary:  Negative for dysuria and hematuria  Endocrine:  Negative for abnormal sleep patterns, increased activity, polydipsia and polyphagia  Hema/Lymph:  Negative for easy bleeding and easy bruising  Integumentary:  Negative for pruritus and rash  Musculoskeletal:  Negative for bone/joint symptoms  Neurological:  Negative for gait disturbance  Psychiatric:  Negative for inappropriate interaction and psychiatric symptoms      Vitals:    25 1400   BP: 133/78   Pulse: 111   Resp:    Temp:            PHYSICAL EXAM:   Constitutional: appears chronically ill  Head/Face: normocephalic  Eyes/Vision: normal extraocular motion is intact  Nose/Mouth/Throat:mucous membranes are moist and no oral lesions are noted  Neck/Thyroid: neck is supple without adenopathy  Lymphatic: no abnormal cervical, supraclavicular adenopathy is noted  Respiratory:  lungs are clear to auscultation bilaterally, normal respiratory effort  Cardiovascular: regular rate and rhythm no murmurs, gallups, or rubs  Abdomen: soft, non-tender, non-distended, BS normal  Vascular: well perfused femoral, and pedal pulses normal  Skin/Hair: no unusual rashes present, no abnormal  bruising noted  Back/Spine: no abnormalities noted  Musculoskeletal: full ROM all extremities good strength  no deformities  Extremities: no edema, cyanosis  Neurologica some decreased mentation    Results:     Laboratory Data:  Lab Results   Component Value Date    WBC 15.7 (H) 03/11/2025    HGB 7.5 (L) 03/11/2025    HCT 23.7 (L) 03/11/2025    PLT 86.0 (L) 03/11/2025    CREATSERUM 1.20 03/11/2025    BUN 23 03/11/2025     (H) 03/11/2025    K 3.6 03/11/2025     (H) 03/11/2025    CO2 25.0 03/11/2025     (H) 03/11/2025    CA 7.9 (L) 03/11/2025    ALB 3.1 (L) 03/11/2025    ALKPHO 171 (H) 03/11/2025    BILT 12.2 (H) 03/11/2025    TP 5.5 (L) 03/11/2025     (H) 03/11/2025     (H) 03/11/2025    INR 1.95 (H) 03/08/2025     (H) 03/04/2025    DDIMER 1.13 (H) 08/25/2023    ESRML 27 (H) 10/01/2021    MG 1.9 03/09/2025    PHOS 1.9 (L) 03/11/2025    TROP 0.00 01/31/2017     (H) 03/06/2025    ETOH <3 03/04/2025       Imaging:  [unfilled]   XR CHEST AP PORTABLE  (CPT=71045)    Result Date: 3/11/2025  PROCEDURE: XR CHEST AP PORTABLE  (CPT=71045) TIME: 1654.   COMPARISON: [facility], XR CHEST AP PORTABLE (CPT=71045), 3/10/2025, 3:40 PM.  INDICATIONS: Increased WOB, tachypnea  TECHNIQUE:   Single view.   FINDINGS/IMPRESSION:    1. There is redemonstration of patchy alveolar opacities throughout both lungs.  The findings could represent alveolar edema, a diffuse multifocal infectious process, or a combination.  The findings have not significantly changed since previous exam.  2. The heart mediastinal structures remain enlarged.  The there are trace bilateral pleural effusions.  3. The thoracic component of an enteric feeding tube is identified traversing the thoracic esophagus.  The distal tip is not visualized but lies well below the GE junction.      Dictated by (CST): Prateek Mcclelland MD on 3/11/2025 at 5:04 PM     Finalized by (CST): Prateek Mcclelland MD on 3/11/2025  at 5:05 PM          XR CHEST AP PORTABLE  (CPT=71045)    Result Date: 3/10/2025  CONCLUSION:   Interval Strauss min enteric tube which is now in the mid to distal stomach.  Left greater than right patchy bilateral lung opacities mildly increased on the right.  Small left pleural effusion.    Dictated by (CST): Vishnu Tuttle MD on 3/10/2025 at 4:03 PM     Finalized by (CST): Vishnu Tuttle MD on 3/10/2025 at 4:04 PM             ASSESSMENT/PLAN:   Assessment       #1 USMAN resolved creatinine 1.2    #2 hypernatremia sodium still bad at 153    Somewhat short of breath but want to get sodium down so we will restart D5W but will follow with Lasix every 12 hours will get x-ray today discussed with nurse practitioner Damari    #3 hepatic disease alcoholic hepatitis  LFTs a little worse   Bilirubin 12.2  Last INR 1.95 a few days ago    Defer to GI prognosis not great    #4 low phosphorus replaced    Discussed with nurse      3/11/2025  Johnathan Ramos MD

## 2025-03-11 NOTE — PAYOR COMM NOTE
3/11  --------------  CONTINUED STAY REVIEW    Payor: [facility]  Subscriber #:  [id number]  Authorization Number: [id number]    Admit date: 3/4/25  Admit time:  3:11 PM    REVIEW DOCUMENTATION:  3/11        Chief Complaint   Patient presents with    Vomiting    Hiccups    Eval-D     Subjective:  Patient seen lying in bed.  Sister and partner at bedside.  Patient remains lethargic, but attempting to move more today.  Patient did not open eyes or respond to questions at time of interview.  Per family at bedside, patient was reportedly speaking to them and asking them questions earlier.           GENERAL: Lethargic.  In no acute distress.  HEART:  Regular rhythm, tachycardia  LUNGS:  Air entry was decreased.  Increased work of breathing. No wheezes   ABDOMEN: Soft and non-tender.    NEUROLOGICAL:  Lethargic, remains confused, agitated at times     Diagnostic Data:    Labs:                  Recent Labs   Lab 03/04/25  1111 03/05/25  0358 03/05/25  1426 03/06/25  0335 03/07/25  0414 03/08/25  0356 03/08/25  1025 03/09/25  0359 03/10/25  0510 03/11/25  0510   WBC 17.5*   < >  --    < > 5.7   < >  --  8.0 10.5 15.7*   HGB 10.1*   < >  --    < > 7.6*   < >  --  7.6* 7.9* 7.5*   MCV 78.9*   < >  --    < > 76.1*   < >  --  74.3* 78.1* 76.2*   PLT 65.0*   < >  --    < > 34.0*   < >  --  53.0* 70.0* 86.0*   BAND  --   --   --   --  7  --   --   --   --  2   INR 3.86*  --  3.75*  --   --   --  1.95*  --   --   --     < > = values in this interval not displayed.                Recent Labs   Lab 03/09/25  0359 03/09/25  1427 03/10/25  0510 03/11/25  0510   *  --  119* 100*   BUN 32*  --  25* 23   CREATSERUM 1.31*  --  1.20 1.20   CA 8.4*  --  8.0* 7.9*   ALB 3.1*  --  3.0* 3.1*   *  --  154* 153*   K 3.3*  3.3* 3.4* 3.5  3.5 3.6   *  --  117* 119*   CO2 27.0  --  27.0 25.0   ALKPHO 177*  --  166* 171*   *  --  153* 175*   ALT 98*  --  103* 105*   BILT 11.4*  --  11.4*  12.2*   TP 5.6*  --  5.5* 5.5*       Scheduled Medications    potassium phosphate dibasic 15 mmol in sodium chloride 0.9% 250 mL IVPB  15 mmol Intravenous Once    vancomycin  125 mg Per NG Tube Daily    piperacillin-tazobactam  3.375 g Intravenous Q8H    doxycycline  100 mg Intravenous Q12H    thiamine  200 mg Intravenous TID    lactulose  20 g Oral 4 times per day    multivitamin  1 tablet Oral Daily    folic acid  1 mg Oral Daily    baclofen  10 mg Oral TID    pantoprazole  40 mg Intravenous Q12H               Assessment & Plan:  Acute alcohol hepatitis   -last drink reportedly 4 days prior to admission  -Reported emesis and poor PO intake, without overt bleeding   -T bili upon arrival to ER 10.5, platelet 65, cr 4.22  -CT a/p reviewed, noted severe fatty liver with subtle undulating contour  -US liver reviewed, noted hepatic steatosis, GB sludge and cholelithiasis without cholecystitis or biliary ductal dilation, hepatic and portal vein patent  -MELD 3.0 on admission: 44   -GI consulted, appreciate further recommendations     Alcohol Abuse with withdrawal   -CIWA monitoring with alcohol withdrawal protocol  -thiamine, folic acid, multivitamin  -Psychiatry consulted, appreciate recommendations     Altered mental status  -Slowly improving  -Likely metabolic encephalopathy  -Initially noted to have elevated ammonia levels noted consistent with hepatic encephalopathy.  -Continue lactulose   -Patient also with USMAN and electrolyte abnormalities.  -Continue treating underlying conditions  -Continue to monitor     Acute hypoxemic resp failure  Possible pneumonia/aspiration  -Likely multifactorial.  -X-ray reviewed.  Noted multifocal infiltrates.   -Concern for possible aspiration given hx of emesis and altered mental status.   -Continue on zosyn, doxy.   -Continues to require high levels of supplemental O2.  Weaning as able  -aspiration precautions  -keep NPO until more alert and passes swallow evaluation.  -Pulmonology  on consult, appreciate further recommendations     Acute Kidney Injury   -Continue improvement noted  -Avoiding Nephrotoxic agents  - Cont to monitor  -Nephrology on consult, appreciate further recommendations     Hypernatremia  -Persistent elevations noted  -Nephrology on consult, recommend continue D5 IV fluids and increased free water flushes.  -Continue to monitor        GI   Remains lethargic and in restraints.  Seems a little more awake and slightly opening eyes to command. Remains on 7L HFNC. No further hiccups.         #Acute alcohol hepatitis with probable underlying cirrhosis  -CT a/p in ER severe fatty liver with subtle undulating contour  -US liver with hepatic steatosis, GB sludge and cholelithiasis without cholecystitis or biliary ductal dilation, hepatic and portal vein patent  -last EGD 11/2023 without varices   -high maddrey; holding off on steroids since concern for infection   -CIWA high with active withdrawal and AMS   -nutrition  -etoh cessation     #Likely early cirrhosis, ETOH abuse, chronic w/u in process  MELD 3.0 on admission: 44  -PSE: Lethargic/drowsy in setting of ETOH withdrawal treated with CIWA protocol, Ammonia 121, pt unable to tolerate PO given AMS, initiate lactulose enemas  -Ascites: none on exam or US  -EV: None on last EGD 11/2023, tubular structure around GE junction suggestive of EV.  Will need repeat EGD electively  -HCC: No lesions on US or CT, AFP in process     #USMAN  -nephrology following      Recommend:  -CIWA per protocol; ETOH withdrawal management  -Lactulose goal 3-4 bm/d  -Daily CMP, CBC, INR  -Chronic liver w/u in process  -Empiric PPI BID        PULM  Tmax 100.6 degrees over last 24 hours.  Currently on 7 L oxygen     Plan   -Patient presents with evidence of abdominal pain concern for acute alcoholic hepatitis now with hypoxemic respiratory failure.  -CT abdomen pelvis with fatty liver seen.  Ultrasound abdomen with no evidence of significant biliary duct  dilatation seen.  -Chest x-ray with ongoing infiltrate seen.  On antibiotic therapy on Zosyn and doxycycline at this time.  -Currently on 7 L oxygen.  Wean as tolerated  -CIWA protocol  -Tube feedings as tolerated  -Further rec close neuromonitoring.  Continue lactulose at this time  -Continue PPI therapy  -DVT prophylaxis: SCDs      MEDICATIONS ADMINISTERED IN LAST 1 DAY:  acetaminophen (Tylenol Extra Strength) tab 500 mg       Date Action Dose Route User    3/10/2025 2055 Given 500 mg Oral Era Masters RN          baclofen (Lioresal) tab 10 mg       Date Action Dose Route User    3/11/2025 0834 Given 10 mg Oral Catalina Moss RN    3/10/2025 2049 Given 10 mg Oral Era Masters RN    3/10/2025 1600 Given 10 mg Oral Mayte Duncan RN          doxycycline hyclate (Vibramycin) 100 mg in sodium chloride 0.9% 100 mL IVPB       Date Action Dose Route User    3/11/2025 1311 New Bag 100 mg Intravenous Catalina Moss RN    3/11/2025 0028 New Bag 100 mg Intravenous Era Masters RN          folic acid (Folvite) tab 1 mg       Date Action Dose Route User    3/11/2025 0834 Given 1 mg Oral Catalina Moss RN          haloperidol lactate (Haldol) 5 MG/ML injection 2 mg       Date Action Dose Route User    3/11/2025 1402 Given 2 mg Intravenous Catalina Moss RN          lactulose (CHRONULAC) 10 GM/15ML solution 20 g       Date Action Dose Route User    3/11/2025 1000 Given 20 g Oral Catalina Moss RN    3/11/2025 0329 Given 20 g Oral Era Masters RN    3/10/2025 2137 Given 20 g Oral Era Masters RN    3/10/2025 1600 Given 20 g Oral Mayte Duncan RN          LORazepam (Ativan) tab 2 mg       Date Action Dose Route User    3/10/2025 2055 Given 2 mg Oral Era Masters RN          LORazepam (Ativan) 2 mg/mL injection 2 mg       Date Action Dose Route User    3/11/2025 0852 Given 2 mg Intravenous Catalina Moss RN    3/11/2025 0522 Given 2 mg  Intravenous Era Masters RN    3/11/2025 0406 Given 2 mg Intravenous Era Masters RN          LORazepam (Ativan) 2 mg/mL injection 3 mg       Date Action Dose Route User    3/10/2025 2242 Given 3 mg Intravenous Era Masters RN          pantoprazole (Protonix) 40 mg in sodium chloride 0.9% PF 10 mL IV push       Date Action Dose Route User    3/11/2025 0028 Given 40 mg Intravenous Era Masters RN          piperacillin-tazobactam (Zosyn) 3.375 g in dextrose 5% 100 mL IVPB-ADDV       Date Action Dose Route User    3/11/2025 1346 New Bag 3.375 g Intravenous Catalina Moss RN    3/11/2025 0328 New Bag 3.375 g Intravenous Era Masters RN    3/10/2025 2043 New Bag 3.375 g Intravenous Era Masters RN          potassium chloride 20 mEq/100mL IVPB premix 20 mEq       Date Action Dose Route User    3/10/2025 1808 New Bag 20 mEq Intravenous Mayte Duncan RN          potassium phosphate dibasic 15 mmol in sodium chloride 0.9% 250 mL IVPB       Date Action Dose Route User    3/10/2025 2039 New Bag 15 mmol Intravenous Era Masters RN          potassium phosphate dibasic 15 mmol in sodium chloride 0.9% 250 mL IVPB       Date Action Dose Route User    3/11/2025 0834 New Bag 15 mmol Intravenous Catalina Moss RN          multivitamin (Tab-A-Yosef/Beta Carotene) tab 1 tablet       Date Action Dose Route User    3/11/2025 0834 Given 1 tablet Oral Catalina Moss RN          thiamine 100 mg/mL injection 200 mg       Date Action Dose Route User    3/11/2025 0834 Given 200 mg Intravenous Catalina Moss RN    3/10/2025 2048 Given 200 mg Intravenous Era Masters RN    3/10/2025 1500 Given 200 mg Intravenous Mayte Duncan RN          vancomycin (Firvanq) 50 mg/mL oral solution 125 mg       Date Action Dose Route User    3/11/2025 0835 Given 125 mg Per NG Tube Catalina Moss RN            Vitals (last day)       Date/Time Temp Pulse Resp BP SpO2  Weight O2 Device O2 Flow Rate (L/min) [facility]    03/11/25 1300 99.1 °F (37.3 °C) 114 28 -- 92 % -- High flow nasal cannula 7 L/min     03/11/25 1230 -- -- -- -- 96 % -- -- --     03/11/25 1000 -- 117 -- 136/85 -- -- -- --     03/11/25 0800 99.2 °F (37.3 °C) 120 30 135/74 92 % -- High flow nasal cannula 7 L/min     03/11/25 0700 -- 115 33 140/79 93 % -- High flow nasal cannula 7 L/min RP    03/11/25 0653 -- -- -- -- 91 % -- High flow nasal cannula 7 L/min     03/11/25 0652 -- -- -- -- 86 % -- Nasal cannula 8 L/min     03/11/25 0600 -- 114 28 129/89 94 % -- Nasal cannula 6 L/min     03/11/25 0500 -- 115 -- 129/71 -- -- -- --     03/11/25 0400 98.3 °F (36.8 °C) 107 31 126/84 94 % -- Nasal cannula 6 L/min     03/11/25 0300 -- 104 -- 117/61 -- -- -- --     03/11/25 0200 -- 102 26 120/78 93 % -- Nasal cannula 6 L/min     03/11/25 0040 -- -- -- 96/57 -- -- -- --     03/11/25 0000 97.3 °F (36.3 °C) 90 27 89/53 93 % -- Nasal cannula 6 L/min     03/10/25 2342 -- 93 -- -- -- -- -- --     03/10/25 2242 -- -- -- 115/65 -- -- -- -- CB    03/10/25 2200 -- 107 30 115/65 97 % -- Nasal cannula 6 L/min     03/10/25 2100 -- 118 26 116/96 97 % -- -- --     03/10/25 2055 100.6 °F (38.1 °C) -- -- -- -- -- -- -- CB    03/10/25 2000 100.4 °F (38 °C) 120 29 141/87 99 % -- Nasal cannula 6 L/min CB    03/10/25 1706 -- 113 33 -- 92 % -- Nasal cannula 6 L/min NR    03/10/25 0846 -- 114 -- -- 96 % -- Nasal cannula 6 L/min NR    03/10/25 0800 -- 99 -- 140/82 -- -- -- -- NR    03/10/25 0641 -- 110 -- 129/88 -- -- -- -- EDI    03/10/25 0600 -- 87 25 110/68 96 % -- Nasal cannula 6 L/min EDI    03/10/25 0500 -- 103 -- 130/89 -- -- -- -- EDI    03/10/25 0400 98 °F (36.7 °C) 102 25 117/80 96 % -- High flow nasal cannula 6 L/min EDI    03/10/25 0200 -- 84 21 113/68 96 % -- High flow nasal cannula 6 L/min EDI    03/10/25 0000 98.8 °F (37.1 °C) 87 26 114/67 98 % -- High flow nasal cannula 6 L/min EDI          CIWA Scores (since  admission)       Date/Time CIWA-Ar Total Who    03/11/25 1000 7 RP    03/11/25 0800 10 RP    03/11/25 0600 6 CB    03/11/25 0500 11 CB    03/11/25 0400 13 CB    03/11/25 0300 6 CB    03/11/25 0200 4 CB    03/11/25 0040 4 CB    03/11/25 0000 4 CB    03/10/25 2342 6 CB    03/10/25 2242 15 CB    03/10/25 2200 6 CB    03/10/25 2100 6 CB    03/10/25 2000 11 CB    03/10/25 0800 18 NR    03/10/25 0641 16 EDI    03/10/25 0600 6 EDI    03/10/25 0500 6 EDI    03/10/25 0400 13 EDI    03/10/25 0200 4 EDI    03/10/25 0000 4 EDI    03/09/25 2300 13 EDI    03/09/25 2200 6 EDI    03/09/25 2130 11 EDI    03/09/25 2000 6 EDI    03/09/25 1800 1 AH    03/09/25 1400 2 AH    03/09/25 0600 4 EDI    03/09/25 0400 5 EDI    03/09/25 0300 5 EDI    03/09/25 0000 5 EDI    03/08/25 2200 4 EDI    03/08/25 2000 4 EDI    03/08/25 1200 5 ES    03/08/25 1000 5 ES    03/08/25 0800 5 ES    03/08/25 0600 6 EDI    03/08/25 0400 5 EDI    03/08/25 0200 5 EDI    03/08/25 0000 5 EDI    03/07/25 2200 5 EDI    03/07/25 2000 5 EDI    03/07/25 0600 5 CT    03/07/25 0400 5 CT    03/07/25 0200 5 CT    03/07/25 0000 8 CT    03/06/25 2200 8 CT    03/06/25 2000 8 CT    03/06/25 1800 5 AW    03/06/25 1600 8 AW    03/06/25 1400 4 AW    03/06/25 1200 4 AW    03/06/25 1000 5 AW    03/06/25 0800 6 AW    03/06/25 0600 14 CT    03/06/25 0500 6 CT    03/06/25 0400 20 CT    03/06/25 0300 17 CT    03/06/25 0200 20 CT    03/06/25 0100 17 CT    03/06/25 0000 5 CT    03/05/25 2300 17 CT    03/05/25 2000 5 CT    03/05/25 1846 5 LN    03/05/25 1742 13 LN    03/05/25 1642 16 LN    03/05/25 1541 19 LN    03/05/25 1516 6 LN    03/05/25 1416 9 LN    03/05/25 1257 18 LN    03/05/25 1200 14 BD    03/05/25 1100 13 BD    03/05/25 1000 10 BD    03/05/25 0900 17 BD    03/05/25 0800 9 BD    03/05/25 0700 13 MP    03/05/25 0600 29 MP    03/05/25 0500 7 MP    03/05/25 0400 6 MP    03/05/25 0300 10 MP    03/05/25 0200 6 MP    03/05/25 0100 8 MP    03/05/25 0000 14 MP    03/04/25 2300 8 MP    03/04/25 2200 13 MP     03/04/25 2100 8 MP    03/04/25 2000 24 MP    03/04/25 1900 12 BD    03/04/25 1800 12 BD    03/04/25 1600 5 BD    03/04/25 1500 1 RR    03/04/25 1315 0 RR    03/04/25 1200 1 RR    03/04/25 0951 17 KB

## 2025-03-11 NOTE — PLAN OF CARE
Problem: Safety Risk - Non-Violent Restraints  Goal: Patient will remain free from self-harm  Description: INTERVENTIONS:  - Apply the least restrictive restraint to prevent harm  - Notify patient and family of reasons restraints applied  - Assess for any contributing factors to confusion (electrolyte disturbances, delirium, medications)  - Discontinue any unnecessary medical devices as soon as possible  - Assess the patient's physical comfort, circulation, skin condition, hydration, nutrition and elimination needs   - Reorient and redirection as needed  - Assess for the need to continue restraints  Outcome: Not Progressing

## 2025-03-11 NOTE — PLAN OF CARE
Problem: Patient Centered Care  Goal: Patient preferences are identified and integrated in the patient's plan of care  Description: Interventions:  - What would you like us to know as we care for you?   - Provide timely, complete, and accurate information to patient/family  - Incorporate patient and family knowledge, values, beliefs, and cultural backgrounds into the planning and delivery of care  - Encourage patient/family to participate in care and decision-making at the level they choose  - Honor patient and family perspectives and choices  Outcome: Progressing     Problem: PAIN - ADULT  Goal: Verbalizes/displays adequate comfort level or patient's stated pain goal  Description: INTERVENTIONS:  - Encourage pt to monitor pain and request assistance  - Assess pain using appropriate pain scale  - Administer analgesics based on type and severity of pain and evaluate response  - Implement non-pharmacological measures as appropriate and evaluate response  - Consider cultural and social influences on pain and pain management  - Manage/alleviate anxiety  - Utilize distraction and/or relaxation techniques  - Monitor for opioid side effects  - Notify MD/LIP if interventions unsuccessful or patient reports new pain  - Anticipate increased pain with activity and pre-medicate as appropriate  Outcome: Progressing     Problem: RISK FOR INFECTION - ADULT  Goal: Absence of fever/infection during anticipated neutropenic period  Description: INTERVENTIONS  - Monitor WBC  - Administer growth factors as ordered  - Implement neutropenic guidelines  Outcome: Progressing     Problem: SAFETY ADULT - FALL  Goal: Free from fall injury  Description: INTERVENTIONS:  - Assess pt frequently for physical needs  - Identify cognitive and physical deficits and behaviors that affect risk of falls.  - [place] fall precautions as indicated by assessment.  - Educate pt/family on patient safety including physical limitations  - Instruct pt to call  for assistance with activity based on assessment  - Modify environment to reduce risk of injury  - Provide assistive devices as appropriate  - Consider OT/PT consult to assist with strengthening/mobility  - Encourage toileting schedule  Outcome: Progressing     Problem: DISCHARGE PLANNING  Goal: Discharge to home or other facility with appropriate resources  Description: INTERVENTIONS:  - Identify barriers to discharge w/pt and caregiver  - Include patient/family/discharge partner in discharge planning  - Arrange for needed discharge resources and transportation as appropriate  - Identify discharge learning needs (meds, wound care, etc)  - Arrange for interpreters to assist at discharge as needed  - Consider post-discharge preferences of patient/family/discharge partner  - Complete POLST form as appropriate  - Assess patient's ability to be responsible for managing their own health  - Refer to Case Management Department for coordinating discharge planning if the patient needs post-hospital services based on physician/LIP order or complex needs related to functional status, cognitive ability or social support system  Outcome: Progressing     Problem: Delirium  Goal: Minimize duration of delirium  Description: Interventions:  - Encourage use of hearing aids, eye glasses  - Promote highest level of mobility daily  - Provide frequent reorientation  - Promote wakefulness i.e. lights on, blinds open  - Promote sleep, encourage patient's normal rest cycle i.e. lights off, TV off, minimize noise and interruptions  - Encourage family to assist in orientation and promotion of home routines  Outcome: Progressing     Problem: RESPIRATORY - ADULT  Goal: Achieves optimal ventilation and oxygenation  Description: INTERVENTIONS:  - Assess for changes in respiratory status  - Assess for changes in mentation and behavior  - Position to facilitate oxygenation and minimize respiratory effort  - Oxygen supplementation based on oxygen  saturation or ABGs  - Provide Smoking Cessation handout, if applicable  - Encourage broncho-pulmonary hygiene including cough, deep breathe, Incentive Spirometry  - Assess the need for suctioning and perform as needed  - Assess and instruct to report SOB or any respiratory difficulty  - Respiratory Therapy support as indicated  - Manage/alleviate anxiety  - Monitor for signs/symptoms of CO2 retention  Outcome: Progressing     Problem: METABOLIC/FLUID AND ELECTROLYTES - ADULT  Goal: Electrolytes maintained within normal limits  Description: INTERVENTIONS:  - Monitor labs and rhythm and assess patient for signs and symptoms of electrolyte imbalances  - Administer electrolyte replacement as ordered  - Monitor response to electrolyte replacements, including rhythm and repeat lab results as appropriate  - Fluid restriction as ordered  - Instruct patient on fluid and nutrition restrictions as appropriate  Outcome: Progressing  Goal: Hemodynamic stability and optimal renal function maintained  Description: INTERVENTIONS:  - Monitor labs and assess for signs and symptoms of volume excess or deficit  - Monitor intake, output and patient weight  - Monitor urine specific gravity, serum osmolarity and serum sodium as indicated or ordered  - Monitor response to interventions for patient's volume status, including labs, urine output, blood pressure (other measures as available)  - Encourage oral intake as appropriate  - Instruct patient on fluid and nutrition restrictions as appropriate  Outcome: Progressing     Problem: Impaired Swallowing  Goal: Minimize aspiration risk  Description: Interventions:  - Patient should be alert and upright for all feedings (90 degrees preferred)  - Offer food and liquids at a slow rate  - No straws  - Encourage small bites of food and small sips of liquid  - Offer pills one at a time, crush or deliver with applesauce as needed  - Discontinue feeding and notify MD (or speech pathologist) if  coughing or persistent throat clearing or wet/gurgly vocal quality is noted  Outcome: Progressing     Problem: Impaired Cognition  Goal: Patient will exhibit improved attention, thought processing and/or memory  Description: Interventions:  - Allow additional time for processing after asking questions or providing instructions  Outcome: Progressing

## 2025-03-11 NOTE — PLAN OF CARE
Problem: Patient Centered Care  Goal: Patient preferences are identified and integrated in the patient's plan of care  Description: Interventions:  - What would you like us to know as we care for you? Per patient partner patient would like to return home.  - Provide timely, complete, and accurate information to patient/family  - Incorporate patient and family knowledge, values, beliefs, and cultural backgrounds into the planning and delivery of care  - Encourage patient/family to participate in care and decision-making at the level they choose  - Honor patient and family perspectives and choices  Outcome: Progressing     Problem: Patient/Family Goals  Goal: Patient/Family Long Term Goal  Description: Patient's Long Term Goal: to remain out of the hospital    Interventions:  - See additional Care Plan goals for specific interventions  Outcome: Progressing  Goal: Patient/Family Short Term Goal  Description: Patient's Short Term Goal: to ne provided resources to quit drinking alcohol.    Interventions:   - See additional Care Plan goals for specific interventions  Outcome: Progressing     Problem: PAIN - ADULT  Goal: Verbalizes/displays adequate comfort level or patient's stated pain goal  Description: INTERVENTIONS:  - Encourage pt to monitor pain and request assistance  - Assess pain using appropriate pain scale  - Administer analgesics based on type and severity of pain and evaluate response  - Implement non-pharmacological measures as appropriate and evaluate response  - Consider cultural and social influences on pain and pain management  - Manage/alleviate anxiety  - Utilize distraction and/or relaxation techniques  - Monitor for opioid side effects  - Notify MD/LIP if interventions unsuccessful or patient reports new pain  - Anticipate increased pain with activity and pre-medicate as appropriate  Outcome: Progressing     Problem: RISK FOR INFECTION - ADULT  Goal: Absence of fever/infection during anticipated  neutropenic period  Description: INTERVENTIONS  - Monitor WBC  - Administer growth factors as ordered  - Implement neutropenic guidelines  Outcome: Progressing     Problem: SAFETY ADULT - FALL  Goal: Free from fall injury  Description: INTERVENTIONS:  - Assess pt frequently for physical needs  - Identify cognitive and physical deficits and behaviors that affect risk of falls.  - [place] fall precautions as indicated by assessment.  - Educate pt/family on patient safety including physical limitations  - Instruct pt to call for assistance with activity based on assessment  - Modify environment to reduce risk of injury  - Provide assistive devices as appropriate  - Consider OT/PT consult to assist with strengthening/mobility  - Encourage toileting schedule  Outcome: Progressing     Problem: DISCHARGE PLANNING  Goal: Discharge to home or other facility with appropriate resources  Description: INTERVENTIONS:  - Identify barriers to discharge w/pt and caregiver  - Include patient/family/discharge partner in discharge planning  - Arrange for needed discharge resources and transportation as appropriate  - Identify discharge learning needs (meds, wound care, etc)  - Arrange for interpreters to assist at discharge as needed  - Consider post-discharge preferences of patient/family/discharge partner  - Complete POLST form as appropriate  - Assess patient's ability to be responsible for managing their own health  - Refer to Case Management Department for coordinating discharge planning if the patient needs post-hospital services based on physician/LIP order or complex needs related to functional status, cognitive ability or social support system  Outcome: Progressing     Problem: Safety Risk - Non-Violent Restraints  Goal: Patient will remain free from self-harm  Description: INTERVENTIONS:  - Apply the least restrictive restraint to prevent harm  - Notify patient and family of reasons restraints applied  - Assess for any  contributing factors to confusion (electrolyte disturbances, delirium, medications)  - Discontinue any unnecessary medical devices as soon as possible  - Assess the patient's physical comfort, circulation, skin condition, hydration, nutrition and elimination needs   - Reorient and redirection as needed  - Assess for the need to continue restraints  Outcome: Progressing     Problem: Delirium  Goal: Minimize duration of delirium  Description: Interventions:  - Encourage use of hearing aids, eye glasses  - Promote highest level of mobility daily  - Provide frequent reorientation  - Promote wakefulness i.e. lights on, blinds open  - Promote sleep, encourage patient's normal rest cycle i.e. lights off, TV off, minimize noise and interruptions  - Encourage family to assist in orientation and promotion of home routines  Outcome: Progressing

## 2025-03-11 NOTE — SPIRITUAL CARE NOTE
Spiritual Care Visit Note    Patient Name: Sami Grijalva Jr. Date of Spiritual Care Visit: 25   : 10/11/1980 Primary Dx: USMAN (acute kidney injury)       Referred By: Referral From: LAUREN Ball             Visit Type/Summary:     received referral for visit from LAUREN [facility]nandezfaith.      - Spiritual Care: Attempted visit. Patient is unavailable.  remains available as needed for follow up.    Spiritual Care support can be requested via an Epic consult.   For urgent/immediate needs, please contact the On Call  at: [place]: ext [phone]    -Chaplain Nettie.

## 2025-03-11 NOTE — PROGRESS NOTES
[facility]     Sami Grijalva  Patient Status:  Inpatient    10/11/1980 MRN [id number]   Location [facility]5W Attending Alex Diaz MD   Hosp Day # 7 PCP Kerri Medina MD     Chief Complaint:   Chief Complaint   Patient presents with    Vomiting    Hiccups    Eval-D        Subjective:     Patient seen lying in bed.  Sister and partner at bedside.  Patient remains lethargic, but attempting to move more today.  Patient did not open eyes or respond to questions at time of interview.  Per family at bedside, patient was reportedly speaking to them and asking them questions earlier.           Objective:      Vital signs:  Vitals:    25 0653 25 0700 25 0800 25 1000   BP:  140/79 135/74 136/85   BP Location:  Right arm Right arm    Pulse:  115 120 117   Resp:  (!) 33 (!) 30    Temp:   99.2 °F (37.3 °C)    TempSrc:       SpO2: 91% 93% 92%    Weight:       Height:           Intake/Output Summary (Last 24 hours) at 3/11/2025 1046  Last data filed at 3/11/2025 0947  Gross per 24 hour   Intake 2729 ml   Output 2350 ml   Net 379 ml           Physical Exam:    GENERAL: Lethargic.  In no acute distress.  HEART:  Regular rhythm, tachycardia  LUNGS:  Air entry was decreased.  Increased work of breathing. No wheezes   ABDOMEN: Soft and non-tender.    NEUROLOGICAL:  Lethargic, remains confused, agitated at times    Diagnostic Data:    Labs:    Recent Labs   Lab 25  1111 25  0358 25  1426 25  0335 25  0414 25  0356 25  1025 25  0359 03/10/25  0510 25  0510   WBC 17.5*   < >  --    < > 5.7   < >  --  8.0 10.5 15.7*   HGB 10.1*   < >  --    < > 7.6*   < >  --  7.6* 7.9* 7.5*   MCV 78.9*   < >  --    < > 76.1*   < >  --  74.3* 78.1* 76.2*   PLT 65.0*   < >  --    < > 34.0*   < >  --  53.0* 70.0* 86.0*   BAND  --   --   --   --  7  --   --   --   --  2   INR 3.86*   --  3.75*  --   --   --  1.95*  --   --   --     < > = values in this interval not displayed.       Recent Labs   Lab 03/09/25  0359 03/09/25  1427 03/10/25  0510 03/11/25  0510   *  --  119* 100*   BUN 32*  --  25* 23   CREATSERUM 1.31*  --  1.20 1.20   CA 8.4*  --  8.0* 7.9*   ALB 3.1*  --  3.0* 3.1*   *  --  154* 153*   K 3.3*  3.3* 3.4* 3.5  3.5 3.6   *  --  117* 119*   CO2 27.0  --  27.0 25.0   ALKPHO 177*  --  166* 171*   *  --  153* 175*   ALT 98*  --  103* 105*   BILT 11.4*  --  11.4* 12.2*   TP 5.6*  --  5.5* 5.5*           Estimated Creatinine Clearance: 74.4 mL/min (based on SCr of 1.2 mg/dL).    Recent Labs   Lab 03/04/25  1111 03/05/25  1426 03/08/25  1025   PTP 39.7* 38.8* 23.3*   INR 3.86* 3.75* 1.95*            COVID-19  Lab Results   Component Value Date    COVID19 Not Detected 12/26/2023    COVID19 Not Detected 01/15/2023       Pro-Calcitonin  No results for input(s): \"PCT\" in the last 168 hours.    Cardiac  No results for input(s): \"TROP\", \"PBNP\" in the last 168 hours.    Inflammatory Markers  No results for input(s): \"CRP\", \"CANDICE\", \"LDH\", \"DDIMER\" in the last 168 hours.    Culture:  Hospital Encounter on 03/04/25   1. Blood Culture     Status: None (Preliminary result)    Collection Time: 03/07/25 10:28 AM    Specimen: Blood,peripheral   Result Value Ref Range    Blood Culture Result No Growth 3 Days N/A   2. Urine Culture, Routine     Status: None    Collection Time: 03/04/25 11:11 AM    Specimen: Urine, clean catch   Result Value Ref Range    Urine Culture No Growth at 18-24 hrs. N/A       XR CHEST AP PORTABLE  (CPT=71045)    Result Date: 3/10/2025  CONCLUSION:   Interval Strauss min enteric tube which is now in the mid to distal stomach.  Left greater than right patchy bilateral lung opacities mildly increased on the right.  Small left pleural effusion.    Dictated by (CST): Vishnu Tuttle MD on 3/10/2025 at 4:03 PM     Finalized by (CST): Vishnu Tuttle MD on  3/10/2025 at 4:04 PM                 Medications:    potassium phosphate dibasic 15 mmol in sodium chloride 0.9% 250 mL IVPB  15 mmol Intravenous Once    vancomycin  125 mg Per NG Tube Daily    piperacillin-tazobactam  3.375 g Intravenous Q8H    doxycycline  100 mg Intravenous Q12H    thiamine  200 mg Intravenous TID    lactulose  20 g Oral 4 times per day    multivitamin  1 tablet Oral Daily    folic acid  1 mg Oral Daily    baclofen  10 mg Oral TID    pantoprazole  40 mg Intravenous Q12H       Assessment & Plan:      Acute alcohol hepatitis   -last drink reportedly 4 days prior to admission  -Reported emesis and poor PO intake, without overt bleeding   -T bili upon arrival to ER 10.5, platelet 65, cr 4.22  -CT a/p reviewed, noted severe fatty liver with subtle undulating contour  -US liver reviewed, noted hepatic steatosis, GB sludge and cholelithiasis without cholecystitis or biliary ductal dilation, hepatic and portal vein patent  -MELD 3.0 on admission: 44   -GI consulted, appreciate further recommendations    Alcohol Abuse with withdrawal   -CIWA monitoring with alcohol withdrawal protocol  -thiamine, folic acid, multivitamin  -Psychiatry consulted, appreciate recommendations    Altered mental status  -Slowly improving  -Likely metabolic encephalopathy  -Initially noted to have elevated ammonia levels noted consistent with hepatic encephalopathy.  -Continue lactulose   -Patient also with USMAN and electrolyte abnormalities.  -Continue treating underlying conditions  -Continue to monitor    Acute hypoxemic resp failure  Possible pneumonia/aspiration  -Likely multifactorial.  -X-ray reviewed.  Noted multifocal infiltrates.   -Concern for possible aspiration given hx of emesis and altered mental status.   -Continue on zosyn, doxy.   -Continues to require high levels of supplemental O2.  Weaning as able  -aspiration precautions  -keep NPO until more alert and passes swallow evaluation.  -Pulmonology on consult,  appreciate further recommendations    Acute Kidney Injury   -Continue improvement noted  -Avoiding Nephrotoxic agents  - Cont to monitor  -Nephrology on consult, appreciate further recommendations    Hypernatremia  -Persistent elevations noted  -Nephrology on consult, recommend continue D5 IV fluids and increased free water flushes.  -Continue to monitor    Plan of care discussed with patient's sister and partner at bedside.  Discussed management/test result(s) with Rn     Quality:  DVT Prophylaxis: SCDs  CODE status: Full  Estimated date of discharge: TBD  Discharge is dependent on: clinical stability    52 minutes spent discussing with other providers, examining patient, obtaining history, reviewing medical records, interpreting and communicating test results/imaging, ordering tests/medications, discussing plan of care and documenting information.      Alex Diaz MD          This note was prepared using Dragon Medical voice recognition dictation software. As a result errors may occur. When identified these errors have been corrected. While every attempt is made to correct errors during dictation discrepancies may still exist

## 2025-03-11 NOTE — PROGRESS NOTES
Critical Care Progress Note  45 y/o male who came to ED for intractable nausea, vomiting, and hiccups. PMH of alcoholism, and GERD. Being treated for alcohol withdrawal, aspiration PNA, acute alcohol hepatitis with probable underlying cirrhosis, and USMAN.     Subjective:  States his name when asked and able to follow simple commands, but unable to answer any other questions. BUE soft wrist restraints due to restlessness and accidental removal of dobhoff tube overnight.    Objective:  /85   Pulse 117   Temp 99.2 °F (37.3 °C)   Resp (!) 30   Ht 172.7 cm (5' 8\")   Wt 182 lb 12.8 oz (82.9 kg)   SpO2 92%   BMI 27.79 kg/m²     Intake/Output Summary (Last 24 hours) at 3/11/2025 1159  Last data filed at 3/11/2025 0947  Gross per 24 hour   Intake 2729 ml   Output 2050 ml   Net 679 ml     Physical Exam:   General Appearance: lethargic, oriented to name, restless  Neck: No JVD.  Lungs: fine crackles bilaterally, tachypneic.  Heart: Regular rate and rhythm, S1 and S2 normal. Tachycardic.   Abdomen: Soft, non-tender, bowel sounds active all four quadrants, no masses, no organomegaly  Extremities: Extremities normal, atraumatic, no cyanosis or edema,capillary refill <3 sec.    Pulses: 2+ pedals, 1+ pedals.   Skin: Skin color, texture, turgor normal for ethnicity, no rashes or lesions, warm and dry    Recent Labs   Lab 03/11/25  0510   RBC 3.11*   HGB 7.5*   HCT 23.7*   MCV 76.2*   MCH 24.1*   MCHC 31.6   RDW 30.9*   NEPRELIM 12.32*   WBC 15.7*   PLT 86.0*     Recent Labs   Lab 03/09/25  0359 03/09/25  1427 03/10/25  0510 03/11/25  0510   *  --  119* 100*   BUN 32*  --  25* 23   CREATSERUM 1.31*  --  1.20 1.20   CA 8.4*  --  8.0* 7.9*   ALB 3.1*  --  3.0* 3.1*   *  --  154* 153*   K 3.3*  3.3* 3.4* 3.5  3.5 3.6   *  --  117* 119*   CO2 27.0  --  27.0 25.0   ALKPHO 177*  --  166* 171*   *  --  153* 175*   ALT 98*  --  103* 105*   BILT 11.4*  --  11.4* 12.2*   TP 5.6*  --  5.5* 5.5*     Recent  Labs   Lab 03/07/25  0831   ABGPHT 7.46*   GQEZLT6C 37   HLSYJ2U 87   ABGHCO3 26.8   SITE Right Radial     No results for input(s): \"BNP\" in the last 168 hours.  Recent Labs   Lab 03/06/25  0335   *     XR CHEST AP PORTABLE  (CPT=71045)    Result Date: 3/10/2025  CONCLUSION:   Interval Strauss min enteric tube which is now in the mid to distal stomach.  Left greater than right patchy bilateral lung opacities mildly increased on the right.  Small left pleural effusion.    Dictated by (CST): Vishnu Tuttle MD on 3/10/2025 at 4:03 PM     Finalized by (CST): Vishnu Tuttle MD on 3/10/2025 at 4:04 PM           Assessment/Plan:  Alcohol withdrawal. Hospital day 7 today. CIWA protocol.  Received 9mg ativan overnight. CIWA scores this AM mostly due to agitation and confusion. D/w RN to treat agitation with PRN haldol. Thiamine, MVT, folic acid.   USMAN r/t nausea, vomiting, dehydration. Nephrology following. Creatinine improved. IVF discontinued by nephrology. TF via dobhoff with water flushes ordered.  Encephalopathy d/t etoh withdrawal and possible delirium. Continue delirium prevention measures.  Acute alcoholic hepatitis with probable underlying cirrhosis: INR 3.86 on admission, trended down to 1.95. Total bili and transaminases elevated. Ammonia elevated, lactulose ordered. +Stool from rectal tube. GI following.  Acute respiratory failure r/t aspiration PNA. Requiring 7L HFNC. CXR with bilateral patchy opacities. Continue zosyn and doxycycline. Aspiration precautions.   Hypernatremia: 153 today. Managed per nephrology. Continue TF with FWF as above.  Hiccups less frequent. Continue scheduled baclofen.   GERD: Protonix IVP BID.    F/E/N: TF advancing as tolerated today with FWF.  GI prophylaxis: protonix as above.  DVT prophylaxis: SCDs.    Full code    Damari Forrest RN- Nurse Practitioner Student  Reviewed with DWAINE Lai    1454 Addendum: Called to room by RN for increased oxygen demand, worsening  tachypnea and increased work of breathing. Dr. Bobo paged with update. 40mg lasix IVP x1 and CXR stat ordered. Dr. Ramos at bedside, also updated on worsening respiratory status.

## 2025-03-12 ENCOUNTER — APPOINTMENT (OUTPATIENT)
Dept: GENERAL RADIOLOGY | Facility: HOSPITAL | Age: 45
End: 2025-03-12
Attending: NURSE PRACTITIONER
Payer: MEDICAID

## 2025-03-12 ENCOUNTER — APPOINTMENT (OUTPATIENT)
Dept: CT IMAGING | Facility: HOSPITAL | Age: 45
End: 2025-03-12
Attending: INTERNAL MEDICINE
Payer: MEDICAID

## 2025-03-12 LAB
ADENOVIRUS PCR:: NOT DETECTED
ALBUMIN SERPL-MCNC: 2.9 G/DL (ref 3.2–4.8)
ALP LIVER SERPL-CCNC: 173 U/L
ALT SERPL-CCNC: 104 U/L
AMMONIA PLAS-MCNC: 32 UMOL/L (ref 11–32)
ANION GAP SERPL CALC-SCNC: 10 MMOL/L (ref 0–18)
AST SERPL-CCNC: 184 U/L (ref ?–34)
B PARAPERT DNA SPEC QL NAA+PROBE: NOT DETECTED
B PERT DNA SPEC QL NAA+PROBE: NOT DETECTED
BASE EXCESS BLD CALC-SCNC: 2.8 MMOL/L (ref ?–2)
BASOPHILS # BLD: 0.17 X10(3) UL (ref 0–0.2)
BASOPHILS NFR BLD: 1 %
BILIRUB DIRECT SERPL-MCNC: 9.3 MG/DL (ref ?–0.3)
BILIRUB SERPL-MCNC: 12 MG/DL (ref 0.3–1.2)
BILIRUB UR QL CFM: POSITIVE
BUN BLD-MCNC: 23 MG/DL (ref 9–23)
BUN/CREAT SERPL: 17.7 (ref 10–20)
C PNEUM DNA SPEC QL NAA+PROBE: NOT DETECTED
CA-I BLD-SCNC: 1.05 MMOL/L (ref 0.95–1.32)
CALCIUM BLD-MCNC: 7.9 MG/DL (ref 8.7–10.4)
CHLORIDE SERPL-SCNC: 112 MMOL/L (ref 98–112)
CO2 SERPL-SCNC: 26 MMOL/L (ref 21–32)
COHGB MFR BLD: 3 % (ref 0–3)
CORONAVIRUS 229E PCR:: NOT DETECTED
CORONAVIRUS HKU1 PCR:: NOT DETECTED
CORONAVIRUS NL63 PCR:: NOT DETECTED
CORONAVIRUS OC43 PCR:: NOT DETECTED
CREAT BLD-MCNC: 1.3 MG/DL
DEPRECATED RDW RBC AUTO: 75.7 FL (ref 35.1–46.3)
EGFRCR SERPLBLD CKD-EPI 2021: 69 ML/MIN/1.73M2 (ref 60–?)
EOSINOPHIL # BLD: 0 X10(3) UL (ref 0–0.7)
EOSINOPHIL NFR BLD: 0 %
ERYTHROCYTE [DISTWIDTH] IN BLOOD BY AUTOMATED COUNT: 31.7 % (ref 11–15)
FLUAV RNA SPEC QL NAA+PROBE: NOT DETECTED
FLUBV RNA SPEC QL NAA+PROBE: NOT DETECTED
GLUCOSE BLD-MCNC: 104 MG/DL (ref 70–99)
GLUCOSE BLDC GLUCOMTR-MCNC: 112 MG/DL (ref 70–99)
GLUCOSE BLDC GLUCOMTR-MCNC: 121 MG/DL (ref 70–99)
GLUCOSE BLDC GLUCOMTR-MCNC: 132 MG/DL (ref 70–99)
GLUCOSE BLDC GLUCOMTR-MCNC: 141 MG/DL (ref 70–99)
GLUCOSE UR-MCNC: NORMAL MG/DL
HCO3 BLDA-SCNC: 27.1 MEQ/L (ref 21–27)
HCT VFR BLD AUTO: 23.5 %
HGB BLD-MCNC: 7.5 G/DL
HGB BLD-MCNC: 7.5 G/DL
INSPIRATION SETTING TIME VENT: 0.9 SEC (ref 0.1–5)
KETONES UR-MCNC: NEGATIVE MG/DL
LACTATE BLD-SCNC: 2.1 MMOL/L (ref 0.5–2)
LEUKOCYTE ESTERASE UR QL STRIP.AUTO: NEGATIVE
LYMPHOCYTES NFR BLD: 1.74 X10(3) UL (ref 1–4)
LYMPHOCYTES NFR BLD: 10 %
MCH RBC QN AUTO: 24.1 PG (ref 26–34)
MCHC RBC AUTO-ENTMCNC: 31.9 G/DL (ref 31–37)
MCV RBC AUTO: 75.6 FL
METAMYELOCYTES # BLD: 0.35 X10(3) UL
METAMYELOCYTES NFR BLD: 2 %
METAPNEUMOVIRUS PCR:: NOT DETECTED
METHGB MFR BLD: 0.8 % SAT (ref 0.4–1.5)
MONOCYTES # BLD: 0.35 X10(3) UL (ref 0.1–1)
MONOCYTES NFR BLD: 2 %
MYCOPLASMA PNEUMONIA PCR:: NOT DETECTED
MYELOCYTES # BLD: 0.17 X10(3) UL
MYELOCYTES NFR BLD: 1 %
NEUTROPHILS # BLD AUTO: 14.09 X10 (3) UL (ref 1.5–7.7)
NEUTROPHILS NFR BLD: 79 %
NEUTS BAND NFR BLD: 5 %
NEUTS HYPERSEG # BLD: 14.62 X10(3) UL (ref 1.5–7.7)
NITRITE UR QL STRIP.AUTO: NEGATIVE
O2 CT BLD-SCNC: 10.2 VOL% (ref 15–23)
O2/TOTAL GAS SETTING VFR VENT: 75 %
OSMOLALITY SERPL CALC.SUM OF ELEC: 310 MOSM/KG (ref 275–295)
PARAINFLUENZA 1 PCR:: NOT DETECTED
PARAINFLUENZA 2 PCR:: NOT DETECTED
PARAINFLUENZA 3 PCR:: NOT DETECTED
PARAINFLUENZA 4 PCR:: NOT DETECTED
PAW @ PEAK INSP FLOW SETTING VENT: 25 CM H2O (ref 1–60)
PCO2 BLDA: 40 MM HG (ref 35–45)
PEEP SETTING VENT: 5 CM H2O
PH BLDA: 7.44 [PH] (ref 7.35–7.45)
PH UR: 5 [PH] (ref 5–8)
PHOSPHATE SERPL-MCNC: 2.5 MG/DL (ref 2.4–5.1)
PLATELET # BLD AUTO: 96 10(3)UL (ref 150–450)
PLATELET MORPHOLOGY: NORMAL
PLATELETS.RETICULATED NFR BLD AUTO: 12.5 % (ref 0–7)
PO2 BLDA: 76 MM HG (ref 80–100)
POTASSIUM BLD-SCNC: 3.4 MMOL/L (ref 3.6–5.1)
POTASSIUM SERPL-SCNC: 3.3 MMOL/L (ref 3.5–5.1)
POTASSIUM SERPL-SCNC: 3.4 MMOL/L (ref 3.5–5.1)
PROT SERPL-MCNC: 5.4 G/DL (ref 5.7–8.2)
PROT UR-MCNC: 30 MG/DL
PUNCTURE CHARGE: YES
RBC # BLD AUTO: 3.11 X10(6)UL
RBC #/AREA URNS AUTO: >10 /HPF
RESP RATE: 12 BPM
RHINOVIRUS/ENTERO PCR:: NOT DETECTED
RSV RNA SPEC QL NAA+PROBE: NOT DETECTED
SAO2 % BLDA: >99 % (ref 94–100)
SARS-COV-2 RNA NPH QL NAA+NON-PROBE: NOT DETECTED
SODIUM BLD-SCNC: 142 MMOL/L (ref 135–145)
SODIUM SERPL-SCNC: 148 MMOL/L (ref 136–145)
SP GR UR STRIP: 1.02 (ref 1–1.03)
TOTAL CELLS COUNTED BLD: 100
TRIGL SERPL-MCNC: 188 MG/DL (ref 30–149)
UROBILINOGEN UR STRIP-ACNC: NORMAL
WBC # BLD AUTO: 17.4 X10(3) UL (ref 4–11)

## 2025-03-12 PROCEDURE — 99233 SBSQ HOSP IP/OBS HIGH 50: CPT | Performed by: INTERNAL MEDICINE

## 2025-03-12 PROCEDURE — 74176 CT ABD & PELVIS W/O CONTRAST: CPT | Performed by: INTERNAL MEDICINE

## 2025-03-12 PROCEDURE — 0BH17EZ INSERTION OF ENDOTRACHEAL AIRWAY INTO TRACHEA, VIA NATURAL OR ARTIFICIAL OPENING: ICD-10-PCS | Performed by: INTERNAL MEDICINE

## 2025-03-12 PROCEDURE — 71045 X-RAY EXAM CHEST 1 VIEW: CPT | Performed by: NURSE PRACTITIONER

## 2025-03-12 PROCEDURE — 71250 CT THORAX DX C-: CPT | Performed by: INTERNAL MEDICINE

## 2025-03-12 PROCEDURE — 31500 INSERT EMERGENCY AIRWAY: CPT | Performed by: INTERNAL MEDICINE

## 2025-03-12 PROCEDURE — 99291 CRITICAL CARE FIRST HOUR: CPT | Performed by: INTERNAL MEDICINE

## 2025-03-12 PROCEDURE — 5A1955Z RESPIRATORY VENTILATION, GREATER THAN 96 CONSECUTIVE HOURS: ICD-10-PCS | Performed by: INTERNAL MEDICINE

## 2025-03-12 PROCEDURE — 36410 VNPNXR 3YR/> PHY/QHP DX/THER: CPT | Performed by: NURSE PRACTITIONER

## 2025-03-12 RX ORDER — BISACODYL 10 MG
10 SUPPOSITORY, RECTAL RECTAL
Status: DISCONTINUED | OUTPATIENT
Start: 2025-03-12 | End: 2025-04-02

## 2025-03-12 RX ORDER — CHLORHEXIDINE GLUCONATE ORAL RINSE 1.2 MG/ML
15 SOLUTION DENTAL
Status: DISCONTINUED | OUTPATIENT
Start: 2025-03-12 | End: 2025-03-17

## 2025-03-12 RX ORDER — MIDAZOLAM HYDROCHLORIDE 5 MG/ML
3 INJECTION, SOLUTION INTRAMUSCULAR; INTRAVENOUS ONCE
Status: DISCONTINUED | OUTPATIENT
Start: 2025-03-12 | End: 2025-03-15

## 2025-03-12 RX ORDER — ACETAMINOPHEN 10 MG/ML
1000 INJECTION, SOLUTION INTRAVENOUS EVERY 6 HOURS PRN
Status: DISCONTINUED | OUTPATIENT
Start: 2025-03-12 | End: 2025-03-19

## 2025-03-12 RX ORDER — ALBUMIN (HUMAN) 12.5 G/50ML
25 SOLUTION INTRAVENOUS EVERY 8 HOURS
Status: COMPLETED | OUTPATIENT
Start: 2025-03-12 | End: 2025-03-14

## 2025-03-12 RX ORDER — VANCOMYCIN HYDROCHLORIDE
15 EVERY 12 HOURS
Status: DISCONTINUED | OUTPATIENT
Start: 2025-03-12 | End: 2025-03-14

## 2025-03-12 RX ORDER — POLYETHYLENE GLYCOL 3350 17 G/17G
17 POWDER, FOR SOLUTION ORAL DAILY PRN
Status: DISCONTINUED | OUTPATIENT
Start: 2025-03-12 | End: 2025-03-31

## 2025-03-12 RX ORDER — HALOPERIDOL 5 MG/ML
1 INJECTION INTRAMUSCULAR EVERY 6 HOURS PRN
Status: DISCONTINUED | OUTPATIENT
Start: 2025-03-12 | End: 2025-04-02

## 2025-03-12 RX ORDER — SENNOSIDES 8.8 MG/5ML
10 LIQUID ORAL 2 TIMES DAILY
Status: DISCONTINUED | OUTPATIENT
Start: 2025-03-12 | End: 2025-03-17

## 2025-03-12 RX ORDER — MIDAZOLAM HYDROCHLORIDE 1 MG/ML
INJECTION INTRAMUSCULAR; INTRAVENOUS
Status: COMPLETED
Start: 2025-03-12 | End: 2025-03-12

## 2025-03-12 RX ORDER — MINERAL OIL AND PETROLATUM 150; 830 MG/G; MG/G
1 OINTMENT OPHTHALMIC NIGHTLY
Status: DISCONTINUED | OUTPATIENT
Start: 2025-03-12 | End: 2025-03-17

## 2025-03-12 RX ORDER — POTASSIUM CHLORIDE 14.9 MG/ML
20 INJECTION INTRAVENOUS ONCE
Status: COMPLETED | OUTPATIENT
Start: 2025-03-12 | End: 2025-03-12

## 2025-03-12 RX ORDER — ACETAMINOPHEN 650 MG/1
650 SUPPOSITORY RECTAL EVERY 4 HOURS PRN
Status: DISCONTINUED | OUTPATIENT
Start: 2025-03-12 | End: 2025-03-19

## 2025-03-12 RX ORDER — ACETAMINOPHEN 160 MG/5ML
650 SOLUTION ORAL EVERY 4 HOURS PRN
Status: DISCONTINUED | OUTPATIENT
Start: 2025-03-12 | End: 2025-03-19

## 2025-03-12 NOTE — CONSULTS
[facility]  part of [facility] ID CONSULT NOTE    Sami Grijalva JrKushal Patient Status:  Inpatient    10/11/1980 MRN [id number]   Location [facility] 2W/SW Attending Natasha Araujo MD   Hosp Day # 8 PCP Kerri Medina MD       Reason for Consultation:  Leukocytosis    ASSESSMENT:    Antibiotics: IV vancomycin, zosyn; (doxycycline)    # Acute hypoxic respiratory failure with fever and multifocal pneumonia  # Acute aspiration pneumonia  # Acute leukocytosis - r/o bacteremia, worsening pneumonia vs intraabdominal process    # Alcoholic hepatitis with encephalopathy on admission   - CT A/P with severe fatty liver   - US GB with sludge w/o cholecystitis  # USMAN - improved  # EtOH abuse and withdrawal      PLAN:    -  IV zosyn  -  discontinue doxycycline  -  start IV vancomycin  -  check BCx, CXR, RPP  -  check CT A/P  -  Follow fever curve, wbc  -  Reviewed labs, micro, imaging reports, available old records  -  d/w ICU staff    History of Present Illness:  Sami Grijalva Jr. is a a(n) 44 year old male. Patient is a 44-year-old male with a history of GERD, alcohol use was admitted to the hospital on 3/4 generalized abdominal pain, hiccups, vomiting with difficulty tolerating orals.  Last drink prior to admission was 1 day prior.  Found to have sepsis with elevated lactic acid, hypotension, WBC 17.5.  Responded to IV fluids and received lactulose for elevated ammonia.  CT A/P with severe fatty liver, stable splenomegaly.  Was lethargic the first few days and had soft restraints with Cedeño, Flexi-Seal, NG tube.  Has been essentially afebrile here with a temperature on 3/10 of 100.6 but significant hypoxia up and down and currently increased up to 40 L with increase WBC to 17.4.  Initial USMAN has improved.  Was started on IV Zosyn and doxycycline on 3/7 when patient became more hypoxic and chest x-ray showed extensive multifocal pneumonia with CT chest showing similar findings.   Possible aspiration.    History:  Past Medical History:    Esophageal reflux    Gout    Hernia     Past Surgical History:   Procedure Laterality Date    Colonoscopy N/A 11/1/2023    Procedure: COLONOSCOPY;  Surgeon: Vandana Austin MD;  Location: [facility] ENDOSCOPY    Egd  02/15/2016    Eh pr repair complex scalp arms legs 1.1 to 2.5 cm  2014    Elbow fracture surgery Right 1991    Hernia surgery      Inguinal hernia repair Left 01/17/2023     Family History   Problem Relation Age of Onset    Diabetes Father     Hypertension Father     Cancer Mother         liver      reports that he has quit smoking. His smoking use included cigarettes. He has never used smokeless tobacco. He reports current alcohol use. He reports current drug use. Drug: Cannabis.    Allergies:  Allergies[1]    Medications:    Current Facility-Administered Medications:     potassium phosphate dibasic 15 mmol in sodium chloride 0.9% 250 mL IVPB, 15 mmol, Intravenous, Once **FOLLOWED BY** potassium chloride 20 mEq/100mL IVPB premix 20 mEq, 20 mEq, Intravenous, Once    haloperidol lactate (Haldol) 5 MG/ML injection 1 mg, 1 mg, Intravenous, Q6H PRN    dextrose 5% infusion, , Intravenous, Continuous    furosemide (Lasix) 10 mg/mL injection 20 mg, 20 mg, Intravenous, TID    vancomycin (Firvanq) 50 mg/mL oral solution 125 mg, 125 mg, Per NG Tube, Daily    piperacillin-tazobactam (Zosyn) 3.375 g in dextrose 5% 100 mL IVPB-ADDV, 3.375 g, Intravenous, Q8H    doxycycline hyclate (Vibramycin) 100 mg in sodium chloride 0.9% 100 mL IVPB, 100 mg, Intravenous, Q12H    dextrose 10% infusion (TPN no rate), , Intravenous, Continuous PRN    sodium bicarbonate tab 650 mg, 650 mg, Per G Tube, PRN **AND** pancrelipase (Lip-Prot-Amyl) (Zenpep) DR particles cap 10,000 Units, 10,000 Units, Per G Tube, PRN    thiamine 100 mg/mL injection 200 mg, 200 mg, Intravenous, TID    lactulose (CHRONULAC) 10 GM/15ML solution 20 g, 20 g, Oral, 4 times per day    acetaminophen (Tylenol Extra  Strength) tab 500 mg, 500 mg, Oral, Q4H PRN    prochlorperazine (Compazine) 10 MG/2ML injection 5 mg, 5 mg, Intravenous, Q8H PRN    multivitamin (Tab-A-Yosef/Beta Carotene) tab 1 tablet, 1 tablet, Oral, Daily    folic acid (Folvite) tab 1 mg, 1 mg, Oral, Daily    LORazepam (Ativan) tab 1 mg, 1 mg, Oral, Q1H PRN **OR** LORazepam (Ativan) 2 mg/mL injection 1 mg, 1 mg, Intravenous, Q1H PRN    LORazepam (Ativan) tab 2 mg, 2 mg, Oral, Q1H PRN **OR** [DISCONTINUED] LORazepam (Ativan) 2 mg/mL injection 2 mg, 2 mg, Intravenous, Q1H PRN    baclofen (Lioresal) tab 10 mg, 10 mg, Oral, TID    pantoprazole (Protonix) 40 mg in sodium chloride 0.9% PF 10 mL IV push, 40 mg, Intravenous, Q12H    Review of Systems:  Unable to obtain 2/2 clinical status    Physical Exam:  Vital signs: Blood pressure 115/63, pulse 115, temperature 97.9 °F (36.6 °C), temperature source Temporal, resp. rate (!) 51, height 172.7 cm (5' 8\"), weight 171 lb 11.8 oz (77.9 kg), SpO2 (!) 89%.    General: in bed, lethargic, on bipap  HEENT: DHT in place  Neck: No lymphadenopathy.  Supple.  Cardiovascular: No chest wall tenderness  Respiratory: Symmetric expansion  Abdomen: Soft, distended, non-tender  Musculoskeletal: No edema noted  Integument: No lesions. No erythema.  FMS, duran    Laboratory Data: Reviewed in EMR    Microbiology: Reviewed in EMR    Radiology: Reviewed    Thank you for allowing us to participate in the care of this patient. Please do not hesitate to call if you have any questions.     We will continue to follow with you and will make further recommendations based on his progress.    MD Gareth [facility]  [phone]  3/12/2025         [1]   Allergies  Allergen Reactions    Morphine SWELLING     SHORTNESS OF BREATH

## 2025-03-12 NOTE — PLAN OF CARE
Problem: PAIN - ADULT  Goal: Verbalizes/displays adequate comfort level or patient's stated pain goal  Description: INTERVENTIONS:  - Encourage pt to monitor pain and request assistance  - Assess pain using appropriate pain scale  - Administer analgesics based on type and severity of pain and evaluate response  - Implement non-pharmacological measures as appropriate and evaluate response  - Consider cultural and social influences on pain and pain management  - Manage/alleviate anxiety  - Utilize distraction and/or relaxation techniques  - Monitor for opioid side effects  - Notify MD/LIP if interventions unsuccessful or patient reports new pain  - Anticipate increased pain with activity and pre-medicate as appropriate  Outcome: Progressing     Problem: RISK FOR INFECTION - ADULT  Goal: Absence of fever/infection during anticipated neutropenic period  Description: INTERVENTIONS  - Monitor WBC  - Administer growth factors as ordered  - Implement neutropenic guidelines  Outcome: Progressing     Problem: SAFETY ADULT - FALL  Goal: Free from fall injury  Description: INTERVENTIONS:  - Assess pt frequently for physical needs  - Identify cognitive and physical deficits and behaviors that affect risk of falls.  - [place] fall precautions as indicated by assessment.  - Educate pt/family on patient safety including physical limitations  - Instruct pt to call for assistance with activity based on assessment  - Modify environment to reduce risk of injury  - Provide assistive devices as appropriate  - Consider OT/PT consult to assist with strengthening/mobility  - Encourage toileting schedule  Outcome: Progressing     Problem: Safety Risk - Non-Violent Restraints  Goal: Patient will remain free from self-harm  Description: INTERVENTIONS:  - Apply the least restrictive restraint to prevent harm  - Notify patient and family of reasons restraints applied  - Assess for any contributing factors to confusion (electrolyte  disturbances, delirium, medications)  - Discontinue any unnecessary medical devices as soon as possible  - Assess the patient's physical comfort, circulation, skin condition, hydration, nutrition and elimination needs   - Reorient and redirection as needed  - Assess for the need to continue restraints  Outcome: Progressing     Problem: Delirium  Goal: Minimize duration of delirium  Description: Interventions:  - Encourage use of hearing aids, eye glasses  - Promote highest level of mobility daily  - Provide frequent reorientation  - Promote wakefulness i.e. lights on, blinds open  - Promote sleep, encourage patient's normal rest cycle i.e. lights off, TV off, minimize noise and interruptions  - Encourage family to assist in orientation and promotion of home routines  Outcome: Progressing     Problem: METABOLIC/FLUID AND ELECTROLYTES - ADULT  Goal: Electrolytes maintained within normal limits  Description: INTERVENTIONS:  - Monitor labs and rhythm and assess patient for signs and symptoms of electrolyte imbalances  - Administer electrolyte replacement as ordered  - Monitor response to electrolyte replacements, including rhythm and repeat lab results as appropriate  - Fluid restriction as ordered  - Instruct patient on fluid and nutrition restrictions as appropriate  Outcome: Progressing  Goal: Hemodynamic stability and optimal renal function maintained  Description: INTERVENTIONS:  - Monitor labs and assess for signs and symptoms of volume excess or deficit  - Monitor intake, output and patient weight  - Monitor urine specific gravity, serum osmolarity and serum sodium as indicated or ordered  - Monitor response to interventions for patient's volume status, including labs, urine output, blood pressure (other measures as available)  - Encourage oral intake as appropriate  - Instruct patient on fluid and nutrition restrictions as appropriate  Outcome: Progressing     Problem: Impaired Swallowing  Goal: Minimize  aspiration risk  Description: Interventions:  - Patient should be alert and upright for all feedings (90 degrees preferred)  - Offer food and liquids at a slow rate  - No straws  - Encourage small bites of food and small sips of liquid  - Offer pills one at a time, crush or deliver with applesauce as needed  - Discontinue feeding and notify MD (or speech pathologist) if coughing or persistent throat clearing or wet/gurgly vocal quality is noted  Outcome: Progressing

## 2025-03-12 NOTE — RESPIRATORY THERAPY NOTE
Patient intubated after failing bipap, RR sustained in the mid 40's.     Ventilator settings PC 12/25/.9/+5/75%. Bilateral breath sounds auscultated. Tube advanced 2 cm. CXR confirmed location.    Suction provided when indicated.     RT will continue to monitor.

## 2025-03-12 NOTE — PLAN OF CARE
Safety restraints in place. High risk lines, tubing, and devices protected.     Problem: Safety Risk - Non-Violent Restraints  Goal: Patient will remain free from self-harm  Description: INTERVENTIONS:  - Apply the least restrictive restraint to prevent harm  - Notify patient and family of reasons restraints applied  - Assess for any contributing factors to confusion (electrolyte disturbances, delirium, medications)  - Discontinue any unnecessary medical devices as soon as possible  - Assess the patient's physical comfort, circulation, skin condition, hydration, nutrition and elimination needs   - Reorient and redirection as needed  - Assess for the need to continue restraints  Outcome: Progressing     Problem: Delirium  Goal: Minimize duration of delirium  Description: Interventions:  - Encourage use of hearing aids, eye glasses  - Promote highest level of mobility daily  - Provide frequent reorientation  - Promote wakefulness i.e. lights on, blinds open  - Promote sleep, encourage patient's normal rest cycle i.e. lights off, TV off, minimize noise and interruptions  - Encourage family to assist in orientation and promotion of home routines  Outcome: Progressing     Alex PORTILLO RN

## 2025-03-12 NOTE — PROGRESS NOTES
[facility]  part of [facility]    Progress Note    Sami Grijalva JrKushal Patient Status:  Inpatient    10/11/1980 MRN [id number]   Location [facility] 2W/SW Attending Natasha Araujo MD   Hosp Day # 8 PCP Kerri Medina MD       Subjective:   Sami Grijalva Jr. is a(n) 44 year old male     ROS:   Intubated and sedated  Vitals:    25 1730   BP: 91/58   Pulse: 91   Resp: 15   Temp:            PHYSICAL EXAM:   Constitutional: appears stated  Head/Face: normocephalic  Eyes/Vision: normal extraocular motion is intact  Nose/Mouth/Throat:mucous membranes are moist and no oral lesions are noted  Neck/Thyroid: neck is supple without adenopathy  Lymphatic: no abnormal cervical, supraclavicular adenopathy is noted  Respiratory: Decreased breath sounds bilaterally  Cardiovascular: regular rate and rhythm no murmurs, gallups, or rubs  Abdomen: soft, non-tender, non-distended, BS normal  Vascular: well perfused femoral, and pedal pulses normal  Skin/Hair: no unusual rashes present, no abnormal bruising noted  Back/Spine: no abnormalities noted  Musculoskeletal: full ROM all extremities good strength  no deformities  Extremities: no edema, cyanosis  Neurological: Sedated    Results:     Laboratory Data:  Lab Results   Component Value Date    WBC 17.4 (H) 2025    HGB 7.5 (L) 2025    HCT 23.5 (L) 2025    PLT 96.0 (L) 2025    CREATSERUM 1.30 2025    BUN 23 2025     (H) 2025    K 3.3 (L) 2025     2025    CO2 26.0 2025     (H) 2025    CA 7.9 (L) 2025    ALB 2.9 (L) 2025    ALKPHO 173 (H) 2025    BILT 12.0 (H) 2025    TP 5.4 (L) 2025     (H) 2025     (H) 2025    INR 1.95 (H) 2025     (H) 2025    DDIMER 1.13 (H) 2023    ESRML 27 (H) 10/01/2021    MG 1.9 2025    PHOS 2.5 2025    TROP 0.00 2017     (H) 2025     ETOH <3 03/04/2025       Imaging:  [unfilled]   XR CHEST AP PORTABLE  (CPT=71045)    Result Date: 3/12/2025  CONCLUSION:   Endotracheal tube terminates 2.8 cm above the em.  Consider 1-2 cm of retraction.  Unchanged esophagogastric tube.  Persistent extensive bilateral patchy opacities, greatest in the left lower lung.      Dictated by (CST): Riley Villa MD on 3/12/2025 at 3:31 PM     Finalized by (CST): Riley Villa MD on 3/12/2025 at 3:33 PM          XR CHEST AP PORTABLE  (CPT=71045)    Result Date: 3/12/2025  CONCLUSION:   No new abnormality.  Multifocal bilateral pulmonary opacities are similar to the prior exam.  Unchanged esophagogastric tube.     Dictated by (CST): Riley Villa MD on 3/12/2025 at 1:20 PM     Finalized by (CST): Riley Villa MD on 3/12/2025 at 1:21 PM          XR CHEST AP PORTABLE  (CPT=71045)    Result Date: 3/11/2025  PROCEDURE: XR CHEST AP PORTABLE  (CPT=71045) TIME: 1654.   COMPARISON: [facility], XR CHEST AP PORTABLE (CPT=71045), 3/10/2025, 3:40 PM.  INDICATIONS: Increased WOB, tachypnea  TECHNIQUE:   Single view.   FINDINGS/IMPRESSION:    1. There is redemonstration of patchy alveolar opacities throughout both lungs.  The findings could represent alveolar edema, a diffuse multifocal infectious process, or a combination.  The findings have not significantly changed since previous exam.  2. The heart mediastinal structures remain enlarged.  The there are trace bilateral pleural effusions.  3. The thoracic component of an enteric feeding tube is identified traversing the thoracic esophagus.  The distal tip is not visualized but lies well below the GE junction.      Dictated by (CST): Prateek Mcclelland MD on 3/11/2025 at 5:04 PM     Finalized by (CST): Prateek Mcclelland MD on 3/11/2025 at 5:05 PM             ASSESSMENT/PLAN:   Assessment       USMAN creatinine bumped up a little bit 1.3 today urine output fairly good but intake much more than output greater than 9 L  going to stop D5W sodium better at 148 because of fluid overload continue Lasix 20 IV every 8 hours and give albumin 25 g every 8 hours x 6 doses discussed with nurse    Low potassium replaced   Alcoholic hepatitis number still bad bilirubin going up to 12 prognosis poor    Respiratory failure intubated on 5 mics of Levophed for hypotension  3/12/2025  Johnathan Ramos MD

## 2025-03-12 NOTE — PROGRESS NOTES
Pulmonary/ICU/Critical Care Progress Note        Reason for Consultation: resp failure  Referring Physician: Dr. Diaz      Subjective:  Less awake  Remains somnolent  + hiccups  On higher 02 needs and placed on BIPAP      From the initial consultation  Pt is unable to provide info  HPI: 43 yo male with hx of gout, GERD, admitted with acute ETOH hepatitis, ETOH use, emesis, USMAN. Seen by GI.  On CIWA protocol, thiamine, folic acid, MVI, BZDs, lactulose  Has been in the ICU for 2 days  ICU consulted this am for worsening hypoxemia  CXR this am with b/l infiltrates concerning for PNA      REVIEW OF SYSTEMS:  Positives and negatives as noted in HPI. All other review of systems otherwise are either limited (due to pt/family inability to provide) or negative.      PAST MEDICAL HISTORY:  Past Medical History:   Diagnosis Date    Esophageal reflux     Gout     Hernia          PAST SURGICAL HISTORY:  Past Surgical History:   Procedure Laterality Date    Colonoscopy N/A 11/1/2023    Procedure: COLONOSCOPY;  Surgeon: Vandana Austin MD;  Location: [facility] ENDOSCOPY    Egd  02/15/2016    Eh pr repair complex scalp arms legs 1.1 to 2.5 cm  2014    Elbow fracture surgery Right 1991    Hernia surgery      Inguinal hernia repair Left 01/17/2023         PAST SOCIAL HISTORY:  Social History     Socioeconomic History    Marital status: Single    Number of children: 0   Occupational History    Occupation: Supervisor, car [employer]   Tobacco Use    Smoking status: Former     Types: Cigarettes    Smokeless tobacco: Never   Vaping Use    Vaping status: Some Days   Substance and Sexual Activity    Alcohol use: Yes     Comment: per pt, DAILY HARD LEMONADE- [employer] hard lemonade 24oz cans, 6 cans daily    Drug use: Yes     Types: Cannabis     Comment: last use, couple months ago per pt report         PAST FAMILY HISTORY:  Family History   Problem Relation Age of Onset    Diabetes Father     Hypertension Father     Cancer Mother         liver        ALLERGIES:  Allergies[1]      MEDS:  Home Medications:  Medications Taking[2]    Scheduled Medication:   potassium phosphate dibasic 15 mmol in sodium chloride 0.9% 250 mL IVPB  15 mmol Intravenous Once    Followed by    potassium chloride  20 mEq Intravenous Once    furosemide  20 mg Intravenous TID    vancomycin  125 mg Per NG Tube Daily    piperacillin-tazobactam  3.375 g Intravenous Q8H    doxycycline  100 mg Intravenous Q12H    thiamine  200 mg Intravenous TID    lactulose  20 g Oral 4 times per day    multivitamin  1 tablet Oral Daily    folic acid  1 mg Oral Daily    baclofen  10 mg Oral TID    pantoprazole  40 mg Intravenous Q12H     Continuous Infusing Medication:   dextrose 75 mL/hr at 03/11/25 1744    dextrose 10%       PRN Medications:    haloperidol lactate    dextrose 10%    sodium bicarbonate **AND** lipase-protease-amylase (Lip-Prot-Amyl)    metoprolol    acetaminophen    ondansetron    prochlorperazine    LORazepam **OR** LORazepam    LORazepam **OR** LORazepam    LORazepam **OR** LORazepam    LORazepam    LORazepam    LORazepam       PHYSICAL EXAM:  /72   Pulse 106   Temp 98 °F (36.7 °C) (Temporal)   Resp (!) 44   Ht 5' 8\" (1.727 m)   Wt 171 lb 11.8 oz (77.9 kg)   SpO2 96%   BMI 26.11 kg/m²      CONSTITUTIONAL: somnolent   HEENT: atraumatic normocephalic  MOUTH: mucous membranes are moist. No OP exudates  NECK/THROAT: no JVD. Trachea midline. No obvious thyromegaly  LUNG: clear b/l no wheezing, + b/l crackles. + hiccups. Chest symmetric with respiratory motion  HEART: regular rate and rhythm, no obvious murmers or gallops noted  ABD: soft non tender. + bowel sounds. No organomegaly noted  EXT: no clubbing, cyanosis, or edema noted      IMAGES:   CXR 3/12/25 with multifocal infiltrates    CXR 3/11/25  FINDINGS/IMPRESSION:   1. There is redemonstration of patchy alveolar opacities throughout both lungs.  The findings could represent alveolar edema, a diffuse multifocal infectious  process, or a combination.  The findings have not significantly changed since previous exam.   2. The heart mediastinal structures remain enlarged.  The there are trace bilateral pleural effusions.   3. The thoracic component of an enteric feeding tube is identified traversing the thoracic esophagus.  The distal tip is not visualized but lies well below the GE junction.     CT head 3/7/25  CONCLUSION:   Motion limits evaluation.  No evidence of acute intracranial abnormality.     CT chest 3/7/25  CONCLUSION:   1. Extensive bilateral airspace disease, concerning for potential multifocal pneumonia. Continued surveillance is advised.   2. Dense bilateral lower airspace consolidation is evident; aspiration pneumonitis is a differential diagnostic possibility. Follow-up is recommended to document resolution.    3. Dilatation of the main pulmonary artery trunk may relate to underlying pulmonary hypertension.    4. Small hiatal hernia with imaging manifestations that may be indicative underlying esophagitis.   5. Marked hepatic steatosis.   6. Lesser incidental findings as above.     CXR 3/7/25 with multifocal infiltrates, possible effusion on the left  CONCLUSION:   1. Cardiomegaly.  Tortuous aorta.   2. Extensive multifocal airspace opacification in the bilateral perihilar and basilar regions with left-sided effusion.  Differential includes pulmonary edema congestive failure versus multifocal pneumonitis.      CT abd/pelvis 3/4/25  CONCLUSION:   Severely fatty liver with subtle undulating contour.  Stable splenomegaly.  Tubular structures around the GE junction are suggestive of lower esophageal varices.  No ascites       LABS:  Recent Labs   Lab 03/10/25  0510 03/11/25  0510 03/12/25  0357   RBC 3.19* 3.11* 3.11*   HGB 7.9* 7.5* 7.5*   HCT 24.9* 23.7* 23.5*   MCV 78.1* 76.2* 75.6*   MCH 24.8* 24.1* 24.1*   MCHC 31.7 31.6 31.9   RDW 29.7* 30.9* 31.7*   NEPRELIM 7.61 12.32* 14.09*   WBC 10.5 15.7* 17.4*   PLT 70.0* 86.0*  96.0*       Recent Labs   Lab 03/09/25  0359 03/09/25  1427 03/10/25  0510 03/11/25  0510 03/12/25  0357   *  --  119* 100* 104*   BUN 32*  --  25* 23 23   CREATSERUM 1.31*  --  1.20 1.20 1.30   EGFRCR 68  --  76 76 69   CA 8.4*  --  8.0* 7.9* 7.9*   ALB 3.1*  --  3.0* 3.1*  --    *  --  154* 153* 148*   K 3.3*  3.3*   < > 3.5  3.5 3.6 3.3*   *  --  117* 119* 112   CO2 27.0  --  27.0 25.0 26.0   ALKPHO 177*  --  166* 171*  --    *  --  153* 175*  --    ALT 98*  --  103* 105*  --    BILT 11.4*  --  11.4* 12.2*  --    TP 5.6*  --  5.5* 5.5*  --     < > = values in this interval not displayed.       ASSESSMENT/PLAN:  Acute hypoxemic resp failure  -secondary to multifocal infiltrates. Given hx of emesis, concern for aspiration  -checked non contrast chest CT showed b/l infiltrates  -on zosyn, doxy. Checked urine leg, strep pneumo, mrsa nares, blood cx neg thus far  -weaned from airvo to 6 LNC but now 02 needs are increasing again as well as WBC ct. Had to be switched to airvo but then to BIPAP for increased WOB and worsening hypoxemia and respiratory efforts  -ID consult  -aspiration precautions  -keep NPO-has TF via NGT  -restarted baclofen for hiccups but not improving. Speak with pharmacy about additional agents    Acute ETOH hepatitis  -GI following. Imaging as above  -PPI, lactulose  -NGT with TF    ETOH withdrawal  -CIWA protocol  -psych following   -BZD/haldol PRN  -CT head neg for acute changes  -more somnolent now. Recheck NH3 and LFTS    USMAN and hypernatremia  -s/p bicarb infusion  -on D5 0.9 NS  -renal following. Na improving     Proph  -DVT: elevated INR low PLT. On SCDS    Dispo  -Full code  -sign other at bedside    Upon my evaluation, this patient had a high probability of imminent or life-threatening deterioration due to respiratory failure, which required my direct attention, intervention, and personal management.    I have personally provided 34 minutes of critical care  time, exclusive of time spent on separately billable procedures.  Time includes review of all pertinent laboratory/radiology results, discussion with consultants, and monitoring for potential decompensation.  Performed interventions included intubation and managing mechanical ventilation.      Thank you for the opportunity to care for Sami Grijalva Jr..      JOLYNN Escobedo DO, MPH  Pulmonary Critical Care Medicine  [facility]                         [1]   Allergies  Allergen Reactions    Morphine SWELLING     SHORTNESS OF BREATH   [2]   No outpatient medications have been marked as taking for the 3/4/25 encounter (Hospital Encounter).

## 2025-03-12 NOTE — PLAN OF CARE
Pt very tachypnic today. Desating to 83% spo2 on 15L. Placed on bipap, then intubated around 1500. Infectious disease consulted CT of abdomen ordered. Repeat BC. UA, nasal swab done. Continue restraints for safety  Problem: Patient Centered Care  Goal: Patient preferences are identified and integrated in the patient's plan of care  Description: Interventions:  - What would you like us to know as we care for you?   - Provide timely, complete, and accurate information to patient/family  - Incorporate patient and family knowledge, values, beliefs, and cultural backgrounds into the planning and delivery of care  - Encourage patient/family to participate in care and decision-making at the level they choose  - Honor patient and family perspectives and choices  Outcome: Progressing     Problem: Patient/Family Goals  Goal: Patient/Family Long Term Goal  Description: Patient's Long Term Goal:     Interventions:  -   - See additional Care Plan goals for specific interventions  Outcome: Progressing  Goal: Patient/Family Short Term Goal  Description: Patient's Short Term Goal:     Interventions:   -  - See additional Care Plan goals for specific interventions  Outcome: Progressing     Problem: PAIN - ADULT  Goal: Verbalizes/displays adequate comfort level or patient's stated pain goal  Description: INTERVENTIONS:  - Encourage pt to monitor pain and request assistance  - Assess pain using appropriate pain scale  - Administer analgesics based on type and severity of pain and evaluate response  - Implement non-pharmacological measures as appropriate and evaluate response  - Consider cultural and social influences on pain and pain management  - Manage/alleviate anxiety  - Utilize distraction and/or relaxation techniques  - Monitor for opioid side effects  - Notify MD/LIP if interventions unsuccessful or patient reports new pain  - Anticipate increased pain with activity and pre-medicate as appropriate  Outcome: Progressing

## 2025-03-12 NOTE — PROGRESS NOTES
[facility]     Gastroenterology Progress Note    Sami Grijalva Jr. Patient Status:  Inpatient    10/11/1980 MRN [id number]   Location [facility]5W Attending Alex Diaz MD   Hosp Day # 8 PCP Kerri Medina MD       Subjective:    Remains lethargic and in restraints. Increasing tachypnea and respiratory distress. There is plans for intubation this afternoon.     Objective:   Blood pressure 146/89, pulse 112, temperature 97.9 °F (36.6 °C), temperature source Temporal, resp. rate 16, height 5' 8\" (1.727 m), weight 171 lb 11.8 oz (77.9 kg), SpO2 99%. Body mass index is 26.11 kg/m².    Gen: lethargic patient, NAD  HEENT: EOMI, the sclera appears icteric, oropharynx clear, mucus membranes appear moist  CV: RRR  Lung: HFNC  Abdomen: soft NTND abdomen with NABS appreciated   Skin: dry, warm, + jaundice  Ext: no LE edema is evident  Neuro: Drowsy  Psych: lethargic    Assessment and Plan:   Sami Grijalva Jr. is a 44 year old male w/ PMHx of longstanding alcohol use, cannabis use, GERD, who presents with generalized abdominal pain, hiccups and vomiting. GI consulted for acute alcohol hepatitis.     #Acute alcohol hepatitis with probable underlying cirrhosis  -CT a/p in ER severe fatty liver with subtle undulating contour  -US liver with hepatic steatosis, GB sludge and cholelithiasis without cholecystitis or biliary ductal dilation, hepatic and portal vein patent  -last EGD 2023 without varices   -high maddrey; holding off on steroids since concern for pulmonary infection   -CIWA high with active withdrawal and AMS   -nutrition  -etoh cessation    #Likely early cirrhosis, ETOH abuse, chronic w/u in process  MELD 3.0 on admission: 44  -PSE: Lethargic/drowsy in setting of ETOH withdrawal treated with CIWA protocol, Ammonia 121, started on lactulose  -Ascites: none on exam or US  -EV: None on last EGD 2023, tubular structure around GE junction suggestive of EV.  Will need  repeat EGD electively  -HCC: No lesions on US or CT, AFP 2.7  - acute hyperbilirubinemia likely component of cholestasis of sepsis     #USMAN  -nephrology following      Recommend:  - appreciate ID evaluation; antibiotics broadened today  -CIWA per protocol; ETOH withdrawal management  -Lactulose goal 3-4 bm/d  -Daily CMP, CBC, INR  -Empiric PPI BID    D/w ICU     Vandana Austin MD      Results:     Lab Results   Component Value Date    WBC 17.4 (H) 03/12/2025    HGB 7.5 (L) 03/12/2025    HCT 23.5 (L) 03/12/2025    PLT 96.0 (L) 03/12/2025    CREATSERUM 1.30 03/12/2025    BUN 23 03/12/2025     (H) 03/12/2025    K 3.3 (L) 03/12/2025     03/12/2025    CO2 26.0 03/12/2025     (H) 03/12/2025    CA 7.9 (L) 03/12/2025    ALB 2.9 (L) 03/12/2025    ALKPHO 173 (H) 03/12/2025    BILT 12.0 (H) 03/12/2025    TP 5.4 (L) 03/12/2025     (H) 03/12/2025     (H) 03/12/2025    INR 1.95 (H) 03/08/2025     (H) 03/04/2025    DDIMER 1.13 (H) 08/25/2023    ESRML 27 (H) 10/01/2021    MG 1.9 03/09/2025    PHOS 2.5 03/12/2025    TROP 0.00 01/31/2017     (H) 03/06/2025    ETOH <3 03/04/2025       XR CHEST AP PORTABLE  (CPT=71045)    Result Date: 3/12/2025  CONCLUSION:   Endotracheal tube terminates 2.8 cm above the em.  Consider 1-2 cm of retraction.  Unchanged esophagogastric tube.  Persistent extensive bilateral patchy opacities, greatest in the left lower lung.      Dictated by (CST): Riley Villa MD on 3/12/2025 at 3:31 PM     Finalized by (CST): Riley Villa MD on 3/12/2025 at 3:33 PM          XR CHEST AP PORTABLE  (CPT=71045)    Result Date: 3/12/2025  CONCLUSION:   No new abnormality.  Multifocal bilateral pulmonary opacities are similar to the prior exam.  Unchanged esophagogastric tube.     Dictated by (CST): Riley Villa MD on 3/12/2025 at 1:20 PM     Finalized by (CST): Riley Villa MD on 3/12/2025 at 1:21 PM          XR CHEST AP PORTABLE  (CPT=71045)    Result Date:  3/11/2025  PROCEDURE: XR CHEST AP PORTABLE  (CPT=71045) TIME: 1654.   COMPARISON: [facility], XR CHEST AP PORTABLE (CPT=71045), 3/10/2025, 3:40 PM.  INDICATIONS: Increased WOB, tachypnea  TECHNIQUE:   Single view.   FINDINGS/IMPRESSION:    1. There is redemonstration of patchy alveolar opacities throughout both lungs.  The findings could represent alveolar edema, a diffuse multifocal infectious process, or a combination.  The findings have not significantly changed since previous exam.  2. The heart mediastinal structures remain enlarged.  The there are trace bilateral pleural effusions.  3. The thoracic component of an enteric feeding tube is identified traversing the thoracic esophagus.  The distal tip is not visualized but lies well below the GE junction.      Dictated by (CST): Prateek Mcclelland MD on 3/11/2025 at 5:04 PM     Finalized by (CST): Prateek Mcclelland MD on 3/11/2025 at 5:05 PM

## 2025-03-12 NOTE — PROGRESS NOTES
[facility] Dosing [facility]      Initial Pharmacokinetic Consult for Vancomycin Dosing     Sami Grijalva Jr. is a 44 year old male who is being initiated on vancomycin therapy for sepsis.  Pharmacy has been asked to dose vancomycin by Dr. Boucher.  The initial treatment and monitoring approach will be steady state AUC strategy.        Weight and Temperature:    Wt Readings from Last 1 Encounters:   25 77.9 kg (171 lb 11.8 oz)        Temp Readings from Last 1 Encounters:   25 97.9 °F (36.6 °C) (Temporal)      Labs:   Recent Labs   Lab 03/10/25  0510 25  0510 25  0357   CREATSERUM 1.20 1.20 1.30      Estimated Creatinine Clearance: 68.7 mL/min (based on SCr of 1.3 mg/dL).     Recent Labs   Lab 03/10/25  0510 25  0510 25  0357   WBC 10.5 15.7* 17.4*          The Pharmacokinetic Target is:     to 600 mg-h/L and trough <=15 mg/L    Renal Dosing Considerations:    None     Assessment/Plan:   Initial/Loading dose: Will receive 1250 mg IV (15 mg/kg, capped at 2250 mg) x 1 initial dose.      Maintenance dose: Pharmacy will dose vancomycin at 1250 mg IV every 12 hours    Monitorin) Plan for vancomycin peak and trough to be obtained at steady state    2) Pharmacy will order SCr as clinically indicated to assess renal function.    3) Pharmacy will monitor for toxicity and efficacy, adjust vancomycin dose and/or frequency, and order vancomycin levels as appropriate per the Pharmacy and Therapeutics Committee approved protocol until discontinuation of the medication.       We appreciate the opportunity to assist in the care of this patient.     Emmett Jones, PharmD  3/12/2025  2:41 PM  [facility] Extension: [phone]

## 2025-03-12 NOTE — PROGRESS NOTES
[facility]  part of [facility]     Hospitalist Progress Note     Sami Grijalva  Patient Status:  Inpatient    10/11/1980 MRN [id number]   Location [facility]5W Attending Natasha Araujo MD   Hosp Day # 8 PCP Kerri Medina MD     Chief Complaint:   Chief Complaint   Patient presents with    Vomiting    Hiccups    Eval-D        Subjective:     Patient seen lying in bed.  Patient remains lethargic, but attempting to move more today.  Patient did not open eyes or respond to questions at time of interview.  Per family at bedside, patient was reportedly speaking to them. Increasing o2 needs, pulm consultation, trial of BiPAP      Objective:      Vital signs:  Vitals:    25 1645 25 1700 25 1705 25 1730   BP: 114/74 (!) 121/109 102/57 91/58   BP Location: Right arm   Right arm   Pulse: 98 99 97 91   Resp: 19 18 19 15   Temp: 98.2 °F (36.8 °C)      TempSrc: Temporal      SpO2: 100% 99% 99% 98%   Weight:       Height:           Intake/Output Summary (Last 24 hours) at 3/12/2025 1740  Last data filed at 3/12/2025 1700  Gross per 24 hour   Intake 5340.5 ml   Output 3690 ml   Net 1650.5 ml           Physical Exam:    GENERAL: Lethargic.  In no acute distress.  HEART:  Regular rhythm, tachycardia  LUNGS:  Air entry was decreased.  Increased work of breathing. No wheezes   ABDOMEN: Soft and non-tender.    NEUROLOGICAL:  Lethargic, remains confused, agitated at times    Diagnostic Data:    Labs:    Recent Labs   Lab 25  0414 25  0356 25  1025 25  0359 03/10/25  0510 25  0510 25  0357   WBC 5.7   < >  --    < > 10.5 15.7* 17.4*   HGB 7.6*   < >  --    < > 7.9* 7.5* 7.5*   MCV 76.1*   < >  --    < > 78.1* 76.2* 75.6*   PLT 34.0*   < >  --    < > 70.0* 86.0* 96.0*   BAND 7  --   --   --   --  2 5   INR  --   --  1.95*  --   --   --   --     < > = values in this interval not displayed.       Recent Labs   Lab 03/10/25  0546  03/11/25  0510 03/12/25  0357 03/12/25  0918   * 100* 104*  --    BUN 25* 23 23  --    CREATSERUM 1.20 1.20 1.30  --    CA 8.0* 7.9* 7.9*  --    ALB 3.0* 3.1*  --  2.9*   * 153* 148*  --    K 3.5  3.5 3.6 3.3*  --    * 119* 112  --    CO2 27.0 25.0 26.0  --    ALKPHO 166* 171*  --  173*   * 175*  --  184*   * 105*  --  104*   BILT 11.4* 12.2*  --  12.0*   TP 5.5* 5.5*  --  5.4*           Estimated Creatinine Clearance: 68.7 mL/min (based on SCr of 1.3 mg/dL).    Recent Labs   Lab 03/08/25  1025   PTP 23.3*   INR 1.95*            COVID-19  Lab Results   Component Value Date    COVID19 Not Detected 03/12/2025    COVID19 Not Detected 12/26/2023    COVID19 Not Detected 01/15/2023       Pro-Calcitonin  No results for input(s): \"PCT\" in the last 168 hours.    Cardiac  No results for input(s): \"TROP\", \"PBNP\" in the last 168 hours.    Inflammatory Markers  No results for input(s): \"CRP\", \"CANDICE\", \"LDH\", \"DDIMER\" in the last 168 hours.    Culture:  Hospital Encounter on 03/04/25   1. Blood Culture     Status: None    Collection Time: 03/07/25 10:28 AM    Specimen: Blood,peripheral   Result Value Ref Range    Blood Culture Result No Growth 5 Days N/A   2. Urine Culture, Routine     Status: None    Collection Time: 03/04/25 11:11 AM    Specimen: Urine, clean catch   Result Value Ref Range    Urine Culture No Growth at 18-24 hrs. N/A       XR CHEST AP PORTABLE  (CPT=71045)    Result Date: 3/12/2025  CONCLUSION:   Endotracheal tube terminates 2.8 cm above the em.  Consider 1-2 cm of retraction.  Unchanged esophagogastric tube.  Persistent extensive bilateral patchy opacities, greatest in the left lower lung.      Dictated by (CST): Riley Villa MD on 3/12/2025 at 3:31 PM     Finalized by (CST): Riley Villa MD on 3/12/2025 at 3:33 PM          XR CHEST AP PORTABLE  (CPT=71045)    Result Date: 3/12/2025  CONCLUSION:   No new abnormality.  Multifocal bilateral pulmonary opacities are similar to  the prior exam.  Unchanged esophagogastric tube.     Dictated by (CST): Riley Villa MD on 3/12/2025 at 1:20 PM     Finalized by (CST): Riley Villa MD on 3/12/2025 at 1:21 PM          XR CHEST AP PORTABLE  (CPT=71045)    Result Date: 3/11/2025  PROCEDURE: XR CHEST AP PORTABLE  (CPT=71045) TIME: 1654.   COMPARISON: [facility], XR CHEST AP PORTABLE (CPT=71045), 3/10/2025, 3:40 PM.  INDICATIONS: Increased WOB, tachypnea  TECHNIQUE:   Single view.   FINDINGS/IMPRESSION:    1. There is redemonstration of patchy alveolar opacities throughout both lungs.  The findings could represent alveolar edema, a diffuse multifocal infectious process, or a combination.  The findings have not significantly changed since previous exam.  2. The heart mediastinal structures remain enlarged.  The there are trace bilateral pleural effusions.  3. The thoracic component of an enteric feeding tube is identified traversing the thoracic esophagus.  The distal tip is not visualized but lies well below the GE junction.      Dictated by (CST): Prateek Mcclelland MD on 3/11/2025 at 5:04 PM     Finalized by (CST): Prateek Mcclelland MD on 3/11/2025 at 5:05 PM          XR CHEST AP PORTABLE  (CPT=71045)    Result Date: 3/10/2025  CONCLUSION:   Interval Strauss min enteric tube which is now in the mid to distal stomach.  Left greater than right patchy bilateral lung opacities mildly increased on the right.  Small left pleural effusion.    Dictated by (CST): Vishnu Tuttle MD on 3/10/2025 at 4:03 PM     Finalized by (CST): Vishnu Tuttle MD on 3/10/2025 at 4:04 PM                 Medications:    potassium chloride  20 mEq Intravenous Once    vancomycin  15 mg/kg Intravenous Q12H    midazolam (PF)  3 mg Intravenous Once    senna  10 mL Per NG Tube BID    docusate  100 mg Per NG Tube BID    chlorhexidine gluconate  15 mL Mouth/Throat BID@0800,2000    mineral oil-white petrolatum  1 Application Both Eyes Nightly    furosemide  20 mg  Intravenous TID    vancomycin  125 mg Per NG Tube Daily    piperacillin-tazobactam  3.375 g Intravenous Q8H    thiamine  200 mg Intravenous TID    lactulose  20 g Oral 4 times per day    multivitamin  1 tablet Oral Daily    folic acid  1 mg Oral Daily    baclofen  10 mg Oral TID    pantoprazole  40 mg Intravenous Q12H       Assessment & Plan:      Acute alcohol hepatitis   -last drink reportedly 4 days prior to admission  -Reported emesis and poor PO intake, without overt bleeding   -T bili upon arrival to ER 10.5, platelet 65, cr 4.22  -CT a/p reviewed, noted severe fatty liver with subtle undulating contour  -US liver reviewed, noted hepatic steatosis, GB sludge and cholelithiasis without cholecystitis or biliary ductal dilation, hepatic and portal vein patent  -MELD 3.0 on admission: 44   -GI consulted, appreciate further recommendations    Alcohol Abuse with withdrawal   -CIWA monitoring with alcohol withdrawal protocol  -thiamine, folic acid, multivitamin  -Psychiatry consulted, appreciate recommendations    Altered mental status  -Slowly improving  -Likely metabolic encephalopathy  -Initially noted to have elevated ammonia levels noted consistent with hepatic encephalopathy.  -Continue lactulose   -Patient also with USMAN and electrolyte abnormalities.  -Continue treating underlying conditions  -Continue to monitor    Acute hypoxemic resp failure-worsening   Possible pneumonia/aspiration  -Likely multifactorial.  -X-ray reviewed.  Noted multifocal infiltrates.   -Concern for possible aspiration given hx of emesis and altered mental status.   -Continue on zosyn, doxy.   -Continues to require high levels of supplemental O2.  Weaning as able  -aspiration precautions  -keep NPO until more alert and passes swallow evaluation.  -Pulmonology on consult, appreciate further recommendations  -trial BiPAP    Acute Kidney Injury   -Continue improvement noted  -Avoiding Nephrotoxic agents  - Cont to monitor  -Nephrology on  consult, appreciate further recommendations    Hypernatremia  -Persistent elevations noted  -Nephrology on consult, recommend continue D5 IV fluids and increased free water flushes.  -Continue to monitor    Plan of care discussed with patient's sister and partner at bedside.  Discussed management/test result(s) with Rn     Quality:  DVT Prophylaxis: SCDs  CODE status: Full  Estimated date of discharge: TBD  Discharge is dependent on: clinical stability    MDM: High, acute illness/severe exacerbation of chronic illness posing threat to life.  IV medications requiring close inpatient monitoring         Natasha Araujo MD          This note was prepared using Dragon Medical voice recognition dictation software. As a result errors may occur. When identified these errors have been corrected. While every attempt is made to correct errors during dictation discrepancies may still exist

## 2025-03-12 NOTE — BRIEF PROCEDURE NOTE
Endotracheal Intubation Procedure Note      Procedure: Endotracheal intubation    Reason for intubation: Impending respiratory failure (hypoxemia/hypercapnia) or airway protection    Performed by: Dr. JOLYNN Escobedo DO    Airway Evaluation: Mallampati class 3    Neck Movement: Full ROM    Ventilation: Patient was pre-oxygenated for >5 minutes on 100% FiO2.    Induction/Medications: Versed 3 mg, Fentanyl 100 mcg, Succinylcholine 78 mg  LAST CHEMISTRY PANEL  Lab Results   Component Value Date     (H) 03/12/2025    K 3.3 (L) 03/12/2025         Procedure:  Technique used: video glidescope  Location of tube: mid lip  Tube depth: 22 cm at teetch  Tube size: 8.0  Cuffed tube: cuffed  Blade Used: 4  Blade Size: MAC  Insertion Attempts: 2  Stylet: yes  Placement confirmed by: [id number] and auscultation   Insertion Events: thick secretions noted at the posterior oropharynx. Once the ETT was placed in the airway, audible gurgling was noted despite ETT inflation. ETCO2 color change and b/l BS were noted. Saturations initially improved but then the pt began desaturating and there was concern for ETT cuff blown. Emergent ETT was exchanged using ETT exchanger and another 8.0 ETT was placed in the airway. Again ETCO2 color change and b/l breath sounds were noted. Pt' sats improved and he was connected to the ventilator.  CXR confirmed proper positioning.    Patient tolerated intubation well and vitals remained stable.    Chest x-ray ordered to confirm ETT position.     JOLYNN Escobedo DO, MPH  Pulmonary Critical Care Medicine

## 2025-03-13 ENCOUNTER — APPOINTMENT (OUTPATIENT)
Dept: GENERAL RADIOLOGY | Facility: HOSPITAL | Age: 45
End: 2025-03-13
Attending: INTERNAL MEDICINE
Payer: MEDICAID

## 2025-03-13 LAB
ANION GAP SERPL CALC-SCNC: 10 MMOL/L (ref 0–18)
ANTIBODY SCREEN: NEGATIVE
BASE EXCESS BLD CALC-SCNC: 2.4 MMOL/L (ref ?–2)
BASOPHILS # BLD AUTO: 0.03 X10(3) UL (ref 0–0.2)
BASOPHILS # BLD AUTO: 0.03 X10(3) UL (ref 0–0.2)
BASOPHILS NFR BLD AUTO: 0.2 %
BASOPHILS NFR BLD AUTO: 0.2 %
BASOPHILS NFR BRONCH: 0 %
BUN BLD-MCNC: 27 MG/DL (ref 9–23)
BUN/CREAT SERPL: 16.4 (ref 10–20)
CA-I BLD-SCNC: 1.08 MMOL/L (ref 0.95–1.32)
CALCIUM BLD-MCNC: 6.9 MG/DL (ref 8.7–10.4)
CHLORIDE SERPL-SCNC: 111 MMOL/L (ref 98–112)
CO2 SERPL-SCNC: 24 MMOL/L (ref 21–32)
COHGB MFR BLD: 3.2 % (ref 0–3)
COLOR FLD: COLORLESS
CREAT BLD-MCNC: 1.65 MG/DL
DEPRECATED RDW RBC AUTO: 72.9 FL (ref 35.1–46.3)
DEPRECATED RDW RBC AUTO: 72.9 FL (ref 35.1–46.3)
EGFRCR SERPLBLD CKD-EPI 2021: 52 ML/MIN/1.73M2 (ref 60–?)
EOSINOPHIL # BLD AUTO: 0.33 X10(3) UL (ref 0–0.7)
EOSINOPHIL # BLD AUTO: 0.35 X10(3) UL (ref 0–0.7)
EOSINOPHIL NFR BLD AUTO: 2.3 %
EOSINOPHIL NFR BLD AUTO: 2.4 %
EOSINOPHIL NFR BRONCH: 1 %
ERYTHROCYTE [DISTWIDTH] IN BLOOD BY AUTOMATED COUNT: 31.2 % (ref 11–15)
ERYTHROCYTE [DISTWIDTH] IN BLOOD BY AUTOMATED COUNT: 31.4 % (ref 11–15)
GLUCOSE BLD-MCNC: 129 MG/DL (ref 70–99)
GLUCOSE BLDC GLUCOMTR-MCNC: 123 MG/DL (ref 70–99)
HCO3 BLDA-SCNC: 26.7 MEQ/L (ref 21–27)
HCT VFR BLD AUTO: 17.5 %
HCT VFR BLD AUTO: 19.1 %
HGB BLD-MCNC: 5.7 G/DL
HGB BLD-MCNC: 6.2 G/DL
HGB BLD-MCNC: 7.1 G/DL
HGB BLD-MCNC: 8.2 G/DL
IMM GRANULOCYTES # BLD AUTO: 0.25 X10(3) UL (ref 0–1)
IMM GRANULOCYTES # BLD AUTO: 0.33 X10(3) UL (ref 0–1)
IMM GRANULOCYTES NFR BLD: 1.8 %
IMM GRANULOCYTES NFR BLD: 2.2 %
LACTATE BLD-SCNC: 1.7 MMOL/L (ref 0.5–2)
LIPASE SERPL-CCNC: 444 U/L (ref 12–53)
LYMPHOCYTES # BLD AUTO: 1.37 X10(3) UL (ref 1–4)
LYMPHOCYTES # BLD AUTO: 1.53 X10(3) UL (ref 1–4)
LYMPHOCYTES NFR BLD AUTO: 10.1 %
LYMPHOCYTES NFR BLD AUTO: 10.3 %
LYMPHOCYTES NFR BRONCH: 6 %
MCH RBC QN AUTO: 24.3 PG (ref 26–34)
MCH RBC QN AUTO: 24.6 PG (ref 26–34)
MCHC RBC AUTO-ENTMCNC: 32.5 G/DL (ref 31–37)
MCHC RBC AUTO-ENTMCNC: 32.6 G/DL (ref 31–37)
MCV RBC AUTO: 74.9 FL
MCV RBC AUTO: 75.4 FL
METHGB MFR BLD: 0.9 % SAT (ref 0.4–1.5)
MODIFIED ALLEN TEST: POSITIVE
MONOCYTES # BLD AUTO: 0.59 X10(3) UL (ref 0.1–1)
MONOCYTES # BLD AUTO: 0.62 X10(3) UL (ref 0.1–1)
MONOCYTES NFR BLD AUTO: 4.2 %
MONOCYTES NFR BLD AUTO: 4.4 %
MONOS+MACROS NFR BRONCH: 42 %
NEUTROPHILS # BLD AUTO: 10.96 X10 (3) UL (ref 1.5–7.7)
NEUTROPHILS # BLD AUTO: 10.96 X10(3) UL (ref 1.5–7.7)
NEUTROPHILS # BLD AUTO: 12.05 X10 (3) UL (ref 1.5–7.7)
NEUTROPHILS # BLD AUTO: 12.05 X10(3) UL (ref 1.5–7.7)
NEUTROPHILS NFR BLD AUTO: 80.8 %
NEUTROPHILS NFR BLD AUTO: 81.1 %
NEUTROPHILS NFR BRONCH: 51 %
O2 CT BLD-SCNC: 10.5 VOL% (ref 15–23)
O2/TOTAL GAS SETTING VFR VENT: 40 %
OSMOLALITY SERPL CALC.SUM OF ELEC: 307 MOSM/KG (ref 275–295)
PAW @ PEAK INSP FLOW SETTING VENT: 25 CM H2O (ref 1–60)
PCO2 BLDA: 37 MM HG (ref 35–45)
PEEP SETTING VENT: 5 CM H2O
PH BLDA: 7.46 [PH] (ref 7.35–7.45)
PHOSPHATE SERPL-MCNC: 2.4 MG/DL (ref 2.4–5.1)
PLATELET # BLD AUTO: 107 10(3)UL (ref 150–450)
PLATELET # BLD AUTO: 98 10(3)UL (ref 150–450)
PLATELETS.RETICULATED NFR BLD AUTO: 12.8 % (ref 0–7)
PLATELETS.RETICULATED NFR BLD AUTO: 13.5 % (ref 0–7)
PO2 BLDA: 61 MM HG (ref 80–100)
POTASSIUM BLD-SCNC: 3 MMOL/L (ref 3.6–5.1)
POTASSIUM SERPL-SCNC: 2.9 MMOL/L (ref 3.5–5.1)
POTASSIUM SERPL-SCNC: 3.3 MMOL/L (ref 3.5–5.1)
PUNCTURE CHARGE: YES
RBC # BLD AUTO: 2.32 X10(6)UL
RBC # BLD AUTO: 2.55 X10(6)UL
RBC # FLD: 480 /CUMM (ref ?–1)
RESP RATE: 12 BPM
RH BLOOD TYPE: POSITIVE
SAO2 % BLDA: 94.4 % (ref 94–100)
SODIUM BLD-SCNC: 141 MMOL/L (ref 135–145)
SODIUM SERPL-SCNC: 145 MMOL/L (ref 136–145)
TOTAL CELLS COUNTED BRONCH: 106 /CUMM (ref ?–1)
TOTAL CELLS COUNTED FLD: 100
TURBIDITY CSF QL: CLEAR
WBC # BLD AUTO: 13.5 X10(3) UL (ref 4–11)
WBC # BLD AUTO: 14.9 X10(3) UL (ref 4–11)
WBC CALC (IRIS) BRW: 106 /CUMM

## 2025-03-13 PROCEDURE — 30233N1 TRANSFUSION OF NONAUTOLOGOUS RED BLOOD CELLS INTO PERIPHERAL VEIN, PERCUTANEOUS APPROACH: ICD-10-PCS | Performed by: FAMILY MEDICINE

## 2025-03-13 PROCEDURE — 31624 DX BRONCHOSCOPE/LAVAGE: CPT | Performed by: INTERNAL MEDICINE

## 2025-03-13 PROCEDURE — 99291 CRITICAL CARE FIRST HOUR: CPT | Performed by: INTERNAL MEDICINE

## 2025-03-13 PROCEDURE — 71045 X-RAY EXAM CHEST 1 VIEW: CPT | Performed by: INTERNAL MEDICINE

## 2025-03-13 PROCEDURE — 99233 SBSQ HOSP IP/OBS HIGH 50: CPT | Performed by: INTERNAL MEDICINE

## 2025-03-13 PROCEDURE — 0B9J8ZX DRAINAGE OF LEFT LOWER LUNG LOBE, VIA NATURAL OR ARTIFICIAL OPENING ENDOSCOPIC, DIAGNOSTIC: ICD-10-PCS | Performed by: INTERNAL MEDICINE

## 2025-03-13 PROCEDURE — 99232 SBSQ HOSP IP/OBS MODERATE 35: CPT | Performed by: OTHER

## 2025-03-13 RX ORDER — SODIUM CHLORIDE 9 MG/ML
INJECTION, SOLUTION INTRAVENOUS ONCE
Status: COMPLETED | OUTPATIENT
Start: 2025-03-13 | End: 2025-03-13

## 2025-03-13 RX ORDER — POTASSIUM CHLORIDE 1.5 G/1.58G
40 POWDER, FOR SOLUTION ORAL ONCE
Status: COMPLETED | OUTPATIENT
Start: 2025-03-13 | End: 2025-03-13

## 2025-03-13 NOTE — PROGRESS NOTES
[facility]  [facility] [place] [facility]     Sami Grijalva . Patient Status:  Inpatient    10/11/1980 MRN [id number]   Location [facility]5W Attending Natasha Araujo MD   Hosp Day # 9 PCP Kerri Medina MD     Chief Complaint:   Chief Complaint   Patient presents with    Vomiting    Hiccups    Eval-D        Subjective:     Patient seen lying in bed. Failed trial BiPAP, now intubated.  Imaging with worsening multifocal PNA.       Objective:      Vital signs:  Vitals:    25 1200 25 1215 25 1230 25 1235   BP: 135/78 97/60  94/56   BP Location:       Pulse: 72 83 79 79   Resp:    Temp:    98.6 °F (37 °C)   TempSrc:    Temporal   SpO2: 99% (!) 89% 93% 93%   Weight:       Height:           Intake/Output Summary (Last 24 hours) at 3/13/2025 1318  Last data filed at 3/13/2025 1259  Gross per 24 hour   Intake 3309.61 ml   Output 2405 ml   Net 904.61 ml           Physical Exam:    GENERAL: Lethargic. Intubated on vent.   HEART:  Regular rhythm, tachycardia  LUNGS:  Air entry was decreased.  Increased work of breathing. No wheezes   ABDOMEN: Soft and non-tender.    NEUROLOGICAL:  Lethargic    Diagnostic Data:    Labs:    Recent Labs   Lab 25  0414 25  0356 25  1025 25  0359 25  0510 25  0357 25  0455 25  0550   WBC 5.7   < >  --    < > 15.7* 17.4* 13.5* 14.9*   HGB 7.6*   < >  --    < > 7.5* 7.5* 5.7* 6.2*   MCV 76.1*   < >  --    < > 76.2* 75.6* 75.4* 74.9*   PLT 34.0*   < >  --    < > 86.0* 96.0* 98.0* 107.0*   BAND 7  --   --   --  2 5  --   --    INR  --   --  1.95*  --   --   --   --   --     < > = values in this interval not displayed.       Recent Labs   Lab 03/10/25  0510 25  0510 25  0357 25  0918 25  1756 25  0455   * 100* 104*  --   --  129*   BUN 25* 23 23  --   --  27*   CREATSERUM 1.20 1.20 1.30  --   --  1.65*   CA 8.0* 7.9* 7.9*  --    --  6.9*   ALB 3.0* 3.1*  --  2.9*  --   --    * 153* 148*  --   --  145   K 3.5  3.5 3.6 3.3*  --  3.4* 2.9*   * 119* 112  --   --  111   CO2 27.0 25.0 26.0  --   --  24.0   ALKPHO 166* 171*  --  173*  --   --    * 175*  --  184*  --   --    * 105*  --  104*  --   --    BILT 11.4* 12.2*  --  12.0*  --   --    TP 5.5* 5.5*  --  5.4*  --   --            Estimated Creatinine Clearance: 54.1 mL/min (A) (based on SCr of 1.65 mg/dL (H)).    Recent Labs   Lab 03/08/25  1025   PTP 23.3*   INR 1.95*            COVID-19  Lab Results   Component Value Date    COVID19 Not Detected 03/12/2025    COVID19 Not Detected 12/26/2023    COVID19 Not Detected 01/15/2023       Pro-Calcitonin  No results for input(s): \"PCT\" in the last 168 hours.    Cardiac  No results for input(s): \"TROP\", \"PBNP\" in the last 168 hours.    Inflammatory Markers  No results for input(s): \"CRP\", \"CANDICE\", \"LDH\", \"DDIMER\" in the last 168 hours.    Culture:  Hospital Encounter on 03/04/25   1. Blood Culture     Status: None    Collection Time: 03/07/25 10:28 AM    Specimen: Blood,peripheral   Result Value Ref Range    Blood Culture Result No Growth 5 Days N/A   2. Urine Culture, Routine     Status: None    Collection Time: 03/04/25 11:11 AM    Specimen: Urine, clean catch   Result Value Ref Range    Urine Culture No Growth at 18-24 hrs. N/A       CT CHEST+ABDOMEN+PELVIS(CPT=71250/65378)    Result Date: 3/13/2025  CONCLUSION:  1. Diffuse pancreatic enlargement and mild peripancreatic inflammatory stranding are new since abdominal CT from 8 days prior and concerning for acute interstitial edematous pancreatitis.  No gross pancreatic ductal dilation or peripancreatic collection.   Request correlation with serum lipase levels. 2. Left greater than right lower lobe airspace disease with intrinsic air bronchograms.  Findings are similar on the left and slightly improved on the right since chest CT from 5 days prior; multifocal  pneumonia/aspiration is suspected.  Also identified are new and/or worsening multifocal upper lobe predominant ground-glass density opacities throughout both lungs; these ground-glass density opacities could be infectious in nature or relate to acute respiratory distress syndrome. 3. Endotracheal tube with tip approximately 1 cm above the level of the em.  Enteric tube tip in the gastric fundus.  Rectal tube and Cedeño catheter also noted. 4. Fatty liver and cirrhosis. 5. Stable splenomegaly. 6. Small to moderate volume intra-abdominal ascites is new since prior abdominal CT.  There is also new mild anasarca. 7. Liquid contents in the colon could relate to a malabsorption state or low-grade infectious/inflammatory versus antibiotic associated colitis.  Mild scattered colonic diverticulosis, but with no CT findings of acute diverticulitis. 8. Low-density appearance of the intracardiac blood pool raises the possibility of underlying anemia. Correlate with hematologic parameters.  9. Lesser incidental findings as above.   A preliminary report was issued by the [facility] teleradiology service. There are no major discrepancies.  elm-remote  Dictated by (CST): Arcenio Norman MD on 3/13/2025 at 8:14 AM     Finalized by (CST): Arcenio Norman MD on 3/13/2025 at 8:28 AM          XR CHEST AP PORTABLE  (CPT=71045)    Result Date: 3/12/2025  CONCLUSION:   Endotracheal tube terminates 2.8 cm above the em.  Consider 1-2 cm of retraction.  Unchanged esophagogastric tube.  Persistent extensive bilateral patchy opacities, greatest in the left lower lung.      Dictated by (CST): Riley Villa MD on 3/12/2025 at 3:31 PM     Finalized by (CST): Riley Villa MD on 3/12/2025 at 3:33 PM          XR CHEST AP PORTABLE  (CPT=71045)    Result Date: 3/12/2025  CONCLUSION:   No new abnormality.  Multifocal bilateral pulmonary opacities are similar to the prior exam.  Unchanged esophagogastric tube.     Dictated by (CST): Dasiy  Riley COMBS MD on 3/12/2025 at 1:20 PM     Finalized by (CST): Riley Villa MD on 3/12/2025 at 1:21 PM          XR CHEST AP PORTABLE  (CPT=71045)    Result Date: 3/11/2025  PROCEDURE: XR CHEST AP PORTABLE  (CPT=71045) TIME: 1654.   COMPARISON: [facility], XR CHEST AP PORTABLE (CPT=71045), 3/10/2025, 3:40 PM.  INDICATIONS: Increased WOB, tachypnea  TECHNIQUE:   Single view.   FINDINGS/IMPRESSION:    1. There is redemonstration of patchy alveolar opacities throughout both lungs.  The findings could represent alveolar edema, a diffuse multifocal infectious process, or a combination.  The findings have not significantly changed since previous exam.  2. The heart mediastinal structures remain enlarged.  The there are trace bilateral pleural effusions.  3. The thoracic component of an enteric feeding tube is identified traversing the thoracic esophagus.  The distal tip is not visualized but lies well below the GE junction.      Dictated by (CST): Prateek Mcclelland MD on 3/11/2025 at 5:04 PM     Finalized by (CST): Prateek Mcclelland MD on 3/11/2025 at 5:05 PM          XR CHEST AP PORTABLE  (CPT=71045)    Result Date: 3/10/2025  CONCLUSION:   Interval Strauss min enteric tube which is now in the mid to distal stomach.  Left greater than right patchy bilateral lung opacities mildly increased on the right.  Small left pleural effusion.    Dictated by (CST): Vishnu Tuttle MD on 3/10/2025 at 4:03 PM     Finalized by (CST): Vishnu Tuttle MD on 3/10/2025 at 4:04 PM                 Medications:    potassium phosphate dibasic 15 mmol in sodium chloride 0.9% 250 mL IVPB  15 mmol Intravenous Once    Followed by    potassium chloride  40 mEq Intravenous Once    vancomycin  15 mg/kg Intravenous Q12H    midazolam (PF)  3 mg Intravenous Once    senna  10 mL Per NG Tube BID    docusate  100 mg Per NG Tube BID    chlorhexidine gluconate  15 mL Mouth/Throat BID@0800,2000    mineral oil-white petrolatum  1 Application Both  Eyes Nightly    albumin human  25 g Intravenous Q8H    furosemide  20 mg Intravenous TID    vancomycin  125 mg Per NG Tube Daily    piperacillin-tazobactam  3.375 g Intravenous Q8H    lactulose  20 g Oral 4 times per day    multivitamin  1 tablet Oral Daily    folic acid  1 mg Oral Daily    baclofen  10 mg Oral TID    pantoprazole  40 mg Intravenous Q12H       Assessment & Plan:        Acute hypoxemic resp failure-worsening   Multifocal pna  -Concern for possible aspiration given hx of emesis and altered mental status.   -Continue on zosyn, doxy.   -Continues to require high levels of supplemental O2.  Weaning as able  -aspiration precautions  -intubated now , per pulm    Acute ETOH hepatitis   -last drink reportedly 4 days prior to admission  -Reported emesis and poor PO intake, without overt bleeding   -T bili upon arrival to ER 10.5, platelet 65, cr 4.22  -CT a/p reviewed, noted severe fatty liver with subtle undulating contour  -US liver reviewed, noted hepatic steatosis, GB sludge and cholelithiasis without cholecystitis or biliary ductal dilation, hepatic and portal vein patent  -MELD 3.0 on admission: 44   -GI consulted, appreciate further recommendations    Alcohol Abuse with withdrawal   -CIWA monitoring with alcohol withdrawal protocol  -thiamine, folic acid, multivitamin  -Psychiatry consulted, appreciate recommendations    Altered mental status  -Slowly improving  -Likely metabolic encephalopathy  -Initially noted to have elevated ammonia levels noted consistent with hepatic encephalopathy.  -Continue lactulose   -Patient also with USMAN and electrolyte abnormalities.  -Continue treating underlying conditions  -Continue to monitor    Acute Kidney Injury   -Continue improvement noted  -Avoiding Nephrotoxic agents  - Cont to monitor  -Nephrology on consult, appreciate further recommendations    Hypernatremia  -Persistent elevations noted  -Nephrology on consult, recommend continue D5 IV fluids and increased  free water flushes.  -Continue to monitor    Plan of care discussed with patient's sister and partner at bedside.  Discussed management/test result(s) with Rn     Quality:  DVT Prophylaxis: SCDs  CODE status: Full  Estimated date of discharge: TBD  Discharge is dependent on: clinical stability    MDM: High, acute illness/severe exacerbation of chronic illness posing threat to life.  IV medications requiring close inpatient monitoring         Natasha Araujo MD          This note was prepared using Dragon Medical voice recognition dictation software. As a result errors may occur. When identified these errors have been corrected. While every attempt is made to correct errors during dictation discrepancies may still exist

## 2025-03-13 NOTE — PROGRESS NOTES
INFECTIOUS DISEASE PROGRESS NOTE  [facility]  part of [facility] ID PROGRESS NOTE    Sami Grijalva . Patient Status:  Inpatient    10/11/1980 MRN [id number]   Location [facility] 2W/SW Attending Natasha Araujo MD   Hosp Day # 9 PCP Kerri Medina MD     Subjective:  ROS unable to be reviewed. Intubated yesterday. 40% FiO2 on ventilator. On low dose levophed. Hgb 6.2 this AM and will be getting U pRBC. Tmax 99.6.    ASSESSMENT:    Antibiotics: IV vancomycin, zosyn; (doxycycline)     # Acute hypoxic respiratory failure with fever and multifocal pneumonia   -S/p intubation 3/12  # Acute aspiration pneumonia  # Acute leukocytosis   # Acute anemia     # Alcoholic hepatitis with encephalopathy on admission               - CT A/P with severe fatty liver               - US GB with sludge w/o cholecystitis   - CT C/A/P 3/12 with pancreatitis  # USMAN - improved  # EtOH abuse and withdrawal        PLAN:  -  Continue on vancomycin and zosyn.  -  CT C/A/P reviewed.  -  Awaiting bronch.  -  FU blood cx.  -  Follow fever curve, wbc.  -  Reviewed labs, micro, imaging reports, available old records.  -  d/w RN.     History of Present Illness:  44-year-old male with a history of GERD, alcohol use was admitted to the hospital on 3/4 generalized abdominal pain, hiccups, vomiting with difficulty tolerating orals.  Last drink prior to admission was 1 day prior.  Found to have sepsis with elevated lactic acid, hypotension, WBC 17.5.  Responded to IV fluids and received lactulose for elevated ammonia.  CT A/P with severe fatty liver, stable splenomegaly.  Was lethargic the first few days and had soft restraints with Cedeño, Flexi-Seal, NG tube.  Has been essentially afebrile here with a temperature on 3/10 of 100.6 but significant hypoxia up and down and currently increased up to 40 L with increase WBC to 17.4.  Initial USMAN has improved.  Was started on IV Zosyn and doxycycline on 3/7  when patient became more hypoxic and chest x-ray showed extensive multifocal pneumonia with CT chest showing similar findings.  Possible aspiration.    Physical Exam:  BP 90/53   Pulse 79   Temp 99.1 °F (37.3 °C) (Temporal)   Resp 15   Ht 5' 8\" (1.727 m)   Wt 171 lb 11.8 oz (77.9 kg)   SpO2 (!) 89%   BMI 26.11 kg/m²     Gen:   Awake, in bed  HEENT:  EOMI, neck supple  CV/lungs:  Regular rate and rhythm, lungs CTAB  Abdom:  Soft, no TTP  Skin/extrem:  No rashes, no c/c/e  :   Cedeño draining yellow urine  Lines:  Midline+    Laboratory Data: Reviewed    Microbiology: Reviewed    Radiology: Reviewed      [facility]  [phone]  3/13/2025

## 2025-03-13 NOTE — PAYOR COMM NOTE
--------------  3/13 CONTINUED STAY REVIEW    Payor: [facility]  Subscriber #:  [id number]  Authorization Number: [id number]     REMAINS IN ICU      ID:    Subjective:  ROS unable to be reviewed. Intubated yesterday. 40% FiO2 on ventilator. On low dose levophed. Hgb 6.2 this AM and will be getting U pRBC. Tmax 99.6.     ASSESSMENT:     Antibiotics: IV vancomycin, zosyn; (doxycycline)     # Acute hypoxic respiratory failure with fever and multifocal pneumonia               -S/p intubation 3/12  # Acute aspiration pneumonia  # Acute leukocytosis   # Acute anemia     # Alcoholic hepatitis with encephalopathy on admission               - CT A/P with severe fatty liver               - US GB with sludge w/o cholecystitis               - CT C/A/P 3/12 with pancreatitis  # USMAN - improved  # EtOH abuse and withdrawal        PLAN:  -  Continue on vancomycin and zosyn.  -  CT C/A/P reviewed.  -  Awaiting bronch.  -  FU blood cx.  -  Follow fever curve, wbc.      Physical Exam:  BP 90/53   Pulse 79   Temp 99.1 °F (37.3 °C) (Temporal)   Resp 15   Ht 5' 8\" (1.727 m)   Wt 171 lb 11.8 oz (77.9 kg)   SpO2 (!) 89%   BMI 26.11 kg/m²      Gen:                   Awake, in bed  HEENT:             EOMI, neck supple  CV/lungs:           Regular rate and rhythm, lungs CTAB  Abdom:              Soft, no TTP  Skin/extrem:      No rashes, no c/c/e  :                    Cedeño draining yellow urine  Lines:                 Midline+      PULM:    Intubated yesterday PM  On levophed at 3 mcg  ID consulted  CT showed multifocal PNA and concern for pancreatitis. Lipase elevated to 444        From the initial consultation  Pt is unable to provide info  HPI: 45 yo male with hx of gout, GERD, admitted with acute ETOH hepatitis, ETOH use, emesis, USMAN. Seen by GI.  On CIWA protocol, thiamine, folic acid, MVI, BZDs, lactulose  Has been in the ICU for 2 days  ICU consulted this am for worsening hypoxemia  CXR this am  with b/l infiltrates concerning for PNA      Scheduled Medications    potassium phosphate dibasic 15 mmol in sodium chloride 0.9% 250 mL IVPB  15 mmol Intravenous Once     Followed by    potassium chloride  40 mEq Intravenous Once    sodium chloride   Intravenous Once    vancomycin  15 mg/kg Intravenous Q12H    midazolam (PF)  3 mg Intravenous Once    senna  10 mL Per NG Tube BID    docusate  100 mg Per NG Tube BID    chlorhexidine gluconate  15 mL Mouth/Throat BID@0800,2000    mineral oil-white petrolatum  1 Application Both Eyes Nightly    albumin human  25 g Intravenous Q8H    furosemide  20 mg Intravenous TID    vancomycin  125 mg Per NG Tube Daily    piperacillin-tazobactam  3.375 g Intravenous Q8H    thiamine  200 mg Intravenous TID    lactulose  20 g Oral 4 times per day    multivitamin  1 tablet Oral Daily    folic acid  1 mg Oral Daily    baclofen  10 mg Oral TID    pantoprazole  40 mg Intravenous Q12H         Continuous Infusing Medication:  Medication Infusions    propofol 25 mcg/kg/min (03/13/25 0730)    norepinephrine 3 mcg/min (03/13/25 0810)    dextrose 10% 30 mL/hr at 03/12/25 2046                PHYSICAL EXAM:  BP 93/53   Pulse 73   Temp 99.6 °F (37.6 °C) (Temporal)   Resp 16   Ht 5' 8\" (1.727 m)   Wt 171 lb 11.8 oz (77.9 kg)   SpO2 (!) 89%   BMI 26.11 kg/m²   Vent Mode: PC/AC  FiO2 (%):  [40 %-100 %] 40 %  S RR:  [12] 12  PEEP/CPAP (cm H2O):  [5 cm H20] 5 cm H20  MAP (cm H2O):  [11-16] 16  CONSTITUTIONAL: intubated sedated  HEENT: dobhoff  MOUTH: ETT  NECK/THROAT: no JVD. Trachea midline. No obvious thyromegaly  LUNG: clear b/l no wheezing, + b/l crackles. Chest symmetric with respiratory motion  HEART: regular rate and rhythm, no obvious murmers or gallops noted  ABD: soft non tender. + bowel sounds. No organomegaly noted  EXT: no clubbing, cyanosis, or edema noted        IMAGES:   CT C/A/P 3/12/25  CONCLUSION:   1. Diffuse pancreatic enlargement and mild peripancreatic inflammatory  stranding are new since abdominal CT from 8 days prior and concerning for acute interstitial edematous pancreatitis.  No gross pancreatic ductal dilation or peripancreatic collection.     Request correlation with serum lipase levels.   2. Left greater than right lower lobe airspace disease with intrinsic air bronchograms.  Findings are similar on the left and slightly improved on the right since chest CT from 5 days prior; multifocal pneumonia/aspiration is suspected.  Also identified   are new and/or worsening multifocal upper lobe predominant ground-glass density opacities throughout both lungs; these ground-glass density opacities could be infectious in nature or relate to acute respiratory distress syndrome.   3. Endotracheal tube with tip approximately 1 cm above the level of the em.  Enteric tube tip in the gastric fundus.  Rectal tube and Cedeño catheter also noted.   4. Fatty liver and cirrhosis.   5. Stable splenomegaly.   6. Small to moderate volume intra-abdominal ascites is new since prior abdominal CT.  There is also new mild anasarca.   7. Liquid contents in the colon could relate to a malabsorption state or low-grade infectious/inflammatory versus antibiotic associated colitis.  Mild scattered colonic diverticulosis, but with no CT findings of acute diverticulitis.   8. Low-density appearance of the intracardiac blood pool raises the possibility of underlying anemia. Correlate with hematologic parameters.    9. Lesser incidental findings as above.       CXR 3/12/25 with multifocal infiltrates        Lab 03/12/25  0357 03/13/25  0455 03/13/25  0550   RBC 3.11* 2.32* 2.55*   HGB 7.5* 5.7* 6.2*   HCT 23.5* 17.5* 19.1*   MCV 75.6* 75.4* 74.9*   MCH 24.1* 24.6* 24.3*   MCHC 31.9 32.6 32.5   RDW 31.7* 31.2* 31.4*   NEPRELIM 14.09* 10.96* 12.05*   WBC 17.4* 13.5* 14.9*   PLT 96.0* 98.0* 107.0*      Lab 03/10/25  0510 03/11/25  0510 03/12/25  0357 03/12/25  0918 03/12/25  1756 03/13/25  0455   *  100* 104*  --   --  129*   BUN 25* 23 23  --   --  27*   CREATSERUM 1.20 1.20 1.30  --   --  1.65*   EGFRCR 76 76 69  --   --  52*   CA 8.0* 7.9* 7.9*  --   --  6.9*   ALB 3.0* 3.1*  --  2.9*  --   --    * 153* 148*  --   --  145   K 3.5  3.5 3.6 3.3*  --  3.4* 2.9*   * 119* 112  --   --  111   CO2 27.0 25.0 26.0  --   --  24.0       5* 5.5*  --  5.4*  --   --          ASSESSMENT/PLAN:  Acute hypoxemic resp failure  -secondary to multifocal infiltrates. Given hx of emesis, concern for aspiration  -checked non contrast chest CT showed b/l infiltrates  -on zosyn, doxy. Checked urine leg, strep pneumo, mrsa nares, blood cx neg thus far  -weaned from airvo to 6 LNC but now 02 needs increased and pt had increased WOB  -intubated on 3/12/25. Continue full MV support, Keep plat < 30 as per ARDS  -ID consulted and added vanco and stopped doxy. Remains on zosyn  -repeat CT with b/l infiltrates L>R  -underwent bronchoscopy with left BAL on 3/13/25  -aspiration precautions     Acute ETOH hepatitis and now pancreatitis  -GI following. Imaging as above-now with pancreatitis  -PPI, lactulose  -NGT     -hypotension-multifactorial from sedation, sepsis, anemia  -likely secondary to sedation and sepsis  -Pt didn't receive sepsis bolus b/c deemed fluid overloaded d/t possible cirrhosis. Continue low dose levophed for MAP > 65  -possibly d/t acute anemia. No obvious source seen. Plan for transfuse 1 unit pRBC     ETOH withdrawal  -CIWA protocol  -psych following   -BZD/haldol PRN  -on propofol for sedation  -CT head neg for acute changes     USMAN and hypernatremia  -s/p bicarb infusion  -on D5 0.9 NS  -renal following. Na improving      Proph  -DVT: elevated INR low PLT. On SCDS     Dispo  -Full code  -sign other and brother (from out of town) are at bedside     Pulmonary/Critical Care Bronchoscopy Procedure Note:      Procedure: bronchoscopy with left bronchoalveolar lavage     Indication/Pre-op Diagnosis:  multifocal  PNA     Post op Diagnosis: multifocal PNA    Findings:     The bronchoscope was introduced into the ETT.     The trachea is of normal caliber, and the em is sharp. The tracheobronchial tree was examined to at least the first subsegmental level.  The bronchial mucosa and anatomy were normal appearing. No secretions, active bleeding, masses or suspicious lesions were seen. Next, bronchoalveolar lavage was performed in the lingular and left lower lobe segments with 30 ml aliquots of saline instilled (total ml instilled 60 ml) and 15 ml fluid specimen aspirated and collected into a luken trap.     The bronchoscope was then withdrawn and the patient tolerated the procedure well without any complications.        No immediate complications were seen after the procedure.     Estimated blood loss was minimal.      MEDICATIONS ADMINISTERED IN LAST 1 DAY:  Transfuse RBC       Date Action Dose Route User    3/13/2025 0915 New Bag (none) Intravenous Kathryn Fortune RN          acetaminophen (Tylenol) 160 MG/5ML oral liquid 650 mg       Date Action Dose Route User    3/13/2025 1200 Given 650 mg Per NG Tube Kathryn Fortune RN          albumin human (Albumin) 25% injection 25 g       Date Action Dose Route User    3/13/2025 0924 New Bag 25 g Intravenous Kathryn Fortune RN    3/13/2025 0211 New Bag 25 g Intravenous Indy Delgado RN    3/12/2025 1831 New Bag 25 g Intravenous Catalina Hoyt RN       baclofen (Lioresal) tab 10 mg       Date Action Dose Route User    3/13/2025 1200 Given 10 mg Oral Kathryn Fortune RN    3/12/2025 2007 Given 10 mg Oral Indy Delgado RN    3/12/2025 1605 Given 10 mg Oral Catalina Hoyt RN       dextrose 10% infusion (TPN no rate)       Date Action Dose Route User    3/12/2025 2046 New Bag (none) Intravenous Indy Delgado RN     folic acid (Folvite) tab 1 mg       Date Action Dose Route User    3/13/2025 1200 Given 1 mg Oral Kathryn Fortune RN          furosemide (Lasix) 10 mg/mL injection 20 mg        Date Action Dose Route User    3/13/2025 0925 Given 20 mg Intravenous Kathryn Fortune RN    3/12/2025 2007 Given 20 mg Intravenous Indy Delgado RN    3/12/2025 1604 Given 20 mg Intravenous Catalina Hoyt RN          lactulose (CHRONULAC) 10 GM/15ML solution 20 g       Date Action Dose Route User    3/13/2025 1200 Given 20 g Oral Kathryn Fortune RN    3/13/2025 0440 Given 20 g Oral Indy Delgado RN    3/12/2025 2233 Given 20 g Oral Indy Delgado RN    3/12/2025 1604 Given 20 g Oral Catalina Hoyt RN          norepinephrine (Levophed) 4 mg/250mL infusion premix       Date Action Dose Route User    3/13/2025 1202 Rate/Dose Change 3 mcg/min Intravenous Kathryn Fortune RN    3/13/2025 1010 Rate/Dose Change 6 mcg/min Intravenous Kathryn Fortune RN    3/13/2025 0810 Rate/Dose Change 3 mcg/min Intravenous Kathryn Fortune, RN    3/13/2025 0647 New Bag 2 mcg/min Intravenous Indy Delgado RN    3/13/2025 0646 Rate/Dose Change 2 mcg/min Intravenous Indy Delgado RN    3/13/2025 0609 Rate/Dose Change 1 mcg/min Intravenous Indy Delgado RN    3/13/2025 0523 Rate/Dose Change 2 mcg/min Intravenous Indy Delgado RN    3/13/2025 0300 Rate/Dose Change 3 mcg/min Intravenous Indy Delgado RN    3/13/2025 0209 Rate/Dose Change 4 mcg/min Intravenous Indy Delgado RN    3/12/2025 1629 New Bag 5 mcg/min Intravenous Catalina Hoyt RN          pantoprazole (Protonix) 40 mg in sodium chloride 0.9% PF 10 mL IV push       Date Action Dose Route User    3/13/2025 1203 Given 40 mg Intravenous Kathryn Fortune RN    3/13/2025 0207 Given 40 mg Intravenous Indy Delgado RN          piperacillin-tazobactam (Zosyn) 3.375 g in dextrose 5% 100 mL IVPB-ADDV       Date Action Dose Route User    3/13/2025 1203 New Bag 3.375 g Intravenous Kathryn Fortune RN    3/13/2025 0439 New Bag 3.375 g Intravenous Indy Delgado, RN    3/12/2025 2006 New Bag 3.375 g Intravenous Indy Delgado, NICOLASA          potassium  chloride 20 mEq/100mL IVPB premix 20 mEq       Date Action Dose Route User    3/12/2025 1800 New Bag 20 mEq Intravenous Catalina Hoyt RN          potassium chloride 40 mEq in 250mL sodium chloride 0.9% IVPB premix       Date Action Dose Route User    3/13/2025 1228 New Bag 40 mEq Intravenous MoorKathryn, RN          potassium phosphate dibasic 15 mmol in sodium chloride 0.9% 250 mL IVPB       Date Action Dose Route User    3/13/2025 0924 New Bag 15 mmol Intravenous FoorKathryn, RN       sodium chloride 0.9% infusion       Date Action Dose Route User    3/13/2025 0915 New Bag (none) Intravenous Kathryn Fortune, RN          sodium chloride 0.9 % IV bolus 500 mL       Date Action Dose Route User    3/12/2025 1630 New Bag 500 mL Intravenous Catalina Hoyt RN       thiamine 100 mg/mL injection 200 mg       Date Action Dose Route User    3/13/2025 0925 Given 200 mg Intravenous Kathryn Fortune RN    3/12/2025 2006 Given 200 mg Intravenous Indy Delgado RN    3/12/2025 1604 Given 200 mg Intravenous Catalina Hoyt RN          vancomycin (Firvanq) 50 mg/mL oral solution 125 mg       Date Action Dose Route User    3/13/2025 0925 Given 125 mg Per NG Tube Kathryn Fortune RN          vancomycin (Vancocin) 1.25 g in sodium chloride 0.9% 250mL IVPB premix       Date Action Dose Route User    3/13/2025 0310 New Bag 1,250 mg Intravenous Indy Delgado RN    3/12/2025 1500 New Bag 1,250 mg Intravenous Catalina Hoyt RN            Vitals (last day)       Date/Time Temp Pulse Resp BP SpO2 Weight O2 Device O2 Flow Rate (L/min) [facility]    03/13/25 1235 98.6 °F (37 °C) 79 18 94/56 93 % -- -- -- EF    03/13/25 1230 -- 79 21 -- 93 % -- -- -- EF    03/13/25 1215 -- 83 22 97/60 89 % -- -- -- EF    03/13/25 1200 -- 72 24 135/78 99 % -- -- -- EF    03/13/25 1145 -- 75 22 106/68 95 % -- -- -- EF    03/13/25 1130 -- 77 20 119/69 96 % -- -- -- EF    03/13/25 1115 -- 74 22 115/71 97 % -- -- -- EF    03/13/25 1100 -- 77 22 116/73 97 % -- -- --  EF    03/13/25 1045 -- 79 30 120/75 97 % -- -- -- EF    03/13/25 1030 -- 90 32 105/63 97 % -- -- -- EF    03/13/25 1015 -- 72 14 99/61 100 % -- -- -- EF    03/13/25 1000 -- 75 14 97/60 95 % -- -- -- EF    03/13/25 0945 99.1 °F (37.3 °C) 79 15 90/53 89 % -- -- -- EF    03/13/25 0930 -- 80 14 80/47 90 % -- -- -- EF    03/13/25 0915 98.7 °F (37.1 °C) 77 18 93/51 92 % -- -- -- EF    03/13/25 0830 -- 73 16 93/53 89 % -- -- -- EF    03/13/25 0800 -- 81 15 81/47 91 % -- -- -- EF    03/13/25 0700 -- 79 15 98/53 95 % -- -- -- EF    03/13/25 0645 -- 83 18 87/49 96 % -- -- -- SP    03/13/25 0600 -- 83 16 102/57 92 % -- Ventilator -- SP    03/13/25 0500 -- 86 21 108/63 97 % -- Ventilator -- SP    03/13/25 0400 99.6 °F (37.6 °C) 78 16 98/58 99 % -- Ventilator -- SP    03/13/25 0300 -- 68 17 107/62 96 % -- Ventilator -- SP    03/13/25 0200 -- 84 16 105/60 96 % -- Ventilator -- SP    03/13/25 0100 -- 82 16 101/58 94 % -- Ventilator -- SP    03/13/25 0000 98.7 °F (37.1 °C) 89 21 102/56 93 % -- Ventilator -- SP    03/12/25 2300 -- 85 15 100/61 99 % -- Ventilator -- SP    03/12/25 2200 -- 88 14 98/55 98 % -- Ventilator -- SP    03/12/25 2100 -- 92 15 100/56 98 % -- Ventilator -- SP    03/12/25 2000 98.9 °F (37.2 °C) 91 15 96/54 98 % -- Ventilator -- SP    03/12/25 1900 -- 92 20 101/58 100 % -- -- -- RC    03/12/25 1830 -- 88 16 102/60 100 % -- -- -- RC    03/12/25 1807 -- 89 16 103/61 100 % -- -- -- RC    03/12/25 1730 -- 91 15 91/58 98 % -- Ventilator -- RC    03/12/25 1705 -- 97 19 102/57 99 % -- -- -- RC    03/12/25 1700 -- 99 18 121/109 99 % -- -- -- RC    03/12/25 1645 98.2 °F (36.8 °C) 98 19 114/74 100 % -- Ventilator -- RC    03/12/25 1500 -- 112 16 146/89 99 % -- -- --     03/12/25 1300 -- 113 43 120/68 95 % -- High flow/High humidity -- RC    03/12/25 1236 -- -- -- -- -- -- High flow/High humidity 40 L/min SS    03/12/25 1200 -- 115 51 115/63 89 % -- High flow nasal cannula 15 L/min RC    03/12/25 1100 -- 113 32 116/69 92  % -- High flow nasal cannula 15 L/min RC    03/12/25 0900 -- 105 31 114/63 93 % -- High flow nasal cannula 15 L/min RC    03/12/25 0700 97.9 °F (36.6 °C) 108 41 118/75 94 % -- High flow nasal cannula 15 L/min RC    03/12/25 0530 -- -- -- -- -- 171 lb 11.8 oz (77.9 kg) -- -- TT    03/12/25 0519 -- 106 44 117/72 96 % -- -- -- TT    03/12/25 0500 -- 106 51 117/72 92 % -- High flow nasal cannula 15 L/min TT    03/12/25 0430 -- 109 -- 115/66 -- -- -- -- TT    03/12/25 0400 98 °F (36.7 °C) 104 52 115/66 93 % -- High flow nasal cannula 15 L/min TT    03/12/25 0330 -- 105 -- 116/65 -- -- High flow nasal cannula 15 L/min TT    03/12/25 0300 -- 97 45 116/65 90 % -- High flow nasal cannula 15 L/min TT    03/12/25 0200 -- 93 36 105/62 92 % -- High flow nasal cannula 15 L/min TT    03/12/25 0100 -- 101 40 106/73 94 % -- High flow nasal cannula 15 L/min TT    03/12/25 0000 98.7 °F (37.1 °C) 111 29 121/73 94 % -- High flow nasal cannula 15 L/min TT          CIWA Scores (since admission)       Date/Time CIWA-Ar Total Who    03/12/25 0519 10 TT    03/12/25 0430 8 TT    03/12/25 0330 16 TT    03/11/25 2258 7 TT    03/11/25 2158 15 TT    03/11/25 1944 15 TT    03/11/25 1800 10 RP    03/11/25 1600 10 RP    03/11/25 1400 9 RP    03/11/25 1200 7 RP    03/11/25 1000 7 RP    03/11/25 0800 10 RP    03/11/25 0600 6 CB    03/11/25 0500 11 CB    03/11/25 0400 13 CB       Blood Transfusion Record       Product Unit Status Volume Start End            Transfuse RBC       25  [id number]  [id number] Completed 03/13/25 1245 400 mL 03/13/25 0915 03/13/25 1235

## 2025-03-13 NOTE — RESPIRATORY THERAPY NOTE
Suction provided as needed, bilateral bs, FiO2 weaned accordingly, transport to CT without complication, no acute events or other changes made overnight, RT will continue to monitor.         03/13/25 0516   Vent Information   Interface Invasive   Vent Type    Vent plugged into main power? Yes   Vent Mode PC/AC   Settings   FiO2 (%) (S)  40 %   Resp Rate (Set) 12   Waveform Decelerating ramp   PEEP/CPAP (cm H2O) 5 cm H20   Insp Time (sec) 0.9 sec   PIP Set (cm H2O) 25 cm H2O   Trigger Sensitivity Flow (L/min) 3 L/min

## 2025-03-13 NOTE — BRIEF PROCEDURE NOTE
Pulmonary/Critical Care Bronchoscopy Procedure Note:          Patient Name: Sami Grijalva Jr.        Room:  [address] [place] [address]        Procedure Date: 3/13/25        Providers: Dr. JOLYNN Escobedo DO, MPH     Procedure: bronchoscopy with left bronchoalveolar lavage     Indication/Pre-op Diagnosis:  multifocal PNA    Post op Diagnosis: multifocal PNA     Medicines: Fentanyl  50 mcg IV, propofol gtt     Procedure Report:    Pre procedure history and physical was performed. Patient meds and allergies were reviewed. Risks and benefits of the procedure and the sedation options and risks were discussed with the patient. Informed consent was obtained and all questions were answered.  Time-out was completed verifying correct patient, procedure, site, positioning, and implant(s) or special equipment if applicable. The patient's Hgb, platelet count, and INR (if applicable) were reviewed.  After reviewing risks and benefits, the patient was deemed in satisfactory condition to undergo the procedure. Vitals (heart rate, respiratory rate, oxygen saturations, and blood pressure) were continuously monitored throughout the procedure. Supplemental oxygen at 100% via mechanical ventilation was provided to the patient throughout the procedure.     Findings:     The bronchoscope was introduced into the ETT.     The trachea is of normal caliber, and the em is sharp. The tracheobronchial tree was examined to at least the first subsegmental level.  The bronchial mucosa and anatomy were normal appearing. No secretions, active bleeding, masses or suspicious lesions were seen. Next, bronchoalveolar lavage was performed in the lingular and left lower lobe segments with 30 ml aliquots of saline instilled (total ml instilled 60 ml) and 15 ml fluid specimen aspirated and collected into a luken trap.     The bronchoscope was then withdrawn and the patient tolerated the procedure well without any complications.        No immediate complications were seen  after the procedure.     Estimated blood loss was minimal.        Recommendation: when medically stable transport to recovery area/hospital room. Follow up on above testing.      JOLYNN Escobedo DO, MPH  Pulmonary Critical Care Medicine  [facility]

## 2025-03-13 NOTE — PLAN OF CARE
Problem: RESPIRATORY - ADULT  Goal: Achieves optimal ventilation and oxygenation  Description: INTERVENTIONS:  - Assess for changes in respiratory status  - Assess for changes in mentation and behavior  - Position to facilitate oxygenation and minimize respiratory effort  - Oxygen supplementation based on oxygen saturation or ABGs  - Provide Smoking Cessation handout, if applicable  - Encourage broncho-pulmonary hygiene including cough, deep breathe, Incentive Spirometry  - Assess the need for suctioning and perform as needed  - Assess and instruct to report SOB or any respiratory difficulty  - Respiratory Therapy support as indicated  - Manage/alleviate anxiety  - Monitor for signs/symptoms of CO2 retention  Outcome: Progressing      Patient remains intubated on full vent support. Episode of desaturation when turning, required increase in PEEP /Fio2, slowly weaning Fio2. BS coarse bilaterally, with moderate amount of thick secretions suctioned from ETT.

## 2025-03-13 NOTE — PROGRESS NOTES
Patient is a 44 year old , single, male with past medical history of GERD, alcohol use disroder, who was admitted to the hospital for USMAN (acute kidney injury): The patient has been demonstrating symptoms of withdrawal.   Patient indicated for psych consult for evaluation and advise.    Consult Duration     The patient seen for over 50-minute, follow-up evaluation, over 50% counseling and coordinating care addressing alcohol withdrawal..  Record reviewed, communication with attending, communication with RN and patient seen face to face evaluation.    History of Present Illness:     According to the team, patient failed BiPAP (respiratory rate was sustained in mid 40's) and was intubated yesterday.    The patient is seen today laying in his hospital bed. The patient is jaundiced and lethargic, could not communicate directly with patient due to sedation. Blood pressure 105/63,with heart rate of 78, 96% intubated.     Patient is well-known to the service with longstanding alcohol dependency and withdrawal syndrome.    Ammonia level 32 with elevated liver enzymes. Magnesium level is 1.9 today.    Patient is sedated and restrained, intubated    He is not able to answer questions or engage in conversation due to sedation.     Past Psychiatric/Medication History:  1. Prior diagnoses: alcohol use disorder  2. Past psychiatric inpatient: Denies, the patients partner reports he has been to rehab 3 times.   3. Past outpatient history: Denies  4. Past suicide history: The patient reports history of attempt 5 years ago. He could not elaborate  5. Medication history: Denies     Social History:   The patient lives at home with his parents and his partner of 10 years. He works at a car wash.   He drinks alcohol daily. Occasional cannabis use. No tobacco or illicit substance abuse.     Family History:  No family history reported      Medical History:   Past Medical History  Past Medical History:    Esophageal reflux    Gout     Hernia       Past Surgical History  Past Surgical History:   Procedure Laterality Date    Colonoscopy N/A 11/1/2023    Procedure: COLONOSCOPY;  Surgeon: Vandana Austin MD;  Location: [facility] ENDOSCOPY    Egd  02/15/2016    Eh pr repair complex scalp arms legs 1.1 to 2.5 cm  2014    Elbow fracture surgery Right 1991    Hernia surgery      Inguinal hernia repair Left 01/17/2023       Family History  Family History   Problem Relation Age of Onset    Diabetes Father     Hypertension Father     Cancer Mother         liver       Social History  Social History     Socioeconomic History    Marital status: Single    Number of children: 0   Occupational History    Occupation: Supervisor, car wash   Tobacco Use    Smoking status: Former     Types: Cigarettes    Smokeless tobacco: Never   Vaping Use    Vaping status: Some Days   Substance and Sexual Activity    Alcohol use: Yes     Comment: per pt, DAILY HARD LEMONADE- [employer] hard lemonade 24oz cans, 6 cans daily    Drug use: Yes     Types: Cannabis     Comment: last use, couple months ago per pt report     Social Drivers of Health     Food Insecurity: No Food Insecurity (3/4/2025)    NCSS - Food Insecurity     Worried About Running Out of Food in the Last Year: No     Ran Out of Food in the Last Year: No   Transportation Needs: No Transportation Needs (3/4/2025)    NCSS - Transportation     Lack of Transportation: No   Housing Stability: Not At Risk (3/4/2025)    NCSS - Housing/Utilities     Has Housing: Yes     Worried About Losing Housing: No     Unable to Get Utilities: No           Current Medications:  Current Facility-Administered Medications   Medication Dose Route Frequency    potassium phosphate dibasic 15 mmol in sodium chloride 0.9% 250 mL IVPB  15 mmol Intravenous Once    Followed by    potassium chloride 40 mEq in 250mL sodium chloride 0.9% IVPB premix  40 mEq Intravenous Once    haloperidol lactate (Haldol) 5 MG/ML injection 1 mg  1 mg Intravenous Q6H PRN     vancomycin (Vancocin) 1.25 g in sodium chloride 0.9% 250mL IVPB premix  15 mg/kg Intravenous Q12H    midazolam (PF) (Versed) 10 mg/2mL injection 3 mg  3 mg Intravenous Once    acetaminophen (Tylenol) 160 MG/5ML oral liquid 650 mg  650 mg Per NG Tube Q4H PRN    Or    acetaminophen (Tylenol) rectal suppository 650 mg  650 mg Rectal Q4H PRN    Or    acetaminophen (Ofirmev) 10 mg/mL infusion premix 1,000 mg  1,000 mg Intravenous Q6H PRN    fentaNYL (Sublimaze) 50 mcg/mL injection 50 mcg  50 mcg Intravenous Q30 Min PRN    senna (Senokot) 8.8 MG/5ML oral syrup 17.6 mg  10 mL Per NG Tube BID    docusate (Colace) 50 MG/5ML oral solution 100 mg  100 mg Per NG Tube BID    polyethylene glycol (PEG 3350) (Miralax) 17 g oral packet 17 g  17 g Per NG Tube Daily PRN    bisacodyl (Dulcolax) 10 MG rectal suppository 10 mg  10 mg Rectal Daily PRN    chlorhexidine gluconate (Peridex) 0.12 % oral solution 15 mL  15 mL Mouth/Throat BID@0800,2000    mineral oil-white petrolatum (Artificial Tears) 83-15 % ophthalmic ointment 1 Application  1 Application Both Eyes Nightly    propofol (Diprivan) 10 mg/mL infusion premix  5-50 mcg/kg/min (Dosing Weight) Intravenous Continuous    norepinephrine (Levophed) 4 mg/250mL infusion premix  0.5-30 mcg/min Intravenous Continuous    albumin human (Albumin) 25% injection 25 g  25 g Intravenous Q8H    furosemide (Lasix) 10 mg/mL injection 20 mg  20 mg Intravenous TID    vancomycin (Firvanq) 50 mg/mL oral solution 125 mg  125 mg Per NG Tube Daily    piperacillin-tazobactam (Zosyn) 3.375 g in dextrose 5% 100 mL IVPB-ADDV  3.375 g Intravenous Q8H    dextrose 10% infusion (TPN no rate)   Intravenous Continuous PRN    sodium bicarbonate tab 650 mg  650 mg Per G Tube PRN    And    pancrelipase (Lip-Prot-Amyl) (Zenpep) DR particles cap 10,000 Units  10,000 Units Per G Tube PRN    thiamine 100 mg/mL injection 200 mg  200 mg Intravenous TID    lactulose (CHRONULAC) 10 GM/15ML solution 20 g  20 g Oral 4 times per  day    acetaminophen (Tylenol Extra Strength) tab 500 mg  500 mg Oral Q4H PRN    prochlorperazine (Compazine) 10 MG/2ML injection 5 mg  5 mg Intravenous Q8H PRN    multivitamin (Tab-A-Yosef/Beta Carotene) tab 1 tablet  1 tablet Oral Daily    folic acid (Folvite) tab 1 mg  1 mg Oral Daily    LORazepam (Ativan) tab 1 mg  1 mg Oral Q1H PRN    Or    LORazepam (Ativan) 2 mg/mL injection 1 mg  1 mg Intravenous Q1H PRN    LORazepam (Ativan) tab 2 mg  2 mg Oral Q1H PRN    baclofen (Lioresal) tab 10 mg  10 mg Oral TID    pantoprazole (Protonix) 40 mg in sodium chloride 0.9% PF 10 mL IV push  40 mg Intravenous Q12H     No medications prior to admission.       Allergies  Allergies[1]    Review of Systems:   As by Admitting/Attending    Results:   Laboratory Data:  Lab Results   Component Value Date    WBC 14.9 (H) 03/13/2025    HGB 6.2 (LL) 03/13/2025    HCT 19.1 (L) 03/13/2025    .0 (L) 03/13/2025    CREATSERUM 1.65 (H) 03/13/2025    BUN 27 (H) 03/13/2025     03/13/2025    K 2.9 (LL) 03/13/2025     03/13/2025    CO2 24.0 03/13/2025     (H) 03/13/2025    CA 6.9 (L) 03/13/2025    ALB 2.9 (L) 03/12/2025    ALKPHO 173 (H) 03/12/2025    TP 5.4 (L) 03/12/2025     (H) 03/12/2025     (H) 03/12/2025    INR 1.95 (H) 03/08/2025    PTP 23.3 (H) 03/08/2025     (HH) 03/13/2025    DDIMER 1.13 (H) 08/25/2023    ESRML 27 (H) 10/01/2021    MG 1.9 03/09/2025    PHOS 2.4 03/13/2025    TROP 0.00 01/31/2017     (H) 03/06/2025    ETOH <3 03/04/2025         Imaging:  CT CHEST+ABDOMEN+PELVIS(CPT=71250/64889)    Result Date: 3/13/2025  CONCLUSION:  1. Diffuse pancreatic enlargement and mild peripancreatic inflammatory stranding are new since abdominal CT from 8 days prior and concerning for acute interstitial edematous pancreatitis.  No gross pancreatic ductal dilation or peripancreatic collection.   Request correlation with serum lipase levels. 2. Left greater than right lower lobe airspace disease  with intrinsic air bronchograms.  Findings are similar on the left and slightly improved on the right since chest CT from 5 days prior; multifocal pneumonia/aspiration is suspected.  Also identified are new and/or worsening multifocal upper lobe predominant ground-glass density opacities throughout both lungs; these ground-glass density opacities could be infectious in nature or relate to acute respiratory distress syndrome. 3. Endotracheal tube with tip approximately 1 cm above the level of the em.  Enteric tube tip in the gastric fundus.  Rectal tube and Cedeño catheter also noted. 4. Fatty liver and cirrhosis. 5. Stable splenomegaly. 6. Small to moderate volume intra-abdominal ascites is new since prior abdominal CT.  There is also new mild anasarca. 7. Liquid contents in the colon could relate to a malabsorption state or low-grade infectious/inflammatory versus antibiotic associated colitis.  Mild scattered colonic diverticulosis, but with no CT findings of acute diverticulitis. 8. Low-density appearance of the intracardiac blood pool raises the possibility of underlying anemia. Correlate with hematologic parameters.  9. Lesser incidental findings as above.   A preliminary report was issued by the [facility] teleradiology service. There are no major discrepancies.  elm-remote  Dictated by (CST): Arcenio Norman MD on 3/13/2025 at 8:14 AM     Finalized by (CST): Arcenio Norman MD on 3/13/2025 at 8:28 AM          XR CHEST AP PORTABLE  (CPT=71045)    Result Date: 3/12/2025  CONCLUSION:   Endotracheal tube terminates 2.8 cm above the em.  Consider 1-2 cm of retraction.  Unchanged esophagogastric tube.  Persistent extensive bilateral patchy opacities, greatest in the left lower lung.      Dictated by (CST): Riley Villa MD on 3/12/2025 at 3:31 PM     Finalized by (CST): Riley Villa MD on 3/12/2025 at 3:33 PM          XR CHEST AP PORTABLE  (CPT=71045)    Result Date: 3/12/2025  CONCLUSION:   No new  abnormality.  Multifocal bilateral pulmonary opacities are similar to the prior exam.  Unchanged esophagogastric tube.     Dictated by (CST): Riley Villa MD on 3/12/2025 at 1:20 PM     Finalized by (CST): Riley Villa MD on 3/12/2025 at 1:21 PM          XR CHEST AP PORTABLE  (CPT=71045)    Result Date: 3/11/2025  PROCEDURE: XR CHEST AP PORTABLE  (CPT=71045) TIME: 1654.   COMPARISON: [facility], XR CHEST AP PORTABLE (CPT=71045), 3/10/2025, 3:40 PM.  INDICATIONS: Increased WOB, tachypnea  TECHNIQUE:   Single view.   FINDINGS/IMPRESSION:    1. There is redemonstration of patchy alveolar opacities throughout both lungs.  The findings could represent alveolar edema, a diffuse multifocal infectious process, or a combination.  The findings have not significantly changed since previous exam.  2. The heart mediastinal structures remain enlarged.  The there are trace bilateral pleural effusions.  3. The thoracic component of an enteric feeding tube is identified traversing the thoracic esophagus.  The distal tip is not visualized but lies well below the GE junction.      Dictated by (CST): Prateek Mcclelland MD on 3/11/2025 at 5:04 PM     Finalized by (CST): Prateek Mcclelland MD on 3/11/2025 at 5:05 PM          XR CHEST AP PORTABLE  (CPT=71045)    Result Date: 3/10/2025  CONCLUSION:   Interval Strauss min enteric tube which is now in the mid to distal stomach.  Left greater than right patchy bilateral lung opacities mildly increased on the right.  Small left pleural effusion.    Dictated by (CST): Vishnu Tuttle MD on 3/10/2025 at 4:03 PM     Finalized by (CST): Vishnu Tuttle MD on 3/10/2025 at 4:04 PM           Vital Signs:   Blood pressure 90/53, pulse 79, temperature 99.1 °F (37.3 °C), temperature source Temporal, resp. rate 15, height 68\", weight 77.9 kg (171 lb 11.8 oz), SpO2 (!) 89%.    Mental Status Exam:   Appearance: Stated age male, in hospital gown, laying down in hospital bed.  Intubated  Psychomotor: Patient seen today sedated with no tremor or agitation otherwise patient with restraint with episode of confusion, impulsivity and occasional agitation.  Orientation: Patient sedated, minimal reactivity  Gait: Not evaluated.  Attitude/Coorperation: sedated and intubated  Behavior: sedated and intubated  Speech: sedated and intubated  Mood: sedated and intubated  Affect: sedated and intubated  Thought process: sedated and intubated  Thought content: no reports of suicidal or homicidal ideation.  Perceptions: sedated and intubated  Concentration: impaired  Memory: impaired  Intellect: Average.  Judgment and Insight: Impaired as an evidenced by recurrent drinking with deterioration in his physical function.    Impression:     Alcohol withdrawal Syndrome with delirium.  Episodic mood disorder.  Alcohol Dependence, continuous.  Rule out Wernicke's syndrome    USMAN (acute kidney injury)    Metabolic acidosis    Hyperkalemia    Leukocytosis    Thrombocytopenia    Coagulopathy (HCC)    Hyponatremia    Alcoholic (HCC)    Scleral icterus    Acute kidney failure    Patient is a 44 year old , single, male with past medical history of GERD, alcohol use disroder, who was admitted to the hospital for USMAN (acute kidney injury): The patient has been demonstrating symptoms of withdrawal.     The patient has demonstrated severe withdrawals with confusion, restlessness, agitation and response to internal stimuli. His symptoms worsened over the past several days (respiratory rate was sustained mid 40's) and required intubation yesterday.    3/6/2025: patient was transferred to CCU due to increased CIWA scores. Patient lethargic this morning.     3/7/2025: Patient has been demonstrating increased confusion, lethargy with excessive sedation with episode of agitation.  No tremors has been observed.    3/8/2025: Patient has been demonstrating stupor and lethargy with excessive sedation. Could not interview  patient directly today. No tremor were observed.    3/10/2025: Patient has been slightly more reactive today but still demonstrating stupor and lethargy with excessive sedation. Could not interview patient directly today. No tremors were observed.     3/13/2025:  Patient sedated and intubated. Intubated yesterday because failed BiPAP. Could not interview patient directly today. No tremor were observed. Will schedule magnesium infusions.      Discussed risk and benefit, acknowledging the current symptom and severity.  At this point, I would recommend the following approach:     Focus on safety  Focus on education and support.  Focus on insight orientation helping the patient understand diagnosis and treatment plan.  Discontinued Ativan on [facility] protocol  Discontinue thiamine 200 mg IV 3 times daily.  Limited Ativan available per [facility] for lower dosage.  Communicated with CCU MD and team and agree and treatment.  Coordinate plan with team    Orders This Visit:  Orders Placed This Encounter   Procedures    CBC With Differential With Platelet    Comp Metabolic Panel (14)    Urinalysis with Culture Reflex    Lipase    RBC Morphology Scan    Prothrombin Time (PT)    Lactic Acid, Plasma    Comp Metabolic Panel (14)    CBC With Differential With Platelet    Magnesium    Sodium, Urine, Random    Creatinine, Urine, Random    Phosphorus    Basic Metabolic Panel (8)    Osmolality, Urine    Lactic Acid Reflex Post Positive    Lactic Acid, Plasma    Lactic Acid Post Positive    Lactic Acid, Plasma    Magnesium    Lactic Acid Reflex Post Positive    Lactic Acid Post Positive    Ethyl Alcohol    Ammonia, Plasma    CBC With Differential With Platelet    Basic Metabolic Panel (8)    Hepatic Function Panel (7)    Arterial blood gas    Prothrombin Time (PT)    AFP, Tumor Marker, Serum    Actin (Smooth Muscle) Antibody    Alpha-1-antirypsin, serum    Ceruloplasmin    Hepatitis A B + C profile    Immunoglobulin A/G/M, Quant    Iron And  Tibc    Liver-Kidney Microsomal Antibody    Mitochondrial (M2) Antibody    Hep A AB, IGM    Scan slide    MD BLOOD SMEAR CONSULT    Potassium    Magnesium    Phosphorus    CK (Creatine Kinase) (Not Creatinine)    Scan slide    Manual differential    Phosphorus    Iron And Tibc    Arterial blood gas    Legionella urine Ag serogrp 1    Streptococcus Pneumoniae Ag, Urine    Ammonia, Plasma    Magnesium    Phosphorus    Potassium    Scan slide    Hepatic Function Panel (7)    Prothrombin Time (PT)    Potassium    CBC With Differential With Platelet    Hepatic Function Panel (7)    Basic Metabolic Panel (8)    Magnesium    Phosphorus    Potassium    Hepatic Function Panel (7)    Potassium    Hepatic Function Panel (7)    Phosphorus    Manual differential    Phosphorus    CBC With Differential With Platelet    Basic Metabolic Panel (8)    Manual differential    Phosphorus    Potassium    Hepatic Function Panel (7)    Ammonia, Plasma    Urinalysis with Culture Reflex    Triglycerides    Triglycerides    Expanded Arterial Blood Gas    Ictotest    MD BLOOD SMEAR CONSULT    CBC With Differential With Platelet    Phosphorus    Potassium    Lipase    Scan slide    Expanded Arterial Blood Gas    Aspergillus Galactomannan Antigen Detection, Bronchoalveolar Lavage or Serum    Type and screen    Prepare RBC Once    ABORH (Blood Type)    Antibody Screen    Cytology fluids    Urine Culture, Routine    Blood Culture    MRSA Screen by PCR    Blood Culture    Emergency MRSA Screen by PCR    Blood Culture    $$$$Respiratory Flu Expanded Panel + Covid-19$$$$    Sputum culture    Bronchial Culture, Quantitativ    Cell count, bronchial wash    Fungus Culture and Stain    AFB Culture and Smear    HSV 1/2 PCR, BAL    CMV PCR,Qualitative, BAL    Pneumocystis by PCR    Myco tuberculosis PCR, Respiratory       Meds This Visit:  Requested Prescriptions      No prescriptions requested or ordered in this encounter       Felix Davila  MD  3/13/2025    Note to Patient: The 21st Century Cures Act makes medical notes like these available to patients in the interest of transparency. However, be advised this is a medical document. It is intended as peer to peer communication. It is written in medical language and may contain abbreviations or verbiage that are unfamiliar. It may appear blunt or direct. Medical documents are intended to carry relevant information, facts as evident, and the clinical opinion of the practitioner. This note may have been transcribed using a voice dictation system. Voice recognition errors may occur. This should not be taken to alter the content or meaning of this note.           [1]   Allergies  Allergen Reactions    Morphine SWELLING     SHORTNESS OF BREATH

## 2025-03-13 NOTE — PROGRESS NOTES
[facility]  part of [facility]    Progress Note    Sami Grijalva JrKushal Patient Status:  Inpatient    10/11/1980 MRN [id number]   Location [facility] 2W/SW Attending Natasha Araujo MD   Hosp Day # 9 PCP Kerri Medina MD       Subjective:   Sami Grijalva Jr. is a(n) 44 year old male     ROS:   Remains intubated and sedated  Vitals:    25 1730   BP: 91/55   Pulse: 79   Resp: 19   Temp:            PHYSICAL EXAM:   Constitutional: appears comfortable on ventilator  Head/Face: normocephalic  Eyes/Vision: normal extraocular motion is intact  Nose/Mouth/Throat:mucous membranes are moist and no oral lesions are noted  Neck/Thyroid: neck is supple without adenopathy  Lymphatic: no abnormal cervical, supraclavicular adenopathy is noted  Respiratory: Bilateral rhonchi  Cardiovascular: regular rate and rhythm no murmurs, gallups, or rubs  Abdomen some distention of belly  Vascular: well perfused femoral, and pedal pulses normal  Skin/Hair: no unusual rashes present, no abnormal bruising noted  Back/Spine: no abnormalities noted  Musculoskeletal: full ROM all extremities good strength  no deformities  Extremities: no edema, cyanosis  Neurological: Sedated    Results:     Laboratory Data:  Lab Results   Component Value Date    WBC 14.9 (H) 2025    HGB 7.1 (L) 2025    HCT 19.1 (L) 2025    .0 (L) 2025    CREATSERUM 1.65 (H) 2025    BUN 27 (H) 2025     2025    K 2.9 (LL) 2025     2025    CO2 24.0 2025     (H) 2025    CA 6.9 (L) 2025    ALB 2.9 (L) 2025    ALKPHO 173 (H) 2025    BILT 12.0 (H) 2025    TP 5.4 (L) 2025     (H) 2025     (H) 2025    INR 1.95 (H) 2025     (HH) 2025    DDIMER 1.13 (H) 2023    ESRML 27 (H) 10/01/2021    MG 1.9 2025    PHOS 2.4 2025    TROP 0.00 2017     (H) 2025     ETOH <3 03/04/2025       Imaging:  [unfilled]   CT CHEST+ABDOMEN+PELVIS(CPT=71250/96140)    Result Date: 3/13/2025  CONCLUSION:  1. Diffuse pancreatic enlargement and mild peripancreatic inflammatory stranding are new since abdominal CT from 8 days prior and concerning for acute interstitial edematous pancreatitis.  No gross pancreatic ductal dilation or peripancreatic collection.   Request correlation with serum lipase levels. 2. Left greater than right lower lobe airspace disease with intrinsic air bronchograms.  Findings are similar on the left and slightly improved on the right since chest CT from 5 days prior; multifocal pneumonia/aspiration is suspected.  Also identified are new and/or worsening multifocal upper lobe predominant ground-glass density opacities throughout both lungs; these ground-glass density opacities could be infectious in nature or relate to acute respiratory distress syndrome. 3. Endotracheal tube with tip approximately 1 cm above the level of the em.  Enteric tube tip in the gastric fundus.  Rectal tube and Cedeño catheter also noted. 4. Fatty liver and cirrhosis. 5. Stable splenomegaly. 6. Small to moderate volume intra-abdominal ascites is new since prior abdominal CT.  There is also new mild anasarca. 7. Liquid contents in the colon could relate to a malabsorption state or low-grade infectious/inflammatory versus antibiotic associated colitis.  Mild scattered colonic diverticulosis, but with no CT findings of acute diverticulitis. 8. Low-density appearance of the intracardiac blood pool raises the possibility of underlying anemia. Correlate with hematologic parameters.  9. Lesser incidental findings as above.   A preliminary report was issued by the [facility] teleradiology service. There are no major discrepancies.  elm-remote  Dictated by (CST): Arcenio Norman MD on 3/13/2025 at 8:14 AM     Finalized by (CST): Arcenio Norman MD on 3/13/2025 at 8:28 AM          XR CHEST  AP PORTABLE  (CPT=71045)    Result Date: 3/12/2025  CONCLUSION:   Endotracheal tube terminates 2.8 cm above the em.  Consider 1-2 cm of retraction.  Unchanged esophagogastric tube.  Persistent extensive bilateral patchy opacities, greatest in the left lower lung.      Dictated by (CST): Riley Villa MD on 3/12/2025 at 3:31 PM     Finalized by (CST): Riley Villa MD on 3/12/2025 at 3:33 PM          XR CHEST AP PORTABLE  (CPT=71045)    Result Date: 3/12/2025  CONCLUSION:   No new abnormality.  Multifocal bilateral pulmonary opacities are similar to the prior exam.  Unchanged esophagogastric tube.     Dictated by (CST): Riley Villa MD on 3/12/2025 at 1:20 PM     Finalized by (CST): Riley Villa MD on 3/12/2025 at 1:21 PM             ASSESSMENT/PLAN:   Assessment       #1 USMAN  Urine output good 1500 cc yesterday but is +9 L stopped IV fluids except antibiotics and drips still on pressors getting IV Lasix every 8 hours  Creatinine did bump up to 1.65 most likely due to pressors  No dialysis needed      #2 hypernatremia better at 145    #3 low potassium replaced  #4 liver disease alcoholic hepatitis no LFTs today  #5 pneumonia remains on broad-spectrum antibiotics Shirley  Had bronchoscopy today and 50% oxygen on ventilator  Discussed with family condition still tenuous         3/13/2025  Johnathan Ramos MD

## 2025-03-13 NOTE — PROGRESS NOTES
[facility]     Gastroenterology Progress Note    Sami Grijalva Jr. Patient Status:  Inpatient    10/11/1980 MRN [id number]   Location [facility]5W Attending Alex Diaz MD   Hosp Day # 9 PCP Kerri Medina MD       Subjective:    Was intubated yesterday afternoon for respiratory distress. This morning remains intubated and sedated.     Objective:   Blood pressure 92/58, pulse 73, temperature 98.6 °F (37 °C), temperature source Temporal, resp. rate 19, height 5' 8\" (1.727 m), weight 171 lb 11.8 oz (77.9 kg), SpO2 93%. Body mass index is 26.11 kg/m².    Gen: sedated  HEENT: EOMI, the sclera appears icteric, oropharynx clear, mucus membranes appear moist  CV: RRR  Lung: mechanical breath sounds  Abdomen: soft NTND abdomen with NABS appreciated   Skin: dry, warm, + jaundice  Ext: no LE edema is evident  Neuro: sedated    Assessment and Plan:   Sami Grijalva Jr. is a 44 year old male w/ PMHx of longstanding alcohol use, cannabis use, GERD, who presents with generalized abdominal pain, hiccups and vomiting. GI consulted for acute alcohol hepatitis.    Main issue at this point is respiratory failure s/p intubation with imaging suggestive of underlying multifocal pneumonia and ARDS.      #Acute alcohol hepatitis with probable underlying cirrhosis  -CT a/p in ER severe fatty liver with subtle undulating contour  -US liver with hepatic steatosis, GB sludge and cholelithiasis without cholecystitis or biliary ductal dilation, hepatic and portal vein patent  -last EGD 2023 without varices   -high maddrey; holding off on steroids since concern for pulmonary infection   -CIWA high with active withdrawal and AMS   -nutrition  -etoh cessation    #Likely early cirrhosis, ETOH abuse, chronic w/u in process  MELD 3.0 on admission: 44  -PSE: Lethargic/drowsy in setting of ETOH withdrawal treated with CIWA protocol, Ammonia 121, started on lactulose  -Ascites: none on exam or US  -EV:  None on last EGD 11/2023, tubular structure around GE junction suggestive of EV.  Will need repeat EGD electively  -HCC: No lesions on US or CT, AFP 2.7  - acute hyperbilirubinemia likely component of cholestasis of sepsis     #USMAN  -nephrology following     # Mild interstitial pancreatitis  - likely etoh induced  - continue supportive care     Recommend:  -appreciate ICU/pulm   -continue broad spectrum abx  -Lactulose goal 3-4 bm/d  -Daily CMP, CBC, INR  -Empiric PPI BID    D/w multiple family members at bedside today      Vandana Austin MD      Results:     Lab Results   Component Value Date    WBC 14.9 (H) 03/13/2025    HGB 7.1 (L) 03/13/2025    HCT 19.1 (L) 03/13/2025    .0 (L) 03/13/2025    CREATSERUM 1.65 (H) 03/13/2025    BUN 27 (H) 03/13/2025     03/13/2025    K 2.9 (LL) 03/13/2025     03/13/2025    CO2 24.0 03/13/2025     (H) 03/13/2025    CA 6.9 (L) 03/13/2025    ALB 2.9 (L) 03/12/2025    ALKPHO 173 (H) 03/12/2025    BILT 12.0 (H) 03/12/2025    TP 5.4 (L) 03/12/2025     (H) 03/12/2025     (H) 03/12/2025    INR 1.95 (H) 03/08/2025     (HH) 03/13/2025    DDIMER 1.13 (H) 08/25/2023    ESRML 27 (H) 10/01/2021    MG 1.9 03/09/2025    PHOS 2.4 03/13/2025    TROP 0.00 01/31/2017     (H) 03/06/2025    ETOH <3 03/04/2025       CT CHEST+ABDOMEN+PELVIS(CPT=71250/56658)    Result Date: 3/13/2025  CONCLUSION:  1. Diffuse pancreatic enlargement and mild peripancreatic inflammatory stranding are new since abdominal CT from 8 days prior and concerning for acute interstitial edematous pancreatitis.  No gross pancreatic ductal dilation or peripancreatic collection.   Request correlation with serum lipase levels. 2. Left greater than right lower lobe airspace disease with intrinsic air bronchograms.  Findings are similar on the left and slightly improved on the right since chest CT from 5 days prior; multifocal pneumonia/aspiration is suspected.  Also identified are new and/or  worsening multifocal upper lobe predominant ground-glass density opacities throughout both lungs; these ground-glass density opacities could be infectious in nature or relate to acute respiratory distress syndrome. 3. Endotracheal tube with tip approximately 1 cm above the level of the em.  Enteric tube tip in the gastric fundus.  Rectal tube and Cedeño catheter also noted. 4. Fatty liver and cirrhosis. 5. Stable splenomegaly. 6. Small to moderate volume intra-abdominal ascites is new since prior abdominal CT.  There is also new mild anasarca. 7. Liquid contents in the colon could relate to a malabsorption state or low-grade infectious/inflammatory versus antibiotic associated colitis.  Mild scattered colonic diverticulosis, but with no CT findings of acute diverticulitis. 8. Low-density appearance of the intracardiac blood pool raises the possibility of underlying anemia. Correlate with hematologic parameters.  9. Lesser incidental findings as above.   A preliminary report was issued by the [facility] teleradiology service. There are no major discrepancies.  elm-remote  Dictated by (CST): Arcenio Norman MD on 3/13/2025 at 8:14 AM     Finalized by (CST): Arcenio Norman MD on 3/13/2025 at 8:28 AM          XR CHEST AP PORTABLE  (CPT=71045)    Result Date: 3/12/2025  CONCLUSION:   Endotracheal tube terminates 2.8 cm above the em.  Consider 1-2 cm of retraction.  Unchanged esophagogastric tube.  Persistent extensive bilateral patchy opacities, greatest in the left lower lung.      Dictated by (CST): Riley Villa MD on 3/12/2025 at 3:31 PM     Finalized by (CST): Riley Villa MD on 3/12/2025 at 3:33 PM          XR CHEST AP PORTABLE  (CPT=71045)    Result Date: 3/12/2025  CONCLUSION:   No new abnormality.  Multifocal bilateral pulmonary opacities are similar to the prior exam.  Unchanged esophagogastric tube.     Dictated by (CST): Riley Villa MD on 3/12/2025 at 1:20 PM     Finalized by (CST): Daisy  Riley COMBS MD on 3/12/2025 at 1:21 PM          XR CHEST AP PORTABLE  (CPT=71045)    Result Date: 3/11/2025  PROCEDURE: XR CHEST AP PORTABLE  (CPT=71045) TIME: 1654.   COMPARISON: [facility], XR CHEST AP PORTABLE (CPT=71045), 3/10/2025, 3:40 PM.  INDICATIONS: Increased WOB, tachypnea  TECHNIQUE:   Single view.   FINDINGS/IMPRESSION:    1. There is redemonstration of patchy alveolar opacities throughout both lungs.  The findings could represent alveolar edema, a diffuse multifocal infectious process, or a combination.  The findings have not significantly changed since previous exam.  2. The heart mediastinal structures remain enlarged.  The there are trace bilateral pleural effusions.  3. The thoracic component of an enteric feeding tube is identified traversing the thoracic esophagus.  The distal tip is not visualized but lies well below the GE junction.      Dictated by (CST): Prateek Mcclelland MD on 3/11/2025 at 5:04 PM     Finalized by (CST): Prateek Mcclelland MD on 3/11/2025 at 5:05 PM

## 2025-03-13 NOTE — DIETARY NOTE
ADULT NUTRITION REASSESSMENT    Pt is at high nutrition risk.  Pt does not meet malnutrition criteria.      RECOMMENDATIONS TO MD:   Resume EN post bronch. Current formula is appropriate for new dx of acute pancreatitis. Still at refeeding risk. Recommend re-check Mg level-added on for am. Water flushes-MD may adjust as appropriate (currently 300 ml q 3 hrs).    See Nutrition Intervention for enteral nutrition (EN) and free water flushes (FWF) specifics     ADMITTING DIAGNOSIS:  Scleral icterus [R17]  USMAN (acute kidney injury) [N17.9]  PERTINENT PAST MEDICAL HISTORY:   Past Medical History:    Esophageal reflux    Gout    Hernia     PATIENT STATUS:   Initial 03/06/25: Pt assessed r/t screened at risk on initial MST due to unintentional wt loss and decreased/poor appetite/intake. Presented to hospital with intractable N/V and poor appetite for the last few days - ETOH hepatitis and USMAN. PMH as listed above including ETOH abuse. Transferred to CCU for high CIWA scores. Scheduled ativan, soft wrist restraints in place. Very lethargic, minimally responsive. Sleeping but restless at time of visit. Not appropriate for diet hx with no family at bedside. Per H&P pt reported N/V, hiccups and inability to tolerate any solid foods x 3 days PTA. Typically consumes 3 24-oz  beers daily. Appears well nourished.  03/07/25 UPDATE: Pt discussed with MD on unit - plan to initiate EN feeds. Consult received for RD to initiate and manage tube feeds. See full assessment from 3/6. Pt remains minimally responsive on ativan and not appropriate to take PO. NPO/CL x6 days this admission. Increasing O2 requirements. Stable on HFNC at 40 L/min. RN to place NGT. Remains on lactulose x4 daily with rectal pouch in place 1650 ml recorded output over the 24 hrs however noted no documentation from day shift on 3/6. Pt with good UO and improving creatinine level. Noted hypophosphotemia with replacement ordered 30 mmol NaPhos rider per protocol. IVF  of D5W 0.9NS @ 75 ml/hr provides 1.8 L, 90 g dextrose and 306 kcal. Lasix x1 today. Messaged nephrology regarding plan for IVF and free water from EN - awaiting response.    03/10/25 UPDATE: Pt lethargic. Remains inappropriate for PO intake. Pt pulled out NG DHT this afternoon. Now replaced by RN with feeds resuming. EN feeds at 30 ml/hr (50% of goal rate). FWF increased to 200 ml q 4 hrs on 3/8 and EN held by MD on 3/9 due to worsening hypernatremia. Feeds resumed overnight 3/10. No improvement in hypernatremia. Pt with large amount of stool output, lactulose QID.    03/13/UPDATE:     Pt intubated 3/12, sedated on Propofol (12.4 ml/hr= ~330 kcals via lipids), low dose norepinephrine (3 mcg/min), worsening pneumonia s/p bronch today, new dx Acute Pancreatitis (per CT scan and lipase 444) 3/12 -acceptable. Stooling on lactulose (rectal tube in place), Marine non-oliguric at present.  Discussed with APN re: resuming EN support post bronch. May continue with same formula for impaired GI function.   FOOD/NUTRITION RELATED HISTORY:  Appetite: NPO  Intake:  EN feeds initiated on 3/7  Intake Meeting Needs: NPO  Percent Meals Eaten (last 6 days)       Date/Time Percent Meals Eaten (%)    03/07/25 2200 0 %    03/08/25 0600 0 %    03/08/25 2100 0 %    03/09/25 0000 0 %    03/09/25 0300 0 %    03/09/25 0429 0 %    03/09/25 2000 0 %    03/10/25 0000 0 %    03/10/25 0450 0 %        Food Allergies: No Known Food Allergies (NKFA)  Cultural/Ethnic/[place]ruze Preferences: Not Obtained    GASTROINTESTINAL: +BM watery, brown stools with lactulose enemas QID, rectal tube, and 1100 ml output from rectal tube; abdomen distended, soft, rounded, non-tender  CT A/P 3/4: \"Severely fatty liver with subtle undulating contour.  Stable splenomegaly.  Tubular structures around the GE junction are suggestive of lower esophageal varices.  No ascites \"    UO: 1100 ml output over the past 24 hrs  I/Os: +7041 ml total this admission    MEDICATIONS:  reviewed; Noted non-cardiac electrolyte replacement protocol ordered;. Holding colace and senna, continues lactulose.    propofol 25 mcg/kg/min (25 1159)    norepinephrine 3 mcg/min (25 1202)    dextrose 10% 30 mL/hr at 25      potassium chloride  40 mEq Intravenous Once    vancomycin  15 mg/kg Intravenous Q12H    midazolam (PF)  3 mg Intravenous Once    senna  10 mL Per NG Tube BID    docusate  100 mg Per NG Tube BID    chlorhexidine gluconate  15 mL Mouth/Throat BID@0800,2000    mineral oil-white petrolatum  1 Application Both Eyes Nightly    albumin human  25 g Intravenous Q8H    furosemide  20 mg Intravenous TID    vancomycin  125 mg Per NG Tube Daily    piperacillin-tazobactam  3.375 g Intravenous Q8H    lactulose  20 g Oral 4 times per day    multivitamin  1 tablet Oral Daily    folic acid  1 mg Oral Daily    baclofen  10 mg Oral TID    pantoprazole  40 mg Intravenous Q12H     LABS: reviewed; K+ and Phos replaced. Mg++ n/a thus ordered for am as may be deficient. May drop in part d/t refeeding despite low rate tube feeds T 3/12-acceptable, Calcium drop in the new scenario of acute pancreatitis. Lipase: 444 today. Na+ wnl today.   Recent Labs     25  0510 25  0357 25  1756 25  0455   * 104*  --  129*   BUN 23 23  --  27*   CREATSERUM 1.20 1.30  --  1.65*   CA 7.9* 7.9*  --  6.9*   * 148*  --  145   K 3.6 3.3* 3.4* 2.9*   * 112  --  111   CO2 25.0 26.0  --  24.0   PHOS 1.9* 2.5  --  2.4   OSMOCALC 320* 310*  --  307*     WEIGHT HISTORY:  Patient Weight(s) for the past 336 hrs:   Weight   25 0530 77.9 kg (171 lb 11.8 oz)   25 1600 82.9 kg (182 lb 12.8 oz)   25 0951 81.2 kg (179 lb)     Wt Readings from Last 10 Encounters:   25 77.9 kg (171 lb 11.8 oz)   24 77.1 kg (170 lb)   23 89.8 kg (198 lb)   23 81.6 kg (180 lb)   10/16/23 81.6 kg (180 lb)   10/16/23 82.6 kg (182 lb)   23 91.6 kg (202 lb)    08/27/23 91.7 kg (202 lb 1.6 oz)   05/31/23 83.9 kg (185 lb)   01/17/23 90.7 kg (200 lb)     ANTHROPOMETRICS:  HT: 172.7 cm (5' 8\")  Wt Readings from Last 1 Encounters:   03/12/25 77.9 kg (171 lb 11.8 oz)   Last weight: Likely accurate  Dosing Weight: 77.9 kg (171 lbs) - updated on 3/13 as decreased from admit.   BMI: Body mass index is 27.79 kg/m².  BMI CLASSIFICATION: 25-29.9 kg/m2 - overweight  IBW/lbs (Calculated) Male: 154 lbs           119% IBW  Usual Body Wt: 170 lbs (per EMR 3/27/24)      108% UBW on admit.     NUTRITION RELATED PHYSICAL FINDINGS:  - Nutrition Focused Physical Exam (NFPE): no wasting noted  - Fluid Accumulation: no edema. CT A/P: small to moderate ascites, mild anasarca. . See RN documentation for details  - Skin Integrity: at risk and intact. See RN documentation for details    NUTRITION DIAGNOSIS/PROBLEM:   Inadequate protein energy intake related to Decreased ability to consume sufficient energy in the setting of ETOH withdrawals as evidenced by decreased/poor intake x5 days.     NUTRITION DIAGNOSIS PROGRESS:  Improvement (unresolved) - EN feeds to resume and slower tube feeding advancement.     NUTRITION INTERVENTION:     NUTRITION PRESCRIPTION:   Estimated Nutrition needs: --dosing wt of 77.9 kg - wt taken on 3/12/25  Calories: 1924 kcals ([place] [employer] equation) for intubated pt.   Protein:  g protein/day (1.2-2.0 g protein/kg Dosing wt)  Fluid Needs: 2900 ml/day (35 ml/kg chronological age method) - adjust for clinical status (increased losses from high stool output)    - Diet:   No orders of the defined types were placed in this encounter.     - Enteral Nutrition:   Vital AF 1.2 at 30 ml/hr per NG tube.   advance feeds by 10 ml q 12 hrs to goal rate of 60 ml/hr. Based on average 22 hour infusion time Goal rate provides 1320 ml total  volume, 1584 kcal, 99 grams protein, 1069 ml total free water, and 100% RDI's.   Add ProSource 1 packet BID for additional 22 g protein, 80 kcals,  90 ml h20. + Additional calories from lipids 330 kcal from Propofol for total kcals: 1994 (100% needs) and total protein: 121 g protein (1.5 gm/kg). Total H20 without flushes  1159 ml   Flush with 300 ml H2O q 3 hrs (to provide 2400 ml total H2O from FWF daily and 3559 ml total fluids daily with EN) .   Meets 100% of estimated energy and 100% of estimated protein needs.      - Nutrition Care Plan:  EN feeds to provide >80% of estimated requirements within first week , minimize re-feeding risk.   - ONS (Oral Nutrition Supplements)/Meals/Snacks: NPO   - Vitamin and mineral supplements: folic acid and multivitamin/mineral. (Thiamine stopped per psych service)   - Feeding assistance: NPO  - Nutrition education: not appropriate at this time  - Coordination of nutrition care: collaboration with other providers -   - Discharge and transfer of nutrition care to new setting or provider: monitor plans    MONITOR AND EVALUATE/NUTRITION GOALS:  - Food and Nutrient Intake:      Monitor: for PO initiation in the future.   - Food and Nutrient Administration:      Monitor: tolerance to enteral nutrition, adequacy of enteral nutrition, for enteral nutrition adjustment, propofol rate, and FWF adequacy and adjustment  - Anthropometric Measurement:    Monitor weight  - Nutrition Goals:      EN + non-nutritive kcal >80% energy needs within the first week of feeds, labs within acceptable limits, minimize lean body mass loss, support body systems, improved GI status, and minimize refeeding risk    DIETITIAN FOLLOW UP: RD to follow and monitor nutrition status    Rachael Castillo RD, LDN, [facility] ([id number])

## 2025-03-13 NOTE — PROGRESS NOTES
Pulmonary/ICU/Critical Care Progress Note        Reason for Consultation: resp failure  Referring Physician: Dr. Diaz      Subjective:  Intubated yesterday PM  On levophed at 3 mcg  ID consulted  CT showed multifocal PNA and concern for pancreatitis. Lipase elevated to 444      From the initial consultation  Pt is unable to provide info  HPI: 43 yo male with hx of gout, GERD, admitted with acute ETOH hepatitis, ETOH use, emesis, USMAN. Seen by GI.  On CIWA protocol, thiamine, folic acid, MVI, BZDs, lactulose  Has been in the ICU for 2 days  ICU consulted this am for worsening hypoxemia  CXR this am with b/l infiltrates concerning for PNA      REVIEW OF SYSTEMS:  Positives and negatives as noted in HPI. All other review of systems otherwise are either limited (due to pt/family inability to provide) or negative.      PAST MEDICAL HISTORY:  Past Medical History:   Diagnosis Date    Esophageal reflux     Gout     Hernia          PAST SURGICAL HISTORY:  Past Surgical History:   Procedure Laterality Date    Colonoscopy N/A 11/1/2023    Procedure: COLONOSCOPY;  Surgeon: Vandana Austin MD;  Location: [facility] ENDOSCOPY    Egd  02/15/2016    Eh pr repair complex scalp arms legs 1.1 to 2.5 cm  2014    Elbow fracture surgery Right 1991    Hernia surgery      Inguinal hernia repair Left 01/17/2023         PAST SOCIAL HISTORY:  Social History     Socioeconomic History    Marital status: Single    Number of children: 0   Occupational History    Occupation: Supervisor, car [employer]   Tobacco Use    Smoking status: Former     Types: Cigarettes    Smokeless tobacco: Never   Vaping Use    Vaping status: Some Days   Substance and Sexual Activity    Alcohol use: Yes     Comment: per pt, DAILY HARD LEMONADE- [employer] hard lemonade 24oz cans, 6 cans daily    Drug use: Yes     Types: Cannabis     Comment: last use, couple months ago per pt report         PAST FAMILY HISTORY:  Family History   Problem Relation Age of Onset    Diabetes Father      Hypertension Father     Cancer Mother         liver       ALLERGIES:  Allergies[1]      MEDS:  Home Medications:  Medications Taking[2]    Scheduled Medication:   potassium phosphate dibasic 15 mmol in sodium chloride 0.9% 250 mL IVPB  15 mmol Intravenous Once    Followed by    potassium chloride  40 mEq Intravenous Once    sodium chloride   Intravenous Once    vancomycin  15 mg/kg Intravenous Q12H    midazolam (PF)  3 mg Intravenous Once    senna  10 mL Per NG Tube BID    docusate  100 mg Per NG Tube BID    chlorhexidine gluconate  15 mL Mouth/Throat BID@0800,2000    mineral oil-white petrolatum  1 Application Both Eyes Nightly    albumin human  25 g Intravenous Q8H    furosemide  20 mg Intravenous TID    vancomycin  125 mg Per NG Tube Daily    piperacillin-tazobactam  3.375 g Intravenous Q8H    thiamine  200 mg Intravenous TID    lactulose  20 g Oral 4 times per day    multivitamin  1 tablet Oral Daily    folic acid  1 mg Oral Daily    baclofen  10 mg Oral TID    pantoprazole  40 mg Intravenous Q12H     Continuous Infusing Medication:   propofol 25 mcg/kg/min (03/13/25 0730)    norepinephrine 3 mcg/min (03/13/25 0810)    dextrose 10% 30 mL/hr at 03/12/25 2046     PRN Medications:    haloperidol lactate    acetaminophen **OR** acetaminophen **OR** acetaminophen    fentaNYL    polyethylene glycol (PEG 3350)    bisacodyl    dextrose 10%    sodium bicarbonate **AND** lipase-protease-amylase (Lip-Prot-Amyl)    acetaminophen    prochlorperazine    LORazepam **OR** LORazepam    LORazepam **OR** [DISCONTINUED] LORazepam       PHYSICAL EXAM:  BP 93/53   Pulse 73   Temp 99.6 °F (37.6 °C) (Temporal)   Resp 16   Ht 5' 8\" (1.727 m)   Wt 171 lb 11.8 oz (77.9 kg)   SpO2 (!) 89%   BMI 26.11 kg/m²   Vent Mode: PC/AC  FiO2 (%):  [40 %-100 %] 40 %  S RR:  [12] 12  PEEP/CPAP (cm H2O):  [5 cm H20] 5 cm H20  MAP (cm H2O):  [11-16] 16  CONSTITUTIONAL: intubated sedated  HEENT: dobhoff  MOUTH: ETT  NECK/THROAT: no JVD. Trachea  midline. No obvious thyromegaly  LUNG: clear b/l no wheezing, + b/l crackles. Chest symmetric with respiratory motion  HEART: regular rate and rhythm, no obvious murmers or gallops noted  ABD: soft non tender. + bowel sounds. No organomegaly noted  EXT: no clubbing, cyanosis, or edema noted      IMAGES:   CT C/A/P 3/12/25  CONCLUSION:   1. Diffuse pancreatic enlargement and mild peripancreatic inflammatory stranding are new since abdominal CT from 8 days prior and concerning for acute interstitial edematous pancreatitis.  No gross pancreatic ductal dilation or peripancreatic collection.     Request correlation with serum lipase levels.   2. Left greater than right lower lobe airspace disease with intrinsic air bronchograms.  Findings are similar on the left and slightly improved on the right since chest CT from 5 days prior; multifocal pneumonia/aspiration is suspected.  Also identified   are new and/or worsening multifocal upper lobe predominant ground-glass density opacities throughout both lungs; these ground-glass density opacities could be infectious in nature or relate to acute respiratory distress syndrome.   3. Endotracheal tube with tip approximately 1 cm above the level of the em.  Enteric tube tip in the gastric fundus.  Rectal tube and Cedeño catheter also noted.   4. Fatty liver and cirrhosis.   5. Stable splenomegaly.   6. Small to moderate volume intra-abdominal ascites is new since prior abdominal CT.  There is also new mild anasarca.   7. Liquid contents in the colon could relate to a malabsorption state or low-grade infectious/inflammatory versus antibiotic associated colitis.  Mild scattered colonic diverticulosis, but with no CT findings of acute diverticulitis.   8. Low-density appearance of the intracardiac blood pool raises the possibility of underlying anemia. Correlate with hematologic parameters.    9. Lesser incidental findings as above.       CXR 3/12/25 with multifocal  infiltrates    CXR 3/11/25  FINDINGS/IMPRESSION:   1. There is redemonstration of patchy alveolar opacities throughout both lungs.  The findings could represent alveolar edema, a diffuse multifocal infectious process, or a combination.  The findings have not significantly changed since previous exam.   2. The heart mediastinal structures remain enlarged.  The there are trace bilateral pleural effusions.   3. The thoracic component of an enteric feeding tube is identified traversing the thoracic esophagus.  The distal tip is not visualized but lies well below the GE junction.     CT head 3/7/25  CONCLUSION:   Motion limits evaluation.  No evidence of acute intracranial abnormality.     CT chest 3/7/25  CONCLUSION:   1. Extensive bilateral airspace disease, concerning for potential multifocal pneumonia. Continued surveillance is advised.   2. Dense bilateral lower airspace consolidation is evident; aspiration pneumonitis is a differential diagnostic possibility. Follow-up is recommended to document resolution.    3. Dilatation of the main pulmonary artery trunk may relate to underlying pulmonary hypertension.    4. Small hiatal hernia with imaging manifestations that may be indicative underlying esophagitis.   5. Marked hepatic steatosis.   6. Lesser incidental findings as above.     CXR 3/7/25 with multifocal infiltrates, possible effusion on the left  CONCLUSION:   1. Cardiomegaly.  Tortuous aorta.   2. Extensive multifocal airspace opacification in the bilateral perihilar and basilar regions with left-sided effusion.  Differential includes pulmonary edema congestive failure versus multifocal pneumonitis.      CT abd/pelvis 3/4/25  CONCLUSION:   Severely fatty liver with subtle undulating contour.  Stable splenomegaly.  Tubular structures around the GE junction are suggestive of lower esophageal varices.  No ascites       LABS:  Recent Labs   Lab 03/12/25  0357 03/13/25  0455 03/13/25  0550   RBC 3.11* 2.32* 2.55*    HGB 7.5* 5.7* 6.2*   HCT 23.5* 17.5* 19.1*   MCV 75.6* 75.4* 74.9*   MCH 24.1* 24.6* 24.3*   MCHC 31.9 32.6 32.5   RDW 31.7* 31.2* 31.4*   NEPRELIM 14.09* 10.96* 12.05*   WBC 17.4* 13.5* 14.9*   PLT 96.0* 98.0* 107.0*       Recent Labs   Lab 03/10/25  0510 03/11/25  0510 03/12/25  0357 03/12/25  0918 03/12/25  1756 03/13/25  0455   * 100* 104*  --   --  129*   BUN 25* 23 23  --   --  27*   CREATSERUM 1.20 1.20 1.30  --   --  1.65*   EGFRCR 76 76 69  --   --  52*   CA 8.0* 7.9* 7.9*  --   --  6.9*   ALB 3.0* 3.1*  --  2.9*  --   --    * 153* 148*  --   --  145   K 3.5  3.5 3.6 3.3*  --  3.4* 2.9*   * 119* 112  --   --  111   CO2 27.0 25.0 26.0  --   --  24.0   ALKPHO 166* 171*  --  173*  --   --    * 175*  --  184*  --   --    * 105*  --  104*  --   --    BILT 11.4* 12.2*  --  12.0*  --   --    TP 5.5* 5.5*  --  5.4*  --   --        ASSESSMENT/PLAN:  Acute hypoxemic resp failure  -secondary to multifocal infiltrates. Given hx of emesis, concern for aspiration  -checked non contrast chest CT showed b/l infiltrates  -on zosyn doxy. Checked urine leg, strep pneumo, mrsa nares, blood cx neg thus far  -weaned from airvo to 6 LNC but now 02 needs increased and pt had increased WOB  -intubated on 3/12/25. Continue full MV support, Keep plat < 30 as per ARDS  -ID consulted and added vanco and stopped doxy. Remains on zosyn  -repeat CT with b/l infiltrates L>R  -underwent bronchoscopy with left BAL on 3/13/25  -aspiration precautions    Acute ETOH hepatitis and now pancreatitis  -GI following. Imaging as above-now with pancreatitis  -PPI, lactulose  -NGT    -hypotension-multifactorial from sedation, sepsis, anemia  -likely secondary to sedation and sepsis  -Pt didn't receive sepsis bolus b/c deemed fluid overloaded d/t possible cirrhosis. Continue low dose levophed for MAP > 65  -possibly d/t acute anemia. No obvious source seen. Plan for transfuse 1 unit pRBC    ETOH withdrawal  -CIWA  protocol  -psych following   -BZD/haldol PRN  -on propofol for sedation  -CT head neg for acute changes    USMAN and hypernatremia  -s/p bicarb infusion  -on D5 0.9 NS  -renal following. Na improving     Proph  -DVT: elevated INR low PLT. On SCDS    Dispo  -Full code  -sign other and brother (from out of town) are at bedside    Upon my evaluation, this patient had a high probability of imminent or life-threatening deterioration due to hypotension, shock, and respiratory failure, which required my direct attention, intervention, and personal management.    I have personally provided 37 minutes of critical care time, exclusive of time spent on separately billable procedures.  Time includes review of all pertinent laboratory/radiology results, discussion with consultants, and monitoring for potential decompensation.  Performed interventions included pressor drugs and managing mechanical ventilation.        Thank you for the opportunity to care for Sami Escobedo DO, MPH  Pulmonary Critical Care Medicine  [facility]                         [1]   Allergies  Allergen Reactions    Morphine SWELLING     SHORTNESS OF BREATH   [2]   No outpatient medications have been marked as taking for the 3/4/25 encounter (Hospital Encounter).

## 2025-03-14 ENCOUNTER — APPOINTMENT (OUTPATIENT)
Dept: GENERAL RADIOLOGY | Facility: HOSPITAL | Age: 45
End: 2025-03-14
Attending: NURSE PRACTITIONER
Payer: MEDICAID

## 2025-03-14 ENCOUNTER — APPOINTMENT (OUTPATIENT)
Dept: GENERAL RADIOLOGY | Facility: HOSPITAL | Age: 45
End: 2025-03-14
Attending: REGISTERED NURSE
Payer: MEDICAID

## 2025-03-14 LAB
ALBUMIN SERPL-MCNC: 3.8 G/DL (ref 3.2–4.8)
ALP LIVER SERPL-CCNC: 130 U/L
ALT SERPL-CCNC: 75 U/L
AMMONIA PLAS-MCNC: 13 UMOL/L (ref 11–32)
ANION GAP SERPL CALC-SCNC: 11 MMOL/L (ref 0–18)
AST SERPL-CCNC: 143 U/L (ref ?–34)
BASOPHILS # BLD AUTO: 0.03 X10(3) UL (ref 0–0.2)
BASOPHILS NFR BLD AUTO: 0.2 %
BILIRUB DIRECT SERPL-MCNC: 8.6 MG/DL (ref ?–0.3)
BILIRUB SERPL-MCNC: 10.8 MG/DL (ref 0.3–1.2)
BLOOD TYPE BARCODE: 5100
BUN BLD-MCNC: 33 MG/DL (ref 9–23)
BUN/CREAT SERPL: 16.5 (ref 10–20)
CALCIUM BLD-MCNC: 7.9 MG/DL (ref 8.7–10.4)
CHLORIDE SERPL-SCNC: 109 MMOL/L (ref 98–112)
CO2 SERPL-SCNC: 24 MMOL/L (ref 21–32)
CREAT BLD-MCNC: 2 MG/DL
DEPRECATED RDW RBC AUTO: 70.2 FL (ref 35.1–46.3)
EGFRCR SERPLBLD CKD-EPI 2021: 41 ML/MIN/1.73M2 (ref 60–?)
EOSINOPHIL # BLD AUTO: 0.42 X10(3) UL (ref 0–0.7)
EOSINOPHIL NFR BLD AUTO: 2.8 %
ERYTHROCYTE [DISTWIDTH] IN BLOOD BY AUTOMATED COUNT: 28.7 % (ref 11–15)
GLUCOSE BLD-MCNC: 106 MG/DL (ref 70–99)
GLUCOSE BLDC GLUCOMTR-MCNC: 111 MG/DL (ref 70–99)
GLUCOSE BLDC GLUCOMTR-MCNC: 125 MG/DL (ref 70–99)
GLUCOSE BLDC GLUCOMTR-MCNC: 129 MG/DL (ref 70–99)
HCT VFR BLD AUTO: 21.4 %
HGB BLD-MCNC: 7.1 G/DL
IMM GRANULOCYTES # BLD AUTO: 0.22 X10(3) UL (ref 0–1)
IMM GRANULOCYTES NFR BLD: 1.5 %
INR BLD: 1.75 (ref 0.8–1.2)
LYMPHOCYTES # BLD AUTO: 1.34 X10(3) UL (ref 1–4)
LYMPHOCYTES NFR BLD AUTO: 9 %
M TB CMPLX RRNA SPEC QL PROBE: NOT DETECTED
MAGNESIUM SERPL-MCNC: 1.3 MG/DL (ref 1.6–2.6)
MCH RBC QN AUTO: 25.3 PG (ref 26–34)
MCHC RBC AUTO-ENTMCNC: 33.2 G/DL (ref 31–37)
MCV RBC AUTO: 76.2 FL
MONOCYTES # BLD AUTO: 0.76 X10(3) UL (ref 0.1–1)
MONOCYTES NFR BLD AUTO: 5.1 %
MRSA DNA SPEC QL NAA+PROBE: NEGATIVE
NEUTROPHILS # BLD AUTO: 12.06 X10 (3) UL (ref 1.5–7.7)
NEUTROPHILS # BLD AUTO: 12.06 X10(3) UL (ref 1.5–7.7)
NEUTROPHILS NFR BLD AUTO: 81.4 %
OSMOLALITY SERPL CALC.SUM OF ELEC: 306 MOSM/KG (ref 275–295)
PHOSPHATE SERPL-MCNC: 2.3 MG/DL (ref 2.4–5.1)
PLATELET # BLD AUTO: 110 10(3)UL (ref 150–450)
PLATELETS.RETICULATED NFR BLD AUTO: 14.4 % (ref 0–7)
POTASSIUM SERPL-SCNC: 3.5 MMOL/L (ref 3.5–5.1)
POTASSIUM SERPL-SCNC: 3.5 MMOL/L (ref 3.5–5.1)
PROT SERPL-MCNC: 5.9 G/DL (ref 5.7–8.2)
PROTHROMBIN TIME: 21.4 SECONDS (ref 11.6–14.8)
RBC # BLD AUTO: 2.81 X10(6)UL
SODIUM SERPL-SCNC: 144 MMOL/L (ref 136–145)
UNIT VOLUME: 350 ML
WBC # BLD AUTO: 14.8 X10(3) UL (ref 4–11)

## 2025-03-14 PROCEDURE — 99232 SBSQ HOSP IP/OBS MODERATE 35: CPT | Performed by: REGISTERED NURSE

## 2025-03-14 PROCEDURE — 99291 CRITICAL CARE FIRST HOUR: CPT | Performed by: INTERNAL MEDICINE

## 2025-03-14 PROCEDURE — 99233 SBSQ HOSP IP/OBS HIGH 50: CPT | Performed by: INTERNAL MEDICINE

## 2025-03-14 PROCEDURE — 71045 X-RAY EXAM CHEST 1 VIEW: CPT | Performed by: REGISTERED NURSE

## 2025-03-14 PROCEDURE — 99233 SBSQ HOSP IP/OBS HIGH 50: CPT | Performed by: HOSPITALIST

## 2025-03-14 PROCEDURE — 71045 X-RAY EXAM CHEST 1 VIEW: CPT | Performed by: NURSE PRACTITIONER

## 2025-03-14 RX ORDER — MAGNESIUM OXIDE 400 MG/1
800 TABLET ORAL ONCE
Status: COMPLETED | OUTPATIENT
Start: 2025-03-14 | End: 2025-03-14

## 2025-03-14 RX ORDER — LACTULOSE 10 G/15ML
20 SOLUTION ORAL 3 TIMES DAILY
Status: DISCONTINUED | OUTPATIENT
Start: 2025-03-14 | End: 2025-03-15

## 2025-03-14 NOTE — PLAN OF CARE
CT Abd/Pelvis + for pancreatitis.  Lipase also elevated.  1UPRBC for Hgb 6.2.  Repeat 7.1.  Blood consent obtained from family.  Potassium replaced.  Bedside bronchoscopy with BAL.  Per Amy in infection control patient does not need to be in airborne isolation pending cytology results.  Low dose levophed still required.  Family working on Primary and secondary surrogate list.  Parents arrived from [place]. Patient remains in bilateral wrist restraints to maintain patient and line safety.  Family educated.     Problem: Safety Risk - Non-Violent Restraints  Goal: Patient will remain free from self-harm  Description: INTERVENTIONS:  - Apply the least restrictive restraint to prevent harm  - Notify patient and family of reasons restraints applied  - Assess for any contributing factors to confusion (electrolyte disturbances, delirium, medications)  - Discontinue any unnecessary medical devices as soon as possible  - Assess the patient's physical comfort, circulation, skin condition, hydration, nutrition and elimination needs   - Reorient and redirection as needed  - Assess for the need to continue restraints  Outcome: Progressing     Problem: RESPIRATORY - ADULT  Goal: Achieves optimal ventilation and oxygenation  Description: INTERVENTIONS:  - Assess for changes in respiratory status  - Assess for changes in mentation and behavior  - Position to facilitate oxygenation and minimize respiratory effort  - Oxygen supplementation based on oxygen saturation or ABGs  - Provide Smoking Cessation handout, if applicable  - Encourage broncho-pulmonary hygiene including cough, deep breathe, Incentive Spirometry  - Assess the need for suctioning and perform as needed  - Assess and instruct to report SOB or any respiratory difficulty  - Respiratory Therapy support as indicated  - Manage/alleviate anxiety  - Monitor for signs/symptoms of CO2 retention  Outcome: Progressing     Problem: CARDIOVASCULAR - ADULT  Goal: Maintains  optimal cardiac output and hemodynamic stability  Description: INTERVENTIONS:- Monitor vital signs, rhythm, and trends- Monitor for bleeding, hypotension and signs of decreased cardiac output- Evaluate effectiveness of vasoactive medications to optimize hemodynamic stability- Monitor arterial and/or venous puncture sites for bleeding and/or hematoma- Assess quality of pulses, skin color and temperature- Assess for signs of decreased coronary artery perfusion - ex. Angina- Evaluate fluid balance, assess for edema, trend weights  Outcome: Progressing

## 2025-03-14 NOTE — RESPIRATORY THERAPY NOTE
Patient received intubated and on ventilator PC 12/ PIP-22/ 60%/+10. Bilateral breath sounds auscultated. Suction provided when indicated. Slowly Weaned PEEP to 5. Pt saturating appropriately.  No acute events during the night. RT will continue to monitor.         03/14/25 0500   Readings   Total RR 20   Minute Ventilation (L/min) 11.2 L/min   Expiratory Tidal Volume 581 mL   PIP Observed (cm H2O) 31 cm H2O   MAP (cm H2O) 15

## 2025-03-14 NOTE — PROGRESS NOTES
[facility]     Gastroenterology Progress Note    Sami Grijalva Jr. Patient Status:  Inpatient    10/11/1980 MRN [id number]   Location [facility] 2W/SW Attending Natasha Araujo MD   Hosp Day # 10 PCP Kerri Medina MD       Subjective:   Pt intubated and sedated  Failed SBT per nursing  Not currently following commands  Objective:   Blood pressure 106/61, pulse 85, temperature 98.2 °F (36.8 °C), temperature source Temporal, resp. rate 23, height 5' 8\" (1.727 m), weight 171 lb 11.8 oz (77.9 kg), SpO2 99%. Body mass index is 26.11 kg/m².    Gen: Sedated, drowsy patient, NAD  HEENT: the sclera appears icteric, oropharynx clear, mucus membranes appear moist, DHT in place, TF running  CV: RRR  Lung: Mechanical breath sounds bilaterally   Abdomen: soft NTND abdomen with NABS appreciated   Skin: dry, warm, + jaundice  Ext: + LE edema is evident  Neuro: Sedated, unable to assess orientation and not interactive  Psych: calm, cooperative    Assessment and Plan:   Sami Grijalva Jr. is a 44 year old male w/ PMHx of longstanding alcohol use, cannabis use, GERD, who presents with generalized abdominal pain, hiccups and vomiting. GI consulted for acute alcohol hepatitis.     Main issue at this point is respiratory failure s/p intubation with imaging suggestive of underlying multifocal pneumonia and ARDS.      #Acute alcohol hepatitis with probable underlying cirrhosis  -CT a/p in ER severe fatty liver with subtle undulating contour  -US liver with hepatic steatosis, GB sludge and cholelithiasis without cholecystitis or biliary ductal dilation, hepatic and portal vein patent  -last EGD 2023 without varices   -high maddrey; holding off on steroids since concern for pulmonary infection   -CIWA high with active withdrawal and AMS   -nutrition  -etoh cessation     #Likely early cirrhosis, ETOH abuse, chronic w/u in process  MELD 3.0 on admission: 44  -PSE: Lethargic/drowsy in setting of ETOH  withdrawal treated with CIWA protocol, Ammonia 121, started on lactulose.  Ammonia now 13 with profuse diarrhea, will decrease lactulose  -Ascites: none on exam or US  -EV: None on last EGD 11/2023, tubular structure around GE junction suggestive of EV.  Will need repeat EGD electively  -HCC: No lesions on US or CT, AFP 2.7  - acute hyperbilirubinemia likely component of cholestasis of sepsis     #USMAN  -nephrology following      # Mild interstitial pancreatitis  - likely etoh induced  - continue supportive care     Recommend:  -appreciate ICU/pulm   -continue broad spectrum abx  -Lactulose goal 3-4 bm/d, decreased in setting of profuse diarrhea and normal ammonia  -Daily CMP, CBC, INR  -Empiric PPI BID    Case discussed with Johnathan Luu MD and Kathryn BALDWIN.    Annie Morrison DNP, FNP-[facility]  3/14/2025      Results:     Lab Results   Component Value Date    WBC 14.8 (H) 03/14/2025    HGB 7.1 (L) 03/14/2025    HCT 21.4 (L) 03/14/2025    .0 (L) 03/14/2025    CREATSERUM 2.00 (H) 03/14/2025    BUN 33 (H) 03/14/2025     03/14/2025    K 3.5 03/14/2025    K 3.5 03/14/2025     03/14/2025    CO2 24.0 03/14/2025     (H) 03/14/2025    CA 7.9 (L) 03/14/2025    ALB 2.9 (L) 03/12/2025    ALKPHO 173 (H) 03/12/2025    BILT 12.0 (H) 03/12/2025    TP 5.4 (L) 03/12/2025     (H) 03/12/2025     (H) 03/12/2025    INR 1.95 (H) 03/08/2025     (HH) 03/13/2025    DDIMER 1.13 (H) 08/25/2023    ESRML 27 (H) 10/01/2021    MG 1.3 (L) 03/14/2025    PHOS 2.3 (L) 03/14/2025    TROP 0.00 01/31/2017     (H) 03/06/2025    ETOH <3 03/04/2025       XR CHEST AP PORTABLE  (CPT=71045)    Result Date: 3/14/2025  CONCLUSION:   No significant change in the multifocal opacities involving both lungs, which are most noticeable in the left lung base.    Dictated by (CST): Emmett Quan MD on 3/14/2025 at 7:22 AM     Finalized by (CST): Emmett Quan MD on 3/14/2025 at 7:25 AM           CT CHEST+ABDOMEN+PELVIS(CPT=71250/11562)    Result Date: 3/13/2025  CONCLUSION:  1. Diffuse pancreatic enlargement and mild peripancreatic inflammatory stranding are new since abdominal CT from 8 days prior and concerning for acute interstitial edematous pancreatitis.  No gross pancreatic ductal dilation or peripancreatic collection.   Request correlation with serum lipase levels. 2. Left greater than right lower lobe airspace disease with intrinsic air bronchograms.  Findings are similar on the left and slightly improved on the right since chest CT from 5 days prior; multifocal pneumonia/aspiration is suspected.  Also identified are new and/or worsening multifocal upper lobe predominant ground-glass density opacities throughout both lungs; these ground-glass density opacities could be infectious in nature or relate to acute respiratory distress syndrome. 3. Endotracheal tube with tip approximately 1 cm above the level of the em.  Enteric tube tip in the gastric fundus.  Rectal tube and Cedeño catheter also noted. 4. Fatty liver and cirrhosis. 5. Stable splenomegaly. 6. Small to moderate volume intra-abdominal ascites is new since prior abdominal CT.  There is also new mild anasarca. 7. Liquid contents in the colon could relate to a malabsorption state or low-grade infectious/inflammatory versus antibiotic associated colitis.  Mild scattered colonic diverticulosis, but with no CT findings of acute diverticulitis. 8. Low-density appearance of the intracardiac blood pool raises the possibility of underlying anemia. Correlate with hematologic parameters.  9. Lesser incidental findings as above.   A preliminary report was issued by the [facility] teleradiology service. There are no major discrepancies.  elm-remote  Dictated by (CST): Arcenio Norman MD on 3/13/2025 at 8:14 AM     Finalized by (CST): Arcenio Norman MD on 3/13/2025 at 8:28 AM          XR CHEST AP PORTABLE  (CPT=71045)    Result Date:  3/12/2025  CONCLUSION:   Endotracheal tube terminates 2.8 cm above the em.  Consider 1-2 cm of retraction.  Unchanged esophagogastric tube.  Persistent extensive bilateral patchy opacities, greatest in the left lower lung.      Dictated by (CST): Riley Villa MD on 3/12/2025 at 3:31 PM     Finalized by (CST): Riley Villa MD on 3/12/2025 at 3:33 PM          XR CHEST AP PORTABLE  (CPT=71045)    Result Date: 3/12/2025  CONCLUSION:   No new abnormality.  Multifocal bilateral pulmonary opacities are similar to the prior exam.  Unchanged esophagogastric tube.     Dictated by (CST): Riley Villa MD on 3/12/2025 at 1:20 PM     Finalized by (CST): Riley Villa MD on 3/12/2025 at 1:21 PM

## 2025-03-14 NOTE — PROGRESS NOTES
[facility]  part of [facility]    Progress Note    Sami Grijalva  Patient Status:  Inpatient    10/11/1980 MRN [id number]   Location [facility] 2W/SW Attending Cameron Torres MD   Hosp Day # 10 PCP Kerri Medina MD     Chief Complaint:     USMAN    Subjective:   Subjective:    Patient seen and examined this afternoon  Family at bedside  Low grade,       Objective:   Blood pressure 103/59, pulse 93, temperature 99 °F (37.2 °C), temperature source Temporal, resp. rate 20, height 5' 8\" (1.727 m), weight 187 lb 13.3 oz (85.2 kg), SpO2 96%.  Physical Exam    General: intubated/sedated  HEENT: EOMI PERRLA, atraumatic normocephalic  Cardiac: S1-S2 appreciated  Lungs: Good air entry bilaterally clear to auscultation  Abdomen: Soft nontender nondistended positive bowel sounds  Ext: Peripheral pulses are positive  Neuro: No focal deficits noted  Psych: Normal mood  Skin: No rashes noted  MSK: Full range of motion intact      Results:   Lab Results   Component Value Date    WBC 14.8 (H) 2025    HGB 7.1 (L) 2025    HCT 21.4 (L) 2025    .0 (L) 2025    CREATSERUM 2.00 (H) 2025    BUN 33 (H) 2025     2025    K 3.5 2025    K 3.5 2025     2025    CO2 24.0 2025     (H) 2025    CA 7.9 (L) 2025    ALB 3.8 2025    ALKPHO 130 (H) 2025    BILT 10.8 (H) 2025    TP 5.9 2025     (H) 2025    ALT 75 (H) 2025    INR 1.75 (H) 2025     (HH) 2025    DDIMER 1.13 (H) 2023    ESRML 27 (H) 10/01/2021    MG 1.3 (L) 2025    PHOS 2.3 (L) 2025    TROP 0.00 2017    TROPHS <3 10/07/2024     (H) 2025    ETOH <3 2025       XR CHEST AP PORTABLE  (CPT=71045)    Result Date: 3/14/2025  CONCLUSION:   No significant change in the multifocal opacities involving both lungs, which are most noticeable in the left lung  base.    Dictated by (CST): Emmett Quan MD on 3/14/2025 at 7:22 AM     Finalized by (CST): Emmett Quan MD on 3/14/2025 at 7:25 AM          CT CHEST+ABDOMEN+PELVIS(CPT=71250/59323)    Result Date: 3/13/2025  CONCLUSION:  1. Diffuse pancreatic enlargement and mild peripancreatic inflammatory stranding are new since abdominal CT from 8 days prior and concerning for acute interstitial edematous pancreatitis.  No gross pancreatic ductal dilation or peripancreatic collection.   Request correlation with serum lipase levels. 2. Left greater than right lower lobe airspace disease with intrinsic air bronchograms.  Findings are similar on the left and slightly improved on the right since chest CT from 5 days prior; multifocal pneumonia/aspiration is suspected.  Also identified are new and/or worsening multifocal upper lobe predominant ground-glass density opacities throughout both lungs; these ground-glass density opacities could be infectious in nature or relate to acute respiratory distress syndrome. 3. Endotracheal tube with tip approximately 1 cm above the level of the em.  Enteric tube tip in the gastric fundus.  Rectal tube and Cedeño catheter also noted. 4. Fatty liver and cirrhosis. 5. Stable splenomegaly. 6. Small to moderate volume intra-abdominal ascites is new since prior abdominal CT.  There is also new mild anasarca. 7. Liquid contents in the colon could relate to a malabsorption state or low-grade infectious/inflammatory versus antibiotic associated colitis.  Mild scattered colonic diverticulosis, but with no CT findings of acute diverticulitis. 8. Low-density appearance of the intracardiac blood pool raises the possibility of underlying anemia. Correlate with hematologic parameters.  9. Lesser incidental findings as above.   A preliminary report was issued by the [facility] teleradiology service. There are no major discrepancies.  elm-remote  Dictated by (CST): Arcenio Norman MD on 3/13/2025  at 8:14 AM     Finalized by (CST): Arcenio Norman MD on 3/13/2025 at 8:28 AM               Assessment & Plan:       Acute hypoxemic resp failure-worsening   Multifocal pna  -Concern for possible aspiration given hx of emesis and altered mental status.   -Continue on zosyn, doxy.   -Continues to require high levels of supplemental O2.  Weaning as able  -aspiration precautions  -intubated now , per pulm     Acute ETOH hepatitis   -last drink reportedly 4 days prior to admission  -Reported emesis and poor PO intake, without overt bleeding   -T bili upon arrival to ER 10.5, platelet 65, cr 4.22  -CT a/p reviewed, noted severe fatty liver with subtle undulating contour  -US liver reviewed, noted hepatic steatosis, GB sludge and cholelithiasis without cholecystitis or biliary ductal dilation, hepatic and portal vein patent  -MELD 3.0 on admission: 44   -GI consulted, appreciate further recommendations     Alcohol Abuse with withdrawal   -CIWA monitoring with alcohol withdrawal protocol  -thiamine, folic acid, multivitamin  -Psychiatry consulted, appreciate recommendations     Altered mental status  -Slowly improving  -Likely metabolic encephalopathy  -Initially noted to have elevated ammonia levels noted consistent with hepatic encephalopathy.  -Continue lactulose   -Patient also with USMAN and electrolyte abnormalities.  -Continue treating underlying conditions  -Continue to monitor     Acute Kidney Injury   -Continue improvement noted  -Avoiding Nephrotoxic agents  - Cont to monitor  -Nephrology on consult, appreciate further recommendations     Hypernatremia  -Persistent elevations noted  -Nephrology on consult, recommend continue D5 IV fluids and increased free water flushes.  -Continue to monitor     Plan of care discussed with patient's sister and partner at bedside.  Discussed management/test result(s) with Rn      Quality:  DVT Prophylaxis: SCDs  CODE status: Full  Estimated date of discharge: TBD  Discharge is  dependent on: clinical stability     MDM: High, acute illness/severe exacerbation of chronic illness posing threat to life.  IV medications requiring close inpatient monitoring             Cameron Torres MD  3/14/2025

## 2025-03-14 NOTE — PROGRESS NOTES
INFECTIOUS DISEASE PROGRESS NOTE  [facility]  part of [facility] ID PROGRESS NOTE    Sami Grijalva Jr. Patient Status:  Inpatient    10/11/1980 MRN [id number]   Location [facility] 2W/SW Attending Natasha Araujo MD   Hosp Day # 10 PCP Kerri Medina MD     Subjective:  ROS unable to be reviewed. Remains intubated and sedated. 60% FiO2 on ventilator. 625 mL from flexiseal overnight.    ASSESSMENT:    Antibiotics: Meropenem 3/14-   (doxycycline, vancomycin, zosyn)     # Acute hypoxic respiratory failure with fever and multifocal pneumonia   -S/p intubation 3/12   -s/p bronch 3/13, cx pending   -MRSA nares negative  # Acute aspiration pneumonia  # Acute leukocytosis   # Acute anemia  # Acute pancreatitis  # Alcoholic hepatitis with encephalopathy on admission               - CT A/P with severe fatty liver               - US GB with sludge w/o cholecystitis   - CT C/A/P 3/12 with pancreatitis  # USMAN - improved  # EtOH abuse and withdrawal        PLAN:  -  Stop vancomycin and zosyn and broaden to meropenem.  -  FU bronch cx.  -  Follow fever curve, wbc.  -  Reviewed labs, micro, imaging reports, available old records.  -  d/w RN.     History of Present Illness:  44-year-old male with a history of GERD, alcohol use was admitted to the hospital on 3/4 generalized abdominal pain, hiccups, vomiting with difficulty tolerating orals.  Last drink prior to admission was 1 day prior.  Found to have sepsis with elevated lactic acid, hypotension, WBC 17.5.  Responded to IV fluids and received lactulose for elevated ammonia.  CT A/P with severe fatty liver, stable splenomegaly.  Was lethargic the first few days and had soft restraints with Cedeño, Flexi-Seal, NG tube.  Has been essentially afebrile here with a temperature on 3/10 of 100.6 but significant hypoxia up and down and currently increased up to 40 L with increase WBC to 17.4.  Initial USMAN has improved.  Was started on IV  Zosyn and doxycycline on 3/7 when patient became more hypoxic and chest x-ray showed extensive multifocal pneumonia with CT chest showing similar findings.  Possible aspiration.    Physical Exam:  /57   Pulse 91   Temp 98.5 °F (36.9 °C) (Temporal)   Resp 21   Ht 5' 8\" (1.727 m)   Wt 187 lb 13.3 oz (85.2 kg)   SpO2 97%   BMI 28.56 kg/m²     Gen:   Intubated, sedated  HEENT:  ETT+, dobhoff+, neck supple  CV/lungs:  Regular rate and rhythm, lungs clear vent sounds  Abdom:  Soft, no TTP  Skin/extrem:  No rashes, jaundiced  :   Cedeño draining yellow urine  Lines:  Midline+  Flexiseal+    Laboratory Data: Reviewed    Microbiology: Reviewed    Radiology: Reviewed      [facility]  [phone]  3/14/2025

## 2025-03-14 NOTE — PROGRESS NOTES
Pulmonary/ICU/Critical Care Progress Note        Reason for Consultation: resp failure  Referring Physician: Dr. Diaz      Subjective:  On vent 60%/PEEP 5  On levo 1  RASS -4      From the initial consultation  Pt is unable to provide info  HPI: 43 yo male with hx of gout, GERD, admitted with acute ETOH hepatitis, ETOH use, emesis, USMAN. Seen by GI.  On CIWA protocol, thiamine, folic acid, MVI, BZDs, lactulose  Has been in the ICU for 2 days  ICU consulted this am for worsening hypoxemia  CXR this am with b/l infiltrates concerning for PNA      REVIEW OF SYSTEMS:  Positives and negatives as noted in HPI. All other review of systems otherwise are either limited (due to pt/family inability to provide) or negative.      PAST MEDICAL HISTORY:  Past Medical History:   Diagnosis Date    Esophageal reflux     Gout     Hernia          PAST SURGICAL HISTORY:  Past Surgical History:   Procedure Laterality Date    Colonoscopy N/A 11/1/2023    Procedure: COLONOSCOPY;  Surgeon: Vandana Austin MD;  Location: [facility] ENDOSCOPY    Egd  02/15/2016    Eh pr repair complex scalp arms legs 1.1 to 2.5 cm  2014    Elbow fracture surgery Right 1991    Hernia surgery      Inguinal hernia repair Left 01/17/2023         PAST SOCIAL HISTORY:  Social History     Socioeconomic History    Marital status: Single    Number of children: 0   Occupational History    Occupation: Supervisor, car [employer]   Tobacco Use    Smoking status: Former     Types: Cigarettes    Smokeless tobacco: Never   Vaping Use    Vaping status: Some Days   Substance and Sexual Activity    Alcohol use: Yes     Comment: per pt, DAILY HARD LEMONADE- [employer] hard lemonade 24oz cans, 6 cans daily    Drug use: Yes     Types: Cannabis     Comment: last use, couple months ago per pt report         PAST FAMILY HISTORY:  Family History   Problem Relation Age of Onset    Diabetes Father     Hypertension Father     Cancer Mother         liver       ALLERGIES:  Allergies[1]      MEDS:  Home  Medications:  Medications Taking[2]    Scheduled Medication:   sodium phosphate  15 mmol Intravenous Once    vancomycin  15 mg/kg Intravenous Q12H    midazolam (PF)  3 mg Intravenous Once    senna  10 mL Per NG Tube BID    docusate  100 mg Per NG Tube BID    chlorhexidine gluconate  15 mL Mouth/Throat BID@0800,2000    mineral oil-white petrolatum  1 Application Both Eyes Nightly    albumin human  25 g Intravenous Q8H    furosemide  20 mg Intravenous TID    vancomycin  125 mg Per NG Tube Daily    piperacillin-tazobactam  3.375 g Intravenous Q8H    lactulose  20 g Oral 4 times per day    multivitamin  1 tablet Oral Daily    folic acid  1 mg Oral Daily    baclofen  10 mg Oral TID    pantoprazole  40 mg Intravenous Q12H     Continuous Infusing Medication:   propofol 35 mcg/kg/min (03/14/25 0321)    norepinephrine 2 mcg/min (03/14/25 0321)    dextrose 10% 30 mL/hr at 03/12/25 2046     PRN Medications:    haloperidol lactate    acetaminophen **OR** acetaminophen **OR** acetaminophen    fentaNYL    polyethylene glycol (PEG 3350)    bisacodyl    dextrose 10%    sodium bicarbonate **AND** lipase-protease-amylase (Lip-Prot-Amyl)    acetaminophen    prochlorperazine    LORazepam **OR** LORazepam    LORazepam **OR** [DISCONTINUED] LORazepam       PHYSICAL EXAM:  /61 (BP Location: Right arm)   Pulse 85   Temp 98.2 °F (36.8 °C) (Temporal)   Resp 23   Ht 5' 8\" (1.727 m)   Wt 171 lb 11.8 oz (77.9 kg)   SpO2 99%   BMI 26.11 kg/m²   Vent Mode: PC/AC  FiO2 (%):  [60 %] 60 %  S RR:  [12] 12  PEEP/CPAP (cm H2O):  [5 cm H20-10 cm H20] 5 cm H20  MAP (cm H2O):  [13-17] 13  CONSTITUTIONAL: intubated sedated  HEENT: dobhoff  MOUTH: ETT  NECK/THROAT: no JVD. Trachea midline. No obvious thyromegaly  LUNG: clear b/l no wheezing, + b/l crackles. Chest symmetric with respiratory motion  HEART: regular rate and rhythm, no obvious murmers or gallops noted  ABD: soft non tender. + bowel sounds. No organomegaly noted  EXT: no clubbing,  cyanosis, or edema noted      IMAGES:   CT C/A/P 3/12/25  CONCLUSION:   1. Diffuse pancreatic enlargement and mild peripancreatic inflammatory stranding are new since abdominal CT from 8 days prior and concerning for acute interstitial edematous pancreatitis.  No gross pancreatic ductal dilation or peripancreatic collection.     Request correlation with serum lipase levels.   2. Left greater than right lower lobe airspace disease with intrinsic air bronchograms.  Findings are similar on the left and slightly improved on the right since chest CT from 5 days prior; multifocal pneumonia/aspiration is suspected.  Also identified   are new and/or worsening multifocal upper lobe predominant ground-glass density opacities throughout both lungs; these ground-glass density opacities could be infectious in nature or relate to acute respiratory distress syndrome.   3. Endotracheal tube with tip approximately 1 cm above the level of the em.  Enteric tube tip in the gastric fundus.  Rectal tube and Cedeño catheter also noted.   4. Fatty liver and cirrhosis.   5. Stable splenomegaly.   6. Small to moderate volume intra-abdominal ascites is new since prior abdominal CT.  There is also new mild anasarca.   7. Liquid contents in the colon could relate to a malabsorption state or low-grade infectious/inflammatory versus antibiotic associated colitis.  Mild scattered colonic diverticulosis, but with no CT findings of acute diverticulitis.   8. Low-density appearance of the intracardiac blood pool raises the possibility of underlying anemia. Correlate with hematologic parameters.    9. Lesser incidental findings as above.       CXR 3/12/25 with multifocal infiltrates    CXR 3/11/25  FINDINGS/IMPRESSION:   1. There is redemonstration of patchy alveolar opacities throughout both lungs.  The findings could represent alveolar edema, a diffuse multifocal infectious process, or a combination.  The findings have not significantly changed  since previous exam.   2. The heart mediastinal structures remain enlarged.  The there are trace bilateral pleural effusions.   3. The thoracic component of an enteric feeding tube is identified traversing the thoracic esophagus.  The distal tip is not visualized but lies well below the GE junction.     CT head 3/7/25  CONCLUSION:   Motion limits evaluation.  No evidence of acute intracranial abnormality.     CT chest 3/7/25  CONCLUSION:   1. Extensive bilateral airspace disease, concerning for potential multifocal pneumonia. Continued surveillance is advised.   2. Dense bilateral lower airspace consolidation is evident; aspiration pneumonitis is a differential diagnostic possibility. Follow-up is recommended to document resolution.    3. Dilatation of the main pulmonary artery trunk may relate to underlying pulmonary hypertension.    4. Small hiatal hernia with imaging manifestations that may be indicative underlying esophagitis.   5. Marked hepatic steatosis.   6. Lesser incidental findings as above.     CXR 3/7/25 with multifocal infiltrates, possible effusion on the left  CONCLUSION:   1. Cardiomegaly.  Tortuous aorta.   2. Extensive multifocal airspace opacification in the bilateral perihilar and basilar regions with left-sided effusion.  Differential includes pulmonary edema congestive failure versus multifocal pneumonitis.      CT abd/pelvis 3/4/25  CONCLUSION:   Severely fatty liver with subtle undulating contour.  Stable splenomegaly.  Tubular structures around the GE junction are suggestive of lower esophageal varices.  No ascites       LABS:  Recent Labs   Lab 03/13/25  0455 03/13/25  0550 03/13/25  1517 03/14/25  0436   RBC 2.32* 2.55*  --  2.81*   HGB 5.7* 6.2* 7.1* 7.1*   HCT 17.5* 19.1*  --  21.4*   MCV 75.4* 74.9*  --  76.2*   MCH 24.6* 24.3*  --  25.3*   MCHC 32.6 32.5  --  33.2   RDW 31.2* 31.4*  --  28.7*   NEPRELIM 10.96* 12.05*  --  12.06*   WBC 13.5* 14.9*  --  14.8*   PLT 98.0* 107.0*  --   110.0*       Recent Labs   Lab 03/10/25  0510 03/11/25  0510 03/12/25  0357 03/12/25  0918 03/12/25  1756 03/13/25  0455 03/13/25  1902 03/14/25  0436   * 100* 104*  --   --  129*  --  106*   BUN 25* 23 23  --   --  27*  --  33*   CREATSERUM 1.20 1.20 1.30  --   --  1.65*  --  2.00*   EGFRCR 76 76 69  --   --  52*  --  41*   CA 8.0* 7.9* 7.9*  --   --  6.9*  --  7.9*   ALB 3.0* 3.1*  --  2.9*  --   --   --   --    * 153* 148*  --   --  145  --  144   K 3.5  3.5 3.6 3.3*  --    < > 2.9* 3.3* 3.5  3.5   * 119* 112  --   --  111  --  109   CO2 27.0 25.0 26.0  --   --  24.0  --  24.0   ALKPHO 166* 171*  --  173*  --   --   --   --    * 175*  --  184*  --   --   --   --    * 105*  --  104*  --   --   --   --    BILT 11.4* 12.2*  --  12.0*  --   --   --   --    TP 5.5* 5.5*  --  5.4*  --   --   --   --     < > = values in this interval not displayed.       ASSESSMENT/PLAN:  Acute hypoxemic resp failure  -secondary to multifocal infiltrates. Given hx of emesis, concern for aspiration  -checked non contrast chest CT showed b/l infiltrates  -initially on zosyn, doxy. Checked urine leg, strep pneumo, mrsa nares, blood cx neg thus far  -initially weaned from airvo to 6 LNC but then declined and was intubated on 3/12/25. Continue full MV support but decrease Fi02 as sats are 98%. Keep plat < 30 as per ARDS  -ID consulted and stopped doxy and zosyn. Switched to meropenem  -repeat CT with b/l infiltrates L>R  -underwent bronchoscopy with left BAL on 3/13/25. Cultures NGTD  -aspiration precautions  -wean sedation to a goal RASS -1    Acute ETOH hepatitis and now pancreatitis  -GI following. Imaging as above-now with pancreatitis  -PPI, lactulose  -NGT    -hypotension-multifactorial from sedation, sepsis, anemia  -likely secondary to sedation and sepsis  -Pt didn't receive sepsis bolus b/c deemed fluid overloaded d/t possible cirrhosis. Continue low dose levophed for MAP > 65  -possibly d/t acute  anemia. No obvious source seen. S/p transfused 1 unit pRBC on 3/13/25    ETOH withdrawal  -CIWA protocol  -psych following   -BZD/haldol PRN  -on propofol for sedation  -CT head neg for acute changes    USMAN and hypernatremia  -s/p bicarb infusion  -s/p D5 0.9 NS  -renal following. Na improving     Proph  -DVT: elevated INR low PLT, anemia. On SCDS  -nutrition: TF    Dispo  -Full code  -sign other and brother (from out of town) are at bedside    Upon my evaluation, this patient had a high probability of imminent or life-threatening deterioration due to shock and respiratory failure, which required my direct attention, intervention, and personal management.    I have personally provided 31 minutes of critical care time, exclusive of time spent on separately billable procedures.  Time includes review of all pertinent laboratory/radiology results, discussion with consultants, and monitoring for potential decompensation.  Performed interventions included managing mechanical ventilation.    Thank you for the opportunity to care for Sami Grijalva Jr..      JOLYNN Escobedo DO, MPH  Pulmonary Critical Care Medicine  [facility]                         [1]   Allergies  Allergen Reactions    Morphine SWELLING     SHORTNESS OF BREATH   [2]   No outpatient medications have been marked as taking for the 3/4/25 encounter (Hospital Encounter).

## 2025-03-14 NOTE — PAYOR COMM NOTE
--------------  3/14 CONTINUED STAY REVIEW    Payor: [facility]  Subscriber #:  [id number]  Authorization Number: [id number]    REMAINS VENTED IN ICU    ID:      Subjective:  ROS unable to be reviewed. Remains intubated and sedated. 60% FiO2 on ventilator. 625 mL from flexiseal overnight.     ASSESSMENT:     Antibiotics: Meropenem 3/14-   (doxycycline, vancomycin, zosyn)     # Acute hypoxic respiratory failure with fever and multifocal pneumonia               -S/p intubation 3/12               -s/p bronch 3/13, cx pending               -MRSA nares negative  # Acute aspiration pneumonia  # Acute leukocytosis   # Acute anemia  # Acute pancreatitis  # Alcoholic hepatitis with encephalopathy on admission               - CT A/P with severe fatty liver               - US GB with sludge w/o cholecystitis               - CT C/A/P 3/12 with pancreatitis  # USMAN - improved  # EtOH abuse and withdrawal        PLAN:  -  Stop vancomycin and zosyn and broaden to meropenem.  -  FU bronch cx.  -  Follow fever curve, wbc.  -  Reviewed labs, micro, imaging reports, available old records.  -  d/w RN.     Physical Exam:  /57   Pulse 91   Temp 98.5 °F (36.9 °C) (Temporal)   Resp 21   Ht 5' 8\" (1.727 m)   Wt 187 lb 13.3 oz (85.2 kg)   SpO2 97%   BMI 28.56 kg/m²      Gen:                   Intubated, sedated  HEENT:             ETT+, dobhoff+, neck supple  CV/lungs:           Regular rate and rhythm, lungs clear vent sounds  Abdom:              Soft, no TTP  Skin/extrem:      No rashes, jaundiced  :                    Cedeño draining yellow urine  Lines:                 Midline+        GI:    Pt intubated and sedated  Failed SBT per nursing  Not currently following commands    Objective:   Blood pressure 106/61, pulse 85, temperature 98.2 °F (36.8 °C), temperature source Temporal, resp. rate 23, height 5' 8\" (1.727 m), weight 171 lb 11.8 oz (77.9 kg), SpO2 99%. Body mass index is 26.11 kg/m².      Gen: Sedated, drowsy patient, NAD  HEENT: the sclera appears icteric, oropharynx clear, mucus membranes appear moist, DHT in place, TF running  CV: RRR  Lung: Mechanical breath sounds bilaterally   Abdomen: soft NTND abdomen with NABS appreciated   Skin: dry, warm, + jaundice  Ext: + LE edema is evident  Neuro: Sedated, unable to assess orientation and not interactive  Psych: calm, cooperative     Assessment and Plan:   Sami Grijalva Jr. is a 44 year old male w/ PMHx of longstanding alcohol use, cannabis use, GERD, who presents with generalized abdominal pain, hiccups and vomiting. GI consulted for acute alcohol hepatitis.     Main issue at this point is respiratory failure s/p intubation with imaging suggestive of underlying multifocal pneumonia and ARDS.      #Acute alcohol hepatitis with probable underlying cirrhosis  -CT a/p in ER severe fatty liver with subtle undulating contour  -US liver with hepatic steatosis, GB sludge and cholelithiasis without cholecystitis or biliary ductal dilation, hepatic and portal vein patent  -last EGD 11/2023 without varices   -high maddrey; holding off on steroids since concern for pulmonary infection   -CIWA high with active withdrawal and AMS   -nutrition  -etoh cessation     #Likely early cirrhosis, ETOH abuse, chronic w/u in process  MELD 3.0 on admission: 44  -PSE: Lethargic/drowsy in setting of ETOH withdrawal treated with CIWA protocol, Ammonia 121, started on lactulose.  Ammonia now 13 with profuse diarrhea, will decrease lactulose  -Ascites: none on exam or US  -EV: None on last EGD 11/2023, tubular structure around GE junction suggestive of EV.  Will need repeat EGD electively  -HCC: No lesions on US or CT, AFP 2.7  - acute hyperbilirubinemia likely component of cholestasis of sepsis     #USMAN  -nephrology following      # Mild interstitial pancreatitis  - likely etoh induced  - continue supportive care     Recommend:  -appreciate ICU/pulm   -continue broad spectrum  abx  -Lactulose goal 3-4 bm/d, decreased in setting of profuse diarrhea and normal ammonia  -Daily CMP, CBC, INR  -Empiric PPI BID    Lab Results   Component Value Date     WBC 14.8 (H) 03/14/2025     HGB 7.1 (L) 03/14/2025     HCT 21.4 (L) 03/14/2025     .0 (L) 03/14/2025     CREATSERUM 2.00 (H) 03/14/2025     BUN 33 (H) 03/14/2025      03/14/2025     K 3.5 03/14/2025     K 3.5 03/14/2025      03/14/2025     CO2 24.0 03/14/2025      (H) 03/14/2025     CA 7.9 (L) 03/14/2025     MG 1.3 (L) 03/14/2025     PHOS 2.3 (L) 03/14/2025     MEDICATIONS ADMINISTERED IN LAST 1 DAY:  albumin human (Albumin) 25% injection 25 g       Date Action Dose Route User    3/14/2025 1045 New Bag 25 g Intravenous FoorKathryn RN    3/14/2025 0115 New Bag 25 g Intravenous AnthonyogtJohn RN    3/13/2025 1831 New Bag 25 g Intravenous FoorKathryn RN     furosemide (Lasix) 10 mg/mL injection 20 mg       Date Action Dose Route User    3/14/2025 1045 Given 20 mg Intravenous Kathryn Fortune RN    3/13/2025 2046 Given 20 mg Intravenous John Jeffrey RN    3/13/2025 1641 Given 20 mg Intravenous Kathryn Fortune RN          lactulose (CHRONULAC) 10 GM/15ML solution 20 g       Date Action Dose Route User    3/14/2025 0624 Given 20 g Oral AnthonyogJohn campos RN    3/14/2025 0001 Given 20 g Oral AnthonyogtJohn RN    3/13/2025 1831 Given 20 g Oral MoorKathryn RN          magnesium oxide (Mag-Ox) tab 800 mg       Date Action Dose Route User    3/14/2025 0615 Given 800 mg Oral John Jeffrey RN          meropenem (Merrem) 1,000 mg in sodium chloride 0.9% 100 mL IVPB-MBP       Date Action Dose Route User    3/14/2025 1238 New Bag 1,000 mg Intravenous Kathryn Fortune RN          norepinephrine (Levophed) 4 mg/250mL infusion premix       Date Action Dose Route User    3/14/2025 0321 New Bag 2 mcg/min Intravenous John Jeffrey RN    3/13/2025 1600 Rate/Dose Change 2 mcg/min Intravenous Kathryn Fortune RN          pantoprazole  (Protonix) 40 mg in sodium chloride 0.9% PF 10 mL IV push       Date Action Dose Route User    3/14/2025 1153 Given 40 mg Intravenous Kathryn Fortune RN    3/14/2025 0000 Given 40 mg Intravenous John Jeffrey RN          piperacillin-tazobactam (Zosyn) 3.375 g in dextrose 5% 100 mL IVPB-ADDV       Date Action Dose Route User    3/14/2025 0422 New Bag 3.375 g Intravenous John Jeffrey RN    3/13/2025 2040 New Bag 3.375 g Intravenous John Jeffrey RN          potassium chloride (Klor-Con) 20 MEQ oral powder 40 mEq       Date Action Dose Route User    3/13/2025 2043 Given 40 mEq Oral John Jeffrey RN          propofol (Diprivan) 10 mg/mL infusion premix       Date Action Dose Route User    3/14/2025 1100 Rate/Dose Change 25 mcg/kg/min × 82.9 kg (Dosing Weight) Intravenous Kathryn Fortune RN    3/14/2025 1003 New Bag 20 mcg/kg/min × 82.9 kg (Dosing Weight) Intravenous Kathryn Fortune RN    3/14/2025 0321 New Bag 35 mcg/kg/min × 82.9 kg (Dosing Weight) Intravenous John Jeffrey RN    3/13/2025 2117 New Bag 35 mcg/kg/min × 82.9 kg (Dosing Weight) Intravenous John Jeffrey RN    3/13/2025 1851 Rate/Dose Change 30 mcg/kg/min × 82.9 kg (Dosing Weight) Intravenous Kathryn Fortune RN    3/13/2025 1609 New Bag 25 mcg/kg/min × 82.9 kg (Dosing Weight) Intravenous Kathryn Fortune RN    3/13/2025 1525 Rate/Dose Verify 25 mcg/kg/min × 82.9 kg (Dosing Weight) Intravenous Kathryn Fortune RN          sodium phosphate 15 mmol in 0.9% NaCl 100mL IVPB premix       Date Action Dose Route User    3/14/2025 0558 Given 15 mmol Intravenous John Jeffrey RN       vancomycin (Firvanq) 50 mg/mL oral solution 125 mg       Date Action Dose Route User    3/14/2025 1045 Given 125 mg Per NG Tube Kathryn Fortune, RN          vancomycin (Vancocin) 1.25 g in sodium chloride 0.9% 250mL IVPB premix       Date Action Dose Route User    3/14/2025 0244 New Bag 1,250 mg Intravenous John Jeffrey RN    3/13/2025 1530 New Bag 1,250 mg  Intravenous Kathryn Fortune, RN            Vitals (last day)       Date/Time Temp Pulse Resp BP SpO2 Weight O2 Device O2 Flow Rate (L/min) [facility]    03/14/25 1200 99 °F (37.2 °C) 91 21 103/57 97 % -- -- --     03/14/25 1100 -- 98 25 105/58 95 % 187 lb 13.3 oz (85.2 kg) -- --     03/14/25 0900 -- 84 23 112/64 98 % -- -- --     03/14/25 0800 98.5 °F (36.9 °C) -- -- -- -- -- -- --     03/14/25 0600 -- 85 23 106/61 99 % -- Ventilator --     03/14/25 0500 -- 84 19 106/60 97 % -- Ventilator --     03/14/25 0400 98.2 °F (36.8 °C) 76 18 101/55 97 % -- Ventilator --     03/14/25 0300 -- 77 17 98/53 96 % -- Ventilator --     03/14/25 0200 -- 73 16 104/60 94 % -- Ventilator --     03/14/25 0100 -- 86 20 101/56 98 % -- Ventilator --     03/14/25 0000 98.8 °F (37.1 °C) 80 17 100/62 98 % -- Ventilator --     03/13/25 2300 -- 76 17 94/56 95 % -- Ventilator --     03/13/25 2200 -- 81 19 93/54 97 % -- Ventilator --     03/13/25 2100 -- 84 19 99/57 99 % -- Ventilator --     03/13/25 2000 98.2 °F (36.8 °C) 72 16 95/57 97 % -- Ventilator --     03/13/25 1900 -- 82 28 93/50 93 % -- Ventilator --     03/13/25 1830 -- 84 23 101/61 99 % -- -- --     03/13/25 1800 -- 79 19 96/56 99 % -- Ventilator --     03/13/25 1730 -- 79 19 91/55 98 % -- -- --     03/13/25 1700 -- 77 20 92/55 90 % -- Ventilator -- EF    03/13/25 1630 -- 71 20 96/59 91 % -- -- -- EF    03/13/25 1615 -- 71 19 104/67 94 % -- -- -- EF    03/13/25 1600 -- 73 18 92/53 92 % -- Ventilator -- EF    03/13/25 1545 -- 73 19 92/58 93 % -- -- -- EF    03/13/25 1500 -- 73 18 91/58 93 % -- -- -- EF    03/13/25 1400 -- 75 18 94/57 94 % -- -- -- EF    03/13/25 1300 -- 78 18 93/60 95 % -- -- -- EF    03/13/25 1235 98.6 °F (37 °C) 79 18 94/56 93 % -- -- -- EF    03/13/25 1230 -- 79 21 -- 93 % -- -- -- EF    03/13/25 1215 -- 83 22 97/60 89 % -- -- -- EF    03/13/25 1200 -- 72 24 135/78 99 % -- Ventilator -- EF    03/13/25 1145 -- 75 22 106/68 95 % -- -- -- EF     03/13/25 1130 -- 77 20 119/69 96 % -- -- -- EF    03/13/25 1115 -- 74 22 115/71 97 % -- -- -- EF    03/13/25 1100 -- 77 22 116/73 97 % -- Ventilator -- EF    03/13/25 1045 -- 79 30 120/75 97 % -- -- -- EF    03/13/25 1030 -- 90 32 105/63 97 % -- -- -- EF    03/13/25 1015 -- 72 14 99/61 100 % -- -- -- EF    03/13/25 1000 -- 75 14 97/60 95 % -- Ventilator -- EF    03/13/25 0945 99.1 °F (37.3 °C) 79 15 90/53 89 % -- -- -- EF    03/13/25 0930 -- 80 14 80/47 90 % -- -- -- EF    03/13/25 0915 98.7 °F (37.1 °C) 77 18 93/51 92 % -- -- -- EF    03/13/25 0900 -- 78 14 91/53 91 % -- Ventilator -- EF    03/13/25 0830 -- 73 16 93/53 89 % -- -- -- EF    03/13/25 0800 -- 81 15 81/47 91 % -- Ventilator -- EF    03/13/25 0700 -- 79 15 98/53 95 % -- -- -- EF    03/13/25 0645 -- 83 18 87/49 96 % -- -- -- SP    03/13/25 0600 -- 83 16 102/57 92 % -- Ventilator -- SP    03/13/25 0500 -- 86 21 108/63 97 % -- Ventilator -- SP    03/13/25 0400 99.6 °F (37.6 °C) 78 16 98/58 99 % -- Ventilator -- SP    03/13/25 0300 -- 68 17 107/62 96 % -- Ventilator -- SP    03/13/25 0200 -- 84 16 105/60 96 % -- Ventilator -- SP    03/13/25 0100 -- 82 16 101/58 94 % -- Ventilator -- SP    03/13/25 0000 98.7 °F (37.1 °C) 89 21 102/56 93 % -- Ventilator -- SP          CIWA Scores (since admission)       Date/Time CIWA-Ar Total Who    03/12/25 0519 10 TT    03/12/25 0430 8 TT    03/12/25 0330 16 TT    03/11/25 2258 7 TT    03/11/25 2158 15 TT    03/11/25 1944 15 TT    03/11/25 1800 10 RP    03/11/25 1600 10 RP

## 2025-03-14 NOTE — PROGRESS NOTES
03/14/25 1339   Vent Information   Vent Mode PC/AC   Settings   FiO2 (%) 50 %   Resp Rate (Set) 12   Waveform Decelerating ramp   PEEP/CPAP (cm H2O) 5 cm H20   Press Control > PEEP CWP 22   Insp Time (sec) 0.9 sec   PIP Set (cm H2O) 27 cm H2O     FIO2 decreased by Dr. Escobedo. Pt maintaining appropriate SPO2 on monitor

## 2025-03-15 ENCOUNTER — APPOINTMENT (OUTPATIENT)
Dept: GENERAL RADIOLOGY | Facility: HOSPITAL | Age: 45
End: 2025-03-15
Attending: INTERNAL MEDICINE
Payer: MEDICAID

## 2025-03-15 PROBLEM — K70.10 ALCOHOLIC HEPATITIS WITHOUT ASCITES (HCC): Status: ACTIVE | Noted: 2025-03-15

## 2025-03-15 PROBLEM — K85.20 ALCOHOL-INDUCED ACUTE PANCREATITIS (HCC): Status: ACTIVE | Noted: 2025-03-15

## 2025-03-15 LAB
ALBUMIN SERPL-MCNC: 3.2 G/DL (ref 3.2–4.8)
ALBUMIN SERPL-MCNC: 3.7 G/DL (ref 3.2–4.8)
ALP LIVER SERPL-CCNC: 140 U/L
ALT SERPL-CCNC: 77 U/L
ANION GAP SERPL CALC-SCNC: 12 MMOL/L (ref 0–18)
AST SERPL-CCNC: 165 U/L (ref ?–34)
BASOPHILS # BLD AUTO: 0.06 X10(3) UL (ref 0–0.2)
BASOPHILS NFR BLD AUTO: 0.3 %
BILIRUB DIRECT SERPL-MCNC: 9.4 MG/DL (ref ?–0.3)
BILIRUB SERPL-MCNC: 11.7 MG/DL (ref 0.3–1.2)
BUN BLD-MCNC: 36 MG/DL (ref 9–23)
BUN/CREAT SERPL: 18.6 (ref 10–20)
CALCIUM BLD-MCNC: 7.8 MG/DL (ref 8.7–10.4)
CHLORIDE SERPL-SCNC: 107 MMOL/L (ref 98–112)
CMV PCR BAL: NOT DETECTED IU/ML
CO2 SERPL-SCNC: 22 MMOL/L (ref 21–32)
CREAT BLD-MCNC: 1.94 MG/DL
DEPRECATED RDW RBC AUTO: 70 FL (ref 35.1–46.3)
EGFRCR SERPLBLD CKD-EPI 2021: 42 ML/MIN/1.73M2 (ref 60–?)
EOSINOPHIL # BLD AUTO: 0.27 X10(3) UL (ref 0–0.7)
EOSINOPHIL NFR BLD AUTO: 1.5 %
ERYTHROCYTE [DISTWIDTH] IN BLOOD BY AUTOMATED COUNT: 29.2 % (ref 11–15)
GLUCOSE BLD-MCNC: 129 MG/DL (ref 70–99)
HCT VFR BLD AUTO: 20.9 %
HGB BLD-MCNC: 7.3 G/DL
HSV 1 PCR BAL: NOT DETECTED COPIES/ML
HSV 2 PCR BAL: NOT DETECTED COPIES/ML
IMM GRANULOCYTES # BLD AUTO: 0.25 X10(3) UL (ref 0–1)
IMM GRANULOCYTES NFR BLD: 1.4 %
INR BLD: 1.74 (ref 0.8–1.2)
LYMPHOCYTES # BLD AUTO: 1.17 X10(3) UL (ref 1–4)
LYMPHOCYTES NFR BLD AUTO: 6.6 %
MAGNESIUM SERPL-MCNC: 2.1 MG/DL (ref 1.6–2.6)
MCH RBC QN AUTO: 26.7 PG (ref 26–34)
MCHC RBC AUTO-ENTMCNC: 34.9 G/DL (ref 31–37)
MCV RBC AUTO: 76.6 FL
MONOCYTES # BLD AUTO: 0.79 X10(3) UL (ref 0.1–1)
MONOCYTES NFR BLD AUTO: 4.4 %
NEUTROPHILS # BLD AUTO: 15.24 X10 (3) UL (ref 1.5–7.7)
NEUTROPHILS # BLD AUTO: 15.24 X10(3) UL (ref 1.5–7.7)
NEUTROPHILS NFR BLD AUTO: 85.8 %
OSMOLALITY SERPL CALC.SUM OF ELEC: 302 MOSM/KG (ref 275–295)
PHOSPHATE SERPL-MCNC: 3.6 MG/DL (ref 2.4–5.1)
PHOSPHATE SERPL-MCNC: 3.6 MG/DL (ref 2.4–5.1)
PLATELET # BLD AUTO: 135 10(3)UL (ref 150–450)
PLATELETS.RETICULATED NFR BLD AUTO: 11.1 % (ref 0–7)
POTASSIUM SERPL-SCNC: 3.1 MMOL/L (ref 3.5–5.1)
PROT SERPL-MCNC: 6.1 G/DL (ref 5.7–8.2)
PROTHROMBIN TIME: 21.3 SECONDS (ref 11.6–14.8)
RBC # BLD AUTO: 2.73 X10(6)UL
SODIUM SERPL-SCNC: 141 MMOL/L (ref 136–145)
TRIGL SERPL-MCNC: 239 MG/DL (ref 30–149)
WBC # BLD AUTO: 17.8 X10(3) UL (ref 4–11)

## 2025-03-15 PROCEDURE — 99232 SBSQ HOSP IP/OBS MODERATE 35: CPT | Performed by: INTERNAL MEDICINE

## 2025-03-15 PROCEDURE — 99233 SBSQ HOSP IP/OBS HIGH 50: CPT | Performed by: HOSPITALIST

## 2025-03-15 PROCEDURE — 99233 SBSQ HOSP IP/OBS HIGH 50: CPT

## 2025-03-15 PROCEDURE — 71045 X-RAY EXAM CHEST 1 VIEW: CPT | Performed by: INTERNAL MEDICINE

## 2025-03-15 PROCEDURE — 99233 SBSQ HOSP IP/OBS HIGH 50: CPT | Performed by: INTERNAL MEDICINE

## 2025-03-15 NOTE — PROGRESS NOTES
[facility]  part of [facility]    Progress Note    Sami Grijalva Jr. Patient Status:  Inpatient    10/11/1980 MRN [id number]   Location [facility] 2W/SW Attending Cameron Torres MD   Hosp Day # 10 PCP Kerri Medina MD       Subjective:   Sami Grijalva Jr. is a(n) 44 year old male     ROS:   Sedated on ventilator  Vitals:    25 2100   BP:    Pulse:    Resp:    Temp: (!) 101.2 °F (38.4 °C)           PHYSICAL EXAM:   Constitutional: appears agitated on ventilator  Head/Face: normocephalic  Eyes/Vision: normal extraocular motion is intact  Nose/Mouth/Throat:mucous membranes are moist and no oral lesions are noted  Neck/Thyroid: neck is supple without adenopathy  Lymphatic: no abnormal cervical, supraclavicular adenopathy is noted  Respiratory: Coarse breath sounds  Cardiovascular: regular rate and rhythm no murmurs, gallups, or rubs  Abdomen: Protuberant belly  Vascular: well perfused femoral, and pedal pulses normal  Skin/Hair: no unusual rashes present, no abnormal bruising noted  Back/Spine: no abnormalities noted  Musculoskeletal: full ROM all extremities good strength  no deformities  Extremities: no edema, cyanosis  Neurologic sedated    Results:     Laboratory Data:  Lab Results   Component Value Date    WBC 14.8 (H) 2025    HGB 7.1 (L) 2025    HCT 21.4 (L) 2025    .0 (L) 2025    CREATSERUM 2.00 (H) 2025    BUN 33 (H) 2025     2025    K 3.5 2025    K 3.5 2025     2025    CO2 24.0 2025     (H) 2025    CA 7.9 (L) 2025    ALB 3.8 2025    ALKPHO 130 (H) 2025    BILT 10.8 (H) 2025    TP 5.9 2025     (H) 2025    ALT 75 (H) 2025    INR 1.75 (H) 2025     (HH) 2025    DDIMER 1.13 (H) 2023    ESRML 27 (H) 10/01/2021    MG 1.3 (L) 2025    PHOS 2.3 (L) 2025    TROP 0.00 2017     (H)  03/06/2025    ETOH <3 03/04/2025       Imaging:  [unfilled]   XR CHEST AP PORTABLE  (CPT=71045)    Result Date: 3/14/2025  CONCLUSION:   ET tube terminates 5 mm above the em.  Recommend retraction.  Secure message regarding this finding sent to the respiratory therapist Varsha on 03/14/2025 at 8:52 p.m.  Enteric tube tip terminates at the junction of the stomach and proximal duodenum.  Unchanged multifocal alveolar opacities.  Unchanged trace left pleural effusion     Dictated by (CST): Yarelis Tomlinson MD on 3/14/2025 at 8:50 PM     Finalized by (CST): Yarelis Tomlinson MD on 3/14/2025 at 8:52 PM          XR CHEST AP PORTABLE  (CPT=71045)    Result Date: 3/14/2025  CONCLUSION:   No significant change in the multifocal opacities involving both lungs, which are most noticeable in the left lung base.    Dictated by (CST): Emmett Quan MD on 3/14/2025 at 7:22 AM     Finalized by (CST): Emmett Quan MD on 3/14/2025 at 7:25 AM          CT CHEST+ABDOMEN+PELVIS(CPT=71250/27435)    Result Date: 3/13/2025  CONCLUSION:  1. Diffuse pancreatic enlargement and mild peripancreatic inflammatory stranding are new since abdominal CT from 8 days prior and concerning for acute interstitial edematous pancreatitis.  No gross pancreatic ductal dilation or peripancreatic collection.   Request correlation with serum lipase levels. 2. Left greater than right lower lobe airspace disease with intrinsic air bronchograms.  Findings are similar on the left and slightly improved on the right since chest CT from 5 days prior; multifocal pneumonia/aspiration is suspected.  Also identified are new and/or worsening multifocal upper lobe predominant ground-glass density opacities throughout both lungs; these ground-glass density opacities could be infectious in nature or relate to acute respiratory distress syndrome. 3. Endotracheal tube with tip approximately 1 cm above the level of the em.  Enteric tube tip in the gastric fundus.   Rectal tube and Cedeño catheter also noted. 4. Fatty liver and cirrhosis. 5. Stable splenomegaly. 6. Small to moderate volume intra-abdominal ascites is new since prior abdominal CT.  There is also new mild anasarca. 7. Liquid contents in the colon could relate to a malabsorption state or low-grade infectious/inflammatory versus antibiotic associated colitis.  Mild scattered colonic diverticulosis, but with no CT findings of acute diverticulitis. 8. Low-density appearance of the intracardiac blood pool raises the possibility of underlying anemia. Correlate with hematologic parameters.  9. Lesser incidental findings as above.   A preliminary report was issued by the [facility] teleradiology service. There are no major discrepancies.  elm-remote  Dictated by (CST): Arcenio Norman MD on 3/13/2025 at 8:14 AM     Finalized by (CST): Arcenio Norman MD on 3/13/2025 at 8:28 AM             ASSESSMENT/PLAN:   Assessment       1 pneumonia respiratory failure remains on broad-spectrum antibiotics  #2 alcoholic liver disease acute hepatitis LFTs  #3 USMAN now on pressors but only 3 mics of Levophed urine output   Actually not too bad about 1900 yesterday is getting Lasix because he is so far positive and his I's and O's creatinine going up  2.0 today  Mag and phosphorus and potassium low they have been replaced  Prognosis very poor continue present plan May need renal replacement therapy in the near future  3/14/2025  Johnathan Ramos MD

## 2025-03-15 NOTE — PROGRESS NOTES
[facility]  part of [facility]    Progress Note    Sami Grijalva  Patient Status:  Inpatient    10/11/1980 MRN [id number]   Location [facility] 2W/SW Attending Cameron Torres MD   Hosp Day # 11 PCP Kerri Medina MD     Chief Complaint:     USMAN    Subjective:   Subjective:    Patient seen and examined this afternoon  Family at bedside  Hypotension  Low grade fever      Objective:   Blood pressure (!) 87/49, pulse 97, temperature 99.4 °F (37.4 °C), temperature source Temporal, resp. rate 23, height 5' 8\" (1.727 m), weight 178 lb 9.2 oz (81 kg), SpO2 96%.  Physical Exam    General: intubated/sedated  HEENT: EOMI PERRLA, atraumatic normocephalic  Cardiac: S1-S2 appreciated  Lungs: Good air entry bilaterally clear to auscultation  Abdomen: Soft nontender nondistended positive bowel sounds  Ext: Peripheral pulses are positive  Neuro: No focal deficits noted  Psych: Normal mood  Skin: No rashes noted  MSK: Full range of motion intact      Results:   Lab Results   Component Value Date    WBC 17.8 (H) 03/15/2025    HGB 7.3 (L) 03/15/2025    HCT 20.9 (L) 03/15/2025    .0 (L) 03/15/2025    CREATSERUM 1.94 (H) 03/15/2025    BUN 36 (H) 03/15/2025     03/15/2025    K 3.1 (L) 03/15/2025     03/15/2025    CO2 22.0 03/15/2025     (H) 03/15/2025    CA 7.8 (L) 03/15/2025    ALB 3.7 03/15/2025    ALKPHO 140 (H) 03/15/2025    BILT 11.7 (H) 03/15/2025    TP 6.1 03/15/2025     (H) 03/15/2025    ALT 77 (H) 03/15/2025    INR 1.74 (H) 03/15/2025     (HH) 2025    DDIMER 1.13 (H) 2023    ESRML 27 (H) 10/01/2021    MG 2.1 03/15/2025    PHOS 3.6 03/15/2025    PHOS 3.6 03/15/2025    TROP 0.00 2017    TROPHS <3 10/07/2024     (H) 2025    ETOH <3 2025       XR CHEST AP PORTABLE  (CPT=71045)    Result Date: 3/14/2025  CONCLUSION:   ET tube terminates 5 mm above the em.  Recommend retraction.  Secure message regarding this  finding sent to the respiratory therapist Varsha on 03/14/2025 at 8:52 p.m.  Enteric tube tip terminates at the junction of the stomach and proximal duodenum.  Unchanged multifocal alveolar opacities.  Unchanged trace left pleural effusion     Dictated by (CST): Yarelis Tomlinson MD on 3/14/2025 at 8:50 PM     Finalized by (CST): Yarelis Tomlinson MD on 3/14/2025 at 8:52 PM          XR CHEST AP PORTABLE  (CPT=71045)    Result Date: 3/14/2025  CONCLUSION:   No significant change in the multifocal opacities involving both lungs, which are most noticeable in the left lung base.    Dictated by (CST): Emmett Quan MD on 3/14/2025 at 7:22 AM     Finalized by (CST): Emmett Quan MD on 3/14/2025 at 7:25 AM               Assessment & Plan:       Acute hypoxemic resp failure-worsening   Multifocal pna  -Concern for possible aspiration given hx of emesis and altered mental status.   -Continue on zosyn, doxy.   -Continues to require high levels of supplemental O2.  Weaning as able  -aspiration precautions  -intubated now , per pulm     Acute ETOH hepatitis   -last drink reportedly 4 days prior to admission  -Reported emesis and poor PO intake, without overt bleeding   -T bili upon arrival to ER 10.5, platelet 65, cr 4.22  -CT a/p reviewed, noted severe fatty liver with subtle undulating contour  -US liver reviewed, noted hepatic steatosis, GB sludge and cholelithiasis without cholecystitis or biliary ductal dilation, hepatic and portal vein patent  -MELD 3.0 on admission: 44   -GI consulted, appreciate further recommendations     Alcohol Abuse with withdrawal   -CIWA monitoring with alcohol withdrawal protocol  -thiamine, folic acid, multivitamin  -Psychiatry consulted, appreciate recommendations     Altered mental status  -Slowly improving  -Likely metabolic encephalopathy  -Initially noted to have elevated ammonia levels noted consistent with hepatic encephalopathy.  -Continue lactulose   -Patient also with USMAN and  electrolyte abnormalities.  -Continue treating underlying conditions  -Continue to monitor     Acute Kidney Injury   -Continue improvement noted  -Avoiding Nephrotoxic agents  - Cont to monitor  -Nephrology on consult, appreciate further recommendations     Hypernatremia  -Persistent elevations noted  -Nephrology on consult, recommend continue D5 IV fluids and increased free water flushes.  -Continue to monitor     Plan of care discussed with patient's sister and partner at bedside.  Discussed management/test result(s) with Rn      Quality:  DVT Prophylaxis: SCDs  CODE status: Full  Estimated date of discharge: TBD  Discharge is dependent on: clinical stability     MDM: High, acute illness/severe exacerbation of chronic illness posing threat to life.  IV medications requiring close inpatient monitoring       Cameron Torres MD

## 2025-03-15 NOTE — PLAN OF CARE
Problem: RESPIRATORY - ADULT  Goal: Achieves optimal ventilation and oxygenation  Description: INTERVENTIONS:  - Assess for changes in respiratory status  - Assess for changes in mentation and behavior  - Position to facilitate oxygenation and minimize respiratory effort  - Oxygen supplementation based on oxygen saturation or ABGs  - Provide Smoking Cessation handout, if applicable  - Encourage broncho-pulmonary hygiene including cough, deep breathe, Incentive Spirometry  - Assess the need for suctioning and perform as needed  - Assess and instruct to report SOB or any respiratory difficulty  - Respiratory Therapy support as indicated  - Manage/alleviate anxiety  - Monitor for signs/symptoms of CO2 retention  Outcome: Progressing     Pt remains intubated with ETT size 8.0 at 25 cm at the lip, on full vent support. Pt is stable and saturating in low 90's, suction provided as needed. RT will continue to monitor.    Vent settings and readings as follow:     03/14/25 1930   Respiratory Assessment   Cough Productive;Strong   Sputum Amount Scant   Sputum Color Tan   Sputum Consistency Tenacious   ETT   Placement Date/Time: 03/12/25 1500   Airway Size: 8 mm  Cuffed: Cuffed  Insertion attempts: 2  Technique: Direct laryngoscopy;Stylet;Cricoid pressure  Placement Verification: (c) Colorimetric EtCO2 device;Other (Comment)  Placed By: ICU physician   Secured at (cm) 25 cm   Cuff Pressure (cm H2O) 30 cm H2O   Suctioned? Y   Measured From Lips   Secured Location Right   Secured by [employer] [place]   Site changed Rotated   Req'd equipment at bedside Bag mask   Additional Assessments   Pulse 108   Resp (!) 30   SpO2 95 %      03/14/25 1930   Vent Information   Interface Invasive   Vent Type    Vent plugged into main power? Yes   Vent ID    Vent Mode PC/AC   Settings   FiO2 (%) 50 %   Resp Rate (Set) 12   Waveform Decelerating ramp   PEEP/CPAP (cm H2O) 5 cm H20   Press Control > PEEP CWP 22   Insp Time (sec) 0.9  sec   PIP Set (cm H2O) 27 cm H2O   Trigger Sensitivity Flow (L/min) 3 L/min   Humidification Heat and moisture exchanger   Readings   Total RR 30   Minute Ventilation (L/min) 15 L/min   Expiratory Tidal Volume 473 mL   PIP Observed (cm H2O) 28 cm H2O   MAP (cm H2O) 13   I/E Ratio 1:1.1   Plateau Pressure (cm H2O) 28 cm H2O   Static Compliance (L/cm H2O) 28   Dynamic Compliance (L/cm H2O) 26 L/cm H2O   Alarms   High RR 40   Insp Pressure High (cm H2O) 60 cm H2O   MV High (L/min) 25 L/min   MV Low (L/min) 3 L/min   Apnea Interval (sec) 20 seconds   Apnea Rate 12         At 21:30 per MD Tomlinson and X-Rays ETT pulled back to 22 cm at the lip.

## 2025-03-15 NOTE — PLAN OF CARE
Pt remains vented and sedated. [place] in line secretions and copious oral secretions. Levo titrated per parameters. Tmax 101.2, tylenol and ice packs applied. TF per NG, duran and flexiseal in place. All safety measures in place, frequent nursing rounds made.     Problem: Patient Centered Care  Goal: Patient preferences are identified and integrated in the patient's plan of care  Description: Interventions:  - What would you like us to know as we care for you?   - Provide timely, complete, and accurate information to patient/family  - Incorporate patient and family knowledge, values, beliefs, and cultural backgrounds into the planning and delivery of care  - Encourage patient/family to participate in care and decision-making at the level they choose  - Honor patient and family perspectives and choices  Outcome: Progressing     Problem: Patient/Family Goals  Goal: Patient/Family Long Term Goal  Description: Patient's Long Term Goal:     Interventions:  -   - See additional Care Plan goals for specific interventions  Outcome: Progressing  Goal: Patient/Family Short Term Goal  Description: Patient's Short Term Goal:     Interventions:   -   - See additional Care Plan goals for specific interventions  Outcome: Progressing     Problem: PAIN - ADULT  Goal: Verbalizes/displays adequate comfort level or patient's stated pain goal  Description: INTERVENTIONS:  - Encourage pt to monitor pain and request assistance  - Assess pain using appropriate pain scale  - Administer analgesics based on type and severity of pain and evaluate response  - Implement non-pharmacological measures as appropriate and evaluate response  - Consider cultural and social influences on pain and pain management  - Manage/alleviate anxiety  - Utilize distraction and/or relaxation techniques  - Monitor for opioid side effects  - Notify MD/LIP if interventions unsuccessful or patient reports new pain  - Anticipate increased pain with activity and  pre-medicate as appropriate  Outcome: Progressing     Problem: RISK FOR INFECTION - ADULT  Goal: Absence of fever/infection during anticipated neutropenic period  Description: INTERVENTIONS  - Monitor WBC  - Administer growth factors as ordered  - Implement neutropenic guidelines  Outcome: Progressing     Problem: SAFETY ADULT - FALL  Goal: Free from fall injury  Description: INTERVENTIONS:  - Assess pt frequently for physical needs  - Identify cognitive and physical deficits and behaviors that affect risk of falls.  - [place] fall precautions as indicated by assessment.  - Educate pt/family on patient safety including physical limitations  - Instruct pt to call for assistance with activity based on assessment  - Modify environment to reduce risk of injury  - Provide assistive devices as appropriate  - Consider OT/PT consult to assist with strengthening/mobility  - Encourage toileting schedule  Outcome: Progressing     Problem: DISCHARGE PLANNING  Goal: Discharge to home or other facility with appropriate resources  Description: INTERVENTIONS:  - Identify barriers to discharge w/pt and caregiver  - Include patient/family/discharge partner in discharge planning  - Arrange for needed discharge resources and transportation as appropriate  - Identify discharge learning needs (meds, wound care, etc)  - Arrange for interpreters to assist at discharge as needed  - Consider post-discharge preferences of patient/family/discharge partner  - Complete POLST form as appropriate  - Assess patient's ability to be responsible for managing their own health  - Refer to Case Management Department for coordinating discharge planning if the patient needs post-hospital services based on physician/LIP order or complex needs related to functional status, cognitive ability or social support system  Outcome: Progressing     Problem: Safety Risk - Non-Violent Restraints  Goal: Patient will remain free from self-harm  Description:  INTERVENTIONS:  - Apply the least restrictive restraint to prevent harm  - Notify patient and family of reasons restraints applied  - Assess for any contributing factors to confusion (electrolyte disturbances, delirium, medications)  - Discontinue any unnecessary medical devices as soon as possible  - Assess the patient's physical comfort, circulation, skin condition, hydration, nutrition and elimination needs   - Reorient and redirection as needed  - Assess for the need to continue restraints  Outcome: Progressing     Problem: Delirium  Goal: Minimize duration of delirium  Description: Interventions:  - Encourage use of hearing aids, eye glasses  - Promote highest level of mobility daily  - Provide frequent reorientation  - Promote wakefulness i.e. lights on, blinds open  - Promote sleep, encourage patient's normal rest cycle i.e. lights off, TV off, minimize noise and interruptions  - Encourage family to assist in orientation and promotion of home routines  Outcome: Progressing     Problem: RESPIRATORY - ADULT  Goal: Achieves optimal ventilation and oxygenation  Description: INTERVENTIONS:  - Assess for changes in respiratory status  - Assess for changes in mentation and behavior  - Position to facilitate oxygenation and minimize respiratory effort  - Oxygen supplementation based on oxygen saturation or ABGs  - Provide Smoking Cessation handout, if applicable  - Encourage broncho-pulmonary hygiene including cough, deep breathe, Incentive Spirometry  - Assess the need for suctioning and perform as needed  - Assess and instruct to report SOB or any respiratory difficulty  - Respiratory Therapy support as indicated  - Manage/alleviate anxiety  - Monitor for signs/symptoms of CO2 retention  Outcome: Progressing     Problem: METABOLIC/FLUID AND ELECTROLYTES - ADULT  Goal: Electrolytes maintained within normal limits  Description: INTERVENTIONS:  - Monitor labs and rhythm and assess patient for signs and symptoms of  electrolyte imbalances  - Administer electrolyte replacement as ordered  - Monitor response to electrolyte replacements, including rhythm and repeat lab results as appropriate  - Fluid restriction as ordered  - Instruct patient on fluid and nutrition restrictions as appropriate  Outcome: Progressing  Goal: Hemodynamic stability and optimal renal function maintained  Description: INTERVENTIONS:  - Monitor labs and assess for signs and symptoms of volume excess or deficit  - Monitor intake, output and patient weight  - Monitor urine specific gravity, serum osmolarity and serum sodium as indicated or ordered  - Monitor response to interventions for patient's volume status, including labs, urine output, blood pressure (other measures as available)  - Encourage oral intake as appropriate  - Instruct patient on fluid and nutrition restrictions as appropriate  Outcome: Progressing     Problem: Impaired Swallowing  Goal: Minimize aspiration risk  Description: Interventions:  - Patient should be alert and upright for all feedings (90 degrees preferred)  - Offer food and liquids at a slow rate  - No straws  - Encourage small bites of food and small sips of liquid  - Offer pills one at a time, crush or deliver with applesauce as needed  - Discontinue feeding and notify MD (or speech pathologist) if coughing or persistent throat clearing or wet/gurgly vocal quality is noted  Outcome: Progressing     Problem: Impaired Cognition  Goal: Patient will exhibit improved attention, thought processing and/or memory  Description: Interventions:    Outcome: Progressing     Problem: CARDIOVASCULAR - ADULT  Goal: Maintains optimal cardiac output and hemodynamic stability  Description: INTERVENTIONS:- Monitor vital signs, rhythm, and trends- Monitor for bleeding, hypotension and signs of decreased cardiac output- Evaluate effectiveness of vasoactive medications to optimize hemodynamic stability- Monitor arterial and/or venous puncture sites  for bleeding and/or hematoma- Assess quality of pulses, skin color and temperature- Assess for signs of decreased coronary artery perfusion - ex. Angina- Evaluate fluid balance, assess for edema, trend weights  Outcome: Progressing

## 2025-03-15 NOTE — RESPIRATORY THERAPY NOTE
Pt received on PC/AC 50% Fio2, RR 12,Peep +5, PC 22,PIP 27,I.T 0.9.   At 09:08-09:48 am pt weaned to CPAP/PS 5/+5/50%.   NIF -21, VC unable to do. (not following commands well), RSBI 85.

## 2025-03-15 NOTE — PLAN OF CARE
Family decided that primary contact/decision maker is sister Lulu.  FiO2 weaned to 50%.  Lactulose dosing decreased to TID. Dobhoff advanced to 60.  Xray pending.  Low grade temp today.  Endorsed to night shift RN to continue to monitor.  Patient remains in bilateral wrist restraints to maintain patient and line safety.      Problem: Patient/Family Goals  Goal: Patient/Family Short Term Goal  Description: Patient's Short Term Goal: Family decided that primary contact/decision maker is sister Lulu.      Interventions:   - Phone number on white board in room.    - See additional Care Plan goals for specific interventions  Outcome: Progressing     Problem: Safety Risk - Non-Violent Restraints  Goal: Patient will remain free from self-harm  Description: INTERVENTIONS:  - Apply the least restrictive restraint to prevent harm  - Notify patient and family of reasons restraints applied  - Assess for any contributing factors to confusion (electrolyte disturbances, delirium, medications)  - Discontinue any unnecessary medical devices as soon as possible  - Assess the patient's physical comfort, circulation, skin condition, hydration, nutrition and elimination needs   - Reorient and redirection as needed  - Assess for the need to continue restraints  Outcome: Progressing     Problem: RESPIRATORY - ADULT  Goal: Achieves optimal ventilation and oxygenation  Description: INTERVENTIONS:  - Assess for changes in respiratory status  - Assess for changes in mentation and behavior  - Position to facilitate oxygenation and minimize respiratory effort  - Oxygen supplementation based on oxygen saturation or ABGs  - Provide Smoking Cessation handout, if applicable  - Encourage broncho-pulmonary hygiene including cough, deep breathe, Incentive Spirometry  - Assess the need for suctioning and perform as needed  - Assess and instruct to report SOB or any respiratory difficulty  - Respiratory Therapy support as indicated  -  Manage/alleviate anxiety  - Monitor for signs/symptoms of CO2 retention  Outcome: Progressing

## 2025-03-15 NOTE — PLAN OF CARE
SAT completed; patient tolerated well and followed commands. SBT completed, placed back on support per RT; see note.  Propofol on for sedation. Levophed infusing, Tube feeds infusing per orders. Cedeño and Flexiseal remain in place. Bilateral wrist restraints in place for patient safety. SCDs in place. Pt turned Q2H. Family at bedside; updated on POC.       Problem: Patient Centered Care  Goal: Patient preferences are identified and integrated in the patient's plan of care  Description: Interventions:  - What would you like us to know as we care for you?  - Provide timely, complete, and accurate information to patient/family  - Incorporate patient and family knowledge, values, beliefs, and cultural backgrounds into the planning and delivery of care  - Encourage patient/family to participate in care and decision-making at the level they choose  - Honor patient and family perspectives and choices  Outcome: Progressing     Problem: Patient/Family Goals  Goal: Patient/Family Long Term Goal  Description: Patient's Long Term Goal:     Interventions:  -   - See additional Care Plan goals for specific interventions  Outcome: Progressing  Goal: Patient/Family Short Term Goal  Description: Patient's Short Term Goal:     Interventions:   - See additional Care Plan goals for specific interventions  Outcome: Progressing     Problem: PAIN - ADULT  Goal: Verbalizes/displays adequate comfort level or patient's stated pain goal  Description: INTERVENTIONS:  - Encourage pt to monitor pain and request assistance  - Assess pain using appropriate pain scale  - Administer analgesics based on type and severity of pain and evaluate response  - Implement non-pharmacological measures as appropriate and evaluate response  - Consider cultural and social influences on pain and pain management  - Manage/alleviate anxiety  - Utilize distraction and/or relaxation techniques  - Monitor for opioid side effects  - Notify MD/LIP if interventions  unsuccessful or patient reports new pain  - Anticipate increased pain with activity and pre-medicate as appropriate  Outcome: Progressing     Problem: RISK FOR INFECTION - ADULT  Goal: Absence of fever/infection during anticipated neutropenic period  Description: INTERVENTIONS  - Monitor WBC  - Administer growth factors as ordered  - Implement neutropenic guidelines  Outcome: Progressing     Problem: SAFETY ADULT - FALL  Goal: Free from fall injury  Description: INTERVENTIONS:  - Assess pt frequently for physical needs  - Identify cognitive and physical deficits and behaviors that affect risk of falls.  - [place] fall precautions as indicated by assessment.  - Educate pt/family on patient safety including physical limitations  - Instruct pt to call for assistance with activity based on assessment  - Modify environment to reduce risk of injury  - Provide assistive devices as appropriate  - Consider OT/PT consult to assist with strengthening/mobility  - Encourage toileting schedule  Outcome: Progressing     Problem: DISCHARGE PLANNING  Goal: Discharge to home or other facility with appropriate resources  Description: INTERVENTIONS:  - Identify barriers to discharge w/pt and caregiver  - Include patient/family/discharge partner in discharge planning  - Arrange for needed discharge resources and transportation as appropriate  - Identify discharge learning needs (meds, wound care, etc)  - Arrange for interpreters to assist at discharge as needed  - Consider post-discharge preferences of patient/family/discharge partner  - Complete POLST form as appropriate  - Assess patient's ability to be responsible for managing their own health  - Refer to Case Management Department for coordinating discharge planning if the patient needs post-hospital services based on physician/LIP order or complex needs related to functional status, cognitive ability or social support system  Outcome: Progressing     Problem: Safety Risk -  Non-Violent Restraints  Goal: Patient will remain free from self-harm  Description: INTERVENTIONS:  - Apply the least restrictive restraint to prevent harm  - Notify patient and family of reasons restraints applied  - Assess for any contributing factors to confusion (electrolyte disturbances, delirium, medications)  - Discontinue any unnecessary medical devices as soon as possible  - Assess the patient's physical comfort, circulation, skin condition, hydration, nutrition and elimination needs   - Reorient and redirection as needed  - Assess for the need to continue restraints  Outcome: Progressing     Problem: Delirium  Goal: Minimize duration of delirium  Description: Interventions:  - Encourage use of hearing aids, eye glasses  - Promote highest level of mobility daily  - Provide frequent reorientation  - Promote wakefulness i.e. lights on, blinds open  - Promote sleep, encourage patient's normal rest cycle i.e. lights off, TV off, minimize noise and interruptions  - Encourage family to assist in orientation and promotion of home routines  Outcome: Progressing     Problem: RESPIRATORY - ADULT  Goal: Achieves optimal ventilation and oxygenation  Description: INTERVENTIONS:  - Assess for changes in respiratory status  - Assess for changes in mentation and behavior  - Position to facilitate oxygenation and minimize respiratory effort  - Oxygen supplementation based on oxygen saturation or ABGs  - Provide Smoking Cessation handout, if applicable  - Encourage broncho-pulmonary hygiene including cough, deep breathe, Incentive Spirometry  - Assess the need for suctioning and perform as needed  - Assess and instruct to report SOB or any respiratory difficulty  - Respiratory Therapy support as indicated  - Manage/alleviate anxiety  - Monitor for signs/symptoms of CO2 retention  Outcome: Progressing     Problem: METABOLIC/FLUID AND ELECTROLYTES - ADULT  Goal: Electrolytes maintained within normal limits  Description:  INTERVENTIONS:  - Monitor labs and rhythm and assess patient for signs and symptoms of electrolyte imbalances  - Administer electrolyte replacement as ordered  - Monitor response to electrolyte replacements, including rhythm and repeat lab results as appropriate  - Fluid restriction as ordered  - Instruct patient on fluid and nutrition restrictions as appropriate  Outcome: Progressing  Goal: Hemodynamic stability and optimal renal function maintained  Description: INTERVENTIONS:  - Monitor labs and assess for signs and symptoms of volume excess or deficit  - Monitor intake, output and patient weight  - Monitor urine specific gravity, serum osmolarity and serum sodium as indicated or ordered  - Monitor response to interventions for patient's volume status, including labs, urine output, blood pressure (other measures as available)  - Encourage oral intake as appropriate  - Instruct patient on fluid and nutrition restrictions as appropriate  Outcome: Progressing     Problem: Impaired Swallowing  Goal: Minimize aspiration risk  Description: Interventions:  - Patient should be alert and upright for all feedings (90 degrees preferred)  - Offer food and liquids at a slow rate  - No straws  - Encourage small bites of food and small sips of liquid  - Offer pills one at a time, crush or deliver with applesauce as needed  - Discontinue feeding and notify MD (or speech pathologist) if coughing or persistent throat clearing or wet/gurgly vocal quality is noted  Outcome: Progressing     Problem: Impaired Cognition  Goal: Patient will exhibit improved attention, thought processing and/or memory  Description: Interventions:    Outcome: Progressing     Problem: CARDIOVASCULAR - ADULT  Goal: Maintains optimal cardiac output and hemodynamic stability  Description: INTERVENTIONS:- Monitor vital signs, rhythm, and trends- Monitor for bleeding, hypotension and signs of decreased cardiac output- Evaluate effectiveness of vasoactive  medications to optimize hemodynamic stability- Monitor arterial and/or venous puncture sites for bleeding and/or hematoma- Assess quality of pulses, skin color and temperature- Assess for signs of decreased coronary artery perfusion - ex. Angina- Evaluate fluid balance, assess for edema, trend weights  Outcome: Progressing

## 2025-03-15 NOTE — PROGRESS NOTES
[facility]     Gastroenterology Progress Note    Sami Grijalva Jr. Patient Status:  Inpatient    10/11/1980 MRN [id number]   Location [facility] 2W/SW Attending Natasha Araujo MD   Hosp Day # 11 PCP Kerri Medina MD       Subjective:   Pt intubated and sedated. Family at bedside.   Febrile yesterday 101.2 around 1900    Objective:   Blood pressure 119/62, pulse 102, temperature 97.7 °F (36.5 °C), temperature source Temporal, resp. rate (!) 29, height 5' 8\" (1.727 m), weight 178 lb 9.2 oz (81 kg), SpO2 98%. Body mass index is 27.15 kg/m².    Gen: Sedated, drowsy patient, NAD  HEENT: the sclera appears icteric, oropharynx clear, mucus membranes appear moist, DHT in place, TF running  CV: RRR  Lung: Mechanical breath sounds bilaterally   Abdomen: soft NTND abdomen with NABS appreciated. Rectal tube in place with liquid brown/green stool output  Skin: dry, warm, + jaundice  Ext: + LE edema is evident  Neuro: Sedated, unable to assess orientation and not interactive  Psych: calm, cooperative    Assessment and Plan:   Sami Grijalva Jr. is a 44 year old male w/ PMHx of longstanding alcohol use, cannabis use, GERD, who presents with generalized abdominal pain, hiccups and vomiting. GI consulted for acute alcohol hepatitis.     Main issue at this point is respiratory failure s/p intubation with imaging suggestive of underlying multifocal pneumonia and ARDS.      #Acute alcohol hepatitis with probable underlying cirrhosis  -CT a/p in ER severe fatty liver with subtle undulating contour  -US liver with hepatic steatosis, GB sludge and cholelithiasis without cholecystitis or biliary ductal dilation, hepatic and portal vein patent  -last EGD 2023 without varices   -high maddrey; holding off on steroids since concern for pulmonary infection   -on CIWA  -nutrition, etoh cessation  -LFTs remain elevated, similar to yesterday, bilirubin increase 10.8 to 11.7     #Likely early cirrhosis,  ETOH abuse, chronic w/u in process  MELD 3.0 on admission: 44  -PSE: Lethargic/drowsy in setting intubation in ICU. Ammonia 121, started on lactulose, has rectal tube in place.  Ammonia 3/14 13 with profuse diarrhea, decreased lactulose from qid to tid. Will hold for now and may restart pending stool output  -Ascites: none on exam or US  -EV: None on last EGD 11/2023, tubular structure around GE junction suggestive of EV.  Will need repeat EGD electively  -HCC: No lesions on US or CT, AFP 2.7  - acute hyperbilirubinemia likely component of cholestasis of sepsis  -MELD 3.0 today: 30     #USMAN  -nephrology following      # Mild interstitial pancreatitis  - likely etoh induced, lipase 444 3/13  - continue supportive care     Recommend:  -continue broad spectrum abx  -will hold lactulose for now 2/2 large volume watery diarrhea  -Daily CMP, CBC, INR  -Empiric PPI BID    Case discussed with Johnathan Luu MD and Lulu BALDWIN.    MARIA ISABEL Francisco  [facility] Gastroenterology  3/15/2025      Results:     Lab Results   Component Value Date    WBC 17.8 (H) 03/15/2025    HGB 7.3 (L) 03/15/2025    HCT 20.9 (L) 03/15/2025    .0 (L) 03/15/2025    CREATSERUM 1.94 (H) 03/15/2025    BUN 36 (H) 03/15/2025     03/15/2025    K 3.1 (L) 03/15/2025     03/15/2025    CO2 22.0 03/15/2025     (H) 03/15/2025    CA 7.8 (L) 03/15/2025    ALB 3.7 03/15/2025    ALKPHO 140 (H) 03/15/2025    BILT 11.7 (H) 03/15/2025    TP 6.1 03/15/2025     (H) 03/15/2025    ALT 77 (H) 03/15/2025    INR 1.74 (H) 03/15/2025     (HH) 03/13/2025    DDIMER 1.13 (H) 08/25/2023    ESRML 27 (H) 10/01/2021    MG 2.1 03/15/2025    PHOS 3.6 03/15/2025    PHOS 3.6 03/15/2025    TROP 0.00 01/31/2017     (H) 03/06/2025    ETOH <3 03/04/2025       XR CHEST AP PORTABLE  (CPT=71045)    Result Date: 3/14/2025  CONCLUSION:   ET tube terminates 5 mm above the em.  Recommend retraction.  Secure message regarding this finding  sent to the respiratory therapist Varsha on 03/14/2025 at 8:52 p.m.  Enteric tube tip terminates at the junction of the stomach and proximal duodenum.  Unchanged multifocal alveolar opacities.  Unchanged trace left pleural effusion     Dictated by (CST): Yarelis Tomlinson MD on 3/14/2025 at 8:50 PM     Finalized by (CST): Yarelis Tomlinson MD on 3/14/2025 at 8:52 PM          XR CHEST AP PORTABLE  (CPT=71045)    Result Date: 3/14/2025  CONCLUSION:   No significant change in the multifocal opacities involving both lungs, which are most noticeable in the left lung base.    Dictated by (CST): Emmett Quan MD on 3/14/2025 at 7:22 AM     Finalized by (CST): Emmett Quan MD on 3/14/2025 at 7:25 AM

## 2025-03-15 NOTE — PROGRESS NOTES
[facility]  part of [facility]    Progress Note      Assessment and Plan:   1.  Acute respiratory failure-aspiration pneumonitis with ARDS phenomenon associated with acute alcoholic hepatitis and withdrawal state.  Chest x-ray suggest mild increase in bilateral opacities.  Status post bronchoscopy.    Recommendations:  1.  Full ventilator support today although can do a spontaneous awakening and breathing trial.  2.  Empiric antibiotic  3.  Morning chest x-ray  4.  Will follow clinically.  5.  Gentle diuresis    2.  Withdrawal state-currently on propofol.    3.  DVT prophylaxis-SCDs in patient with coagulopathy and history of GI bleed.    4.  Cirrhosis and pancreatitis-supportive care with lactulose and PPI.    5.  USMAN-stabilizing clinically.  As per nephrology.  Lasix.    Subjective:   Sami Grijalva Jr. is a(n) 44 year old male who is sedate on ventilator    Objective:   Blood pressure 99/54, pulse 104, temperature 99.4 °F (37.4 °C), temperature source Temporal, resp. rate 23, height 5' 8\" (1.727 m), weight 178 lb 9.2 oz (81 kg), SpO2 95%.    Physical Exam sedate male  HEENT examination is unremarkable with pupils equal round and reactive to light and accommodation.   Neck without adenopathy, thyromegaly, JVD nor bruit.   Lungs diminished to auscultation and percussion.  Cardiac regular rate and rhythm no murmur.   Abdomen nontender, without hepatosplenomegaly and no mass appreciable.   Extremities without clubbing cyanosis nor edema.   Neurologic grossly intact with symmetric tone and strength and reflex.  Skin without gross abnormality     Results:     Lab Results   Component Value Date    WBC 17.8 03/15/2025    HGB 7.3 03/15/2025    HCT 20.9 03/15/2025    .0 03/15/2025    CREATSERUM 1.94 03/15/2025    BUN 36 03/15/2025     03/15/2025    K 3.1 03/15/2025     03/15/2025    CO2 22.0 03/15/2025     03/15/2025    CA 7.8 03/15/2025    ALB 3.7 03/15/2025    ALKPHO 140  03/15/2025    BILT 11.7 03/15/2025    TP 6.1 03/15/2025     03/15/2025    ALT 77 03/15/2025    INR 1.74 03/15/2025    MG 2.1 03/15/2025    PHOS 3.6 03/15/2025    PHOS 3.6 03/15/2025     Chest x-ray-slightly increased bilateral opacities    Iván Gutiérrez MD  Medical Director, Critical Care, [facility]  Medical Director, [facility]  Pager: [phone]

## 2025-03-15 NOTE — PROGRESS NOTES
[facility]  part of [facility]    Progress Note    Sami Grijalva Jr. Patient Status:  Inpatient    10/11/1980 MRN [id number]   Location [facility] 2W/SW Attending Cameron Torres MD   Hosp Day # 11 PCP Kerri Medina MD       Subjective:   Sami Grijalva Jr. is a(n) 44 year old male who I am seeing for USMAN    Remains intubated  Levo at 1  Urine output is acceptable  FiO2 is 50%    Objective:   /62 (BP Location: Right arm)   Pulse 102   Temp 97.7 °F (36.5 °C) (Temporal)   Resp (!) 29   Ht 5' 8\" (1.727 m)   Wt 178 lb 9.2 oz (81 kg)   SpO2 98%   BMI 27.15 kg/m²      Intake/Output Summary (Last 24 hours) at 3/15/2025 1230  Last data filed at 3/15/2025 0600  Gross per 24 hour   Intake 3618.6 ml   Output 2335 ml   Net 1283.6 ml     Wt Readings from Last 1 Encounters:   03/15/25 178 lb 9.2 oz (81 kg)       Exam  Vital signs: Blood pressure 119/62, pulse 102, temperature 97.7 °F (36.5 °C), temperature source Temporal, resp. rate (!) 29, height 5' 8\" (1.727 m), weight 178 lb 9.2 oz (81 kg), SpO2 98%.    General: No acute distress.  Intubated  HEENT: Moist mucous membranes. EOMI. PERRLA  Neck:  No JVD.   Respiratory: Rales  Cardiovascular: S1, S2.  Regular rate and rhythm.   Abdomen: Soft, nontender, nondistended.    Neurologic: No focal neurological deficits.  Musculoskeletal: Trace edema  Access:    Results:     Recent Labs   Lab 25  0550 25  1517 25  0436 03/15/25  0439   RBC 2.55*  --  2.81* 2.73*   HGB 6.2* 7.1* 7.1* 7.3*   HCT 19.1*  --  21.4* 20.9*   MCV 74.9*  --  76.2* 76.6*   MCH 24.3*  --  25.3* 26.7   MCHC 32.5  --  33.2 34.9   RDW 31.4*  --  28.7* 29.2*   NEPRELIM 12.05*  --  12.06* 15.24*   WBC 14.9*  --  14.8* 17.8*   .0*  --  110.0* 135.0*         Recent Labs   Lab 25  0455 25  1902 25  0436 03/15/25  0439   *  --  106* 129*   BUN 27*  --  33* 36*   CREATSERUM 1.65*  --  2.00* 1.94*   CA 6.9*  --  7.9* 7.8*      --  144 141   K 2.9* 3.3* 3.5  3.5 3.1*     --  109 107   CO2 24.0  --  24.0 22.0          XR CHEST AP PORTABLE  (CPT=71045)    Result Date: 3/14/2025  CONCLUSION:   ET tube terminates 5 mm above the em.  Recommend retraction.  Secure message regarding this finding sent to the respiratory therapist Varsha on 03/14/2025 at 8:52 p.m.  Enteric tube tip terminates at the junction of the stomach and proximal duodenum.  Unchanged multifocal alveolar opacities.  Unchanged trace left pleural effusion     Dictated by (CST): Yarelis Tomlinson MD on 3/14/2025 at 8:50 PM     Finalized by (CST): Yarelis Tomlinson MD on 3/14/2025 at 8:52 PM          XR CHEST AP PORTABLE  (CPT=71045)    Result Date: 3/14/2025  CONCLUSION:   No significant change in the multifocal opacities involving both lungs, which are most noticeable in the left lung base.    Dictated by (CST): Emmett Quan MD on 3/14/2025 at 7:22 AM     Finalized by (CST): Emmett Quan MD on 3/14/2025 at 7:25 AM           Assessment and Plan:     44 year old male w/ PMHx of longstanding alcohol use, cannabis use, GERD, who presents with generalized abdominal pain, hiccups and vomiting     Impression:    USMAN-initially prerenal and creatinine improved however again getting worse probably ischemic ATN.  Creatinine is stable, good urine output.  Volume overload/hypoxic respiratory failure, intubated and is net positive.  Continue Lasix 20 mg IV 3 times daily.   Alcoholic hepatitis/cirrhosis  Sepsis/hypotension.  On antibiotic and pressors      Plan:    Creatinine is slightly better  Concentrate all drips  Decreased free water flushes--dec to 200 q 3 hrs  Continue Lasix 20 mg IV 3 times daily,   Try to replace all electrolytes per NG      Thank you very much for allowing me to participate in the care of your patient.  If you have any questions, please do not hesitate to contact me.     Jenny Ricks MD  3/15/2025

## 2025-03-16 ENCOUNTER — APPOINTMENT (OUTPATIENT)
Dept: GENERAL RADIOLOGY | Facility: HOSPITAL | Age: 45
End: 2025-03-16
Attending: INTERNAL MEDICINE
Payer: MEDICAID

## 2025-03-16 LAB
ALBUMIN SERPL-MCNC: 3.3 G/DL (ref 3.2–4.8)
ALBUMIN SERPL-MCNC: 3.3 G/DL (ref 3.2–4.8)
ALP LIVER SERPL-CCNC: 141 U/L
ALT SERPL-CCNC: 73 U/L
ANION GAP SERPL CALC-SCNC: 11 MMOL/L (ref 0–18)
AST SERPL-CCNC: 160 U/L (ref ?–34)
BASE EXCESS BLD CALC-SCNC: -1.5 MMOL/L (ref ?–2)
BASOPHILS # BLD AUTO: 0.05 X10(3) UL (ref 0–0.2)
BASOPHILS NFR BLD AUTO: 0.3 %
BILIRUB DIRECT SERPL-MCNC: 9 MG/DL (ref ?–0.3)
BILIRUB SERPL-MCNC: 11 MG/DL (ref 0.3–1.2)
BUN BLD-MCNC: 51 MG/DL (ref 9–23)
BUN/CREAT SERPL: 23.9 (ref 10–20)
CALCIUM BLD-MCNC: 8 MG/DL (ref 8.7–10.4)
CHLORIDE SERPL-SCNC: 109 MMOL/L (ref 98–112)
CO2 SERPL-SCNC: 21 MMOL/L (ref 21–32)
CREAT BLD-MCNC: 2.13 MG/DL
DEPRECATED RDW RBC AUTO: 75.4 FL (ref 35.1–46.3)
EGFRCR SERPLBLD CKD-EPI 2021: 38 ML/MIN/1.73M2 (ref 60–?)
EOSINOPHIL # BLD AUTO: 0.25 X10(3) UL (ref 0–0.7)
EOSINOPHIL NFR BLD AUTO: 1.5 %
ERYTHROCYTE [DISTWIDTH] IN BLOOD BY AUTOMATED COUNT: 30.4 % (ref 11–15)
GLUCOSE BLD-MCNC: 117 MG/DL (ref 70–99)
HCO3 BLDA-SCNC: 23.7 MEQ/L (ref 21–27)
HCT VFR BLD AUTO: 20.8 %
HGB BLD-MCNC: 6.9 G/DL
HGB BLD-MCNC: 8.3 G/DL
IMM GRANULOCYTES # BLD AUTO: 0.27 X10(3) UL (ref 0–1)
IMM GRANULOCYTES NFR BLD: 1.6 %
INSPIRATION SETTING TIME VENT: 0.8 SEC (ref 0.1–5)
LYMPHOCYTES # BLD AUTO: 1.51 X10(3) UL (ref 1–4)
LYMPHOCYTES NFR BLD AUTO: 9.2 %
MAGNESIUM SERPL-MCNC: 2.1 MG/DL (ref 1.6–2.6)
MCH RBC QN AUTO: 24.7 PG (ref 26–34)
MCHC RBC AUTO-ENTMCNC: 33.2 G/DL (ref 31–37)
MCV RBC AUTO: 74.6 FL
MONOCYTES # BLD AUTO: 1.08 X10(3) UL (ref 0.1–1)
MONOCYTES NFR BLD AUTO: 6.6 %
NEUTROPHILS # BLD AUTO: 13.26 X10 (3) UL (ref 1.5–7.7)
NEUTROPHILS # BLD AUTO: 13.26 X10(3) UL (ref 1.5–7.7)
NEUTROPHILS NFR BLD AUTO: 80.8 %
O2 CT BLD-SCNC: 11.2 VOL% (ref 15–23)
O2/TOTAL GAS SETTING VFR VENT: 50 %
OSMOLALITY SERPL CALC.SUM OF ELEC: 307 MOSM/KG (ref 275–295)
PAW @ PEAK INSP FLOW SETTING VENT: 22 CM H2O (ref 1–60)
PCO2 BLDA: 28 MM HG (ref 35–45)
PEEP SETTING VENT: 5 CM H2O
PH BLDA: 7.49 [PH] (ref 7.35–7.45)
PHOSPHATE SERPL-MCNC: 2.7 MG/DL (ref 2.4–5.1)
PLATELET # BLD AUTO: 172 10(3)UL (ref 150–450)
PLATELETS.RETICULATED NFR BLD AUTO: 11.4 % (ref 0–7)
PO2 BLDA: 71 MM HG (ref 80–100)
POTASSIUM SERPL-SCNC: 3.2 MMOL/L (ref 3.5–5.1)
POTASSIUM SERPL-SCNC: 3.2 MMOL/L (ref 3.5–5.1)
POTASSIUM SERPL-SCNC: 3.6 MMOL/L (ref 3.5–5.1)
PROT SERPL-MCNC: 5.6 G/DL (ref 5.7–8.2)
PUNCTURE CHARGE: YES
RBC # BLD AUTO: 2.79 X10(6)UL
RESP RATE: 12 BPM
SAO2 % BLDA: 98.6 % (ref 94–100)
SODIUM SERPL-SCNC: 141 MMOL/L (ref 136–145)
WBC # BLD AUTO: 16.4 X10(3) UL (ref 4–11)

## 2025-03-16 PROCEDURE — 71045 X-RAY EXAM CHEST 1 VIEW: CPT | Performed by: INTERNAL MEDICINE

## 2025-03-16 PROCEDURE — 99232 SBSQ HOSP IP/OBS MODERATE 35: CPT | Performed by: INTERNAL MEDICINE

## 2025-03-16 PROCEDURE — 99291 CRITICAL CARE FIRST HOUR: CPT | Performed by: INTERNAL MEDICINE

## 2025-03-16 PROCEDURE — 99233 SBSQ HOSP IP/OBS HIGH 50: CPT | Performed by: HOSPITALIST

## 2025-03-16 RX ORDER — SODIUM CHLORIDE 9 MG/ML
INJECTION, SOLUTION INTRAVENOUS ONCE
Status: COMPLETED | OUTPATIENT
Start: 2025-03-16 | End: 2025-03-16

## 2025-03-16 RX ORDER — FUROSEMIDE 10 MG/ML
20 INJECTION INTRAMUSCULAR; INTRAVENOUS
Status: DISCONTINUED | OUTPATIENT
Start: 2025-03-16 | End: 2025-03-29

## 2025-03-16 RX ORDER — POTASSIUM CHLORIDE 1.5 G/1.58G
40 POWDER, FOR SOLUTION ORAL ONCE
Status: COMPLETED | OUTPATIENT
Start: 2025-03-16 | End: 2025-03-16

## 2025-03-16 NOTE — PLAN OF CARE
Vented and sedated. Levophed titrated per parameters. One unit PRBC this AM. All safety measures in place, frequent nursing rounds made.     Problem: Patient Centered Care  Goal: Patient preferences are identified and integrated in the patient's plan of care  Description: Interventions:  - What would you like us to know as we care for you?   - Provide timely, complete, and accurate information to patient/family  - Incorporate patient and family knowledge, values, beliefs, and cultural backgrounds into the planning and delivery of care  - Encourage patient/family to participate in care and decision-making at the level they choose  - Honor patient and family perspectives and choices  Outcome: Progressing     Problem: Patient/Family Goals  Goal: Patient/Family Long Term Goal  Description: Patient's Long Term Goal:     Interventions:  -   - See additional Care Plan goals for specific interventions  Outcome: Progressing  Goal: Patient/Family Short Term Goal  Description: Patient's Short Term Goal:     Interventions:   -   - See additional Care Plan goals for specific interventions  Outcome: Progressing     Problem: PAIN - ADULT  Goal: Verbalizes/displays adequate comfort level or patient's stated pain goal  Description: INTERVENTIONS:  - Encourage pt to monitor pain and request assistance  - Assess pain using appropriate pain scale  - Administer analgesics based on type and severity of pain and evaluate response  - Implement non-pharmacological measures as appropriate and evaluate response  - Consider cultural and social influences on pain and pain management  - Manage/alleviate anxiety  - Utilize distraction and/or relaxation techniques  - Monitor for opioid side effects  - Notify MD/LIP if interventions unsuccessful or patient reports new pain  - Anticipate increased pain with activity and pre-medicate as appropriate  Outcome: Progressing     Problem: RISK FOR INFECTION - ADULT  Goal: Absence of fever/infection during  anticipated neutropenic period  Description: INTERVENTIONS  - Monitor WBC  - Administer growth factors as ordered  - Implement neutropenic guidelines  Outcome: Progressing     Problem: SAFETY ADULT - FALL  Goal: Free from fall injury  Description: INTERVENTIONS:  - Assess pt frequently for physical needs  - Identify cognitive and physical deficits and behaviors that affect risk of falls.  - Gould fall precautions as indicated by assessment.  - Educate pt/family on patient safety including physical limitations  - Instruct pt to call for assistance with activity based on assessment  - Modify environment to reduce risk of injury  - Provide assistive devices as appropriate  - Consider OT/PT consult to assist with strengthening/mobility  - Encourage toileting schedule  Outcome: Progressing     Problem: DISCHARGE PLANNING  Goal: Discharge to home or other facility with appropriate resources  Description: INTERVENTIONS:  - Identify barriers to discharge w/pt and caregiver  - Include patient/family/discharge partner in discharge planning  - Arrange for needed discharge resources and transportation as appropriate  - Identify discharge learning needs (meds, wound care, etc)  - Arrange for interpreters to assist at discharge as needed  - Consider post-discharge preferences of patient/family/discharge partner  - Complete POLST form as appropriate  - Assess patient's ability to be responsible for managing their own health  - Refer to Case Management Department for coordinating discharge planning if the patient needs post-hospital services based on physician/LIP order or complex needs related to functional status, cognitive ability or social support system  Outcome: Progressing     Problem: Safety Risk - Non-Violent Restraints  Goal: Patient will remain free from self-harm  Description: INTERVENTIONS:  - Apply the least restrictive restraint to prevent harm  - Notify patient and family of reasons restraints applied  - Assess  for any contributing factors to confusion (electrolyte disturbances, delirium, medications)  - Discontinue any unnecessary medical devices as soon as possible  - Assess the patient's physical comfort, circulation, skin condition, hydration, nutrition and elimination needs   - Reorient and redirection as needed  - Assess for the need to continue restraints  Outcome: Progressing     Problem: Delirium  Goal: Minimize duration of delirium  Description: Interventions:  - Encourage use of hearing aids, eye glasses  - Promote highest level of mobility daily  - Provide frequent reorientation  - Promote wakefulness i.e. lights on, blinds open  - Promote sleep, encourage patient's normal rest cycle i.e. lights off, TV off, minimize noise and interruptions  - Encourage family to assist in orientation and promotion of home routines  Outcome: Progressing     Problem: RESPIRATORY - ADULT  Goal: Achieves optimal ventilation and oxygenation  Description: INTERVENTIONS:  - Assess for changes in respiratory status  - Assess for changes in mentation and behavior  - Position to facilitate oxygenation and minimize respiratory effort  - Oxygen supplementation based on oxygen saturation or ABGs  - Provide Smoking Cessation handout, if applicable  - Encourage broncho-pulmonary hygiene including cough, deep breathe, Incentive Spirometry  - Assess the need for suctioning and perform as needed  - Assess and instruct to report SOB or any respiratory difficulty  - Respiratory Therapy support as indicated  - Manage/alleviate anxiety  - Monitor for signs/symptoms of CO2 retention  Outcome: Progressing     Problem: METABOLIC/FLUID AND ELECTROLYTES - ADULT  Goal: Electrolytes maintained within normal limits  Description: INTERVENTIONS:  - Monitor labs and rhythm and assess patient for signs and symptoms of electrolyte imbalances  - Administer electrolyte replacement as ordered  - Monitor response to electrolyte replacements, including rhythm and  repeat lab results as appropriate  - Fluid restriction as ordered  - Instruct patient on fluid and nutrition restrictions as appropriate  Outcome: Progressing  Goal: Hemodynamic stability and optimal renal function maintained  Description: INTERVENTIONS:  - Monitor labs and assess for signs and symptoms of volume excess or deficit  - Monitor intake, output and patient weight  - Monitor urine specific gravity, serum osmolarity and serum sodium as indicated or ordered  - Monitor response to interventions for patient's volume status, including labs, urine output, blood pressure (other measures as available)  - Encourage oral intake as appropriate  - Instruct patient on fluid and nutrition restrictions as appropriate  Outcome: Progressing     Problem: Impaired Swallowing  Goal: Minimize aspiration risk  Description: Interventions:  - Patient should be alert and upright for all feedings (90 degrees preferred)  - Offer food and liquids at a slow rate  - No straws  - Encourage small bites of food and small sips of liquid  - Offer pills one at a time, crush or deliver with applesauce as needed  - Discontinue feeding and notify MD (or speech pathologist) if coughing or persistent throat clearing or wet/gurgly vocal quality is noted  Outcome: Progressing     Problem: Impaired Cognition  Goal: Patient will exhibit improved attention, thought processing and/or memory  Description: Interventions:    Outcome: Progressing     Problem: CARDIOVASCULAR - ADULT  Goal: Maintains optimal cardiac output and hemodynamic stability  Description: INTERVENTIONS:- Monitor vital signs, rhythm, and trends- Monitor for bleeding, hypotension and signs of decreased cardiac output- Evaluate effectiveness of vasoactive medications to optimize hemodynamic stability- Monitor arterial and/or venous puncture sites for bleeding and/or hematoma- Assess quality of pulses, skin color and temperature- Assess for signs of decreased coronary artery perfusion -  ex. Angina- Evaluate fluid balance, assess for edema, trend weights  Outcome: Progressing

## 2025-03-16 NOTE — PROGRESS NOTES
[facility]  part of [facility]    Progress Note    Sami Grijalva Jr. Patient Status:  Inpatient    10/11/1980 MRN [id number]   Location [facility] 2W/SW Attending Cameron Torres MD   Hosp Day # 12 PCP Kerri Medina MD       Subjective:   Sami Grijalva Jr. is a(n) 44 year old male who I am seeing for USMAN    Remains intubated  Levo at 3  Urine output is acceptable  FiO2 is 50%  Getting PRBC    Objective:   /77 (BP Location: Right arm)   Pulse 105   Temp 98.9 °F (37.2 °C) (Temporal)   Resp (!) 29   Ht 5' 8\" (1.727 m)   Wt 181 lb 10.5 oz (82.4 kg)   SpO2 93%   BMI 27.62 kg/m²      Intake/Output Summary (Last 24 hours) at 3/16/2025 1131  Last data filed at 3/16/2025 0930  Gross per 24 hour   Intake 3818.62 ml   Output 1475 ml   Net 2343.62 ml     Wt Readings from Last 1 Encounters:   25 181 lb 10.5 oz (82.4 kg)       Exam  Vital signs: Blood pressure 105/77, pulse 105, temperature 98.9 °F (37.2 °C), temperature source Temporal, resp. rate (!) 29, height 5' 8\" (1.727 m), weight 181 lb 10.5 oz (82.4 kg), SpO2 93%.    General: No acute distress.  Intubated  HEENT: ETT  Neck:  No JVD.   Respiratory: Rales  Cardiovascular: S1, S2.  Regular rate and rhythm.   Abdomen: Soft, nontender, nondistended.    Neurologic: No focal neurological deficits.  Musculoskeletal: Trace edema  Access:    Results:     Recent Labs   Lab 25  0436 03/15/25  0439 25  0513   RBC 2.81* 2.73* 2.79*   HGB 7.1* 7.3* 6.9*   HCT 21.4* 20.9* 20.8*   MCV 76.2* 76.6* 74.6*   MCH 25.3* 26.7 24.7*   MCHC 33.2 34.9 33.2   RDW 28.7* 29.2* 30.4*   NEPRELIM 12.06* 15.24* 13.26*   WBC 14.8* 17.8* 16.4*   .0* 135.0* 172.0         Recent Labs   Lab 25  0436 03/15/25  0439 25  0513 25  1048   * 129* 117*  --    BUN 33* 36* 51*  --    CREATSERUM 2.00* 1.94* 2.13*  --    CA 7.9* 7.8* 8.0*  --     141 141  --    K 3.5  3.5 3.1* 3.2*  3.2* 3.6    107 109   --    CO2 24.0 22.0 21.0  --           XR CHEST AP PORTABLE  (CPT=71045)    Result Date: 3/16/2025  CONCLUSION:   No new abnormality.  Unchanged and tubes.  Persistent low lung volumes and extensive bilateral airspace opacities.    elm-remote     Dictated by (CST): Riley Villa MD on 3/16/2025 at 8:22 AM     Finalized by (CST): Riley Villa MD on 3/16/2025 at 8:23 AM          XR CHEST AP PORTABLE  (CPT=71045)    Result Date: 3/14/2025  CONCLUSION:   ET tube terminates 5 mm above the em.  Recommend retraction.  Secure message regarding this finding sent to the respiratory therapist Varsha on 03/14/2025 at 8:52 p.m.  Enteric tube tip terminates at the junction of the stomach and proximal duodenum.  Unchanged multifocal alveolar opacities.  Unchanged trace left pleural effusion     Dictated by (CST): Yarelis Tomlinson MD on 3/14/2025 at 8:50 PM     Finalized by (CST): Yarelis Tomlinson MD on 3/14/2025 at 8:52 PM           Assessment and Plan:     44 year old male w/ PMHx of longstanding alcohol use, cannabis use, GERD, who presents with generalized abdominal pain, hiccups and vomiting     Impression:    USMAN-initially prerenal and creatinine improved however again getting worse probably ischemic ATN.  Creatinine is stable, good urine output.  Volume overload/hypoxic respiratory failure, intubated and is net positive.  Continue Lasix 20 mg IV 3 times daily.   Alcoholic hepatitis/cirrhosis  Sepsis/hypotension.  On antibiotic and pressors  Anemia getting PRBC      Plan:    Creatinine is stable  Concentrate all drips  Decreased free water flushes--dec to 200 q 4 hrs  Noted pulm dec lasix to bid ( remains net +)   Try to replace all electrolytes per NG  Getting PRBC    Thank you very much for allowing me to participate in the care of your patient.  If you have any questions, please do not hesitate to contact me.     Jenny Ricks MD

## 2025-03-16 NOTE — RESPIRATORY THERAPY NOTE
Received Pt on full support with Vent settings of      03/15/25 1915   Vent Information   Vent Mode PC/AC   Settings   FiO2 (%) 50 %   Resp Rate (Set) 12   Waveform Decelerating ramp   PEEP/CPAP (cm H2O) 5 cm H20   Press Control > PEEP CWP 22   Insp Time (sec) 0.9 sec   PIP Set (cm H2O) 27 cm H2O     Size 8.0 ETT tube secured at 22 at the lip . Bilateral BS auscultated and suction was provided as needed. No acute event overnight. No vent changes were made overnight.

## 2025-03-16 NOTE — PROGRESS NOTES
[facility]  part of [facility]    Progress Note    Sami Grijalva  Patient Status:  Inpatient    10/11/1980 MRN [id number]   Location [facility] 2W/SW Attending Cameron Torres MD   Hosp Day # 12 PCP Kerri Medina MD     Chief Complaint:     USMAN    Subjective:   Subjective:    Patient seen and examined this afternoon  Unable to obtain ROS  Family at bedside  Hypotension    Objective:   Blood pressure 98/52, pulse 102, temperature 98.9 °F (37.2 °C), temperature source Temporal, resp. rate 21, height 5' 8\" (1.727 m), weight 181 lb 10.5 oz (82.4 kg), SpO2 95%.  Physical Exam    General: intubated/sedated  HEENT: EOMI PERRLA, atraumatic normocephalic  Cardiac: S1-S2 appreciated  Lungs: Good air entry bilaterally clear to auscultation  Abdomen: Soft nontender nondistended positive bowel sounds  Ext: Peripheral pulses are positive  Neuro: No focal deficits noted  Psych: Normal mood  Skin: No rashes noted  MSK: Full range of motion intact      Results:   Lab Results   Component Value Date    WBC 16.4 (H) 2025    HGB 8.3 (L) 2025    HCT 20.8 (L) 2025    .0 2025    CREATSERUM 2.13 (H) 2025    BUN 51 (H) 2025     2025    K 3.6 2025     2025    CO2 21.0 2025     (H) 2025    CA 8.0 (L) 2025    ALB 3.3 2025    ALB 3.3 2025    ALKPHO 141 (H) 2025    BILT 11.0 (H) 2025    TP 5.6 (L) 2025     (H) 2025    ALT 73 (H) 2025    INR 1.74 (H) 03/15/2025     (HH) 2025    DDIMER 1.13 (H) 2023    ESRML 27 (H) 10/01/2021    MG 2.1 2025    PHOS 2.7 2025    TROP 0.00 2017    TROPHS <3 10/07/2024     (H) 2025    ETOH <3 2025       XR CHEST AP PORTABLE  (CPT=71045)    Result Date: 3/16/2025  CONCLUSION:   No new abnormality.  Unchanged and tubes.  Persistent low lung volumes and extensive bilateral  airspace opacities.    elm-remote     Dictated by (CST): Riley Villa MD on 3/16/2025 at 8:22 AM     Finalized by (CST): Riley Villa MD on 3/16/2025 at 8:23 AM          XR CHEST AP PORTABLE  (CPT=71045)    Result Date: 3/14/2025  CONCLUSION:   ET tube terminates 5 mm above the em.  Recommend retraction.  Secure message regarding this finding sent to the respiratory therapist Varsha on 03/14/2025 at 8:52 p.m.  Enteric tube tip terminates at the junction of the stomach and proximal duodenum.  Unchanged multifocal alveolar opacities.  Unchanged trace left pleural effusion     Dictated by (CST): Yarelis Tomlinson MD on 3/14/2025 at 8:50 PM     Finalized by (CST): Yarelis Tomlinson MD on 3/14/2025 at 8:52 PM               Assessment & Plan:       Acute hypoxemic resp failure-worsening   Multifocal pna  -Concern for possible aspiration given hx of emesis and altered mental status.   -Continue on zosyn, doxy.   -Continues to require high levels of supplemental O2.  Weaning as able  -aspiration precautions  -intubated now , per pulm     Acute ETOH hepatitis   -last drink reportedly 4 days prior to admission  -Reported emesis and poor PO intake, without overt bleeding   -T bili upon arrival to ER 10.5, platelet 65, cr 4.22  -CT a/p reviewed, noted severe fatty liver with subtle undulating contour  -US liver reviewed, noted hepatic steatosis, GB sludge and cholelithiasis without cholecystitis or biliary ductal dilation, hepatic and portal vein patent  -MELD 3.0 on admission: 44   -GI consulted, appreciate further recommendations     Alcohol Abuse with withdrawal   -CIWA monitoring with alcohol withdrawal protocol  -thiamine, folic acid, multivitamin  -Psychiatry consulted, appreciate recommendations     Altered mental status  -Slowly improving  -Likely metabolic encephalopathy  -Initially noted to have elevated ammonia levels noted consistent with hepatic encephalopathy.  -Continue lactulose   -Patient also with USMAN  and electrolyte abnormalities.  -Continue treating underlying conditions  -Continue to monitor     Acute Kidney Injury   -Continue improvement noted  -Avoiding Nephrotoxic agents  - Cont to monitor  -Nephrology on consult, appreciate further recommendations     Hypernatremia  -Persistent elevations noted  -Nephrology on consult, recommend continue D5 IV fluids and increased free water flushes.  -Continue to monitor     Plan of care discussed with patient's sister and partner at bedside.  Discussed management/test result(s) with Rn      Quality:  DVT Prophylaxis: SCDs  CODE status: Full  Estimated date of discharge: TBD  Discharge is dependent on: clinical stability     Global A/P  -found to have hgb 6.9 this morning- transfused 1 U PRBC  -Reviewed previous consultant notes  -Reviewed CBC, BMP, Mag, and Phos  -Reviewed tests ordered  -Repeat labs in am  -MDM: High, severe exacerbation of chronic illness posing a threat to life. IV medications requiring close inpatient monitoring.       Cameron Torres MD

## 2025-03-16 NOTE — PROGRESS NOTES
[facility]  part of [facility]    Progress Note    Sami Grijalva Jr. Patient Status:  Inpatient    10/11/1980 MRN [id number]   Location [facility] 2W/SW Attending Cameron Torres MD   Hosp Day # 12 PCP Kerri Medina MD         Subjective:     Unable to perform ROS    Intubated on mechanical ventilation  Sedated  On pressure control mode with 50% FiO2  On 3 mics of Levophed  ABGs with pH 7.49 pCO2 of 28 and pO2 of 71  Objective:   Blood pressure 122/71, pulse 114, temperature 98.9 °F (37.2 °C), temperature source Temporal, resp. rate 18, height 5' 8\" (1.727 m), weight 181 lb 10.5 oz (82.4 kg), SpO2 94%.  Physical Exam  Constitutional:       General: He is in acute distress.      Appearance: He is ill-appearing.   HENT:      Head: Atraumatic.      Nose: Nose normal.      Mouth/Throat:      Mouth: Mucous membranes are moist.   Eyes:      General: No scleral icterus.  Cardiovascular:      Rate and Rhythm: Regular rhythm. Tachycardia present.   Pulmonary:      Effort: Respiratory distress present.      Breath sounds: Rales present. No wheezing or rhonchi.   Abdominal:      General: There is distension.      Tenderness: There is no guarding or rebound.      Comments: Diminished bowel sounds   Musculoskeletal:      Right lower leg: No edema.      Left lower leg: No edema.   Skin:     General: Skin is dry.   Neurological:      General: No focal deficit present.      Comments: Arousable when off sedation moves extremities and follows commands         Results:   Lab Results   Component Value Date    WBC 16.4 (H) 2025    HGB 6.9 (LL) 2025    HCT 20.8 (L) 2025    .0 2025    CREATSERUM 2.13 (H) 2025    BUN 51 (H) 2025     2025    K 3.2 (L) 2025    K 3.2 (L) 2025     2025    CO2 21.0 2025     (H) 2025    CA 8.0 (L) 2025    ALB 3.3 2025    ALB 3.3 2025    MIRYAM 141 (H)  03/16/2025    BILT 11.0 (H) 03/16/2025    TP 5.6 (L) 03/16/2025     (H) 03/16/2025    ALT 73 (H) 03/16/2025    INR 1.74 (H) 03/15/2025     (HH) 03/13/2025    DDIMER 1.13 (H) 08/25/2023    ESRML 27 (H) 10/01/2021    MG 2.1 03/16/2025    PHOS 2.7 03/16/2025    TROP 0.00 01/31/2017    TROPHS <3 10/07/2024     (H) 03/06/2025    ETOH <3 03/04/2025       XR CHEST AP PORTABLE  (CPT=71045)    Result Date: 3/16/2025  CONCLUSION:   No new abnormality.  Unchanged and tubes.  Persistent low lung volumes and extensive bilateral airspace opacities.    elm-remote     Dictated by (CST): Riley Villa MD on 3/16/2025 at 8:22 AM     Finalized by (CST): Riley Villa MD on 3/16/2025 at 8:23 AM          XR CHEST AP PORTABLE  (CPT=71045)    Result Date: 3/14/2025  CONCLUSION:   ET tube terminates 5 mm above the em.  Recommend retraction.  Secure message regarding this finding sent to the respiratory therapist Varsha on 03/14/2025 at 8:52 p.m.  Enteric tube tip terminates at the junction of the stomach and proximal duodenum.  Unchanged multifocal alveolar opacities.  Unchanged trace left pleural effusion     Dictated by (CST): Yarelis Tomlinson MD on 3/14/2025 at 8:50 PM     Finalized by (CST): Yarelis Tomlinson MD on 3/14/2025 at 8:52 PM               Assessment & Plan:         1-acute respiratory failure /ARDS  - Acute alcohol induced pancreatitis  - Acute alcohol hepatitis  - aspiration pneumonitis   - Withdrawal syndrome    Intubated on vent / Fio2 50 % on pressure control mode and O2 sat 91-92 % and abgs noted   CXR bilateral infiltrate s/p bronchoscopy and so far negative cultures   Doing poorly on SBT  On low dose levo  Continue vent support and management /protective strategy  Antibiotics      2-alcohol induced acute hepatitis and acute pancreatitis with underlying cirrhosis  Patient also with withdrawal syndrome  GI and psych following  Supportive care    3-anemia  Transfuse 1 unit of PRBC  today  Follow-up H&H    4-acute kidney injury  I's and O's +15 L with good urine output  Now on diuretics with worsening renal function will cut down  Renal following    5-nutrition  Tolerating NG tube feeding    6-DVT prophylaxis  SCD only since patient with severe anemia      D/w family at bedside at length   High risk , critical       Upon my evaluation, this patient had a high probability of imminent or life-threatening deterioration due to critical findings of laboratory tests, hypotension, hypoxemia, renal failure, and respiratory failure, which required my direct attention, intervention, and personal management.    I have personally provided 35 minutes of critical care time, exclusive of time spent on separately billable procedures.  Time includes review of all pertinent laboratory/radiology results, discussion with consultants, and monitoring for potential decompensation.  Performed interventions included pressor drugs and managing mechanical ventilation.             Rashaun Seaman MD  3/16/2025

## 2025-03-16 NOTE — PLAN OF CARE
SAT completed; patient tolerated well and followed commands. SBT completed, patient tolerated well, placed back on support per RT; see note.  Propofol on for sedation. Levophed paused at 1030; patient tolerated well. Tube feeds infusing per orders. Patient received 1 unit PRBC, Hgb up to 8.3. Cedeño and flexiseal remain in place. Bilateral wrist Restraints in place for patient safety, family present at bedside throughout day, updated on POC. Patient Tmax 100.2, Acetaminophen given; temp down to 99.5. SCDs in place. Pt turned Q2H.       Problem: Patient Centered Care  Goal: Patient preferences are identified and integrated in the patient's plan of care  Description: Interventions:  - What would you like us to know as we care for you?   - Provide timely, complete, and accurate information to patient/family  - Incorporate patient and family knowledge, values, beliefs, and cultural backgrounds into the planning and delivery of care  - Encourage patient/family to participate in care and decision-making at the level they choose  - Honor patient and family perspectives and choices  Outcome: Progressing     Problem: Patient/Family Goals  Goal: Patient/Family Long Term Goal  Description: Patient's Long Term Goal:     Interventions:  -   - See additional Care Plan goals for specific interventions  Outcome: Progressing  Goal: Patient/Family Short Term Goal  Description: Patient's Short Term Goal:     Interventions:   -   - See additional Care Plan goals for specific interventions  Outcome: Progressing     Problem: PAIN - ADULT  Goal: Verbalizes/displays adequate comfort level or patient's stated pain goal  Description: INTERVENTIONS:  - Encourage pt to monitor pain and request assistance  - Assess pain using appropriate pain scale  - Administer analgesics based on type and severity of pain and evaluate response  - Implement non-pharmacological measures as appropriate and evaluate response  - Consider cultural and social  influences on pain and pain management  - Manage/alleviate anxiety  - Utilize distraction and/or relaxation techniques  - Monitor for opioid side effects  - Notify MD/LIP if interventions unsuccessful or patient reports new pain  - Anticipate increased pain with activity and pre-medicate as appropriate  Outcome: Progressing     Problem: RISK FOR INFECTION - ADULT  Goal: Absence of fever/infection during anticipated neutropenic period  Description: INTERVENTIONS  - Monitor WBC  - Administer growth factors as ordered  - Implement neutropenic guidelines  Outcome: Progressing     Problem: SAFETY ADULT - FALL  Goal: Free from fall injury  Description: INTERVENTIONS:  - Assess pt frequently for physical needs  - Identify cognitive and physical deficits and behaviors that affect risk of falls.  - Bear fall precautions as indicated by assessment.  - Educate pt/family on patient safety including physical limitations  - Instruct pt to call for assistance with activity based on assessment  - Modify environment to reduce risk of injury  - Provide assistive devices as appropriate  - Consider OT/PT consult to assist with strengthening/mobility  - Encourage toileting schedule  Outcome: Progressing     Problem: DISCHARGE PLANNING  Goal: Discharge to home or other facility with appropriate resources  Description: INTERVENTIONS:  - Identify barriers to discharge w/pt and caregiver  - Include patient/family/discharge partner in discharge planning  - Arrange for needed discharge resources and transportation as appropriate  - Identify discharge learning needs (meds, wound care, etc)  - Arrange for interpreters to assist at discharge as needed  - Consider post-discharge preferences of patient/family/discharge partner  - Complete POLST form as appropriate  - Assess patient's ability to be responsible for managing their own health  - Refer to Case Management Department for coordinating discharge planning if the patient needs  post-hospital services based on physician/LIP order or complex needs related to functional status, cognitive ability or social support system  Outcome: Progressing     Problem: Safety Risk - Non-Violent Restraints  Goal: Patient will remain free from self-harm  Description: INTERVENTIONS:  - Apply the least restrictive restraint to prevent harm  - Notify patient and family of reasons restraints applied  - Assess for any contributing factors to confusion (electrolyte disturbances, delirium, medications)  - Discontinue any unnecessary medical devices as soon as possible  - Assess the patient's physical comfort, circulation, skin condition, hydration, nutrition and elimination needs   - Reorient and redirection as needed  - Assess for the need to continue restraints  Outcome: Progressing     Problem: Delirium  Goal: Minimize duration of delirium  Description: Interventions:  - Encourage use of hearing aids, eye glasses  - Promote highest level of mobility daily  - Provide frequent reorientation  - Promote wakefulness i.e. lights on, blinds open  - Promote sleep, encourage patient's normal rest cycle i.e. lights off, TV off, minimize noise and interruptions  - Encourage family to assist in orientation and promotion of home routines  Outcome: Progressing     Problem: RESPIRATORY - ADULT  Goal: Achieves optimal ventilation and oxygenation  Description: INTERVENTIONS:  - Assess for changes in respiratory status  - Assess for changes in mentation and behavior  - Position to facilitate oxygenation and minimize respiratory effort  - Oxygen supplementation based on oxygen saturation or ABGs  - Provide Smoking Cessation handout, if applicable  - Encourage broncho-pulmonary hygiene including cough, deep breathe, Incentive Spirometry  - Assess the need for suctioning and perform as needed  - Assess and instruct to report SOB or any respiratory difficulty  - Respiratory Therapy support as indicated  - Manage/alleviate anxiety  -  Monitor for signs/symptoms of CO2 retention  Outcome: Progressing     Problem: METABOLIC/FLUID AND ELECTROLYTES - ADULT  Goal: Electrolytes maintained within normal limits  Description: INTERVENTIONS:  - Monitor labs and rhythm and assess patient for signs and symptoms of electrolyte imbalances  - Administer electrolyte replacement as ordered  - Monitor response to electrolyte replacements, including rhythm and repeat lab results as appropriate  - Fluid restriction as ordered  - Instruct patient on fluid and nutrition restrictions as appropriate  Outcome: Progressing  Goal: Hemodynamic stability and optimal renal function maintained  Description: INTERVENTIONS:  - Monitor labs and assess for signs and symptoms of volume excess or deficit  - Monitor intake, output and patient weight  - Monitor urine specific gravity, serum osmolarity and serum sodium as indicated or ordered  - Monitor response to interventions for patient's volume status, including labs, urine output, blood pressure (other measures as available)  - Encourage oral intake as appropriate  - Instruct patient on fluid and nutrition restrictions as appropriate  Outcome: Progressing     Problem: Impaired Swallowing  Goal: Minimize aspiration risk  Description: Interventions:  - Patient should be alert and upright for all feedings (90 degrees preferred)  - Offer food and liquids at a slow rate  - No straws  - Encourage small bites of food and small sips of liquid  - Offer pills one at a time, crush or deliver with applesauce as needed  - Discontinue feeding and notify MD (or speech pathologist) if coughing or persistent throat clearing or wet/gurgly vocal quality is noted  Outcome: Progressing     Problem: Impaired Cognition  Goal: Patient will exhibit improved attention, thought processing and/or memory  Description: Interventions:    Outcome: Progressing     Problem: CARDIOVASCULAR - ADULT  Goal: Maintains optimal cardiac output and hemodynamic  stability  Description: INTERVENTIONS:- Monitor vital signs, rhythm, and trends- Monitor for bleeding, hypotension and signs of decreased cardiac output- Evaluate effectiveness of vasoactive medications to optimize hemodynamic stability- Monitor arterial and/or venous puncture sites for bleeding and/or hematoma- Assess quality of pulses, skin color and temperature- Assess for signs of decreased coronary artery perfusion - ex. Angina- Evaluate fluid balance, assess for edema, trend weights  Outcome: Progressing

## 2025-03-16 NOTE — PROGRESS NOTES
RESPIRATORY THERAPY MECHANICAL VENTILATION PROGRESS NOTE    Ventilator Weaning:  Patient meets criteria for weaning? yes Weaning was attempted yes using pressure support 5 cmH2O + PEEP 5 cmH2O. The patient tolerated well for 30 minutes. The patient was returned to original ventilator settings  due to no plan to extubate today.    Weaning parameters:-     RR-32  VC- 0.7  NIF- -58      Current Ventilator Data:  Suction moderate amount of white thick secretions. ET tube 8.0 secured at 22 @ the lips.     03/16/25 0755   Vent Information   Vent Mode PC/AC   Settings   FiO2 (%) 50 %   Resp Rate (Set) 12   Waveform Decelerating ramp   PEEP/CPAP (cm H2O) 5 cm H20   Press Control > PEEP CWP 22   Insp Time (sec) 0.9 sec   PIP Set (cm H2O) 27 cm H2O   Trigger Sensitivity Flow (L/min) 3 L/min   Humidification Heat and moisture exchanger   Heater Temperature 98.6 °F (37 °C)   Readings   Total RR 27   Minute Ventilation (L/min) 14.2 L/min   Expiratory Tidal Volume 499 mL   PIP Observed (cm H2O) 27 cm H2O   MAP (cm H2O) 14   I/E Ratio 1:2.1   Plateau Pressure (cm H2O) 18 cm H2O   Static Compliance (L/cm H2O) 29        Latest Reference Range & Units 03/16/25 08:19   ABG PH 7.35 - 7.45  7.49 (H)   ABG PCO2 35 - 45 mm Hg 28 (L)   ABG PO2 80 - 100 mm Hg 71 (L)   ABG HCO3 21.0 - 27.0 mEq/L 23.7   ABG O2 SATURATION 94.0 - 100.0 % 98.6   Blood Gas Base Excess -2.0 - 2.0 mmol/L -1.5   Oxygen Delivery Device  Vent   Blood Gas Vent Mode  Pressure Control   Blood Gas Respiration Rate BPM 12   Oxygen Content 15.0 - 23.0 Vol% 11.2 (L)   FiO2  50.00   SAMPLE SITE  Right Radial   PEEP cm H2O 5.0   PEAK INSPIRATORY PRESSURE 1 - 60 cm H2O 22   INSPIRATORY TIME 0.1 - 5.0 sec 0.8

## 2025-03-17 ENCOUNTER — APPOINTMENT (OUTPATIENT)
Dept: GENERAL RADIOLOGY | Facility: HOSPITAL | Age: 45
End: 2025-03-17
Attending: INTERNAL MEDICINE
Payer: MEDICAID

## 2025-03-17 LAB
ALBUMIN SERPL-MCNC: 3.3 G/DL (ref 3.2–4.8)
ALBUMIN SERPL-MCNC: 3.3 G/DL (ref 3.2–4.8)
ALBUMIN/GLOB SERPL: 1.3 {RATIO} (ref 1–2)
ALP LIVER SERPL-CCNC: 168 U/L
ALP LIVER SERPL-CCNC: 168 U/L
ALT SERPL-CCNC: 80 U/L
ALT SERPL-CCNC: 80 U/L
ANION GAP SERPL CALC-SCNC: 9 MMOL/L (ref 0–18)
AST SERPL-CCNC: 184 U/L (ref ?–34)
AST SERPL-CCNC: 184 U/L (ref ?–34)
BASOPHILS # BLD AUTO: 0.04 X10(3) UL (ref 0–0.2)
BASOPHILS NFR BLD AUTO: 0.3 %
BILIRUB DIRECT SERPL-MCNC: 9.9 MG/DL (ref ?–0.3)
BILIRUB SERPL-MCNC: 12.4 MG/DL (ref 0.3–1.2)
BILIRUB SERPL-MCNC: 12.4 MG/DL (ref 0.3–1.2)
BLOOD TYPE BARCODE: 5100
BUN BLD-MCNC: 61 MG/DL (ref 9–23)
BUN/CREAT SERPL: 31.6 (ref 10–20)
CALCIUM BLD-MCNC: 8.3 MG/DL (ref 8.7–10.4)
CHLORIDE SERPL-SCNC: 107 MMOL/L (ref 98–112)
CO2 SERPL-SCNC: 22 MMOL/L (ref 21–32)
CREAT BLD-MCNC: 1.93 MG/DL
DEPRECATED RDW RBC AUTO: 78.3 FL (ref 35.1–46.3)
EGFRCR SERPLBLD CKD-EPI 2021: 43 ML/MIN/1.73M2 (ref 60–?)
EOSINOPHIL # BLD AUTO: 0.27 X10(3) UL (ref 0–0.7)
EOSINOPHIL NFR BLD AUTO: 2 %
ERYTHROCYTE [DISTWIDTH] IN BLOOD BY AUTOMATED COUNT: 29.2 % (ref 11–15)
GLOBULIN PLAS-MCNC: 2.6 G/DL (ref 2–3.5)
GLUCOSE BLD-MCNC: 118 MG/DL (ref 70–99)
HCT VFR BLD AUTO: 24 %
HGB BLD-MCNC: 8.1 G/DL
IMM GRANULOCYTES # BLD AUTO: 0.33 X10(3) UL (ref 0–1)
IMM GRANULOCYTES NFR BLD: 2.5 %
LYMPHOCYTES # BLD AUTO: 1.27 X10(3) UL (ref 1–4)
LYMPHOCYTES NFR BLD AUTO: 9.5 %
MAGNESIUM SERPL-MCNC: 2.2 MG/DL (ref 1.6–2.6)
MCH RBC QN AUTO: 26.4 PG (ref 26–34)
MCHC RBC AUTO-ENTMCNC: 33.8 G/DL (ref 31–37)
MCV RBC AUTO: 78.2 FL
MONOCYTES # BLD AUTO: 1.17 X10(3) UL (ref 0.1–1)
MONOCYTES NFR BLD AUTO: 8.7 %
NEUTROPHILS # BLD AUTO: 10.3 X10 (3) UL (ref 1.5–7.7)
NEUTROPHILS # BLD AUTO: 10.3 X10(3) UL (ref 1.5–7.7)
NEUTROPHILS NFR BLD AUTO: 77 %
OSMOLALITY SERPL CALC.SUM OF ELEC: 304 MOSM/KG (ref 275–295)
PHOSPHATE SERPL-MCNC: 4 MG/DL (ref 2.4–5.1)
PLATELET # BLD AUTO: 194 10(3)UL (ref 150–450)
PLATELETS.RETICULATED NFR BLD AUTO: 11 % (ref 0–7)
POTASSIUM SERPL-SCNC: 3.3 MMOL/L (ref 3.5–5.1)
PROT SERPL-MCNC: 5.9 G/DL (ref 5.7–8.2)
PROT SERPL-MCNC: 5.9 G/DL (ref 5.7–8.2)
RBC # BLD AUTO: 3.07 X10(6)UL
SODIUM SERPL-SCNC: 138 MMOL/L (ref 136–145)
UNIT VOLUME: 350 ML
WBC # BLD AUTO: 13.4 X10(3) UL (ref 4–11)

## 2025-03-17 PROCEDURE — 99233 SBSQ HOSP IP/OBS HIGH 50: CPT | Performed by: INTERNAL MEDICINE

## 2025-03-17 PROCEDURE — 71045 X-RAY EXAM CHEST 1 VIEW: CPT | Performed by: INTERNAL MEDICINE

## 2025-03-17 PROCEDURE — 99233 SBSQ HOSP IP/OBS HIGH 50: CPT | Performed by: HOSPITALIST

## 2025-03-17 RX ORDER — POTASSIUM CHLORIDE 1.5 G/1.58G
40 POWDER, FOR SOLUTION ORAL ONCE
Status: COMPLETED | OUTPATIENT
Start: 2025-03-17 | End: 2025-03-17

## 2025-03-17 NOTE — PLAN OF CARE
Problem: Patient Centered Care  Goal: Patient preferences are identified and integrated in the patient's plan of care  Description: Interventions:  - What would you like us to know as we care for you? I have a big family   - Provide timely, complete, and accurate information to patient/family  - Incorporate patient and family knowledge, values, beliefs, and cultural backgrounds into the planning and delivery of care  - Encourage patient/family to participate in care and decision-making at the level they choose  - Honor patient and family perspectives and choices  Outcome: Progressing     Problem: Safety Risk - Non-Violent Restraints  Goal: Patient will remain free from self-harm  Description: INTERVENTIONS:  - Apply the least restrictive restraint to prevent harm  - Notify patient and family of reasons restraints applied  - Assess for any contributing factors to confusion (electrolyte disturbances, delirium, medications)  - Discontinue any unnecessary medical devices as soon as possible  - Assess the patient's physical comfort, circulation, skin condition, hydration, nutrition and elimination needs   - Reorient and redirection as needed  - Assess for the need to continue restraints  3/17/2025 1812 by Leidy Villaseñor, RN  Outcome: Progressing  3/17/2025 0750 by Leidy Villaseñor, RN  Outcome: Not Progressing     Problem: RESPIRATORY - ADULT  Goal: Achieves optimal ventilation and oxygenation  Description: INTERVENTIONS:  - Assess for changes in respiratory status  - Assess for changes in mentation and behavior  - Position to facilitate oxygenation and minimize respiratory effort  - Oxygen supplementation based on oxygen saturation or ABGs  - Provide Smoking Cessation handout, if applicable  - Encourage broncho-pulmonary hygiene including cough, deep breathe, Incentive Spirometry  - Assess the need for suctioning and perform as needed  - Assess and instruct to report SOB or any respiratory difficulty  - Respiratory  Therapy support as indicated  - Manage/alleviate anxiety  - Monitor for signs/symptoms of CO2 retention  Outcome: Progressing     Problem: Impaired Swallowing  Goal: Minimize aspiration risk  Description: Interventions:  - Patient should be alert and upright for all feedings (90 degrees preferred)  - Offer food and liquids at a slow rate  - No straws  - Encourage small bites of food and small sips of liquid  - Offer pills one at a time, crush or deliver with applesauce as needed  - Discontinue feeding and notify MD (or speech pathologist) if coughing or persistent throat clearing or wet/gurgly vocal quality is noted  Outcome: Not Progressing     Problem: CARDIOVASCULAR - ADULT  Goal: Maintains optimal cardiac output and hemodynamic stability  Description: INTERVENTIONS:- Monitor vital signs, rhythm, and trends- Monitor for bleeding, hypotension and signs of decreased cardiac output- Evaluate effectiveness of vasoactive medications to optimize hemodynamic stability- Monitor arterial and/or venous puncture sites for bleeding and/or hematoma- Assess quality of pulses, skin color and temperature- Assess for signs of decreased coronary artery perfusion - ex. Angina- Evaluate fluid balance, assess for edema, trend weights  Outcome: Progressing

## 2025-03-17 NOTE — PLAN OF CARE
Problem: Patient Centered Care  Goal: Patient preferences are identified and integrated in the patient's plan of care  Description: Interventions:  - What would you like us to know as we care for you?   - Provide timely, complete, and accurate information to patient/family  - Incorporate patient and family knowledge, values, beliefs, and cultural backgrounds into the planning and delivery of care  - Encourage patient/family to participate in care and decision-making at the level they choose  - Honor patient and family perspectives and choices  Outcome: Progressing     Problem: Patient/Family Goals  Goal: Patient/Family Long Term Goal  Description: Patient's Long Term Goal:     Interventions:  -   - See additional Care Plan goals for specific interventions  Outcome: Progressing  Goal: Patient/Family Short Term Goal  Description: Patient's Short Term Goal:     Interventions:   -   - See additional Care Plan goals for specific interventions  Outcome: Progressing     Problem: PAIN - ADULT  Goal: Verbalizes/displays adequate comfort level or patient's stated pain goal  Description: INTERVENTIONS:  - Encourage pt to monitor pain and request assistance  - Assess pain using appropriate pain scale  - Administer analgesics based on type and severity of pain and evaluate response  - Implement non-pharmacological measures as appropriate and evaluate response  - Consider cultural and social influences on pain and pain management  - Manage/alleviate anxiety  - Utilize distraction and/or relaxation techniques  - Monitor for opioid side effects  - Notify MD/LIP if interventions unsuccessful or patient reports new pain  - Anticipate increased pain with activity and pre-medicate as appropriate  Outcome: Progressing     Problem: RISK FOR INFECTION - ADULT  Goal: Absence of fever/infection during anticipated neutropenic period  Description: INTERVENTIONS  - Monitor WBC  - Administer growth factors as ordered  - Implement neutropenic  guidelines  Outcome: Progressing     Problem: SAFETY ADULT - FALL  Goal: Free from fall injury  Description: INTERVENTIONS:  - Assess pt frequently for physical needs  - Identify cognitive and physical deficits and behaviors that affect risk of falls.  - [place] fall precautions as indicated by assessment.  - Educate pt/family on patient safety including physical limitations  - Instruct pt to call for assistance with activity based on assessment  - Modify environment to reduce risk of injury  - Provide assistive devices as appropriate  - Consider OT/PT consult to assist with strengthening/mobility  - Encourage toileting schedule  Outcome: Progressing     Problem: DISCHARGE PLANNING  Goal: Discharge to home or other facility with appropriate resources  Description: INTERVENTIONS:  - Identify barriers to discharge w/pt and caregiver  - Include patient/family/discharge partner in discharge planning  - Arrange for needed discharge resources and transportation as appropriate  - Identify discharge learning needs (meds, wound care, etc)  - Arrange for interpreters to assist at discharge as needed  - Consider post-discharge preferences of patient/family/discharge partner  - Complete POLST form as appropriate  - Assess patient's ability to be responsible for managing their own health  - Refer to Case Management Department for coordinating discharge planning if the patient needs post-hospital services based on physician/LIP order or complex needs related to functional status, cognitive ability or social support system  Outcome: Progressing     Problem: Safety Risk - Non-Violent Restraints  Goal: Patient will remain free from self-harm  Description: INTERVENTIONS:  - Apply the least restrictive restraint to prevent harm  - Notify patient and family of reasons restraints applied  - Assess for any contributing factors to confusion (electrolyte disturbances, delirium, medications)  - Discontinue any unnecessary medical devices  as soon as possible  - Assess the patient's physical comfort, circulation, skin condition, hydration, nutrition and elimination needs   - Reorient and redirection as needed  - Assess for the need to continue restraints  Outcome: Progressing     Problem: Delirium  Goal: Minimize duration of delirium  Description: Interventions:  - Encourage use of hearing aids, eye glasses  - Promote highest level of mobility daily  - Provide frequent reorientation  - Promote wakefulness i.e. lights on, blinds open  - Promote sleep, encourage patient's normal rest cycle i.e. lights off, TV off, minimize noise and interruptions  - Encourage family to assist in orientation and promotion of home routines  Outcome: Progressing     Problem: RESPIRATORY - ADULT  Goal: Achieves optimal ventilation and oxygenation  Description: INTERVENTIONS:  - Assess for changes in respiratory status  - Assess for changes in mentation and behavior  - Position to facilitate oxygenation and minimize respiratory effort  - Oxygen supplementation based on oxygen saturation or ABGs  - Provide Smoking Cessation handout, if applicable  - Encourage broncho-pulmonary hygiene including cough, deep breathe, Incentive Spirometry  - Assess the need for suctioning and perform as needed  - Assess and instruct to report SOB or any respiratory difficulty  - Respiratory Therapy support as indicated  - Manage/alleviate anxiety  - Monitor for signs/symptoms of CO2 retention  Outcome: Progressing     Problem: METABOLIC/FLUID AND ELECTROLYTES - ADULT  Goal: Electrolytes maintained within normal limits  Description: INTERVENTIONS:  - Monitor labs and rhythm and assess patient for signs and symptoms of electrolyte imbalances  - Administer electrolyte replacement as ordered  - Monitor response to electrolyte replacements, including rhythm and repeat lab results as appropriate  - Fluid restriction as ordered  - Instruct patient on fluid and nutrition restrictions as  appropriate  Outcome: Progressing  Goal: Hemodynamic stability and optimal renal function maintained  Description: INTERVENTIONS:  - Monitor labs and assess for signs and symptoms of volume excess or deficit  - Monitor intake, output and patient weight  - Monitor urine specific gravity, serum osmolarity and serum sodium as indicated or ordered  - Monitor response to interventions for patient's volume status, including labs, urine output, blood pressure (other measures as available)  - Encourage oral intake as appropriate  - Instruct patient on fluid and nutrition restrictions as appropriate  Outcome: Progressing     Problem: Impaired Swallowing  Goal: Minimize aspiration risk  Description: Interventions:  - Patient should be alert and upright for all feedings (90 degrees preferred)  - Offer food and liquids at a slow rate  - No straws  - Encourage small bites of food and small sips of liquid  - Offer pills one at a time, crush or deliver with applesauce as needed  - Discontinue feeding and notify MD (or speech pathologist) if coughing or persistent throat clearing or wet/gurgly vocal quality is noted  Outcome: Progressing     Problem: Impaired Cognition  Goal: Patient will exhibit improved attention, thought processing and/or memory  Description: Interventions:    Outcome: Progressing     Problem: CARDIOVASCULAR - ADULT  Goal: Maintains optimal cardiac output and hemodynamic stability  Description: INTERVENTIONS:- Monitor vital signs, rhythm, and trends- Monitor for bleeding, hypotension and signs of decreased cardiac output- Evaluate effectiveness of vasoactive medications to optimize hemodynamic stability- Monitor arterial and/or venous puncture sites for bleeding and/or hematoma- Assess quality of pulses, skin color and temperature- Assess for signs of decreased coronary artery perfusion - ex. Angina- Evaluate fluid balance, assess for edema, trend weights  Outcome: Progressing

## 2025-03-17 NOTE — PROGRESS NOTES
Pulmonary/ICU/Critical Care Progress Note        Reason for Consultation: resp failure  Referring Physician: Dr. Diaz      Subjective:  On vent 40%/PEEP 5  Off pressors  Fever last night  More alert and following commands      From the initial consultation  Pt is unable to provide info  HPI: 43 yo male with hx of gout, GERD, admitted with acute ETOH hepatitis, ETOH use, emesis, USMAN. Seen by GI.  On CIWA protocol, thiamine, folic acid, MVI, BZDs, lactulose  Has been in the ICU for 2 days  ICU consulted this am for worsening hypoxemia  CXR this am with b/l infiltrates concerning for PNA      REVIEW OF SYSTEMS:  Positives and negatives as noted in HPI. All other review of systems otherwise are either limited (due to pt/family inability to provide) or negative.      PAST MEDICAL HISTORY:  Past Medical History:   Diagnosis Date    Esophageal reflux     Gout     Hernia          PAST SURGICAL HISTORY:  Past Surgical History:   Procedure Laterality Date    Colonoscopy N/A 11/1/2023    Procedure: COLONOSCOPY;  Surgeon: Vandana Austin MD;  Location: [facility] ENDOSCOPY    Egd  02/15/2016    Eh pr repair complex scalp arms legs 1.1 to 2.5 cm  2014    Elbow fracture surgery Right 1991    Hernia surgery      Inguinal hernia repair Left 01/17/2023         PAST SOCIAL HISTORY:  Social History     Socioeconomic History    Marital status: Single    Number of children: 0   Occupational History    Occupation: Supervisor, car [employer]   Tobacco Use    Smoking status: Former     Types: Cigarettes    Smokeless tobacco: Never   Vaping Use    Vaping status: Some Days   Substance and Sexual Activity    Alcohol use: Yes     Comment: per pt, DAILY HARD LEMONADE- [employer] hard lemonade 24oz cans, 6 cans daily    Drug use: Yes     Types: Cannabis     Comment: last use, couple months ago per pt report         PAST FAMILY HISTORY:  Family History   Problem Relation Age of Onset    Diabetes Father     Hypertension Father     Cancer Mother         liver        ALLERGIES:  Allergies[1]      MEDS:  Home Medications:  Medications Taking[2]    Scheduled Medication:   furosemide  20 mg Intravenous BID (Diuretic)    meropenem  1,000 mg Intravenous Q12H    senna  10 mL Per NG Tube BID    docusate  100 mg Per NG Tube BID    chlorhexidine gluconate  15 mL Mouth/Throat BID@0800,2000    mineral oil-white petrolatum  1 Application Both Eyes Nightly    vancomycin  125 mg Per NG Tube Daily    multivitamin  1 tablet Oral Daily    folic acid  1 mg Oral Daily    baclofen  10 mg Oral TID    pantoprazole  40 mg Intravenous Q12H     Continuous Infusing Medication:   propofol Stopped (03/17/25 0830)    norepinephrine Stopped (03/16/25 1025)    dextrose 10% Stopped (03/13/25 1645)     PRN Medications:    haloperidol lactate    acetaminophen **OR** acetaminophen **OR** acetaminophen    fentaNYL    polyethylene glycol (PEG 3350)    bisacodyl    dextrose 10%    sodium bicarbonate **AND** lipase-protease-amylase (Lip-Prot-Amyl)    acetaminophen    prochlorperazine    LORazepam **OR** LORazepam    LORazepam **OR** [DISCONTINUED] LORazepam       PHYSICAL EXAM:  /66 (BP Location: Right arm)   Pulse 103   Temp 98.4 °F (36.9 °C) (Temporal)   Resp (!) 27   Ht 5' 8\" (1.727 m)   Wt 195 lb 1.7 oz (88.5 kg)   SpO2 96%   BMI 29.67 kg/m²   Vent Mode: CPAP;PS  FiO2 (%):  [40 %-50 %] 40 %  S RR:  [12] 12  PEEP/CPAP (cm H2O):  [5 cm H20] 5 cm H20  MAP (cm H2O):  [10-14] 13  CONSTITUTIONAL: intubated sedated but following commands  HEENT: dobhoff  MOUTH: ETT  NECK/THROAT: no JVD. Trachea midline. No obvious thyromegaly  LUNG: clear b/l no wheezing, + b/l crackles. Chest symmetric with respiratory motion  HEART: regular rate and rhythm, no obvious murmers or gallops noted  ABD: soft non tender. + bowel sounds. No organomegaly noted  EXT: no clubbing, cyanosis, or edema noted      IMAGES:   CXR 3/17/25  Findings and impression:   ETT 6 cm above the em   Enteric tube beneath the diaphragm    Stable heart   Persistent low lung volumes and bilateral diffuse pulmonary opacities.   No pneumothorax     CT C/A/P 3/12/25  CONCLUSION:   1. Diffuse pancreatic enlargement and mild peripancreatic inflammatory stranding are new since abdominal CT from 8 days prior and concerning for acute interstitial edematous pancreatitis.  No gross pancreatic ductal dilation or peripancreatic collection.     Request correlation with serum lipase levels.   2. Left greater than right lower lobe airspace disease with intrinsic air bronchograms.  Findings are similar on the left and slightly improved on the right since chest CT from 5 days prior; multifocal pneumonia/aspiration is suspected.  Also identified   are new and/or worsening multifocal upper lobe predominant ground-glass density opacities throughout both lungs; these ground-glass density opacities could be infectious in nature or relate to acute respiratory distress syndrome.   3. Endotracheal tube with tip approximately 1 cm above the level of the em.  Enteric tube tip in the gastric fundus.  Rectal tube and Cedeño catheter also noted.   4. Fatty liver and cirrhosis.   5. Stable splenomegaly.   6. Small to moderate volume intra-abdominal ascites is new since prior abdominal CT.  There is also new mild anasarca.   7. Liquid contents in the colon could relate to a malabsorption state or low-grade infectious/inflammatory versus antibiotic associated colitis.  Mild scattered colonic diverticulosis, but with no CT findings of acute diverticulitis.   8. Low-density appearance of the intracardiac blood pool raises the possibility of underlying anemia. Correlate with hematologic parameters.    9. Lesser incidental findings as above.       CXR 3/12/25 with multifocal infiltrates    CXR 3/11/25  FINDINGS/IMPRESSION:   1. There is redemonstration of patchy alveolar opacities throughout both lungs.  The findings could represent alveolar edema, a diffuse multifocal infectious  process, or a combination.  The findings have not significantly changed since previous exam.   2. The heart mediastinal structures remain enlarged.  The there are trace bilateral pleural effusions.   3. The thoracic component of an enteric feeding tube is identified traversing the thoracic esophagus.  The distal tip is not visualized but lies well below the GE junction.     CT head 3/7/25  CONCLUSION:   Motion limits evaluation.  No evidence of acute intracranial abnormality.     CT chest 3/7/25  CONCLUSION:   1. Extensive bilateral airspace disease, concerning for potential multifocal pneumonia. Continued surveillance is advised.   2. Dense bilateral lower airspace consolidation is evident; aspiration pneumonitis is a differential diagnostic possibility. Follow-up is recommended to document resolution.    3. Dilatation of the main pulmonary artery trunk may relate to underlying pulmonary hypertension.    4. Small hiatal hernia with imaging manifestations that may be indicative underlying esophagitis.   5. Marked hepatic steatosis.   6. Lesser incidental findings as above.     CXR 3/7/25 with multifocal infiltrates, possible effusion on the left  CONCLUSION:   1. Cardiomegaly.  Tortuous aorta.   2. Extensive multifocal airspace opacification in the bilateral perihilar and basilar regions with left-sided effusion.  Differential includes pulmonary edema congestive failure versus multifocal pneumonitis.      CT abd/pelvis 3/4/25  CONCLUSION:   Severely fatty liver with subtle undulating contour.  Stable splenomegaly.  Tubular structures around the GE junction are suggestive of lower esophageal varices.  No ascites       LABS:  Recent Labs   Lab 03/15/25  0439 03/16/25  0513 03/16/25  1048 03/17/25  0500   RBC 2.73* 2.79*  --  3.07*   HGB 7.3* 6.9* 8.3* 8.1*   HCT 20.9* 20.8*  --  24.0*   MCV 76.6* 74.6*  --  78.2*   MCH 26.7 24.7*  --  26.4   MCHC 34.9 33.2  --  33.8   RDW 29.2* 30.4*  --  29.2*   NEPRELIM 15.24*  13.26*  --  10.30*   WBC 17.8* 16.4*  --  13.4*   .0* 172.0  --  194.0       Recent Labs   Lab 03/15/25  0439 03/15/25  0959 03/16/25  0513 03/16/25  1048 03/17/25  0500   *  --  117*  --  118*   BUN 36*  --  51*  --  61*   CREATSERUM 1.94*  --  2.13*  --  1.93*   EGFRCR 42*  --  38*  --  43*   CA 7.8*  --  8.0*  --  8.3*   ALB 3.2 3.7 3.3  3.3  --  3.3  3.3     --  141  --  138   K 3.1*  --  3.2*  3.2* 3.6 3.3*     --  109  --  107   CO2 22.0  --  21.0  --  22.0   ALKPHO  --  140* 141*  --  168*  168*   AST  --  165* 160*  --  184*  184*   ALT  --  77* 73*  --  80*  80*   BILT  --  11.7* 11.0*  --  12.4*  12.4*   TP  --  6.1 5.6*  --  5.9  5.9         ASSESSMENT/PLAN:  Acute hypoxemic resp failure  -secondary to multifocal infiltrates. Given hx of emesis, concern for aspiration  -checked non contrast chest CT showed b/l infiltrates  -initially on zosyn, doxy. Checked urine leg, strep pneumo, mrsa nares, blood cx neg thus far  -initially weaned from airvo to 6 LNC but then declined and was intubated on 3/12/25. On full MV support   -passed SBT on 3/17/25 and extubated to NC  -ID consulted and stopped doxy and zosyn. Switched to meropenem  -repeat CT with b/l infiltrates L>R  -underwent bronchoscopy with left BAL on 3/13/25. Cultures NGTD  -aspiration precautions    Acute ETOH hepatitis and now pancreatitis  -GI following. Imaging as above-now with pancreatitis  -PPI  -NGT    -hypotension-multifactorial from sedation, sepsis, anemia  -likely secondary to sedation and sepsis  -Pt didn't receive sepsis bolus b/c deemed fluid overloaded d/t possible cirrhosis. Continue low dose levophed for MAP > 65  -possibly d/t acute anemia. No obvious source seen. S/p transfused 1 unit pRBC on 3/16/25  -off pressors now    ETOH withdrawal  -CIWA protocol  -psych following   -BZD/haldol PRN  -on propofol for sedation  -CT head neg for acute changes    USMAN and hypernatremia  -s/p bicarb infusion  -s/p  D5 0.9 NS  -renal following. Na improving     Proph  -DVT: elevated INR low PLT, anemia. On SCDS  -nutrition: TF    Dispo  -Full code  -sign other and brother (from out of town) are at bedside      Thank you for the opportunity to care for Sami Grijalva Jr..      JOLYNN Escobedo DO, MPH  Pulmonary Critical Care Medicine  [facility]                         [1]   Allergies  Allergen Reactions    Morphine SWELLING     SHORTNESS OF BREATH   [2]   No outpatient medications have been marked as taking for the 3/4/25 encounter (Hospital Encounter).

## 2025-03-17 NOTE — PROGRESS NOTES
INFECTIOUS DISEASE PROGRESS NOTE  [facility]  part of [facility] ID PROGRESS NOTE    Sami Grijalva . Patient Status:  Inpatient    10/11/1980 MRN [id number]   Location [facility] 2W/SW Attending Natasha Araujo MD   Hosp Day # 13 PCP Kerri Medina MD     Subjective:  ROS unable to be reviewed. Remains intubated and sedated. Tmax 102.5 last night. Does follow commands. Plans for SBT. 225 mL from flexiseal overnight.    ASSESSMENT:    Antibiotics: Meropenem 3/14-, OVP  (doxycycline, vancomycin, zosyn)     # Acute hypoxic respiratory failure with fever and multifocal pneumonia   -S/p intubation 3/12   -s/p bronch 3/13, cx NGTD   -MRSA nares negative  # Acute aspiration pneumonia  # Acute leukocytosis   # Acute anemia  # Acute pancreatitis  # Alcoholic hepatitis with encephalopathy on admission               - CT A/P with severe fatty liver               - US GB with sludge w/o cholecystitis   - CT C/A/P 3/12 with pancreatitis  # USMAN - improved  # EtOH abuse and withdrawal        PLAN:  -  Continue on meropenem. Check blood cx given fever.  -  FU bronch cx.  -  Follow fever curve, wbc.  -  Reviewed labs, micro, imaging reports, available old records.  -  Case d/w RN.     History of Present Illness:  44-year-old male with a history of GERD, alcohol use was admitted to the hospital on 3/4 generalized abdominal pain, hiccups, vomiting with difficulty tolerating orals.  Last drink prior to admission was 1 day prior.  Found to have sepsis with elevated lactic acid, hypotension, WBC 17.5.  Responded to IV fluids and received lactulose for elevated ammonia.  CT A/P with severe fatty liver, stable splenomegaly.  Was lethargic the first few days and had soft restraints with Cedeño, Flexi-Seal, NG tube.  Has been essentially afebrile here with a temperature on 3/10 of 100.6 but significant hypoxia up and down and currently increased up to 40 L with increase WBC to 17.4.   Initial USMAN has improved.  Was started on IV Zosyn and doxycycline on 3/7 when patient became more hypoxic and chest x-ray showed extensive multifocal pneumonia with CT chest showing similar findings.  Possible aspiration.    Physical Exam:  /67 (BP Location: Right arm)   Pulse 107   Temp 98.4 °F (36.9 °C) (Temporal)   Resp 25   Ht 5' 8\" (1.727 m)   Wt 195 lb 1.7 oz (88.5 kg)   SpO2 92%   BMI 29.67 kg/m²     Gen:   Intubated, sedated  HEENT:  ETT+, dobhoff+, neck supple  CV/lungs:  Regular rate and rhythm, lungs clear vent sounds  Abdom:  Soft, no TTP  Skin/extrem:  No rashes, jaundiced  :   Cedeño draining yellow urine  Lines:  Midline+  Flexiseal+    Laboratory Data: Reviewed    Microbiology: Reviewed    Radiology: Reviewed      [facility]  [phone]  3/17/2025

## 2025-03-17 NOTE — RESPIRATORY THERAPY NOTE
Patient received intubated and on ventilator. SBT done today, NIF, vital capacity RSBI okay, respirations around 25. Proceeded to extubation, placed on 6 L high flow nasal cannula, tolerating well.

## 2025-03-17 NOTE — DIETARY NOTE
ADULT NUTRITION REASSESSMENT    Pt is at high nutrition risk.  Pt does not meet malnutrition criteria.      RECOMMENDATIONS TO MD:   See Nutrition Intervention for enteral nutrition (EN) and free water flushes (FWF) specifics     ADMITTING DIAGNOSIS:  Scleral icterus [R17]  USMAN (acute kidney injury) [N17.9]  PERTINENT PAST MEDICAL HISTORY:   Past Medical History:    Esophageal reflux    Gout    Hernia     PATIENT STATUS:   Initial 03/06/25: Pt assessed r/t screened at risk on initial MST due to unintentional wt loss and decreased/poor appetite/intake. Presented to hospital with intractable N/V and poor appetite for the last few days - ETOH hepatitis and USMAN. PMH as listed above including ETOH abuse. Transferred to CCU for high CIWA scores. Scheduled ativan, soft wrist restraints in place. Very lethargic, minimally responsive. Sleeping but restless at time of visit. Not appropriate for diet hx with no family at bedside. Per H&P pt reported N/V, hiccups and inability to tolerate any solid foods x 3 days PTA. Typically consumes 3 24-oz  beers daily. Appears well nourished.  03/07/25 UPDATE: Pt discussed with MD on unit - plan to initiate EN feeds. Consult received for RD to initiate and manage tube feeds. See full assessment from 3/6. Pt remains minimally responsive on ativan and not appropriate to take PO. NPO/CL x6 days this admission. Increasing O2 requirements. Stable on HFNC at 40 L/min. RN to place NGT. Remains on lactulose x4 daily with rectal pouch in place 1650 ml recorded output over the 24 hrs however noted no documentation from day shift on 3/6. Pt with good UO and improving creatinine level. Noted hypophosphotemia with replacement ordered 30 mmol NaPhos rider per protocol. IVF of D5W 0.9NS @ 75 ml/hr provides 1.8 L, 90 g dextrose and 306 kcal. Lasix x1 today. Messaged nephrology regarding plan for IVF and free water from EN - awaiting response.    03/10/25 UPDATE: Pt lethargic. Remains inappropriate for PO  intake. Pt pulled out NG DHT this afternoon. Now replaced by RN with feeds resuming. EN feeds at 30 ml/hr (50% of goal rate). FWF increased to 200 ml q 4 hrs on 3/8 and EN held by MD on 3/9 due to worsening hypernatremia. Feeds resumed overnight 3/10. No improvement in hypernatremia. Pt with large amount of stool output, lactulose QID.    03/13/25 UPDATE:     Pt intubated 3/12, sedated on Propofol (12.4 ml/hr= ~330 kcals via lipids), low dose norepinephrine (3 mcg/min), worsening pneumonia s/p bronch today, new dx Acute Pancreatitis (per CT scan and lipase 444) 3/12 -acceptable. Stooling on lactulose (rectal tube in place), Marine non-oliguric at present.  Discussed with APN re: resuming EN support post bronch. May continue with same formula for impaired GI function.     03/17/2025 UPDATE:  Continues on tube feeds resumed at 6p on 3/14 at goal nutrition. Lactulose stopped 3/14 and stool output decreased/acceptable while ammonia level normalized on 3/12 and 3/14. 3/16 Water flushes decreased per renal service to 200 ml q 4 hrs,and  lasix dose decreased. I/O's still +16L.Sodium level 138 (trending down) today.  Remains intubated and on low dose levo. May further decreased FWF- renal service has been adjusting.   FOOD/NUTRITION RELATED HISTORY:  Appetite: NPO  Intake:  EN feeds initiated on 3/7  Intake Meeting Needs: yes  Percent Meals Eaten (last 6 days)       None        Food Allergies: No Known Food Allergies (NKFA)  Cultural/Ethnic/Worship Preferences: Not Obtained    GASTROINTESTINAL: Rectal tube: 475 ml out/24 hrs. Mild acute pancreatitis (last lipase 444 on 3/13/25). 3/4 last CT A/P   CT A/P 3/4: \"Severely fatty liver with subtle undulating contour.  Stable splenomegaly.  Tubular structures around the GE junction are suggestive of lower esophageal varices.  No ascites \"    UO: 1100 ml output over the past 24 hrs  I/Os: +7041 ml total this admission    MEDICATIONS: reviewed; Noted non-cardiac electrolyte  replacement protocol ordered;..    dextrose 10% Stopped (03/13/25 1645)      furosemide  20 mg Intravenous BID (Diuretic)    meropenem  1,000 mg Intravenous Q12H    vancomycin  125 mg Per NG Tube Daily    multivitamin  1 tablet Oral Daily    folic acid  1 mg Oral Daily    baclofen  10 mg Oral TID    pantoprazole  40 mg Intravenous Q12H     LABS: reviewed; Lipase: 444 3/13 . Na+ wnl today. K+ replaced.   Recent Labs     03/15/25  0439 03/16/25  0513 03/16/25  1048 03/17/25  0500   * 117*  --  118*   BUN 36* 51*  --  61*   CREATSERUM 1.94* 2.13*  --  1.93*   CA 7.8* 8.0*  --  8.3*   MG 2.1 2.1  --  2.2    141  --  138   K 3.1* 3.2*  3.2* 3.6 3.3*    109  --  107   CO2 22.0 21.0  --  22.0   PHOS 3.6  3.6 2.7  --  4.0   OSMOCALC 302* 307*  --  304*     WEIGHT HISTORY:  Patient Weight(s) for the past 336 hrs:   Weight   03/17/25 0600 88.5 kg (195 lb 1.7 oz)   03/16/25 0600 82.4 kg (181 lb 10.5 oz)   03/15/25 0600 81 kg (178 lb 9.2 oz)   03/14/25 1100 85.2 kg (187 lb 13.3 oz)   03/12/25 0530 77.9 kg (171 lb 11.8 oz)   03/04/25 1600 82.9 kg (182 lb 12.8 oz)   03/04/25 0951 81.2 kg (179 lb)     Wt Readings from Last 10 Encounters:   03/17/25 88.5 kg (195 lb 1.7 oz)   03/27/24 77.1 kg (170 lb)   12/26/23 89.8 kg (198 lb)   11/01/23 81.6 kg (180 lb)   10/16/23 81.6 kg (180 lb)   10/16/23 82.6 kg (182 lb)   09/06/23 91.6 kg (202 lb)   08/27/23 91.7 kg (202 lb 1.6 oz)   05/31/23 83.9 kg (185 lb)   01/17/23 90.7 kg (200 lb)     ANTHROPOMETRICS:  HT: 172.7 cm (5' 8\")  Wt Readings from Last 1 Encounters:   03/17/25 88.5 kg (195 lb 1.7 oz)   Last weight: unsure of accuracy as up and down (16 lbs up now)   Dosing Weight: 77.9 kg (171 lbs) - updated on 3/13 as decreased from admit.-more reflective of dry wt.    BMI: Body mass index is 27.79 kg/m².  BMI CLASSIFICATION: 25-29.9 kg/m2 - overweight  IBW/lbs (Calculated) Male: 154 lbs           119% IBW  Usual Body Wt: 170 lbs (per EMR 3/27/24)      108% UBW on admit.      NUTRITION RELATED PHYSICAL FINDINGS:  - Nutrition Focused Physical Exam (NFPE): no wasting noted  - Fluid Accumulation: +1 feet and hands.and B/L UE non-pitting edema. . CT A/P: small to moderate ascites, mild anasarca. . See RN documentation for details  - Skin Integrity: at risk and intact. See RN documentation for details    NUTRITION DIAGNOSIS/PROBLEM:   Inadequate protein energy intake related to Decreased ability to consume sufficient energy in the setting of ETOH withdrawals as evidenced by decreased/poor intake x5 days.     NUTRITION DIAGNOSIS PROGRESS:  Improvement (unresolved) - EN feeds to goal.     NUTRITION INTERVENTION:     NUTRITION PRESCRIPTION:   Estimated Nutrition needs: --dosing wt of 77.9 kg - wt taken on 3/12/25  Calories: 1924 kcals ([place] [employer] equation) for intubated pt.   Protein:  g protein/day (1.2-2.0 g protein/kg Dosing wt)  Fluid Needs: 2900 ml/day (35 ml/kg chronological age method) - adjust for clinical status (increased losses from high stool output)    - Diet:   No orders of the defined types were placed in this encounter.     - Enteral Nutrition: via NG tube.   Vital AF 1.2 at  goal rate of 60 ml/hr. Based on average 22 hour infusion time Goal rate provides 1320 ml total  volume, 1584 kcal, 99 grams protein, 1069 ml total free water, and 100% RDI's.   Add ProSource 1 packet BID for additional 22 g protein, 80 kcals, 90 ml h20. + Additional calories from lipids 330 kcal from Propofol for total kcals: 1994 (100% needs) and total protein: 121 g protein (1.5 gm/kg). Total H20 without flushes  1159 ml   Flush with 200 ml H2O q 4 hrs (to provide 1200  ml total H2O from FWF daily and 2269 ml total fluids daily with EN) .   Meets 100% of estimated energy and 100% of estimated protein needs.    - ONS (Oral Nutrition Supplements)/Meals/Snacks: NPO   - Vitamin and mineral supplements: folic acid and multivitamin/mineral. (Thiamine stopped per psych service)   - Feeding assistance:  NPO  - Nutrition education: not appropriate at this time  - Coordination of nutrition care: collaboration with other providers -   - Discharge and transfer of nutrition care to new setting or provider: monitor plans    MONITOR AND EVALUATE/NUTRITION GOALS:  - Food and Nutrient Intake:      Monitor: for potential  PO initiation in the future.   - Food and Nutrient Administration:      Monitor: tolerance to enteral nutrition, adequacy of enteral nutrition, for enteral nutrition adjustment, propofol rate, and FWF adequacy and adjustment  - Anthropometric Measurement:    Monitor weight  - Nutrition Goals:      EN + non-nutritive kcal >80% energy needs, labs within acceptable limits, minimize lean body mass loss, support body systems.     DIETITIAN FOLLOW UP: RD to follow and monitor nutrition status    Rachael Castillo RD, LDN, [facility] ([id number])

## 2025-03-17 NOTE — PROGRESS NOTES
[facility]     Gastroenterology Progress Note    Sami Grijalva Jr. Patient Status:  Inpatient    10/11/1980 MRN [id number]   Location [facility] 2W/SW Attending Natasha Araujo MD   Hosp Day # 13 PCP Kerri Medina MD       Subjective:   Extubated this morning.  He is awake albeit drowsy.  Multiple family at bedside.  He denies abdominal pain    Objective:   Blood pressure 121/67, pulse 106, temperature 98.4 °F (36.9 °C), temperature source Temporal, resp. rate (!) 28, height 5' 8\" (1.727 m), weight 195 lb 1.7 oz (88.5 kg), SpO2 92%. Body mass index is 29.67 kg/m².    Gen: Drowsy but awake, ANO x 3  HEENT: MMM, DHT in place  CV: RRR  Lung: Clear bilaterally  Abdomen: soft NTND abdomen with NABS appreciated   Skin: dry, warm,   Ext:  + edema  Neuro: Appropriate and conversant    Assessment and Plan:   Sami Grijalva Jr. is a 44 year old male w/ PMHx of longstanding alcohol use, cannabis use, GERD, who presents with generalized abdominal pain, hiccups and vomiting. GI consulted for acute alcohol hepatitis.  ICU stay complicated by multifocal pneumonia and ARDS now status post extubation this morning     #Acute alcohol hepatitis with probable underlying cirrhosis  -CT a/p in ER severe fatty liver with subtle undulating contour  -US liver with hepatic steatosis, GB sludge and cholelithiasis without cholecystitis or biliary ductal dilation, hepatic and portal vein patent  -last EGD 2023 without varices   -high maddrey; holding off on steroids because of pneumonia  -on CIWA  -nutrition, etoh cessation  -LFTs remain elevated likely from acute alcohol hepatitis in setting of alcohol cirrhosis and overlap cholestasis of sepsis  -Once he passes swallow evaluation, can remove Dobbhoff tube and start oral feeds     #Likely early cirrhosis, ETOH   MELD 3.0 on admission: 44  -PSE: Continue lactulose and Xifaxan  -Ascites: none on exam or US  -EV: None on last EGD 2023, tubular  structure around GE junction suggestive of EV.  Will need repeat EGD electively  -HCC: No lesions on US or CT, AFP 2.7     #USMAN  -nephrology following, improving     # Mild interstitial pancreatitis  - likely etoh induced   - continue supportive care    Vandana Austin MD      Results:     Lab Results   Component Value Date    WBC 13.4 (H) 03/17/2025    HGB 8.1 (L) 03/17/2025    HCT 24.0 (L) 03/17/2025    .0 03/17/2025    CREATSERUM 1.93 (H) 03/17/2025    BUN 61 (H) 03/17/2025     03/17/2025    K 3.3 (L) 03/17/2025     03/17/2025    CO2 22.0 03/17/2025     (H) 03/17/2025    CA 8.3 (L) 03/17/2025    ALB 3.3 03/17/2025    ALB 3.3 03/17/2025    ALKPHO 168 (H) 03/17/2025    ALKPHO 168 (H) 03/17/2025    BILT 12.4 (H) 03/17/2025    BILT 12.4 (H) 03/17/2025    TP 5.9 03/17/2025    TP 5.9 03/17/2025     (H) 03/17/2025     (H) 03/17/2025    ALT 80 (H) 03/17/2025    ALT 80 (H) 03/17/2025    INR 1.74 (H) 03/15/2025     (HH) 03/13/2025    DDIMER 1.13 (H) 08/25/2023    ESRML 27 (H) 10/01/2021    MG 2.2 03/17/2025    PHOS 4.0 03/17/2025    TROP 0.00 01/31/2017     (H) 03/06/2025    ETOH <3 03/04/2025       XR CHEST AP PORTABLE  (CPT=71045)    Result Date: 3/16/2025  CONCLUSION:   No new abnormality.  Unchanged and tubes.  Persistent low lung volumes and extensive bilateral airspace opacities.    elm-remote     Dictated by (CST): Riley Villa MD on 3/16/2025 at 8:22 AM     Finalized by (CST): Riley Villa MD on 3/16/2025 at 8:23 AM

## 2025-03-17 NOTE — PROGRESS NOTES
[facility]  part of [facility]    Progress Note    Sami Grijalva JrKushal Patient Status:  Inpatient    10/11/1980 MRN [id number]   Location [facility] 2W/SW Attending Cameron Torres MD   Hosp Day # 13 PCP Kerri Medina MD       Subjective:   Sami Grijalva Jr. is a(n) 44 year old male     ROS:     Constitutional: Feeling better does not like being restrained  ENMT:  Negative for ear drainage, hearing loss and nasal drainage  Eyes:  Negative for eye discharge and vision loss  Cardiovascular:  Negative for chest pain, sob, irregular heartbeat/palpitations  Respiratory:  Negative for cough, dyspnea and wheezing  Gastrointestinal:  Negative for abdominal pain, constipation, decreased appetite, diarrhea and vomiting  Genitourinary:  Negative for dysuria and hematuria  Endocrine:  Negative for abnormal sleep patterns, increased activity, polydipsia and polyphagia  Hema/Lymph:  Negative for easy bleeding and easy bruising  Integumentary:  Negative for pruritus and rash  Musculoskeletal:  Negative for bone/joint symptoms  Neurologica awake and alert      Vitals:    25 1400   BP: 121/67   Pulse: 106   Resp: (!) 28   Temp:            PHYSICAL EXAM:   Constitutional: appears well hydrated alert and responsive no acute distress noted  Head/Face: normocephalic  Eyes/Vision: normal extraocular motion is intact  Nose/Mouth/Throat:mucous membranes are moist and no oral lesions are noted  Neck/Thyroid: neck is supple without adenopathy  Lymphatic: no abnormal cervical, supraclavicular adenopathy is noted  Respiratory: Some decreased breath sounds  Cardiovascular: regular rate and rhythm no murmurs, gallups, or rubs  Abdomen: soft, non-tender, non-distended, BS normal  Vascular: well perfused femoral, and pedal pulses normal  Skin/Hair: no unusual rashes present, no abnormal bruising noted  Back/Spine: no abnormalities noted  Musculoskeletal: full ROM all extremities good strength  no  deformities  Extremities: no edema, cyanosis  Neurological:  Grossly normal    Results:     Laboratory Data:  Lab Results   Component Value Date    WBC 13.4 (H) 03/17/2025    HGB 8.1 (L) 03/17/2025    HCT 24.0 (L) 03/17/2025    .0 03/17/2025    CREATSERUM 1.93 (H) 03/17/2025    BUN 61 (H) 03/17/2025     03/17/2025    K 3.3 (L) 03/17/2025     03/17/2025    CO2 22.0 03/17/2025     (H) 03/17/2025    CA 8.3 (L) 03/17/2025    ALB 3.3 03/17/2025    ALB 3.3 03/17/2025    ALKPHO 168 (H) 03/17/2025    ALKPHO 168 (H) 03/17/2025    BILT 12.4 (H) 03/17/2025    BILT 12.4 (H) 03/17/2025    TP 5.9 03/17/2025    TP 5.9 03/17/2025     (H) 03/17/2025     (H) 03/17/2025    ALT 80 (H) 03/17/2025    ALT 80 (H) 03/17/2025    INR 1.74 (H) 03/15/2025     (HH) 03/13/2025    DDIMER 1.13 (H) 08/25/2023    ESRML 27 (H) 10/01/2021    MG 2.2 03/17/2025    PHOS 4.0 03/17/2025    TROP 0.00 01/31/2017     (H) 03/06/2025    ETOH <3 03/04/2025       Imaging:  [unfilled]   XR CHEST AP PORTABLE  (CPT=71045)    Result Date: 3/16/2025  CONCLUSION:   No new abnormality.  Unchanged and tubes.  Persistent low lung volumes and extensive bilateral airspace opacities.    elm-remote     Dictated by (CST): Riley Villa MD on 3/16/2025 at 8:22 AM     Finalized by (CST): Riley Villa MD on 3/16/2025 at 8:23 AM             ASSESSMENT/PLAN:   Assessment       #1 USMAN  Urine output good at 1450  Creatinine better 1.93 no dialysis indicated fluids are minimized on diuretics twice a day hypernatremia better   #2 alcoholic liver disease alcoholic hepatitis  LFTs still creeping up  #3 respiratory failure now on 40% oxygen on ventilator  #4 low K replaced    #5 anemia received blood yesterday    Discussed with nurse continue present plan optimistic now about recovery  3/17/2025  Johnathan Ramos MD

## 2025-03-17 NOTE — PROGRESS NOTES
[facility]  part of [facility]    Progress Note    Sami Grijalva  Patient Status:  Inpatient    10/11/1980 MRN [id number]   Location [facility] 2W/SW Attending Cameron Torres MD   Hosp Day # 13 PCP Kerri Medina MD     Chief Complaint:     USMAN    Subjective:   Subjective:    Patient seen and examined this afternoon  Unable to obtain ROS  Family at bedside  Hypotension    Objective:   Blood pressure 120/69, pulse 108, temperature 98.4 °F (36.9 °C), temperature source Temporal, resp. rate 26, height 5' 8\" (1.727 m), weight 195 lb 1.7 oz (88.5 kg), SpO2 93%.  Physical Exam    General: intubated/sedated  HEENT: EOMI PERRLA, atraumatic normocephalic  Cardiac: S1-S2 appreciated  Lungs: Good air entry bilaterally clear to auscultation  Abdomen: Soft nontender nondistended positive bowel sounds  Ext: Peripheral pulses are positive  Neuro: No focal deficits noted  Psych: Normal mood  Skin: No rashes noted  MSK: Full range of motion intact      Results:   Lab Results   Component Value Date    WBC 13.4 (H) 2025    HGB 8.1 (L) 2025    HCT 24.0 (L) 2025    .0 2025    CREATSERUM 1.93 (H) 2025    BUN 61 (H) 2025     2025    K 3.3 (L) 2025     2025    CO2 22.0 2025     (H) 2025    CA 8.3 (L) 2025    ALB 3.3 2025    ALB 3.3 2025    ALKPHO 168 (H) 2025    ALKPHO 168 (H) 2025    BILT 12.4 (H) 2025    BILT 12.4 (H) 2025    TP 5.9 2025    TP 5.9 2025     (H) 2025     (H) 2025    ALT 80 (H) 2025    ALT 80 (H) 2025    INR 1.74 (H) 03/15/2025     (HH) 2025    DDIMER 1.13 (H) 2023    ESRML 27 (H) 10/01/2021    MG 2.2 2025    PHOS 4.0 2025    TROP 0.00 2017    TROPHS <3 10/07/2024     (H) 2025    ETOH <3 2025       XR CHEST AP PORTABLE  (CPT=71045)    Result  Date: 3/16/2025  CONCLUSION:   No new abnormality.  Unchanged and tubes.  Persistent low lung volumes and extensive bilateral airspace opacities.    elm-remote     Dictated by (CST): Riley Vlila MD on 3/16/2025 at 8:22 AM     Finalized by (CST): Riley Villa MD on 3/16/2025 at 8:23 AM               Assessment & Plan:       Acute hypoxemic resp failure-worsening   Multifocal pna  -Concern for possible aspiration given hx of emesis and altered mental status.   -Continue on zosyn, doxy.   -Continues to require high levels of supplemental O2.  Weaning as able  -aspiration precautions  -intubated now , per pulm     Acute ETOH hepatitis   -last drink reportedly 4 days prior to admission  -Reported emesis and poor PO intake, without overt bleeding   -T bili upon arrival to ER 10.5, platelet 65, cr 4.22  -CT a/p reviewed, noted severe fatty liver with subtle undulating contour  -US liver reviewed, noted hepatic steatosis, GB sludge and cholelithiasis without cholecystitis or biliary ductal dilation, hepatic and portal vein patent  -MELD 3.0 on admission: 44   -GI consulted, appreciate further recommendations     Alcohol Abuse with withdrawal   -CIWA monitoring with alcohol withdrawal protocol  -thiamine, folic acid, multivitamin  -Psychiatry consulted, appreciate recommendations     Altered mental status  -Slowly improving  -Likely metabolic encephalopathy  -Initially noted to have elevated ammonia levels noted consistent with hepatic encephalopathy.  -Continue lactulose   -Patient also with USMAN and electrolyte abnormalities.  -Continue treating underlying conditions  -Continue to monitor     Acute Kidney Injury   -Continue improvement noted  -Avoiding Nephrotoxic agents  - Cont to monitor  -Nephrology on consult, appreciate further recommendations     Hypernatremia  -Persistent elevations noted  -Nephrology on consult, recommend continue D5 IV fluids and increased free water flushes.  -Continue to monitor     Plan of  care discussed with patient's sister and partner at bedside.  Discussed management/test result(s) with Rn      Quality:  DVT Prophylaxis: SCDs  CODE status: Full  Estimated date of discharge: TBD  Discharge is dependent on: clinical stability     Global A/P  -hgb 8.1 stable today  -leukocytosis improved  -d/w nephrology patient possibly will need dialysis  -hypokalemia this morning continue electrolyte replacement protocol.  -worsening transaminitis, hyperbili  -prognosis guarded.  -Reviewed previous consultant notes  -Reviewed CBC, BMP, Mag, and Phos  -Reviewed tests ordered  -Repeat labs in am  -MDM: High, severe exacerbation of chronic illness posing a threat to life. IV medications requiring close inpatient monitoring.       Cameron Torres MD

## 2025-03-17 NOTE — PAYOR COMM NOTE
--------------  3/16  CONTINUED STAY REVIEW    Payor: [facility]  Subscriber #:  [id number]  Authorization Number: [id number]    3/16:    REMAINS VENTED IN ICU    PULM:    Unable to perform ROS     Intubated on mechanical ventilation  Sedated  On pressure control mode with 50% FiO2  On 3 mics of Levophed  ABGs with pH 7.49 pCO2 of 28 and pO2 of 71     Objective:  Blood pressure 122/71, pulse 114, temperature 98.9 °F (37.2 °C), temperature source Temporal, resp. rate 18, height 5' 8\" (1.727 m), weight 181 lb 10.5 oz (82.4 kg), SpO2 94%.  Physical Exam  Constitutional:       General: He is in acute distress.      Appearance: He is ill-appearing.   HENT:      Head: Atraumatic.      Nose: Nose normal.      Mouth/Throat:      Mouth: Mucous membranes are moist.   Eyes:      General: No scleral icterus.  Cardiovascular:      Rate and Rhythm: Regular rhythm. Tachycardia present.   Pulmonary:      Effort: Respiratory distress present.      Breath sounds: Rales present. No wheezing or rhonchi.   Abdominal:      General: There is distension.      Tenderness: There is no guarding or rebound.      Comments: Diminished bowel sounds   Musculoskeletal:      Right lower leg: No edema.      Left lower leg: No edema.   Skin:     General: Skin is dry.   Neurological:      General: No focal deficit present.      Comments: Arousable when off sedation moves extremities and follows commands      Lab Results   Component Value Date     WBC 16.4 (H) 03/16/2025     HGB 6.9 (LL) 03/16/2025     HCT 20.8 (L) 03/16/2025     .0 03/16/2025     CREATSERUM 2.13 (H) 03/16/2025     BUN 51 (H) 03/16/2025      03/16/2025     K 3.2 (L) 03/16/2025     K 3.2 (L) 03/16/2025      03/16/2025     CO2 21.0 03/16/2025      (H) 03/16/2025     CA 8.0 (L) 03/16/2025     ALB 3.3 03/16/2025     ALB 3.3 03/16/2025     ALKPHO 141 (H) 03/16/2025     BILT 11.0 (H) 03/16/2025     TP 5.6 (L) 03/16/2025      (H)  03/16/2025     ALT 73 (H) 03/16/2025     MG 2.1 03/16/2025     PHOS 2.7 03/16/2025      XR CHEST AP PORTABLE   Result Date: 3/16/2025  CONCLUSION:          No new abnormality.  Unchanged and tubes.  Persistent low lung volumes and extensive bilateral airspace opacities.        Assessment & Plan:  1-acute respiratory failure /ARDS  - Acute alcohol induced pancreatitis  - Acute alcohol hepatitis  - aspiration pneumonitis   - Withdrawal syndrome     Intubated on vent / Fio2 50 % on pressure control mode and O2 sat 91-92 % and abgs noted   CXR bilateral infiltrate s/p bronchoscopy and so far negative cultures   Doing poorly on SBT  On low dose levo  Continue vent support and management /protective strategy  Antibiotics        2-alcohol induced acute hepatitis and acute pancreatitis with underlying cirrhosis  Patient also with withdrawal syndrome  GI and psych following  Supportive care     3-anemia  Transfuse 1 unit of PRBC today  Follow-up H&H     4-acute kidney injury  I's and O's +15 L with good urine output  Now on diuretics with worsening renal function will cut down  Renal following     5-nutrition  Tolerating NG tube feeding     6-DVT prophylaxis  SCD only since patient with severe anemia        D/w family at bedside at length   High risk , critical      RENAL:    Remains intubated  Levo at 3  Urine output is acceptable  FiO2 is 50%  Getting PRBC      Impression:     USMAN-initially prerenal and creatinine improved however again getting worse probably ischemic ATN.  Creatinine is stable, good urine output.  Volume overload/hypoxic respiratory failure, intubated and is net positive.  Continue Lasix 20 mg IV 3 times daily.   Alcoholic hepatitis/cirrhosis  Sepsis/hypotension.  On antibiotic and pressors  Anemia getting PRBC      Plan:     Creatinine is stable  Concentrate all drips  Decreased free water flushes--dec to 200 q 4 hrs  Noted pulm dec lasix to bid ( remains net +)   Try to replace all electrolytes per  NG  Getting PRBC      MEDICATIONS ADMINISTERED IN LAST 1 DAY:    furosemide (Lasix) 10 mg/mL injection 20 mg       Date Action Dose Route User    3/17/2025 0815 Given 20 mg Intravenous Leidy Villaseñor RN    3/16/2025 1604 Given 20 mg Intravenous Lulu Bal RN          meropenem (Merrem) 1,000 mg in sodium chloride 0.9% 100 mL IVPB-MBP       Date Action Dose Route User    3/17/2025 1205 New Bag 1,000 mg Intravenous Leidy Villaseñor RN    3/16/2025 2345 New Bag 1,000 mg Intravenous Soila Mcgrath RN          pantoprazole (Protonix) 40 mg in sodium chloride 0.9% PF 10 mL IV push       Date Action Dose Route User    3/17/2025 1206 Given 40 mg Intravenous Leidy Villaseñor RN    3/16/2025 2346 Given 40 mg Intravenous Soila Mcgrath RN          potassium chloride (Klor-Con) 20 MEQ oral powder 40 mEq       Date Action Dose Route User    3/17/2025 0616 Given 40 mEq Oral Soila Mcgrath RN          propofol (Diprivan) 10 mg/mL infusion premix       Date Action Dose Route User    3/17/2025 0815 Rate/Dose Change 15 mcg/kg/min × 82.9 kg (Dosing Weight) Intravenous Leidy Villaseñor RN    3/17/2025 0621 New Bag 30 mcg/kg/min × 82.9 kg (Dosing Weight) Intravenous Soila Mcgrath RN    3/17/2025 0006 New Bag 30 mcg/kg/min × 82.9 kg (Dosing Weight) Intravenous Soila Mcgrath RN    3/16/2025 1811 New Bag 30 mcg/kg/min × 82.9 kg (Dosing Weight) Intravenous Lulu Bal RN       vancomycin (Firvanq) 50 mg/mL oral solution 125 mg       Date Action Dose Route User    3/17/2025 0815 Given 125 mg Per NG Tube Leidy Villaseñor RN            Vitals (last day)       Date/Time Temp Pulse Resp BP SpO2 Weight O2 Device O2 Flow Rate (L/min) Who    03/17/25 1300 -- 108 26 120/69 93 % -- High flow nasal cannula 6 L/min     03/17/25 1200 98.4 °F (36.9 °C) 104 24 120/66 95 % -- High flow nasal cannula 6 L/min LC    03/17/25 1100 -- 106 29 125/66 94 % -- High flow nasal cannula 6 L/min LC    03/17/25 1000 -- 107 25 129/67 92 % --  High flow nasal cannula 6 L/min LC    03/17/25 0952 -- -- -- -- 92 % -- High flow nasal cannula 6 L/min JF    03/17/25 0943 -- -- -- -- 89 % -- High flow nasal cannula 4 L/min LC    03/17/25 0900 -- 103 27 122/66 96 % -- Ventilator -- LC    03/17/25 0800 98.4 °F (36.9 °C) 98 22 111/60 97 % -- Ventilator -- LC    03/17/25 0700 -- 86 18 103/54 100 % -- Ventilator -- MJ    03/17/25 0600 -- 86 17 101/54 98 % 195 lb 1.7 oz (88.5 kg) Ventilator -- MJ    03/17/25 0500 -- 96 25 116/65 97 % -- Ventilator -- MJ    03/17/25 0400 98.2 °F (36.8 °C) 88 18 101/56 97 % -- Ventilator -- MJ    03/17/25 0300 -- 94 20 111/61 96 % -- Ventilator -- MJ    03/17/25 0200 -- 91 19 110/62 98 % -- Ventilator -- MJ    03/17/25 0100 -- 87 18 101/53 99 % -- Ventilator -- MJ    03/17/25 0000 98.2 °F (36.8 °C) 94 19 106/58 99 % -- Ventilator -- MJ    03/16/25 2300 -- 90 18 99/51 99 % -- Ventilator -- MJ    03/16/25 2200 -- 92 17 103/52 97 % -- Ventilator -- MJ    03/16/25 2100 -- 100 19 111/57 98 % -- Ventilator -- MJ    03/16/25 2045 99.8 °F (37.7 °C) 102 21 -- 98 % -- -- -- MJ    03/16/25 2000 100.8 °F (38.2 °C) 99 20 99/48 97 % -- Ventilator -- MJ    03/16/25 1945 102.5 °F (39.2 °C) -- -- -- -- -- -- -- MJ    03/16/25 1900 -- 102 19 100/52 97 % -- Ventilator -- BD    03/16/25 1800 99.5 °F (37.5 °C) 103 21 105/52 97 % -- Ventilator -- EV    03/16/25 1700 -- -- -- -- -- -- Ventilator -- EV    03/16/25 1700 -- 101 28 107/52 96 % -- -- -- BD    03/16/25 1600 100.2 °F (37.9 °C) -- -- -- -- -- Ventilator -- EV    03/16/25 1600 -- 103 21 91/52 95 % -- -- -- BD    03/16/25 1500 -- 100 21 96/49 96 % -- Ventilator -- EV    03/16/25 1400 -- 106 25 102/50 94 % -- Ventilator -- EV    03/16/25 1300 -- 102 21 98/52 95 % -- Ventilator -- EV    03/16/25 1200 98.6 °F (37 °C) 105 26 98/54 96 % -- Ventilator -- EV    03/16/25 1100 -- 105 29 105/77 93 % -- Ventilator -- EV    03/16/25 1030 -- 116 29 108/63 93 % -- -- -- EV    03/16/25 1000 -- 114 18 122/71 94 % --  Ventilator -- EV    03/16/25 0930 -- 105 24 122/69 95 % -- -- -- BD    03/16/25 0900 -- 101 23 116/56 95 % -- Ventilator -- EV    03/16/25 0815 -- 104 28 117/61 92 % -- Ventilator -- EV    03/16/25 0800 99.3 °F (37.4 °C) 105 27 120/66 93 % -- Ventilator -- EV    03/16/25 0700 -- 100 22 113/60 94 % -- Ventilator -- EV    03/16/25 0630 98.9 °F (37.2 °C) 100 23 109/59 94 % -- -- -- MJ    03/16/25 0615 99.2 °F (37.3 °C) 100 23 110/59 94 % -- Ventilator -- MJ    03/16/25 0600 -- 98 19 101/53 94 % 181 lb 10.5 oz (82.4 kg) Ventilator -- MJ    03/16/25 0500 -- 96 17 98/48 95 % -- Ventilator -- MJ    03/16/25 0400 97.2 °F (36.2 °C) 106 17 104/50 98 % -- Ventilator -- MJ    03/16/25 0300 -- 101 21 97/50 97 % -- Ventilator -- MJ    03/16/25 0200 -- 98 18 90/50 98 % -- Ventilator -- MJ    03/16/25 0100 -- 106 25 105/54 96 % -- Ventilator -- MJ    03/16/25 0000 98 °F (36.7 °C) 106 25 109/53 95 % -- Ventilator -- MJ          CIWA Scores (since admission)       Date/Time CIWA-Ar Total Who    03/17/25 1200 5 LC    03/17/25 0700 2 LC    03/16/25 1600 0 EV    03/16/25 1200 0 EV    03/16/25 0800 0 EV       Blood Transfusion Record       Product Unit Status Volume Start End            Transfuse RBC       25  [id number]  [id number] Completed 03/16/25 0935 470.7 mL 03/16/25 0615 03/16/25 0930       25  [id number]  [id number] Completed 03/13/25 1245 400 mL 03/13/25 0915 03/13/25 1235

## 2025-03-18 LAB
ALBUMIN SERPL-MCNC: 3.1 G/DL (ref 3.2–4.8)
ALP LIVER SERPL-CCNC: 189 U/L
ALT SERPL-CCNC: 89 U/L
ANION GAP SERPL CALC-SCNC: 8 MMOL/L (ref 0–18)
ASPERGILLUS AG BAL/SERUM: 0.03 INDEX
AST SERPL-CCNC: 214 U/L (ref ?–34)
BASOPHILS # BLD AUTO: 0.05 X10(3) UL (ref 0–0.2)
BASOPHILS NFR BLD AUTO: 0.5 %
BILIRUB DIRECT SERPL-MCNC: 10.2 MG/DL (ref ?–0.3)
BILIRUB SERPL-MCNC: 12.6 MG/DL (ref 0.3–1.2)
BUN BLD-MCNC: 59 MG/DL (ref 9–23)
BUN/CREAT SERPL: 46.1 (ref 10–20)
CALCIUM BLD-MCNC: 8 MG/DL (ref 8.7–10.4)
CHLORIDE SERPL-SCNC: 109 MMOL/L (ref 98–112)
CO2 SERPL-SCNC: 23 MMOL/L (ref 21–32)
CREAT BLD-MCNC: 1.28 MG/DL
DEPRECATED RDW RBC AUTO: 77.2 FL (ref 35.1–46.3)
EGFRCR SERPLBLD CKD-EPI 2021: 70 ML/MIN/1.73M2 (ref 60–?)
EOSINOPHIL # BLD AUTO: 0.16 X10(3) UL (ref 0–0.7)
EOSINOPHIL NFR BLD AUTO: 1.5 %
ERYTHROCYTE [DISTWIDTH] IN BLOOD BY AUTOMATED COUNT: 29.9 % (ref 11–15)
GLUCOSE BLD-MCNC: 113 MG/DL (ref 70–99)
HCT VFR BLD AUTO: 23.9 %
HGB BLD-MCNC: 8.3 G/DL
IMM GRANULOCYTES # BLD AUTO: 0.29 X10(3) UL (ref 0–1)
IMM GRANULOCYTES NFR BLD: 2.7 %
LYMPHOCYTES # BLD AUTO: 1.29 X10(3) UL (ref 1–4)
LYMPHOCYTES NFR BLD AUTO: 12.1 %
MAGNESIUM SERPL-MCNC: 2.2 MG/DL (ref 1.6–2.6)
MCH RBC QN AUTO: 26.7 PG (ref 26–34)
MCHC RBC AUTO-ENTMCNC: 34.7 G/DL (ref 31–37)
MCV RBC AUTO: 76.8 FL
MONOCYTES # BLD AUTO: 1.11 X10(3) UL (ref 0.1–1)
MONOCYTES NFR BLD AUTO: 10.4 %
NEUTROPHILS # BLD AUTO: 7.73 X10 (3) UL (ref 1.5–7.7)
NEUTROPHILS # BLD AUTO: 7.73 X10(3) UL (ref 1.5–7.7)
NEUTROPHILS NFR BLD AUTO: 72.8 %
OSMOLALITY SERPL CALC.SUM OF ELEC: 307 MOSM/KG (ref 275–295)
PHOSPHATE SERPL-MCNC: 2.2 MG/DL (ref 2.4–5.1)
PLATELET # BLD AUTO: 235 10(3)UL (ref 150–450)
PLATELETS.RETICULATED NFR BLD AUTO: 8.4 % (ref 0–7)
POTASSIUM SERPL-SCNC: 3.3 MMOL/L (ref 3.5–5.1)
POTASSIUM SERPL-SCNC: 3.3 MMOL/L (ref 3.5–5.1)
POTASSIUM SERPL-SCNC: 3.6 MMOL/L (ref 3.5–5.1)
POTASSIUM SERPL-SCNC: 6.2 MMOL/L (ref 3.5–5.1)
PROT SERPL-MCNC: 5.7 G/DL (ref 5.7–8.2)
RBC # BLD AUTO: 3.11 X10(6)UL
SODIUM SERPL-SCNC: 140 MMOL/L (ref 136–145)
WBC # BLD AUTO: 10.6 X10(3) UL (ref 4–11)

## 2025-03-18 PROCEDURE — 99233 SBSQ HOSP IP/OBS HIGH 50: CPT | Performed by: INTERNAL MEDICINE

## 2025-03-18 PROCEDURE — 99233 SBSQ HOSP IP/OBS HIGH 50: CPT | Performed by: HOSPITALIST

## 2025-03-18 RX ORDER — ACETAMINOPHEN 500 MG
500 TABLET ORAL ONCE
Status: COMPLETED | OUTPATIENT
Start: 2025-03-18 | End: 2025-03-18

## 2025-03-18 RX ORDER — POTASSIUM CHLORIDE 14.9 MG/ML
20 INJECTION INTRAVENOUS ONCE
Status: COMPLETED | OUTPATIENT
Start: 2025-03-18 | End: 2025-03-18

## 2025-03-18 NOTE — DIETARY NOTE
NUTRITION NOTE UPDATE:     03/18/2025: Pt extubated 3/17, s/p BSSE today and diet just advanced to chopped/soft and bite sized, thin liquids- pt ordering first meal now. Tube feeds on hold until pt demonstrates diet tolerance and po intake >50% of meal or >40% + Oral supplement. . Pt family at bedside helping pt with menu selections. Pt states is hungry, eats all kinds of foods but mentioned likes Tacos most and drinks chocolate milk. Discussed addition of oral  nutrition supplements and pt agrees to lavon shakes BID (Mighty Shakes). If po intake >50% of tray may discontinue feeding tube. Discussed plan of care with RN and at rounds.     PLAN: Follow up per policy.     Rachael Castillo RD, LDN, [facility] ([id number])

## 2025-03-18 NOTE — OCCUPATIONAL THERAPY NOTE
OCCUPATIONAL THERAPY EVALUATION - INPATIENT     Room Number: 217/217-A  Evaluation Date: 3/18/2025  Type of Evaluation: Initial  Presenting Problem: USMAN; ETOH; respiratory failure; extubated 3/17    Physician Order: IP Consult to Occupational Therapy  Reason for Therapy: ADL/IADL Dysfunction and Discharge Planning    OCCUPATIONAL THERAPY ASSESSMENT   Patient is a 44 year old male admitted 3/4/2025 for USMAN; complicated course with ETOH withdrawal, respiratory failure requiring intubation; pt extubated 3/17.  Prior to admission, patient's baseline is home with family and typically independent .  Patient is currently functioning below baseline with self care and basic mobility.  Patient is requiring up to total A as a result of the following impairments: profound weakness and deconditioning, BUE/BLE weakness (L>R), balance, mobility, endurance, activity tolerance, safety. Occupational Therapy will continue to follow for duration of hospitalization.    Patient will benefit from continued skilled OT Services to facilitate return to prior level of function as patient demonstrates high motivation with excellent tolerance to an intensive therapy program.    PLAN DURING HOSPITALIZATION  OT Device Recommendations: TBD  OT Treatment Plan: Balance activities, Energy conservation/work simplification techniques, ADL training, Functional transfer training, Endurance training, Patient/Family education, Patient/Family training, Compensatory technique education     OCCUPATIONAL THERAPY MEDICAL/SOCIAL HISTORY   Problem List  Principal Problem:    USMAN (acute kidney injury)  Active Problems:    Metabolic acidosis    Hyperkalemia    Leukocytosis    Thrombocytopenia    Coagulopathy (HCC)    Hyponatremia    Alcoholic (HCC)    Scleral icterus    Acute kidney failure    DTs (delirium tremens) (HCC)    Alcohol use disorder    Acute respiratory failure with hypoxia (HCC)    Wernicke's syndrome    Hypophosphatemia    Hypernatremia     Alcohol-induced acute pancreatitis (HCC)    Alcoholic hepatitis without ascites (HCC)    HOME SITUATION  Type of Home: House  Home Layout: Two level  Lives With: Family  Toilet and Equipment: Standard height toilet  Shower/Tub and Equipment: Tub-shower combo  Drives: No  Patient Regularly Uses: None    SUBJECTIVE  Thank you    OCCUPATIONAL THERAPY EXAMINATION      OBJECTIVE  Precautions: Bed/chair alarm (Rectal tube, duran, NG tube)  Fall Risk: High fall risk      PAIN ASSESSMENT  Rating: Unable to rate  Location: generalized with repositioning  Management Techniques: Activity promotion    COGNITION  Alert, following commands, delayed processing  Communication: WNL    Behavioral/Emotional/Social: Cooperative, pleasant    RANGE OF MOTION   Upper extremity PROM is within functional limits- AROM of LUE to ~90 degrees of flexion, elbow WNL, grasp WNL; [id number] [id number] degrees of active flexion; elbow WNL grasp WNL; pt is very weak and has poor muscular endurance to sustain static positioning.     ACTIVITIES OF DAILY LIVING ASSESSMENT  AM-PAC ‘6-Clicks’ Inpatient Daily Activity Short Form  How much help from another person does the patient currently need…  -   Putting on and taking off regular lower body clothing?: A Lot  -   Bathing (including washing, rinsing, drying)?: A Lot  -   Toileting, which includes using toilet, bedpan or urinal? : A Lot  -   Putting on and taking off regular upper body clothing?: A Lot  -   Taking care of personal grooming such as brushing teeth?: A Lot  -   Eating meals?: A Lot    AM-PAC Score:  Score: 12  Approx Degree of Impairment: 66.57%  Standardized Score (AM-PAC Scale): 30.6  CMS Modifier (G-Code): CL    FUNCTIONAL TRANSFER ASSESSMENT     BED MOBILITY  Rolling: Maximum Assist (x2)  Supine to Sit : Maximum Assist (x2)  Sit to Supine (OT): Maximum Assist (x2)    BALANCE ASSESSMENT  Static Sitting: Moderate Assist  Static Standing: Not Tested    FUNCTIONAL ADL ASSESSMENT  Eating: -- (NG tube;  pending SLP consult)  Grooming Seated: Minimal Assist  Bathing Seated: Maximum Assist  UB Dressing Seated: Maximum Assist  LB Dressing Seated: Dependent  Toileting Seated: Dependent    THERAPEUTIC EXERCISE     Skilled Therapy Provided: Care coordinated with PT; recommend x2A for dynamic tasks; pt rcvd in bed with supportive family present; pt rolling in bed with Max aX 2; Bed moblity completed with Max A x2; pt with poor sitting balance; decreased trunk control; posterior and L lateral lean noted throughout; pt requires total A for LE self care including toileting and dressing; requires assist to manage packages and contianers for grooming and self feed; fatigues very quickly; tolerated >10 minutes at EOB but required Mod-Max A to sustain balance; pt returned to bed with Max A X2; repositioned for comfort; placed in chair position; goal to progress up to chair at next session; pt stable at exit and ensured all needs in reach; Continue skilled occupational therapy while IP to maximize patient function and increase patient participation, safety, and independence with basic ADL and everyday activities.       EDUCATION PROVIDED  Patient Education : Role of Occupational Therapy; Discharge Recommendations; Plan of Care; Functional Transfer Techniques; Fall Prevention; Surgical Precautions; Posture/Positioning; Energy Conservation; Proper Body Mechanics  Patient's Response to Education: Requires Further Education  Family/Caregiver Education : Role of Occupational Therapy; Plan of Care; Discharge Recommendations  Family/Caregiver's Response to Education: Verbalized Understanding    The patient's Approx Degree of Impairment: 66.57% has been calculated based on documentation in the [facility] '6 clicks' Inpatient Daily Activity Short Form.  Research supports that patients with this level of impairment may benefit from IR.  Final disposition will be made by interdisciplinary medical team.     Patient End of Session: In bed    OT  Goals  Patients self stated goal is: no goals stated      Patient will complete functional transfer with Mod A   Comment:     Patient will complete toileting with Mod A   Comment:     Patient will tolerate standing for 1-2 minutes in prep for adls with Mod A    Comment:    Patient will tolerate unsupported sitting at eOB with SBA in prep for seated ADLs  Comment:          Goals  on:   Frequency: 3-5x week     Patient Evaluation Complexity Level:   Occupational Profile/Medical History HIGH - Extensive review of history including review of medical or therapy records    Specific performance deficits impacting engagement in ADL/IADL HIGH  5+ performance deficits    Client Assessment/Performance Deficits HIGH - Comorbidities and significant modifications of tasks    Clinical Decision Making HIGH - Analysis of occupational profile, comprehensive assessments, multiple treatment options    Overall Complexity HIGH     OT Session Time: 24 minutes  Self-Care Home Management: 0 minutes  Therapeutic Activity: 24 minutes      Will Sahni, Occupational Therapist, OTR/L ext [phone]

## 2025-03-18 NOTE — PROGRESS NOTES
[facility]     Gastroenterology Progress Note    Sami Grijalva Jr. Patient Status:  Inpatient    10/11/1980 MRN [id number]   Location [facility] 2W/SW Attending Natasha Araujo MD   Hosp Day # 14 PCP Kerri Medina MD       Subjective:   He is awake and alert, family at bedside. Ate small bites for lunch he is not very hungry. No abdominal pain, n/v. No fever.     Objective:   Blood pressure 124/75, pulse 103, temperature 97.5 °F (36.4 °C), temperature source Temporal, resp. rate 25, height 5' 8\" (1.727 m), weight 186 lb 11.7 oz (84.7 kg), SpO2 95%. Body mass index is 28.39 kg/m².    Gen: Drowsy but awake, ANO x 3  HEENT: MMM, DHT in place  CV: RRR  Lung: Clear bilaterally  Abdomen: soft NTND abdomen with NABS appreciated   Skin: dry, warm,   Ext:  + edema  Neuro: Appropriate and conversant    Assessment and Plan:   Sami Grijalva Jr. is a 44 year old male w/ PMHx of longstanding alcohol use, cannabis use, GERD, who presents with generalized abdominal pain, hiccups and vomiting. GI consulted for acute alcohol hepatitis.  ICU stay complicated by multifocal pneumonia and ARDS now status post extubation this morning     #Acute alcohol hepatitis with probable underlying cirrhosis  -CT a/p in ER severe fatty liver with subtle undulating contour  -US liver with hepatic steatosis, GB sludge and cholelithiasis without cholecystitis or biliary ductal dilation, hepatic and portal vein patent  -last EGD 2023 without varices   -high maddrey; holding off on steroids because of pneumonia  -LFTs remain elevated likely from acute alcohol hepatitis in setting of alcohol cirrhosis and overlap cholestasis of sepsis  -Once he passes swallow evaluation, can remove Dobbhoff tube and start oral feeds     #Likely early cirrhosis, ETOH   MELD 3.0 on admission: 44  -PSE: Continue lactulose and Xifaxan  -Ascites: none on exam or US  -EV: None on last EGD 2023, tubular structure around GE junction  suggestive of EV.  Will need repeat EGD electively  -HCC: No lesions on US or CT, AFP 2.7     # Mild interstitial pancreatitis  - likely etoh induced   - continue supportive care    Vandana Austin MD      Results:     Lab Results   Component Value Date    WBC 10.6 03/18/2025    HGB 8.3 (L) 03/18/2025    HCT 23.9 (L) 03/18/2025    .0 03/18/2025    CREATSERUM 1.28 03/18/2025    BUN 59 (H) 03/18/2025     03/18/2025    K 3.3 (L) 03/18/2025    K 3.3 (L) 03/18/2025     03/18/2025    CO2 23.0 03/18/2025     (H) 03/18/2025    CA 8.0 (L) 03/18/2025    ALB 3.1 (L) 03/18/2025    ALKPHO 189 (H) 03/18/2025    BILT 12.6 (H) 03/18/2025    TP 5.7 03/18/2025     (H) 03/18/2025    ALT 89 (H) 03/18/2025    INR 1.74 (H) 03/15/2025     (HH) 03/13/2025    DDIMER 1.13 (H) 08/25/2023    ESRML 27 (H) 10/01/2021    MG 2.2 03/18/2025    PHOS 2.2 (L) 03/18/2025    TROP 0.00 01/31/2017     (H) 03/06/2025    ETOH <3 03/04/2025       No results found.

## 2025-03-18 NOTE — PROGRESS NOTES
[facility]  part of [facility]  Hospitalist Progress Note     Sami Grijalva  Patient Status:  Inpatient    10/11/1980  44 year old CSN [id number]   Location 217/217-A Attending Yonny Bowers MD   Hosp Day # 14 PCP Kerri Medina MD     Assessment & Plan:   ----------------------------------  Respiratory failure, acute hypoxemic.  Due to pneumonia, ARDS.  Oxygen requirement is improving.  Extubated 3/17  -Supplemental oxygen as needed, titrate down  -Pulmonology consultation appreciated  -Treat contributing factors as described  -Tube feeds    Pneumonia. aspiration.  Clinically improving.  Extubated successfully  -IV antibiotics: Meropenem  -Blood cultures: NGTD  -Monitor temperature, blood pressure and fever curve  -Pulmonology consult appreciated  -ID consult appreciated  -Repeat chest imaging to be decided    Acute kidney injury.   Baseline creatinine is 1.5, highest creatinine during this admission > 1.5x baseline.  -Nephrology consult  -Monitor urine output  -Repeat chemistry in the morning  -No indication for dialysis  -Hold diuretics  -Hold ACEi/ARB  -Avoid nephrotoxins/IV contrast/hypotension  -Renal imaging: If fails to continue improving  -IVF: none  -DC Cedeño catheter 3/19    Diarrhea.  Some contribution from antibiotics, tube feeds, acute illness  -GI following  -ID following  -Defer further investigation to them  -DC rectal tube, has been in since 3/5    Acute alcoholic hepatitis.  -GI following    Other problems  Pancreatitis  Alcohol abuse  Cirrhosis  Hepatic steatosis  Hyponatremia    Supplementary Documentation:   DVT Mechanical Prophylaxis:   SCDs,    DVT Pharmacologic Prophylaxis   Medication   None                Code Status: Full Code  Cedeño: Cedeño catheter in place  Cedeño Duration (in days): 14  Central line:    ADOLFO:         I personally reviewed the available laboratories, imaging including. I discussed/will discuss the case with consultants. I ordered  laboratories and/or radiographic studies. I adjusted medications as detailed above.  Medical decision making high, risk is high.    Subjective:   ----------------------------------  Patient is awake and alert, he is very happy to have had some water earlier today.  Denies significant pain.  Feels like he is thinking clearly.  Worked with physical therapy was not able to do very much earlier today.      Objective:   Chief Complaint:   Chief Complaint   Patient presents with    Vomiting    Hiccups    Eval-D     ----------------------------------  Temp:  [97.2 °F (36.2 °C)-98.7 °F (37.1 °C)] 97.5 °F (36.4 °C)  Pulse:  [] 103  Resp:  [20-29] 25  BP: (114-131)/(57-87) 124/75  SpO2:  [92 %-97 %] 95 %  Gen: A+Ox3.  No distress.   HEENT: NCAT, neck supple, no carotid bruit.  Keofeed tube in place  CV: RRR, S1S2, and intact distal pulses. No gallop, rub, murmur.  Pulm: Poor effort, no wheezing or rales  Abd: Soft, NTND, BS normal, no mass, no HSM, no rebound/guarding.  Rectal tube in place, Cedeño catheter in place  Neuro: Normal reflexes, CN. Sensory/motor exams grossly normal deficit.  Generally weak  MS: No joint effusions.  No peripheral edema.  Skin: Skin is warm and dry. No rashes, erythema, diaphoresis.   Psych: Normal mood and affect. Calm, cooperative    Labs:  Lab Results   Component Value Date    HGB 8.3 (L) 03/18/2025    WBC 10.6 03/18/2025    .0 03/18/2025     03/18/2025    K 3.3 (L) 03/18/2025    K 3.3 (L) 03/18/2025    CREATSERUM 1.28 03/18/2025    INR 1.74 (H) 03/15/2025     (H) 03/18/2025    ALT 89 (H) 03/18/2025    TROP 0.00 01/31/2017            potassium phosphate dibasic 15 mmol in sodium chloride 0.9% 250 mL IVPB  15 mmol Intravenous Once    Followed by    potassium chloride  20 mEq Intravenous Once    furosemide  20 mg Intravenous BID (Diuretic)    meropenem  1,000 mg Intravenous Q12H    vancomycin  125 mg Per NG Tube Daily    multivitamin  1 tablet Oral Daily    folic acid  1  mg Oral Daily    baclofen  10 mg Oral TID    pantoprazole  40 mg Intravenous Q12H       haloperidol lactate    acetaminophen **OR** acetaminophen **OR** acetaminophen    polyethylene glycol (PEG 3350)    bisacodyl    dextrose 10%    sodium bicarbonate **AND** lipase-protease-amylase (Lip-Prot-Amyl)    acetaminophen    prochlorperazine    LORazepam **OR** LORazepam    LORazepam **OR** [DISCONTINUED] LORazepam

## 2025-03-18 NOTE — PROGRESS NOTES
[facility]  part of [facility]    Progress Note    Sami Grijalva Jr. Patient Status:  Inpatient    10/11/1980 MRN [id number]   Location [facility] 2W/SW Attending Yonny Bowers MD   Hosp Day # 14 PCP Kerri Medina MD       Subjective:   Sami Grijalva Jr. is a(n) 44 year old male     ROS:     Constitutional: Feels better happy to be alive  ENMT:  Negative for ear drainage, hearing loss and nasal drainage  Eyes:  Negative for eye discharge and vision loss  Cardiovascular:  Negative for chest pain, sob, irregular heartbeat/palpitations  Respiratory:  Negative for cough, dyspnea and wheezing  Gastrointestinal:  Negative for abdominal pain, constipation, decreased appetite, diarrhea and vomiting  Genitourinary:  Negative for dysuria and hematuria  Endocrine:  Negative for abnormal sleep patterns, increased activity, polydipsia and polyphagia  Hema/Lymph:  Negative for easy bleeding and easy bruising  Integumentary:  Negative for pruritus and rash  Musculoskeletal:  Negative for bone/joint symptoms  Neurological:  Negative for gait disturbance  Psychiatric:  Negative for inappropriate interaction and psychiatric symptoms      Vitals:    25 1200   BP: 124/75   Pulse: 103   Resp: 25   Temp: 97.5 °F (36.4 °C)           PHYSICAL EXAM:   Constitutional: appears well hydrated alert and responsive no acute distress noted  Head/Face: normocephalic  Eyes/Vision: normal extraocular motion is intact  Nose/Mouth/Throat:mucous membranes are moist and no oral lesions are noted  Neck/Thyroid: neck is supple without adenopathy  Lymphatic: no abnormal cervical, supraclavicular adenopathy is noted  Respiratory:  lungs are clear to auscultation bilaterally, normal respiratory effort  Cardiovascular: regular rate and rhythm no murmurs, gallups, or rubs  Abdomen: Soft still some belly distention  Vascular: well perfused femoral, and pedal pulses normal  Skin/Hair: no unusual rashes present, no  abnormal bruising noted  Back/Spine: no abnormalities noted  Musculoskeletal: full ROM all extremities good strength  no deformities  Extremities: no edema, cyanosis  Neurological:  Grossly normal    Results:     Laboratory Data:  Lab Results   Component Value Date    WBC 10.6 03/18/2025    HGB 8.3 (L) 03/18/2025    HCT 23.9 (L) 03/18/2025    .0 03/18/2025    CREATSERUM 1.28 03/18/2025    BUN 59 (H) 03/18/2025     03/18/2025    K 3.3 (L) 03/18/2025    K 3.3 (L) 03/18/2025     03/18/2025    CO2 23.0 03/18/2025     (H) 03/18/2025    CA 8.0 (L) 03/18/2025    ALB 3.1 (L) 03/18/2025    ALKPHO 189 (H) 03/18/2025    BILT 12.6 (H) 03/18/2025    TP 5.7 03/18/2025     (H) 03/18/2025    ALT 89 (H) 03/18/2025    INR 1.74 (H) 03/15/2025     (HH) 03/13/2025    DDIMER 1.13 (H) 08/25/2023    ESRML 27 (H) 10/01/2021    MG 2.2 03/18/2025    PHOS 2.2 (L) 03/18/2025    TROP 0.00 01/31/2017     (H) 03/06/2025    ETOH <3 03/04/2025       Imaging:  [unfilled]   No results found.      ASSESSMENT/PLAN:   Assessment       #1 respiratory failure doing well on lasix bid    #2 acute liver failure alcoholic hepatitis doing better     3 USMAN doing better  Urine output 2500 cc still +16 L creatinine down to 1.3  Lft's about the same  #4 low K replace  #5 mental status better and long talk with patient and boyfriend  6 anemia chrionic   On epo  Cpm     3/18/2025  Johnathan Ramos MD

## 2025-03-18 NOTE — PROGRESS NOTES
INFECTIOUS DISEASE PROGRESS NOTE  [facility]  part of [facility] ID PROGRESS NOTE    Sami Grijalva . Patient Status:  Inpatient    10/11/1980 MRN [id number]   Location [facility] 2W/SW Attending Natasha Araujo MD   Hosp Day # 14 PCP Kerri Medina MD     Subjective:  ROS reviewed. Extubated yesterday. On 6L HFNC. SLP to see today. Afebrile.    ASSESSMENT:    Antibiotics: Meropenem 3/14-, OVP  (doxycycline, vancomycin, zosyn)     # Acute hypoxic respiratory failure with fever and multifocal pneumonia   -S/p intubation 3/12   -s/p bronch 3/13, cx NGTD   -MRSA nares negative  # Acute aspiration pneumonia  # Acute leukocytosis   # Acute anemia  # Acute pancreatitis  # Alcoholic hepatitis with encephalopathy on admission               - CT A/P with severe fatty liver               - US GB with sludge w/o cholecystitis   - CT C/A/P 3/12 with pancreatitis  # USMAN - improved  # EtOH abuse and withdrawal     PLAN:  -  Continue on meropenem, day 5 of  as well as OVP.  -  FU blood cx.  -  Follow fever curve, wbc.  -  Reviewed labs, micro, imaging reports, available old records.  -  Case d/w RN.     History of Present Illness:  44-year-old male with a history of GERD, alcohol use was admitted to the hospital on 3/4 generalized abdominal pain, hiccups, vomiting with difficulty tolerating orals.  Last drink prior to admission was 1 day prior.  Found to have sepsis with elevated lactic acid, hypotension, WBC 17.5.  Responded to IV fluids and received lactulose for elevated ammonia.  CT A/P with severe fatty liver, stable splenomegaly.  Was lethargic the first few days and had soft restraints with Cedeño, Flexi-Seal, NG tube.  Has been essentially afebrile here with a temperature on 3/10 of 100.6 but significant hypoxia up and down and currently increased up to 40 L with increase WBC to 17.4.  Initial USMAN has improved.  Was started on IV Zosyn and doxycycline on 3/7 when patient  became more hypoxic and chest x-ray showed extensive multifocal pneumonia with CT chest showing similar findings.  Possible aspiration.    Physical Exam:  /65   Pulse 103   Temp 98.7 °F (37.1 °C) (Oral)   Resp 25   Ht 5' 8\" (1.727 m)   Wt 186 lb 11.7 oz (84.7 kg)   SpO2 94%   BMI 28.39 kg/m²     Gen:   Awake, in bed  HEENT:  +scleral icterus, dobhoff+, neck supple  CV/lungs:  RRR, lungs clear vent sounds  Abdom:  Soft, no TTP  Skin/extrem:  No rashes, jaundiced  :   Cedeño draining yellow urine  Lines:  Midline+  Flexiseal+    Laboratory Data: Reviewed    Microbiology: Reviewed    Radiology: Reviewed      [facility]  [phone]  3/18/2025

## 2025-03-18 NOTE — PHYSICAL THERAPY NOTE
PHYSICAL THERAPY EVALUATION - INPATIENT     Room Number: 217/217-A  Evaluation Date: 3/18/2025  Type of Evaluation: Initial   Physician Order: PT Eval and Treat    Presenting Problem: USAMN, hiccups, nausea, vomiting, abdominal pain, alcohol hepatitis, pneumonia, intubated on 3/12/25 and extubated on 3/17/25  Co-Morbidities : alcoholism, anxiety, gout, gastroesophageal reflux disease  Reason for Therapy: Mobility Dysfunction and Discharge Planning    PHYSICAL THERAPY ASSESSMENT   Patient is a 44 year old male admitted 3/4/2025 for USMAN, hiccups, nausea, vomiting, abdominal pain, alcohol hepatitis, pneumonia, intubated on 3/12/25 and extubated on 3/17/25. Prior to admission, patient's baseline is Independent with ADLs/iADLs/functional mobility. Patient is currently functioning below baseline with bed mobility, transfers, gait, stair negotiation, maintaining seated position, standing prolonged periods, and performing household tasks.  Patient is requiring maximum assist and assist x 2  as a result of the following impairments: decreased functional strength, decreased endurance/aerobic capacity, pain, impaired sitting balance, impaired coordination, decreased muscular endurance, medical status, increased O2 needs from baseline, and BUE/BLE weakness .  Physical Therapy will continue to follow for duration of hospitalization.    Patient will benefit from continued skilled PT Services to facilitate return to prior level of function as patient demonstrates high motivation with excellent tolerance to an intensive therapy program .    PLAN DURING HOSPITALIZATION  Nursing Mobility Recommendation : Lift Equipment  PT Device Recommendation: Mechanical lift  PT Treatment Plan: Bed mobility, Body mechanics, Endurance, Energy conservation, Patient education, Gait training, Strengthening, Transfer training, Balance training  Rehab Potential : Good  Frequency (Obs): 5x/week     PHYSICAL THERAPY MEDICAL/SOCIAL HISTORY     Problem  List  Principal Problem:    USMAN (acute kidney injury)  Active Problems:    Metabolic acidosis    Hyperkalemia    Leukocytosis    Thrombocytopenia    Coagulopathy (HCC)    Hyponatremia    Alcoholic (HCC)    Scleral icterus    Acute kidney failure    DTs (delirium tremens) (HCC)    Alcohol use disorder    Acute respiratory failure with hypoxia (HCC)    Wernicke's syndrome    Hypophosphatemia    Hypernatremia    Alcohol-induced acute pancreatitis (HCC)    Alcoholic hepatitis without ascites (HCC)    HOME SITUATION  Type of Home: House  Home Layout: Multi-level  Stairs to Enter : 2   Railing: No    Stairs to Bedroom: 5    Railing: Yes    Lives With: Family    Drives: No   Patient Regularly Uses: None     SUBJECTIVE  Agreeable     PHYSICAL THERAPY EXAMINATION   OBJECTIVE  Precautions: Bed/chair alarm, Rectal tube, HOB elevated 30 degrees (NG)  Fall Risk: High fall risk    PAIN ASSESSMENT  Rating: Unable to rate  Location: generalized body pains  Management Techniques: Activity promotion, Body mechanics, Repositioning    COGNITION  Following Commands:  follows one step commands with increased time and follows one step commands with repetition    RANGE OF MOTION AND STRENGTH ASSESSMENT  Lower extremity ROM is within functional limits   Lower extremity strength is impaired; 2+/5 to 3-/5 globally    BALANCE  Static Sitting: Poor -  Dynamic Sitting: Poor -  Static Standing: Not tested  Dynamic Standing: Not tested    ACTIVITY TOLERANCE  Pulse: 104  Heart Rate Source: Monitor     BP: (!) 135/93  BP Location: Right arm  BP Method: Automatic  Patient Position: Sitting    O2 WALK  Oxygen Therapy  SPO2% on Oxygen at Rest: 96  At rest oxygen flow (liters per minute): 6  SPO2% Ambulation on Oxygen: 92 (while sitting EOB, NOT ambulating)  Ambulation oxygen flow (liters per minute): 6    AM-PAC '6-Clicks' INPATIENT SHORT FORM - BASIC MOBILITY  How much difficulty does the patient currently have...  Patient Difficulty: Turning over in  bed (including adjusting bedclothes, sheets and blankets)?: A Lot   Patient Difficulty: Sitting down on and standing up from a chair with arms (e.g., wheelchair, bedside commode, etc.): A Lot   Patient Difficulty: Moving from lying on back to sitting on the side of the bed?: A Lot   How much help from another person does the patient currently need...   Help from Another: Moving to and from a bed to a chair (including a wheelchair)?: Total   Help from Another: Need to walk in hospital room?: Total   Help from Another: Climbing 3-5 steps with a railing?: Total     AM-PAC Score:  Raw Score: 9   Approx Degree of Impairment: 81.38%   Standardized Score (AM-PAC Scale): 30.55   CMS Modifier (G-Code): CM    FUNCTIONAL ABILITY STATUS  Functional Mobility/Gait Assessment  Gait Assistance: Not tested  Supine to Sit: maximum assist x 2. Patient tolerated static sitting approx 10 minutes with BUE support and Mod A>Max A in order to maintain static sitting balance. Patient with left posterolateral lean, requiring Max A to correct.   Sit to Supine: maximum assist x 2    Exercise/Education Provided:  Bed mobility  Body mechanics  Energy conservation  Functional activity tolerated  Posture  ROM  Lower therapeutic exercise:  Ankle pumps  LAQ    Skilled Therapy Provided: Patient in bed with family present in room upon arrival. RN approved activity. Educated patient on POC and benefits of mobilization. Agreeable to participate. Patient reporting generalized body pains, not quantified per the pain scale. Patient benefits from increased time, cues, and physical assistance in order to complete functional mobility tasks. Patient reporting fatigue while sitting EOB, with increased assistance required in order to maintain sitting balance as patient fatigued. Discussed goal for next session to be able to sit in bedside chair, patient agreeable.     The patient's Approx Degree of Impairment: 81.38% has been calculated based on documentation in  the [facility] '6 clicks' Inpatient Basic Mobility Short Form.  Research supports that patients with this level of impairment may benefit from LTAC, however, patient is functioning below baseline levels and would benefit from rehab.  Final disposition will be made by interdisciplinary medical team.    Patient End of Session: In bed, Needs met, Call light within reach, RN aware of session/findings, All patient questions and concerns addressed, Hospital anti-slip socks, Alarm set, Family present    CURRENT GOALS  Goals to be met by: 4/1/25  Patient Goal Patient's self-stated goal is: none stated   Goal #1 Patient is able to demonstrate supine - sit EOB @ level: moderate assistance     Goal #1   Current Status    Goal #2 Patient will tolerate static sitting EOB for 10 minutes with BUE support and CGA in order to prepare for future out of bed activities.    Goal #2  Current Status    Goal #3 Patient is able to demonstrate transfers Sit to/from Stand at assistance level: moderate assistance with walker - rolling     Goal #3   Current Status    Goal #4 Patient will tolerate static standing for 2 minutes with BUE support on RW and Min A in order to prepare for future out of bed activities.   Goal #4   Current Status    Goal #5 Patient is able to ambulate 25 feet with assist device: walker - rolling at assistance level: moderate assistance   Goal #5   Current Status    Goal #6 Patient to demonstrate independence with home activity/exercise instructions provided to patient in preparation for discharge.   Goal #6  Current Status      Patient Evaluation Complexity Level:  History Moderate - 1 or 2 personal factors and/or co-morbidities   Examination of body systems Moderate - addressing a total of 3 or more elements   Clinical Presentation  Moderate - Evolving   Clinical Decision Making  Moderate Complexity     Therapeutic Activity:  15 minutes

## 2025-03-18 NOTE — PLAN OF CARE
Problem: Patient Centered Care  Goal: Patient preferences are identified and integrated in the patient's plan of care  Description: Interventions:  - Provide timely, complete, and accurate information to patient/family  - Incorporate patient and family knowledge, values, beliefs, and cultural backgrounds into the planning and delivery of care  - Encourage patient/family to participate in care and decision-making at the level they choose  - Honor patient and family perspectives and choices  Outcome: Progressing     Problem: Patient/Family Goals  Goal: Patient/Family Long Term Goal    Interventions:  - See additional Care Plan goals for specific interventions  Outcome: Progressing  Goal: Patient/Family Short Term Goal    Interventions:   - See additional Care Plan goals for specific interventions  Outcome: Progressing     Problem: PAIN - ADULT  Goal: Verbalizes/displays adequate comfort level or patient's stated pain goal  Description: INTERVENTIONS:  - Encourage pt to monitor pain and request assistance  - Assess pain using appropriate pain scale  - Administer analgesics based on type and severity of pain and evaluate response  - Implement non-pharmacological measures as appropriate and evaluate response  - Consider cultural and social influences on pain and pain management  - Manage/alleviate anxiety  - Utilize distraction and/or relaxation techniques  - Monitor for opioid side effects  - Notify MD/LIP if interventions unsuccessful or patient reports new pain  - Anticipate increased pain with activity and pre-medicate as appropriate  Outcome: Progressing     Problem: RISK FOR INFECTION - ADULT  Goal: Absence of fever/infection during anticipated neutropenic period  Description: INTERVENTIONS  - Monitor WBC  - Administer growth factors as ordered  - Implement neutropenic guidelines  Outcome: Progressing     Problem: SAFETY ADULT - FALL  Goal: Free from fall injury  Description: INTERVENTIONS:  - Assess pt  frequently for physical needs  - Identify cognitive and physical deficits and behaviors that affect risk of falls.  - [place] fall precautions as indicated by assessment.  - Educate pt/family on patient safety including physical limitations  - Instruct pt to call for assistance with activity based on assessment  - Modify environment to reduce risk of injury  - Provide assistive devices as appropriate  - Consider OT/PT consult to assist with strengthening/mobility  - Encourage toileting schedule  Outcome: Progressing     Problem: DISCHARGE PLANNING  Goal: Discharge to home or other facility with appropriate resources  Description: INTERVENTIONS:  - Identify barriers to discharge w/pt and caregiver  - Include patient/family/discharge partner in discharge planning  - Arrange for needed discharge resources and transportation as appropriate  - Identify discharge learning needs (meds, wound care, etc)  - Arrange for interpreters to assist at discharge as needed  - Consider post-discharge preferences of patient/family/discharge partner  - Complete POLST form as appropriate  - Assess patient's ability to be responsible for managing their own health  - Refer to Case Management Department for coordinating discharge planning if the patient needs post-hospital services based on physician/LIP order or complex needs related to functional status, cognitive ability or social support system  Outcome: Progressing     Problem: Safety Risk - Non-Violent Restraints  Goal: Patient will remain free from self-harm  Description: INTERVENTIONS:  - Apply the least restrictive restraint to prevent harm  - Notify patient and family of reasons restraints applied  - Assess for any contributing factors to confusion (electrolyte disturbances, delirium, medications)  - Discontinue any unnecessary medical devices as soon as possible  - Assess the patient's physical comfort, circulation, skin condition, hydration, nutrition and elimination needs   -  Reorient and redirection as needed  - Assess for the need to continue restraints  3/18/2025 0638 by Vanita Prieto RN  Outcome: Progressing  3/18/2025 0636 by Vanita Prieto RN  Outcome: Progressing     Problem: Delirium  Goal: Minimize duration of delirium  Description: Interventions:  - Encourage use of hearing aids, eye glasses  - Promote highest level of mobility daily  - Provide frequent reorientation  - Promote wakefulness i.e. lights on, blinds open  - Promote sleep, encourage patient's normal rest cycle i.e. lights off, TV off, minimize noise and interruptions  - Encourage family to assist in orientation and promotion of home routines  Outcome: Progressing     Problem: RESPIRATORY - ADULT  Goal: Achieves optimal ventilation and oxygenation  Description: INTERVENTIONS:  - Assess for changes in respiratory status  - Assess for changes in mentation and behavior  - Position to facilitate oxygenation and minimize respiratory effort  - Oxygen supplementation based on oxygen saturation or ABGs  - Provide Smoking Cessation handout, if applicable  - Encourage broncho-pulmonary hygiene including cough, deep breathe, Incentive Spirometry  - Assess the need for suctioning and perform as needed  - Assess and instruct to report SOB or any respiratory difficulty  - Respiratory Therapy support as indicated  - Manage/alleviate anxiety  - Monitor for signs/symptoms of CO2 retention  Outcome: Progressing     Problem: METABOLIC/FLUID AND ELECTROLYTES - ADULT  Goal: Electrolytes maintained within normal limits  Description: INTERVENTIONS:  - Monitor labs and rhythm and assess patient for signs and symptoms of electrolyte imbalances  - Administer electrolyte replacement as ordered  - Monitor response to electrolyte replacements, including rhythm and repeat lab results as appropriate  - Fluid restriction as ordered  - Instruct patient on fluid and nutrition restrictions as appropriate  Outcome: Progressing  Goal: Hemodynamic  stability and optimal renal function maintained  Description: INTERVENTIONS:  - Monitor labs and assess for signs and symptoms of volume excess or deficit  - Monitor intake, output and patient weight  - Monitor urine specific gravity, serum osmolarity and serum sodium as indicated or ordered  - Monitor response to interventions for patient's volume status, including labs, urine output, blood pressure (other measures as available)  - Encourage oral intake as appropriate  - Instruct patient on fluid and nutrition restrictions as appropriate  Outcome: Progressing     Problem: Impaired Swallowing  Goal: Minimize aspiration risk  Description: Interventions:  - Patient should be alert and upright for all feedings (90 degrees preferred)  - Offer food and liquids at a slow rate  - No straws  - Encourage small bites of food and small sips of liquid  - Offer pills one at a time, crush or deliver with applesauce as needed  - Discontinue feeding and notify MD (or speech pathologist) if coughing or persistent throat clearing or wet/gurgly vocal quality is noted  Outcome: Progressing     Problem: Impaired Cognition  Goal: Patient will exhibit improved attention, thought processing and/or memory  Description: Interventions:  Outcome: Progressing     Problem: CARDIOVASCULAR - ADULT  Goal: Maintains optimal cardiac output and hemodynamic stability  Description: INTERVENTIONS:- Monitor vital signs, rhythm, and trends- Monitor for bleeding, hypotension and signs of decreased cardiac output- Evaluate effectiveness of vasoactive medications to optimize hemodynamic stability- Monitor arterial and/or venous puncture sites for bleeding and/or hematoma- Assess quality of pulses, skin color and temperature- Assess for signs of decreased coronary artery perfusion - ex. Angina- Evaluate fluid balance, assess for edema, trend weights  Outcome: Progressing

## 2025-03-18 NOTE — PROGRESS NOTES
[facility]  part of [facility]     Progress Note    Sami Grijalva  Patient Status:  Inpatient    10/11/1980 MRN [id number]   Location [facility] 2W/SW Attending Alex Diaz MD   Hosp Day # 14 PCP Kerri Medina MD       Subjective:   Patient seen and examined.  More alert following some simple commands.  Denies significant abdominal pain.  Afebrile over last 24 hours    Objective:   Blood pressure 126/65, pulse 103, temperature 98.7 °F (37.1 °C), temperature source Oral, resp. rate 25, height 5' 8\" (1.727 m), weight 186 lb 11.7 oz (84.7 kg), SpO2 94%.  Intake/Output:   Last 3 shifts: I/O last 3 completed shifts:  In: 5318.8 [I.V.:836.8; Other:30; NG/GT:4352; IV PIGGYBACK:100]  Out: 4050 [Urine:3400; Stool:650]   This shift: I/O this shift:  In: 300 [NG/GT:300]  Out: 700 [Urine:700]     Vent Settings:      Hemodynamic parameters (last 24 hours):      Scheduled Meds:   Current Facility-Administered Medications   Medication Dose Route Frequency    potassium phosphate dibasic 15 mmol in sodium chloride 0.9% 250 mL IVPB  15 mmol Intravenous Once    Followed by    potassium chloride 20 mEq/100mL IVPB premix 20 mEq  20 mEq Intravenous Once    furosemide (Lasix) 10 mg/mL injection 20 mg  20 mg Intravenous BID (Diuretic)    meropenem (Merrem) 1,000 mg in sodium chloride 0.9% 100 mL IVPB-MBP  1,000 mg Intravenous Q12H    haloperidol lactate (Haldol) 5 MG/ML injection 1 mg  1 mg Intravenous Q6H PRN    acetaminophen (Tylenol) 160 MG/5ML oral liquid 650 mg  650 mg Per NG Tube Q4H PRN    Or    acetaminophen (Tylenol) rectal suppository 650 mg  650 mg Rectal Q4H PRN    Or    acetaminophen (Ofirmev) 10 mg/mL infusion premix 1,000 mg  1,000 mg Intravenous Q6H PRN    polyethylene glycol (PEG 3350) (Miralax) 17 g oral packet 17 g  17 g Per NG Tube Daily PRN    bisacodyl (Dulcolax) 10 MG rectal suppository 10 mg  10 mg Rectal Daily PRN    vancomycin (Firvanq) 50 mg/mL oral solution 125  mg  125 mg Per NG Tube Daily    dextrose 10% infusion (TPN no rate)   Intravenous Continuous PRN    sodium bicarbonate tab 650 mg  650 mg Per G Tube PRN    And    pancrelipase (Lip-Prot-Amyl) (Zenpep) DR particles cap 10,000 Units  10,000 Units Per G Tube PRN    acetaminophen (Tylenol Extra Strength) tab 500 mg  500 mg Oral Q4H PRN    prochlorperazine (Compazine) 10 MG/2ML injection 5 mg  5 mg Intravenous Q8H PRN    multivitamin (Tab-A-Yosef/Beta Carotene) tab 1 tablet  1 tablet Oral Daily    folic acid (Folvite) tab 1 mg  1 mg Oral Daily    LORazepam (Ativan) tab 1 mg  1 mg Oral Q1H PRN    Or    LORazepam (Ativan) 2 mg/mL injection 1 mg  1 mg Intravenous Q1H PRN    LORazepam (Ativan) tab 2 mg  2 mg Oral Q1H PRN    baclofen (Lioresal) tab 10 mg  10 mg Oral TID    pantoprazole (Protonix) 40 mg in sodium chloride 0.9% PF 10 mL IV push  40 mg Intravenous Q12H       Continuous Infusions:    dextrose 10% Stopped (03/13/25 1645)       Physical Exam  Constitutional: no acute distress  Eyes: PERRL, scleral icterus  ENT: nares pateint  Neck: supple, no JVD  Cardio: RRR, S1 S2  Respiratory: Basilar crackles  GI: abdomen soft, non tender, active bowel sounds, no organomegaly  Extremities: no clubbing, cyanosis, edema  Neurologic: no gross motor deficits  Skin: warm, dry, jaundice      Results:     Lab Results   Component Value Date    WBC 10.6 03/18/2025    HGB 8.3 03/18/2025    HCT 23.9 03/18/2025    .0 03/18/2025    CREATSERUM 1.28 03/18/2025    BUN 59 03/18/2025     03/18/2025    K 3.3 03/18/2025    K 3.3 03/18/2025     03/18/2025    CO2 23.0 03/18/2025     03/18/2025    CA 8.0 03/18/2025    ALB 3.1 03/18/2025    ALKPHO 189 03/18/2025    BILT 12.6 03/18/2025    TP 5.7 03/18/2025     03/18/2025    ALT 89 03/18/2025    MG 2.2 03/18/2025    PHOS 2.2 03/18/2025       No results found.          Assessment   1.  Alcoholic hepatitis  2.  Cirrhosis  3.  Acute hypoxemic respiratory failure  4.  EtOH  withdrawal  5.  Thrombocytopenia, resolved  6.  Anemia  7.  Alcoholic pancreatitis  8.  Aspiration pneumonia       Plan   -Patient presents with evidence of abdominal pain concern for acute alcoholic hepatitis now with hypoxemic respiratory failure.  -CT chest abdomen pelvis on 3/12/2025 with pancreatic enlargement seen with some pancreatic stranding.  Left greater than right airspace opacity seen.  -Tolerating extubation on 3/17/2025.  Wean oxygen as tolerated  -Chest x-ray on 3/17/2025 with bilateral infiltrates seen  -Status post bronchoscopy with BAL on 3/13/2025 blood cultures negative to date.  -Currently on antibiotic therapy with meropenem  -Swallow evaluation pending  -Further recommendations per GI.  -CIWA protocol  -PT/OT  -Up in chair as tolerated  -DVT prophylaxis: SCDs  -Discussed with family.      Akin Bobo,   Pulmonary Critical Care Medicine  [facility]

## 2025-03-18 NOTE — SLP NOTE
ADULT SWALLOWING EVALUATION    ASSESSMENT    ASSESSMENT/OVERALL IMPRESSION:  PPE REQUIRED. THIS THERAPIST WORE GLOVES FOR DURATION OF EVALUATION. HANDS WASHED UPON ENTRANCE/EXIT.    Per MD H&P, \"The patient is a 44-year-old  male who came in today to the emergency room for evaluation of intractable nausea and vomiting, poor appetite for the last few days, with hiccups.\"    SLP BSSE orders received and acknowledged. A swallow evaluation warranted per prolonged intubation. Pt afebrile with clear/weak vocal quality, on 6L/HF, with oxygen saturation at 94%. Pt with no hx of dysphagia at [facility].   Pt positioned 90 degrees in bed, alert/cooperative/family present. Pt with no complaints of pain. Oral motor examination revealed reduced strength, ROM, and rate of motion. Pt presented with trials of hard solids, puree and thin liquids via straw/cup/tsp. Pt with adequate oral acceptance and bilabial seal across all trials. Pt with intact bite, prolonged mastication of solids, and delayed A-P transit. Pt's swallow response appears delayed with reduced hyolaryngeal elevation/excursion. No clinical signs of aspiration (e.g., immediate/delayed throat clear, immediate/delayed cough, wet vocal quality, increased O2 effort) observed across all trials. 3/17 CXR indicates \"ETT 6 cm above the em Enteric tube beneath the diaphragm Stable heart Persistent low lung volumes and bilateral diffuse pulmonary opacities. No pneumothorax \" . Oxygen status remained stable t/o the entire evaluation.     At this time, pt presents with mild oral dysphagia and probable pharyngeal dysfunction. Recommend a soft bite sized diet and thin liquids with strict adherence to safe swallowing compensatory strategies. Results and recommendations reviewed with RN, pt, and family. Pt/pt's family v/u to all results/recommendations. Recommendations remain written on whiteboard. SLP collaborated with RN for MD diet orders.     PLAN: SLP to f/u x2 meal  assessments, monitor imaging, and VFSS if clinically indicated       RECOMMENDATIONS   Diet Recommendations - Solids: Mechanical soft chopped/ Soft & Bite Sized  Diet Recommendations - Liquids: Thin Liquids                    Compensatory Strategies Recommended: Alternate consistencies  Aspiration Precautions: Upright position, Slow rate, Small bites, Small sips  Medication Administration Recommendations:  (as tolerated)  Treatment Plan/Recommendations: Aspiration precautions    HISTORY   MEDICAL HISTORY  Reason for Referral:  (prolonged intubation)    Problem List  Principal Problem:    USMAN (acute kidney injury)  Active Problems:    Metabolic acidosis    Hyperkalemia    Leukocytosis    Thrombocytopenia    Coagulopathy (HCC)    Hyponatremia    Alcoholic (HCC)    Scleral icterus    Acute kidney failure    DTs (delirium tremens) (HCC)    Alcohol use disorder    Acute respiratory failure with hypoxia (HCC)    Wernicke's syndrome    Hypophosphatemia    Hypernatremia    Alcohol-induced acute pancreatitis (HCC)    Alcoholic hepatitis without ascites (HCC)      Past Medical History  Past Medical History:    Esophageal reflux    Gout    Hernia       Prior Living Situation: Home alone  Diet Prior to Admission: Regular, Thin liquids  Precautions: Aspiration    Patient/Family Goals: did not state    SWALLOWING HISTORY  Current Diet Consistency: NPO  Dysphagia History: none  Imaging Results: 3/7 CT BRAIN  CONCLUSION:   Motion limits evaluation.  No evidence of acute intracranial abnormality.     3/7 CT CHEST  CONCLUSION:   1. Extensive bilateral airspace disease, concerning for potential multifocal pneumonia. Continued surveillance is advised.   2. Dense bilateral lower airspace consolidation is evident; aspiration pneumonitis is a differential diagnostic possibility. Follow-up is recommended to document resolution.    3. Dilatation of the main pulmonary artery trunk may relate to underlying pulmonary hypertension.    4. Small  hiatal hernia with imaging manifestations that may be indicative underlying esophagitis.   5. Marked hepatic steatosis.   6. Lesser incidental findings as above.         SUBJECTIVE       OBJECTIVE   ORAL MOTOR EXAMINATION  Dentition: Functional  Symmetry: Within Functional Limits  Strength: Overall reduced     Range of Motion: Overall reduced  Rate of Motion: Reduced    Voice Quality: Clear, Weak  Respiratory Status: Supplemental O2, Nasal cannula  Consistencies Trialed: Thin liquids, Puree, Hard solid  Method of Presentation: Staff/Clinician assistance, Spoon, Cup, Straw  Patient Positioned: Upright, Midline    Oral Phase of Swallow: Impaired  Bolus Retrieval: Intact  Bilabial Seal: Intact  Bolus Formation: Impaired  Bolus Propulsion: Impaired  Mastication: Impaired  Retention: Impaired    Pharyngeal Phase of Swallow: Appears Impaired  Laryngeal Elevation Timing: Appears impaired  Laryngeal Elevation Strength: Appears impaired  Laryngeal Elevation Coordination: Appears intact  (Please note: Silent aspiration cannot be evaluated clinically. Videofluoroscopic Swallow Study is required to rule-out silent aspiration.)    Esophageal Phase of Swallow: No complaints consistent with possible esophageal involvement  Comments: NA          GOALS  Goal #1 The patient will tolerate soft bite sized consistency and thin liquids without overt signs or symptoms of aspiration with 100 % accuracy over 1-2 session(s).  In Progress   Goal #2 The patient/family/caregiver will demonstrate understanding and implementation of aspiration precautions and swallow strategies independently over 1-2 session(s).    In Progress   Goal #3 The patient will tolerate trial upgrade of soft easy to chew/regular consistency and thin liquids without overt signs or symptoms of aspiration with 100 % accuracy over 1-2 session(s).  In Progress   Goal #4 The patient will utilize compensatory strategies as outlined by  BSSE (clinical evaluation) including Slow  rate, Small bites, Small sips, Alternate liquids/solids, Upright 90 degrees, Eliminate distractions with PRN assistance 100 % of the time across 2 sessions.    In Progress     FOLLOW UP  Treatment Plan/Recommendations: Aspiration precautions  Duration: 1 week  Follow Up Needed (Documentation Required): Yes  SLP Follow-up Date: 03/19/25    Thank you for your referral.   If you have any questions, please contact JACK Albright M.S. CCC-SLP  Speech Language Pathologist  Phone Number [phone]

## 2025-03-18 NOTE — PAYOR COMM NOTE
--------------  3/17 CONTINUED STAY REVIEW    Payor: [facility]  Subscriber #:  [id number]  Authorization Number: [id number]    REMAINS VENTED IN ICU    ID:    ROS unable to be reviewed. Remains intubated and sedated. Tmax 102.5 last night. Does follow commands. Plans for SBT. 225 mL from flexiseal overnight.     ASSESSMENT:     Antibiotics: Meropenem 3/14-, OVP  (doxycycline, vancomycin, zosyn)     # Acute hypoxic respiratory failure with fever and multifocal pneumonia               -S/p intubation 3/12               -s/p bronch 3/13, cx NGTD               -MRSA nares negative  # Acute aspiration pneumonia  # Acute leukocytosis   # Acute anemia  # Acute pancreatitis  # Alcoholic hepatitis with encephalopathy on admission               - CT A/P with severe fatty liver               - US GB with sludge w/o cholecystitis               - CT C/A/P 3/12 with pancreatitis  # USMAN - improved  # EtOH abuse and withdrawal        PLAN:  -  Continue on meropenem. Check blood cx given fever.  -  FU bronch cx.  -  Follow fever curve, wbc.  -  Reviewed labs, micro, imaging reports, available old records.  -  Case d/w RN.       Physical Exam:  /67 (BP Location: Right arm)   Pulse 107   Temp 98.4 °F (36.9 °C) (Temporal)   Resp 25   Ht 5' 8\" (1.727 m)   Wt 195 lb 1.7 oz (88.5 kg)   SpO2 92%   BMI 29.67 kg/m²      Gen:                   Intubated, sedated  HEENT:             ETT+, dobhoff+, neck supple  CV/lungs:           Regular rate and rhythm, lungs clear vent sounds  Abdom:              Soft, no TTP  Skin/extrem:      No rashes, jaundiced  :                    Cedeño draining yellow urine  Lines:                 Midline+  Flexiseal+     ID Attending:     Agree with assessment and plan as discussed with PAKEITH and documented in PAAlejandroC note.  Fever yesterday but per family via forehead thermometer. Lower from axillary. Resolved w/o tylenol. Extubated today. Fatigued. Check BCx.      PULM:    Subjective:  On vent 40%/PEEP 5  Off pressors  Fever last night  More alert and following commands      From the initial consultation  Pt is unable to provide info  HPI: 45 yo male with hx of gout, GERD, admitted with acute ETOH hepatitis, ETOH use, emesis, USMAN. Seen by GI.  On CIWA protocol, thiamine, folic acid, MVI, BZDs, lactulose  Has been in the ICU for 2 days  ICU consulted this am for worsening hypoxemia  CXR this am with b/l infiltrates concerning for PNA      Scheduled Medications    furosemide  20 mg Intravenous BID (Diuretic)    meropenem  1,000 mg Intravenous Q12H    senna  10 mL Per NG Tube BID    docusate  100 mg Per NG Tube BID    chlorhexidine gluconate  15 mL Mouth/Throat BID@0800,2000    mineral oil-white petrolatum  1 Application Both Eyes Nightly    vancomycin  125 mg Per NG Tube Daily    multivitamin  1 tablet Oral Daily    folic acid  1 mg Oral Daily    baclofen  10 mg Oral TID    pantoprazole  40 mg Intravenous Q12H         Continuous Infusing Medication:  Medication Infusions    propofol Stopped (03/17/25 0830)    norepinephrine Stopped (03/16/25 1025)    dextrose 10% Stopped (03/13/25 1645)         PHYSICAL EXAM:  /66 (BP Location: Right arm)   Pulse 103   Temp 98.4 °F (36.9 °C) (Temporal)   Resp (!) 27   Ht 5' 8\" (1.727 m)   Wt 195 lb 1.7 oz (88.5 kg)   SpO2 96%   BMI 29.67 kg/m²   Vent Mode: CPAP;PS  FiO2 (%):  [40 %-50 %] 40 %  S RR:  [12] 12  PEEP/CPAP (cm H2O):  [5 cm H20] 5 cm H20  MAP (cm H2O):  [10-14] 13  CONSTITUTIONAL: intubated sedated but following commands  HEENT: dobhoff  MOUTH: ETT  NECK/THROAT: no JVD. Trachea midline. No obvious thyromegaly  LUNG: clear b/l no wheezing, + b/l crackles. Chest symmetric with respiratory motion  HEART: regular rate and rhythm, no obvious murmers or gallops noted  ABD: soft non tender. + bowel sounds. No organomegaly noted  EXT: no clubbing, cyanosis, or edema noted      CXR 3/17/25  Findings and impression:   ETT 6  cm above the em   Enteric tube beneath the diaphragm   Stable heart   Persistent low lung volumes and bilateral diffuse pulmonary opacities.   No pneumothorax      Lab 03/15/25  0439 03/16/25  0513 03/16/25  1048 03/17/25  0500   RBC 2.73* 2.79*  --  3.07*   HGB 7.3* 6.9* 8.3* 8.1*   HCT 20.9* 20.8*  --  24.0*   MCV 76.6* 74.6*  --  78.2*   MCH 26.7 24.7*  --  26.4   MCHC 34.9 33.2  --  33.8   RDW 29.2* 30.4*  --  29.2*   NEPRELIM 15.24* 13.26*  --  10.30*   WBC 17.8* 16.4*  --  13.4*   .0* 172.0  --  194.0      Lab 03/15/25  0439 03/15/25  0959 03/16/25  0513 03/16/25  1048 03/17/25  0500   *  --  117*  --  118*   BUN 36*  --  51*  --  61*   CREATSERUM 1.94*  --  2.13*  --  1.93*   EGFRCR 42*  --  38*  --  43*   CA 7.8*  --  8.0*  --  8.3*   ALB 3.2 3.7 3.3  3.3  --  3.3  3.3     --  141  --  138   K 3.1*  --  3.2*  3.2* 3.6 3.3*     --  109  --  107   CO2 22.0  --  21.0  --  22.0   ALKPHO  --  140* 141*  --  168*  168*   AST  --  165* 160*  --  184*  184*   ALT  --  77* 73*  --  80*  80*   BILT  --  11.7* 11.0*  --  12.4*  12.4*   TP  --  6.1 5.6*  --  5.9  5.9       ASSESSMENT/PLAN:  Acute hypoxemic resp failure  -secondary to multifocal infiltrates. Given hx of emesis, concern for aspiration  -checked non contrast chest CT showed b/l infiltrates  -initially on zosyn, doxy. Checked urine leg, strep pneumo, mrsa nares, blood cx neg thus far  -initially weaned from airvo to 6 LNC but then declined and was intubated on 3/12/25. On full MV support   -passed SBT on 3/17/25 and extubated to NC  -ID consulted and stopped doxy and zosyn. Switched to meropenem  -repeat CT with b/l infiltrates L>R  -underwent bronchoscopy with left BAL on 3/13/25. Cultures NGTD  -aspiration precautions     Acute ETOH hepatitis and now pancreatitis  -GI following. Imaging as above-now with pancreatitis  -PPI  -NGT     -hypotension-multifactorial from sedation, sepsis, anemia  -likely secondary to  sedation and sepsis  -Pt didn't receive sepsis bolus b/c deemed fluid overloaded d/t possible cirrhosis. Continue low dose levophed for MAP > 65  -possibly d/t acute anemia. No obvious source seen. S/p transfused 1 unit pRBC on 3/16/25  -off pressors now     ETOH withdrawal  -CIWA protocol  -psych following   -BZD/haldol PRN  -on propofol for sedation  -CT head neg for acute changes     USMAN and hypernatremia  -s/p bicarb infusion  -s/p D5 0.9 NS  -renal following. Na improving      Proph  -DVT: elevated INR low PLT, anemia. On SCDS  -nutrition: TF     Dispo  -Full code  -sign other and brother (from out of town) are at bedside    GI:      Assessment and Plan:   Sami Grijalav  is a 44 year old male w/ PMHx of longstanding alcohol use, cannabis use, GERD, who presents with generalized abdominal pain, hiccups and vomiting. GI consulted for acute alcohol hepatitis.  ICU stay complicated by multifocal pneumonia and ARDS now status post extubation this morning     #Acute alcohol hepatitis with probable underlying cirrhosis  -CT a/p in ER severe fatty liver with subtle undulating contour  -US liver with hepatic steatosis, GB sludge and cholelithiasis without cholecystitis or biliary ductal dilation, hepatic and portal vein patent  -last EGD 11/2023 without varices   -high maddrey; holding off on steroids because of pneumonia  -on CIWA  -nutrition, etoh cessation  -LFTs remain elevated likely from acute alcohol hepatitis in setting of alcohol cirrhosis and overlap cholestasis of sepsis  -Once he passes swallow evaluation, can remove Dobbhoff tube and start oral feeds     #Likely early cirrhosis, ETOH   MELD 3.0 on admission: 44  -PSE: Continue lactulose and Xifaxan  -Ascites: none on exam or US  -EV: None on last EGD 11/2023, tubular structure around GE junction suggestive of EV.  Will need repeat EGD electively  -HCC: No lesions on US or CT, AFP 2.7     #USMAN  -nephrology following, improving     # Mild interstitial  pancreatitis  - likely etoh induced   - continue supportive care  MEDICATIONS ADMINISTERED IN LAST 1 DAY:  baclofen (Lioresal) tab 10 mg       Date Action Dose Route User    3/17/2025 1752 Given 10 mg Oral Leidy Villaseñor RN          folic acid (Folvite) tab 1 mg       Date Action Dose Route User    3/18/2025 0835 Given 1 mg Oral Dave Carvajal RN          furosemide (Lasix) 10 mg/mL injection 20 mg       Date Action Dose Route User    3/18/2025 0835 Given 20 mg Intravenous Dave Carvajal RN    3/17/2025 1753 Given 20 mg Intravenous Leidy Villaseñor RN          meropenem (Merrem) 1,000 mg in sodium chloride 0.9% 100 mL IVPB-MBP       Date Action Dose Route User    3/18/2025 1212 New Bag 1,000 mg Intravenous Dave Carvajal RN    3/18/2025 0013 New Bag 1,000 mg Intravenous Vanita Prieto RN          pantoprazole (Protonix) 40 mg in sodium chloride 0.9% PF 10 mL IV push       Date Action Dose Route User    3/18/2025 1212 Given 40 mg Intravenous Dave Carvajal RN    3/18/2025 0013 Given 40 mg Intravenous Vanita Prieto RN          potassium phosphate dibasic 15 mmol in sodium chloride 0.9% 250 mL IVPB       Date Action Dose Route User    3/18/2025 1051 New Bag 15 mmol Intravenous Dave Carvajal RN          multivitamin (Tab-A-Yosef/Beta Carotene) tab 1 tablet       Date Action Dose Route User    3/18/2025 0835 Given 1 tablet Oral Dave Carvajal RN          vancomycin (Firvanq) 50 mg/mL oral solution 125 mg       Date Action Dose Route User    3/18/2025 0835 Given 125 mg Per NG Tube Dave Carvajal RN            Vitals (last day)       Date/Time Temp Pulse Resp BP SpO2 Weight O2 Device O2 Flow Rate (L/min) [facility]    03/18/25 1200 97.5 °F (36.4 °C) 103 25 124/75 95 % -- High flow nasal cannula --     03/18/25 1100 -- 103 25 126/65 94 % -- -- -- EM    03/18/25 1000 -- 103 29 131/65 93 % -- -- -- EM    03/18/25 0900 -- 103 25 131/67 95 % -- -- -- EM    03/18/25 0800 98.7 °F (37.1 °C) 102 25 119/70 94 % -- High flow nasal cannula  -- EM    03/18/25 0700 -- 92 25 125/83 -- -- -- -- EM    03/18/25 0600 -- 103 24 119/70 95 % 186 lb 11.7 oz (84.7 kg) -- --     03/18/25 0500 -- 101 26 119/70 96 % -- -- --     03/18/25 0400 98.6 °F (37 °C) 103 24 122/61 95 % -- High flow nasal cannula 6 L/min     03/18/25 0300 -- 100 24 118/66 96 % -- High flow nasal cannula 6 L/min     03/18/25 0200 -- 97 26 114/59 97 % -- High flow nasal cannula 6 L/min     03/18/25 0100 -- 100 26 123/61 96 % -- High flow nasal cannula 6 L/min     03/18/25 0000 98.2 °F (36.8 °C) 100 28 124/60 96 % -- High flow nasal cannula 6 L/min     03/17/25 2300 -- 97 24 120/57 95 % -- High flow nasal cannula 6 L/min     03/17/25 2200 -- 102 27 122/61 96 % -- High flow nasal cannula 6 L/min     03/17/25 2100 -- 101 27 121/63 97 % -- High flow nasal cannula 6 L/min     03/17/25 2000 97.2 °F (36.2 °C) 103 20 129/62 94 % -- High flow nasal cannula 6 L/min     03/17/25 1900 -- 103 23 130/64 94 % -- High flow nasal cannula 6 L/min     03/17/25 1800 -- 101 26 125/62 93 % -- High flow nasal cannula 6 L/min     03/17/25 1700 -- 103 21 116/70 94 % -- High flow nasal cannula 6 L/min     03/17/25 1600 98 °F (36.7 °C) 108 24 125/87 97 % -- High flow nasal cannula 6 L/min     03/17/25 1500 -- 123 28 126/70 93 % -- High flow nasal cannula 6 L/min     03/17/25 1400 -- 106 28 121/67 92 % -- High flow nasal cannula 6 L/min LC    03/17/25 1300 -- 108 26 120/69 93 % -- High flow nasal cannula 6 L/min     03/17/25 1200 98.4 °F (36.9 °C) 104 24 120/66 95 % -- High flow nasal cannula 6 L/min     03/17/25 1100 -- 106 29 125/66 94 % -- High flow nasal cannula 6 L/min     03/17/25 1000 -- 107 25 129/67 92 % -- High flow nasal cannula 6 L/min     03/17/25 0952 -- -- -- -- 92 % -- High flow nasal cannula 6 L/min     03/17/25 0943 -- -- -- -- 89 % -- High flow nasal cannula 4 L/min     03/17/25 0900 -- 103 27 122/66 96 % -- Ventilator --     03/17/25 0800 98.4 °F (36.9 °C)  98 22 111/60 97 % -- Ventilator -- LC    03/17/25 0700 -- 86 18 103/54 100 % -- Ventilator -- MJ    03/17/25 0600 -- 86 17 101/54 98 % 195 lb 1.7 oz (88.5 kg) Ventilator -- MJ    03/17/25 0500 -- 96 25 116/65 97 % -- Ventilator -- MJ    03/17/25 0400 98.2 °F (36.8 °C) 88 18 101/56 97 % -- Ventilator -- MJ    03/17/25 0300 -- 94 20 111/61 96 % -- Ventilator -- MJ    03/17/25 0200 -- 91 19 110/62 98 % -- Ventilator -- MJ    03/17/25 0100 -- 87 18 101/53 99 % -- Ventilator -- MJ    03/17/25 0000 98.2 °F (36.8 °C) 94 19 106/58 99 % -- Ventilator -- MJ          CIWA Scores (since admission)       Date/Time CIWA-Ar Total Who    03/18/25 0800 2 EM    03/17/25 1600 2 LC    03/17/25 1200 5 LC    03/17/25 0700 2 LC

## 2025-03-19 LAB
ALBUMIN SERPL-MCNC: 2.9 G/DL (ref 3.2–4.8)
ALBUMIN SERPL-MCNC: 2.9 G/DL (ref 3.2–4.8)
ALBUMIN/GLOB SERPL: 1.1 {RATIO} (ref 1–2)
ALP LIVER SERPL-CCNC: 182 U/L
ALP LIVER SERPL-CCNC: 182 U/L
ALT SERPL-CCNC: 108 U/L
ALT SERPL-CCNC: 108 U/L
ANION GAP SERPL CALC-SCNC: 7 MMOL/L (ref 0–18)
AST SERPL-CCNC: 251 U/L (ref ?–34)
AST SERPL-CCNC: 251 U/L (ref ?–34)
BASOPHILS # BLD AUTO: 0.04 X10(3) UL (ref 0–0.2)
BASOPHILS NFR BLD AUTO: 0.4 %
BILIRUB DIRECT SERPL-MCNC: 11.3 MG/DL (ref ?–0.3)
BILIRUB SERPL-MCNC: 14.1 MG/DL (ref 0.3–1.2)
BILIRUB SERPL-MCNC: 14.1 MG/DL (ref 0.3–1.2)
BUN BLD-MCNC: 49 MG/DL (ref 9–23)
BUN/CREAT SERPL: 51.6 (ref 10–20)
CALCIUM BLD-MCNC: 8 MG/DL (ref 8.7–10.4)
CHLORIDE SERPL-SCNC: 106 MMOL/L (ref 98–112)
CO2 SERPL-SCNC: 23 MMOL/L (ref 21–32)
CREAT BLD-MCNC: 0.95 MG/DL
DEPRECATED RDW RBC AUTO: 83.4 FL (ref 35.1–46.3)
EGFRCR SERPLBLD CKD-EPI 2021: 100 ML/MIN/1.73M2 (ref 60–?)
EOSINOPHIL # BLD AUTO: 0.24 X10(3) UL (ref 0–0.7)
EOSINOPHIL NFR BLD AUTO: 2.2 %
ERYTHROCYTE [DISTWIDTH] IN BLOOD BY AUTOMATED COUNT: 31.3 % (ref 11–15)
GLOBULIN PLAS-MCNC: 2.6 G/DL (ref 2–3.5)
GLUCOSE BLD-MCNC: 107 MG/DL (ref 70–99)
HCT VFR BLD AUTO: 24 %
HGB BLD-MCNC: 8.1 G/DL
IMM GRANULOCYTES # BLD AUTO: 0.24 X10(3) UL (ref 0–1)
IMM GRANULOCYTES NFR BLD: 2.2 %
LYMPHOCYTES # BLD AUTO: 1.46 X10(3) UL (ref 1–4)
LYMPHOCYTES NFR BLD AUTO: 13.3 %
MCH RBC QN AUTO: 25.7 PG (ref 26–34)
MCHC RBC AUTO-ENTMCNC: 33.8 G/DL (ref 31–37)
MCV RBC AUTO: 76.2 FL
MONOCYTES # BLD AUTO: 1.05 X10(3) UL (ref 0.1–1)
MONOCYTES NFR BLD AUTO: 9.6 %
NEUTROPHILS # BLD AUTO: 7.94 X10 (3) UL (ref 1.5–7.7)
NEUTROPHILS # BLD AUTO: 7.94 X10(3) UL (ref 1.5–7.7)
NEUTROPHILS NFR BLD AUTO: 72.3 %
OSMOLALITY SERPL CALC.SUM OF ELEC: 295 MOSM/KG (ref 275–295)
PHOSPHATE SERPL-MCNC: 2.9 MG/DL (ref 2.4–5.1)
PLATELET # BLD AUTO: 270 10(3)UL (ref 150–450)
PLATELET MORPHOLOGY: NORMAL
PLATELETS.RETICULATED NFR BLD AUTO: 8.4 % (ref 0–7)
POTASSIUM SERPL-SCNC: 3.6 MMOL/L (ref 3.5–5.1)
PROT SERPL-MCNC: 5.5 G/DL (ref 5.7–8.2)
PROT SERPL-MCNC: 5.5 G/DL (ref 5.7–8.2)
RBC # BLD AUTO: 3.15 X10(6)UL
SODIUM SERPL-SCNC: 136 MMOL/L (ref 136–145)
WBC # BLD AUTO: 11 X10(3) UL (ref 4–11)

## 2025-03-19 PROCEDURE — 99233 SBSQ HOSP IP/OBS HIGH 50: CPT | Performed by: INTERNAL MEDICINE

## 2025-03-19 PROCEDURE — 99233 SBSQ HOSP IP/OBS HIGH 50: CPT | Performed by: HOSPITALIST

## 2025-03-19 PROCEDURE — 99233 SBSQ HOSP IP/OBS HIGH 50: CPT | Performed by: OTHER

## 2025-03-19 RX ORDER — BACLOFEN 10 MG/1
10 TABLET ORAL 3 TIMES DAILY PRN
Status: DISCONTINUED | OUTPATIENT
Start: 2025-03-19 | End: 2025-03-26

## 2025-03-19 RX ORDER — LORAZEPAM 2 MG/ML
0.5 INJECTION INTRAMUSCULAR ONCE
Status: COMPLETED | OUTPATIENT
Start: 2025-03-19 | End: 2025-03-19

## 2025-03-19 RX ORDER — ACETAMINOPHEN 500 MG
1000 TABLET ORAL EVERY 6 HOURS PRN
Status: DISCONTINUED | OUTPATIENT
Start: 2025-03-19 | End: 2025-04-02

## 2025-03-19 RX ORDER — PREDNISOLONE 5 MG/1
40 TABLET ORAL DAILY
Status: DISCONTINUED | OUTPATIENT
Start: 2025-03-19 | End: 2025-03-19

## 2025-03-19 NOTE — PROGRESS NOTES
Pulmonary/ICU/Critical Care Progress Note        Reason for Consultation: resp failure  Referring Physician: Dr. Diaz      Subjective:  On 4 LNC  Feels better      From the initial consultation  Pt is unable to provide info  HPI: 45 yo male with hx of gout, GERD, admitted with acute ETOH hepatitis, ETOH use, emesis, USMAN. Seen by GI.  On CIWA protocol, thiamine, folic acid, MVI, BZDs, lactulose  Has been in the ICU for 2 days  ICU consulted this am for worsening hypoxemia  CXR this am with b/l infiltrates concerning for PNA      REVIEW OF SYSTEMS:  Positives and negatives as noted in HPI. All other review of systems otherwise are either limited (due to pt/family inability to provide) or negative.      PAST MEDICAL HISTORY:  Past Medical History:   Diagnosis Date    Esophageal reflux     Gout     Hernia          PAST SURGICAL HISTORY:  Past Surgical History:   Procedure Laterality Date    Colonoscopy N/A 11/1/2023    Procedure: COLONOSCOPY;  Surgeon: Vandana Austin MD;  Location: [facility] ENDOSCOPY    Egd  02/15/2016    Eh pr repair complex scalp arms legs 1.1 to 2.5 cm  2014    Elbow fracture surgery Right 1991    Hernia surgery      Inguinal hernia repair Left 01/17/2023         PAST SOCIAL HISTORY:  Social History     Socioeconomic History    Marital status: Single    Number of children: 0   Occupational History    Occupation: Supervisor, car [employer]   Tobacco Use    Smoking status: Former     Types: Cigarettes    Smokeless tobacco: Never   Vaping Use    Vaping status: Some Days   Substance and Sexual Activity    Alcohol use: Yes     Comment: per pt, DAILY HARD LEMONADE- [employer] hard lemonade 24oz cans, 6 cans daily    Drug use: Yes     Types: Cannabis     Comment: last use, couple months ago per pt report         PAST FAMILY HISTORY:  Family History   Problem Relation Age of Onset    Diabetes Father     Hypertension Father     Cancer Mother         liver       ALLERGIES:  Allergies[1]      MEDS:  Home  Medications:  Medications Taking[2]    Scheduled Medication:   epoetin sheela  10,000 Units Subcutaneous Once per day on Tuesday Thursday Saturday    furosemide  20 mg Intravenous BID (Diuretic)    meropenem  1,000 mg Intravenous Q12H    vancomycin  125 mg Per NG Tube Daily    multivitamin  1 tablet Oral Daily    folic acid  1 mg Oral Daily    baclofen  10 mg Oral TID    pantoprazole  40 mg Intravenous Q12H     Continuous Infusing Medication:   dextrose 10% Stopped (03/13/25 1645)     PRN Medications:    haloperidol lactate    acetaminophen **OR** acetaminophen **OR** acetaminophen    polyethylene glycol (PEG 3350)    bisacodyl    dextrose 10%    sodium bicarbonate **AND** lipase-protease-amylase (Lip-Prot-Amyl)    acetaminophen    prochlorperazine       PHYSICAL EXAM:  /66 (BP Location: Right arm)   Pulse 102   Temp 97.8 °F (36.6 °C) (Oral)   Resp 20   Ht 5' 8\" (1.727 m)   Wt 186 lb 11.7 oz (84.7 kg)   SpO2 95%   BMI 28.39 kg/m²      CONSTITUTIONAL: NAD + jaundice  HEENT: AT/NC  MOUTH: MMM  NECK/THROAT: no JVD. Trachea midline. No obvious thyromegaly  LUNG: clear b/l no wheezing, + b/l crackles. Chest symmetric with respiratory motion  HEART: regular rate and rhythm, no obvious murmers or gallops noted  ABD: soft non tender. + bowel sounds. No organomegaly noted  EXT: no clubbing, cyanosis, or edema noted      IMAGES:   CXR 3/17/25  Findings and impression:   ETT 6 cm above the em   Enteric tube beneath the diaphragm   Stable heart   Persistent low lung volumes and bilateral diffuse pulmonary opacities.   No pneumothorax     CT C/A/P 3/12/25  CONCLUSION:   1. Diffuse pancreatic enlargement and mild peripancreatic inflammatory stranding are new since abdominal CT from 8 days prior and concerning for acute interstitial edematous pancreatitis.  No gross pancreatic ductal dilation or peripancreatic collection.     Request correlation with serum lipase levels.   2. Left greater than right lower lobe  airspace disease with intrinsic air bronchograms.  Findings are similar on the left and slightly improved on the right since chest CT from 5 days prior; multifocal pneumonia/aspiration is suspected.  Also identified   are new and/or worsening multifocal upper lobe predominant ground-glass density opacities throughout both lungs; these ground-glass density opacities could be infectious in nature or relate to acute respiratory distress syndrome.   3. Endotracheal tube with tip approximately 1 cm above the level of the em.  Enteric tube tip in the gastric fundus.  Rectal tube and Cedeño catheter also noted.   4. Fatty liver and cirrhosis.   5. Stable splenomegaly.   6. Small to moderate volume intra-abdominal ascites is new since prior abdominal CT.  There is also new mild anasarca.   7. Liquid contents in the colon could relate to a malabsorption state or low-grade infectious/inflammatory versus antibiotic associated colitis.  Mild scattered colonic diverticulosis, but with no CT findings of acute diverticulitis.   8. Low-density appearance of the intracardiac blood pool raises the possibility of underlying anemia. Correlate with hematologic parameters.    9. Lesser incidental findings as above.       CXR 3/12/25 with multifocal infiltrates    CXR 3/11/25  FINDINGS/IMPRESSION:   1. There is redemonstration of patchy alveolar opacities throughout both lungs.  The findings could represent alveolar edema, a diffuse multifocal infectious process, or a combination.  The findings have not significantly changed since previous exam.   2. The heart mediastinal structures remain enlarged.  The there are trace bilateral pleural effusions.   3. The thoracic component of an enteric feeding tube is identified traversing the thoracic esophagus.  The distal tip is not visualized but lies well below the GE junction.     CT head 3/7/25  CONCLUSION:   Motion limits evaluation.  No evidence of acute intracranial abnormality.     CT  chest 3/7/25  CONCLUSION:   1. Extensive bilateral airspace disease, concerning for potential multifocal pneumonia. Continued surveillance is advised.   2. Dense bilateral lower airspace consolidation is evident; aspiration pneumonitis is a differential diagnostic possibility. Follow-up is recommended to document resolution.    3. Dilatation of the main pulmonary artery trunk may relate to underlying pulmonary hypertension.    4. Small hiatal hernia with imaging manifestations that may be indicative underlying esophagitis.   5. Marked hepatic steatosis.   6. Lesser incidental findings as above.     CXR 3/7/25 with multifocal infiltrates, possible effusion on the left  CONCLUSION:   1. Cardiomegaly.  Tortuous aorta.   2. Extensive multifocal airspace opacification in the bilateral perihilar and basilar regions with left-sided effusion.  Differential includes pulmonary edema congestive failure versus multifocal pneumonitis.      CT abd/pelvis 3/4/25  CONCLUSION:   Severely fatty liver with subtle undulating contour.  Stable splenomegaly.  Tubular structures around the GE junction are suggestive of lower esophageal varices.  No ascites       LABS:  Recent Labs   Lab 03/17/25  0500 03/18/25  0500 03/19/25  0523   RBC 3.07* 3.11* 3.15*   HGB 8.1* 8.3* 8.1*   HCT 24.0* 23.9* 24.0*   MCV 78.2* 76.8* 76.2*   MCH 26.4 26.7 25.7*   MCHC 33.8 34.7 33.8   RDW 29.2* 29.9* 31.3*   NEPRELIM 10.30* 7.73* 7.94*   WBC 13.4* 10.6 11.0   .0 235.0 270.0       Recent Labs   Lab 03/17/25  0500 03/18/25  0500 03/18/25  1827 03/18/25 2025 03/19/25  0523   * 113*  --   --  107*   BUN 61* 59*  --   --  49*   CREATSERUM 1.93* 1.28  --   --  0.95   EGFRCR 43* 70  --   --  100   CA 8.3* 8.0*  --   --  8.0*   ALB 3.3  3.3 3.1*  --   --  2.9*  2.9*    140  --   --  136   K 3.3* 3.3*  3.3* 6.2* 3.6 3.6    109  --   --  106   CO2 22.0 23.0  --   --  23.0   ALKPHO 168*  168* 189*  --   --  182*  182*   *  184*  214*  --   --  251*  251*   ALT 80*  80* 89*  --   --  108*  108*   BILT 12.4*  12.4* 12.6*  --   --  14.1*  14.1*   TP 5.9  5.9 5.7  --   --  5.5*  5.5*         ASSESSMENT/PLAN:  Acute hypoxemic resp failure  -secondary to multifocal infiltrates. Given hx of emesis, concern for aspiration  -checked non contrast chest CT showed b/l infiltrates  -initially on zosyn, doxy. Checked urine leg, strep pneumo, mrsa nares, blood cx neg thus far  -initially weaned from airvo to 6 LNC but then declined and was intubated on 3/12/25. Was on full MV support   -passed SBT on 3/17/25 and extubated to NC. Now on 4LNC  -ID consulted and stopped doxy and zosyn. Switched to meropenem  -repeat CT with b/l infiltrates L>R  -underwent bronchoscopy with left BAL on 3/13/25. Cultures NGTD  -aspiration precautions    Acute ETOH hepatitis and now pancreatitis  -GI following. Imaging as above-now with pancreatitis  -PPI  -passed swallow     -hypotension-multifactorial from sedation, sepsis, anemia  -likely secondary to sedation and sepsis  -Pt didn't receive sepsis bolus b/c deemed fluid overloaded d/t possible cirrhosis. Continue low dose levophed for MAP > 65  -possibly d/t acute anemia. No obvious source seen. S/p transfused 1 unit pRBC on 3/16/25  -off pressors now    ETOH withdrawal  -s/p CIWA protocol  -psych following   -CT head neg for acute changes    USMAN and hypernatremia  -s/p bicarb infusion  -s/p D5 0.9 NS  -renal following. Na improving     Proph  -DVT: elevated INR low PLT, anemia. On SCDS  -nutrition: passed swallow     Dispo  -Full code  -sign other at bedside      Thank you for the opportunity to care for Sami Escobedo DO, MPH  Pulmonary Critical Care Medicine  [facility]                         [1]   Allergies  Allergen Reactions    Morphine SWELLING     SHORTNESS OF BREATH   [2]   No outpatient medications have been marked as taking for the 3/4/25  encounter (Hospital Encounter).

## 2025-03-19 NOTE — PROGRESS NOTES
[facility]     Gastroenterology Progress Note    Sami Grijalva Jr. Patient Status:  Inpatient    10/11/1980 MRN [id number]   Location [facility] 2W/SW Attending Natasha Araujo MD   Hosp Day # 15 PCP Kerri Medina MD       Subjective:   He is awake and alert, family at bedside. Appetite improving. No abdominal pain, n/v. No fever. DHT removed.     Objective:   Blood pressure 123/66, pulse 110, temperature 97.8 °F (36.6 °C), temperature source Oral, resp. rate 20, height 5' 8\" (1.727 m), weight 186 lb 11.7 oz (84.7 kg), SpO2 95%. Body mass index is 28.39 kg/m².    Gen: awake and alert and oriented  HEENT: MMM  CV: RRR  Lung: Clear bilaterally  Abdomen: soft NTND abdomen with NABS appreciated   Skin: dry, warm,   Ext:  + edema  Neuro: Appropriate and conversant    Assessment and Plan:   Sami Grijalva Jr. is a 44 year old male w/ PMHx of longstanding alcohol use, cannabis use, GERD, who presents with generalized abdominal pain, hiccups and vomiting. GI consulted for acute alcohol hepatitis.  ICU stay complicated by multifocal pneumonia and ARDS now status post extubation this morning     #Acute alcohol hepatitis with probable underlying cirrhosis  -CT a/p in ER severe fatty liver with subtle undulating contour  -US liver with hepatic steatosis, GB sludge and cholelithiasis without cholecystitis or biliary ductal dilation, hepatic and portal vein patent  -last EGD 2023 without varices   -high maddrey; holding off on steroids because of pneumonia  -LFTs remain elevated likely from acute alcohol hepatitis in setting of alcohol cirrhosis and overlap cholestasis of sepsis  -nutrition     #Likely early cirrhosis, ETOH   MELD 3.0 on admission: 44  -PSE: Continue lactulose and Xifaxan  -Ascites: none on exam or US  -EV: None on last EGD 2023, tubular structure around GE junction suggestive of EV.  Will need repeat EGD electively  -HCC: No lesions on US or CT, AFP 2.7     # Mild  interstitial pancreatitis  - likely etoh induced   - continue supportive care    Vandana Austin MD      Results:     Lab Results   Component Value Date    WBC 11.0 03/19/2025    HGB 8.1 (L) 03/19/2025    HCT 24.0 (L) 03/19/2025    .0 03/19/2025    CREATSERUM 0.95 03/19/2025    BUN 49 (H) 03/19/2025     03/19/2025    K 3.6 03/19/2025     03/19/2025    CO2 23.0 03/19/2025     (H) 03/19/2025    CA 8.0 (L) 03/19/2025    ALB 2.9 (L) 03/19/2025    ALB 2.9 (L) 03/19/2025    ALKPHO 182 (H) 03/19/2025    ALKPHO 182 (H) 03/19/2025    BILT 14.1 (H) 03/19/2025    BILT 14.1 (H) 03/19/2025    TP 5.5 (L) 03/19/2025    TP 5.5 (L) 03/19/2025     (H) 03/19/2025     (H) 03/19/2025     (H) 03/19/2025     (H) 03/19/2025    INR 1.74 (H) 03/15/2025     (HH) 03/13/2025    DDIMER 1.13 (H) 08/25/2023    ESRML 27 (H) 10/01/2021    MG 2.2 03/18/2025    PHOS 2.9 03/19/2025    TROP 0.00 01/31/2017     (H) 03/06/2025    ETOH <3 03/04/2025       No results found.

## 2025-03-19 NOTE — PLAN OF CARE
Patient continues to improve. Diet upgraded to small bites with thin liquids. Rectal tube removed per orders. K+ lab results 6.2 @ 1827. Potassium phosphate stopped, Attending and Nephrology notified. Orders for patient to tranfer to 5th floor. Report called in to NICOLASA Marlow.    Problem: Patient Centered Care  Goal: Patient preferences are identified and integrated in the patient's plan of care  Description: Interventions:  - What would you like us to know as we care for you? I live at home with my partner.  - Provide timely, complete, and accurate information to patient/family  - Incorporate patient and family knowledge, values, beliefs, and cultural backgrounds into the planning and delivery of care  - Encourage patient/family to participate in care and decision-making at the level they choose  - Honor patient and family perspectives and choices  Outcome: Progressing     Problem: RISK FOR INFECTION - ADULT  Goal: Absence of fever/infection during anticipated neutropenic period  Description: INTERVENTIONS  - Monitor WBC  - Administer growth factors as ordered  - Implement neutropenic guidelines  Outcome: Progressing     Problem: SAFETY ADULT - FALL  Goal: Free from fall injury  Description: INTERVENTIONS:  - Assess pt frequently for physical needs  - Identify cognitive and physical deficits and behaviors that affect risk of falls.  - [place] fall precautions as indicated by assessment.  - Educate pt/family on patient safety including physical limitations  - Instruct pt to call for assistance with activity based on assessment  - Modify environment to reduce risk of injury  - Provide assistive devices as appropriate  - Consider OT/PT consult to assist with strengthening/mobility  - Encourage toileting schedule  Outcome: Progressing     Problem: DISCHARGE PLANNING  Goal: Discharge to home or other facility with appropriate resources  Description: INTERVENTIONS:  - Identify barriers to discharge w/pt and  caregiver  - Include patient/family/discharge partner in discharge planning  - Arrange for needed discharge resources and transportation as appropriate  - Identify discharge learning needs (meds, wound care, etc)  - Arrange for interpreters to assist at discharge as needed  - Consider post-discharge preferences of patient/family/discharge partner  - Complete POLST form as appropriate  - Assess patient's ability to be responsible for managing their own health  - Refer to Case Management Department for coordinating discharge planning if the patient needs post-hospital services based on physician/LIP order or complex needs related to functional status, cognitive ability or social support system  Outcome: Progressing

## 2025-03-19 NOTE — PROGRESS NOTES
[facility]  part of [facility]  Hospitalist Progress Note     Sami Grijalva  Patient Status:  Inpatient    10/11/1980  44 year old CSN [id number]   Location 217/217-A Attending Yonny Bowers MD   Hosp Day # 15 PCP Kerri Medina MD     Assessment & Plan:   ----------------------------------  Respiratory failure, acute hypoxemic.  Due to pneumonia, ARDS.  Oxygen requirement is improving.  Extubated 3/17  -Supplemental oxygen as needed, titrate down  -Pulmonology consultation appreciated  -Treat contributing factors as described    Pneumonia. aspiration.  Clinically improving.  Extubated successfully  -IV antibiotics: Meropenem  -Blood cultures: NGTD  -Monitor temperature, blood pressure and fever curve  -Pulmonology consult appreciated  -ID consult appreciated  -Repeat chest imaging to be decided    Acute kidney injury.   Baseline creatinine is 1.5, highest creatinine during this admission > 1.5x baseline.  -Nephrology consult  -Monitor urine output  -Repeat chemistry in the morning  -No indication for dialysis  -Hold diuretics  -Hold ACEi/ARB  -Avoid nephrotoxins/IV contrast/hypotension  -Renal imaging: If fails to continue improving  -IVF: none  -DC Cedeño catheter 3/19    Diarrhea.  Some contribution from antibiotics, tube feeds, acute illness  -GI following  -ID following  -Defer further investigation to them    Acute alcoholic hepatitis.  Bilirubin rising  -GI following    Other problems  Pancreatitis  Alcohol abuse  Cirrhosis  Hepatic steatosis  Hyponatremia    Supplementary Documentation:   DVT Mechanical Prophylaxis:   SCDs,    DVT Pharmacologic Prophylaxis   Medication   None                Code Status: Full Code  Cedeño: External urinary catheter in place  Cedeño Duration (in days): 14  Central line:    ADOLFO: 3/21/2025        I personally reviewed the available laboratories, imaging including. I discussed/will discuss the case with consultants. I ordered laboratories and/or  radiographic studies. I adjusted medications as detailed above.  Medical decision making high, risk is high.    Subjective:   ----------------------------------  Excellently pulled out his feeding tube earlier today.  Doing fairly well with oral intake.  No chest pain shortness of breath breathing is okay.  Was able to take a few steps with physical therapy.      Objective:   Chief Complaint:   Chief Complaint   Patient presents with    Vomiting    Hiccups    Eval-D     ----------------------------------  Temp:  [97.8 °F (36.6 °C)-99.1 °F (37.3 °C)] 97.8 °F (36.6 °C)  Pulse:  [] 110  Resp:  [20-23] 20  BP: (122-127)/(65-74) 123/66  SpO2:  [95 %-99 %] 95 %  Gen: A+Ox3.  No distress.   HEENT: NCAT, neck supple, no carotid bruit.  Keofeed tube in place  CV: RRR, S1S2, and intact distal pulses. No gallop, rub, murmur.  Pulm: Poor effort, no wheezing or rales  Abd: Soft, NTND, BS normal, no mass, no HSM, no rebound/guarding.  Rectal tube in place, Cedeño catheter in place  Neuro: Normal reflexes, CN. Sensory/motor exams grossly normal deficit.  Generally weak  MS: No joint effusions.  No peripheral edema.  Skin: Skin is warm and dry. No rashes, erythema, diaphoresis.   Psych: Normal mood and affect. Calm, cooperative    Labs:  Lab Results   Component Value Date    HGB 8.1 (L) 03/19/2025    WBC 11.0 03/19/2025    .0 03/19/2025     03/19/2025    K 3.6 03/19/2025    CREATSERUM 0.95 03/19/2025    INR 1.74 (H) 03/15/2025     (H) 03/19/2025     (H) 03/19/2025     (H) 03/19/2025     (H) 03/19/2025    TROP 0.00 01/31/2017            epoetin sheela  10,000 Units Subcutaneous Once per day on Tuesday Thursday Saturday    furosemide  20 mg Intravenous BID (Diuretic)    meropenem  1,000 mg Intravenous Q12H    vancomycin  125 mg Per NG Tube Daily    multivitamin  1 tablet Oral Daily    folic acid  1 mg Oral Daily    baclofen  10 mg Oral TID    pantoprazole  40 mg Intravenous Q12H        haloperidol lactate    acetaminophen **OR** acetaminophen **OR** acetaminophen    polyethylene glycol (PEG 3350)    bisacodyl    dextrose 10%    sodium bicarbonate **AND** lipase-protease-amylase (Lip-Prot-Amyl)    acetaminophen    prochlorperazine

## 2025-03-19 NOTE — PROGRESS NOTES
Patient is a 44 year old , single, male with past medical history of GERD, alcohol use disroder, who was admitted to the hospital for USMAN (acute kidney injury): The patient has been demonstrating symptoms of withdrawal.   Patient indicated for psych consult for evaluation and advise.    Consult Duration     The patient seen for over 50-minute, follow-up evaluation, over 50% counseling and coordinating care addressing alcohol withdrawal..  Record reviewed, communication with attending, communication with RN and patient seen face to face evaluation.    History of Present Illness:     According to the team, patient transferred from CCU to floor. Patient now alert, able to talk, communicating with family and with nursing staff. Able to eat by mouth, NG tube is out.     The patient is seen today laying in his hospital bed. Patient is on nasal cannula. Patent oriented, reports doing well. He acknowledges that he had too much alcohol and that his body cannot tolerate any more alcohol. He states that his recovery starts with him and seemed hopeful for his rehabilitation. Was unable to sleep well last night, received medication to help him sleep.    Patient is well-known to the service with longstanding alcohol dependency and withdrawal syndrome.    Magnesium level is  2.2 today. Elevated liver enzymes.    Patient is sedated and restrained, intubated    He is not able to answer questions or engage in conversation due to sedation.     Past Psychiatric/Medication History:  1. Prior diagnoses: alcohol use disorder  2. Past psychiatric inpatient: Denies, the patients partner reports he has been to rehab 3 times.   3. Past outpatient history: Denies  4. Past suicide history: The patient reports history of attempt 5 years ago. He could not elaborate  5. Medication history: Denies     Social History:   The patient lives at home with his parents and his partner of 10 years. He works at a car wash.   He drinks alcohol daily.  Occasional cannabis use. No tobacco or illicit substance abuse.     Family History:  No family history reported      Medical History:   Past Medical History  Past Medical History:    Esophageal reflux    Gout    Hernia       Past Surgical History  Past Surgical History:   Procedure Laterality Date    Colonoscopy N/A 11/1/2023    Procedure: COLONOSCOPY;  Surgeon: Vandana Austin MD;  Location: [facility] ENDOSCOPY    Egd  02/15/2016    Eh pr repair complex scalp arms legs 1.1 to 2.5 cm  2014    Elbow fracture surgery Right 1991    Hernia surgery      Inguinal hernia repair Left 01/17/2023       Family History  Family History   Problem Relation Age of Onset    Diabetes Father     Hypertension Father     Cancer Mother         liver       Social History  Social History     Socioeconomic History    Marital status: Single    Number of children: 0   Occupational History    Occupation: Supervisor, car wash   Tobacco Use    Smoking status: Former     Types: Cigarettes    Smokeless tobacco: Never   Vaping Use    Vaping status: Some Days   Substance and Sexual Activity    Alcohol use: Yes     Comment: per pt, DAILY HARD LEMONADE- [employer] hard lemonade 24oz cans, 6 cans daily    Drug use: Yes     Types: Cannabis     Comment: last use, couple months ago per pt report     Social Drivers of Health     Food Insecurity: No Food Insecurity (3/4/2025)    NCSS - Food Insecurity     Worried About Running Out of Food in the Last Year: No     Ran Out of Food in the Last Year: No   Transportation Needs: No Transportation Needs (3/4/2025)    NCSS - Transportation     Lack of Transportation: No   Housing Stability: Not At Risk (3/4/2025)    NCSS - Housing/Utilities     Has Housing: Yes     Worried About Losing Housing: No     Unable to Get Utilities: No           Current Medications:  Current Facility-Administered Medications   Medication Dose Route Frequency    epoetin sheela (Epogen, Procrit) 37213 UNIT/ML injection 10,000 Units  10,000 Units  Subcutaneous Once per day on Tuesday Thursday Saturday    furosemide (Lasix) 10 mg/mL injection 20 mg  20 mg Intravenous BID (Diuretic)    meropenem (Merrem) 1,000 mg in sodium chloride 0.9% 100 mL IVPB-MBP  1,000 mg Intravenous Q12H    haloperidol lactate (Haldol) 5 MG/ML injection 1 mg  1 mg Intravenous Q6H PRN    acetaminophen (Tylenol) 160 MG/5ML oral liquid 650 mg  650 mg Per NG Tube Q4H PRN    Or    acetaminophen (Tylenol) rectal suppository 650 mg  650 mg Rectal Q4H PRN    Or    acetaminophen (Ofirmev) 10 mg/mL infusion premix 1,000 mg  1,000 mg Intravenous Q6H PRN    polyethylene glycol (PEG 3350) (Miralax) 17 g oral packet 17 g  17 g Per NG Tube Daily PRN    bisacodyl (Dulcolax) 10 MG rectal suppository 10 mg  10 mg Rectal Daily PRN    vancomycin (Firvanq) 50 mg/mL oral solution 125 mg  125 mg Per NG Tube Daily    dextrose 10% infusion (TPN no rate)   Intravenous Continuous PRN    sodium bicarbonate tab 650 mg  650 mg Per G Tube PRN    And    pancrelipase (Lip-Prot-Amyl) (Zenpep) DR particles cap 10,000 Units  10,000 Units Per G Tube PRN    acetaminophen (Tylenol Extra Strength) tab 500 mg  500 mg Oral Q4H PRN    prochlorperazine (Compazine) 10 MG/2ML injection 5 mg  5 mg Intravenous Q8H PRN    multivitamin (Tab-A-Yosef/Beta Carotene) tab 1 tablet  1 tablet Oral Daily    folic acid (Folvite) tab 1 mg  1 mg Oral Daily    baclofen (Lioresal) tab 10 mg  10 mg Oral TID    pantoprazole (Protonix) 40 mg in sodium chloride 0.9% PF 10 mL IV push  40 mg Intravenous Q12H     No medications prior to admission.       Allergies  Allergies[1]    Review of Systems:   As by Admitting/Attending    Results:   Laboratory Data:  Lab Results   Component Value Date    WBC 11.0 03/19/2025    HGB 8.1 (L) 03/19/2025    HCT 24.0 (L) 03/19/2025    .0 03/19/2025    CREATSERUM 0.95 03/19/2025    BUN 49 (H) 03/19/2025     03/19/2025    K 3.6 03/19/2025     03/19/2025    CO2 23.0 03/19/2025     (H) 03/19/2025     CA 8.0 (L) 03/19/2025    ALB 2.9 (L) 03/19/2025    ALB 2.9 (L) 03/19/2025    ALKPHO 182 (H) 03/19/2025    ALKPHO 182 (H) 03/19/2025    TP 5.5 (L) 03/19/2025    TP 5.5 (L) 03/19/2025     (H) 03/19/2025     (H) 03/19/2025     (H) 03/19/2025     (H) 03/19/2025    INR 1.74 (H) 03/15/2025    PTP 21.3 (H) 03/15/2025     (HH) 03/13/2025    DDIMER 1.13 (H) 08/25/2023    ESRML 27 (H) 10/01/2021    MG 2.2 03/18/2025    PHOS 2.9 03/19/2025    TROP 0.00 01/31/2017     (H) 03/06/2025    ETOH <3 03/04/2025         Imaging:  No results found.    Vital Signs:   Blood pressure 123/66, pulse 110, temperature 97.8 °F (36.6 °C), temperature source Oral, resp. rate 20, height 68\", weight 84.7 kg (186 lb 11.7 oz), SpO2 95%.    Mental Status Exam:   Appearance: Stated age male, in hospital gown, laying down in hospital bed. On nasal cannula  Psychomotor: Patient seen today no tremor or agitation   Orientation: Patient oriented to person, place, time  Gait: Not evaluated.  Attitude/Coorperation: cooperative  Behavior: no psychomotor agitation/depression  Speech: slightly slurred and slowed. Normal rhythm.   Mood: \"good\"  Affect: euthymic  Thought process: goal-directed, logical  Thought content: normal, hopeful for recovery  Perceptions:normal   Concentration: intact  Memory: intact  Intellect: Average.  Judgment and Insight: Impaired as an evidenced by recurrent drinking with deterioration in his physical function.    Impression:     Alcohol withdrawal Syndrome with delirium.  Episodic mood disorder.  Alcohol Dependence, continuous.  Rule out Wernicke's syndrome    USMAN (acute kidney injury)    Metabolic acidosis    Hyperkalemia    Leukocytosis    Thrombocytopenia    Coagulopathy (HCC)    Hyponatremia    Alcoholic (HCC)    Scleral icterus    Acute kidney failure    Patient is a 44 year old , single, male with past medical history of GERD, alcohol use disroder, who was admitted to the  hospital for USMAN (acute kidney injury): The patient has been demonstrating symptoms of withdrawal.     The patient has demonstrated severe withdrawals with confusion, restlessness, agitation and response to internal stimuli. His symptoms worsened over the past several days (respiratory rate was sustained mid 40's) and required intubation yesterday.    3/6/2025: patient was transferred to CCU due to increased CIWA scores. Patient lethargic this morning.     3/7/2025: Patient has been demonstrating increased confusion, lethargy with excessive sedation with episode of agitation.  No tremors has been observed.    3/8/2025: Patient has been demonstrating stupor and lethargy with excessive sedation. Could not interview patient directly today. No tremor were observed.    3/10/2025: Patient has been slightly more reactive today but still demonstrating stupor and lethargy with excessive sedation. Could not interview patient directly today. No tremors were observed.     3/13/2025:  Patient sedated and intubated. Intubated yesterday because failed BiPAP. Could not interview patient directly today. No tremor were observed. Will schedule magnesium infusions.    3/19/2025: Patient seen today sitting in hospital bed on floor; transferred from CCU. He is on nasal cannula. Patient able to speak, tolerate food by mouth, is oriented and speaking with family and staff. Patient acknowledged his alcohol abuse and that his body cannot tolerate any more alcohol. He appeared hopeful for recovery.  Focus today on motivation to maintain sobriety and education about the seriousness of his condition.    Discussed risk and benefit, acknowledging the current symptom and severity.  At this point, I would recommend the following approach:     Focus on safety  Focus on education and support.  Focus on insight orientation helping the patient understand diagnosis and treatment plan.  Limited Ativan available per CIWA for lower dosage.  Continue  haloperidol 1mg every 6 hrs PRN agitation   Communicated with team and agree with treatment.  Coordinate plan with team    Orders This Visit:  Orders Placed This Encounter   Procedures    CBC With Differential With Platelet    Comp Metabolic Panel (14)    Urinalysis with Culture Reflex    Lipase    RBC Morphology Scan    Prothrombin Time (PT)    Lactic Acid, Plasma    Comp Metabolic Panel (14)    CBC With Differential With Platelet    Magnesium    Sodium, Urine, Random    Creatinine, Urine, Random    Phosphorus    Basic Metabolic Panel (8)    Osmolality, Urine    Lactic Acid Reflex Post Positive    Lactic Acid, Plasma    Lactic Acid Post Positive    Lactic Acid, Plasma    Magnesium    Lactic Acid Reflex Post Positive    Lactic Acid Post Positive    Ethyl Alcohol    Ammonia, Plasma    CBC With Differential With Platelet    Basic Metabolic Panel (8)    Hepatic Function Panel (7)    Arterial blood gas    Prothrombin Time (PT)    AFP, Tumor Marker, Serum    Actin (Smooth Muscle) Antibody    Alpha-1-antirypsin, serum    Ceruloplasmin    Hepatitis A B + C profile    Immunoglobulin A/G/M, Quant    Iron And Tibc    Liver-Kidney Microsomal Antibody    Mitochondrial (M2) Antibody    Hep A AB, IGM    Scan slide    MD BLOOD SMEAR CONSULT    Potassium    Magnesium    Phosphorus    CK (Creatine Kinase) (Not Creatinine)    Scan slide    Manual differential    Phosphorus    Iron And Tibc    Arterial blood gas    Legionella urine Ag serogrp 1    Streptococcus Pneumoniae Ag, Urine    Ammonia, Plasma    Magnesium    Phosphorus    Potassium    Scan slide    Hepatic Function Panel (7)    Prothrombin Time (PT)    Potassium    CBC With Differential With Platelet    Hepatic Function Panel (7)    Basic Metabolic Panel (8)    Magnesium    Phosphorus    Potassium    Hepatic Function Panel (7)    Potassium    Hepatic Function Panel (7)    Phosphorus    Manual differential    Phosphorus    CBC With Differential With Platelet    Basic Metabolic  Panel (8)    Manual differential    Phosphorus    Potassium    Hepatic Function Panel (7)    Ammonia, Plasma    Urinalysis with Culture Reflex    Triglycerides    Expanded Arterial Blood Gas    Ictotest    MD BLOOD SMEAR CONSULT    CBC With Differential With Platelet    Phosphorus    Potassium    Lipase    Scan slide    Expanded Arterial Blood Gas    Aspergillus Galactomannan Antigen Detection, Bronchoalveolar Lavage or Serum    Hemoglobin    Magnesium    Potassium    Phosphorus    Magnesium    Ammonia, Plasma    Prothrombin Time (PT)    Hepatic Function Panel (7)    CBC With Differential With Platelet    Renal Function Panel    Potassium    Hepatic Function Panel (7)    Prothrombin Time (PT)    Renal Function Panel    CBC With Differential With Platelet    Magnesium    Potassium    Arterial blood gas    Hemoglobin    CBC With Differential With Platelet    Comp Metabolic Panel (14)    Phosphorus    Magnesium    Potassium    CBC With Differential With Platelet    Phosphorus    Magnesium    Basic Metabolic Panel (8)    Phosphorus    Potassium    CBC With Differential With Platelet    Comp Metabolic Panel (14)    Potassium    RBC Morphology Scan    Type and screen    Prepare RBC Once    ABORH (Blood Type)    Antibody Screen    Prepare RBC Once    Cytology fluids    Urine Culture, Routine    Blood Culture    MRSA Screen by PCR    Blood Culture    Emergency MRSA Screen by PCR    Blood Culture    $$$$Respiratory Flu Expanded Panel + Covid-19$$$$    Sputum culture    Bronchial Culture, Quantitativ    Cell count, bronchial wash    Fungus Culture and Stain    AFB Culture and Smear    HSV 1/2 PCR, BAL    CMV PCR,Qualitative, BAL    Pneumocystis by PCR    Myco tuberculosis PCR, Respiratory    FUNGUS CULTURE(P)    Fungus Stain    AFB Culture(P)    AFB Smear    BRONCHIAL WASH DIFFERENTIAL    Emergency MRSA Screen by PCR    Blood Culture       Meds This Visit:  Requested Prescriptions      No prescriptions requested or ordered in  this encounter       Felix Davila MD  3/19/2025    Note to Patient: The 21st Century Cures Act makes medical notes like these available to patients in the interest of transparency. However, be advised this is a medical document. It is intended as peer to peer communication. It is written in medical language and may contain abbreviations or verbiage that are unfamiliar. It may appear blunt or direct. Medical documents are intended to carry relevant information, facts as evident, and the clinical opinion of the practitioner. This note may have been transcribed using a voice dictation system. Voice recognition errors may occur. This should not be taken to alter the content or meaning of this note.           [1]   Allergies  Allergen Reactions    Morphine SWELLING     SHORTNESS OF BREATH

## 2025-03-19 NOTE — SLP NOTE
SPEECH DAILY NOTE - INPATIENT    ASSESSMENT & PLAN   ASSESSMENT  PPE REQUIRED. THIS THERAPIST WORE GLOVES, DROPLET MASK, AND GOGGLES FOR DURATION OF EVALUATION. HANDS WASHED UPON ENTRANCE/EXIT.    SLP f/u for ongoing dysphagia tx/meal assessment per recommendations of bite sized/thin per BSE. RN reports pt tolerates diet and medication well with no overt clinical s/s aspiration. Pt denies any swallowing challenges.     Pt positioned upright in bed with family at bedside. Pt afebrile, tolerating 4L/Min O2NC with oxygen status 96 prior to the start of oral trials. SLP reviewed aspiration precautions and safe swallowing compensatory strategies with the patient. Safe swallow guidelines remain written on the white board in purple. Diet recommendations remain on the whiteboard in the room. Patient and family v/u. Provided minimal assistance, pt tolerates advanced trials and thin liquids via straw with no overt clinical signs/symptoms of aspiration. Mild prolonged mastication with large bites. Oxygen status remained >95 t/o the entire session. Recommend upgrade to easy to chew solids and continue thin liquids.     PLAN: SLP to f/u x1 meal assessments, monitor CXR, and VFSS if clinically warranted.     Diet Recommendations - Solids: Mechanical soft chopped/ Soft & Bite Sized  Diet Recommendations - Liquids: Thin Liquids    Compensatory Strategies Recommended: Alternate consistencies  Aspiration Precautions: Upright position, Slow rate, Small bites, Small sips  Medication Administration Recommendations:  (as tolerated)    Patient Experiencing Pain: No    Treatment Plan  Treatment Plan/Recommendations: Aspiration precautions    Interdisciplinary Communication: Discussed with RN  Recommendations posted at bedside            GOALS  Goal #1 The patient will tolerate easy to chew consistency and thin liquids without overt signs or symptoms of aspiration with 100 % accuracy over 1-2 session(s).  Goal modified 3/19   Goal #2 The  patient/family/caregiver will demonstrate understanding and implementation of aspiration precautions and swallow strategies independently over 1-2 session(s).    In Progress   Goal #3 The patient will tolerate trial upgrade of soft easy to chew/regular consistency and thin liquids without overt signs or symptoms of aspiration with 100 % accuracy over 1-2 session(s).  Partially met 3/19   Goal #4 The patient will utilize compensatory strategies as outlined by  BSSE (clinical evaluation) including Slow rate, Small bites, Small sips, Alternate liquids/solids, Upright 90 degrees, Eliminate distractions with PRN assistance 100 % of the time across 2 sessions.    In Progress     FOLLOW UP  Follow Up Needed (Documentation Required): Yes  SLP Follow-up Date: 03/19/25  Duration: 1 week    Session: 1    If you have any questions, please contact JACK Olivas M.S. CCC-SLP  Speech Language Pathologist  Phone Number Ext. [phone]

## 2025-03-19 NOTE — PROGRESS NOTES
[facility]  part of [facility]    Progress Note    aSmi Grijalva Jr. Patient Status:  Inpatient    10/11/1980 MRN [id number]   Location [facility] 5SW/SE Attending Yonny Bowers MD   Hosp Day # 15 PCP Kerri Medina MD       Subjective:   Sami Grijalva Jr. is a(n) 44 year old male     ROS:     Constitutional: Feeling much better up in bed speaking with parents and partner  ENMT:  Negative for ear drainage, hearing loss and nasal drainage  Eyes:  Negative for eye discharge and vision loss  Cardiovascular:  Negative for chest pain, sob, irregular heartbeat/palpitations  Respiratory:  Negative for cough, dyspnea and wheezing  Gastrointestinal:  Negative for abdominal pain, constipation, decreased appetite, diarrhea and vomiting  Genitourinary:  Negative for dysuria and hematuria  Endocrine:  Negative for abnormal sleep patterns, increased activity, polydipsia and polyphagia  Hema/Lymph:  Negative for easy bleeding and easy bruising  Integumentary:  Negative for pruritus and rash  Musculoskeletal:  Negative for bone/joint symptoms  Neurological: Still weak  Psychiatric: Mood improved      Vitals:    25 1045   BP:    Pulse: 110   Resp:    Temp:            PHYSICAL EXAM:   Constitutional: appears well hydrated alert and responsive no acute distress noted  Head/Face: normocephalic  Eyes/Vision: normal extraocular motion is intact  Nose/Mouth/Throat:mucous membranes are moist and no oral lesions are noted  Neck/Thyroid: neck is supple without adenopathy  Lymphatic: no abnormal cervical, supraclavicular adenopathy is noted  Respiratory:  lungs are clear to auscultation bilaterally, normal respiratory effort  Cardiovascular: regular rate and rhythm no murmurs, gallups, or rubs  Abdomen: soft, non-tender, non-distended, BS normal  Vascular: well perfused femoral, and pedal pulses normal  Skin/Hair: no unusual rashes present, no abnormal bruising noted  Back/Spine: no abnormalities  noted  Musculoskeletal: full ROM all extremities good strength  no deformities  Extremities: no edema, cyanosis  Neurological:  Grossly normal    Results:     Laboratory Data:  Lab Results   Component Value Date    WBC 11.0 03/19/2025    HGB 8.1 (L) 03/19/2025    HCT 24.0 (L) 03/19/2025    .0 03/19/2025    CREATSERUM 0.95 03/19/2025    BUN 49 (H) 03/19/2025     03/19/2025    K 3.6 03/19/2025     03/19/2025    CO2 23.0 03/19/2025     (H) 03/19/2025    CA 8.0 (L) 03/19/2025    ALB 2.9 (L) 03/19/2025    ALB 2.9 (L) 03/19/2025    ALKPHO 182 (H) 03/19/2025    ALKPHO 182 (H) 03/19/2025    BILT 14.1 (H) 03/19/2025    BILT 14.1 (H) 03/19/2025    TP 5.5 (L) 03/19/2025    TP 5.5 (L) 03/19/2025     (H) 03/19/2025     (H) 03/19/2025     (H) 03/19/2025     (H) 03/19/2025    INR 1.74 (H) 03/15/2025     (HH) 03/13/2025    DDIMER 1.13 (H) 08/25/2023    ESRML 27 (H) 10/01/2021    MG 2.2 03/18/2025    PHOS 2.9 03/19/2025    TROP 0.00 01/31/2017     (H) 03/06/2025    ETOH <3 03/04/2025       Imaging:  [unfilled]   No results found.      ASSESSMENT/PLAN:   Assessment       #1 respiratory failure doing great remains on Lasix twice a day  Good urine output differential and I's and O's improving  #2 alcoholic hepatitis per GI   LFTs still elevated  #3 acute pancreatitis stable  Anemia stable  To have 2 more days of meropenem continue present plan will need rehab  3/19/2025  Johnathan Ramos MD

## 2025-03-19 NOTE — PLAN OF CARE
Problem: Patient Centered Care  Goal: Patient preferences are identified and integrated in the patient's plan of care  Description: Interventions:  - Provide timely, complete, and accurate information to patient/family  - Incorporate patient and family knowledge, values, beliefs, and cultural backgrounds into the planning and delivery of care  - Encourage patient/family to participate in care and decision-making at the level they choose  - Honor patient and family perspectives and choices  Outcome: Progressing    Problem: PAIN - ADULT  Goal: Verbalizes/displays adequate comfort level or patient's stated pain goal  Description: INTERVENTIONS:  - Encourage pt to monitor pain and request assistance  - Assess pain using appropriate pain scale  - Administer analgesics based on type and severity of pain and evaluate response  - Implement non-pharmacological measures as appropriate and evaluate response  - Consider cultural and social influences on pain and pain management  - Manage/alleviate anxiety  - Utilize distraction and/or relaxation techniques  - Monitor for opioid side effects  - Notify MD/LIP if interventions unsuccessful or patient reports new pain  - Anticipate increased pain with activity and pre-medicate as appropriate  Outcome: Progressing     Problem: RISK FOR INFECTION - ADULT  Goal: Absence of fever/infection during anticipated neutropenic period  Description: INTERVENTIONS  - Monitor WBC  - Administer growth factors as ordered  - Implement neutropenic guidelines  Outcome: Progressing     Problem: SAFETY ADULT - FALL  Goal: Free from fall injury  Description: INTERVENTIONS:  - Assess pt frequently for physical needs  - Identify cognitive and physical deficits and behaviors that affect risk of falls.  - [place] fall precautions as indicated by assessment.  - Educate pt/family on patient safety including physical limitations  - Instruct pt to call for assistance with activity based on assessment  -  Modify environment to reduce risk of injury  - Provide assistive devices as appropriate  - Consider OT/PT consult to assist with strengthening/mobility  - Encourage toileting schedule  Outcome: Progressing     Problem: DISCHARGE PLANNING  Goal: Discharge to home or other facility with appropriate resources  Description: INTERVENTIONS:  - Identify barriers to discharge w/pt and caregiver  - Include patient/family/discharge partner in discharge planning  - Arrange for needed discharge resources and transportation as appropriate  - Identify discharge learning needs (meds, wound care, etc)  - Arrange for interpreters to assist at discharge as needed  - Consider post-discharge preferences of patient/family/discharge partner  - Complete POLST form as appropriate  - Assess patient's ability to be responsible for managing their own health  - Refer to Case Management Department for coordinating discharge planning if the patient needs post-hospital services based on physician/LIP order or complex needs related to functional status, cognitive ability or social support system  Outcome: Progressing     Problem: Delirium  Goal: Minimize duration of delirium  Description: Interventions:  - Encourage use of hearing aids, eye glasses  - Promote highest level of mobility daily  - Provide frequent reorientation  - Promote wakefulness i.e. lights on, blinds open  - Promote sleep, encourage patient's normal rest cycle i.e. lights off, TV off, minimize noise and interruptions  - Encourage family to assist in orientation and promotion of home routines  Outcome: Progressing     Problem: RESPIRATORY - ADULT  Goal: Achieves optimal ventilation and oxygenation  Description: INTERVENTIONS:  - Assess for changes in respiratory status  - Assess for changes in mentation and behavior  - Position to facilitate oxygenation and minimize respiratory effort  - Oxygen supplementation based on oxygen saturation or ABGs  - Provide Smoking Cessation  handout, if applicable  - Encourage broncho-pulmonary hygiene including cough, deep breathe, Incentive Spirometry  - Assess the need for suctioning and perform as needed  - Assess and instruct to report SOB or any respiratory difficulty  - Respiratory Therapy support as indicated  - Manage/alleviate anxiety  - Monitor for signs/symptoms of CO2 retention  Outcome: Progressing     Problem: METABOLIC/FLUID AND ELECTROLYTES - ADULT  Goal: Electrolytes maintained within normal limits  Description: INTERVENTIONS:  - Monitor labs and rhythm and assess patient for signs and symptoms of electrolyte imbalances  - Administer electrolyte replacement as ordered  - Monitor response to electrolyte replacements, including rhythm and repeat lab results as appropriate  - Fluid restriction as ordered  - Instruct patient on fluid and nutrition restrictions as appropriate  Outcome: Progressing  Goal: Hemodynamic stability and optimal renal function maintained  Description: INTERVENTIONS:  - Monitor labs and assess for signs and symptoms of volume excess or deficit  - Monitor intake, output and patient weight  - Monitor urine specific gravity, serum osmolarity and serum sodium as indicated or ordered  - Monitor response to interventions for patient's volume status, including labs, urine output, blood pressure (other measures as available)  - Encourage oral intake as appropriate  - Instruct patient on fluid and nutrition restrictions as appropriate  Outcome: Progressing     Problem: Impaired Swallowing  Goal: Minimize aspiration risk  Description: Interventions:  - Patient should be alert and upright for all feedings (90 degrees preferred)  - Offer food and liquids at a slow rate  - No straws  - Encourage small bites of food and small sips of liquid  - Offer pills one at a time, crush or deliver with applesauce as needed  - Discontinue feeding and notify MD (or speech pathologist) if coughing or persistent throat clearing or wet/gurgly  vocal quality is noted  Outcome: Progressing     Problem: Impaired Cognition  Goal: Patient will exhibit improved attention, thought processing and/or memory  Description: Interventions:  - Minimize distractions in the room when full attention is required  - Allow additional time for processing after asking questions or providing instructions  Outcome: Progressing     Problem: CARDIOVASCULAR - ADULT  Goal: Maintains optimal cardiac output and hemodynamic stability  Description: INTERVENTIONS:- Monitor vital signs, rhythm, and trends- Monitor for bleeding, hypotension and signs of decreased cardiac output- Evaluate effectiveness of vasoactive medications to optimize hemodynamic stability- Monitor arterial and/or venous puncture sites for bleeding and/or hematoma- Assess quality of pulses, skin color and temperature- Assess for signs of decreased coronary artery perfusion - ex. Angina- Evaluate fluid balance, assess for edema, trend weights  Outcome: Progressing

## 2025-03-19 NOTE — PHYSICAL THERAPY NOTE
PHYSICAL THERAPY TREATMENT NOTE - INPATIENT     Room Number: 568/568-A       Presenting Problem: USMAN, hiccups, nausea, vomiting, abdominal pain, alcohol hepatitis, pneumonia, intubated on 3/12/25 and extubated on 3/17/25  Co-Morbidities : alcoholism, anxiety, gout, gastroesophageal reflux disease    Problem List  Principal Problem:    USMAN (acute kidney injury)  Active Problems:    Metabolic acidosis    Hyperkalemia    Leukocytosis    Thrombocytopenia    Coagulopathy (HCC)    Hyponatremia    Alcoholic (HCC)    Scleral icterus    Acute kidney failure    DTs (delirium tremens) (HCC)    Alcohol use disorder    Acute respiratory failure with hypoxia (HCC)    Wernicke's syndrome    Hypophosphatemia    Hypernatremia    Alcohol-induced acute pancreatitis (HCC)    Alcoholic hepatitis without ascites (HCC)      PHYSICAL THERAPY ASSESSMENT   Patient demonstrates fair progress this session, goals  remain in progress.      Patient is requiring maximum assist and total assist  as a result of the following impairments: decreased functional strength, impaired seated balance, impaired coordination, impaired motor planning, decreased muscular endurance, cognitive deficits (increased processing time, decreased insight, impaired problem solving), and medical status.     Patient continues to function below baseline with bed mobility, transfers, and gait.  Next session anticipate patient to progress bed mobility and transfers.  Physical Therapy will continue to follow patient for duration of hospitalization.    Patient continues to benefit from continued skilled PT services: to facilitate return to prior level of function as patient demonstrates high motivation with excellent tolerance to an intensive therapy program .    PLAN DURING HOSPITALIZATION  Nursing Mobility Recommendation : Lift Equipment  PT Device Recommendation: Mechanical lift  PT Treatment Plan: Bed mobility, Body mechanics, Endurance, Energy conservation, Patient  education, Gait training, Strengthening, Transfer training, Balance training  Frequency (Obs): 5x/week     SUBJECTIVE  \"This feels weird now.\" Patient reporting discomfort in NG tube following bed mobility.    OBJECTIVE  Precautions: Bed/chair alarm, Rectal tube, HOB elevated 30 degrees (NG)    WEIGHT BEARING RESTRICTION  none    PAIN ASSESSMENT   Rating: Unable to rate  Location: generalized  Management Techniques: Body mechanics, Activity promotion, Repositioning    BALANCE  Static Sitting: Poor  Dynamic Sitting: Dependent  Static Standing: Not tested  Dynamic Standing: Not tested    ACTIVITY TOLERANCE  Pulse: 110                       O2 WALK  Oxygen Therapy  SPO2% on Oxygen at Rest: 88  At rest oxygen flow (liters per minute): 4  SPO2% Ambulation on Oxygen: 91  Ambulation oxygen flow (liters per minute): 4    AM-PAC '6-Clicks' INPATIENT SHORT FORM - BASIC MOBILITY  How much difficulty does the patient currently have...  Patient Difficulty: Turning over in bed (including adjusting bedclothes, sheets and blankets)?: A Lot   Patient Difficulty: Sitting down on and standing up from a chair with arms (e.g., wheelchair, bedside commode, etc.): Unable   Patient Difficulty: Moving from lying on back to sitting on the side of the bed?: A Lot   How much help from another person does the patient currently need...   Help from Another: Moving to and from a bed to a chair (including a wheelchair)?: Total   Help from Another: Need to walk in hospital room?: Total   Help from Another: Climbing 3-5 steps with a railing?: Total     AM-PAC Score:  Raw Score: 8   Approx Degree of Impairment: 86.62%   Standardized Score (AM-PAC Scale): 28.58   CMS Modifier (G-Code): CM    FUNCTIONAL ABILITY STATUS  Functional Mobility/Gait Assessment  Gait Assistance: Not tested  Rolling: maximum assist  Supine to Sit: dependent - maxAx2  Stand-pivot transfer: dependent - maxAx2    Skilled Therapy Provided: Patient incontinent of bowels requiring  maxA for bed mobility, NG tube paused and patient assisted in rolling for dependent pericares, upon completion patient c/o discomfort in NG site, upon sitting up NG tube falling out, RN notified, no harm to patient. Patient requiring mod-maxA for seated balance but progressing to CGA for 30s-1 minute, predisposition to lean L. Patient requiring increased repetition and cueing for command following and answering questions. Patient's partner and family present and supportive.     Patient seen for co-treatment with occupational therapy to maximize patient safety and function given cognitive and functional impairments.  Patient received semi-fowlers in bed, agreeable to physical therapy. Vital signs monitored as noted above, no adverse symptoms and patient stable during session. Anticipated therapy needs remain appropriate based on the patient's performance, personal factors, and remaining functional impairments.    PATIENT EDUCATION  Education Provided To: Patient, Family/Caregiver  Patient Education: Role of Physical Therapy, Plan of Care, Functional Transfer Techniques, Fall Prevention, Posture/Positioning, Energy Conservation, Proper Body Mechanics  Patient's Response to Education: Requires Further Education, Demonstrates Poor Carry Over to Information  Family/Caregiver Education: Role of Physical Therapy, Plan of Care  Family/Caregiver's Response to Education: Verbalized Understanding    The patient's Approx Degree of Impairment: 86.62% has been calculated based on documentation in the [facility] '6 clicks' Inpatient Daily Activity Short Form.  Research supports that patients with this level of impairment may benefit from long-term care, however based on patient's prior level of function anticipate would benefit from a rehab facility.  Final disposition will be made by interdisciplinary medical team.    Patient End of Session: Up in chair, Needs met, Call light within reach, RN aware of session/findings, All patient  questions and concerns addressed, Hospital anti-slip socks, Family present    CURRENT GOALS   Goals to be met by: 4/1/25  Patient Goal Patient's self-stated goal is: none stated   Goal #1 Patient is able to demonstrate supine - sit EOB @ level: moderate assistance     Goal #1   Current Status NOT MET/IN PROGRESS    Goal #2 Patient will tolerate static sitting EOB for 10 minutes with BUE support and CGA in order to prepare for future out of bed activities.    Goal #2  Current Status NOT MET/IN PROGRESS    Goal #3 Patient is able to demonstrate transfers Sit to/from Stand at assistance level: moderate assistance with walker - rolling     Goal #3   Current Status NOT MET/IN PROGRESS    Goal #4 Patient will tolerate static standing for 2 minutes with BUE support on RW and Min A in order to prepare for future out of bed activities.   Goal #4   Current Status NOT MET/IN PROGRESS    Goal #5 Patient is able to ambulate 25 feet with assist device: walker - rolling at assistance level: moderate assistance   Goal #5   Current Status NOT MET/IN PROGRESS    Goal #6 Patient to demonstrate independence with home activity/exercise instructions provided to patient in preparation for discharge.   Goal #6  Current Status IN PROGRESS/ONGOING        Therapeutic Activity: 25 minutes      Luz Tenorio, PT, DPT  [facility]  Rehab Services - Physical Therapy  [id number]

## 2025-03-19 NOTE — OCCUPATIONAL THERAPY NOTE
OCCUPATIONAL THERAPY TREATMENT NOTE - INPATIENT        Room Number: 568/568-A     Presenting Problem: USMAN; ETOH; respiratory failure; extubated 3/17    Problem List  Principal Problem:    USMAN (acute kidney injury)  Active Problems:    Metabolic acidosis    Hyperkalemia    Leukocytosis    Thrombocytopenia    Coagulopathy (HCC)    Hyponatremia    Alcoholic (HCC)    Scleral icterus    Acute kidney failure    DTs (delirium tremens) (HCC)    Alcohol use disorder    Acute respiratory failure with hypoxia (HCC)    Wernicke's syndrome    Hypophosphatemia    Hypernatremia    Alcohol-induced acute pancreatitis (HCC)    Alcoholic hepatitis without ascites (HCC)      OCCUPATIONAL THERAPY ASSESSMENT   Patient demonstrates good  progress this session, goals remain in progress.    Patient is requiring maximum assist and dependent as a result of the following impairments: decreased functional strength, decreased endurance, impaired static and dynamic, sitting and standing balance, impaired coordination, impaired motor planning, cognitive deficits (increased time for processing), and medical status.    Patient continues to function below baseline with toileting, bathing, upper body dressing, lower body dressing, grooming, bed mobility, transfers, static sitting balance, dynamic sitting balance, static standing balance, dynamic standing balance, functional standing tolerance, and energy conservation strategies.  Next session anticipate patient to progress toileting, bathing, upper body dressing, lower body dressing, grooming, bed mobility, transfers, static sitting balance, dynamic sitting balance, static standing balance, dynamic standing balance, functional standing tolerance, and energy conservation strategies.  Occupational Therapy will continue to follow patient for duration of hospitalization.    Patient continues to benefit from continued skilled OT services: to facilitate return to prior level of function as patient  demonstrates high motivation with excellent tolerance to an intensive therapy program.     PLAN DURING HOSPITALIZATION  OT Device Recommendations: TBD  OT Treatment Plan: Balance activities, Energy conservation/work simplification techniques, ADL training, IADL training, Functional transfer training, UE strengthening/ROM, Endurance training, Patient/Family education, Cognitive reorientation, Patient/Family training, Neuromuscluar reeducation, Equipment eval/education, Compensatory technique education     SUBJECTIVE  \"I feel okay.\"    OBJECTIVE  Precautions: Bed/chair alarm, Rectal tube, HOB elevated 30 degrees (NG)    PAIN ASSESSMENT  Rating: Unable to rate  Location: generalized with movement  Management Techniques: Activity promotion; Body mechanics; Relaxation; Repositioning    ACTIVITIES OF DAILY LIVING ASSESSMENT  AM-PAC ‘6-Clicks’ Inpatient Daily Activity Short Form  How much help from another person does the patient currently need…  -   Putting on and taking off regular lower body clothing?: A Lot  -   Bathing (including washing, rinsing, drying)?: A Lot  -   Toileting, which includes using toilet, bedpan or urinal? : A Lot  -   Putting on and taking off regular upper body clothing?: A Lot  -   Taking care of personal grooming such as brushing teeth?: A Little  -   Eating meals?: A Little    AM-PAC Score:  Score: 14  Approx Degree of Impairment: 59.67%  Standardized Score (AM-PAC Scale): 33.39  CMS Modifier (G-Code): CK    FUNCTIONAL TRANSFER ASSESSMENT  Sit to Stand: Edge of Bed  Edge of Bed: Maximum Assist (x2)    BED MOBILITY  Rolling: Maximum Assist (x2)  Supine to Sit : Maximum Assist (x2)  Sit to Supine (OT): Maximum Assist (x2)    BALANCE ASSESSMENT  Static Sitting: Moderate Assist  Static Standing: Not Tested    FUNCTIONAL ADL ASSESSMENT  Eating: Minimal Assist  Grooming Seated: Minimal Assist  Bathing Seated: Maximum Assist  UB Dressing Seated: Maximum Assist  LB Dressing Seated: Maximum  Assist  Toileting Seated: Maximum Assist    THERAPEUTIC EXERCISE     Skilled Therapy Provided: Patient received supine in bed. Patient performing ADLs and functional mobility at a max assist level this session. Patient most limited by overall strength and endurance. Education provided on body mechanics and how that manifests functionally while completing ADLs and functional mobility. Patient with fair return demonstration on all education. Patient presents with cognitive deficits and requires increased processing time, but able to follow commands with increased time. Patient able to complete supine<sit EOB with max A x2. Initially, patient required max A for sitting balance, but progressed to CGA. Max A x2 to SPT to chair.    EDUCATION PROVIDED  Patient Education : Role of Occupational Therapy; Discharge Recommendations; Plan of Care; Functional Transfer Techniques; Fall Prevention; Surgical Precautions; Posture/Positioning; Energy Conservation; Proper Body Mechanics  Patient's Response to Education: Requires Further Education  Family/Caregiver Education : Role of Occupational Therapy; Plan of Care; Discharge Recommendations  Family/Caregiver's Response to Education: Verbalized Understanding    The patient's Approx Degree of Impairment: 59.67% has been calculated based on documentation in the [facility] '6 clicks' Inpatient Daily Activity Short Form.  Research supports that patients with this level of impairment may benefit from Acute Rehab.  Final disposition will be made by interdisciplinary medical team.    Patient End of Session: Up in chair, Needs met, Call light within reach, RN aware of session/findings, All patient questions and concerns addressed, Hospital anti-slip socks, Alarm set, Family present    OT Goals:  Patients self stated goal is: no goals stated       Patient will complete functional transfer with Mod A   Comment: ongoing    Patient will complete toileting with Mod A   Comment: ongoing    Patient  will tolerate standing for 1-2 minutes in prep for adls with Mod A    Comment: ongoing    Patient will tolerate unsupported sitting at eOB with SBA in prep for seated ADLs  Comment: ongoing            Goals  on:   Frequency: 3-5x week     Self-Care Home Management: 15 minutes  Therapeutic Activity: 10 minutes

## 2025-03-19 NOTE — PROGRESS NOTES
INFECTIOUS DISEASE PROGRESS NOTE  [facility]  part of [facility] ID PROGRESS NOTE    Sami Grijalva . Patient Status:  Inpatient    10/11/1980 MRN [id number]   Location [facility] 2W/SW Attending Natasha Araujo MD   Hosp Day # 15 PCP Kerri Medina MD     Subjective:  ROS reviewed. Did eat yesterday. Dobhoff in place.    ASSESSMENT:    Antibiotics: Meropenem 3/14-, OVP  (doxycycline, vancomycin, zosyn)     # Acute hypoxic respiratory failure with fever and multifocal pneumonia   -S/p intubation 3/12   -s/p bronch 3/13, cx NGTD   -MRSA nares negative  # Acute aspiration pneumonia  # Acute leukocytosis   # Acute anemia  # Acute pancreatitis  # Alcoholic hepatitis with encephalopathy on admission               - CT A/P with severe fatty liver               - US GB with sludge w/o cholecystitis   - CT C/A/P 3/12 with pancreatitis  # USMAN - improved  # EtOH abuse and withdrawal     PLAN:  -  Continue on meropenem, day  of  as well as OVP.  -  Follow fever curve, wbc.  -  Reviewed labs, micro, imaging reports, available old records.  -  Case d/w RN.     History of Present Illness:  44-year-old male with a history of GERD, alcohol use was admitted to the hospital on 3/4 generalized abdominal pain, hiccups, vomiting with difficulty tolerating orals.  Last drink prior to admission was 1 day prior.  Found to have sepsis with elevated lactic acid, hypotension, WBC 17.5.  Responded to IV fluids and received lactulose for elevated ammonia.  CT A/P with severe fatty liver, stable splenomegaly.  Was lethargic the first few days and had soft restraints with Cedeño, Flexi-Seal, NG tube.  Has been essentially afebrile here with a temperature on 3/10 of 100.6 but significant hypoxia up and down and currently increased up to 40 L with increase WBC to 17.4.  Initial USMAN has improved.  Was started on IV Zosyn and doxycycline on 3/7 when patient became more hypoxic and chest x-ray  showed extensive multifocal pneumonia with CT chest showing similar findings.  Possible aspiration.    Physical Exam:  /66 (BP Location: Right arm)   Pulse 102   Temp 97.8 °F (36.6 °C) (Oral)   Resp 20   Ht 5' 8\" (1.727 m)   Wt 186 lb 11.7 oz (84.7 kg)   SpO2 95%   BMI 28.39 kg/m²     Gen:   Awake, in bed  HEENT:  +scleral icterus, dobhoff+, neck supple  CV/lungs:  RRR, lungs clear vent sounds  Abdom:  Soft, no TTP  Skin/extrem:  No rashes, jaundiced  :   Cedeño draining yellow urine  Lines:  Midline+  Flexiseal+    Laboratory Data: Reviewed    Microbiology: Reviewed    Radiology: Reviewed      [facility]  [phone]  3/19/2025

## 2025-03-20 ENCOUNTER — APPOINTMENT (OUTPATIENT)
Dept: NUCLEAR MEDICINE | Facility: HOSPITAL | Age: 45
End: 2025-03-20
Attending: STUDENT IN AN ORGANIZED HEALTH CARE EDUCATION/TRAINING PROGRAM
Payer: MEDICAID

## 2025-03-20 ENCOUNTER — APPOINTMENT (OUTPATIENT)
Dept: ULTRASOUND IMAGING | Facility: HOSPITAL | Age: 45
End: 2025-03-20
Attending: INTERNAL MEDICINE
Payer: MEDICAID

## 2025-03-20 ENCOUNTER — APPOINTMENT (OUTPATIENT)
Dept: GENERAL RADIOLOGY | Facility: HOSPITAL | Age: 45
End: 2025-03-20
Attending: HOSPITALIST
Payer: MEDICAID

## 2025-03-20 LAB
ALBUMIN SERPL-MCNC: 2.8 G/DL (ref 3.2–4.8)
ALBUMIN/GLOB SERPL: 1 {RATIO} (ref 1–2)
ALP LIVER SERPL-CCNC: 181 U/L
ALT SERPL-CCNC: 112 U/L
ANION GAP SERPL CALC-SCNC: 8 MMOL/L (ref 0–18)
AST SERPL-CCNC: 273 U/L (ref ?–34)
ATRIAL RATE: 92 BPM
BASOPHILS # BLD AUTO: 0.06 X10(3) UL (ref 0–0.2)
BASOPHILS NFR BLD AUTO: 0.5 %
BILIRUB SERPL-MCNC: 15 MG/DL (ref 0.3–1.2)
BUN BLD-MCNC: 43 MG/DL (ref 9–23)
BUN/CREAT SERPL: 44.8 (ref 10–20)
CALCIUM BLD-MCNC: 8.1 MG/DL (ref 8.7–10.4)
CHLORIDE SERPL-SCNC: 102 MMOL/L (ref 98–112)
CO2 SERPL-SCNC: 22 MMOL/L (ref 21–32)
CREAT BLD-MCNC: 0.96 MG/DL
D DIMER PPP FEU-MCNC: 9.14 UG/ML FEU (ref ?–0.5)
DEPRECATED RDW RBC AUTO: 84.2 FL (ref 35.1–46.3)
EGFRCR SERPLBLD CKD-EPI 2021: 99 ML/MIN/1.73M2 (ref 60–?)
EOSINOPHIL # BLD AUTO: 0.28 X10(3) UL (ref 0–0.7)
EOSINOPHIL NFR BLD AUTO: 2.1 %
ERYTHROCYTE [DISTWIDTH] IN BLOOD BY AUTOMATED COUNT: 30.7 % (ref 11–15)
GLOBULIN PLAS-MCNC: 2.9 G/DL (ref 2–3.5)
GLUCOSE BLD-MCNC: 94 MG/DL (ref 70–99)
HCT VFR BLD AUTO: 23.8 %
HGB BLD-MCNC: 8.2 G/DL
IMM GRANULOCYTES # BLD AUTO: 0.27 X10(3) UL (ref 0–1)
IMM GRANULOCYTES NFR BLD: 2.1 %
INR BLD: 1.8 (ref 0.8–1.2)
LYMPHOCYTES # BLD AUTO: 1.69 X10(3) UL (ref 1–4)
LYMPHOCYTES NFR BLD AUTO: 13 %
MCH RBC QN AUTO: 26.9 PG (ref 26–34)
MCHC RBC AUTO-ENTMCNC: 34.5 G/DL (ref 31–37)
MCV RBC AUTO: 78 FL
MONOCYTES # BLD AUTO: 1.21 X10(3) UL (ref 0.1–1)
MONOCYTES NFR BLD AUTO: 9.3 %
NEUTROPHILS # BLD AUTO: 9.52 X10 (3) UL (ref 1.5–7.7)
NEUTROPHILS # BLD AUTO: 9.52 X10(3) UL (ref 1.5–7.7)
NEUTROPHILS NFR BLD AUTO: 73 %
OSMOLALITY SERPL CALC.SUM OF ELEC: 285 MOSM/KG (ref 275–295)
P AXIS: 50 DEGREES
P-R INTERVAL: 158 MS
PHOSPHATE SERPL-MCNC: 3.1 MG/DL (ref 2.4–5.1)
PLATELET # BLD AUTO: 281 10(3)UL (ref 150–450)
PLATELETS.RETICULATED NFR BLD AUTO: 7.5 % (ref 0–7)
POTASSIUM SERPL-SCNC: 4.2 MMOL/L (ref 3.5–5.1)
PROT SERPL-MCNC: 5.7 G/DL (ref 5.7–8.2)
PROTHROMBIN TIME: 21.9 SECONDS (ref 11.6–14.8)
Q-T INTERVAL: 364 MS
QRS DURATION: 94 MS
QTC CALCULATION (BEZET): 450 MS
R AXIS: 8 DEGREES
RBC # BLD AUTO: 3.05 X10(6)UL
SODIUM SERPL-SCNC: 132 MMOL/L (ref 136–145)
T AXIS: -2 DEGREES
TROPONIN I SERPL HS-MCNC: 3 NG/L
VENTRICULAR RATE: 92 BPM
WBC # BLD AUTO: 13 X10(3) UL (ref 4–11)

## 2025-03-20 PROCEDURE — 78582 LUNG VENTILAT&PERFUS IMAGING: CPT | Performed by: STUDENT IN AN ORGANIZED HEALTH CARE EDUCATION/TRAINING PROGRAM

## 2025-03-20 PROCEDURE — 99232 SBSQ HOSP IP/OBS MODERATE 35: CPT | Performed by: INTERNAL MEDICINE

## 2025-03-20 PROCEDURE — 99233 SBSQ HOSP IP/OBS HIGH 50: CPT | Performed by: INTERNAL MEDICINE

## 2025-03-20 PROCEDURE — 99291 CRITICAL CARE FIRST HOUR: CPT | Performed by: HOSPITALIST

## 2025-03-20 PROCEDURE — 93970 EXTREMITY STUDY: CPT | Performed by: INTERNAL MEDICINE

## 2025-03-20 PROCEDURE — 71045 X-RAY EXAM CHEST 1 VIEW: CPT | Performed by: HOSPITALIST

## 2025-03-20 RX ORDER — TRAMADOL HYDROCHLORIDE 50 MG/1
50 TABLET ORAL EVERY 6 HOURS PRN
Status: DISCONTINUED | OUTPATIENT
Start: 2025-03-20 | End: 2025-03-20

## 2025-03-20 RX ORDER — BACLOFEN 10 MG/1
20 TABLET ORAL 3 TIMES DAILY PRN
Status: DISCONTINUED | OUTPATIENT
Start: 2025-03-20 | End: 2025-03-20

## 2025-03-20 RX ORDER — PANTOPRAZOLE SODIUM 40 MG/1
40 TABLET, DELAYED RELEASE ORAL
Status: DISCONTINUED | OUTPATIENT
Start: 2025-03-20 | End: 2025-04-02

## 2025-03-20 RX ORDER — TRAMADOL HYDROCHLORIDE 50 MG/1
50 TABLET ORAL ONCE
Status: COMPLETED | OUTPATIENT
Start: 2025-03-20 | End: 2025-03-20

## 2025-03-20 NOTE — SIGNIFICANT EVENT
RRT    *See RRT Documentation Record*    Reason the RRT was called: Chest Pain  Assessment of patient leading up to RRT: Patient complained of chest pain to PCT Ebony. PCT notified this RN. This RN obtained an EKG on patient & called RRT.  Interventions/Testing: PT, D-dimer, Chest Xray, Troponin, Tramadol x1  Patient Outcome/Disposition: Monitoring  Family Notified: yes  Name of family notified: Lawrence

## 2025-03-20 NOTE — PHYSICAL THERAPY NOTE
PHYSICAL THERAPY TREATMENT NOTE - INPATIENT     Room Number: 568/568-A       Presenting Problem: USMAN, hiccups, nausea, vomiting, abdominal pain, alcohol hepatitis, pneumonia, intubated on 3/12/25 and extubated on 3/17/25  Co-Morbidities : alcoholism, anxiety, gout, gastroesophageal reflux disease    Problem List  Principal Problem:    USMAN (acute kidney injury)  Active Problems:    Metabolic acidosis    Hyperkalemia    Leukocytosis    Thrombocytopenia    Coagulopathy (HCC)    Hyponatremia    Alcoholic (HCC)    Scleral icterus    Acute kidney failure    DTs (delirium tremens) (HCC)    Alcohol use disorder    Acute respiratory failure with hypoxia (HCC)    Wernicke's syndrome    Hypophosphatemia    Hypernatremia    Alcohol-induced acute pancreatitis (HCC)    Alcoholic hepatitis without ascites (HCC)      PHYSICAL THERAPY ASSESSMENT   Patient demonstrates fair progress this session, goals  remain in progress.      Patient is requiring moderate assist, maximum assist, and total assist  as a result of the following impairments: decreased functional strength, impaired seated and standing balance, decreased muscular endurance, cognitive deficits (decreased insight, increased processing time, impaired problem solving), and medical status.     Patient continues to function below baseline with bed mobility, transfers, gait, and stair negotiation.  Next session anticipate patient to progress bed mobility, transfers, and gait.  Physical Therapy will continue to follow patient for duration of hospitalization.    Patient continues to benefit from continued skilled PT services: to facilitate return to prior level of function as patient demonstrates high motivation with excellent tolerance to an intensive therapy program pending further medical and functional improvement.    PLAN DURING HOSPITALIZATION  Nursing Mobility Recommendation : Lift Equipment  PT Device Recommendation: Mechanical lift  PT Treatment Plan: Bed mobility, Body  mechanics, Endurance, Energy conservation, Patient education, Gait training, Strengthening, Transfer training, Balance training  Frequency (Obs): 5x/week     SUBJECTIVE  Patient stating remote is broken.     OBJECTIVE  Precautions: Bed/chair alarm, Rectal tube, HOB elevated 30 degrees (NG)    WEIGHT BEARING RESTRICTION  none    PAIN ASSESSMENT   Rating: 10  Location: R side of chest  Management Techniques: Breathing techniques, Relaxation    BALANCE  Static Sitting: Poor +  Dynamic Sitting: Dependent  Static Standing: Dependent  Dynamic Standing: Not tested    ACTIVITY TOLERANCE  Pulse: 105  Heart Rate Source: Monitor     BP: 124/70  BP Location: Right arm  BP Method: Automatic  Patient Position: Lying     O2 WALK  Oxygen Therapy  SPO2% on Oxygen at Rest: 96  At rest oxygen flow (liters per minute): 4  SPO2% Ambulation on Oxygen: 84  Ambulation oxygen flow (liters per minute): 4    AM-PAC '6-Clicks' INPATIENT SHORT FORM - BASIC MOBILITY  How much difficulty does the patient currently have...  Patient Difficulty: Turning over in bed (including adjusting bedclothes, sheets and blankets)?: A Little   Patient Difficulty: Sitting down on and standing up from a chair with arms (e.g., wheelchair, bedside commode, etc.): Unable   Patient Difficulty: Moving from lying on back to sitting on the side of the bed?: A Little   How much help from another person does the patient currently need...   Help from Another: Moving to and from a bed to a chair (including a wheelchair)?: A Lot   Help from Another: Need to walk in hospital room?: Total   Help from Another: Climbing 3-5 steps with a railing?: Total     AM-PAC Score:  Raw Score: 11   Approx Degree of Impairment: 72.57%   Standardized Score (AM-PAC Scale): 33.86   CMS Modifier (G-Code): CL    FUNCTIONAL ABILITY STATUS  Functional Mobility/Gait Assessment  Gait Assistance: Not tested  Distance (ft): deferred 2/2 impaired standing balance/tolerance  Rolling: minimal  assist  Supine to Sit: minimal assist - head of bed elevated, cues for sequencing  Sit to Stand: dependent - maxAx2, posterior lean, cues for hip extensor activation, <20s  Stand-pivot transfer bed>chair: moderate assist    Skilled Therapy Provided: Since previous session patient has progressed, decreased assist required with bed mobility and able to accept some weight on feet to shuffle during transfer to chair. In sitting patient requiring mod-maxA for balance 2/2 L lateral lean, able to correct with cues for short periods of time. Patient requiring maxAx1-2 for static standing balance 2/2 posterolateral lean to L, decreased glute activation.     Patient seen for co-treatment with occupational therapy to maximize patient safety and function given cognitive and functional impairments. Patient received supine in bed, agreeable to physical therapy. Vital signs monitored as noted above, patient experiencing chest pain on R side of chest, RRT called this AM and workup done without significant findings, medical team aware, patient motivated to get out of bed to chair . Anticipated therapy needs remain appropriate based on the patient's performance, personal factors, and remaining functional impairments.    PATIENT EDUCATION  Education Provided To: Patient, Family/Caregiver  Patient Education: Role of Physical Therapy, Plan of Care, Functional Transfer Techniques, Fall Prevention, Posture/Positioning, Energy Conservation, Proper Body Mechanics  Patient's Response to Education: Requires Further Education, Demonstrates Poor Carry Over to Information  Family/Caregiver Education: Role of Physical Therapy, Plan of Care  Family/Caregiver's Response to Education: Verbalized Understanding    The patient's Approx Degree of Impairment: 72.57% has been calculated based on documentation in the [facility] '6 clicks' Inpatient Daily Activity Short Form.  Research supports that patients with this level of impairment may benefit from a rehab  facility.  Final disposition will be made by interdisciplinary medical team.      Patient End of Session: Up in chair, Needs met, Call light within reach, RN aware of session/findings, All patient questions and concerns addressed, Hospital anti-slip socks, Alarm set    CURRENT GOALS   Goals to be met by: 4/1/25  Patient Goal Patient's self-stated goal is: none stated   Goal #1 Patient is able to demonstrate supine - sit EOB @ level: moderate assistance     Goal #1   Current Status MET 3/20/25   Goal #2 Patient will tolerate static sitting EOB for 10 minutes with BUE support and CGA in order to prepare for future out of bed activities.    Goal #2  Current Status NOT MET/IN PROGRESS    Goal #3 Patient is able to demonstrate transfers Sit to/from Stand at assistance level: moderate assistance with walker - rolling     Goal #3   Current Status NOT MET/IN PROGRESS    Goal #4 Patient will tolerate static standing for 2 minutes with BUE support on RW and Min A in order to prepare for future out of bed activities.   Goal #4   Current Status NOT MET/IN PROGRESS    Goal #5 Patient is able to ambulate 25 feet with assist device: walker - rolling at assistance level: moderate assistance   Goal #5   Current Status NOT MET/IN PROGRESS    Goal #6 Patient to demonstrate independence with home activity/exercise instructions provided to patient in preparation for discharge.   Goal #6  Current Status IN PROGRESS/ONGOING        Therapeutic Activity: 18 minutes      Luz Tenorio, PT, DPT  [facility]  Rehab Services - Physical Therapy  [id number]

## 2025-03-20 NOTE — PROGRESS NOTES
[facility]  part of [facility]    Progress Note    Sami Grijalva Jr. Patient Status:  Inpatient    10/11/1980 MRN [id number]   Location [facility] 5SW/SE Attending Yonny Bowers MD   Hosp Day # 16 PCP Kerri Medina MD       Subjective:   Sami Grijalva Jr. is a(n) 44 year old male     ROS:     Constitutional: Feeling better up in chair did have an episode of chest pressure last night  ENMT:  Negative for ear drainage, hearing loss and nasal drainage  Eyes:  Negative for eye discharge and vision loss  Cardiovascular:  Negative for chest pain, sob, irregular heartbeat/palpitations  Respiratory:  Negative for cough, dyspnea and wheezing  Gastrointestinal:  Negative for abdominal pain, constipation, decreased appetite, diarrhea and vomiting  Genitourinary:  Negative for dysuria and hematuria  Endocrine:  Negative for abnormal sleep patterns, increased activity, polydipsia and polyphagia  Hema/Lymph:  Negative for easy bleeding and easy bruising  Integumentary:  Negative for pruritus and rash  Musculoskeletal:  Negative for bone/joint symptoms  Neurological:  Negative for gait disturbance  Psychiatric:  Negative for inappropriate interaction and psychiatric symptoms      Vitals:    25 1606   BP: 110/59   Pulse: 105   Resp: 20   Temp: 99.1 °F (37.3 °C)           PHYSICAL EXAM:   Constitutional: appears well hydrated alert and responsive no acute distress noted looking better up in chair  Eyes/Vision positive scleral icterus  Nose/Mouth/Throat:mucous membranes are moist and no oral lesions are noted  Neck/Thyroid: neck is supple without adenopathy  Lymphatic: no abnormal cervical, supraclavicular adenopathy is noted  Respiratory:  lungs are clear to auscultation bilaterally, normal respiratory effort  Cardiovascular: regular rate and rhythm no murmurs, gallups, or rubs  Abdomen: Belly soft but still some distention  Vascular: well perfused femoral, and pedal pulses  normal  Skin/Hair: no unusual rashes present, no abnormal bruising noted  Back/Spine: no abnormalities noted  Musculoskeletal: full ROM all extremities good strength  no deformities  Extremities: no edema, cyanosis  Neurological:  Grossly normal    Results:     Laboratory Data:  Lab Results   Component Value Date    WBC 13.0 (H) 03/20/2025    HGB 8.2 (L) 03/20/2025    HCT 23.8 (L) 03/20/2025    .0 03/20/2025    CREATSERUM 0.96 03/20/2025    BUN 43 (H) 03/20/2025     (L) 03/20/2025    K 4.2 03/20/2025     03/20/2025    CO2 22.0 03/20/2025    GLU 94 03/20/2025    CA 8.1 (L) 03/20/2025    ALB 2.8 (L) 03/20/2025    ALKPHO 181 (H) 03/20/2025    BILT 15.0 (H) 03/20/2025    TP 5.7 03/20/2025     (H) 03/20/2025     (H) 03/20/2025    INR 1.80 (H) 03/20/2025     (HH) 03/13/2025    DDIMER 9.14 (H) 03/20/2025    ESRML 27 (H) 10/01/2021    MG 2.2 03/18/2025    PHOS 3.1 03/20/2025    TROP 0.00 01/31/2017     (H) 03/06/2025    ETOH <3 03/04/2025       Imaging:  [unfilled]    VENOUS DOPPLER LEG BILAT - DIAG IMG (CPT=93970)    Result Date: 3/20/2025  CONCLUSION:  1. No lower extremity DVT bilaterally.    Dictated by (CST): Parish Craig MD on 3/20/2025 at 1:09 PM     Finalized by (CST): Parish Craig MD on 3/20/2025 at 1:09 PM          NM LUNG VQ VENT / PERFUSION SCAN  (CPT=78582)    Result Date: 3/20/2025  CONCLUSION:  1. Indeterminate examination; multiple triple matched defects are demonstrated, presumably related to underlying parenchymal disease visualized on radiographs. The patient is a poor candidate for scintigraphic assessment.  2. Accumulation of xenon radiotracer in the region of the liver is likely indicative of hepatic steatosis.    Dictated by (CST): Pavel Lara MD on 3/20/2025 at 12:01 PM     Finalized by (CST): Pavel Lara MD on 3/20/2025 at 12:04 PM          XR CHEST AP PORTABLE  (CPT=71045)    Result Date: 3/20/2025  CONCLUSION: Patchy alveolar  opacities throughout the lungs, suggesting edema or multifocal pneumonia.  No significant interval change since preceding exam.  Interval extubation and removal of feeding tube.   [facility] provided a preliminary report for this examination. This final report agrees with their preliminary findings.   Dictated by (CST): Isaiah Lee MD on 3/20/2025 at 6:31 AM     Finalized by (CST): Isaiah Lee MD on 3/20/2025 at 6:33 AM             ASSESSMENT/PLAN:   Assessment       1 USMAN resolved good urine output  Continue Lasix 20 IV twice a day  Creatinine excellent 0.96   #2 alcoholic hepatitis LFTs still going up  For GI hopefully will get liver recovery nutrition eating well  Concerning that INR still high at 1.8 which indicates decreased synthetic function  Weakness will need rehab    Anemia hemoglobin 8.2 multifactorial is on Epogen    Discussed with family will sign off please watch labs call for any questions or problems thank you  Atypical chest pain question of PE or DVT.  Negative  3/20/2025  Johnathan Ramos MD

## 2025-03-20 NOTE — PROGRESS NOTES
Pulmonary/ICU/Critical Care Progress Note        Reason for Consultation: resp failure  Referring Physician: Dr. Diaz      Subjective:  On 4 LNC  Feels better but experiences right chest discomfort      From the initial consultation  Pt is unable to provide info  HPI: 45 yo male with hx of gout, GERD, admitted with acute ETOH hepatitis, ETOH use, emesis, USMAN. Seen by GI.  On CIWA protocol, thiamine, folic acid, MVI, BZDs, lactulose  Has been in the ICU for 2 days  ICU consulted this am for worsening hypoxemia  CXR this am with b/l infiltrates concerning for PNA      REVIEW OF SYSTEMS:  Positives and negatives as noted in HPI. All other review of systems otherwise are either limited (due to pt/family inability to provide) or negative.      PAST MEDICAL HISTORY:  Past Medical History:   Diagnosis Date    Esophageal reflux     Gout     Hernia          PAST SURGICAL HISTORY:  Past Surgical History:   Procedure Laterality Date    Colonoscopy N/A 11/1/2023    Procedure: COLONOSCOPY;  Surgeon: Vandana Austin MD;  Location: [facility] ENDOSCOPY    Egd  02/15/2016    Eh pr repair complex scalp arms legs 1.1 to 2.5 cm  2014    Elbow fracture surgery Right 1991    Hernia surgery      Inguinal hernia repair Left 01/17/2023         PAST SOCIAL HISTORY:  Social History     Socioeconomic History    Marital status: Single    Number of children: 0   Occupational History    Occupation: Supervisor, car [employer]   Tobacco Use    Smoking status: Former     Types: Cigarettes    Smokeless tobacco: Never   Vaping Use    Vaping status: Some Days   Substance and Sexual Activity    Alcohol use: Yes     Comment: per pt, DAILY HARD LEMONADE- [employer] hard lemonade 24oz cans, 6 cans daily    Drug use: Yes     Types: Cannabis     Comment: last use, couple months ago per pt report         PAST FAMILY HISTORY:  Family History   Problem Relation Age of Onset    Diabetes Father     Hypertension Father     Cancer Mother         liver        ALLERGIES:  Allergies[1]      MEDS:  Home Medications:  Medications Taking[2]    Scheduled Medication:   pantoprazole  40 mg Oral BID AC    epoetin sheela  10,000 Units Subcutaneous Once per day on Tuesday Thursday Saturday    furosemide  20 mg Intravenous BID (Diuretic)    meropenem  1,000 mg Intravenous Q12H    vancomycin  125 mg Per NG Tube Daily    multivitamin  1 tablet Oral Daily    folic acid  1 mg Oral Daily     Continuous Infusing Medication:      PRN Medications:    baclofen    acetaminophen    haloperidol lactate    polyethylene glycol (PEG 3350)    bisacodyl    prochlorperazine       PHYSICAL EXAM:  /72 (BP Location: Right arm)   Pulse 97   Temp 98.3 °F (36.8 °C) (Oral)   Resp 20   Ht 5' 8\" (1.727 m)   Wt 186 lb 11.7 oz (84.7 kg)   SpO2 96%   BMI 28.39 kg/m²      CONSTITUTIONAL: NAD + jaundice  HEENT: AT/NC  MOUTH: MMM  NECK/THROAT: no JVD. Trachea midline. No obvious thyromegaly  LUNG: clear b/l no wheezing, + b/l crackles. Chest symmetric with respiratory motion  HEART: regular rate and rhythm, no obvious murmers or gallops noted  ABD: soft non tender. + bowel sounds. No organomegaly noted  EXT: no clubbing, cyanosis. Min b/l LE edema      IMAGES:   CXR 3/20/25  CONCLUSION: Patchy alveolar opacities throughout the lungs, suggesting edema or multifocal pneumonia.  No significant interval change since preceding exam.  Interval extubation and removal of feeding tube.     CXR 3/17/25  Findings and impression:   ETT 6 cm above the em   Enteric tube beneath the diaphragm   Stable heart   Persistent low lung volumes and bilateral diffuse pulmonary opacities.   No pneumothorax     CT C/A/P 3/12/25  CONCLUSION:   1. Diffuse pancreatic enlargement and mild peripancreatic inflammatory stranding are new since abdominal CT from 8 days prior and concerning for acute interstitial edematous pancreatitis.  No gross pancreatic ductal dilation or peripancreatic collection.     Request correlation  with serum lipase levels.   2. Left greater than right lower lobe airspace disease with intrinsic air bronchograms.  Findings are similar on the left and slightly improved on the right since chest CT from 5 days prior; multifocal pneumonia/aspiration is suspected.  Also identified   are new and/or worsening multifocal upper lobe predominant ground-glass density opacities throughout both lungs; these ground-glass density opacities could be infectious in nature or relate to acute respiratory distress syndrome.   3. Endotracheal tube with tip approximately 1 cm above the level of the em.  Enteric tube tip in the gastric fundus.  Rectal tube and Cedeño catheter also noted.   4. Fatty liver and cirrhosis.   5. Stable splenomegaly.   6. Small to moderate volume intra-abdominal ascites is new since prior abdominal CT.  There is also new mild anasarca.   7. Liquid contents in the colon could relate to a malabsorption state or low-grade infectious/inflammatory versus antibiotic associated colitis.  Mild scattered colonic diverticulosis, but with no CT findings of acute diverticulitis.   8. Low-density appearance of the intracardiac blood pool raises the possibility of underlying anemia. Correlate with hematologic parameters.    9. Lesser incidental findings as above.       CXR 3/12/25 with multifocal infiltrates    CXR 3/11/25  FINDINGS/IMPRESSION:   1. There is redemonstration of patchy alveolar opacities throughout both lungs.  The findings could represent alveolar edema, a diffuse multifocal infectious process, or a combination.  The findings have not significantly changed since previous exam.   2. The heart mediastinal structures remain enlarged.  The there are trace bilateral pleural effusions.   3. The thoracic component of an enteric feeding tube is identified traversing the thoracic esophagus.  The distal tip is not visualized but lies well below the GE junction.     CT head 3/7/25  CONCLUSION:   Motion limits  evaluation.  No evidence of acute intracranial abnormality.     CT chest 3/7/25  CONCLUSION:   1. Extensive bilateral airspace disease, concerning for potential multifocal pneumonia. Continued surveillance is advised.   2. Dense bilateral lower airspace consolidation is evident; aspiration pneumonitis is a differential diagnostic possibility. Follow-up is recommended to document resolution.    3. Dilatation of the main pulmonary artery trunk may relate to underlying pulmonary hypertension.    4. Small hiatal hernia with imaging manifestations that may be indicative underlying esophagitis.   5. Marked hepatic steatosis.   6. Lesser incidental findings as above.     CXR 3/7/25 with multifocal infiltrates, possible effusion on the left  CONCLUSION:   1. Cardiomegaly.  Tortuous aorta.   2. Extensive multifocal airspace opacification in the bilateral perihilar and basilar regions with left-sided effusion.  Differential includes pulmonary edema congestive failure versus multifocal pneumonitis.      CT abd/pelvis 3/4/25  CONCLUSION:   Severely fatty liver with subtle undulating contour.  Stable splenomegaly.  Tubular structures around the GE junction are suggestive of lower esophageal varices.  No ascites       LABS:  Recent Labs   Lab 03/18/25  0500 03/19/25  0523 03/20/25  0331   RBC 3.11* 3.15* 3.05*   HGB 8.3* 8.1* 8.2*   HCT 23.9* 24.0* 23.8*   MCV 76.8* 76.2* 78.0*   MCH 26.7 25.7* 26.9   MCHC 34.7 33.8 34.5   RDW 29.9* 31.3* 30.7*   NEPRELIM 7.73* 7.94* 9.52*   WBC 10.6 11.0 13.0*   .0 270.0 281.0       Recent Labs   Lab 03/18/25  0500 03/18/25  1827 03/18/25 2025 03/19/25  0523 03/20/25  0331   *  --   --  107* 94   BUN 59*  --   --  49* 43*   CREATSERUM 1.28  --   --  0.95 0.96   EGFRCR 70  --   --  100 99   CA 8.0*  --   --  8.0* 8.1*   ALB 3.1*  --   --  2.9*  2.9* 2.8*     --   --  136 132*   K 3.3*  3.3*   < > 3.6 3.6 4.2     --   --  106 102   CO2 23.0  --   --  23.0 22.0   ALKPHO  189*  --   --  182*  182* 181*   *  --   --  251*  251* 273*   ALT 89*  --   --  108*  108* 112*   BILT 12.6*  --   --  14.1*  14.1* 15.0*   TP 5.7  --   --  5.5*  5.5* 5.7    < > = values in this interval not displayed.       ASSESSMENT/PLAN:  Acute hypoxemic resp failure  -secondary to multifocal infiltrates. Given hx of emesis, concern for aspiration  -checked non contrast chest CT showed b/l infiltrates  -initially on zosyn, doxy. Checked urine leg, strep pneumo, mrsa nares, blood cx neg thus far  -initially weaned from airvo to 6 LNC but then declined and was intubated on 3/12/25. Was on full MV support   -passed SBT on 3/17/25 and extubated to NC. Now on 4LNC  -ID consulted and stopped doxy and zosyn. Switched to meropenem  -repeat CT with b/l infiltrates L>R  -underwent bronchoscopy with left BAL on 3/13/25. Cultures NGTD  -aspiration precautions  -now with right chest discomfort with taking a deep breath in. CXR pretty unchanged. VQ scan being done today. Will also order LE dopplers    Acute ETOH hepatitis and now pancreatitis  -GI following. Imaging as above-now with pancreatitis  -PPI  -passed swallow     -hypotension-multifactorial from sedation, sepsis, anemia  -likely secondary to sedation and sepsis  -Pt didn't receive sepsis bolus b/c deemed fluid overloaded d/t possible cirrhosis. Was on low dose levophed for MAP > 65 weaned off   -possibly d/t acute anemia. No obvious source seen. S/p transfused 1 unit pRBC on 3/16/25  -off pressors now    ETOH withdrawal  -s/p CIWA protocol  -psych following   -CT head neg for acute changes    USMAN and hypernatremia  -s/p bicarb infusion  -s/p D5 0.9 NS  -renal following. Na improving     Proph  -DVT: elevated INR low PLT, anemia. On SCDS  -nutrition: passed swallow     Dispo  -Full code  -sign other at bedside      Thank you for the opportunity to care for Sami Escobedo DO, MPH  Pulmonary Critical Care Medicine  [place] [facility]  Pulmonary and Critical Care Medicine                         [1]   Allergies  Allergen Reactions    Morphine SWELLING     SHORTNESS OF BREATH   [2]   No outpatient medications have been marked as taking for the 3/4/25 encounter (Hospital Encounter).

## 2025-03-20 NOTE — PROGRESS NOTES
On call Vazquezurnist 03/20/25    RRT called to room 568    Patient lying in bed appears comfortable complaining of right-sided chest pain ongoing for the past few hours.  States pain worsens with deep breathing denies any radiation of the pain denies any associated shortness of breath palpitations or diaphoresis.  Denies any similar pain in the past.  Rates it as a 7 out of 10 in severity    On exam  Temperature 98  Pulse 90  Respiration 18  /70  Pulse ox 98% 4 L nasal cannula  Alert oriented in no distress  Chest clear  Heart regular rate  Abdomen soft  Extremities no edema    Assessment and plan    Chest pain unclear etiology  EKG with no acute changes however does have S1Q3T3.  Check troponin D-dimer, along with chest x-ray.  Use tramadol for pain.  If D-dimer elevated will require CT rule out PE    35 minutes of critical care time spent with patient, and coordinating care.

## 2025-03-20 NOTE — PROGRESS NOTES
[facility]     Gastroenterology Progress Note    Sami Grijalva Jr. Patient Status:  Inpatient    10/11/1980 MRN [id number]   Location [facility] 2W/SW Attending Natasha Araujo MD   Hosp Day # 16 PCP Kerri Medina MD       Subjective:   Overnight with right sided chest pain. States it persists as of this afternoon. No nausea or emesis. No abdominal pain.     Objective:   Blood pressure 110/59, pulse 105, temperature 99.1 °F (37.3 °C), temperature source Oral, resp. rate 20, height 5' 8\" (1.727 m), weight 186 lb 11.7 oz (84.7 kg), SpO2 94%. Body mass index is 28.39 kg/m².    Gen: awake and alert and oriented  HEENT: MMM  CV: RRR  Lung: on NC  Abdomen: soft NTND abdomen    Skin: dry, warm,   Ext:  edema  Neuro: Appropriate and conversant    Assessment and Plan:   Sami Grijalva Jr. is a 44 year old male w/ PMHx of longstanding alcohol use, cannabis use, GERD, who presents with generalized abdominal pain, hiccups and vomiting. GI consulted for acute alcohol hepatitis.  ICU stay complicated by multifocal pneumonia and ARDS now status post extubation this morning     #Acute alcohol hepatitis with probable underlying cirrhosis  -CT a/p in ER severe fatty liver with subtle undulating contour  -US liver with hepatic steatosis, GB sludge and cholelithiasis without cholecystitis or biliary ductal dilation, hepatic and portal vein patent  -last EGD 2023 without varices   -high maddrey; holding off on steroids because of pneumonia  -LFTs remain elevated likely from acute alcohol hepatitis in setting of alcohol cirrhosis and overlap cholestasis of sepsis  -nutrition     #Likely early cirrhosis, ETOH   MELD 3.0 on admission: 44  -PSE: Continue lactulose and Xifaxan  -Ascites: none on exam or US  -EV: None on last EGD 2023, tubular structure around GE junction suggestive of EV.  Will need repeat EGD electively  -HCC: No lesions on US or CT, AFP 2.7     # Mild interstitial  pancreatitis  - likely etoh induced   - continue supportive care      Vandana Austin MD      Results:     Lab Results   Component Value Date    WBC 13.0 (H) 03/20/2025    HGB 8.2 (L) 03/20/2025    HCT 23.8 (L) 03/20/2025    .0 03/20/2025    CREATSERUM 0.96 03/20/2025    BUN 43 (H) 03/20/2025     (L) 03/20/2025    K 4.2 03/20/2025     03/20/2025    CO2 22.0 03/20/2025    GLU 94 03/20/2025    CA 8.1 (L) 03/20/2025    ALB 2.8 (L) 03/20/2025    ALKPHO 181 (H) 03/20/2025    BILT 15.0 (H) 03/20/2025    TP 5.7 03/20/2025     (H) 03/20/2025     (H) 03/20/2025    INR 1.80 (H) 03/20/2025     (HH) 03/13/2025    DDIMER 9.14 (H) 03/20/2025    ESRML 27 (H) 10/01/2021    MG 2.2 03/18/2025    PHOS 3.1 03/20/2025    TROP 0.00 01/31/2017     (H) 03/06/2025    ETOH <3 03/04/2025       US VENOUS DOPPLER LEG BILAT - DIAG IMG (CPT=93970)    Result Date: 3/20/2025  CONCLUSION:  1. No lower extremity DVT bilaterally.    Dictated by (CST): Parish Craig MD on 3/20/2025 at 1:09 PM     Finalized by (CST): Parish Craig MD on 3/20/2025 at 1:09 PM          NM LUNG VQ VENT / PERFUSION SCAN  (CPT=78582)    Result Date: 3/20/2025  CONCLUSION:  1. Indeterminate examination; multiple triple matched defects are demonstrated, presumably related to underlying parenchymal disease visualized on radiographs. The patient is a poor candidate for scintigraphic assessment.  2. Accumulation of xenon radiotracer in the region of the liver is likely indicative of hepatic steatosis.    Dictated by (CST): Pavel Lara MD on 3/20/2025 at 12:01 PM     Finalized by (CST): Pavel Lara MD on 3/20/2025 at 12:04 PM          XR CHEST AP PORTABLE  (CPT=71045)    Result Date: 3/20/2025  CONCLUSION: Patchy alveolar opacities throughout the lungs, suggesting edema or multifocal pneumonia.  No significant interval change since preceding exam.  Interval extubation and removal of feeding tube.   [facility] provided a  preliminary report for this examination. This final report agrees with their preliminary findings.   Dictated by (CST): Isaiah Lee MD on 3/20/2025 at 6:31 AM     Finalized by (CST): Isaiah Lee MD on 3/20/2025 at 6:33 AM         EKG 12 Lead    Result Date: 3/20/2025  Normal sinus rhythm Possible Inferior infarct , age undetermined Possible Anterior infarct , age undetermined Abnormal ECG When compared with ECG of 04-MAR-2025 10:00, Borderline criteria for Anterior infarct are now Present Borderline criteria for Inferior infarct are now Present T wave inversion now evident in Inferior leads Nonspecific T wave abnormality now evident in Anterior leads Confirmed by BRYSON COPELAND, OLIVIA ([id number]) on 3/20/2025 8:14:43 AM

## 2025-03-20 NOTE — PLAN OF CARE
Problem: Patient Centered Care  Goal: Patient preferences are identified and integrated in the patient's plan of care  Description: Interventions:  - Provide timely, complete, and accurate information to patient/family  - Incorporate patient and family knowledge, values, beliefs, and cultural backgrounds into the planning and delivery of care  - Encourage patient/family to participate in care and decision-making at the level they choose  - Honor patient and family perspectives and choices  Outcome: Progressing     Problem: PAIN - ADULT  Goal: Verbalizes/displays adequate comfort level or patient's stated pain goal  Description: INTERVENTIONS:  - Encourage pt to monitor pain and request assistance  - Assess pain using appropriate pain scale  - Administer analgesics based on type and severity of pain and evaluate response  - Implement non-pharmacological measures as appropriate and evaluate response  - Consider cultural and social influences on pain and pain management  - Manage/alleviate anxiety  - Utilize distraction and/or relaxation techniques  - Monitor for opioid side effects  - Notify MD/LIP if interventions unsuccessful or patient reports new pain  - Anticipate increased pain with activity and pre-medicate as appropriate  Outcome: Progressing     Problem: RISK FOR INFECTION - ADULT  Goal: Absence of fever/infection during anticipated neutropenic period  Description: INTERVENTIONS  - Monitor WBC  - Administer growth factors as ordered  - Implement neutropenic guidelines  Outcome: Progressing     Problem: SAFETY ADULT - FALL  Goal: Free from fall injury  Description: INTERVENTIONS:  - Assess pt frequently for physical needs  - Identify cognitive and physical deficits and behaviors that affect risk of falls.  - [place] fall precautions as indicated by assessment.  - Educate pt/family on patient safety including physical limitations  - Instruct pt to call for assistance with activity based on assessment  -  Modify environment to reduce risk of injury  - Provide assistive devices as appropriate  - Consider OT/PT consult to assist with strengthening/mobility  - Encourage toileting schedule  Outcome: Progressing     Problem: DISCHARGE PLANNING  Goal: Discharge to home or other facility with appropriate resources  Description: INTERVENTIONS:  - Identify barriers to discharge w/pt and caregiver  - Include patient/family/discharge partner in discharge planning  - Arrange for needed discharge resources and transportation as appropriate  - Identify discharge learning needs (meds, wound care, etc)  - Arrange for interpreters to assist at discharge as needed  - Consider post-discharge preferences of patient/family/discharge partner  - Complete POLST form as appropriate  - Assess patient's ability to be responsible for managing their own health  - Refer to Case Management Department for coordinating discharge planning if the patient needs post-hospital services based on physician/LIP order or complex needs related to functional status, cognitive ability or social support system  Outcome: Progressing     Problem: Delirium  Goal: Minimize duration of delirium  Description: Interventions:  - Encourage use of hearing aids, eye glasses  - Promote highest level of mobility daily  - Provide frequent reorientation  - Promote wakefulness i.e. lights on, blinds open  - Promote sleep, encourage patient's normal rest cycle i.e. lights off, TV off, minimize noise and interruptions  - Encourage family to assist in orientation and promotion of home routines  Outcome: Progressing     Problem: RESPIRATORY - ADULT  Goal: Achieves optimal ventilation and oxygenation  Description: INTERVENTIONS:  - Assess for changes in respiratory status  - Assess for changes in mentation and behavior  - Position to facilitate oxygenation and minimize respiratory effort  - Oxygen supplementation based on oxygen saturation or ABGs  - Provide Smoking Cessation  handout, if applicable  - Encourage broncho-pulmonary hygiene including cough, deep breathe, Incentive Spirometry  - Assess the need for suctioning and perform as needed  - Assess and instruct to report SOB or any respiratory difficulty  - Respiratory Therapy support as indicated  - Manage/alleviate anxiety  - Monitor for signs/symptoms of CO2 retention  Outcome: Progressing     Problem: METABOLIC/FLUID AND ELECTROLYTES - ADULT  Goal: Electrolytes maintained within normal limits  Description: INTERVENTIONS:  - Monitor labs and rhythm and assess patient for signs and symptoms of electrolyte imbalances  - Administer electrolyte replacement as ordered  - Monitor response to electrolyte replacements, including rhythm and repeat lab results as appropriate  - Fluid restriction as ordered  - Instruct patient on fluid and nutrition restrictions as appropriate  Outcome: Progressing  Goal: Hemodynamic stability and optimal renal function maintained  Description: INTERVENTIONS:  - Monitor labs and assess for signs and symptoms of volume excess or deficit  - Monitor intake, output and patient weight  - Monitor urine specific gravity, serum osmolarity and serum sodium as indicated or ordered  - Monitor response to interventions for patient's volume status, including labs, urine output, blood pressure (other measures as available)  - Encourage oral intake as appropriate  - Instruct patient on fluid and nutrition restrictions as appropriate  Outcome: Progressing     Problem: Impaired Swallowing  Goal: Minimize aspiration risk  Description: Interventions:  - Patient should be alert and upright for all feedings (90 degrees preferred)  - Offer food and liquids at a slow rate  - No straws  - Encourage small bites of food and small sips of liquid  - Offer pills one at a time, crush or deliver with applesauce as needed  - Discontinue feeding and notify MD (or speech pathologist) if coughing or persistent throat clearing or wet/gurgly  vocal quality is noted  Outcome: Progressing     Problem: Impaired Cognition  Goal: Patient will exhibit improved attention, thought processing and/or memory  Description: Interventions:  - Minimize distractions in the room when full attention is required  - Allow additional time for processing after asking questions or providing instructions  Outcome: Progressing     Problem: CARDIOVASCULAR - ADULT  Goal: Maintains optimal cardiac output and hemodynamic stability  Description: INTERVENTIONS:- Monitor vital signs, rhythm, and trends- Monitor for bleeding, hypotension and signs of decreased cardiac output- Evaluate effectiveness of vasoactive medications to optimize hemodynamic stability- Monitor arterial and/or venous puncture sites for bleeding and/or hematoma- Assess quality of pulses, skin color and temperature- Assess for signs of decreased coronary artery perfusion - ex. Angina- Evaluate fluid balance, assess for edema, trend weights  Outcome: Progressing

## 2025-03-20 NOTE — OCCUPATIONAL THERAPY NOTE
OCCUPATIONAL THERAPY TREATMENT NOTE - INPATIENT        Room Number: 568/568-A     Presenting Problem: USMAN; ETOH; respiratory failure; extubated 3/17    Problem List  Principal Problem:    USMAN (acute kidney injury)  Active Problems:    Metabolic acidosis    Hyperkalemia    Leukocytosis    Thrombocytopenia    Coagulopathy (HCC)    Hyponatremia    Alcoholic (HCC)    Scleral icterus    Acute kidney failure    DTs (delirium tremens) (HCC)    Alcohol use disorder    Acute respiratory failure with hypoxia (HCC)    Wernicke's syndrome    Hypophosphatemia    Hypernatremia    Alcohol-induced acute pancreatitis (HCC)    Alcoholic hepatitis without ascites (HCC)      OCCUPATIONAL THERAPY ASSESSMENT   Patient demonstrates limited progress this session, goals remain in progress.    Patient is requiring maximum assist as a result of the following impairments: decreased functional strength, decreased endurance, impaired sitting balance balance, decreased insight to deficits, and decreased safety awareness.    Patient continues to function below baseline with adls and functional mobility.  Next session anticipate patient to progress grooming, static sitting balance, static standing balance, and functional standing tolerance.  Occupational Therapy will continue to follow patient for duration of hospitalization.    Patient continues to benefit from continued skilled OT services: to facilitate return to prior level of function as patient demonstrates high motivation with excellent tolerance to an intensive therapy program.     PLAN DURING HOSPITALIZATION  OT Device Recommendations: TBD  OT Treatment Plan: Patient/Family training, Patient/Family education, Endurance training, Functional transfer training, ADL training, Balance activities     SUBJECTIVE  \"I want to walk\"    OBJECTIVE  Precautions: Bed/chair alarm, Rectal tube, HOB elevated 30 degrees (NG)    PAIN ASSESSMENT  Rating: Unable to rate  Location: -- (chest pain)  Management  Techniques: Breathing techniques (protective bracing)    ACTIVITY TOLERANCE   w/ activity    O2 SATURATIONS  96% on 4L at rest;84% w/ activity. Pt requiring increased time and cues for deep breathing to return to 91%    ACTIVITIES OF DAILY LIVING ASSESSMENT  AM-PAC ‘6-Clicks’ Inpatient Daily Activity Short Form  How much help from another person does the patient currently need…  -   Putting on and taking off regular lower body clothing?: A Lot  -   Bathing (including washing, rinsing, drying)?: A Lot  -   Toileting, which includes using toilet, bedpan or urinal? : A Lot  -   Putting on and taking off regular upper body clothing?: A Lot  -   Taking care of personal grooming such as brushing teeth?: A Little  -   Eating meals?: A Little    AM-PAC Score:  Score: 14  Approx Degree of Impairment: 59.67%  Standardized Score (AM-PAC Scale): 33.39  CMS Modifier (G-Code): CK    FUNCTIONAL ADL ASSESSMENT  Eating: min assist  Grooming: mod assist  UB Dressing: mod assist  LB Dressing: max assist  Toileting: na    Skilled Therapy Provided: RN contacted prior to start of care. Treatment coordinated w/ PT. Pt agreeable to participation in therapy. Gait belt used during dynamic activity. Pt received in bed, alert and oriented;family at bedside. Pt currently requires min a to transfer supine<>sit at eob. Pt required min a to maintain unsupported sitting due to left lateral lean. Pt unable to self correct for deviations in posture w/o cues and physical prompts. Pt transferred bed<>chair w/ rw and mod a. Pt required max a x 2 for sit<>stand from chair;mod x 2 from raised height bed. Breathing strategies reinforced w/ pt sob w/ activity    At end of session pt remaining up in chair w/ all needs in reach and alarm on;family at bedside. RN aware of pt's status and performance in therapy. Lift recommended for back to bed      EDUCATION PROVIDED  Patient Education : Role of Occupational Therapy; Plan of Care; Functional Transfer  Techniques; Fall Prevention; Posture/Positioning  Patient's Response to Education: Requires Further Education  Family/Caregiver Education : Role of Occupational Therapy; Plan of Care; Discharge Recommendations  Family/Caregiver's Response to Education: Verbalized Understanding    The patient's Approx Degree of Impairment: 59.67% has been calculated based on documentation in the [facility] '6 clicks' Inpatient Daily Activity Short Form.  Research supports that patients with this level of impairment may benefit from IRF.  Final disposition will be made by interdisciplinary medical team.    Patient End of Session: Up in chair, Needs met, Call light within reach, RN aware of session/findings, All patient questions and concerns addressed, Alarm set, Family present    OT Goals:     Patient will complete functional transfer with Mod A   Comment: pt required max a x 2 for sit<>stand from chair    Patient will complete toileting with Mod A   Comment: na    Patient will tolerate standing for 1-2 minutes in prep for adls with Mod A    Comment: pt required max a x 2 to maintain static standing < 1 minute w/ rw    Patient will tolerate unsupported sitting at eOB with SBA in prep for seated ADLs  Comment: pt required min a to maintain unsupported sitting            Goals  on: 25  Frequency: 3-5x week     Therapeutic Activity: 20 minutes

## 2025-03-20 NOTE — DIETARY NOTE
ADULT NUTRITION REASSESSMENT    Pt is at high nutrition risk.  Pt does not meet malnutrition criteria.      RECOMMENDATIONS TO MD:   See nutrition intervention for ONS (oral nutrition supplements)    ADMITTING DIAGNOSIS:  Scleral icterus [R17]  USMAN (acute kidney injury) [N17.9]  PERTINENT PAST MEDICAL HISTORY:   Past Medical History:    Esophageal reflux    Gout    Hernia     PATIENT STATUS:   Initial 03/06/25: Pt assessed r/t screened at risk on initial MST due to unintentional wt loss and decreased/poor appetite/intake. Presented to hospital with intractable N/V and poor appetite for the last few days - ETOH hepatitis and USMAN. PMH as listed above including ETOH abuse. Transferred to CCU for high CIWA scores. Scheduled ativan, soft wrist restraints in place. Very lethargic, minimally responsive. Sleeping but restless at time of visit. Not appropriate for diet hx with no family at bedside. Per H&P pt reported N/V, hiccups and inability to tolerate any solid foods x 3 days PTA. Typically consumes 3 24-oz  beers daily. Appears well nourished.  03/07/25 UPDATE: Pt discussed with MD on unit - plan to initiate EN feeds. Consult received for RD to initiate and manage tube feeds. See full assessment from 3/6. Pt remains minimally responsive on ativan and not appropriate to take PO. NPO/CL x6 days this admission. Increasing O2 requirements. Stable on HFNC at 40 L/min. RN to place NGT. Remains on lactulose x4 daily with rectal pouch in place 1650 ml recorded output over the 24 hrs however noted no documentation from day shift on 3/6. Pt with good UO and improving creatinine level. Noted hypophosphotemia with replacement ordered 30 mmol NaPhos rider per protocol. IVF of D5W 0.9NS @ 75 ml/hr provides 1.8 L, 90 g dextrose and 306 kcal. Lasix x1 today. Messaged nephrology regarding plan for IVF and free water from EN - awaiting response.    03/10/25 UPDATE: Pt lethargic. Remains inappropriate for PO intake. Pt pulled out NG DHT  this afternoon. Now replaced by RN with feeds resuming. EN feeds at 30 ml/hr (50% of goal rate). FWF increased to 200 ml q 4 hrs on 3/8 and EN held by MD on 3/9 due to worsening hypernatremia. Feeds resumed overnight 3/10. No improvement in hypernatremia. Pt with large amount of stool output, lactulose QID.    03/13/25 UPDATE:     Pt intubated 3/12, sedated on Propofol (12.4 ml/hr= ~330 kcals via lipids), low dose norepinephrine (3 mcg/min), worsening pneumonia s/p bronch today, new dx Acute Pancreatitis (per CT scan and lipase 444) 3/12 -acceptable. Stooling on lactulose (rectal tube in place), Marine non-oliguric at present.  Discussed with APN re: resuming EN support post bronch. May continue with same formula for impaired GI function.     03/17/2025 UPDATE:  Continues on tube feeds resumed at 6p on 3/14 at goal nutrition. Lactulose stopped 3/14 and stool output decreased/acceptable while ammonia level normalized on 3/12 and 3/14. 3/16 Water flushes decreased per renal service to 200 ml q 4 hrs,and  lasix dose decreased. I/O's still +16L.Sodium level 138 (trending down) today.  Remains intubated and on low dose levo. May further decreased FWF- renal service has been adjusting.     Update 3/20/25: Pt reassessed for follow up. Chart reviewed, pt extubated 3/17. Tube feeding discontinued, accidentally pulled out,  and diet advanced to chopped/ soft and bite sized, thin liquids. Visited pt at bedside today, pt stated appetite was good. Per chart, intake is fair to good since diet advanced to PO. Pt is consuming Mighty Shakes BID, will continue to send.     FOOD/NUTRITION RELATED HISTORY:  Appetite: Fair to good  Intake: ~20% x100 meals documented since 3/18/25  Intake Meeting Needs: No, but oral nutrition supplement given to maximize.  Percent Meals Eaten (last 6 days)       Date/Time Percent Meals Eaten (%)    03/18/25 0100 25 %     Percent Meals Eaten (%): 25%of yogurt and soup. Refused shake. at 03/18/25 0100     03/19/25 1515 100 %    03/19/25 2117 20 %     Percent Meals Eaten (%): dinner at 03/19/25 2117 03/20/25 1000 90 %        Food Allergies: No Known Food Allergies (NKFA)  Cultural/Ethnic/Hindu Preferences: Not Obtained    GASTROINTESTINAL: +BM 3/19 loose, brown . Swallow Eval noted.    CT A/P 3/4: \"Severely fatty liver with subtle undulating contour.  Stable splenomegaly.  Tubular structures around the GE junction are suggestive of lower esophageal varices.  No ascites \"    MEDICATIONS: reviewed; Noted non-cardiac electrolyte replacement protocol ordered;..        pantoprazole  40 mg Oral BID AC    epoetin sheela  10,000 Units Subcutaneous Once per day on Tuesday Thursday Saturday    furosemide  20 mg Intravenous BID (Diuretic)    meropenem  1,000 mg Intravenous Q12H    vancomycin  125 mg Per NG Tube Daily    multivitamin  1 tablet Oral Daily    folic acid  1 mg Oral Daily     LABS: reviewed; Lipase: 444 3/13 . Na+ wnl today. K+ replaced.   Hyponatremia noted, Bili is rising.   Recent Labs     03/17/25  0500 03/18/25  0500 03/18/25  1827 03/18/25  2025 03/19/25  0523 03/20/25  0331   * 113*  --   --  107* 94   BUN 61* 59*  --   --  49* 43*   CREATSERUM 1.93* 1.28  --   --  0.95 0.96   CA 8.3* 8.0*  --   --  8.0* 8.1*   MG 2.2 2.2  --   --   --   --     140  --   --  136 132*   K 3.3* 3.3*  3.3*   < > 3.6 3.6 4.2    109  --   --  106 102   CO2 22.0 23.0  --   --  23.0 22.0   PHOS 4.0 2.2*  --   --  2.9 3.1   OSMOCALC 304* 307*  --   --  295 285    < > = values in this interval not displayed.     WEIGHT HISTORY:  Patient Weight(s) for the past 336 hrs:   Weight   03/18/25 0600 84.7 kg (186 lb 11.7 oz)   03/17/25 0600 88.5 kg (195 lb 1.7 oz)   03/16/25 0600 82.4 kg (181 lb 10.5 oz)   03/15/25 0600 81 kg (178 lb 9.2 oz)   03/14/25 1100 85.2 kg (187 lb 13.3 oz)   03/12/25 0530 77.9 kg (171 lb 11.8 oz)     Wt Readings from Last 10 Encounters:   03/18/25 84.7 kg (186 lb 11.7 oz)   03/27/24 77.1 kg  (170 lb)   12/26/23 89.8 kg (198 lb)   11/01/23 81.6 kg (180 lb)   10/16/23 81.6 kg (180 lb)   10/16/23 82.6 kg (182 lb)   09/06/23 91.6 kg (202 lb)   08/27/23 91.7 kg (202 lb 1.6 oz)   05/31/23 83.9 kg (185 lb)   01/17/23 90.7 kg (200 lb)     ANTHROPOMETRICS:  HT: 172.7 cm (5' 8\")  Wt Readings from Last 1 Encounters:   03/18/25 84.7 kg (186 lb 11.7 oz)   Last weight: unsure of accuracy as up and down (16 lbs up now)     Dosing Weight: 77.9 kg (171 lbs) - updated on 3/13 as decreased from admit.-more reflective of dry wt.    BMI: Body mass index is 27.79 kg/m².  BMI CLASSIFICATION: 25-29.9 kg/m2 - overweight  IBW/lbs (Calculated) Male: 154 lbs           119% IBW  Usual Body Wt: 170 lbs (per EMR 3/27/24)      108% UBW on admit.     NUTRITION RELATED PHYSICAL FINDINGS:  - Nutrition Focused Physical Exam (NFPE): no wasting noted  - Fluid Accumulation: +1 feet and hands.and B/L UE non-pitting edema. . CT A/P: small to moderate ascites, mild anasarca. . See RN documentation for details  - Skin Integrity: at risk and intact. See RN documentation for details    NUTRITION DIAGNOSIS/PROBLEM:   Inadequate protein energy intake related to Decreased ability to consume sufficient energy in the setting of ETOH withdrawals as evidenced by decreased/poor intake x5 days.     NUTRITION DIAGNOSIS PROGRESS:  Improvement (unresolved) - PO intake improving, appetite increasing.    NUTRITION INTERVENTION:     NUTRITION PRESCRIPTION:   Estimated Nutrition needs: --dosing wt of 77.9 kg - wt taken on 3/12/25  Calories: 2155 - 2351 kcals (MSJ 1703 x 1.15 AF x 1.1 -1.2 IF or 28 -30 calories/kg).  Protein: 93 - 117 g protein/day (1.2-1.5 g protein/kg Dosing wt)    - Diet:       Procedures    Regular/General diet Fluid Consistency: Thin Liquids ; Texture Consistency: Soft / Easy to Chew ; Is Patient on Accuchecks? No; Is Patient on Suicide Precautions? No     - ONS (Oral Nutrition Supplements)/Meals/Snacks: Mighty Shake (300 calories/ 9 g  protein each) BID Chocolate   - Vitamin and mineral supplements: folic acid and multivitamin/mineral.   - Feeding assistance: meal set up  - Nutrition education: not appropriate at this time  - Coordination of nutrition care: collaboration with other providers -   - Discharge and transfer of nutrition care to new setting or provider: monitor plans    MONITOR AND EVALUATE/NUTRITION GOALS:  - Food and Nutrient Intake:      Monitor: adequacy of PO intake and adequacy of supplement intake  - Food and Nutrient Administration:      Monitor: N/A  - Anthropometric Measurement:    Monitor weight  - Nutrition Goals:      PO and supplement greater than 75% of needs, labs within acceptable limits, and maintain true wt within 5%     DIETITIAN FOLLOW UP: RD to follow and monitor nutrition status    Maria Dolores Keith-Binh  Dietetic Intern  Phone: [phone]

## 2025-03-20 NOTE — SLP NOTE
SPEECH DAILY NOTE - INPATIENT    ASSESSMENT & PLAN   ASSESSMENT  PPE REQUIRED. THIS THERAPIST WORE GLOVES, DROPLET MASK, AND GOGGLES FOR DURATION OF EVALUATION. HANDS WASHED UPON ENTRANCE/EXIT.    SLP f/u for ongoing dysphagia tx/meal assessment per recommendations of easy to chew/thin per BSE. RN reports pt tolerates diet and medication well with no overt clinical s/s aspiration. Pt denies any swallowing challenges.     Pt positioned upright in bed. Pt afebrile, tolerating 4L/Min O2NC with oxygen status 96 prior to the start of oral trials. SLP reviewed aspiration precautions and safe swallowing compensatory strategies with the patient. Safe swallow guidelines remain written on the white board in purple. Diet recommendations remain on the whiteboard in the room. Patient and family v/u. Provided no assistance, pt tolerates easy to chew and thin liquids via open cup with no overt clinical signs/symptoms of aspiration. Oxygen status remained >95 t/o the entire session. Oral/buccal cavities clear of residue following all trials.     PLAN: SLP to sign off at this time secondary to pt tolerating least restrictive diet with no CSA.     Diet Recommendations - Solids: Soft/ Easy to chew  Diet Recommendations - Liquids: Thin Liquids    Compensatory Strategies Recommended: Alternate consistencies  Aspiration Precautions: Upright position, Slow rate, Small bites, Small sips  Medication Administration Recommendations:  (as tolerated)    Patient Experiencing Pain: No      Treatment Plan  Treatment Plan/Recommendations: Aspiration precautions    Interdisciplinary Communication: Discussed with RN  Recommendations posted at bedside            GOALS  Goal #1 The patient will tolerate easy to chew consistency and thin liquids without overt signs or symptoms of aspiration with 100 % accuracy over 1-2 session(s).  Goal met 3/20   Goal #2 The patient/family/caregiver will demonstrate understanding and implementation of aspiration  precautions and swallow strategies independently over 1-2 session(s).    Goal met 3/20   Goal #3 The patient will tolerate trial upgrade of soft easy to chew/regular consistency and thin liquids without overt signs or symptoms of aspiration with 100 % accuracy over 1-2 session(s).  Partially met 3/19   Goal #4 The patient will utilize compensatory strategies as outlined by  BSSE (clinical evaluation) including Slow rate, Small bites, Small sips, Alternate liquids/solids, Upright 90 degrees, Eliminate distractions with PRN assistance 100 % of the time across 2 sessions.    Goal met 3/20     FOLLOW UP  Follow Up Needed (Documentation Required): Yes  SLP Follow-up Date: 03/20/25  Duration: 1 week    Session: 2    If you have any questions, please contact JACK Olivas M.S. CCC-SLP  Speech Language Pathologist  Phone Number Ext. [phone]

## 2025-03-20 NOTE — CONSULTS
[facility]  part of [facility]      Sami Grijavla Jr. Patient Status:  Inpatient    10/11/1980 MRN [id number]   Location [facility] 5SW/SE Attending Yonny Bowers MD   Hosp Day # 16 PCP Kerri Medina MD       CC: Hepatic encephalopathy, acute hypoxic respiratory failure, deconditioning due to complications of EtOH abuse    History of Present Illness:    44-year-old gentleman with longstanding history of EtOH abuse presented initially to the hospital intractable nausea and vomiting, poor appetite for a few days prior to admission and intractable hiccups.  CT abdomen done at that time showed of fatty liver, stable splenomegaly tubular structures around the gastroesophageal junction consistent with esophageal varices.  Initially started on supportive treatment.  Ultimately admitted to the ICU.  Did require intubation.  Found to have multifocal pneumonia.  Alcoholic liver cirrhosis.  Kidney injury.  Infectious diseases been following the patient and he has been on IV antibiotics.  Renal function slowly improving.  He has been diuresed.  He has been extubated since 317.  Mental status slowly starting to improve.  Still requiring oxygen.  And pulmonology continues to follow the patient.  He is also been having intractable diarrhea thought to be multifactorial in nature.  He has findings consistent with alcoholic hepatitis.  LFTs continue to trend upwards.  Patient has become quite jaundiced.  Appetite is slowly improving.  Still profoundly deconditioned.  He has had right-sided chest wall pain.  Being ruled out for PE.    Prescriptions Prior to Admission[1]  Allergies[2]  Past Medical History:    Esophageal reflux    Gout    Hernia     Past Surgical History:   Procedure Laterality Date    Colonoscopy N/A 2023    Procedure: COLONOSCOPY;  Surgeon: Vandana Austin MD;  Location: [facility] ENDOSCOPY    Egd  02/15/2016    Eh pr repair complex scalp arms legs 1.1 to 2.5 cm  2014    Elbow  fracture surgery Right 1991    Hernia surgery      Inguinal hernia repair Left 01/17/2023     Social History     Socioeconomic History    Marital status: Single     Spouse name: Not on file    Number of children: 0    Years of education: Not on file    Highest education level: Not on file   Occupational History    Occupation: Supervisor, car wash   Tobacco Use    Smoking status: Former     Types: Cigarettes    Smokeless tobacco: Never   Vaping Use    Vaping status: Some Days   Substance and Sexual Activity    Alcohol use: Yes     Comment: per pt, DAILY HARD LEMONADE- [employer] hard lemonade 24oz cans, 6 cans daily    Drug use: Yes     Types: Cannabis     Comment: last use, couple months ago per pt report    Sexual activity: Not on file   Other Topics Concern    Not on file   Social History Narrative    Not on file     Social Drivers of Health     Food Insecurity: No Food Insecurity (3/4/2025)    NCSS - Food Insecurity     Worried About Running Out of Food in the Last Year: No     Ran Out of Food in the Last Year: No   Transportation Needs: No Transportation Needs (3/4/2025)    NCSS - Transportation     Lack of Transportation: No   Stress: Not on file   Housing Stability: Not At Risk (3/4/2025)    NCSS - Housing/Utilities     Has Housing: Yes     Worried About Losing Housing: No     Unable to Get Utilities: No     Family History   Problem Relation Age of Onset    Diabetes Father     Hypertension Father     Cancer Mother         liver       PAIN SCALE: 2/10    ACP: Full code    Review of Systems  No nausea at this time, no shortness of breath.  Have right-sided chest pain on deep inspiration.  He states appetite is better.  He does emphasize that he wants to stop drinking.  Unsure of continence.  Our assessment open MD negative.    Physical Exam  Temp:  [98.3 °F (36.8 °C)-98.9 °F (37.2 °C)] 98.3 °F (36.8 °C)  Pulse:  [] 97  Resp:  [19-20] 20  BP: (109-126)/(62-73) 126/72  SpO2:  [94 %-97 %] 96 %  96%    I/O last 3  completed shifts:  In: 1520 [P.O.:240; I.V.:80; NG/GT:990; IV PIGGYBACK:210]  Out: 2175 [Urine:2175]  I/O this shift:  In: 1300 [P.O.:300; IV PIGGYBACK:1000]  Out: -        CVS-S1-S2 RRR trace edema bilateral extremities  Extremities-warm to touch, peripheral pulses are palpable  Lymph nodes-no palpable lymph nodes in neck or groins  Skin-icteric, intact  MSK-bilateral upper extremity strength 4/5.  Lower extremity examination DF PF 5 HF 3+/5 bilaterally  Neurological exam-alert and oriented x 2-3.  Reasonable historian.  Sensation appears to be intact    Previous Function: Independent.  Lives with his family in a two-story home.  Positive for EtOH abuse.  History of smoking.    CURRENT FUNCTION:  AM-PAC Score:  Raw Score: 8   Approx Degree of Impairment: 86.62%   Standardized Score (AM-PAC Scale): 28.58   CMS Modifier (G-Code): CM     FUNCTIONAL ABILITY STATUS  Functional Mobility/Gait Assessment  Gait Assistance: Not tested  Rolling: maximum assist  Supine to Sit: dependent - maxAx2  Stand-pivot transfer: dependent - maxAx2    Labs  Lab Results   Component Value Date    WBC 13.0 (H) 03/20/2025    HGB 8.2 (L) 03/20/2025    HCT 23.8 (L) 03/20/2025    MCV 78.0 (L) 03/20/2025    .0 03/20/2025     No results found for: \"PTT\"  No results found for: \"PROTIME\"  Lab Results   Component Value Date     (L) 03/20/2025    K 4.2 03/20/2025    CO2 22.0 03/20/2025     03/20/2025    BUN 43 (H) 03/20/2025       Radiology:  US VENOUS DOPPLER LEG BILAT - DIAG IMG (CPT=93970)    Result Date: 3/20/2025  PROCEDURE: US VENOUS DOPPLER LEG BILAT-DIAG IMG (CPT=93970)  COMPARISON: [facility], US VENOUS DOPPLER LEG BILAT-DIAG IMG (CPT=93970), 8/27/2023, 5:00 PM.  INDICATIONS: Lower extremity swelling.. r/o DVT  TECHNIQUE: Color duplex Doppler venous ultrasound of both lower extremities was performed in the  usual manner.  FINDINGS:   The femoral and popliteal veins appear normal.  Normal flow was  demonstrated with color and pulsed Doppler.  Visualized portions of saphenous, and proximal calf veins appear normal.   THROMBI: None visible. COMPRESSIBILITY:   Normal. OTHER: Negative.         CONCLUSION:  1. No lower extremity DVT bilaterally.    Dictated by (CST): Parish Craig MD on 3/20/2025 at 1:09 PM     Finalized by (CST): Parish Craig MD on 3/20/2025 at 1:09 PM          NM LUNG VQ VENT / PERFUSION SCAN  (CPT=78582)    Result Date: 3/20/2025  PROCEDURE: NM LUNG VQ/PERFUSION SCAN (CPT=78582)  COMPARISON: [facility], CT CHEST ABDOMEN PELVIS (CPT=71250/39336), 3/12/2025, 11:37 PM.  [facility], XR CHEST AP PORTABLE (CPT=71045), 3/13/2025, 6:02 PM.  [facility], XR CHEST AP PORTABLE (CPT=71045), 3/14/2025, 6:26 AM.  [facility], XR CHEST AP PORTABLE (CPT=71045), 3/14/2025, 8:07 PM.  [facility], XR CHEST AP PORTABLE (CPT=71045), 3/15/2025, 7:53 AM.  [facility], XR CHEST AP PORTABLE (CPT=71045), 3/16/2025, 7:29 AM.  [facility], XR CHEST AP PORTABLE (CPT=71045), 3/20/2025, 3:35 AM.  INDICATIONS: Central substernal chest wall pain.  TECHNIQUE: Ventilation/perfusion lung study done with 9.2 millicuries Xenon-133 (inhaled), followed by 7.2 millicuries of Technetium-99m MAA injected into a right wrist vein.   FINDINGS:  VENTILATION: On single breath images, there is homogeneous distribution of the radioisotope. There is heterogeneous equilibrium and washout. Progressive accumulation of radiotracer beneath the right hemidiaphragm is demonstrated. PERFUSION: There is a markedly heterogeneous perfusion pattern; numerous bilateral subsegmental perfusion defects are apparent. V/Q MISMATCH: Grossly, none significant.   Comparison is made with a chest X-ray dated the same day.          CONCLUSION:  1. Indeterminate examination; multiple triple matched defects are demonstrated, presumably related to underlying  parenchymal disease visualized on radiographs. The patient is a poor candidate for scintigraphic assessment.  2. Accumulation of xenon radiotracer in the region of the liver is likely indicative of hepatic steatosis.    Dictated by (CST): Pavel Lara MD on 3/20/2025 at 12:01 PM     Finalized by (CST): Pavel Lara MD on 3/20/2025 at 12:04 PM          XR CHEST AP PORTABLE  (CPT=71045)    Result Date: 3/20/2025  PROCEDURE: XR CHEST AP PORTABLE  (CPT=71045) TIME: 3:41 a.m.   COMPARISON: [facility], XR CHEST AP PORTABLE (CPT=71045), 3/15/2025, 7:53 AM.  [facility], XR CHEST AP PORTABLE (CPT=71045), 3/16/2025, 7:29 AM.  [facility], XR CHEST AP PORTABLE (CPT=71045), 3/17/2025, 7:35 AM.  INDICATIONS: Chest pain, shortness of breath.  TECHNIQUE:   Single view.   FINDINGS:  CARDIAC/VASC: Cardiac silhouette has stable enlargement.  Mild vascular congestion.  MEDIAST/CHARLOTTE:   Atherosclerotic aorta with no visible aneurysm.  LUNGS/PLEURA: Low lung volumes.  Patchy alveolar opacities throughout the lungs, more confluent on the left.  No pleural effusion. BONES: Scattered mild degenerative endplate changes in the visualized thoracolumbar spine. OTHER: Interval extubation and removal of feeding tube.         CONCLUSION: Patchy alveolar opacities throughout the lungs, suggesting edema or multifocal pneumonia.  No significant interval change since preceding exam.  Interval extubation and removal of feeding tube.   [facility] provided a preliminary report for this examination. This final report agrees with their preliminary findings.   Dictated by (CST): Isaiah Lee MD on 3/20/2025 at 6:31 AM     Finalized by (CST): Isaiah Lee MD on 3/20/2025 at 6:33 AM          XR CHEST AP PORTABLE  (CPT=71045)    Result Date: 3/17/2025  PROCEDURE: XR CHEST AP PORTABLE  (CPT=71045)  COMPARISON: [facility], XR CHEST AP PORTABLE (CPT=71045), 3/16/2025, 7:29 AM.   INDICATIONS: Follow up.  Findings and impression:  ETT 6 cm above the em  Enteric tube beneath the diaphragm  Stable heart  Persistent low lung volumes and bilateral diffuse pulmonary opacities.  No pneumothorax Finalized by (CST): Moises Lynch MD on 3/17/2025 at 8:43 AM          XR CHEST AP PORTABLE  (CPT=71045)    Result Date: 3/16/2025  PROCEDURE: XR CHEST AP PORTABLE  (CPT=71045)  COMPARISON: [facility], XR CHEST AP PORTABLE (CPT=71045), 3/15/2025, 7:53 AM.  [facility], XR CHEST AP PORTABLE (CPT=71045), 3/14/2025, 8:07 PM.  [facility], XR CHEST AP PORTABLE (CPT=71045), 3/14/2025, 6:26 AM.  INDICATIONS: Shortness of breath.  FINDINGS: Combined with conclusion.         CONCLUSION:   No new abnormality.  Unchanged and tubes.  Persistent low lung volumes and extensive bilateral airspace opacities.    [facility]     Dictated by (CST): Riley Villa MD on 3/16/2025 at 8:22 AM     Finalized by (CST): Riley Villa MD on 3/16/2025 at 8:23 AM          XR CHEST AP PORTABLE  (CPT=71045)    Result Date: 3/15/2025  PROCEDURE: XR CHEST AP PORTABLE  (CPT=71045)  COMPARISON: [facility], XR CHEST AP PORTABLE (CPT=71045), 3/14/2025, 8:07 PM.  INDICATIONS: Follow up shortness of breath.  Findings and impression:  ETT 3 cm above the em  Stable heart.  Persistent low lung volumes with stable to mildly increased bilateral opacities  No pneumothorax Finalized by (CST): Moises Lynch MD on 3/15/2025 at 9:58 AM          XR CHEST AP PORTABLE  (CPT=71045)    Result Date: 3/14/2025  PROCEDURE: XR CHEST AP PORTABLE  (CPT=71045) TIME: 8:15 p.m..   COMPARISON: [facility], XR CHEST AP PORTABLE (CPT=71045), 3/14/2025, 6:26 AM.  [facility], XR CHEST AP PORTABLE (CPT=71045), 3/13/2025, 6:02 PM.  [facility], XR CHEST AP PORTABLE (CPT=71045), 3/12/2025, 3:06 PM.  INDICATIONS: Assessment for ng placement.  TECHNIQUE:   Single  view.   FINDINGS:  CARDIAC/VASC: The cardiomediastinal silhouette is unchanged in size. MEDIAST/CHARLOTTE:   Unchanged LUNGS/PLEURA: No significant change in the multifocal opacities, which are most noticeable in the left lung base.  There is a trace left pleural effusion.  No pneumothorax.  There are low lung volumes. BONES: Multilevel degenerative changes of the thoracic spine.  Bilateral shoulder degenerative change. OTHER: Endotracheal tube terminates 5 mm above the em.  Enteric tube courses below the diaphragm with the tip projecting to the right of midline at the expected location of the junction of the stomach and the duodenum.          CONCLUSION:   ET tube terminates 5 mm above the em.  Recommend retraction.  Secure message regarding this finding sent to the respiratory therapist Varsha on 03/14/2025 at 8:52 p.m.  Enteric tube tip terminates at the junction of the stomach and proximal duodenum.  Unchanged multifocal alveolar opacities.  Unchanged trace left pleural effusion     Dictated by (CST): Yarelis Tomlinson MD on 3/14/2025 at 8:50 PM     Finalized by (CST): Yarelis Tomlinson MD on 3/14/2025 at 8:52 PM          XR CHEST AP PORTABLE  (CPT=71045)    Result Date: 3/14/2025  PROCEDURE: XR CHEST AP PORTABLE  (CPT=71045) TIME: 6:26.   COMPARISON: [facility], CT CHEST ABDOMEN PELVIS (CPT=71250/25321), 3/12/2025, 11:37 PM.  [facility], XR CHEST AP PORTABLE (CPT=71045), 3/13/2025, 6:02 PM.  [facility], XR CHEST AP PORTABLE (CPT=71045), 3/12/2025, 3:06 PM.  [facility], XR CHEST AP PORTABLE (CPT=71045), 3/12/2025, 12:39 PM.  INDICATIONS: ET tube and progress.  TECHNIQUE:   Single view.   FINDINGS:  CARDIAC/VASC: The cardiomediastinal silhouette is unchanged in size. MEDIAST/CHARLOTTE:   Unchanged LUNGS/PLEURA: No significant change in the multifocal opacities, which are most noticeable in the left lung base.  There is a trace left pleural effusion.   No pneumothorax.  There are low lung volumes. BONES: Multilevel degenerative changes of the thoracic spine.  Bilateral shoulder degenerative change. OTHER: The endotracheal tube terminates 3.2 cm above the em.  The enteric tube terminates in the stomach.         CONCLUSION:   No significant change in the multifocal opacities involving both lungs, which are most noticeable in the left lung base.    Dictated by (CST): Emmett Quan MD on 3/14/2025 at 7:22 AM     Finalized by (CST): Emmett Quan MD on 3/14/2025 at 7:25 AM          XR CHEST AP PORTABLE  (CPT=71045)    Result Date: 3/13/2025  PROCEDURE: XR CHEST AP PORTABLE  (CPT=71045)  COMPARISON: [facility], XR CHEST AP PORTABLE (CPT=71045), 3/12/2025, 3:06 PM.  INDICATIONS: ETT repositioning  Findings and impression:  ETT now 4.3 cm above the em  Mildly decreased bilateral pulmonary opacities new  Stable heart  Dobbhoff in the stomach  No pneumothorax Finalized by (CST): Moises Lynch MD on 3/13/2025 at 6:20 PM          CT CHEST+ABDOMEN+PELVIS(CPT=71250/04477)    Result Date: 3/13/2025  PROCEDURE: CT CHEST ABDOMEN PELVIS (CPT=71250/20926)  COMPARISON: [facility], CT ABDOMEN & PELVIS W CON, 9/16/2015, 3:34 PM.  CT ABDOMEN PELVIS IV CONTRAST NO ORAL (ER), 5/03/2019, 8:52 AM.  [facility], CTA CHEST+CTA ABDOMEN DISSECT SET (CPT=71275/46028), 8/13/2023, 10:16 PM.   [facility], CT ABDOMEN+PELVIS (CPT=74176), 3/04/2025, 12:43 PM.  [facility], CT CHEST (CPT=71250), 3/07/2025, 10:55 AM.  INDICATIONS: Sepsis, respiratory failure, ETOH abuse, likely cirrhosis.  TECHNIQUE: Multidetector CT images of the chest, abdomen and pelvis were obtained without intravenous contrast material. Automated exposure control for dose reduction was used. Adjustment of the mA and/or kV was done based on the patient's size. Iterative reconstruction technique for dose reduction was employed.    FINDINGS:   COMMENT: Evaluation of the vasculature, [place], and of the abdominal viscera for the presence of intraparenchymal pathology is suboptimal in the absence of contrast infusion. CARDIAC: The heart is not enlarged. VASCULATURE: The thoracic aorta has unremarkable configuration without aneurysmal dilatation.  LUNGS/PLEURA: Dependent airspace disease in the left lower lobe with associated air bronchograms.  Previously noted right lower lobe airspace disease has slightly improved.  There are, however, worsening upper lobe predominant diffuse ground-glass density opacities throughout both lungs.  No significant pleural effusion.  No pneumothorax. AIRWAYS: Endotracheal tube tip is located approximately 1 cm above the level of the em.  Secretions and/or debris throughout the trachea. MEDIASTINUM/CHARLOTTE: No mass or lymphadenopathy.  CHEST WALL: No axillary mass or lymphadenopathy.   LIVER: Diffuse low attenuation of the liver parenchyma.  There are also subtle nodular liver contours.  No gross focal hepatic lesion on this limited noncontrast exam.  Note that assessment for hepatic lesions is suboptimal. BILIARY: High density sludge in the gallbladder.  No pericholecystic infiltration or gallbladder luminal distention. PANCREAS: Diffuse enlargement of the pancreas with mild peripancreatic inflammatory stranding; findings are new since recent prior abdominal CT.  No gross pancreatic ductal dilation or well-defined/drainable peripancreatic collection. SPLEEN: Enlarged 16 cm maximal craniocaudal extent spleen. ADRENALS:   No defined mass or abnormal enlargement.  KIDNEYS:   No renal or ureteral calculi are identified. There is no hydronephrosis or hydroureter. No perinephric or periureteric fat stranding is appreciated.  GI/MESENTERY:  There is no evidence of bowel obstruction.  Enteric tube extends beyond the gastroesophageal junction and the tip is partially coiled in the gastric fundus, please note that the stomach is  incompletely distended and suboptimally evaluated.   Liquid contents in the colon with a rectal tube, some segments of the colon are incompletely distended and suboptimally evaluated, colonic diverticulosis.  Normal appendix. URINARY BLADDER: Ecdeño catheter in the urinary bladder, which is otherwise incompletely distended and suboptimally evaluated. PELVIC NODES: No lymphadenopathy.   PELVIC ORGANS: No visible mass.  Pelvic organs appropriate for patient age.  VASCULATURE:   No aneurysm is detected.  Minor atherosclerosis. RETROPERITONEUM: No mass or lymphadenopathy is apparent.  BONES:   Slight dextroscoliosis of the thoracolumbar spine. ABDOMINAL WALL: Small fat containing umbilical hernia.  Mild anasarca. OTHER: Small to moderate volume intra-abdominal ascites is new since prior abdominal CT and most pronounced in the dependent pelvis.  Ascitic fluid also extends along both paracolic gutters.  Small amount of ascites also extends dependently into mesenteric fat associated with a small retrocardiac hiatal hernia.  No free intraperitoneal air.         CONCLUSION:  1. Diffuse pancreatic enlargement and mild peripancreatic inflammatory stranding are new since abdominal CT from 8 days prior and concerning for acute interstitial edematous pancreatitis.  No gross pancreatic ductal dilation or peripancreatic collection.   Request correlation with serum lipase levels. 2. Left greater than right lower lobe airspace disease with intrinsic air bronchograms.  Findings are similar on the left and slightly improved on the right since chest CT from 5 days prior; multifocal pneumonia/aspiration is suspected.  Also identified are new and/or worsening multifocal upper lobe predominant ground-glass density opacities throughout both lungs; these ground-glass density opacities could be infectious in nature or relate to acute respiratory distress syndrome. 3. Endotracheal tube with tip approximately 1 cm above the level of the em.   Enteric tube tip in the gastric fundus.  Rectal tube and Cedeño catheter also noted. 4. Fatty liver and cirrhosis. 5. Stable splenomegaly. 6. Small to moderate volume intra-abdominal ascites is new since prior abdominal CT.  There is also new mild anasarca. 7. Liquid contents in the colon could relate to a malabsorption state or low-grade infectious/inflammatory versus antibiotic associated colitis.  Mild scattered colonic diverticulosis, but with no CT findings of acute diverticulitis. 8. Low-density appearance of the intracardiac blood pool raises the possibility of underlying anemia. Correlate with hematologic parameters.  9. Lesser incidental findings as above.   A preliminary report was issued by the [facility] teleradiology service. There are no major discrepancies.  [facility]  Dictated by (CST): Arcenio Norman MD on 3/13/2025 at 8:14 AM     Finalized by (CST): Arcenio Norman MD on 3/13/2025 at 8:28 AM          XR CHEST AP PORTABLE  (CPT=71045)    Result Date: 3/12/2025  PROCEDURE: XR CHEST AP PORTABLE  (CPT=71045)  COMPARISON: [facility], XR CHEST AP PORTABLE (CPT=71045), 3/12/2025, 12:39 PM.  [facility], XR CHEST AP PORTABLE (CPT=71045), 3/11/2025, 4:48 PM.  [facility], XR CHEST AP PORTABLE (CPT=71045), 3/10/2025, 3:40 PM.  INDICATIONS: ETT placement  FINDINGS: Combined with conclusion.         CONCLUSION:   Endotracheal tube terminates 2.8 cm above the em.  Consider 1-2 cm of retraction.  Unchanged esophagogastric tube.  Persistent extensive bilateral patchy opacities, greatest in the left lower lung.      Dictated by (CST): Riley Villa MD on 3/12/2025 at 3:31 PM     Finalized by (CST): Riley Villa MD on 3/12/2025 at 3:33 PM          XR CHEST AP PORTABLE  (CPT=71045)    Result Date: 3/12/2025  PROCEDURE: XR CHEST AP PORTABLE  (CPT=71045)  COMPARISON: [facility], XR CHEST AP PORTABLE (CPT=71045), 3/11/2025, 4:48 PM.  [place]  [facility], XR CHEST AP PORTABLE (CPT=71045), 3/10/2025, 3:40 PM.  [facility], XR CHEST AP PORTABLE (CPT=71045), 3/07/2025, 8:03 PM.  INDICATIONS: Increased exerted breathing, tachypnea.  FINDINGS: Combined with conclusion.         CONCLUSION:   No new abnormality.  Multifocal bilateral pulmonary opacities are similar to the prior exam.  Unchanged esophagogastric tube.     Dictated by (CST): Riley Villa MD on 3/12/2025 at 1:20 PM     Finalized by (CST): Riley Villa MD on 3/12/2025 at 1:21 PM          XR CHEST AP PORTABLE  (CPT=71045)    Result Date: 3/11/2025         PROCEDURE: XR CHEST AP PORTABLE  (CPT=71045) TIME: 1654.   COMPARISON: [facility], XR CHEST AP PORTABLE (CPT=71045), 3/10/2025, 3:40 PM.  INDICATIONS: Increased WOB, tachypnea  TECHNIQUE:   Single view.   FINDINGS/IMPRESSION:    1. There is redemonstration of patchy alveolar opacities throughout both lungs.  The findings could represent alveolar edema, a diffuse multifocal infectious process, or a combination.  The findings have not significantly changed since previous exam.  2. The heart mediastinal structures remain enlarged.  The there are trace bilateral pleural effusions.  3. The thoracic component of an enteric feeding tube is identified traversing the thoracic esophagus.  The distal tip is not visualized but lies well below the GE junction.      Dictated by (CST): Prateek Mcclelland MD on 3/11/2025 at 5:04 PM     Finalized by (CST): Prateek Mcclelland MD on 3/11/2025 at 5:05 PM          XR CHEST AP PORTABLE  (CPT=71045)    Result Date: 3/10/2025  PROCEDURE: XR CHEST AP PORTABLE  (CPT=71045) TIME: 15 40 hours  COMPARISON: [facility], XR CHEST AP PORTABLE (CPT=71045), 3/07/2025, 8:03 PM.  [facility], XR CHEST AP PORTABLE (CPT=71045), 3/07/2025, 1:32 PM.  [facility], XR CHEST AP PORTABLE (CPT=71045), 3/07/2025, 7:06 AM.  INDICATIONS: verify NG tube placement   TECHNIQUE:   Single view.   FINDINGS: Heart and mediastinum are unchanged.  Left greater than right patchy bilateral opacity redemonstrated which appears mildly increased on the right.  Small left pleural effusion.  No pneumothorax.  Interval advancement of the enteric tube which is now in the mid to distal stomach.         CONCLUSION:   Interval Strauss min enteric tube which is now in the mid to distal stomach.  Left greater than right patchy bilateral lung opacities mildly increased on the right.  Small left pleural effusion.    Dictated by (CST): Vishnu Tuttle MD on 3/10/2025 at 4:03 PM     Finalized by (CST): Vishnu Tuttle MD on 3/10/2025 at 4:04 PM          XR CHEST AP PORTABLE  (CPT=71045)    Result Date: 3/7/2025  PROCEDURE: XR CHEST AP PORTABLE  (CPT=71045) TIME: 2003.   COMPARISON: [facility], CT CHEST (CPT=71250), 3/07/2025, 10:55 AM.  [facility], XR CHEST AP PORTABLE (CPT=71045), 3/07/2025, 1:32 PM.  INDICATIONS: NG tube placement.  TECHNIQUE:   Single view.   FINDINGS:  COMMENT: Overlying wires and treatment artifacts obscure fine detail. CARDIAC/VASC: The cardiomediastinal silhouette is not enlarged. There is mild tortuosity of the thoracic aorta with peripheral atherosclerotic vascular calcification. The pulmonary vascularity is within normal limits. MEDIAST/CHARLOTTE: No visible mass or adenopathy. LUNGS/PLEURA: There are low lung volumes.  Extensive hazy left greater than right basilar opacities with partial obscuration of the left hemidiaphragm.  Suspect mild blunting of the left costophrenic angle.  No large right pleural effusion.  No pneumothorax. BONES: Mild degenerative changes of the thoracic spine are apparent. OTHER:   Enteric tube extends beyond the gastroesophageal junction; distal tip is partially coiled but projects at the expected location of the gastric fundus/body.         CONCLUSION:  1. Enteric tube extends well beyond the gastroesophageal junction;  the distal tip is partially coiled but projects at the expected location of the gastric fundus/body (good position). 2. Stable extensive left greater than right basilar opacities, which are concerning for infection/pneumonia in the appropriate clinical setting.   [facility]  Dictated by (CST): Arcenio Norman MD on 3/07/2025 at 8:54 PM     Finalized by (CST): Arcenio Norman MD on 3/07/2025 at 8:56 PM          XR CHEST AP PORTABLE  (CPT=71045)    Result Date: 3/7/2025         PROCEDURE: XR CHEST AP PORTABLE  (CPT=71045) TIME: 1332.   COMPARISON: [facility], XR CHEST AP PORTABLE (CPT=71045), 3/07/2025, 7:06 AM.  INDICATIONS: Verify NG tube placement  TECHNIQUE:   Single view.   FINDINGS/IMPRESSION:    1. There is an enteric feeding tube coiled within the gastric fundus in good position.  2. There is redemonstration of patchy interstitial and alveolar opacities throughout both lungs (left worse than right).  The findings could represent a multifocal infectious process, atypical interstitial edema, or a combination.  There is a small to moderate-sized left-sided pleural effusion with probable underlying atelectasis.  3. The heart mediastinal structures are minimally enlarged.      Dictated by (CST): Prateek Mcclelland MD on 3/07/2025 at 1:40 PM     Finalized by (CST): Prateek Mcclelland MD on 3/07/2025 at 1:42 PM          CT CHEST (GVI=70218)    Result Date: 3/7/2025  PROCEDURE: CT CHEST (CPT=71250)  COMPARISON: [facility], XR CHEST AP PORTABLE (CPT=71045), 8/24/2023, 6:49 PM.  [facility], XR CHEST AP PORTABLE (CPT=71045), 8/26/2023, 11:09 PM.  [facility], XR CHEST AP PORTABLE (CPT=71045), 3/07/2025, 7:06 AM.  [facility], CTA CHEST+CTA ABDOMEN DISSECT SET (CPT=71275/63088), 8/13/2023, 10:16 PM.  INDICATIONS: Respiratory failure.  TECHNIQUE: Multidetector CT images of the chest were obtained without non-ionic intravenous contrast material.  Automated exposure control for dose reduction was used. Adjustment of the mA and/or kV was done based on the patient's size. Iterative reconstruction technique for dose reduction was employed. Dose information was transmitted to the ACR (American College of Radiology) NRDR (National Radiology Data Registry), which includes the Dose Index Registry.   FINDINGS:  COMMENT: Overall examination quality is degraded by beam hardening artifact throughout the imaged volume secondary to patient body habitus, contact of the lateral body wall with the scanner gantry, and the patient's bilateral arm-down positioning. Images  are further compromised by patient motion artifact. Evaluation of the vasculature and charlotte is suboptimal in the absence of contrast infusion. CARDIAC: The heart is borderline enlarged. Hypodensity of the intracardiac blood pool relative to the myocardium may relate to underlying anemia. VASCULATURE: The thoracic aorta has normal-variant configuration with a shared origin of the brachiocephalic trunk and left common carotid artery and with the left vertebral artery arising directly from the arch.  The main pulmonary trunk is dilated in caliber, measuring 3.3 cm. LUNGS/PLEURA: Extensive bilateral airspace consolidation is evident, with dense consolidation involving the lower lobes bilaterally and with multifocal patchy nodular opacities throughout the remaining lobes.   No large pleural effusion or pneumothorax is detected.  AIRWAYS: There may be some degree of truncation of the left lower lobe bronchus. Air bronchograms are seen. The tracheobronchial tree is without central mass or obstructing lesion. MEDIASTINUM/CHARLOTTE: Borderline-sized posterior mediastinal lymph nodes are seen.  CHEST WALL: Bilateral gynecomastia is suggested. No axillary mass or lymphadenopathy.  LIMITED ABDOMEN: Included portions of the unenhanced upper abdomen are notable for diffusely hypoattenuating hepatic parenchyma, consistent with  severe hepatic steatosis.   BONES: Mild multilevel degenerative changes of the thoracic spine are apparent. OTHER: A small hiatal hernia is evident. Distal esophageal thickening is appreciated.          CONCLUSION:  1. Extensive bilateral airspace disease, concerning for potential multifocal pneumonia. Continued surveillance is advised.  2. Dense bilateral lower airspace consolidation is evident; aspiration pneumonitis is a differential diagnostic possibility. Follow-up is recommended to document resolution.   3. Dilatation of the main pulmonary artery trunk may relate to underlying pulmonary hypertension.   4. Small hiatal hernia with imaging manifestations that may be indicative underlying esophagitis.  5. Marked hepatic steatosis.  6. Lesser incidental findings as above.    Dictated by (CST): Pavel Lara MD on 3/07/2025 at 11:38 AM     Finalized by (CST): Pavel Lara MD on 3/07/2025 at 11:44 AM          CT BRAIN OR HEAD (CPT=70450)    Result Date: 3/7/2025  PROCEDURE: CT BRAIN OR HEAD (CPT=70450)  COMPARISON: None.  INDICATIONS: Somnolence  TECHNIQUE: CT images were obtained without contrast material.  Automated exposure control for dose reduction was used.  Dose information is transmitted to the ACR (American College of Radiology) NRDR (National Radiology Data Registry) which includes the Dose Index Registry.  Motion limits evaluation.  FINDINGS:  CSF SPACES: Ventricles, cisterns, and sulci are appropriate for age.  No hydrocephalus, subarachnoid hemorrhage, or mass.  No midline shift.  CEREBRUM: No edema, hemorrhage, mass, acute infarction, or significant atrophy.  Punctate calcification in the left frontal region may relate to sequela of previous infection. WHITE MATTER: Normal white matter.  CEREBELLUM: No edema, hemorrhage, mass, acute infarction, or significant atrophy.  BRAINSTEM: No edema, hemorrhage, mass, acute infarction, or significant atrophy.  CALVARIUM: No apparent fracture, mass, or other  significant visible lesion.  SINUSES: Limited views demonstrate no significant mucosal thickening or fluid.  ORBITS: Limited views are unremarkable.  OTHER: Negative.          CONCLUSION:   Motion limits evaluation.  No evidence of acute intracranial abnormality.    Dictated by (CST): Vishnu Tuttle MD on 3/07/2025 at 11:32 AM     Finalized by (CST): Vishnu Tuttle MD on 3/07/2025 at 11:33 AM          XR CHEST AP PORTABLE  (CPT=71045)    Result Date: 3/7/2025  PROCEDURE: XR CHEST AP PORTABLE  (CPT=71045) TIME: 0706 hours   COMPARISON: [facility], XR CHEST AP/PA (1 VIEW) (CPT=71045), 10/07/2024, 2:59 AM.  INDICATIONS: Increased work of breathing, increased oxygen requirement.  TECHNIQUE:   Single view.   FINDINGS:  CARDIAC/VASC: Cardiomegaly.  Vascular margins obscured  MEDIAST/CHARLOTTE:   Atherosclerotic aorta with no visible aneurysm.  LUNGS/PLEURA: Extensive bilateral mixed alveolar and interstitial multifocal airspace opacification most pronounced in the perihilar bibasilar regions.  Superimposed left-sided effusion suspected.  Marked hypoinflation BONES: Mild scoliosis with mild degenerative disc disease and spondylosis.  OTHER: Negative.          CONCLUSION:  1. Cardiomegaly.  Tortuous aorta. 2. Extensive multifocal airspace opacification in the bilateral perihilar and basilar regions with left-sided effusion.  Differential includes pulmonary edema congestive failure versus multifocal pneumonitis.    Dictated by (CST): Nolan Dooley MD on 3/07/2025 at 7:49 AM     Finalized by (CST): Nolan Dooley MD on 3/07/2025 at 7:51 AM          US LIVER (CPT=76705)    Result Date: 3/5/2025  PROCEDURE: US LIVER (CPT=76705)  COMPARISON: None.  INDICATIONS: n/v, hiccups  TECHNIQUE:   The liver was evaluated with gray scale and colorflow of the main vessels.    FINDINGS:  LIVER:   The liver has a heterogeneous echotexture with increased echogenicity compatible with fatty infiltration. There is  decreased through transmission of the ultrasound beam which limits evaluation of a mass. Otherwise no gross mass or gross intrahepatic biliary ductal dilation.  Right hepatic vein not seen.  Remainder of the Hepatic veins and main portal vein are patent with appropriately directed flow. GALLBLADDER:   There is a small amount of gallbladder sludge.  Small gallstones seen within the gallbladder neck.  There is no gallbladder wall thickening or pericholecystic fluid. There is no sonographic Cedeño sign. BILE DUCTS:   Not visualized due to patient respirations. PANCREAS:   The visualized pancreas is unremarkable. OTHER:   There is no hydronephrosis of the right kidney.         CONCLUSION:   Hepatic steatosis.  Gallbladder sludge and cholelithiasis without sonographic evidence of acute cholecystitis.      Dictated by (CST): Elvis Aguilar MD on 3/05/2025 at 8:16 AM     Finalized by (CST): Elvis Aguilar MD on 3/05/2025 at 8:18 AM          CT ABDOMEN+PELVIS(CPT=74176)    Result Date: 3/4/2025  PROCEDURE:   CT ABDOMEN+PELVIS(CPT=74176)  COMPARISON:   [facility], CT ABDOMEN + PELVIS (CONTRAST ONLY) (CPT=74177), 12/19/2022, 1:25 PM.  [facility], CTA CHEST+CTA ABDOMEN DISSECT SET (CPT=71275/67516), 8/13/2023, 10:16 PM.  INDICATIONS:   n/v, unable to stop hiccups  TECHNIQUE: CT images of the abdomen and pelvis were obtained without intravenous contrast material.  Automated exposure control for dose reduction was used. Adjustment of the mA and/or kV was done based on the patient's size. Use of iterative reconstruction technique for dose reduction was used.  Dose information is transmitted to the ACR (American College of Radiology) NRDR (National Radiology Data Registry) which includes the Dose Index Registry.  FINDINGS:  Small hiatal hernia with mildly dilated fluid-filled lower esophagus.  No lung base aspiration  Severely fatty liver with subtle undulating contour.  Normal gallbladder and pancreas   Stable splenomegaly 14.5-15 centimeter.Multiple small tubular structures around the lower esophagus  Normal adrenals.  Normal-size unobstructed kidneys with no calculi  Normal size aorta.  No ascites  Unobstructed bowel.  Normal appendix.  Uncomplicated diverticulosis.  Tiny fat containing umbilical hernia  Urinary bladder is completely decompressed  No aggressive bone lesion.   CONCLUSION:   Severely fatty liver with subtle undulating contour.  Stable splenomegaly.  Tubular structures around the GE junction are suggestive of lower esophageal varices.  No ascites Finalized by (CST): Moises Lynch MD on 3/04/2025 at 1:13 PM          [unfilled]    Assessment    Patient Active Problem List   Diagnosis    Chest pain of uncertain etiology    Hiatal hernia    UGIB (upper gastrointestinal bleed)    Esophagitis    Alcohol use    Alcohol withdrawal syndrome with perceptual disturbance (HCC)    Alcohol dependence, continuous (HCC)    Episodic mood disorder    USMAN (acute kidney injury)    Metabolic acidosis    Hyperkalemia    Leukocytosis    Thrombocytopenia    Coagulopathy (HCC)    Hyponatremia    Alcoholic (HCC)    Scleral icterus    Acute kidney failure    DTs (delirium tremens) (HCC)    Alcohol use disorder    Acute respiratory failure with hypoxia (HCC)    Wernicke's syndrome    Hypophosphatemia    Hypernatremia    Alcohol-induced acute pancreatitis (HCC)    Alcoholic hepatitis without ascites (HCC)       Plan     Continue PT OT-will need follow-up notes  LFTs trending upwards, GI following  Duration of IV antibiotics as per IM/ID  CT chest still showing multifocal pneumonia.  D-dimer elevated however it appears that CT angio chest does not show PE  Pancreatitis-GI following  EtOH withdrawal-psychiatry following  Leukocytosis, trending back up    Patient still with active medical issues, uptrending LFTs, continued leukocytosis and extremely poor activity tolerance, however mentation has improved at this time cannot be  considered for acute inpatient rehabilitation.  Will follow-up on this patient.        ADRIÁN PIERCE MD    3/20/2025    2:45 PM         [1]   No medications prior to admission.   [2]   Allergies  Allergen Reactions    Morphine SWELLING     SHORTNESS OF BREATH

## 2025-03-20 NOTE — CM/SW NOTE
Patient discussed in RN DC rounds. PMR MD saw patient. Due to patient's active and ongoing medical issues, PMR can not agree with therapy's needs for AIR, but they will continue to follow.  Benjamin sent out AIR referrals via aidin, pending updated PMR follow up.     Sw updated patient and family at bedside of above. Patient and family expressed understanding. SW called Lulu , informed her of above.     Plan: Pending medical clearance, DC to [facility], *PMR follow up, List/choice/auth    SW/CM to remain available for support and/or discharge planning.     Patricia Last, MSW, LSW   x [phone]

## 2025-03-20 NOTE — PLAN OF CARE
Problem: Patient Centered Care  Goal: Patient preferences are identified and integrated in the patient's plan of care  Description: Interventions:  - What would you like us to know as we care for you?   - Provide timely, complete, and accurate information to patient/family  - Incorporate patient and family knowledge, values, beliefs, and cultural backgrounds into the planning and delivery of care  - Encourage patient/family to participate in care and decision-making at the level they choose  - Honor patient and family perspectives and choices  Outcome: Progressing     Problem: Patient/Family Goals  Goal: Patient/Family Long Term Goal  Description: Patient's Long Term Goal:     Interventions:  -   - See additional Care Plan goals for specific interventions  Outcome: Progressing  Goal: Patient/Family Short Term Goal  Description: Patient's Short Term Goal:     Interventions:   -   - See additional Care Plan goals for specific interventions  Outcome: Progressing     Problem: PAIN - ADULT  Goal: Verbalizes/displays adequate comfort level or patient's stated pain goal  Description: INTERVENTIONS:  - Encourage pt to monitor pain and request assistance  - Assess pain using appropriate pain scale  - Administer analgesics based on type and severity of pain and evaluate response  - Implement non-pharmacological measures as appropriate and evaluate response  - Consider cultural and social influences on pain and pain management  - Manage/alleviate anxiety  - Utilize distraction and/or relaxation techniques  - Monitor for opioid side effects  - Notify MD/LIP if interventions unsuccessful or patient reports new pain  - Anticipate increased pain with activity and pre-medicate as appropriate  Outcome: Progressing     Problem: RISK FOR INFECTION - ADULT  Goal: Absence of fever/infection during anticipated neutropenic period  Description: INTERVENTIONS  - Monitor WBC  - Administer growth factors as ordered  - Implement neutropenic  guidelines  Outcome: Progressing     Problem: SAFETY ADULT - FALL  Goal: Free from fall injury  Description: INTERVENTIONS:  - Assess pt frequently for physical needs  - Identify cognitive and physical deficits and behaviors that affect risk of falls.  - [place] fall precautions as indicated by assessment.  - Educate pt/family on patient safety including physical limitations  - Instruct pt to call for assistance with activity based on assessment  - Modify environment to reduce risk of injury  - Provide assistive devices as appropriate  - Consider OT/PT consult to assist with strengthening/mobility  - Encourage toileting schedule  Outcome: Progressing     Problem: DISCHARGE PLANNING  Goal: Discharge to home or other facility with appropriate resources  Description: INTERVENTIONS:  - Identify barriers to discharge w/pt and caregiver  - Include patient/family/discharge partner in discharge planning  - Arrange for needed discharge resources and transportation as appropriate  - Identify discharge learning needs (meds, wound care, etc)  - Arrange for interpreters to assist at discharge as needed  - Consider post-discharge preferences of patient/family/discharge partner  - Complete POLST form as appropriate  - Assess patient's ability to be responsible for managing their own health  - Refer to Case Management Department for coordinating discharge planning if the patient needs post-hospital services based on physician/LIP order or complex needs related to functional status, cognitive ability or social support system  Outcome: Progressing     Problem: Delirium  Goal: Minimize duration of delirium  Description: Interventions:  - Encourage use of hearing aids, eye glasses  - Promote highest level of mobility daily  - Provide frequent reorientation  - Promote wakefulness i.e. lights on, blinds open  - Promote sleep, encourage patient's normal rest cycle i.e. lights off, TV off, minimize noise and interruptions  - Encourage  family to assist in orientation and promotion of home routines  Outcome: Progressing     Problem: RESPIRATORY - ADULT  Goal: Achieves optimal ventilation and oxygenation  Description: INTERVENTIONS:  - Assess for changes in respiratory status  - Assess for changes in mentation and behavior  - Position to facilitate oxygenation and minimize respiratory effort  - Oxygen supplementation based on oxygen saturation or ABGs  - Provide Smoking Cessation handout, if applicable  - Encourage broncho-pulmonary hygiene including cough, deep breathe, Incentive Spirometry  - Assess the need for suctioning and perform as needed  - Assess and instruct to report SOB or any respiratory difficulty  - Respiratory Therapy support as indicated  - Manage/alleviate anxiety  - Monitor for signs/symptoms of CO2 retention  Outcome: Progressing     Problem: METABOLIC/FLUID AND ELECTROLYTES - ADULT  Goal: Electrolytes maintained within normal limits  Description: INTERVENTIONS:  - Monitor labs and rhythm and assess patient for signs and symptoms of electrolyte imbalances  - Administer electrolyte replacement as ordered  - Monitor response to electrolyte replacements, including rhythm and repeat lab results as appropriate  - Fluid restriction as ordered  - Instruct patient on fluid and nutrition restrictions as appropriate  Outcome: Progressing  Goal: Hemodynamic stability and optimal renal function maintained  Description: INTERVENTIONS:  - Monitor labs and assess for signs and symptoms of volume excess or deficit  - Monitor intake, output and patient weight  - Monitor urine specific gravity, serum osmolarity and serum sodium as indicated or ordered  - Monitor response to interventions for patient's volume status, including labs, urine output, blood pressure (other measures as available)  - Encourage oral intake as appropriate  - Instruct patient on fluid and nutrition restrictions as appropriate  Outcome: Progressing     Problem: Impaired  Swallowing  Goal: Minimize aspiration risk  Description: Interventions:  - Patient should be alert and upright for all feedings (90 degrees preferred)  - Offer food and liquids at a slow rate  - No straws  - Encourage small bites of food and small sips of liquid  - Offer pills one at a time, crush or deliver with applesauce as needed  - Discontinue feeding and notify MD (or speech pathologist) if coughing or persistent throat clearing or wet/gurgly vocal quality is noted  Outcome: Progressing     Problem: Impaired Cognition  Goal: Patient will exhibit improved attention, thought processing and/or memory  Description: Interventions:    Outcome: Progressing     Problem: CARDIOVASCULAR - ADULT  Goal: Maintains optimal cardiac output and hemodynamic stability  Description: INTERVENTIONS:- Monitor vital signs, rhythm, and trends- Monitor for bleeding, hypotension and signs of decreased cardiac output- Evaluate effectiveness of vasoactive medications to optimize hemodynamic stability- Monitor arterial and/or venous puncture sites for bleeding and/or hematoma- Assess quality of pulses, skin color and temperature- Assess for signs of decreased coronary artery perfusion - ex. Angina- Evaluate fluid balance, assess for edema, trend weights  Outcome: Progressing

## 2025-03-21 ENCOUNTER — APPOINTMENT (OUTPATIENT)
Dept: CT IMAGING | Facility: HOSPITAL | Age: 45
End: 2025-03-21
Attending: INTERNAL MEDICINE
Payer: MEDICAID

## 2025-03-21 LAB
ALBUMIN SERPL-MCNC: 2.9 G/DL (ref 3.2–4.8)
ALBUMIN/GLOB SERPL: 0.9 {RATIO} (ref 1–2)
ALP LIVER SERPL-CCNC: 211 U/L
ALT SERPL-CCNC: 111 U/L
ANION GAP SERPL CALC-SCNC: 8 MMOL/L (ref 0–18)
AST SERPL-CCNC: 228 U/L (ref ?–34)
BASOPHILS # BLD AUTO: 0.07 X10(3) UL (ref 0–0.2)
BASOPHILS NFR BLD AUTO: 0.5 %
BILIRUB SERPL-MCNC: 16.9 MG/DL (ref 0.3–1.2)
BUN BLD-MCNC: 34 MG/DL (ref 9–23)
BUN/CREAT SERPL: 34 (ref 10–20)
CALCIUM BLD-MCNC: 8.4 MG/DL (ref 8.7–10.4)
CHLORIDE SERPL-SCNC: 102 MMOL/L (ref 98–112)
CO2 SERPL-SCNC: 24 MMOL/L (ref 21–32)
CREAT BLD-MCNC: 1 MG/DL
DEPRECATED RDW RBC AUTO: 86.9 FL (ref 35.1–46.3)
EGFRCR SERPLBLD CKD-EPI 2021: 94 ML/MIN/1.73M2 (ref 60–?)
EOSINOPHIL # BLD AUTO: 0.24 X10(3) UL (ref 0–0.7)
EOSINOPHIL NFR BLD AUTO: 1.7 %
ERYTHROCYTE [DISTWIDTH] IN BLOOD BY AUTOMATED COUNT: 32.4 % (ref 11–15)
GLOBULIN PLAS-MCNC: 3.1 G/DL (ref 2–3.5)
GLUCOSE BLD-MCNC: 110 MG/DL (ref 70–99)
HCT VFR BLD AUTO: 25.9 %
HGB BLD-MCNC: 8.6 G/DL
IMM GRANULOCYTES # BLD AUTO: 0.27 X10(3) UL (ref 0–1)
IMM GRANULOCYTES NFR BLD: 1.9 %
INR BLD: 1.89 (ref 0.8–1.2)
LYMPHOCYTES # BLD AUTO: 1.29 X10(3) UL (ref 1–4)
LYMPHOCYTES NFR BLD AUTO: 8.9 %
MCH RBC QN AUTO: 26.1 PG (ref 26–34)
MCHC RBC AUTO-ENTMCNC: 33.2 G/DL (ref 31–37)
MCV RBC AUTO: 78.5 FL
MONOCYTES # BLD AUTO: 1.19 X10(3) UL (ref 0.1–1)
MONOCYTES NFR BLD AUTO: 8.2 %
NEUTROPHILS # BLD AUTO: 11.45 X10 (3) UL (ref 1.5–7.7)
NEUTROPHILS # BLD AUTO: 11.45 X10(3) UL (ref 1.5–7.7)
NEUTROPHILS NFR BLD AUTO: 78.8 %
OSMOLALITY SERPL CALC.SUM OF ELEC: 286 MOSM/KG (ref 275–295)
PLATELET # BLD AUTO: 348 10(3)UL (ref 150–450)
PLATELETS.RETICULATED NFR BLD AUTO: 6.8 % (ref 0–7)
POTASSIUM SERPL-SCNC: 3.4 MMOL/L (ref 3.5–5.1)
PROT SERPL-MCNC: 6 G/DL (ref 5.7–8.2)
PROTHROMBIN TIME: 22.7 SECONDS (ref 11.6–14.8)
RBC # BLD AUTO: 3.3 X10(6)UL
SODIUM SERPL-SCNC: 134 MMOL/L (ref 136–145)
WBC # BLD AUTO: 14.5 X10(3) UL (ref 4–11)

## 2025-03-21 PROCEDURE — 99232 SBSQ HOSP IP/OBS MODERATE 35: CPT | Performed by: REGISTERED NURSE

## 2025-03-21 PROCEDURE — 71260 CT THORAX DX C+: CPT | Performed by: INTERNAL MEDICINE

## 2025-03-21 PROCEDURE — 99233 SBSQ HOSP IP/OBS HIGH 50: CPT | Performed by: HOSPITALIST

## 2025-03-21 PROCEDURE — 99233 SBSQ HOSP IP/OBS HIGH 50: CPT | Performed by: INTERNAL MEDICINE

## 2025-03-21 RX ORDER — POTASSIUM CHLORIDE 1500 MG/1
40 TABLET, EXTENDED RELEASE ORAL ONCE
Status: COMPLETED | OUTPATIENT
Start: 2025-03-21 | End: 2025-03-21

## 2025-03-21 NOTE — PROGRESS NOTES
Pulmonary/ICU/Critical Care Progress Note        Reason for Consultation: resp failure  Referring Physician: Dr. Diaz      Subjective:  On 3 LNC. Reports chest pain has resolved      From the initial consultation  Pt is unable to provide info  HPI: 45 yo male with hx of gout, GERD, admitted with acute ETOH hepatitis, ETOH use, emesis, USMAN. Seen by GI.  On CIWA protocol, thiamine, folic acid, MVI, BZDs, lactulose  Has been in the ICU for 2 days  ICU consulted this am for worsening hypoxemia  CXR this am with b/l infiltrates concerning for PNA      REVIEW OF SYSTEMS:  Positives and negatives as noted in HPI. All other review of systems otherwise are either limited (due to pt/family inability to provide) or negative.      PAST MEDICAL HISTORY:  Past Medical History:   Diagnosis Date    Esophageal reflux     Gout     Hernia          PAST SURGICAL HISTORY:  Past Surgical History:   Procedure Laterality Date    Colonoscopy N/A 11/1/2023    Procedure: COLONOSCOPY;  Surgeon: Vandana Austin MD;  Location: [facility]    Egd  02/15/2016    Eh pr repair complex scalp arms legs 1.1 to 2.5 cm  2014    Elbow fracture surgery Right 1991    Hernia surgery      Inguinal hernia repair Left 01/17/2023         PAST SOCIAL HISTORY:  Social History     Socioeconomic History    Marital status: Single    Number of children: 0   Occupational History    Occupation: Supervisor, car wash   Tobacco Use    Smoking status: Former     Types: Cigarettes    Smokeless tobacco: Never   Vaping Use    Vaping status: Some Days   Substance and Sexual Activity    Alcohol use: Yes     Comment: per pt, DAILY HARD LEMONADE- [employer] hard lemonade 24oz cans, 6 cans daily    Drug use: Yes     Types: Cannabis     Comment: last use, couple months ago per pt report         PAST FAMILY HISTORY:  Family History   Problem Relation Age of Onset    Diabetes Father     Hypertension Father     Cancer Mother         liver        ALLERGIES:  Allergies[1]      MEDS:  Home Medications:  Medications Taking[2]    Scheduled Medication:   potassium chloride  40 mEq Oral Once    pantoprazole  40 mg Oral BID AC    epoetin sheela  10,000 Units Subcutaneous Once per day on Tuesday Thursday Saturday    furosemide  20 mg Intravenous BID (Diuretic)    vancomycin  125 mg Per NG Tube Daily    multivitamin  1 tablet Oral Daily    folic acid  1 mg Oral Daily     Continuous Infusing Medication:      PRN Medications:    baclofen    acetaminophen    haloperidol lactate    polyethylene glycol (PEG 3350)    bisacodyl    prochlorperazine       PHYSICAL EXAM:  /69 (BP Location: Right arm)   Pulse 107   Temp 100.1 °F (37.8 °C) (Oral)   Resp 20   Ht 5' 8\" (1.727 m)   Wt 186 lb 11.7 oz (84.7 kg)   SpO2 94%   BMI 28.39 kg/m²      CONSTITUTIONAL: NAD + jaundice  HEENT: AT/NC  MOUTH: MMM  NECK/THROAT: no JVD. Trachea midline. No obvious thyromegaly  LUNG: clear b/l no wheezing, + b/l crackles. Chest symmetric with respiratory motion  HEART: regular rate and rhythm, no obvious murmers or gallops noted  ABD: soft non tender. + bowel sounds. No organomegaly noted  EXT: no clubbing, cyanosis. Min b/l LE edema      IMAGES:   VQ scan indeterminate 3/20/25    LE dopplers neg 3/20/25    CXR 3/20/25  CONCLUSION: Patchy alveolar opacities throughout the lungs, suggesting edema or multifocal pneumonia.  No significant interval change since preceding exam.  Interval extubation and removal of feeding tube.     CXR 3/17/25  Findings and impression:   ETT 6 cm above the em   Enteric tube beneath the diaphragm   Stable heart   Persistent low lung volumes and bilateral diffuse pulmonary opacities.   No pneumothorax     CT C/A/P 3/12/25  CONCLUSION:   1. Diffuse pancreatic enlargement and mild peripancreatic inflammatory stranding are new since abdominal CT from 8 days prior and concerning for acute interstitial edematous pancreatitis.  No gross pancreatic ductal  dilation or peripancreatic collection.     Request correlation with serum lipase levels.   2. Left greater than right lower lobe airspace disease with intrinsic air bronchograms.  Findings are similar on the left and slightly improved on the right since chest CT from 5 days prior; multifocal pneumonia/aspiration is suspected.  Also identified   are new and/or worsening multifocal upper lobe predominant ground-glass density opacities throughout both lungs; these ground-glass density opacities could be infectious in nature or relate to acute respiratory distress syndrome.   3. Endotracheal tube with tip approximately 1 cm above the level of the em.  Enteric tube tip in the gastric fundus.  Rectal tube and Cdeeño catheter also noted.   4. Fatty liver and cirrhosis.   5. Stable splenomegaly.   6. Small to moderate volume intra-abdominal ascites is new since prior abdominal CT.  There is also new mild anasarca.   7. Liquid contents in the colon could relate to a malabsorption state or low-grade infectious/inflammatory versus antibiotic associated colitis.  Mild scattered colonic diverticulosis, but with no CT findings of acute diverticulitis.   8. Low-density appearance of the intracardiac blood pool raises the possibility of underlying anemia. Correlate with hematologic parameters.    9. Lesser incidental findings as above.       CXR 3/12/25 with multifocal infiltrates    CXR 3/11/25  FINDINGS/IMPRESSION:   1. There is redemonstration of patchy alveolar opacities throughout both lungs.  The findings could represent alveolar edema, a diffuse multifocal infectious process, or a combination.  The findings have not significantly changed since previous exam.   2. The heart mediastinal structures remain enlarged.  The there are trace bilateral pleural effusions.   3. The thoracic component of an enteric feeding tube is identified traversing the thoracic esophagus.  The distal tip is not visualized but lies well below the  GE junction.     CT head 3/7/25  CONCLUSION:   Motion limits evaluation.  No evidence of acute intracranial abnormality.     CT chest 3/7/25  CONCLUSION:   1. Extensive bilateral airspace disease, concerning for potential multifocal pneumonia. Continued surveillance is advised.   2. Dense bilateral lower airspace consolidation is evident; aspiration pneumonitis is a differential diagnostic possibility. Follow-up is recommended to document resolution.    3. Dilatation of the main pulmonary artery trunk may relate to underlying pulmonary hypertension.    4. Small hiatal hernia with imaging manifestations that may be indicative underlying esophagitis.   5. Marked hepatic steatosis.   6. Lesser incidental findings as above.     CXR 3/7/25 with multifocal infiltrates, possible effusion on the left  CONCLUSION:   1. Cardiomegaly.  Tortuous aorta.   2. Extensive multifocal airspace opacification in the bilateral perihilar and basilar regions with left-sided effusion.  Differential includes pulmonary edema congestive failure versus multifocal pneumonitis.      CT abd/pelvis 3/4/25  CONCLUSION:   Severely fatty liver with subtle undulating contour.  Stable splenomegaly.  Tubular structures around the GE junction are suggestive of lower esophageal varices.  No ascites       LABS:  Recent Labs   Lab 03/19/25  0523 03/20/25  0331 03/21/25  1055   RBC 3.15* 3.05* 3.30*   HGB 8.1* 8.2* 8.6*   HCT 24.0* 23.8* 25.9*   MCV 76.2* 78.0* 78.5*   MCH 25.7* 26.9 26.1   MCHC 33.8 34.5 33.2   RDW 31.3* 30.7* 32.4*   NEPRELIM 7.94* 9.52* 11.45*   WBC 11.0 13.0* 14.5*   .0 281.0 348.0       Recent Labs   Lab 03/19/25  0523 03/20/25  0331 03/21/25  1055   * 94 110*   BUN 49* 43* 34*   CREATSERUM 0.95 0.96 1.00   EGFRCR 100 99 94   CA 8.0* 8.1* 8.4*   ALB 2.9*  2.9* 2.8* 2.9*    132* 134*   K 3.6 4.2 3.4*    102 102   CO2 23.0 22.0 24.0   ALKPHO 182*  182* 181* 211*   *  251* 273* 228*   *  108* 112*  111*   BILT 14.1*  14.1* 15.0* 16.9*   TP 5.5*  5.5* 5.7 6.0       ASSESSMENT/PLAN:  Acute hypoxemic resp failure  -secondary to multifocal infiltrates. Given hx of emesis, concern for aspiration  -checked non contrast chest CT showed b/l infiltrates  -initially on zosyn, doxy. Checked urine leg, strep pneumo, mrsa nares, blood cx neg thus far  -initially weaned from airvo to 6 LNC but then declined and was intubated on 3/12/25. Was on full MV support   -passed SBT on 3/17/25 and extubated to NC. Now on 4LNC  -ID consulted and stopped doxy and zosyn. Switched to meropenem and completed course  -repeat CT with b/l infiltrates L>R  -underwent bronchoscopy with left BAL on 3/13/25. Cultures NGTD  -aspiration precautions  -now with right chest discomfort with taking a deep breath in. CXR pretty unchanged. VQ scan indeterminate. LE dopplers neg. Will check CT PE study. Recommend holding evening lasix     Acute ETOH hepatitis and now pancreatitis  -GI following. Imaging as above-now with pancreatitis  -PPI  -passed swallow     -hypotension-multifactorial from sedation, sepsis, anemia  -likely secondary to sedation and sepsis  -Pt didn't receive sepsis bolus b/c deemed fluid overloaded d/t possible cirrhosis. Was on low dose levophed for MAP > 65 weaned off   -possibly d/t acute anemia. No obvious source seen. S/p transfused 1 unit pRBC on 3/16/25  -off pressors now    ETOH withdrawal  -s/p CIWA protocol  -psych following   -CT head neg for acute changes    USMAN and hypernatremia  -s/p bicarb infusion  -s/p D5 0.9 NS  -renal was following. Na improving     Proph  -DVT: elevated INR low PLT, anemia. On SCDS  -nutrition: passed swallow     Dispo  -Full code  -family at bedside    Thank you for the opportunity to care for Sami Escobedo DO, MPH  Pulmonary Critical Care Medicine  [facility]                       [1]   Allergies  Allergen Reactions    Morphine  SWELLING     SHORTNESS OF BREATH   [2]   No outpatient medications have been marked as taking for the 3/4/25 encounter (Hospital Encounter).

## 2025-03-21 NOTE — PLAN OF CARE
Problem: Patient Centered Care  Goal: Patient preferences are identified and integrated in the patient's plan of care  Description: Interventions:  - Provide timely, complete, and accurate information to patient/family  - Incorporate patient and family knowledge, values, beliefs, and cultural backgrounds into the planning and delivery of care  - Encourage patient/family to participate in care and decision-making at the level they choose  - Honor patient and family perspectives and choices  Outcome: Progressing     Problem: PAIN - ADULT  Goal: Verbalizes/displays adequate comfort level or patient's stated pain goal  Description: INTERVENTIONS:  - Encourage pt to monitor pain and request assistance  - Assess pain using appropriate pain scale  - Administer analgesics based on type and severity of pain and evaluate response  - Implement non-pharmacological measures as appropriate and evaluate response  - Consider cultural and social influences on pain and pain management  - Manage/alleviate anxiety  - Utilize distraction and/or relaxation techniques  - Monitor for opioid side effects  - Notify MD/LIP if interventions unsuccessful or patient reports new pain  - Anticipate increased pain with activity and pre-medicate as appropriate  Outcome: Progressing     Problem: RISK FOR INFECTION - ADULT  Goal: Absence of fever/infection during anticipated neutropenic period  Description: INTERVENTIONS  - Monitor WBC  - Administer growth factors as ordered  - Implement neutropenic guidelines  Outcome: Progressing     Problem: SAFETY ADULT - FALL  Goal: Free from fall injury  Description: INTERVENTIONS:  - Assess pt frequently for physical needs  - Identify cognitive and physical deficits and behaviors that affect risk of falls.  - Hessel fall precautions as indicated by assessment.  - Educate pt/family on patient safety including physical limitations  - Instruct pt to call for assistance with activity based on assessment  -  Modify environment to reduce risk of injury  - Provide assistive devices as appropriate  - Consider OT/PT consult to assist with strengthening/mobility  - Encourage toileting schedule  Outcome: Progressing     Problem: DISCHARGE PLANNING  Goal: Discharge to home or other facility with appropriate resources  Description: INTERVENTIONS:  - Identify barriers to discharge w/pt and caregiver  - Include patient/family/discharge partner in discharge planning  - Arrange for needed discharge resources and transportation as appropriate  - Identify discharge learning needs (meds, wound care, etc)  - Arrange for interpreters to assist at discharge as needed  - Consider post-discharge preferences of patient/family/discharge partner  - Complete POLST form as appropriate  - Assess patient's ability to be responsible for managing their own health  - Refer to Case Management Department for coordinating discharge planning if the patient needs post-hospital services based on physician/LIP order or complex needs related to functional status, cognitive ability or social support system  Outcome: Progressing     Problem: Delirium  Goal: Minimize duration of delirium  Description: Interventions:  - Encourage use of hearing aids, eye glasses  - Promote highest level of mobility daily  - Provide frequent reorientation  - Promote wakefulness i.e. lights on, blinds open  - Promote sleep, encourage patient's normal rest cycle i.e. lights off, TV off, minimize noise and interruptions  - Encourage family to assist in orientation and promotion of home routines  Outcome: Progressing     Problem: RESPIRATORY - ADULT  Goal: Achieves optimal ventilation and oxygenation  Description: INTERVENTIONS:  - Assess for changes in respiratory status  - Assess for changes in mentation and behavior  - Position to facilitate oxygenation and minimize respiratory effort  - Oxygen supplementation based on oxygen saturation or ABGs  - Provide Smoking Cessation  handout, if applicable  - Encourage broncho-pulmonary hygiene including cough, deep breathe, Incentive Spirometry  - Assess the need for suctioning and perform as needed  - Assess and instruct to report SOB or any respiratory difficulty  - Respiratory Therapy support as indicated  - Manage/alleviate anxiety  - Monitor for signs/symptoms of CO2 retention  Outcome: Progressing     Problem: METABOLIC/FLUID AND ELECTROLYTES - ADULT  Goal: Electrolytes maintained within normal limits  Description: INTERVENTIONS:  - Monitor labs and rhythm and assess patient for signs and symptoms of electrolyte imbalances  - Administer electrolyte replacement as ordered  - Monitor response to electrolyte replacements, including rhythm and repeat lab results as appropriate  - Fluid restriction as ordered  - Instruct patient on fluid and nutrition restrictions as appropriate  Outcome: Progressing  Goal: Hemodynamic stability and optimal renal function maintained  Description: INTERVENTIONS:  - Monitor labs and assess for signs and symptoms of volume excess or deficit  - Monitor intake, output and patient weight  - Monitor urine specific gravity, serum osmolarity and serum sodium as indicated or ordered  - Monitor response to interventions for patient's volume status, including labs, urine output, blood pressure (other measures as available)  - Encourage oral intake as appropriate  - Instruct patient on fluid and nutrition restrictions as appropriate  Outcome: Progressing     Problem: Impaired Swallowing  Goal: Minimize aspiration risk  Description: Interventions:  - Patient should be alert and upright for all feedings (90 degrees preferred)  - Offer food and liquids at a slow rate  - No straws  - Encourage small bites of food and small sips of liquid  - Offer pills one at a time, crush or deliver with applesauce as needed  - Discontinue feeding and notify MD (or speech pathologist) if coughing or persistent throat clearing or wet/gurgly  vocal quality is noted  Outcome: Progressing     Problem: Impaired Cognition  Goal: Patient will exhibit improved attention, thought processing and/or memory  Description: Interventions:  - Minimize distractions in the room when full attention is required  - Allow additional time for processing after asking questions or providing instructions  - Encourage use of eye glasses  Outcome: Progressing     Problem: CARDIOVASCULAR - ADULT  Goal: Maintains optimal cardiac output and hemodynamic stability  Description: INTERVENTIONS:- Monitor vital signs, rhythm, and trends- Monitor for bleeding, hypotension and signs of decreased cardiac output- Evaluate effectiveness of vasoactive medications to optimize hemodynamic stability- Monitor arterial and/or venous puncture sites for bleeding and/or hematoma- Assess quality of pulses, skin color and temperature- Assess for signs of decreased coronary artery perfusion - ex. Angina- Evaluate fluid balance, assess for edema, trend weights  Outcome: Progressing

## 2025-03-21 NOTE — PROGRESS NOTES
[facility]  part of [facility]  Hospitalist Progress Note     Sami Grijalva  Patient Status:  Inpatient    10/11/1980  44 year old CSN [id number]   Location 217/217-A Attending Yonny Bowers MD   Hosp Day # 17 PCP Kerri Medina MD     Assessment & Plan:   ----------------------------------  Respiratory failure, acute hypoxemic.  Due to pneumonia, ARDS.  Oxygen requirement is improving.  Extubated 3/17  -Supplemental oxygen as needed, titrate down  -Pulmonology consultation appreciated  -Treat contributing factors as described    Pneumonia. aspiration.  Clinically improving.  Extubated successfully  -IV antibiotics: Meropenem  -Blood cultures: NGTD  -Monitor temperature, blood pressure and fever curve  -Pulmonology consult appreciated  -ID consult appreciated  -Repeat chest imaging to be decided    Acute kidney injury.   Baseline creatinine is 1.5, highest creatinine during this admission > 1.5x baseline.  -Nephrology consult  -Monitor urine output  -Repeat chemistry in the morning  -No indication for dialysis  -Hold diuretics  -Hold ACEi/ARB  -Avoid nephrotoxins/IV contrast/hypotension  -Renal imaging: If fails to continue improving  -IVF: none  -DC Cedeño catheter 3/19    Chest pain.  Troponins negative.  EKG nonacute.  D-dimer positive.  VQ scan indeterminate.  Pain improved.  Although PE cannot be completely ruled out, at this point is low likelihood and as patient's kidneys are recovering from recent USMAN, would favor not giving him IV contrast at this point.  -Monitor  -Reconsider CT to rule out PE if clinical scenario changes    Diarrhea.  Some contribution from antibiotics, tube feeds, acute illness  -GI following  -ID following  -Defer further investigation to them    Acute alcoholic hepatitis.  Bilirubin rising  -GI following    Other problems  Pancreatitis  Alcohol abuse  Cirrhosis  Hepatic steatosis  Hyponatremia    Supplementary Documentation:   DVT Mechanical  Prophylaxis:   SCDs,    DVT Pharmacologic Prophylaxis   Medication   None                Code Status: Full Code  Cedeño: External urinary catheter in place  Cedeño Duration (in days): 14  Central line:    ADOLFO: 3/24/2025        I personally reviewed the available laboratories, imaging including. I discussed/will discuss the case with consultants. I ordered laboratories and/or radiographic studies. I adjusted medications as detailed above.  Medical decision making high, risk is high.    Subjective:   ----------------------------------  Had chest pain overnight on the right side.  Questionably pleuritic though resolved at the time of my evaluation.      Objective:   Chief Complaint:   Chief Complaint   Patient presents with    Vomiting    Hiccups    Eval-D     ----------------------------------  Temp:  [98.5 °F (36.9 °C)-100.1 °F (37.8 °C)] 100.1 °F (37.8 °C)  Pulse:  [103-107] 107  Resp:  [20] 20  BP: (110-124)/(59-71) 113/69  SpO2:  [94 %-97 %] 94 %  Gen: A+Ox3.  No distress.   HEENT: NCAT, neck supple, no carotid bruit.  CV: RRR, S1S2, and intact distal pulses. No gallop, rub, murmur.  Pulm: Poor effort, no wheezing or rales  Abd: Soft, NTND, BS normal, no mass, no HSM, no rebound/guarding.  Rectal tube in place, Cedeño catheter in place  Neuro: Normal reflexes, CN. Sensory/motor exams grossly normal deficit.  Generally weak  MS: No joint effusions.  No peripheral edema.  Skin: Skin is warm and dry. No rashes, erythema, diaphoresis.   Psych: Normal mood and affect. Calm, cooperative    Labs:  Lab Results   Component Value Date    HGB 8.6 (L) 03/21/2025    WBC 14.5 (H) 03/21/2025    .0 03/21/2025     (L) 03/21/2025    K 3.4 (L) 03/21/2025    CREATSERUM 1.00 03/21/2025    INR 1.80 (H) 03/20/2025     (H) 03/21/2025     (H) 03/21/2025    TROP 0.00 01/31/2017            potassium chloride  40 mEq Oral Once    pantoprazole  40 mg Oral BID AC    epoetin sheela  10,000 Units Subcutaneous Once per day  on Tuesday Thursday Saturday    furosemide  20 mg Intravenous BID (Diuretic)    vancomycin  125 mg Per NG Tube Daily    multivitamin  1 tablet Oral Daily    folic acid  1 mg Oral Daily       baclofen    acetaminophen    haloperidol lactate    polyethylene glycol (PEG 3350)    bisacodyl    prochlorperazine

## 2025-03-21 NOTE — PROGRESS NOTES
[facility]     Gastroenterology Progress Note    Sami Grijavla Jr. Patient Status:  Inpatient    10/11/1980 MRN [id number]   Location [facility] 5SW/SE Attending Yonny Bowers MD   Hosp Day # 17 PCP Kerri Medina MD       Subjective:   Pt awake, resting in bed  Seen in transport to CT to r/o PE  Denies ABD pain, N/V and diarrhea  No black/bloody stools  No chills  Objective:   Blood pressure 113/69, pulse 107, temperature 100.1 °F (37.8 °C), temperature source Oral, resp. rate 20, height 5' 8\" (1.727 m), weight 186 lb 11.7 oz (84.7 kg), SpO2 94%. Body mass index is 28.39 kg/m².    Gen: awake, alert patient, NAD  HEENT: EOMI, the sclera appears icteric, oropharynx clear, mucus membranes appear moist  CV: RRR  Lung: no conversational dyspnea   Abdomen: soft NT, mildly distended abdomen with NABS appreciated   Skin: dry, warm, jaundice  Ext: no LE edema is evident  Neuro: Alert, oriented x3 and interactive  Psych: calm, cooperative    Assessment and Plan:   Sami Grijalva Jr. is a 44 year old male w/ PMHx of longstanding alcohol use, cannabis use, GERD, who presents with generalized abdominal pain, hiccups and vomiting. GI consulted for acute alcohol hepatitis.  ICU stay complicated by multifocal pneumonia and ARDS now status post extubation this morning     #Acute alcohol hepatitis with probable underlying cirrhosis  -CT a/p in ER severe fatty liver with subtle undulating contour  -US liver with hepatic steatosis, GB sludge and cholelithiasis without cholecystitis or biliary ductal dilation, hepatic and portal vein patent  -last EGD 2023 without varices   -high maddrey; holding off on steroids because of pneumonia  -LFTs remain elevated likely from acute alcohol hepatitis in setting of alcohol cirrhosis and overlap cholestasis of sepsis  -nutrition     #Likely early cirrhosis, ETOH   MELD 3.0 on admission: 44  -PSE: Continue lactulose and Xifaxan  -Ascites: none on exam or  US  -EV: None on last EGD 11/2023, tubular structure around GE junction suggestive of EV.  Will need repeat EGD electively  -HCC: No lesions on US or CT, AFP 2.7     #Mild interstitial pancreatitis  - likely etoh induced   - continue supportive care    Case discussed with Baldo Zavala MD and Faustina BALDWIN.    Annie Morrison DNP, FNP-BC  [facility] Gastroenterology  3/21/2025      Results:     Lab Results   Component Value Date    WBC 14.5 (H) 03/21/2025    HGB 8.6 (L) 03/21/2025    HCT 25.9 (L) 03/21/2025    .0 03/21/2025    CREATSERUM 1.00 03/21/2025    BUN 34 (H) 03/21/2025     (L) 03/21/2025    K 3.4 (L) 03/21/2025     03/21/2025    CO2 24.0 03/21/2025     (H) 03/21/2025    CA 8.4 (L) 03/21/2025    ALB 2.9 (L) 03/21/2025    ALKPHO 211 (H) 03/21/2025    BILT 16.9 (H) 03/21/2025    TP 6.0 03/21/2025     (H) 03/21/2025     (H) 03/21/2025    INR 1.80 (H) 03/20/2025     (HH) 03/13/2025    DDIMER 9.14 (H) 03/20/2025    ESRML 27 (H) 10/01/2021    MG 2.2 03/18/2025    PHOS 3.1 03/20/2025    TROP 0.00 01/31/2017     (H) 03/06/2025    ETOH <3 03/04/2025       US VENOUS DOPPLER LEG BILAT - DIAG IMG (CPT=93970)    Result Date: 3/20/2025  CONCLUSION:  1. No lower extremity DVT bilaterally.    Dictated by (CST): Parish Craig MD on 3/20/2025 at 1:09 PM     Finalized by (CST): Parish Craig MD on 3/20/2025 at 1:09 PM          NM LUNG VQ VENT / PERFUSION SCAN  (CPT=78582)    Result Date: 3/20/2025  CONCLUSION:  1. Indeterminate examination; multiple triple matched defects are demonstrated, presumably related to underlying parenchymal disease visualized on radiographs. The patient is a poor candidate for scintigraphic assessment.  2. Accumulation of xenon radiotracer in the region of the liver is likely indicative of hepatic steatosis.    Dictated by (CST): Pavel Lara MD on 3/20/2025 at 12:01 PM     Finalized by (CST): Pavel Lara MD on 3/20/2025 at 12:04 PM           XR CHEST AP PORTABLE  (CPT=71045)    Result Date: 3/20/2025  CONCLUSION: Patchy alveolar opacities throughout the lungs, suggesting edema or multifocal pneumonia.  No significant interval change since preceding exam.  Interval extubation and removal of feeding tube.   [facility] provided a preliminary report for this examination. This final report agrees with their preliminary findings.   Dictated by (CST): Isaiah Lee MD on 3/20/2025 at 6:31 AM     Finalized by (CST): Isaiah Lee MD on 3/20/2025 at 6:33 AM         EKG 12 Lead    Result Date: 3/20/2025  Normal sinus rhythm Possible Inferior infarct , age undetermined Possible Anterior infarct , age undetermined Abnormal ECG When compared with ECG of 04-MAR-2025 10:00, Borderline criteria for Anterior infarct are now Present Borderline criteria for Inferior infarct are now Present T wave inversion now evident in Inferior leads Nonspecific T wave abnormality now evident in Anterior leads Confirmed by BRYSON COPELAND, OLIVIA ([id number]) on 3/20/2025 8:14:43 AM

## 2025-03-21 NOTE — PROGRESS NOTES
[facility]  part of [facility]  Hospitalist Progress Note     Sami Grijalva JrKushal Patient Status:  Inpatient    10/11/1980  44 year old CSN [id number]   Location 217/217-A Attending Yonny Bowers MD   Hosp Day # 17 PCP Kerri Medina MD     Assessment & Plan:   ----------------------------------  Respiratory failure, acute hypoxemic.  Due to pneumonia, ARDS.  Oxygen requirement is improving.  Extubated 3/17  -Supplemental oxygen as needed, titrate down  -Pulmonology consultation appreciated  -Treat contributing factors as described    Pneumonia. aspiration.  Clinically improving.  Extubated successfully  -IV antibiotics: Meropenem completed  -Blood cultures: NGTD  -Monitor temperature, blood pressure and fever curve  -Pulmonology consult appreciated  -ID consult appreciated  -Repeat chest imaging to be decided    Acute kidney injury.   Resolved    Chest pain.  Resolved.  Favoring not to do CT with contrast given his recent kidney insult and resolved symptoms, hemodynamic stability  -Monitor  -Reconsider CT to rule out PE if clinical scenario changes    Diarrhea.  Some contribution from antibiotics, tube feeds, acute illness  -GI following  -ID following  -Defer further investigation to them    Acute alcoholic hepatitis.  Bilirubin rising  -GI following    Other problems  Pancreatitis  Alcohol abuse  Cirrhosis  Hepatic steatosis  Hyponatremia    Supplementary Documentation:   DVT Mechanical Prophylaxis:   SCDs,    DVT Pharmacologic Prophylaxis   Medication   None                Code Status: Full Code  Cedeño: External urinary catheter in place  Cedeño Duration (in days): 14  Central line:    ADOLFO: Subacute rehab when medically stable    I personally reviewed the available laboratories, imaging including. I discussed/will discuss the case with consultants. I ordered laboratories and/or radiographic studies. I adjusted medications as detailed above.  Medical decision making high, risk is  high.    Subjective:   ----------------------------------  Chest pain better.  No shortness of breath.  Wants to get up and move around more.  Doing well with his oral intake      Objective:   Chief Complaint:   Chief Complaint   Patient presents with    Vomiting    Hiccups    Eval-D     ----------------------------------  Temp:  [98.5 °F (36.9 °C)-100.1 °F (37.8 °C)] 100.1 °F (37.8 °C)  Pulse:  [103-107] 107  Resp:  [20] 20  BP: (110-124)/(59-71) 113/69  SpO2:  [94 %-97 %] 94 %  Gen: A+Ox3.  No distress.  Jaundiced  HEENT: NCAT, neck supple, no carotid bruit.  Scleral icterus  CV: RRR, S1S2, and intact distal pulses. No gallop, rub, murmur.  Pulm: Poor effort, no wheezing or rales  Abd: Soft, NTND, BS normal, no mass, no HSM, no rebound/guarding.  Rectal tube in place, Cedeño catheter in place  Neuro: Normal reflexes, CN. Sensory/motor exams grossly normal deficit.  Generally weak  MS: No joint effusions.  No peripheral edema.  Skin: Skin is warm and dry. No rashes, erythema, diaphoresis.   Psych: Normal mood and affect. Calm, cooperative    Labs:  Lab Results   Component Value Date    HGB 8.6 (L) 03/21/2025    WBC 14.5 (H) 03/21/2025    .0 03/21/2025     (L) 03/21/2025    K 3.4 (L) 03/21/2025    CREATSERUM 1.00 03/21/2025    INR 1.80 (H) 03/20/2025     (H) 03/21/2025     (H) 03/21/2025    TROP 0.00 01/31/2017            potassium chloride  40 mEq Oral Once    pantoprazole  40 mg Oral BID AC    epoetin sheela  10,000 Units Subcutaneous Once per day on Tuesday Thursday Saturday    furosemide  20 mg Intravenous BID (Diuretic)    vancomycin  125 mg Per NG Tube Daily    multivitamin  1 tablet Oral Daily    folic acid  1 mg Oral Daily       baclofen    acetaminophen    haloperidol lactate    polyethylene glycol (PEG 3350)    bisacodyl    prochlorperazine

## 2025-03-21 NOTE — PAYOR COMM NOTE
--------------  3/21 CONTINUED STAY REVIEW    Payor: [facility]  Subscriber #:  [id number]  Authorization Number: [id number]    ID:    Subjective:  ROS reviewed. In chair. Tmax 100.2.      ASSESSMENT:     Antibiotics: Meropenem 3/14-3/20, OVP  (doxycycline, vancomycin, zosyn)     # Acute hypoxic respiratory failure with fever and multifocal pneumonia               -S/p intubation 3/12               -s/p bronch 3/13, cx NGTD               -MRSA nares negative  # Acute aspiration pneumonia  # Acute leukocytosis   # Acute anemia  # Acute pancreatitis  # Alcoholic hepatitis with encephalopathy on admission               - CT A/P with severe fatty liver               - US GB with sludge w/o cholecystitis               - CT C/A/P 3/12 with pancreatitis  # USMAN - improved  # EtOH abuse and withdrawal     PLAN:  -  Just completed seven day course of meropenem 3/20. Check blood cx. Continue on OVP.  -  FU CT chest.  -  Follow fever curve, wbc.  -  Reviewed labs, micro, imaging reports, available old record      Physical Exam:  /69 (BP Location: Right arm)   Pulse 107   Temp 100.1 °F (37.8 °C) (Oral)   Resp 20   Ht 5' 8\" (1.727 m)   Wt 186 lb 11.7 oz (84.7 kg)   SpO2 94%   BMI 28.39 kg/m²      Gen:                   Awake, in chair  HEENT:             +scleral icterus, neck supple  CV/lungs:           RRR, lungs CTAB  Abdom:              Soft, no TTP  Skin/extrem:      No rashes, jaundiced  :                    Cedeño draining yellow urine  Lines:                 Midline+       HOSPITALIST:      Assessment & Plan:  ----------------------------------  Respiratory failure, acute hypoxemic.  Due to pneumonia, ARDS.  Oxygen requirement is improving.  Extubated 3/17  -Supplemental oxygen as needed, titrate down  -Pulmonology consultation appreciated  -Treat contributing factors as described     Pneumonia. aspiration.  Clinically improving.  Extubated successfully  -IV antibiotics:  Meropenem completed  -Blood cultures: NGTD  -Monitor temperature, blood pressure and fever curve  -Pulmonology consult appreciated  -ID consult appreciated  -Repeat chest imaging to be decided     Acute kidney injury.   Resolved     Chest pain.  Resolved.  Favoring not to do CT with contrast given his recent kidney insult and resolved symptoms, hemodynamic stability  -Monitor  -Reconsider CT to rule out PE if clinical scenario changes     Diarrhea.  Some contribution from antibiotics, tube feeds, acute illness  -GI following  -ID following  -Defer further investigation to them     Acute alcoholic hepatitis.  Bilirubin rising  -GI following     Other problems  Pancreatitis  Alcohol abuse  Cirrhosis  Hepatic steatosis  Hyponatremia      ADOLFO: Subacute rehab when medically stable     Subjective:  ----------------------------------  Chest pain better.  No shortness of breath.  Wants to get up and move around more.  Doing well with his oral intake     Temp:  [98.5 °F (36.9 °C)-100.1 °F (37.8 °C)] 100.1 °F (37.8 °C)  Pulse:  [103-107] 107  Resp:  [20] 20  BP: (110-124)/(59-71) 113/69  SpO2:  [94 %-97 %] 94 %  Gen: A+Ox3.  No distress.  Jaundiced  HEENT: NCAT, neck supple, no carotid bruit.  Scleral icterus  CV: RRR, S1S2, and intact distal pulses. No gallop, rub, murmur.  Pulm: Poor effort, no wheezing or rales  Abd: Soft, NTND, BS normal, no mass, no HSM, no rebound/guarding.  Rectal tube in place, Cedeño catheter in place  Neuro:  Normal reflexes, CN. Sensory/motor exams grossly normal deficit.  Generally weak  MS: No joint effusions.  No peripheral edema.  Skin: Skin is warm and dry. No rashes, erythema, diaphoresis.   Psych:   Normal mood and affect. Calm, cooperative      Scheduled Medications    potassium chloride  40 mEq Oral Once    pantoprazole  40 mg Oral BID AC    epoetin sheela  10,000 Units Subcutaneous Once per day on Tuesday Thursday Saturday    furosemide  20 mg Intravenous BID (Diuretic)    vancomycin  125 mg  Per NG Tube Daily    multivitamin  1 tablet Oral Daily    folic acid  1 mg Oral Daily          PULM:    Subjective:  On 3 LNC. Reports chest pain has resolved       PHYSICAL EXAM:  /69 (BP Location: Right arm)   Pulse 107   Temp 100.1 °F (37.8 °C) (Oral)   Resp 20   Ht 5' 8\" (1.727 m)   Wt 186 lb 11.7 oz (84.7 kg)   SpO2 94%   BMI 28.39 kg/m²   CONSTITUTIONAL: NAD + jaundice  HEENT: AT/NC  MOUTH: MMM  NECK/THROAT: no JVD. Trachea midline. No obvious thyromegaly  LUNG: clear b/l no wheezing, + b/l crackles. Chest symmetric with respiratory motion  HEART: regular rate and rhythm, no obvious murmers or gallops noted  ABD: soft non tender. + bowel sounds. No organomegaly noted  EXT: no clubbing, cyanosis. Min b/l LE edema      IMAGES:   VQ scan indeterminate 3/20/25     LE dopplers neg 3/20/25     CXR 3/20/25  CONCLUSION: Patchy alveolar opacities throughout the lungs, suggesting edema or multifocal pneumonia.  No significant interval change since preceding exam.  Interval extubation and removal of feeding tube.     Lab 03/19/25  0523 03/20/25  0331 03/21/25  1055   RBC 3.15* 3.05* 3.30*   HGB 8.1* 8.2* 8.6*   HCT 24.0* 23.8* 25.9*   MCV 76.2* 78.0* 78.5*   MCH 25.7* 26.9 26.1   MCHC 33.8 34.5 33.2   RDW 31.3* 30.7* 32.4*   NEPRELIM 7.94* 9.52* 11.45*   WBC 11.0 13.0* 14.5*   .0 281.0 348.0               Recent Labs   Lab 03/19/25  0523 03/20/25  0331 03/21/25  1055   * 94 110*   BUN 49* 43* 34*   CREATSERUM 0.95 0.96 1.00   EGFRCR 100 99 94   CA 8.0* 8.1* 8.4*   ALB 2.9*  2.9* 2.8* 2.9*    132* 134*   K 3.6 4.2 3.4*    102 102   CO2 23.0 22.0 24.0   ALKPHO 182*  182* 181* 211*   *  251* 273* 228*   *  108* 112* 111*   BILT 14.1*  14.1* 15.0* 16.9*   TP 5.5*  5.5* 5.7 6.0         ASSESSMENT/PLAN:  Acute hypoxemic resp failure  -secondary to multifocal infiltrates. Given hx of emesis, concern for aspiration  -checked non contrast chest CT showed b/l  infiltrates  -initially on zosyn, doxy. Checked urine leg, strep pneumo, mrsa nares, blood cx neg thus far  -initially weaned from airvo to 6 LNC but then declined and was intubated on 3/12/25. Was on full MV support   -passed SBT on 3/17/25 and extubated to NC. Now on 4LNC  -ID consulted and stopped doxy and zosyn. Switched to meropenem and completed course  -repeat CT with b/l infiltrates L>R  -underwent bronchoscopy with left BAL on 3/13/25. Cultures NGTD  -aspiration precautions  -now with right chest discomfort with taking a deep breath in. CXR pretty unchanged. VQ scan indeterminate. LE dopplers neg. Will check CT PE study. Recommend holding evening lasix      Acute ETOH hepatitis and now pancreatitis  -GI following. Imaging as above-now with pancreatitis  -PPI  -passed swallow      -hypotension-multifactorial from sedation, sepsis, anemia  -likely secondary to sedation and sepsis  -Pt didn't receive sepsis bolus b/c deemed fluid overloaded d/t possible cirrhosis. Was on low dose levophed for MAP > 65 weaned off   -possibly d/t acute anemia. No obvious source seen. S/p transfused 1 unit pRBC on 3/16/25  -off pressors now     ETOH withdrawal  -s/p CIWA protocol  -psych following   -CT head neg for acute changes     USMAN and hypernatremia  -s/p bicarb infusion  -s/p D5 0.9 NS  -renal was following. Na improving      Proph  -DVT: elevated INR low PLT, anemia. On SCDS  -nutrition: passed swallow      Dispo  -Full code  -family at bedside    GI:    Lab 03/19/25  0523 03/20/25  0331 03/21/25  1055   RBC 3.15* 3.05* 3.30*   HGB 8.1* 8.2* 8.6*   HCT 24.0* 23.8* 25.9*   MCV 76.2* 78.0* 78.5*   MCH 25.7* 26.9 26.1   MCHC 33.8 34.5 33.2   RDW 31.3* 30.7* 32.4*   NEPRELIM 7.94* 9.52* 11.45*   WBC 11.0 13.0* 14.5*   .0 281.0 348.0               Recent Labs   Lab 03/19/25  0523 03/20/25  0331 03/21/25  1055   * 94 110*   BUN 49* 43* 34*   CREATSERUM 0.95 0.96 1.00   EGFRCR 100 99 94   CA 8.0* 8.1* 8.4*   ALB  2.9*  2.9* 2.8* 2.9*    132* 134*   K 3.6 4.2 3.4*    102 102   CO2 23.0 22.0 24.0   ALKPHO 182*  182* 181* 211*   *  251* 273* 228*   *  108* 112* 111*   BILT 14.1*  14.1* 15.0* 16.9*   TP 5.5*  5.5* 5.7 6.0         ASSESSMENT/PLAN:  Acute hypoxemic resp failure  -secondary to multifocal infiltrates. Given hx of emesis, concern for aspiration  -checked non contrast chest CT showed b/l infiltrates  -initially on zosyn, doxy. Checked urine leg, strep pneumo, mrsa nares, blood cx neg thus far  -initially weaned from airvo to 6 LNC but then declined and was intubated on 3/12/25. Was on full MV support   -passed SBT on 3/17/25 and extubated to NC. Now on 4LNC  -ID consulted and stopped doxy and zosyn. Switched to meropenem and completed course  -repeat CT with b/l infiltrates L>R  -underwent bronchoscopy with left BAL on 3/13/25. Cultures NGTD  -aspiration precautions  -now with right chest discomfort with taking a deep breath in. CXR pretty unchanged. VQ scan indeterminate. LE dopplers neg. Will check CT PE study. Recommend holding evening lasix      Acute ETOH hepatitis and now pancreatitis  -GI following. Imaging as above-now with pancreatitis  -PPI  -passed swallow      -hypotension-multifactorial from sedation, sepsis, anemia  -likely secondary to sedation and sepsis  -Pt didn't receive sepsis bolus b/c deemed fluid overloaded d/t possible cirrhosis. Was on low dose levophed for MAP > 65 weaned off   -possibly d/t acute anemia. No obvious source seen. S/p transfused 1 unit pRBC on 3/16/25  -off pressors now     ETOH withdrawal  -s/p CIWA protocol  -psych following   -CT head neg for acute changes     USMAN and hypernatremia  -s/p bicarb infusion  -s/p D5 0.9 NS  -renal was following. Na improving      Proph  -DVT: elevated INR low PLT, anemia. On SCDS  -nutrition: passed swallow      Dispo  -Full code  -family at bedside    Assessment and Plan:   Sami Grijalva Jr. is a 44 year old male  w/ PMHx of longstanding alcohol use, cannabis use, GERD, who presents with generalized abdominal pain, hiccups and vomiting. GI consulted for acute alcohol hepatitis.  ICU stay complicated by multifocal pneumonia and ARDS now status post extubation this morning     #Acute alcohol hepatitis with probable underlying cirrhosis  -CT a/p in ER severe fatty liver with subtle undulating contour  -US liver with hepatic steatosis, GB sludge and cholelithiasis without cholecystitis or biliary ductal dilation, hepatic and portal vein patent  -last EGD 11/2023 without varices   -high maddrey; holding off on steroids because of pneumonia  -LFTs remain elevated likely from acute alcohol hepatitis in setting of alcohol cirrhosis and overlap cholestasis of sepsis  -nutrition     #Likely early cirrhosis, ETOH   MELD 3.0 on admission: 44  -PSE: Continue lactulose and Xifaxan  -Ascites: none on exam or US  -EV: None on last EGD 11/2023, tubular structure around GE junction suggestive of EV.  Will need repeat EGD electively  -HCC: No lesions on US or CT, AFP 2.7     #Mild interstitial pancreatitis  - likely etoh induced   - continue supportive care      MEDICATIONS ADMINISTERED IN LAST 1 DAY:  baclofen (Lioresal) tab 10 mg       Date Action Dose Route User    3/20/2025 2230 Given 10 mg Oral Denzel Draper RN          epoetin sheela (Epogen, Procrit) 36711 UNIT/ML injection 10,000 Units       Date Action Dose Route User    3/20/2025 1719 Given 10,000 Units Subcutaneous (Right Upper Arm) Faustina Solares RN          folic acid (Folvite) tab 1 mg       Date Action Dose Route User    3/21/2025 0852 Given 1 mg Oral Faustina Solares RN          furosemide (Lasix) 10 mg/mL injection 20 mg       Date Action Dose Route User    3/21/2025 0852 Given 20 mg Intravenous Faustina Solares RN    3/20/2025 1714 Given 20 mg Intravenous Faustina Solares RN          iopamidol 76% (ISOVUE-370) injection for power injector       Date Action Dose Route User     3/21/2025 1350 Given 80 mL Intravenous Sulaiman Foreman          meropenem (Merrem) 1,000 mg in sodium chloride 0.9% 100 mL IVPB-MBP       Date Action Dose Route User    3/21/2025 0023 New Bag 1,000 mg Intravenous Denzel Draper RN          pantoprazole (Protonix) DR tab 40 mg       Date Action Dose Route User    3/21/2025 0514 Given 40 mg Oral Denzel Draper RN    3/20/2025 1714 Given 40 mg Oral Faustina Solares RN          potassium chloride (Klor-Con M20) tab 40 mEq       Date Action Dose Route User    3/21/2025 1414 Given 40 mEq Oral Faustina Solares RN          multivitamin (Tab-A-Yosef/Beta Carotene) tab 1 tablet       Date Action Dose Route User    3/21/2025 0852 Given 1 tablet Oral Faustina Solares RN          vancomycin (Firvanq) 50 mg/mL oral solution 125 mg       Date Action Dose Route User    3/21/2025 0852 Given 125 mg Per NG Tube Faustina Solares RN            Vitals (last day)       Date/Time Temp Pulse Resp BP SpO2 Weight O2 Device O2 Flow Rate (L/min) [facility]    03/21/25 1416 99.2 °F (37.3 °C) 107 20 126/72 95 % -- High flow nasal cannula 2 L/min GA    03/21/25 0850 100.1 °F (37.8 °C) 107 20 -- 94 % -- High flow nasal cannula 4 L/min GA    03/21/25 0630 -- 107 -- -- -- -- -- -- EO    03/21/25 0514 98.9 °F (37.2 °C) 105 20 113/69 95 % -- High flow nasal cannula 4 L/min AR    03/21/25 0224 -- 104 -- -- -- -- -- -- EO    03/21/25 0111 -- 103 -- -- -- -- -- -- EO    03/20/25 2228 98.5 °F (36.9 °C) 104 20 122/71 97 % -- High flow nasal cannula 4 L/min AR    03/20/25 1606 99.1 °F (37.3 °C) 105 20 110/59 94 % -- High flow nasal cannula -- GA    03/20/25 1520 -- 105 -- 124/70 -- -- -- -- GR    03/20/25 1028 98.3 °F (36.8 °C) 97 20 126/72 96 % -- High flow nasal cannula 4 L/min GA    03/20/25 0255 -- 95 20 109/62 97 % -- High flow nasal cannula 4 L/min     03/20/25 0255 98.5 °F (36.9 °C) -- -- -- -- -- -- -- AR

## 2025-03-21 NOTE — PROGRESS NOTES
INFECTIOUS DISEASE PROGRESS NOTE  [facility]  part of [facility] ID PROGRESS NOTE    Sami Grijalva . Patient Status:  Inpatient    10/11/1980 MRN [id number]   Location [facility] 2W/SW Attending Natasha Araujo MD   Hosp Day # 17 PCP Kerri Medina MD     Subjective:  ROS reviewed. In chair. Tmax 100.2.     ASSESSMENT:    Antibiotics: Meropenem 3/14-3/20, OVP  (doxycycline, vancomycin, zosyn)     # Acute hypoxic respiratory failure with fever and multifocal pneumonia   -S/p intubation 3/12   -s/p bronch 3/13, cx NGTD   -MRSA nares negative  # Acute aspiration pneumonia  # Acute leukocytosis   # Acute anemia  # Acute pancreatitis  # Alcoholic hepatitis with encephalopathy on admission               - CT A/P with severe fatty liver               - US GB with sludge w/o cholecystitis   - CT C/A/P 3/12 with pancreatitis  # USMAN - improved  # EtOH abuse and withdrawal     PLAN:  -  Just completed seven day course of meropenem 3/20. Check blood cx. Continue on OVP.  -  FU CT chest.  -  Follow fever curve, wbc.  -  Reviewed labs, micro, imaging reports, available old records.  -  Case d/w RN.     History of Present Illness:  44-year-old male with a history of GERD, alcohol use was admitted to the hospital on 3/4 generalized abdominal pain, hiccups, vomiting with difficulty tolerating orals.  Last drink prior to admission was 1 day prior.  Found to have sepsis with elevated lactic acid, hypotension, WBC 17.5.  Responded to IV fluids and received lactulose for elevated ammonia.  CT A/P with severe fatty liver, stable splenomegaly.  Was lethargic the first few days and had soft restraints with Cedeño, Flexi-Seal, NG tube.  Has been essentially afebrile here with a temperature on 3/10 of 100.6 but significant hypoxia up and down and currently increased up to 40 L with increase WBC to 17.4.  Initial USMAN has improved.  Was started on IV Zosyn and doxycycline on 3/7 when patient  became more hypoxic and chest x-ray showed extensive multifocal pneumonia with CT chest showing similar findings.  Possible aspiration.    Physical Exam:  /69 (BP Location: Right arm)   Pulse 107   Temp 100.1 °F (37.8 °C) (Oral)   Resp 20   Ht 5' 8\" (1.727 m)   Wt 186 lb 11.7 oz (84.7 kg)   SpO2 94%   BMI 28.39 kg/m²     Gen:   Awake, in chair  HEENT:  +scleral icterus, neck supple  CV/lungs:  RRR, lungs CTAB  Abdom:  Soft, no TTP  Skin/extrem:  No rashes, jaundiced  :   Cedeño draining yellow urine  Lines:  Midline+    Laboratory Data: Reviewed    Microbiology: Reviewed    Radiology: Reviewed      [facility]  [phone]  3/21/2025

## 2025-03-21 NOTE — PLAN OF CARE
Problem: Patient Centered Care  Goal: Patient preferences are identified and integrated in the patient's plan of care  Description: Interventions:  - What would you like us to know as we care for you?   - Provide timely, complete, and accurate information to patient/family  - Incorporate patient and family knowledge, values, beliefs, and cultural backgrounds into the planning and delivery of care  - Encourage patient/family to participate in care and decision-making at the level they choose  - Honor patient and family perspectives and choices  Outcome: Progressing     Problem: Patient/Family Goals  Goal: Patient/Family Long Term Goal  Description: Patient's Long Term Goal:     Interventions:  - See additional Care Plan goals for specific interventions  Outcome: Progressing  Goal: Patient/Family Short Term Goal  Description: Patient's Short Term Goal:     Interventions:   - See additional Care Plan goals for specific interventions  Outcome: Progressing     Problem: PAIN - ADULT  Goal: Verbalizes/displays adequate comfort level or patient's stated pain goal  Description: INTERVENTIONS:  - Encourage pt to monitor pain and request assistance  - Assess pain using appropriate pain scale  - Administer analgesics based on type and severity of pain and evaluate response  - Implement non-pharmacological measures as appropriate and evaluate response  - Consider cultural and social influences on pain and pain management  - Manage/alleviate anxiety  - Utilize distraction and/or relaxation techniques  - Monitor for opioid side effects  - Notify MD/LIP if interventions unsuccessful or patient reports new pain  - Anticipate increased pain with activity and pre-medicate as appropriate  Outcome: Progressing     Problem: RISK FOR INFECTION - ADULT  Goal: Absence of fever/infection during anticipated neutropenic period  Description: INTERVENTIONS  - Monitor WBC  - Administer growth factors as ordered  - Implement neutropenic  guidelines  Outcome: Progressing     Problem: SAFETY ADULT - FALL  Goal: Free from fall injury  Description: INTERVENTIONS:  - Assess pt frequently for physical needs  - Identify cognitive and physical deficits and behaviors that affect risk of falls.  - [place] fall precautions as indicated by assessment.  - Educate pt/family on patient safety including physical limitations  - Instruct pt to call for assistance with activity based on assessment  - Modify environment to reduce risk of injury  - Provide assistive devices as appropriate  - Consider OT/PT consult to assist with strengthening/mobility  - Encourage toileting schedule  Outcome: Progressing     Problem: DISCHARGE PLANNING  Goal: Discharge to home or other facility with appropriate resources  Description: INTERVENTIONS:  - Identify barriers to discharge w/pt and caregiver  - Include patient/family/discharge partner in discharge planning  - Arrange for needed discharge resources and transportation as appropriate  - Identify discharge learning needs (meds, wound care, etc)  - Arrange for interpreters to assist at discharge as needed  - Consider post-discharge preferences of patient/family/discharge partner  - Complete POLST form as appropriate  - Assess patient's ability to be responsible for managing their own health  - Refer to Case Management Department for coordinating discharge planning if the patient needs post-hospital services based on physician/LIP order or complex needs related to functional status, cognitive ability or social support system  Outcome: Progressing     Problem: Delirium  Goal: Minimize duration of delirium  Description: Interventions:  - Encourage use of hearing aids, eye glasses  - Promote highest level of mobility daily  - Provide frequent reorientation  - Promote wakefulness i.e. lights on, blinds open  - Promote sleep, encourage patient's normal rest cycle i.e. lights off, TV off, minimize noise and interruptions  - Encourage  family to assist in orientation and promotion of home routines  Outcome: Progressing     Problem: RESPIRATORY - ADULT  Goal: Achieves optimal ventilation and oxygenation  Description: INTERVENTIONS:  - Assess for changes in respiratory status  - Assess for changes in mentation and behavior  - Position to facilitate oxygenation and minimize respiratory effort  - Oxygen supplementation based on oxygen saturation or ABGs  - Provide Smoking Cessation handout, if applicable  - Encourage broncho-pulmonary hygiene including cough, deep breathe, Incentive Spirometry  - Assess the need for suctioning and perform as needed  - Assess and instruct to report SOB or any respiratory difficulty  - Respiratory Therapy support as indicated  - Manage/alleviate anxiety  - Monitor for signs/symptoms of CO2 retention  Outcome: Progressing     Problem: METABOLIC/FLUID AND ELECTROLYTES - ADULT  Goal: Electrolytes maintained within normal limits  Description: INTERVENTIONS:  - Monitor labs and rhythm and assess patient for signs and symptoms of electrolyte imbalances  - Administer electrolyte replacement as ordered  - Monitor response to electrolyte replacements, including rhythm and repeat lab results as appropriate  - Fluid restriction as ordered  - Instruct patient on fluid and nutrition restrictions as appropriate  Outcome: Progressing  Goal: Hemodynamic stability and optimal renal function maintained  Description: INTERVENTIONS:  - Monitor labs and assess for signs and symptoms of volume excess or deficit  - Monitor intake, output and patient weight  - Monitor urine specific gravity, serum osmolarity and serum sodium as indicated or ordered  - Monitor response to interventions for patient's volume status, including labs, urine output, blood pressure (other measures as available)  - Encourage oral intake as appropriate  - Instruct patient on fluid and nutrition restrictions as appropriate  Outcome: Progressing     Problem: Impaired  Swallowing  Goal: Minimize aspiration risk  Description: Interventions:  - Patient should be alert and upright for all feedings (90 degrees preferred)  - Offer food and liquids at a slow rate  - No straws  - Encourage small bites of food and small sips of liquid  - Offer pills one at a time, crush or deliver with applesauce as needed  - Discontinue feeding and notify MD (or speech pathologist) if coughing or persistent throat clearing or wet/gurgly vocal quality is noted  Outcome: Progressing     Problem: Impaired Cognition  Goal: Patient will exhibit improved attention, thought processing and/or memory  Description: Interventions:    Outcome: Progressing     Problem: CARDIOVASCULAR - ADULT  Goal: Maintains optimal cardiac output and hemodynamic stability  Description: INTERVENTIONS:- Monitor vital signs, rhythm, and trends- Monitor for bleeding, hypotension and signs of decreased cardiac output- Evaluate effectiveness of vasoactive medications to optimize hemodynamic stability- Monitor arterial and/or venous puncture sites for bleeding and/or hematoma- Assess quality of pulses, skin color and temperature- Assess for signs of decreased coronary artery perfusion - ex. Angina- Evaluate fluid balance, assess for edema, trend weights  Outcome: Progressing

## 2025-03-22 LAB
ALBUMIN SERPL-MCNC: 2.6 G/DL (ref 3.2–4.8)
ALBUMIN/GLOB SERPL: 0.9 {RATIO} (ref 1–2)
ALP LIVER SERPL-CCNC: 201 U/L
ALT SERPL-CCNC: 98 U/L
ANION GAP SERPL CALC-SCNC: 5 MMOL/L (ref 0–18)
AST SERPL-CCNC: 195 U/L (ref ?–34)
BILIRUB SERPL-MCNC: 15.1 MG/DL (ref 0.3–1.2)
BUN BLD-MCNC: 32 MG/DL (ref 9–23)
BUN/CREAT SERPL: 36.8 (ref 10–20)
CALCIUM BLD-MCNC: 7.9 MG/DL (ref 8.7–10.4)
CHLORIDE SERPL-SCNC: 105 MMOL/L (ref 98–112)
CO2 SERPL-SCNC: 23 MMOL/L (ref 21–32)
CREAT BLD-MCNC: 0.87 MG/DL
EGFRCR SERPLBLD CKD-EPI 2021: 108 ML/MIN/1.73M2 (ref 60–?)
GLOBULIN PLAS-MCNC: 2.8 G/DL (ref 2–3.5)
GLUCOSE BLD-MCNC: 95 MG/DL (ref 70–99)
INR BLD: 1.93 (ref 0.8–1.2)
OSMOLALITY SERPL CALC.SUM OF ELEC: 283 MOSM/KG (ref 275–295)
POTASSIUM SERPL-SCNC: 3.8 MMOL/L (ref 3.5–5.1)
POTASSIUM SERPL-SCNC: 3.8 MMOL/L (ref 3.5–5.1)
PROT SERPL-MCNC: 5.4 G/DL (ref 5.7–8.2)
PROTHROMBIN TIME: 23.1 SECONDS (ref 11.6–14.8)
SODIUM SERPL-SCNC: 133 MMOL/L (ref 136–145)

## 2025-03-22 PROCEDURE — 99232 SBSQ HOSP IP/OBS MODERATE 35: CPT | Performed by: INTERNAL MEDICINE

## 2025-03-22 PROCEDURE — 99233 SBSQ HOSP IP/OBS HIGH 50: CPT | Performed by: HOSPITALIST

## 2025-03-22 PROCEDURE — 99233 SBSQ HOSP IP/OBS HIGH 50: CPT | Performed by: INTERNAL MEDICINE

## 2025-03-22 NOTE — PLAN OF CARE
Problem: Patient Centered Care  Goal: Patient preferences are identified and integrated in the patient's plan of care  Description: Interventions:  - What would you like us to know as we care for you? From home with family   - Provide timely, complete, and accurate information to patient/family  - Incorporate patient and family knowledge, values, beliefs, and cultural backgrounds into the planning and delivery of care  - Encourage patient/family to participate in care and decision-making at the level they choose  - Honor patient and family perspectives and choices  Outcome: Progressing     Problem: Patient/Family Goals  Goal: Patient/Family Long Term Goal  Description: Patient's Long Term Goal: Discharge     Interventions:  - - Monitor vital signs  - Monitor appropriate labs  - Pain management  - Administer medications per order  - Follow MD orders  - Diagnostics per order  - Update / inform patient and family on plan of care  - Discharge planning     - See additional Care Plan goals for specific interventions  Outcome: Progressing  Goal: Patient/Family Short Term Goal  Description: Patient's Short Term Goal: Wants to get up and start walking    Interventions:   - PT/OT to work with patient   - See additional Care Plan goals for specific interventions  Outcome: Progressing     Problem: PAIN - ADULT  Goal: Verbalizes/displays adequate comfort level or patient's stated pain goal  Description: INTERVENTIONS:  - Encourage pt to monitor pain and request assistance  - Assess pain using appropriate pain scale  - Administer analgesics based on type and severity of pain and evaluate response  - Implement non-pharmacological measures as appropriate and evaluate response  - Consider cultural and social influences on pain and pain management  - Manage/alleviate anxiety  - Utilize distraction and/or relaxation techniques  - Monitor for opioid side effects  - Notify MD/LIP if interventions unsuccessful or patient reports new  pain  - Anticipate increased pain with activity and pre-medicate as appropriate  Outcome: Progressing     Problem: RISK FOR INFECTION - ADULT  Goal: Absence of fever/infection during anticipated neutropenic period  Description: INTERVENTIONS  - Monitor WBC  - Administer growth factors as ordered  - Implement neutropenic guidelines  Outcome: Progressing     Problem: SAFETY ADULT - FALL  Goal: Free from fall injury  Description: INTERVENTIONS:  - Assess pt frequently for physical needs  - Identify cognitive and physical deficits and behaviors that affect risk of falls.  - [place] fall precautions as indicated by assessment.  - Educate pt/family on patient safety including physical limitations  - Instruct pt to call for assistance with activity based on assessment  - Modify environment to reduce risk of injury  - Provide assistive devices as appropriate  - Consider OT/PT consult to assist with strengthening/mobility  - Encourage toileting schedule  Outcome: Progressing     Problem: DISCHARGE PLANNING  Goal: Discharge to home or other facility with appropriate resources  Description: INTERVENTIONS:  - Identify barriers to discharge w/pt and caregiver  - Include patient/family/discharge partner in discharge planning  - Arrange for needed discharge resources and transportation as appropriate  - Identify discharge learning needs (meds, wound care, etc)  - Arrange for interpreters to assist at discharge as needed  - Consider post-discharge preferences of patient/family/discharge partner  - Complete POLST form as appropriate  - Assess patient's ability to be responsible for managing their own health  - Refer to Case Management Department for coordinating discharge planning if the patient needs post-hospital services based on physician/LIP order or complex needs related to functional status, cognitive ability or social support system  Outcome: Progressing     Problem: Delirium  Goal: Minimize duration of  delirium  Description: Interventions:  - Encourage use of hearing aids, eye glasses  - Promote highest level of mobility daily  - Provide frequent reorientation  - Promote wakefulness i.e. lights on, blinds open  - Promote sleep, encourage patient's normal rest cycle i.e. lights off, TV off, minimize noise and interruptions  - Encourage family to assist in orientation and promotion of home routines  Outcome: Progressing     Problem: RESPIRATORY - ADULT  Goal: Achieves optimal ventilation and oxygenation  Description: INTERVENTIONS:  - Assess for changes in respiratory status  - Assess for changes in mentation and behavior  - Position to facilitate oxygenation and minimize respiratory effort  - Oxygen supplementation based on oxygen saturation or ABGs  - Provide Smoking Cessation handout, if applicable  - Encourage broncho-pulmonary hygiene including cough, deep breathe, Incentive Spirometry  - Assess the need for suctioning and perform as needed  - Assess and instruct to report SOB or any respiratory difficulty  - Respiratory Therapy support as indicated  - Manage/alleviate anxiety  - Monitor for signs/symptoms of CO2 retention  Outcome: Progressing     Problem: METABOLIC/FLUID AND ELECTROLYTES - ADULT  Goal: Electrolytes maintained within normal limits  Description: INTERVENTIONS:  - Monitor labs and rhythm and assess patient for signs and symptoms of electrolyte imbalances  - Administer electrolyte replacement as ordered  - Monitor response to electrolyte replacements, including rhythm and repeat lab results as appropriate  - Fluid restriction as ordered  - Instruct patient on fluid and nutrition restrictions as appropriate  Outcome: Progressing  Goal: Hemodynamic stability and optimal renal function maintained  Description: INTERVENTIONS:  - Monitor labs and assess for signs and symptoms of volume excess or deficit  - Monitor intake, output and patient weight  - Monitor urine specific gravity, serum osmolarity  and serum sodium as indicated or ordered  - Monitor response to interventions for patient's volume status, including labs, urine output, blood pressure (other measures as available)  - Encourage oral intake as appropriate  - Instruct patient on fluid and nutrition restrictions as appropriate  Outcome: Progressing     Problem: Impaired Swallowing  Goal: Minimize aspiration risk  Description: Interventions:  - Patient should be alert and upright for all feedings (90 degrees preferred)  - Offer food and liquids at a slow rate  - No straws  - Encourage small bites of food and small sips of liquid  - Offer pills one at a time, crush or deliver with applesauce as needed  - Discontinue feeding and notify MD (or speech pathologist) if coughing or persistent throat clearing or wet/gurgly vocal quality is noted  Outcome: Progressing     Problem: Impaired Cognition  Goal: Patient will exhibit improved attention, thought processing and/or memory  Description: Interventions:  - Allow additional time for processing after asking questions or providing instructions  Outcome: Progressing     Problem: CARDIOVASCULAR - ADULT  Goal: Maintains optimal cardiac output and hemodynamic stability  Description: INTERVENTIONS:- Monitor vital signs, rhythm, and trends- Monitor for bleeding, hypotension and signs of decreased cardiac output- Evaluate effectiveness of vasoactive medications to optimize hemodynamic stability- Monitor arterial and/or venous puncture sites for bleeding and/or hematoma- Assess quality of pulses, skin color and temperature- Assess for signs of decreased coronary artery perfusion - ex. Angina- Evaluate fluid balance, assess for edema, trend weights  Outcome: Progressing

## 2025-03-22 NOTE — PROGRESS NOTES
INFECTIOUS DISEASE PROGRESS NOTE  [facility]  part of [facility] ID PROGRESS NOTE    Sami Grijalva . Patient Status:  Inpatient    10/11/1980 MRN [id number]   Location [facility] 2W/SW Attending Natasha Araujo MD   Hosp Day # 18 PCP Kerri Medina MD     Subjective:  ROS reviewed. Still requiring lift to get into chair. Eating better. Family asking about rehab options for DC. Tmax 99.7.    ASSESSMENT:    Antibiotics: OVP  (doxycycline, vancomycin, zosyn) Meropenem 3/14-3/20,     # Fever   -CT chest 3/21 without PE with improved PNA  # Acute hypoxic respiratory failure with fever and multifocal pneumonia   -S/p intubation 3/12   -s/p bronch 3/13, cx NGTD   -MRSA nares negative  # Acute aspiration pneumonia  # Acute leukocytosis   # Acute anemia  # Acute pancreatitis  # Alcoholic hepatitis with encephalopathy on admission               - CT A/P with severe fatty liver               - US GB with sludge w/o cholecystitis   - CT C/A/P 3/12 with pancreatitis  # USMAN - improved  # EtOH abuse and withdrawal     PLAN:  -  Continue on OVP.  -  FU blood cx.  -  Follow fever curve, wbc.  -  Reviewed labs, micro, imaging reports, available old records.  -  Case d/w patient, family, RN.     History of Present Illness:  44-year-old male with a history of GERD, alcohol use was admitted to the hospital on 3/4 generalized abdominal pain, hiccups, vomiting with difficulty tolerating orals.  Last drink prior to admission was 1 day prior.  Found to have sepsis with elevated lactic acid, hypotension, WBC 17.5.  Responded to IV fluids and received lactulose for elevated ammonia.  CT A/P with severe fatty liver, stable splenomegaly.  Was lethargic the first few days and had soft restraints with Cedeño, Flexi-Seal, NG tube.  Has been essentially afebrile here with a temperature on 3/10 of 100.6 but significant hypoxia up and down and currently increased up to 40 L with increase WBC to  17.4.  Initial USMAN has improved.  Was started on IV Zosyn and doxycycline on 3/7 when patient became more hypoxic and chest x-ray showed extensive multifocal pneumonia with CT chest showing similar findings.  Possible aspiration.    Physical Exam:  /69 (BP Location: Right arm)   Pulse 107   Temp 99.7 °F (37.6 °C) (Oral)   Resp 20   Ht 5' 8\" (1.727 m)   Wt 186 lb 11.7 oz (84.7 kg)   SpO2 98%   BMI 28.39 kg/m²     Gen:   Awake, in chair  HEENT:  +scleral icterus, neck supple  CV/lungs:  RRR, lungs CTAB  Abdom:  Soft, no TTP  Skin/extrem:  No rashes, jaundiced  :   Primofit+  Lines:  Midline+    Laboratory Data: Reviewed    Microbiology: Reviewed    Radiology: Reviewed      [facility]  [phone]  3/22/2025

## 2025-03-22 NOTE — PROGRESS NOTES
[facility]  part of [facility]  Hospitalist Progress Note     Sami Grijalva JrKushal Patient Status:  Inpatient    10/11/1980  44 year old CSN [id number]   Location 217/217-A Attending Yonny Bowers MD   Hosp Day # 18 PCP Kerri Medina MD     Assessment & Plan:   ----------------------------------  Respiratory failure, acute hypoxemic.  Due to pneumonia, ARDS.  Oxygen requirement is improving.  Extubated 3/17  -Supplemental oxygen as needed, titrate down.  Currently down to 1 L  -Pulmonology consultation appreciated  -Treat contributing factors as described    Pneumonia. aspiration.  Clinically improving.  Extubated successfully  -IV antibiotics: Meropenem completed  -Blood cultures: NGTD  -Monitor temperature, blood pressure and fever curve  -Pulmonology consult appreciated  -ID consult appreciated  -Repeat chest imaging to be decided    Acute kidney injury.   Resolved    Chest pain.  Resolved.  Workup for ACS, PE negative    Diarrhea.  Some contribution from antibiotics, tube feeds, acute illness  -GI following  -ID following  -Defer further investigation to them    Acute alcoholic hepatitis.  Bilirubin stabilizing  -GI following    Other problems  Pancreatitis  Alcohol abuse  Cirrhosis  Hepatic steatosis  Hyponatremia    Supplementary Documentation:   DVT Mechanical Prophylaxis:   SCDs,    DVT Pharmacologic Prophylaxis   Medication   None                Code Status: Full Code  Cedeño: External urinary catheter in place  Cedeño Duration (in days): 14  Central line:    ADOLFO: Reached out to PMR to clarify their recommendation.    I personally reviewed the available laboratories, imaging including. I discussed/will discuss the case with consultants. I ordered laboratories and/or radiographic studies. I adjusted medications as detailed above.  Medical decision making high, risk is high.    Subjective:   ----------------------------------  No new complaints.      Objective:   Chief Complaint:    Chief Complaint   Patient presents with    Vomiting    Hiccups    Eval-D     ----------------------------------  Temp:  [99.1 °F (37.3 °C)-99.7 °F (37.6 °C)] 99.7 °F (37.6 °C)  Pulse:  [102-120] 107  Resp:  [18-20] 20  BP: (110-126)/(67-76) 117/69  SpO2:  [93 %-98 %] 98 %  Gen: A+Ox3.  No distress.  Jaundiced  HEENT: NCAT, neck supple, no carotid bruit.  Scleral icterus  CV: RRR, S1S2, and intact distal pulses. No gallop, rub, murmur.  Pulm: Poor effort, no wheezing or rales  Abd: Soft, NTND, BS normal, no mass, no HSM, no rebound/guarding.  Rectal tube in place, Cedeño catheter in place  Neuro: Normal reflexes, CN. Sensory/motor exams grossly normal deficit.  Generally weak  MS: No joint effusions.  No peripheral edema.  Skin: Skin is warm and dry. No rashes, erythema, diaphoresis.   Psych: Normal mood and affect. Calm, cooperative    Labs:  Lab Results   Component Value Date    HGB 8.6 (L) 03/21/2025    WBC 14.5 (H) 03/21/2025    .0 03/21/2025     (L) 03/22/2025    K 3.8 03/22/2025    K 3.8 03/22/2025    CREATSERUM 0.87 03/22/2025    INR 1.93 (H) 03/22/2025     (H) 03/22/2025    ALT 98 (H) 03/22/2025    TROP 0.00 01/31/2017            pantoprazole  40 mg Oral BID AC    epoetin sheela  10,000 Units Subcutaneous Once per day on Tuesday Thursday Saturday    furosemide  20 mg Intravenous BID (Diuretic)    vancomycin  125 mg Per NG Tube Daily    multivitamin  1 tablet Oral Daily    folic acid  1 mg Oral Daily       baclofen    acetaminophen    haloperidol lactate    polyethylene glycol (PEG 3350)    bisacodyl    prochlorperazine

## 2025-03-22 NOTE — PROGRESS NOTES
[facility]     Gastroenterology Progress Note    Sami Grijalva Jr. Patient Status:  Inpatient    10/11/1980 MRN [id number]   Location [facility] 5SW/SE Attending Yonny Bowers MD   Hosp Day # 18 PCP Kerri Medina MD       Assessment and Plan:   Severe alcoholic hepatitis  Complicated by a protracted hospitalization for multifocal pneumonia and ARDS requiring mechanical ventilation.  The patient is alert and appropriate.  He is deeply icteric.  Laboratory testing, however, is improving.  Renal function is normalizing.  Transaminases and bilirubin have fallen as compared to yesterday.  Hopefully this represents a favorable trend.  I would continue to trend liver chemistries.  Steroids have not been started in light of the patient's recent pneumonia.  I have reinforced the importance of good nutrition and complete abstinence from alcohol.  Resumption of alcohol intake will be fatal.    Recommend:  1.  Encouraged nutrition.  2.  Complete abstinence from alcohol.  3.  Trend liver chemistries and synthetic function.  4.  Ambulate with assistance.        Subjective:   Seen with family.  Lying comfortably flat in bed.  Fully alert.  Denies shortness of breath or abdominal pain.    Objective:   Patient Vitals for the past 24 hrs:   BP Temp Temp src Pulse Resp SpO2   25 0945 -- -- -- -- -- 98 %   25 0918 -- -- -- -- -- 93 %   25 0904 117/69 99.7 °F (37.6 °C) Oral 107 20 95 %   25 0430 110/67 99.1 °F (37.3 °C) Oral 105 18 95 %   25 0423 -- -- -- 104 -- --   25 0109 -- -- -- 110 -- --   25 2311 -- -- -- -- -- 93 %   25 2222 123/76 99.2 °F (37.3 °C) Oral 102 20 97 %   25 2048 -- -- -- 109 -- --   25 1900 -- -- -- 120 -- --   25 1416 126/72 99.2 °F (37.3 °C) Oral 107 20 95 %     Body mass index is 28.39 kg/m².    General: Patient appears comfortable and in no acute distress.  Chronically ill in appearance.  HEENT: Scleral  icterus is present.  Mucous membranes are moist.  Cardiovascular: Regular rate and rhythm   Lung: Clear to auscultation bilaterally  Abdomen:Non-distended.  Bowel sounds are present.  There is no tenderness to palpation.  There are no masses appreciated.  Liver and spleen are not palpable.  Skin: Deeply icteric.  Warm and dry.  Ext: No cyanosis, clubbing or edema is evident.   Neuro-Fully alert and interactive, and gross movements of extremities normal  Affect:Normal      Results:     Recent Labs   Lab 03/19/25  0523 03/20/25  0331 03/21/25  1055   RBC 3.15* 3.05* 3.30*   HGB 8.1* 8.2* 8.6*   HCT 24.0* 23.8* 25.9*   MCV 76.2* 78.0* 78.5*   MCH 25.7* 26.9 26.1   MCHC 33.8 34.5 33.2   RDW 31.3* 30.7* 32.4*   NEPRELIM 7.94* 9.52* 11.45*   WBC 11.0 13.0* 14.5*   .0 281.0 348.0         Recent Labs   Lab 03/20/25  0331 03/21/25  1055 03/22/25  0441   GLU 94 110* 95   BUN 43* 34* 32*   CREATSERUM 0.96 1.00 0.87   CA 8.1* 8.4* 7.9*   ALB 2.8* 2.9* 2.6*   * 134* 133*   K 4.2 3.4* 3.8  3.8    102 105   CO2 22.0 24.0 23.0   ALKPHO 181* 211* 201*   * 228* 195*   * 111* 98*   BILT 15.0* 16.9* 15.1*   TP 5.7 6.0 5.4*       Lab Results   Component Value Date    INR 1.93 (H) 03/22/2025    INR 1.89 (H) 03/21/2025    INR 1.80 (H) 03/20/2025       CT CHEST PE AORTA (IV ONLY) (CPT=71260)    Result Date: 3/21/2025  CONCLUSION:  1. No large central pulmonary embolus.  Evaluation beyond the proximal segmental level is precluded by heterogeneous contrast bolus and respiratory related motion artifact. 2. Enlargement of main pulmonary artery suggests pulmonary hypertension. 3. Interval improvement in multifocal bilateral pneumonia. 4. Trace left pleural effusion. 5. Herniation of fat into the left inferior mediastinum without significant change. 6. Hepatic cirrhosis with changes related to portal hypertension including small amount of perihepatic ascites. 7. Mild LAD coronary artery calcification. 8. Mild  left atrial enlargement.    Dictated by (CST): Parish Craig MD on 3/21/2025 at 2:01 PM     Finalized by (CST): Parish Craig MD on 3/21/2025 at 2:08 PM          US VENOUS DOPPLER LEG BILAT - DIAG IMG (CPT=93970)    Result Date: 3/20/2025  CONCLUSION:  1. No lower extremity DVT bilaterally.    Dictated by (CST): Parish Craig MD on 3/20/2025 at 1:09 PM     Finalized by (CST): Parish Craig MD on 3/20/2025 at 1:09 PM          NM LUNG VQ VENT / PERFUSION SCAN  (CPT=78582)    Result Date: 3/20/2025  CONCLUSION:  1. Indeterminate examination; multiple triple matched defects are demonstrated, presumably related to underlying parenchymal disease visualized on radiographs. The patient is a poor candidate for scintigraphic assessment.  2. Accumulation of xenon radiotracer in the region of the liver is likely indicative of hepatic steatosis.    Dictated by (CST): Pavel Lara MD on 3/20/2025 at 12:01 PM     Finalized by (CST): Pavel Lara MD on 3/20/2025 at 12:04 PM          XR CHEST AP PORTABLE  (CPT=71045)    Result Date: 3/20/2025  CONCLUSION: Patchy alveolar opacities throughout the lungs, suggesting edema or multifocal pneumonia.  No significant interval change since preceding exam.  Interval extubation and removal of feeding tube.   [facility] provided a preliminary report for this examination. This final report agrees with their preliminary findings.   Dictated by (CST): Isaiah Lee MD on 3/20/2025 at 6:31 AM     Finalized by (CST): Isaiah Lee MD on 3/20/2025 at 6:33 AM                 Baldo Zavala MD  3/22/2025

## 2025-03-22 NOTE — PROGRESS NOTES
[facility]  part of [facility]     Progress Note    Sami Grijalva  Patient Status:  Inpatient    10/11/1980 MRN [id number]   Location [facility] 2W/SW Attending Alex Diaz MD   Hosp Day # 18 PCP Kerri Medina MD       Subjective:   Patient seen and examined.  Resting in chair.  Denies significant dyspnea or pain    Objective:   Blood pressure 117/69, pulse 107, temperature 99.7 °F (37.6 °C), temperature source Oral, resp. rate 20, height 5' 8\" (1.727 m), weight 186 lb 11.7 oz (84.7 kg), SpO2 98%.  Intake/Output:   Last 3 shifts: I/O last 3 completed shifts:  In: 550 [P.O.:360; I.V.:90; IV PIGGYBACK:100]  Out: 3375 [Urine:3375]   This shift: I/O this shift:  In: 120 [P.O.:120]  Out: -      Vent Settings:      Hemodynamic parameters (last 24 hours):      Scheduled Meds:   Current Facility-Administered Medications   Medication Dose Route Frequency    pantoprazole (Protonix) DR tab 40 mg  40 mg Oral BID AC    baclofen (Lioresal) tab 10 mg  10 mg Oral TID PRN    acetaminophen (Tylenol Extra Strength) tab 1,000 mg  1,000 mg Oral Q6H PRN    epoetin sheela (Epogen, Procrit) 47167 UNIT/ML injection 10,000 Units  10,000 Units Subcutaneous Once per day on     furosemide (Lasix) 10 mg/mL injection 20 mg  20 mg Intravenous BID (Diuretic)    haloperidol lactate (Haldol) 5 MG/ML injection 1 mg  1 mg Intravenous Q6H PRN    polyethylene glycol (PEG 3350) (Miralax) 17 g oral packet 17 g  17 g Per NG Tube Daily PRN    bisacodyl (Dulcolax) 10 MG rectal suppository 10 mg  10 mg Rectal Daily PRN    vancomycin (Firvanq) 50 mg/mL oral solution 125 mg  125 mg Per NG Tube Daily    prochlorperazine (Compazine) 10 MG/2ML injection 5 mg  5 mg Intravenous Q8H PRN    multivitamin (Tab-A-Yosef/Beta Carotene) tab 1 tablet  1 tablet Oral Daily    folic acid (Folvite) tab 1 mg  1 mg Oral Daily       Continuous Infusions:         Physical Exam  Constitutional: no acute  distress  Eyes: PERRL, scleral icterus  ENT: nares pateint  Neck: supple, no JVD  Cardio: RRR, S1 S2  Respiratory: Basilar crackles  GI: abdomen soft, non tender, active bowel sounds, no organomegaly  Extremities: no clubbing, cyanosis, edema  Neurologic: no gross motor deficits  Skin: warm, dry, jaundice      Results:     Lab Results   Component Value Date    CREATSERUM 0.87 03/22/2025    BUN 32 03/22/2025     03/22/2025    K 3.8 03/22/2025    K 3.8 03/22/2025     03/22/2025    CO2 23.0 03/22/2025    GLU 95 03/22/2025    CA 7.9 03/22/2025    ALB 2.6 03/22/2025    ALKPHO 201 03/22/2025    BILT 15.1 03/22/2025    TP 5.4 03/22/2025     03/22/2025    ALT 98 03/22/2025    INR 1.93 03/22/2025       CT CHEST PE AORTA (IV ONLY) (CPT=71260)    Result Date: 3/21/2025  CONCLUSION:  1. No large central pulmonary embolus.  Evaluation beyond the proximal segmental level is precluded by heterogeneous contrast bolus and respiratory related motion artifact. 2. Enlargement of main pulmonary artery suggests pulmonary hypertension. 3. Interval improvement in multifocal bilateral pneumonia. 4. Trace left pleural effusion. 5. Herniation of fat into the left inferior mediastinum without significant change. 6. Hepatic cirrhosis with changes related to portal hypertension including small amount of perihepatic ascites. 7. Mild LAD coronary artery calcification. 8. Mild left atrial enlargement.    Dictated by (CST): Parish Craig MD on 3/21/2025 at 2:01 PM     Finalized by (CST): Parish Craig MD on 3/21/2025 at 2:08 PM          US VENOUS DOPPLER LEG BILAT - DIAG IMG (CPT=93970)    Result Date: 3/20/2025  CONCLUSION:  1. No lower extremity DVT bilaterally.    Dictated by (CST): Parish Craig MD on 3/20/2025 at 1:09 PM     Finalized by (CST): Parish Craig MD on 3/20/2025 at 1:09 PM                 Assessment   1.  Alcoholic hepatitis  2.  Cirrhosis  3.  Acute hypoxemic respiratory failure  4.  EtOH withdrawal  5.   Thrombocytopenia, resolved  6.  Anemia  7.  Alcoholic pancreatitis  8.  Aspiration pneumonia       Plan   -Patient presents with evidence of abdominal pain concern for acute alcoholic hepatitis now with hypoxemic respiratory failure.  -CT chest abdomen pelvis on 3/12/2025 with pancreatic enlargement seen with some pancreatic stranding.  Left greater than right airspace opacity seen.  -Tolerating extubation on 3/17/2025.  Wean oxygen as tolerated  -Chest x-ray on 3/17/2025 with bilateral infiltrates seen  -Status post bronchoscopy with BAL on 3/13/2025 blood cultures negative to date.  -Currently on antibiotic therapy with meropenem  -Further recommendations per GI.  -[facility] protocol  -PT/OT  -Up in chair as tolerated  -DVT prophylaxis: SCDs  -Discussed with family.      Akin Bobo, DO  Pulmonary Critical Care Medicine  [facility]

## 2025-03-23 ENCOUNTER — APPOINTMENT (OUTPATIENT)
Dept: GENERAL RADIOLOGY | Facility: HOSPITAL | Age: 45
End: 2025-03-23
Attending: HOSPITALIST
Payer: MEDICAID

## 2025-03-23 LAB
ALBUMIN SERPL-MCNC: 2.7 G/DL (ref 3.2–4.8)
ALBUMIN/GLOB SERPL: 1 {RATIO} (ref 1–2)
ALP LIVER SERPL-CCNC: 220 U/L
ALT SERPL-CCNC: 98 U/L
ANION GAP SERPL CALC-SCNC: 9 MMOL/L (ref 0–18)
AST SERPL-CCNC: 201 U/L (ref ?–34)
BASOPHILS # BLD AUTO: 0.04 X10(3) UL (ref 0–0.2)
BASOPHILS NFR BLD AUTO: 0.2 %
BILIRUB SERPL-MCNC: 15.2 MG/DL (ref 0.3–1.2)
BILIRUB UR QL CFM: POSITIVE
BUN BLD-MCNC: 24 MG/DL (ref 9–23)
BUN/CREAT SERPL: 26.4 (ref 10–20)
CALCIUM BLD-MCNC: 8.2 MG/DL (ref 8.7–10.4)
CHLORIDE SERPL-SCNC: 104 MMOL/L (ref 98–112)
CLARITY UR: CLEAR
CO2 SERPL-SCNC: 22 MMOL/L (ref 21–32)
CREAT BLD-MCNC: 0.91 MG/DL
DEPRECATED RDW RBC AUTO: 93.3 FL (ref 35.1–46.3)
EGFRCR SERPLBLD CKD-EPI 2021: 105 ML/MIN/1.73M2 (ref 60–?)
EOSINOPHIL # BLD AUTO: 0.27 X10(3) UL (ref 0–0.7)
EOSINOPHIL NFR BLD AUTO: 1.4 %
ERYTHROCYTE [DISTWIDTH] IN BLOOD BY AUTOMATED COUNT: 32.8 % (ref 11–15)
GLOBULIN PLAS-MCNC: 2.8 G/DL (ref 2–3.5)
GLUCOSE BLD-MCNC: 99 MG/DL (ref 70–99)
GLUCOSE UR-MCNC: NORMAL MG/DL
HCT VFR BLD AUTO: 25.3 %
HGB BLD-MCNC: 8.4 G/DL
HGB UR QL STRIP.AUTO: NEGATIVE
IMM GRANULOCYTES # BLD AUTO: 0.36 X10(3) UL (ref 0–1)
IMM GRANULOCYTES NFR BLD: 1.9 %
INR BLD: 1.95 (ref 0.8–1.2)
KETONES UR-MCNC: NEGATIVE MG/DL
LEUKOCYTE ESTERASE UR QL STRIP.AUTO: NEGATIVE
LYMPHOCYTES # BLD AUTO: 1.51 X10(3) UL (ref 1–4)
LYMPHOCYTES NFR BLD AUTO: 7.8 %
MCH RBC QN AUTO: 27.2 PG (ref 26–34)
MCHC RBC AUTO-ENTMCNC: 33.2 G/DL (ref 31–37)
MCV RBC AUTO: 81.9 FL
MONOCYTES # BLD AUTO: 1.23 X10(3) UL (ref 0.1–1)
MONOCYTES NFR BLD AUTO: 6.4 %
NEUTROPHILS # BLD AUTO: 15.84 X10 (3) UL (ref 1.5–7.7)
NEUTROPHILS # BLD AUTO: 15.84 X10(3) UL (ref 1.5–7.7)
NEUTROPHILS NFR BLD AUTO: 82.3 %
NITRITE UR QL STRIP.AUTO: NEGATIVE
OSMOLALITY SERPL CALC.SUM OF ELEC: 284 MOSM/KG (ref 275–295)
PH UR: 5.5 [PH] (ref 5–8)
PLATELET # BLD AUTO: 325 10(3)UL (ref 150–450)
PLATELETS.RETICULATED NFR BLD AUTO: 6.4 % (ref 0–7)
POTASSIUM SERPL-SCNC: 3.6 MMOL/L (ref 3.5–5.1)
PROT SERPL-MCNC: 5.5 G/DL (ref 5.7–8.2)
PROT UR-MCNC: NEGATIVE MG/DL
PROTHROMBIN TIME: 23.2 SECONDS (ref 11.6–14.8)
RBC # BLD AUTO: 3.09 X10(6)UL
SODIUM SERPL-SCNC: 135 MMOL/L (ref 136–145)
SP GR UR STRIP: 1.01 (ref 1–1.03)
UROBILINOGEN UR STRIP-ACNC: NORMAL
WBC # BLD AUTO: 19.3 X10(3) UL (ref 4–11)

## 2025-03-23 PROCEDURE — 99233 SBSQ HOSP IP/OBS HIGH 50: CPT | Performed by: HOSPITALIST

## 2025-03-23 PROCEDURE — 71045 X-RAY EXAM CHEST 1 VIEW: CPT | Performed by: HOSPITALIST

## 2025-03-23 PROCEDURE — 99232 SBSQ HOSP IP/OBS MODERATE 35: CPT | Performed by: INTERNAL MEDICINE

## 2025-03-23 NOTE — PROGRESS NOTES
[facility]  part of [facility]     Progress Note    Sami Grijalva  Patient Status:  Inpatient    10/11/1980 MRN [id number]   Location [facility] 2W/SW Attending Alex Diaz MD   Hosp Day # 19 PCP Kerri Medina MD       Subjective:   Patient seen and examined.  Resting in chair.  Denies significant dyspnea or pain.  Appears comfortable    Objective:   Blood pressure 117/64, pulse 115, temperature 99.4 °F (37.4 °C), temperature source Oral, resp. rate (!) 28, height 5' 8\" (1.727 m), weight 186 lb 11.7 oz (84.7 kg), SpO2 95%.  Intake/Output:   Last 3 shifts: I/O last 3 completed shifts:  In: 410 [P.O.:340; I.V.:70]  Out: 3500 [Urine:3500]   This shift: I/O this shift:  In: 180 [P.O.:180]  Out: -      Vent Settings:      Hemodynamic parameters (last 24 hours):      Scheduled Meds:   Current Facility-Administered Medications   Medication Dose Route Frequency    pantoprazole (Protonix) DR tab 40 mg  40 mg Oral BID AC    baclofen (Lioresal) tab 10 mg  10 mg Oral TID PRN    acetaminophen (Tylenol Extra Strength) tab 1,000 mg  1,000 mg Oral Q6H PRN    epoetin sheela (Epogen, Procrit) 24300 UNIT/ML injection 10,000 Units  10,000 Units Subcutaneous Once per day on     furosemide (Lasix) 10 mg/mL injection 20 mg  20 mg Intravenous BID (Diuretic)    haloperidol lactate (Haldol) 5 MG/ML injection 1 mg  1 mg Intravenous Q6H PRN    polyethylene glycol (PEG 3350) (Miralax) 17 g oral packet 17 g  17 g Per NG Tube Daily PRN    bisacodyl (Dulcolax) 10 MG rectal suppository 10 mg  10 mg Rectal Daily PRN    vancomycin (Firvanq) 50 mg/mL oral solution 125 mg  125 mg Per NG Tube Daily    prochlorperazine (Compazine) 10 MG/2ML injection 5 mg  5 mg Intravenous Q8H PRN    multivitamin (Tab-A-Yosef/Beta Carotene) tab 1 tablet  1 tablet Oral Daily    folic acid (Folvite) tab 1 mg  1 mg Oral Daily       Continuous Infusions:         Physical Exam  Constitutional: no acute  distress  Eyes: PERRL, scleral icterus  ENT: nares pateint  Neck: supple, no JVD  Cardio: RRR, S1 S2  Respiratory: Basilar crackles  GI: abdomen soft, non tender, active bowel sounds, no organomegaly  Extremities: no clubbing, cyanosis, edema  Neurologic: no gross motor deficits  Skin: warm, dry, jaundice      Results:     Lab Results   Component Value Date    WBC 19.3 03/23/2025    HGB 8.4 03/23/2025    HCT 25.3 03/23/2025    .0 03/23/2025    CREATSERUM 0.91 03/23/2025    BUN 24 03/23/2025     03/23/2025    K 3.6 03/23/2025     03/23/2025    CO2 22.0 03/23/2025    GLU 99 03/23/2025    CA 8.2 03/23/2025    ALB 2.7 03/23/2025    ALKPHO 220 03/23/2025    BILT 15.2 03/23/2025    TP 5.5 03/23/2025     03/23/2025    ALT 98 03/23/2025    INR 1.95 03/23/2025       CT CHEST PE AORTA (IV ONLY) (CPT=71260)    Result Date: 3/21/2025  CONCLUSION:  1. No large central pulmonary embolus.  Evaluation beyond the proximal segmental level is precluded by heterogeneous contrast bolus and respiratory related motion artifact. 2. Enlargement of main pulmonary artery suggests pulmonary hypertension. 3. Interval improvement in multifocal bilateral pneumonia. 4. Trace left pleural effusion. 5. Herniation of fat into the left inferior mediastinum without significant change. 6. Hepatic cirrhosis with changes related to portal hypertension including small amount of perihepatic ascites. 7. Mild LAD coronary artery calcification. 8. Mild left atrial enlargement.    Dictated by (CST): Parish Craig MD on 3/21/2025 at 2:01 PM     Finalized by (CST): Parish Craig MD on 3/21/2025 at 2:08 PM                 Assessment   1.  Alcoholic hepatitis  2.  Cirrhosis  3.  Acute hypoxemic respiratory failure  4.  EtOH withdrawal  5.  Thrombocytopenia, resolved  6.  Anemia  7.  Alcoholic pancreatitis  8.  Aspiration pneumonia       Plan   -Patient presents with evidence of abdominal pain concern for acute alcoholic hepatitis now  with hypoxemic respiratory failure.  -CT chest abdomen pelvis on 3/12/2025 with pancreatic enlargement seen with some pancreatic stranding.  Left greater than right airspace opacity seen.  -Tolerating extubation on 3/17/2025.  Wean oxygen as tolerated  -Chest x-ray on 3/17/2025 with bilateral infiltrates seen  -Status post bronchoscopy with BAL on 3/13/2025 blood cultures negative to date.  -Currently on antibiotic therapy with meropenem  -Further recommendations per GI.  -[facility] protocol  -PT/OT  -Up in chair as tolerated  -DVT prophylaxis: SCDs  -Discussed with family.      Akin Bobo,   Pulmonary Critical Care Medicine  [facility]

## 2025-03-23 NOTE — PLAN OF CARE
Problem: Patient Centered Care  Goal: Patient preferences are identified and integrated in the patient's plan of care  Description: Interventions:  - Provide timely, complete, and accurate information to patient/family  - Incorporate patient and family knowledge, values, beliefs, and cultural backgrounds into the planning and delivery of care  - Encourage patient/family to participate in care and decision-making at the level they choose  - Honor patient and family perspectives and choices  Outcome: Progressing     Problem: PAIN - ADULT  Goal: Verbalizes/displays adequate comfort level or patient's stated pain goal  Description: INTERVENTIONS:  - Encourage pt to monitor pain and request assistance  - Assess pain using appropriate pain scale  - Administer analgesics based on type and severity of pain and evaluate response  - Implement non-pharmacological measures as appropriate and evaluate response  - Consider cultural and social influences on pain and pain management  - Manage/alleviate anxiety  - Utilize distraction and/or relaxation techniques  - Monitor for opioid side effects  - Notify MD/LIP if interventions unsuccessful or patient reports new pain  - Anticipate increased pain with activity and pre-medicate as appropriate  Outcome: Progressing     Problem: RISK FOR INFECTION - ADULT  Goal: Absence of fever/infection during anticipated neutropenic period  Description: INTERVENTIONS  - Monitor WBC  - Administer growth factors as ordered  - Implement neutropenic guidelines  Outcome: Progressing     Problem: SAFETY ADULT - FALL  Goal: Free from fall injury  Description: INTERVENTIONS:  - Assess pt frequently for physical needs  - Identify cognitive and physical deficits and behaviors that affect risk of falls.  - [place] fall precautions as indicated by assessment.  - Educate pt/family on patient safety including physical limitations  - Instruct pt to call for assistance with activity based on assessment  -  Modify environment to reduce risk of injury  - Provide assistive devices as appropriate  - Consider OT/PT consult to assist with strengthening/mobility  - Encourage toileting schedule  Outcome: Progressing     Problem: DISCHARGE PLANNING  Goal: Discharge to home or other facility with appropriate resources  Description: INTERVENTIONS:  - Identify barriers to discharge w/pt and caregiver  - Include patient/family/discharge partner in discharge planning  - Arrange for needed discharge resources and transportation as appropriate  - Identify discharge learning needs (meds, wound care, etc)  - Arrange for interpreters to assist at discharge as needed  - Consider post-discharge preferences of patient/family/discharge partner  - Complete POLST form as appropriate  - Assess patient's ability to be responsible for managing their own health  - Refer to Case Management Department for coordinating discharge planning if the patient needs post-hospital services based on physician/LIP order or complex needs related to functional status, cognitive ability or social support system  Outcome: Progressing     Problem: Delirium  Goal: Minimize duration of delirium  Description: Interventions:  - Encourage use of hearing aids, eye glasses  - Promote highest level of mobility daily  - Provide frequent reorientation  - Promote wakefulness i.e. lights on, blinds open  - Promote sleep, encourage patient's normal rest cycle i.e. lights off, TV off, minimize noise and interruptions  - Encourage family to assist in orientation and promotion of home routines  Outcome: Progressing     Problem: RESPIRATORY - ADULT  Goal: Achieves optimal ventilation and oxygenation  Description: INTERVENTIONS:  - Assess for changes in respiratory status  - Assess for changes in mentation and behavior  - Position to facilitate oxygenation and minimize respiratory effort  - Oxygen supplementation based on oxygen saturation or ABGs  - Provide Smoking Cessation  handout, if applicable  - Encourage broncho-pulmonary hygiene including cough, deep breathe, Incentive Spirometry  - Assess the need for suctioning and perform as needed  - Assess and instruct to report SOB or any respiratory difficulty  - Respiratory Therapy support as indicated  - Manage/alleviate anxiety  - Monitor for signs/symptoms of CO2 retention  Outcome: Progressing     Problem: METABOLIC/FLUID AND ELECTROLYTES - ADULT  Goal: Electrolytes maintained within normal limits  Description: INTERVENTIONS:  - Monitor labs and rhythm and assess patient for signs and symptoms of electrolyte imbalances  - Administer electrolyte replacement as ordered  - Monitor response to electrolyte replacements, including rhythm and repeat lab results as appropriate  - Fluid restriction as ordered  - Instruct patient on fluid and nutrition restrictions as appropriate  Outcome: Progressing  Goal: Hemodynamic stability and optimal renal function maintained  Description: INTERVENTIONS:  - Monitor labs and assess for signs and symptoms of volume excess or deficit  - Monitor intake, output and patient weight  - Monitor urine specific gravity, serum osmolarity and serum sodium as indicated or ordered  - Monitor response to interventions for patient's volume status, including labs, urine output, blood pressure (other measures as available)  - Encourage oral intake as appropriate  - Instruct patient on fluid and nutrition restrictions as appropriate  Outcome: Progressing     Problem: Impaired Swallowing  Goal: Minimize aspiration risk  Description: Interventions:  - Patient should be alert and upright for all feedings (90 degrees preferred)  - Offer food and liquids at a slow rate  - No straws  - Encourage small bites of food and small sips of liquid  - Offer pills one at a time, crush or deliver with applesauce as needed  - Discontinue feeding and notify MD (or speech pathologist) if coughing or persistent throat clearing or wet/gurgly  vocal quality is noted  Outcome: Progressing     Problem: Impaired Cognition  Goal: Patient will exhibit improved attention, thought processing and/or memory  Description: Interventions:  - Minimize distractions in the room when full attention is required  Outcome: Progressing     Problem: CARDIOVASCULAR - ADULT  Goal: Maintains optimal cardiac output and hemodynamic stability  Description: INTERVENTIONS:- Monitor vital signs, rhythm, and trends- Monitor for bleeding, hypotension and signs of decreased cardiac output- Evaluate effectiveness of vasoactive medications to optimize hemodynamic stability- Monitor arterial and/or venous puncture sites for bleeding and/or hematoma- Assess quality of pulses, skin color and temperature- Assess for signs of decreased coronary artery perfusion - ex. Angina- Evaluate fluid balance, assess for edema, trend weights  Outcome: Progressing

## 2025-03-23 NOTE — PROGRESS NOTES
[facility]     Gastroenterology Progress Note    Sami Grijalva Jr. Patient Status:  Inpatient    10/11/1980 MRN [id number]   Location [facility] 5SW/SE Attending Yonny Bowers MD   Hosp Day # 19 PCP Kerri Medina MD       Assessment and Plan:   Severe alcoholic hepatitis  Complicated by a protracted hospitalization for multifocal pneumonia and ARDS requiring mechanical ventilation.  The patient is alert and appropriate.  He remains deeply icteric.  Liver chemistries and synthetic function are stable today as compared to yesterday, however, have not fallen further.  Steroids have not been started in light of the patient's recent pneumonia.  WBC up today.  Liver chemistries and CBC should be trended.  I have offered the patient the option of pentoxifylline which is not contraindicated.  He seems quite hesitant and asks that I discussed with his family.  I will therefore hold on instituting this medication which can be discussed tomorrow.  Resumption of alcohol intake will be fatal.  I have encouraged nutrition, ambulation and complete abstinence from alcohol.     Recommend:  1.  Offered the option of pentoxifylline.  This can be discussed further tomorrow.  2.  Trend labs including CBC and liver chemistries/synthetic function.  3.  Complete abstinence from alcohol.  4.  Ambulate with assistance.      Subjective:   No complaints.  Denies shortness of breath, abdominal pain or diarrhea.    Objective:   Patient Vitals for the past 24 hrs:   BP Temp Temp src Pulse Resp SpO2   25 1215 -- -- -- -- -- 95 %   25 0933 117/64 99.4 °F (37.4 °C) Oral 115 (!) 28 95 %   25 0718 -- -- -- 112 -- --   25 0547 131/73 99.3 °F (37.4 °C) Oral 109 18 96 %   25 0403 -- -- -- 110 -- --   25 2130 -- -- -- 110 -- --   25 1954 120/63 100 °F (37.8 °C) Oral 117 20 94 %   25 1900 -- -- -- 111 -- --   25 1734 -- 100.1 °F (37.8 °C) -- -- -- --    03/22/25 1618 119/60 100.1 °F (37.8 °C) Oral 115 -- 96 %     Body mass index is 28.39 kg/m².    General: Chronically ill in appearance.  Deeply icteric.  HEENT: Scleral icterus is present..  Mucous membranes are moist.  Cardiovascular: Regular rate and rhythm   Lung: Clear to auscultation bilaterally  Abdomen:Non-distended.  Bowel sounds are present.  There is no tenderness to palpation.  There are no masses appreciated.  Liver and spleen are not palpable.  Skin: Deeply icteric.  Warm and dry.  Ext: No cyanosis, clubbing or edema is evident.   Neuro- Alert and interactive, and gross movements of extremities normal  Affect:Normal      Results:     Recent Labs   Lab 03/20/25  0331 03/21/25  1055 03/23/25  0539   RBC 3.05* 3.30* 3.09*   HGB 8.2* 8.6* 8.4*   HCT 23.8* 25.9* 25.3*   MCV 78.0* 78.5* 81.9   MCH 26.9 26.1 27.2   MCHC 34.5 33.2 33.2   RDW 30.7* 32.4* 32.8*   NEPRELIM 9.52* 11.45* 15.84*   WBC 13.0* 14.5* 19.3*   .0 348.0 325.0         Recent Labs   Lab 03/21/25  1055 03/22/25  0441 03/23/25  0539   * 95 99   BUN 34* 32* 24*   CREATSERUM 1.00 0.87 0.91   CA 8.4* 7.9* 8.2*   ALB 2.9* 2.6* 2.7*   * 133* 135*   K 3.4* 3.8  3.8 3.6    105 104   CO2 24.0 23.0 22.0   ALKPHO 211* 201* 220*   * 195* 201*   * 98* 98*   BILT 16.9* 15.1* 15.2*   TP 6.0 5.4* 5.5*       Lab Results   Component Value Date    INR 1.95 (H) 03/23/2025    INR 1.93 (H) 03/22/2025    INR 1.89 (H) 03/21/2025       CT CHEST PE AORTA (IV ONLY) (CPT=71260)    Result Date: 3/21/2025  CONCLUSION:  1. No large central pulmonary embolus.  Evaluation beyond the proximal segmental level is precluded by heterogeneous contrast bolus and respiratory related motion artifact. 2. Enlargement of main pulmonary artery suggests pulmonary hypertension. 3. Interval improvement in multifocal bilateral pneumonia. 4. Trace left pleural effusion. 5. Herniation of fat into the left inferior mediastinum without significant  change. 6. Hepatic cirrhosis with changes related to portal hypertension including small amount of perihepatic ascites. 7. Mild LAD coronary artery calcification. 8. Mild left atrial enlargement.    Dictated by (CST): Parish Craig MD on 3/21/2025 at 2:01 PM     Finalized by (CST): Parish Craig MD on 3/21/2025 at 2:08 PM                 Baldo Zavala MD  3/23/2025

## 2025-03-23 NOTE — PLAN OF CARE
Problem: Patient Centered Care  Goal: Patient preferences are identified and integrated in the patient's plan of care  Description: Interventions:  - Provide timely, complete, and accurate information to patient/family  - Incorporate patient and family knowledge, values, beliefs, and cultural backgrounds into the planning and delivery of care  - Encourage patient/family to participate in care and decision-making at the level they choose  - Honor patient and family perspectives and choices  Outcome: Progressing     Problem: PAIN - ADULT  Goal: Verbalizes/displays adequate comfort level or patient's stated pain goal  Description: INTERVENTIONS:  - Encourage pt to monitor pain and request assistance  - Assess pain using appropriate pain scale  - Administer analgesics based on type and severity of pain and evaluate response  - Implement non-pharmacological measures as appropriate and evaluate response  - Consider cultural and social influences on pain and pain management  - Manage/alleviate anxiety  - Utilize distraction and/or relaxation techniques  - Monitor for opioid side effects  - Notify MD/LIP if interventions unsuccessful or patient reports new pain  - Anticipate increased pain with activity and pre-medicate as appropriate  Outcome: Progressing     Problem: RISK FOR INFECTION - ADULT  Goal: Absence of fever/infection during anticipated neutropenic period  Description: INTERVENTIONS  - Monitor WBC  - Administer growth factors as ordered  - Implement neutropenic guidelines  Outcome: Progressing     Problem: SAFETY ADULT - FALL  Goal: Free from fall injury  Description: INTERVENTIONS:  - Assess pt frequently for physical needs  - Identify cognitive and physical deficits and behaviors that affect risk of falls.  - [place] fall precautions as indicated by assessment.  - Educate pt/family on patient safety including physical limitations  - Instruct pt to call for assistance with activity based on assessment  -  Modify environment to reduce risk of injury  - Provide assistive devices as appropriate  - Consider OT/PT consult to assist with strengthening/mobility  - Encourage toileting schedule  Outcome: Progressing     Problem: DISCHARGE PLANNING  Goal: Discharge to home or other facility with appropriate resources  Description: INTERVENTIONS:  - Identify barriers to discharge w/pt and caregiver  - Include patient/family/discharge partner in discharge planning  - Arrange for needed discharge resources and transportation as appropriate  - Identify discharge learning needs (meds, wound care, etc)  - Arrange for interpreters to assist at discharge as needed  - Consider post-discharge preferences of patient/family/discharge partner  - Complete POLST form as appropriate  - Assess patient's ability to be responsible for managing their own health  - Refer to Case Management Department for coordinating discharge planning if the patient needs post-hospital services based on physician/LIP order or complex needs related to functional status, cognitive ability or social support system  Outcome: Progressing     Problem: Delirium  Goal: Minimize duration of delirium  Description: Interventions:  - Encourage use of hearing aids, eye glasses  - Promote highest level of mobility daily  - Provide frequent reorientation  - Promote wakefulness i.e. lights on, blinds open  - Promote sleep, encourage patient's normal rest cycle i.e. lights off, TV off, minimize noise and interruptions  - Encourage family to assist in orientation and promotion of home routines  Outcome: Progressing     Problem: RESPIRATORY - ADULT  Goal: Achieves optimal ventilation and oxygenation  Description: INTERVENTIONS:  - Assess for changes in respiratory status  - Assess for changes in mentation and behavior  - Position to facilitate oxygenation and minimize respiratory effort  - Oxygen supplementation based on oxygen saturation or ABGs  - Provide Smoking Cessation  handout, if applicable  - Encourage broncho-pulmonary hygiene including cough, deep breathe, Incentive Spirometry  - Assess the need for suctioning and perform as needed  - Assess and instruct to report SOB or any respiratory difficulty  - Respiratory Therapy support as indicated  - Manage/alleviate anxiety  - Monitor for signs/symptoms of CO2 retention  Outcome: Progressing     Problem: METABOLIC/FLUID AND ELECTROLYTES - ADULT  Goal: Electrolytes maintained within normal limits  Description: INTERVENTIONS:  - Monitor labs and rhythm and assess patient for signs and symptoms of electrolyte imbalances  - Administer electrolyte replacement as ordered  - Monitor response to electrolyte replacements, including rhythm and repeat lab results as appropriate  - Fluid restriction as ordered  - Instruct patient on fluid and nutrition restrictions as appropriate  Outcome: Progressing  Goal: Hemodynamic stability and optimal renal function maintained  Description: INTERVENTIONS:  - Monitor labs and assess for signs and symptoms of volume excess or deficit  - Monitor intake, output and patient weight  - Monitor urine specific gravity, serum osmolarity and serum sodium as indicated or ordered  - Monitor response to interventions for patient's volume status, including labs, urine output, blood pressure (other measures as available)  - Encourage oral intake as appropriate  - Instruct patient on fluid and nutrition restrictions as appropriate  Outcome: Progressing     Problem: Impaired Swallowing  Goal: Minimize aspiration risk  Description: Interventions:  - Patient should be alert and upright for all feedings (90 degrees preferred)  - Offer food and liquids at a slow rate  - No straws  - Encourage small bites of food and small sips of liquid  - Offer pills one at a time, crush or deliver with applesauce as needed  - Discontinue feeding and notify MD (or speech pathologist) if coughing or persistent throat clearing or wet/gurgly  vocal quality is noted  Outcome: Progressing     Problem: Impaired Cognition  Goal: Patient will exhibit improved attention, thought processing and/or memory  Description: Interventions:  - Minimize distractions in the room when full attention is required  Outcome: Progressing     Problem: CARDIOVASCULAR - ADULT  Goal: Maintains optimal cardiac output and hemodynamic stability  Description: INTERVENTIONS:- Monitor vital signs, rhythm, and trends- Monitor for bleeding, hypotension and signs of decreased cardiac output- Evaluate effectiveness of vasoactive medications to optimize hemodynamic stability- Monitor arterial and/or venous puncture sites for bleeding and/or hematoma- Assess quality of pulses, skin color and temperature- Assess for signs of decreased coronary artery perfusion - ex. Angina- Evaluate fluid balance, assess for edema, trend weights  Outcome: Progressing

## 2025-03-23 NOTE — PLAN OF CARE
Problem: Patient Centered Care  Goal: Patient preferences are identified and integrated in the patient's plan of care  Description: Interventions:  - What would you like us to know as we care for you?   - Provide timely, complete, and accurate information to patient/family  - Incorporate patient and family knowledge, values, beliefs, and cultural backgrounds into the planning and delivery of care  - Encourage patient/family to participate in care and decision-making at the level they choose  - Honor patient and family perspectives and choices  Outcome: Progressing     Problem: PAIN - ADULT  Goal: Verbalizes/displays adequate comfort level or patient's stated pain goal  Description: INTERVENTIONS:  - Encourage pt to monitor pain and request assistance  - Assess pain using appropriate pain scale  - Administer analgesics based on type and severity of pain and evaluate response  - Implement non-pharmacological measures as appropriate and evaluate response  - Consider cultural and social influences on pain and pain management  - Manage/alleviate anxiety  - Utilize distraction and/or relaxation techniques  - Monitor for opioid side effects  - Notify MD/LIP if interventions unsuccessful or patient reports new pain  - Anticipate increased pain with activity and pre-medicate as appropriate  Outcome: Progressing     Problem: RISK FOR INFECTION - ADULT  Goal: Absence of fever/infection during anticipated neutropenic period  Description: INTERVENTIONS  - Monitor WBC  - Administer growth factors as ordered  - Implement neutropenic guidelines  Outcome: Progressing     Problem: SAFETY ADULT - FALL  Goal: Free from fall injury  Description: INTERVENTIONS:  - Assess pt frequently for physical needs  - Identify cognitive and physical deficits and behaviors that affect risk of falls.  - [place] fall precautions as indicated by assessment.  - Educate pt/family on patient safety including physical limitations  - Instruct pt to call  for assistance with activity based on assessment  - Modify environment to reduce risk of injury  - Provide assistive devices as appropriate  - Consider OT/PT consult to assist with strengthening/mobility  - Encourage toileting schedule  Outcome: Progressing     Problem: DISCHARGE PLANNING  Goal: Discharge to home or other facility with appropriate resources  Description: INTERVENTIONS:  - Identify barriers to discharge w/pt and caregiver  - Include patient/family/discharge partner in discharge planning  - Arrange for needed discharge resources and transportation as appropriate  - Identify discharge learning needs (meds, wound care, etc)  - Arrange for interpreters to assist at discharge as needed  - Consider post-discharge preferences of patient/family/discharge partner  - Complete POLST form as appropriate  - Assess patient's ability to be responsible for managing their own health  - Refer to Case Management Department for coordinating discharge planning if the patient needs post-hospital services based on physician/LIP order or complex needs related to functional status, cognitive ability or social support system  Outcome: Progressing     Problem: Delirium  Goal: Minimize duration of delirium  Description: Interventions:  - Encourage use of hearing aids, eye glasses  - Promote highest level of mobility daily  - Provide frequent reorientation  - Promote wakefulness i.e. lights on, blinds open  - Promote sleep, encourage patient's normal rest cycle i.e. lights off, TV off, minimize noise and interruptions  - Encourage family to assist in orientation and promotion of home routines  Outcome: Progressing     Problem: RESPIRATORY - ADULT  Goal: Achieves optimal ventilation and oxygenation  Description: INTERVENTIONS:  - Assess for changes in respiratory status  - Assess for changes in mentation and behavior  - Position to facilitate oxygenation and minimize respiratory effort  - Oxygen supplementation based on oxygen  saturation or ABGs  - Provide Smoking Cessation handout, if applicable  - Encourage broncho-pulmonary hygiene including cough, deep breathe, Incentive Spirometry  - Assess the need for suctioning and perform as needed  - Assess and instruct to report SOB or any respiratory difficulty  - Respiratory Therapy support as indicated  - Manage/alleviate anxiety  - Monitor for signs/symptoms of CO2 retention  Outcome: Progressing     Problem: METABOLIC/FLUID AND ELECTROLYTES - ADULT  Goal: Electrolytes maintained within normal limits  Description: INTERVENTIONS:  - Monitor labs and rhythm and assess patient for signs and symptoms of electrolyte imbalances  - Administer electrolyte replacement as ordered  - Monitor response to electrolyte replacements, including rhythm and repeat lab results as appropriate  - Fluid restriction as ordered  - Instruct patient on fluid and nutrition restrictions as appropriate  Outcome: Progressing  Goal: Hemodynamic stability and optimal renal function maintained  Description: INTERVENTIONS:  - Monitor labs and assess for signs and symptoms of volume excess or deficit  - Monitor intake, output and patient weight  - Monitor urine specific gravity, serum osmolarity and serum sodium as indicated or ordered  - Monitor response to interventions for patient's volume status, including labs, urine output, blood pressure (other measures as available)  - Encourage oral intake as appropriate  - Instruct patient on fluid and nutrition restrictions as appropriate  Outcome: Progressing     Problem: Impaired Swallowing  Goal: Minimize aspiration risk  Description: Interventions:  - Patient should be alert and upright for all feedings (90 degrees preferred)  - Offer food and liquids at a slow rate  - No straws  - Encourage small bites of food and small sips of liquid  - Offer pills one at a time, crush or deliver with applesauce as needed  - Discontinue feeding and notify MD (or speech pathologist) if  coughing or persistent throat clearing or wet/gurgly vocal quality is noted  Outcome: Progressing     Problem: Impaired Cognition  Goal: Patient will exhibit improved attention, thought processing and/or memory  Description: Interventions:  - Minimize distractions in the room when full attention is required  - Allow additional time for processing after asking questions or providing instructions  Outcome: Progressing     Problem: CARDIOVASCULAR - ADULT  Goal: Maintains optimal cardiac output and hemodynamic stability  Description: INTERVENTIONS:- Monitor vital signs, rhythm, and trends- Monitor for bleeding, hypotension and signs of decreased cardiac output- Evaluate effectiveness of vasoactive medications to optimize hemodynamic stability- Monitor arterial and/or venous puncture sites for bleeding and/or hematoma- Assess quality of pulses, skin color and temperature- Assess for signs of decreased coronary artery perfusion - ex. Angina- Evaluate fluid balance, assess for edema, trend weights  Outcome: Progressing

## 2025-03-23 NOTE — PHYSICAL THERAPY NOTE
PHYSICAL THERAPY TREATMENT NOTE - INPATIENT     Room Number: 568/568-A    Visit # 3    Presenting Problem: USMAN, hiccups, nausea, vomiting, abdominal pain, alcohol hepatitis, pneumonia, intubated on 3/12/25 and extubated on 3/17/25  Co-Morbidities : alcoholism, anxiety, gout, gastroesophageal reflux disease    Problem List  Principal Problem:    USMAN (acute kidney injury)  Active Problems:    Metabolic acidosis    Hyperkalemia    Leukocytosis    Thrombocytopenia    Coagulopathy (HCC)    Hyponatremia    Alcoholic (HCC)    Scleral icterus    Acute kidney failure    DTs (delirium tremens) (HCC)    Alcohol use disorder    Acute respiratory failure with hypoxia (HCC)    Wernicke's syndrome    Hypophosphatemia    Hypernatremia    Alcohol-induced acute pancreatitis (HCC)    Alcoholic hepatitis without ascites (HCC)      PHYSICAL THERAPY ASSESSMENT   Patient demonstrates good  progress this session, goals  updated to reflect patient performance.      Patient is requiring minimal assist and moderate assist as a result of the following impairments: decreased functional strength, impaired seated and standing balance, decreased muscular endurance, cognitive deficits (decreased insight, increased processing time, impaired problem solving), and medical status.     Patient continues to function below baseline with bed mobility, transfers, gait, and stair negotiation.  Next session anticipate patient to progress bed mobility, transfers, and gait.  Physical Therapy will continue to follow patient for duration of hospitalization.    Patient continues to benefit from continued skilled PT services: to facilitate return to prior level of function as patient demonstrates high motivation with excellent tolerance to an intensive therapy program     PLAN DURING HOSPITALIZATION  Nursing Mobility Recommendation : 1 Assist  PT Device Recommendation: Mechanical lift  PT Treatment Plan: Bed mobility, Body mechanics, Endurance, Energy conservation,  Patient education, Gait training, Strengthening, Transfer training, Balance training  Frequency (Obs): 5x/week     SUBJECTIVE  Pt pleasant and cooperative.     OBJECTIVE  Precautions: Bed/chair alarm, Rectal tube, HOB elevated 30 degrees (NG)    WEIGHT BEARING RESTRICTION  none    PAIN ASSESSMENT   Ratin  Location: denies pain at this visit  Management Techniques: Breathing techniques, Relaxation    BALANCE  Static Sitting: Fair +  Dynamic Sitting: Fair  Static Standing: Poor  Dynamic Standing: Poor -    ACTIVITY TOLERANCE  Pulse: (!) 125  Heart Rate Source: Monitor     BP: 134/80  BP Location: Right arm  BP Method: Automatic  Patient Position: Sitting     O2 WALK  Oxygen Therapy  SPO2% on Oxygen at Rest: 94  At rest oxygen flow (liters per minute): 4  SPO2% Ambulation on Oxygen: 88  Ambulation oxygen flow (liters per minute): 4    AM-PAC '6-Clicks' INPATIENT SHORT FORM - BASIC MOBILITY  How much difficulty does the patient currently have...  Patient Difficulty: Turning over in bed (including adjusting bedclothes, sheets and blankets)?: A Little   Patient Difficulty: Sitting down on and standing up from a chair with arms (e.g., wheelchair, bedside commode, etc.): A Little   Patient Difficulty: Moving from lying on back to sitting on the side of the bed?: A Little   How much help from another person does the patient currently need...   Help from Another: Moving to and from a bed to a chair (including a wheelchair)?: A Little   Help from Another: Need to walk in hospital room?: A Lot   Help from Another: Climbing 3-5 steps with a railing?: Total     AM-PAC Score:  Raw Score: 15   Approx Degree of Impairment: 57.7%   Standardized Score (AM-PAC Scale): 39.45   CMS Modifier (G-Code): CK    FUNCTIONAL ABILITY STATUS  Functional Mobility/Gait Assessment  Gait Assistance: Moderate assistance  Distance (ft): 5 steps to bedside chair  Assistive Device: Rolling walker  Pattern: Shuffle  Rolling:  supervision  Supine to  Sit: supervision   Sit to Stand: contact guard assist   Gait: Min/Mod A for amb to bedside chair.     Skilled Therapy Provided:  Chart reviewed and RN approved PT session. Pt lying in bed and agreeable to PT. Vitals assessed and WNL. Pt completed exercises in semi-reclined position in bed and chair position in bed (B arm raises, arm circles forward and backward, cervical rotation, B SAQ, heel slides, hip abd, adduction squeeze, B arm reaching, and forward reaching x 10 each). Pt performed bed mobility with supervision, sitting EOB with supervision and performed seated exercises (knee extension, marching x 10). Pt instructed in proper hand placement and integration of RW with transfers and ambulation. Pt cued for upright posture in standing and during ambulation and to keep RW within ESTUARDO. Pt performed approx 5 steps with RW to bedside chair, Mod A for safety and support, and cueing. Pt left up in chair all needs in reach. Alarm on. RN notified of session. Recommend pt up to chair 3-4 times per day with assist from RN staff.       PATIENT EDUCATION  Education Provided To: Patient  Patient Education: Role of Physical Therapy, Plan of Care, Functional Transfer Techniques, Posture/Positioning, Fall Prevention, Energy Conservation, Proper Body Mechanics, Gait Training  Patient's Response to Education: Verbalized Understanding, Returned Demonstration  Family/Caregiver Education: Role of Physical Therapy, Plan of Care  Family/Caregiver's Response to Education: Verbalized Understanding      Patient End of Session: Up in chair, Needs met, Call light within reach, RN aware of session/findings, All patient questions and concerns addressed, Hospital anti-slip socks, Alarm set, Family present    CURRENT GOALS   Goals to be met by: 4/1/25  Patient Goal Patient's self-stated goal is: updated on 3/23/25- \"to start walking\"   Goal #1 Patient is able to demonstrate supine - sit EOB @ level: moderate assistance     Goal #1   Current  Status MET 3/20/25  Upgrade goal to supine<>sit to EOB at Mod I   Goal #2 Patient will tolerate static sitting EOB for 10 minutes with BUE support and CGA in order to prepare for future out of bed activities.    Goal #2  Current Status Met sitting EOB x 10 minutes with B UE support and performing LE exercises. 3/23/25   Goal #3 Patient is able to demonstrate transfers Sit to/from Stand at assistance level: moderate assistance with walker - rolling     Goal #3   Current Status Performed sit<>Stand with CGA to RW.   Upgrade goal to perform sit<>stand at Mod I with RW   Goal #4 Patient will tolerate static standing for 2 minutes with BUE support on RW and Min A in order to prepare for future out of bed activities.   Goal #4   Current Status NOT MET/IN PROGRESS    Goal #5 Patient is able to ambulate 25 feet with assist device: walker - rolling at assistance level: moderate assistance   Goal #5   Current Status NOT MET/IN PROGRESS    Goal #6 Patient to demonstrate independence with home activity/exercise instructions provided to patient in preparation for discharge.   Goal #6  Current Status IN PROGRESS/ONGOING      Therapeutic activity: 15 minutes  Therapeutic exercises: 20 minutes  Gt Training: 10 minutes

## 2025-03-23 NOTE — PROGRESS NOTES
[facility]  part of [facility]  Hospitalist Progress Note     Sami Grijalva  Patient Status:  Inpatient    10/11/1980  44 year old CSN [id number]   Location 217/217-A Attending Yonny Bowers MD   Hosp Day # 19 PCP Kerri Medina MD     Assessment & Plan:   ----------------------------------  Respiratory failure, acute hypoxemic.  Due to pneumonia, ARDS.  Oxygen requirement is improving.  Extubated 3/17.  Using between 1 and 3 L of oxygen  -Supplemental oxygen as needed, titrate down.   -Pulmonology consultation appreciated  -Treat contributing factors as described    Pneumonia. aspiration.  Clinically improving.  Extubated successfully  -IV antibiotics: Meropenem completed  -Blood cultures: NGTD  -Monitor temperature, blood pressure and fever curve  -Pulmonology consult appreciated  -ID consult appreciated  -Repeat chest imaging to be decided    Acute kidney injury.   Resolved    Chest pain.  Resolved.  Workup for ACS, PE negative    Diarrhea.  Some contribution from antibiotics, tube feeds, acute illness  -GI following  -ID following  -Defer further investigation to them    Acute alcoholic hepatitis.  Bilirubin stabilizing  -GI following    Other problems  Pancreatitis  Alcohol abuse  Cirrhosis  Hepatic steatosis  Hyponatremia    Supplementary Documentation:   DVT Mechanical Prophylaxis:   SCDs,    DVT Pharmacologic Prophylaxis   Medication   None                Code Status: Full Code  Cedeño: External urinary catheter in place  Cedeño Duration (in days): 14  Central line:    ADOLFO: Discussed with PMR, will plan for acute rehab pending continued medical stability    I personally reviewed the available laboratories, imaging including. I discussed/will discuss the case with consultants. I ordered laboratories and/or radiographic studies. I adjusted medications as detailed above.  Medical decision making high, risk is high.    Subjective:   ----------------------------------  No new  complaints.      Objective:   Chief Complaint:   Chief Complaint   Patient presents with    Vomiting    Hiccups    Eval-D     ----------------------------------  Temp:  [99.3 °F (37.4 °C)-100.1 °F (37.8 °C)] 99.4 °F (37.4 °C)  Pulse:  [109-117] 115  Resp:  [18-28] 28  BP: (117-131)/(60-73) 117/64  SpO2:  [94 %-96 %] 95 %  Gen: A+Ox3.  No distress.  Jaundiced  HEENT: NCAT, neck supple, no carotid bruit.  Scleral icterus  CV: RRR, S1S2, and intact distal pulses. No gallop, rub, murmur.  Pulm: Poor effort, no wheezing or rales  Abd: Soft, NTND, BS normal, no mass, no HSM, no rebound/guarding.  Rectal tube in place, Cedeño catheter in place  Neuro: Normal reflexes, CN. Sensory/motor exams grossly normal deficit.  Generally weak  MS: No joint effusions.  No peripheral edema.  Skin: Skin is warm and dry. No rashes, erythema, diaphoresis.   Psych: Normal mood and affect. Calm, cooperative    Labs:  Lab Results   Component Value Date    HGB 8.4 (L) 03/23/2025    WBC 19.3 (H) 03/23/2025    .0 03/23/2025     (L) 03/23/2025    K 3.6 03/23/2025    CREATSERUM 0.91 03/23/2025    INR 1.95 (H) 03/23/2025     (H) 03/23/2025    ALT 98 (H) 03/23/2025    TROP 0.00 01/31/2017            pantoprazole  40 mg Oral BID AC    epoetin sheela  10,000 Units Subcutaneous Once per day on Tuesday Thursday Saturday    furosemide  20 mg Intravenous BID (Diuretic)    vancomycin  125 mg Per NG Tube Daily    multivitamin  1 tablet Oral Daily    folic acid  1 mg Oral Daily       baclofen    acetaminophen    haloperidol lactate    polyethylene glycol (PEG 3350)    bisacodyl    prochlorperazine

## 2025-03-24 ENCOUNTER — APPOINTMENT (OUTPATIENT)
Dept: CT IMAGING | Facility: HOSPITAL | Age: 45
End: 2025-03-24
Attending: PHYSICIAN ASSISTANT
Payer: MEDICAID

## 2025-03-24 LAB
ADENOVIRUS PCR:: NOT DETECTED
ALBUMIN SERPL-MCNC: 2.5 G/DL (ref 3.2–4.8)
ALBUMIN/GLOB SERPL: 0.9 {RATIO} (ref 1–2)
ALP LIVER SERPL-CCNC: 211 U/L
ALT SERPL-CCNC: 89 U/L
ANION GAP SERPL CALC-SCNC: 10 MMOL/L (ref 0–18)
AST SERPL-CCNC: 180 U/L (ref ?–34)
B PARAPERT DNA SPEC QL NAA+PROBE: NOT DETECTED
B PERT DNA SPEC QL NAA+PROBE: NOT DETECTED
BASOPHILS # BLD: 0 X10(3) UL (ref 0–0.2)
BASOPHILS NFR BLD: 0 %
BILIRUB SERPL-MCNC: 14.9 MG/DL (ref 0.3–1.2)
BUN BLD-MCNC: 25 MG/DL (ref 9–23)
BUN/CREAT SERPL: 23.8 (ref 10–20)
C PNEUM DNA SPEC QL NAA+PROBE: NOT DETECTED
CALCIUM BLD-MCNC: 7.8 MG/DL (ref 8.7–10.4)
CHLORIDE SERPL-SCNC: 102 MMOL/L (ref 98–112)
CO2 SERPL-SCNC: 22 MMOL/L (ref 21–32)
CORONAVIRUS 229E PCR:: NOT DETECTED
CORONAVIRUS HKU1 PCR:: NOT DETECTED
CORONAVIRUS NL63 PCR:: NOT DETECTED
CORONAVIRUS OC43 PCR:: NOT DETECTED
CREAT BLD-MCNC: 1.05 MG/DL
DEPRECATED RDW RBC AUTO: 94.1 FL (ref 35.1–46.3)
EGFRCR SERPLBLD CKD-EPI 2021: 89 ML/MIN/1.73M2 (ref 60–?)
EOSINOPHIL # BLD: 0.42 X10(3) UL (ref 0–0.7)
EOSINOPHIL NFR BLD: 2 %
ERYTHROCYTE [DISTWIDTH] IN BLOOD BY AUTOMATED COUNT: 32.9 % (ref 11–15)
FLUAV RNA SPEC QL NAA+PROBE: NOT DETECTED
FLUBV RNA SPEC QL NAA+PROBE: NOT DETECTED
GLOBULIN PLAS-MCNC: 2.8 G/DL (ref 2–3.5)
GLUCOSE BLD-MCNC: 96 MG/DL (ref 70–99)
HCT VFR BLD AUTO: 23.7 %
HGB BLD-MCNC: 7.8 G/DL
LYMPHOCYTES NFR BLD: 0.42 X10(3) UL (ref 1–4)
LYMPHOCYTES NFR BLD: 2 %
MCH RBC QN AUTO: 26.9 PG (ref 26–34)
MCHC RBC AUTO-ENTMCNC: 32.9 G/DL (ref 31–37)
MCV RBC AUTO: 81.7 FL
METAPNEUMOVIRUS PCR:: NOT DETECTED
MONOCYTES # BLD: 1.46 X10(3) UL (ref 0.1–1)
MONOCYTES NFR BLD: 7 %
MYCOPLASMA PNEUMONIA PCR:: NOT DETECTED
NEUTROPHILS # BLD AUTO: 17.06 X10 (3) UL (ref 1.5–7.7)
NEUTROPHILS NFR BLD: 87 %
NEUTS BAND NFR BLD: 2 %
NEUTS HYPERSEG # BLD: 18.6 X10(3) UL (ref 1.5–7.7)
OSMOLALITY SERPL CALC.SUM OF ELEC: 282 MOSM/KG (ref 275–295)
PARAINFLUENZA 1 PCR:: NOT DETECTED
PARAINFLUENZA 2 PCR:: NOT DETECTED
PARAINFLUENZA 3 PCR:: NOT DETECTED
PARAINFLUENZA 4 PCR:: NOT DETECTED
PLATELET # BLD AUTO: 281 10(3)UL (ref 150–450)
PLATELET MORPHOLOGY: NORMAL
PLATELETS.RETICULATED NFR BLD AUTO: 6.2 % (ref 0–7)
POTASSIUM SERPL-SCNC: 3.2 MMOL/L (ref 3.5–5.1)
PROT SERPL-MCNC: 5.3 G/DL (ref 5.7–8.2)
RBC # BLD AUTO: 2.9 X10(6)UL
RHINOVIRUS/ENTERO PCR:: NOT DETECTED
RSV RNA SPEC QL NAA+PROBE: NOT DETECTED
SARS-COV-2 RNA NPH QL NAA+NON-PROBE: NOT DETECTED
SODIUM SERPL-SCNC: 134 MMOL/L (ref 136–145)
TOTAL CELLS COUNTED BLD: 100
WBC # BLD AUTO: 20.9 X10(3) UL (ref 4–11)

## 2025-03-24 PROCEDURE — 99233 SBSQ HOSP IP/OBS HIGH 50: CPT | Performed by: INTERNAL MEDICINE

## 2025-03-24 PROCEDURE — 99233 SBSQ HOSP IP/OBS HIGH 50: CPT | Performed by: HOSPITALIST

## 2025-03-24 PROCEDURE — 74177 CT ABD & PELVIS W/CONTRAST: CPT | Performed by: PHYSICIAN ASSISTANT

## 2025-03-24 RX ORDER — VANCOMYCIN HYDROCHLORIDE
15 EVERY 12 HOURS
Status: DISCONTINUED | OUTPATIENT
Start: 2025-03-24 | End: 2025-03-24

## 2025-03-24 RX ORDER — POTASSIUM CHLORIDE 1500 MG/1
40 TABLET, EXTENDED RELEASE ORAL ONCE
Status: COMPLETED | OUTPATIENT
Start: 2025-03-24 | End: 2025-03-24

## 2025-03-24 NOTE — CM/SW NOTE
Department  asked to send updates to [facility] referral for Acute Rehab.    Assigned SW/CM to follow up with patient/family on discharge plan.     Radha Roldan, DSC

## 2025-03-24 NOTE — PROGRESS NOTES
PT attempted in am and pt was up and amb with RN for toileting and up to chair and back to bed requesting rest. PT attempted in PM pt was away from room for CT by has been febrile per RN reporting. PT will re attempt 3/25/25.

## 2025-03-24 NOTE — PROGRESS NOTES
[facility]  part of [facility]  Hospitalist Progress Note     Sami Grijalva  Patient Status:  Inpatient    10/11/1980  44 year old CSN [id number]   Location 217/217-A Attending Yonny Bowers MD   Hosp Day # 20 PCP Kerri Medina MD     Assessment & Plan:   ----------------------------------  Fevers.  New onset 3/23 along with leukocytosis.  Exact source unclear.  -Blood cultures  -UA and culture  -Chest x-ray negative  -ID aware, CT abdomen pelvis ordered  -Respiratory viral panel  -Empiric antibiotics with meropenem, vancomycin    Respiratory failure, acute hypoxemic.  Due to pneumonia, ARDS.  Oxygen requirement is improving.  Extubated 3/17.  Using between 1 and 3 L of oxygen  -Supplemental oxygen as needed, titrate down.   -Pulmonology consultation appreciated  -Treat contributing factors as described    Acute alcoholic hepatitis.  Bilirubin stabilizing, may take a very long time to normalize  -GI following    Other problems  Pancreatitis  Alcohol abuse  Cirrhosis  Hepatic steatosis  Hyponatremia  USMAN resolved  Chest pain, resolved  Aspiration pneumonia, finished antibiotics  Diarrhea, better  Sinus tachycardia, due to fever    Supplementary Documentation:   DVT Mechanical Prophylaxis:   SCDs,    DVT Pharmacologic Prophylaxis   Medication   None                Code Status: Full Code  Cedeño: External urinary catheter in place  Cedeño Duration (in days): 14  Central line:    ADOLFO: Discussed with PMR, will plan for acute rehab pending continued medical stability    I personally reviewed the available laboratories, imaging including. I discussed/will discuss the case with consultants. I ordered laboratories and/or radiographic studies. I adjusted medications as detailed above.  Medical decision making high, risk is high.    Subjective:   ----------------------------------  Having fevers which makes him feel uncomfortable.  Generally not feeling as energetic.  Appetite is decent.   Still very weak.  No new cough.  No other localizing signs or symptoms.      Objective:   Chief Complaint:   Chief Complaint   Patient presents with    Vomiting    Hiccups    Eval-D     ----------------------------------  Temp:  [99.6 °F (37.6 °C)-101.8 °F (38.8 °C)] 101.1 °F (38.4 °C)  Pulse:  [108-125] 114  Resp:  [20-22] 22  BP: (110-134)/(63-80) 125/67  SpO2:  [90 %-97 %] 93 %  Gen: Alert, appropriate, appears very weak jaundiced  HEENT: NCAT, neck supple, no carotid bruit.  Scleral icterus  CV: Mild tachycardia, S1S2, and intact distal pulses. No gallop, rub, murmur.  Pulm: Poor effort, no wheezing or rales  Abd: Soft, NTND, BS normal, no mass, no HSM, no rebound/guarding.  Rectal tube in place, Cedeño catheter in place  Neuro: Normal reflexes, CN. Sensory/motor exams grossly normal deficit.  Generally weak  MS: No joint effusions.  No peripheral edema.  Skin: Skin is warm and dry. No rashes, erythema, diaphoresis.   Psych: Normal mood and affect. Calm, cooperative    Labs:  Lab Results   Component Value Date    HGB 7.8 (L) 03/24/2025    WBC 20.9 (H) 03/24/2025    .0 03/24/2025     (L) 03/24/2025    K 3.2 (L) 03/24/2025    CREATSERUM 1.05 03/24/2025    INR 1.95 (H) 03/23/2025     (H) 03/24/2025    ALT 89 (H) 03/24/2025    TROP 0.00 01/31/2017            meropenem  500 mg Intravenous Q8H    vancomycin  15 mg/kg Intravenous Q12H    pantoprazole  40 mg Oral BID AC    epoetin sheela  10,000 Units Subcutaneous Once per day on Tuesday Thursday Saturday    furosemide  20 mg Intravenous BID (Diuretic)    vancomycin  125 mg Per NG Tube Daily    multivitamin  1 tablet Oral Daily    folic acid  1 mg Oral Daily       baclofen    acetaminophen    haloperidol lactate    polyethylene glycol (PEG 3350)    bisacodyl    prochlorperazine

## 2025-03-24 NOTE — PROGRESS NOTES
[facility]     Gastroenterology Progress Note    Sami Grijalva Jr. Patient Status:  Inpatient    10/11/1980 MRN [id number]   Location [facility] 5SW/SE Attending Yonny Bowers MD   Hosp Day # 20 PCP Kerri Medina MD       Assessment and Plan:   Severe alcoholic hepatitis  Complicated by a protracted hospitalization for multifocal pneumonia and ARDS requiring mechanical ventilation.  Ongoing high MELD, Maddrey DF.  Steroids have not been started in light of the patient's recent pneumonia.  Now with increasing leukocytosis and fever, concern for underlying infection again.  Infectious workup has been reinitiated.  Resumption of alcohol intake will be fatal.  I have encouraged nutrition, ambulation and complete abstinence from alcohol.     Recommend:  1.  Infectious workup pending  2.  Restarted on empiric antibiotics.  Pan CT scan pending  3.  Complete abstinence from alcohol.  4.  Ambulate with assistance.    D/w Dr Robinson Ross Ma, MD    Subjective:   New fever yesterday evening.  No abdominal pain.  No nausea or vomiting.  He got up and walked around this morning.  No new diarrhea.  He does note leg swelling.    Objective:   Patient Vitals for the past 24 hrs:   BP Temp Temp src Pulse Resp SpO2   25 1215 -- -- -- -- -- 95 %   25 0933 117/64 99.4 °F (37.4 °C) Oral 115 (!) 28 95 %   25 0718 -- -- -- 112 -- --   25 0547 131/73 99.3 °F (37.4 °C) Oral 109 18 96 %   25 0403 -- -- -- 110 -- --   25 2130 -- -- -- 110 -- --   25 1954 120/63 100 °F (37.8 °C) Oral 117 20 94 %   25 1900 -- -- -- 111 -- --   25 1734 -- 100.1 °F (37.8 °C) -- -- -- --   25 1618 119/60 100.1 °F (37.8 °C) Oral 115 -- 96 %     Body mass index is 28.39 kg/m².    General: Chronically ill in appearance.  Deeply icteric.  HEENT: Scleral icterus is present..  Mucous membranes are moist.  Cardiovascular: Regular rate and rhythm   Lung: Clear to  auscultation bilaterally  Abdomen:Non-distended.  Bowel sounds are present.  There is no tenderness to palpation.  There are no masses appreciated.  Liver and spleen are not palpable.  Skin: Deeply icteric.  Warm and dry.  Ext: + edema  Neuro- Alert and interactive, and gross movements of extremities normal  Affect:Normal      Results:     Recent Labs   Lab 03/21/25  1055 03/23/25  0539 03/24/25  0554   RBC 3.30* 3.09* 2.90*   HGB 8.6* 8.4* 7.8*   HCT 25.9* 25.3* 23.7*   MCV 78.5* 81.9 81.7   MCH 26.1 27.2 26.9   MCHC 33.2 33.2 32.9   RDW 32.4* 32.8* 32.9*   NEPRELIM 11.45* 15.84* 17.06*   WBC 14.5* 19.3* 20.9*   .0 325.0 281.0         Recent Labs   Lab 03/22/25  0441 03/23/25  0539 03/24/25  0554   GLU 95 99 96   BUN 32* 24* 25*   CREATSERUM 0.87 0.91 1.05   CA 7.9* 8.2* 7.8*   ALB 2.6* 2.7* 2.5*   * 135* 134*   K 3.8  3.8 3.6 3.2*    104 102   CO2 23.0 22.0 22.0   ALKPHO 201* 220* 211*   * 201* 180*   ALT 98* 98* 89*   BILT 15.1* 15.2* 14.9*   TP 5.4* 5.5* 5.3*       Lab Results   Component Value Date    INR 1.95 (H) 03/23/2025    INR 1.93 (H) 03/22/2025    INR 1.89 (H) 03/21/2025       XR CHEST AP PORTABLE  (CPT=71045)    Result Date: 3/23/2025  CONCLUSION:   No significant change in the multifocal opacities in both lungs.    Dictated by (CST): Emmett Quan MD on 3/23/2025 at 9:24 PM     Finalized by (CST): Emmett Quan MD on 3/23/2025 at 9:26 PM

## 2025-03-24 NOTE — PROGRESS NOTES
Pulmonary/ICU/Critical Care Progress Note        Reason for Consultation: resp failure  Referring Physician: Dr. Diaz      Subjective:  On 2 LNC  Low grade fevers      From the initial consultation  Pt is unable to provide info  HPI: 43 yo male with hx of gout, GERD, admitted with acute ETOH hepatitis, ETOH use, emesis, USMAN. Seen by GI.  On CIWA protocol, thiamine, folic acid, MVI, BZDs, lactulose  Has been in the ICU for 2 days  ICU consulted this am for worsening hypoxemia  CXR this am with b/l infiltrates concerning for PNA      REVIEW OF SYSTEMS:  Positives and negatives as noted in HPI. All other review of systems otherwise are either limited (due to pt/family inability to provide) or negative.      PAST MEDICAL HISTORY:  Past Medical History:   Diagnosis Date    Esophageal reflux     Gout     Hernia          PAST SURGICAL HISTORY:  Past Surgical History:   Procedure Laterality Date    Colonoscopy N/A 11/1/2023    Procedure: COLONOSCOPY;  Surgeon: Vandana Austin MD;  Location: [facility] ENDOSCOPY    Egd  02/15/2016    Eh pr repair complex scalp arms legs 1.1 to 2.5 cm  2014    Elbow fracture surgery Right 1991    Hernia surgery      Inguinal hernia repair Left 01/17/2023         PAST SOCIAL HISTORY:  Social History     Socioeconomic History    Marital status: Single    Number of children: 0   Occupational History    Occupation: Supervisor, car [employer]   Tobacco Use    Smoking status: Former     Types: Cigarettes    Smokeless tobacco: Never   Vaping Use    Vaping status: Some Days   Substance and Sexual Activity    Alcohol use: Yes     Comment: per pt, DAILY HARD LEMONADE- [employer] hard lemonade 24oz cans, 6 cans daily    Drug use: Yes     Types: Cannabis     Comment: last use, couple months ago per pt report         PAST FAMILY HISTORY:  Family History   Problem Relation Age of Onset    Diabetes Father     Hypertension Father     Cancer Mother         liver       ALLERGIES:  Allergies[1]      MEDS:  Home  Medications:  Medications Taking[2]    Scheduled Medication:   meropenem  500 mg Intravenous Q8H    vancomycin  15 mg/kg Intravenous Q12H    pantoprazole  40 mg Oral BID AC    epoetin sheela  10,000 Units Subcutaneous Once per day on Tuesday Thursday Saturday    furosemide  20 mg Intravenous BID (Diuretic)    vancomycin  125 mg Per NG Tube Daily    multivitamin  1 tablet Oral Daily    folic acid  1 mg Oral Daily     Continuous Infusing Medication:      PRN Medications:    baclofen    acetaminophen    haloperidol lactate    polyethylene glycol (PEG 3350)    bisacodyl    prochlorperazine       PHYSICAL EXAM:  /62 (BP Location: Right arm)   Pulse 103   Temp (!) 100.6 °F (38.1 °C) (Oral)   Resp 20   Ht 5' 8\" (1.727 m)   Wt 186 lb 11.7 oz (84.7 kg)   SpO2 94%   BMI 28.39 kg/m²      CONSTITUTIONAL: NAD + jaundice  HEENT: AT/NC  MOUTH: MMM  NECK/THROAT: no JVD. Trachea midline. No obvious thyromegaly  LUNG: clear b/l no wheezing, + b/l crackles. Chest symmetric with respiratory motion  HEART: regular rate and rhythm, no obvious murmers or gallops noted  ABD: soft non tender. + bowel sounds. No organomegaly noted  EXT: no clubbing, cyanosis. Min b/l LE edema      IMAGES:   Chest CT PE 3/21/25 on my review no PE. Overall improving b/l pulmonary infiltrates  CONCLUSION:   1. No large central pulmonary embolus.  Evaluation beyond the proximal segmental level is precluded by heterogeneous contrast bolus and respiratory related motion artifact.   2. Enlargement of main pulmonary artery suggests pulmonary hypertension.   3. Interval improvement in multifocal bilateral pneumonia.   4. Trace left pleural effusion.   5. Herniation of fat into the left inferior mediastinum without significant change.   6. Hepatic cirrhosis with changes related to portal hypertension including small amount of perihepatic ascites.   7. Mild LAD coronary artery calcification.   8. Mild left atrial enlargement     VQ scan indeterminate  3/20/25    LE dopplers neg 3/20/25    CXR 3/20/25  CONCLUSION: Patchy alveolar opacities throughout the lungs, suggesting edema or multifocal pneumonia.  No significant interval change since preceding exam.  Interval extubation and removal of feeding tube.     CXR 3/17/25  Findings and impression:   ETT 6 cm above the em   Enteric tube beneath the diaphragm   Stable heart   Persistent low lung volumes and bilateral diffuse pulmonary opacities.   No pneumothorax     CT C/A/P 3/12/25  CONCLUSION:   1. Diffuse pancreatic enlargement and mild peripancreatic inflammatory stranding are new since abdominal CT from 8 days prior and concerning for acute interstitial edematous pancreatitis.  No gross pancreatic ductal dilation or peripancreatic collection.     Request correlation with serum lipase levels.   2. Left greater than right lower lobe airspace disease with intrinsic air bronchograms.  Findings are similar on the left and slightly improved on the right since chest CT from 5 days prior; multifocal pneumonia/aspiration is suspected.  Also identified   are new and/or worsening multifocal upper lobe predominant ground-glass density opacities throughout both lungs; these ground-glass density opacities could be infectious in nature or relate to acute respiratory distress syndrome.   3. Endotracheal tube with tip approximately 1 cm above the level of the em.  Enteric tube tip in the gastric fundus.  Rectal tube and Cedeño catheter also noted.   4. Fatty liver and cirrhosis.   5. Stable splenomegaly.   6. Small to moderate volume intra-abdominal ascites is new since prior abdominal CT.  There is also new mild anasarca.   7. Liquid contents in the colon could relate to a malabsorption state or low-grade infectious/inflammatory versus antibiotic associated colitis.  Mild scattered colonic diverticulosis, but with no CT findings of acute diverticulitis.   8. Low-density appearance of the intracardiac blood pool raises  the possibility of underlying anemia. Correlate with hematologic parameters.    9. Lesser incidental findings as above.       CXR 3/12/25 with multifocal infiltrates    CXR 3/11/25  FINDINGS/IMPRESSION:   1. There is redemonstration of patchy alveolar opacities throughout both lungs.  The findings could represent alveolar edema, a diffuse multifocal infectious process, or a combination.  The findings have not significantly changed since previous exam.   2. The heart mediastinal structures remain enlarged.  The there are trace bilateral pleural effusions.   3. The thoracic component of an enteric feeding tube is identified traversing the thoracic esophagus.  The distal tip is not visualized but lies well below the GE junction.     CT head 3/7/25  CONCLUSION:   Motion limits evaluation.  No evidence of acute intracranial abnormality.     CT chest 3/7/25  CONCLUSION:   1. Extensive bilateral airspace disease, concerning for potential multifocal pneumonia. Continued surveillance is advised.   2. Dense bilateral lower airspace consolidation is evident; aspiration pneumonitis is a differential diagnostic possibility. Follow-up is recommended to document resolution.    3. Dilatation of the main pulmonary artery trunk may relate to underlying pulmonary hypertension.    4. Small hiatal hernia with imaging manifestations that may be indicative underlying esophagitis.   5. Marked hepatic steatosis.   6. Lesser incidental findings as above.     CXR 3/7/25 with multifocal infiltrates, possible effusion on the left  CONCLUSION:   1. Cardiomegaly.  Tortuous aorta.   2. Extensive multifocal airspace opacification in the bilateral perihilar and basilar regions with left-sided effusion.  Differential includes pulmonary edema congestive failure versus multifocal pneumonitis.      CT abd/pelvis 3/4/25  CONCLUSION:   Severely fatty liver with subtle undulating contour.  Stable splenomegaly.  Tubular structures around the GE junction are  suggestive of lower esophageal varices.  No ascites       LABS:  Recent Labs   Lab 03/21/25  1055 03/23/25  0539 03/24/25  0554   RBC 3.30* 3.09* 2.90*   HGB 8.6* 8.4* 7.8*   HCT 25.9* 25.3* 23.7*   MCV 78.5* 81.9 81.7   MCH 26.1 27.2 26.9   MCHC 33.2 33.2 32.9   RDW 32.4* 32.8* 32.9*   NEPRELIM 11.45* 15.84* 17.06*   WBC 14.5* 19.3* 20.9*   .0 325.0 281.0       Recent Labs   Lab 03/22/25  0441 03/23/25  0539 03/24/25  0554   GLU 95 99 96   BUN 32* 24* 25*   CREATSERUM 0.87 0.91 1.05   EGFRCR 108 105 89   CA 7.9* 8.2* 7.8*   ALB 2.6* 2.7* 2.5*   * 135* 134*   K 3.8  3.8 3.6 3.2*    104 102   CO2 23.0 22.0 22.0   ALKPHO 201* 220* 211*   * 201* 180*   ALT 98* 98* 89*   BILT 15.1* 15.2* 14.9*   TP 5.4* 5.5* 5.3*       ASSESSMENT/PLAN:  Acute hypoxemic resp failure  -secondary to multifocal infiltrates. Given hx of emesis, concern for aspiration  -checked non contrast chest CT showed b/l infiltrates  -initially on zosyn, doxy. Checked urine leg, strep pneumo, mrsa nares, blood cx neg thus far  -initially weaned from airvo to NC but then declined and was intubated on 3/12/25. Was on full MV support   -passed SBT on 3/17/25 and extubated to NC. Now on 4LNC  -ID consulted and stopped doxy and zosyn. Switched to meropenem and completed course  -repeat CT with b/l infiltrates L>R  -underwent bronchoscopy with left BAL on 3/13/25. Cultures NGTD  -aspiration precautions  -experienced chest discomfort last week with taking a deep breath in. CXR pretty unchanged. VQ scan indeterminate. LE dopplers neg. Checked a chest CT PE was neg for PE and showed improving b/l pulmonary infiltrates  -now weaned to 2 LNC supp 02    Acute ETOH hepatitis and now pancreatitis  -GI following. Imaging as above-now with pancreatitis  -PPI  -passed swallow   -persistent leukocytosis and fevers  -ID restarted abx and checking abd CT    -hypotension (now resolved)-multifactorial from sedation, sepsis, anemia  -likely  secondary to sedation and sepsis  -Pt didn't receive sepsis bolus b/c deemed fluid overloaded d/t possible cirrhosis. Was on low dose levophed for MAP > 65 weaned off   -possibly d/t acute anemia. No obvious source seen. S/p transfused 1 unit pRBC on 3/16/25  -off pressors now    ETOH withdrawal  -s/p CIWA protocol  -psych was following   -CT head neg for acute changes    USMAN and hypernatremia  -s/p bicarb infusion  -s/p D5 0.9 NS  -renal previously following. Na improving    Proph  -DVT: elevated INR low PLT, anemia. On SCDS  -nutrition: passed swallow     Dispo  -Full code  -sign other at bedside    Thank you for the opportunity to care for Sami Grijalva Jr..      JOLYNN Escobedo DO, MPH  Pulmonary Critical Care Medicine  [facility]                         [1]   Allergies  Allergen Reactions    Morphine SWELLING     SHORTNESS OF BREATH   [2]   No outpatient medications have been marked as taking for the 3/4/25 encounter (Hospital Encounter).

## 2025-03-24 NOTE — PROGRESS NOTES
[facility] Pharmacy Dosing Service      Initial Pharmacokinetic Consult for Vancomycin Dosing     Sami Grijalva Jr. is a 44 year old male who is being initiated on vancomycin therapy for pneumonia.  Pharmacy has been asked to dose vancomycin by HARPREET Thomas.  The initial treatment and monitoring approach will be steady state AUC strategy.        Weight and Temperature:    Wt Readings from Last 1 Encounters:   25 84.7 kg (186 lb 11.7 oz)        Temp Readings from Last 1 Encounters:   25 (!) 101.1 °F (38.4 °C) (Oral)      Labs:   Recent Labs   Lab 25  0441 25  0539 25  0554   CREATSERUM 0.87 0.91 1.05      Estimated Creatinine Clearance: 85 mL/min (based on SCr of 1.05 mg/dL).     Recent Labs   Lab 25  1055 25  0539 25  0554   WBC 14.5* 19.3* 20.9*          The Pharmacokinetic Target is:     to 600 mg-h/L and trough <=15 mg/L    Renal Dosing Considerations:    None     Assessment/Plan:   Previously on vancomycin treatment 3/12-3/14, will restart dosing and monitor scr closely.    Initial/Loading dose: Will receive 1250 mg IV (15 mg/kg, capped at 2250 mg) x 1 initial dose.      Maintenance dose: Pharmacy will dose vancomycin at 1250 mg IV every 12 hours    Monitorin) Plan for vancomycin peak and trough to be obtained at steady state    2) Pharmacy will order SCr as clinically indicated to assess renal function.    3) Pharmacy will monitor for toxicity and efficacy, adjust vancomycin dose and/or frequency, and order vancomycin levels as appropriate per the Pharmacy and Therapeutics Committee approved protocol until discontinuation of the medication.       We appreciate the opportunity to assist in the care of this patient.     Tyrel Tejada, PharmD  3/24/2025  10:50 AM  [facility]: [phone]

## 2025-03-24 NOTE — PLAN OF CARE
Problem: Patient Centered Care  Goal: Patient preferences are identified and integrated in the patient's plan of care  Description: Interventions:  - What would you like us to know as we care for you?   - Provide timely, complete, and accurate information to patient/family  - Incorporate patient and family knowledge, values, beliefs, and cultural backgrounds into the planning and delivery of care  - Encourage patient/family to participate in care and decision-making at the level they choose  - Honor patient and family perspectives and choices  Outcome: Progressing     Problem: Patient/Family Goals  Goal: Patient/Family Long Term Goal  Description: Patient's Long Term Goal:     Interventions:  -   - See additional Care Plan goals for specific interventions  Outcome: Progressing  Goal: Patient/Family Short Term Goal  Description: Patient's Short Term Goal:     Interventions:   - See additional Care Plan goals for specific interventions  Outcome: Progressing     Problem: PAIN - ADULT  Goal: Verbalizes/displays adequate comfort level or patient's stated pain goal  Description: INTERVENTIONS:  - Encourage pt to monitor pain and request assistance  - Assess pain using appropriate pain scale  - Administer analgesics based on type and severity of pain and evaluate response  - Implement non-pharmacological measures as appropriate and evaluate response  - Consider cultural and social influences on pain and pain management  - Manage/alleviate anxiety  - Utilize distraction and/or relaxation techniques  - Monitor for opioid side effects  - Notify MD/LIP if interventions unsuccessful or patient reports new pain  - Anticipate increased pain with activity and pre-medicate as appropriate  Outcome: Progressing     Problem: RISK FOR INFECTION - ADULT  Goal: Absence of fever/infection during anticipated neutropenic period  Description: INTERVENTIONS  - Monitor WBC  - Administer growth factors as ordered  - Implement neutropenic  guidelines  Outcome: Progressing     Problem: SAFETY ADULT - FALL  Goal: Free from fall injury  Description: INTERVENTIONS:  - Assess pt frequently for physical needs  - Identify cognitive and physical deficits and behaviors that affect risk of falls.  - [place] fall precautions as indicated by assessment.  - Educate pt/family on patient safety including physical limitations  - Instruct pt to call for assistance with activity based on assessment  - Modify environment to reduce risk of injury  - Provide assistive devices as appropriate  - Consider OT/PT consult to assist with strengthening/mobility  - Encourage toileting schedule  Outcome: Progressing     Problem: DISCHARGE PLANNING  Goal: Discharge to home or other facility with appropriate resources  Description: INTERVENTIONS:  - Identify barriers to discharge w/pt and caregiver  - Include patient/family/discharge partner in discharge planning  - Arrange for needed discharge resources and transportation as appropriate  - Identify discharge learning needs (meds, wound care, etc)  - Arrange for interpreters to assist at discharge as needed  - Consider post-discharge preferences of patient/family/discharge partner  - Complete POLST form as appropriate  - Assess patient's ability to be responsible for managing their own health  - Refer to Case Management Department for coordinating discharge planning if the patient needs post-hospital services based on physician/LIP order or complex needs related to functional status, cognitive ability or social support system  Outcome: Progressing     Problem: Delirium  Goal: Minimize duration of delirium  Description: Interventions:  - Encourage use of hearing aids, eye glasses  - Promote highest level of mobility daily  - Provide frequent reorientation  - Promote wakefulness i.e. lights on, blinds open  - Promote sleep, encourage patient's normal rest cycle i.e. lights off, TV off, minimize noise and interruptions  - Encourage  family to assist in orientation and promotion of home routines  Outcome: Progressing     Problem: RESPIRATORY - ADULT  Goal: Achieves optimal ventilation and oxygenation  Description: INTERVENTIONS:  - Assess for changes in respiratory status  - Assess for changes in mentation and behavior  - Position to facilitate oxygenation and minimize respiratory effort  - Oxygen supplementation based on oxygen saturation or ABGs  - Provide Smoking Cessation handout, if applicable  - Encourage broncho-pulmonary hygiene including cough, deep breathe, Incentive Spirometry  - Assess the need for suctioning and perform as needed  - Assess and instruct to report SOB or any respiratory difficulty  - Respiratory Therapy support as indicated  - Manage/alleviate anxiety  - Monitor for signs/symptoms of CO2 retention  Outcome: Progressing     Problem: METABOLIC/FLUID AND ELECTROLYTES - ADULT  Goal: Electrolytes maintained within normal limits  Description: INTERVENTIONS:  - Monitor labs and rhythm and assess patient for signs and symptoms of electrolyte imbalances  - Administer electrolyte replacement as ordered  - Monitor response to electrolyte replacements, including rhythm and repeat lab results as appropriate  - Fluid restriction as ordered  - Instruct patient on fluid and nutrition restrictions as appropriate  Outcome: Progressing  Goal: Hemodynamic stability and optimal renal function maintained  Description: INTERVENTIONS:  - Monitor labs and assess for signs and symptoms of volume excess or deficit  - Monitor intake, output and patient weight  - Monitor urine specific gravity, serum osmolarity and serum sodium as indicated or ordered  - Monitor response to interventions for patient's volume status, including labs, urine output, blood pressure (other measures as available)  - Encourage oral intake as appropriate  - Instruct patient on fluid and nutrition restrictions as appropriate  Outcome: Progressing     Problem: Impaired  Swallowing  Goal: Minimize aspiration risk  Description: Interventions:  - Patient should be alert and upright for all feedings (90 degrees preferred)  - Offer food and liquids at a slow rate  - No straws  - Encourage small bites of food and small sips of liquid  - Offer pills one at a time, crush or deliver with applesauce as needed  - Discontinue feeding and notify MD (or speech pathologist) if coughing or persistent throat clearing or wet/gurgly vocal quality is noted  Outcome: Progressing     Problem: Impaired Cognition  Goal: Patient will exhibit improved attention, thought processing and/or memory  Description: Interventions:    Outcome: Progressing     Problem: CARDIOVASCULAR - ADULT  Goal: Maintains optimal cardiac output and hemodynamic stability  Description: INTERVENTIONS:- Monitor vital signs, rhythm, and trends- Monitor for bleeding, hypotension and signs of decreased cardiac output- Evaluate effectiveness of vasoactive medications to optimize hemodynamic stability- Monitor arterial and/or venous puncture sites for bleeding and/or hematoma- Assess quality of pulses, skin color and temperature- Assess for signs of decreased coronary artery perfusion - ex. Angina- Evaluate fluid balance, assess for edema, trend weights  Outcome: Progressing

## 2025-03-24 NOTE — PLAN OF CARE
Problem: Patient Centered Care  Goal: Patient preferences are identified and integrated in the patient's plan of care  Description: Interventions:  - What would you like us to know as we care for you?   - Provide timely, complete, and accurate information to patient/family  - Incorporate patient and family knowledge, values, beliefs, and cultural backgrounds into the planning and delivery of care  - Encourage patient/family to participate in care and decision-making at the level they choose  - Honor patient and family perspectives and choices  Outcome: Progressing        Problem: PAIN - ADULT  Goal: Verbalizes/displays adequate comfort level or patient's stated pain goal  Description: INTERVENTIONS:  - Encourage pt to monitor pain and request assistance  - Assess pain using appropriate pain scale  - Administer analgesics based on type and severity of pain and evaluate response  - Implement non-pharmacological measures as appropriate and evaluate response  - Consider cultural and social influences on pain and pain management  - Manage/alleviate anxiety  - Utilize distraction and/or relaxation techniques  - Monitor for opioid side effects  - Notify MD/LIP if interventions unsuccessful or patient reports new pain  - Anticipate increased pain with activity and pre-medicate as appropriate  Outcome: Progressing     Problem: RISK FOR INFECTION - ADULT  Goal: Absence of fever/infection during anticipated neutropenic period  Description: INTERVENTIONS  - Monitor WBC  - Administer growth factors as ordered  - Implement neutropenic guidelines  Outcome: Progressing     Problem: SAFETY ADULT - FALL  Goal: Free from fall injury  Description: INTERVENTIONS:  - Assess pt frequently for physical needs  - Identify cognitive and physical deficits and behaviors that affect risk of falls.  - Yarnell fall precautions as indicated by assessment.  - Educate pt/family on patient safety including physical limitations  - Instruct pt to  call for assistance with activity based on assessment  - Modify environment to reduce risk of injury  - Provide assistive devices as appropriate  - Consider OT/PT consult to assist with strengthening/mobility  - Encourage toileting schedule  Outcome: Progressing     Problem: DISCHARGE PLANNING  Goal: Discharge to home or other facility with appropriate resources  Description: INTERVENTIONS:  - Identify barriers to discharge w/pt and caregiver  - Include patient/family/discharge partner in discharge planning  - Arrange for needed discharge resources and transportation as appropriate  - Identify discharge learning needs (meds, wound care, etc)  - Arrange for interpreters to assist at discharge as needed  - Consider post-discharge preferences of patient/family/discharge partner  - Complete POLST form as appropriate  - Assess patient's ability to be responsible for managing their own health  - Refer to Case Management Department for coordinating discharge planning if the patient needs post-hospital services based on physician/LIP order or complex needs related to functional status, cognitive ability or social support system  Outcome: Progressing     Problem: Delirium  Goal: Minimize duration of delirium  Description: Interventions:  - Encourage use of hearing aids, eye glasses  - Promote highest level of mobility daily  - Provide frequent reorientation  - Promote wakefulness i.e. lights on, blinds open  - Promote sleep, encourage patient's normal rest cycle i.e. lights off, TV off, minimize noise and interruptions  - Encourage family to assist in orientation and promotion of home routines  Outcome: Progressing     Problem: RESPIRATORY - ADULT  Goal: Achieves optimal ventilation and oxygenation  Description: INTERVENTIONS:  - Assess for changes in respiratory status  - Assess for changes in mentation and behavior  - Position to facilitate oxygenation and minimize respiratory effort  - Oxygen supplementation based on  oxygen saturation or ABGs  - Provide Smoking Cessation handout, if applicable  - Encourage broncho-pulmonary hygiene including cough, deep breathe, Incentive Spirometry  - Assess the need for suctioning and perform as needed  - Assess and instruct to report SOB or any respiratory difficulty  - Respiratory Therapy support as indicated  - Manage/alleviate anxiety  - Monitor for signs/symptoms of CO2 retention  Outcome: Progressing     Problem: METABOLIC/FLUID AND ELECTROLYTES - ADULT  Goal: Electrolytes maintained within normal limits  Description: INTERVENTIONS:  - Monitor labs and rhythm and assess patient for signs and symptoms of electrolyte imbalances  - Administer electrolyte replacement as ordered  - Monitor response to electrolyte replacements, including rhythm and repeat lab results as appropriate  - Fluid restriction as ordered  - Instruct patient on fluid and nutrition restrictions as appropriate  Outcome: Progressing  Goal: Hemodynamic stability and optimal renal function maintained  Description: INTERVENTIONS:  - Monitor labs and assess for signs and symptoms of volume excess or deficit  - Monitor intake, output and patient weight  - Monitor urine specific gravity, serum osmolarity and serum sodium as indicated or ordered  - Monitor response to interventions for patient's volume status, including labs, urine output, blood pressure (other measures as available)  - Encourage oral intake as appropriate  - Instruct patient on fluid and nutrition restrictions as appropriate  Outcome: Progressing     Problem: Impaired Swallowing  Goal: Minimize aspiration risk  Description: Interventions:  - Patient should be alert and upright for all feedings (90 degrees preferred)  - Offer food and liquids at a slow rate  - No straws  - Encourage small bites of food and small sips of liquid  - Offer pills one at a time, crush or deliver with applesauce as needed  - Discontinue feeding and notify MD (or speech pathologist) if  coughing or persistent throat clearing or wet/gurgly vocal quality is noted  Outcome: Progressing     Problem: Impaired Cognition  Goal: Patient will exhibit improved attention, thought processing and/or memory  Description: Interventions:  - Minimize distractions in the room when full attention is required  - Consider use of a daily journal to improve memory and reduce confusion related to daily events and orientation  - Allow additional time for processing after asking questions or providing instructions  Outcome: Progressing     Problem: CARDIOVASCULAR - ADULT  Goal: Maintains optimal cardiac output and hemodynamic stability  Description: INTERVENTIONS:- Monitor vital signs, rhythm, and trends- Monitor for bleeding, hypotension and signs of decreased cardiac output- Evaluate effectiveness of vasoactive medications to optimize hemodynamic stability- Monitor arterial and/or venous puncture sites for bleeding and/or hematoma- Assess quality of pulses, skin color and temperature- Assess for signs of decreased coronary artery perfusion - ex. Angina- Evaluate fluid balance, assess for edema, trend weights  Outcome: Progressing

## 2025-03-24 NOTE — PROGRESS NOTES
INFECTIOUS DISEASE PROGRESS NOTE  [facility]  part of [facility] ID PROGRESS NOTE    Sami Grijalva . Patient Status:  Inpatient    10/11/1980 MRN [id number]   Location [facility] 2W/SW Attending Natasha Araujo MD   Hosp Day # 20 PCP Kerri Medina MD     Subjective:  ROS reviewed. Febrile starting yesterday. No complaints of pain. Has been eating. Got up to chair.    ASSESSMENT:    Antibiotics: OVP, vancomycin, meropenem  (doxycycline, vancomycin, zosyn) Meropenem 3/14-3/20     # Continued fevers with bandemia, R/o intraabodminal etiology ?viral URI   -CT chest 3/21 without PE with improved PNA  # Acute hypoxic respiratory failure with fever and multifocal pneumonia   -S/p intubation 3/12   -s/p bronch 3/13, cx NGTD   -MRSA nares negative  # Acute aspiration pneumonia  # Acute leukocytosis   # Acute anemia  # Acute pancreatitis  # Alcoholic hepatitis with encephalopathy on admission               - CT A/P with severe fatty liver               - US GB with sludge w/o cholecystitis   - CT C/A/P 3/12 with pancreatitis  # USMAN - improved  # EtOH abuse and withdrawal     PLAN:  -  Continue on OVP. Restart vancomycin and meropenem. Check CT A/P and RVP.  -  FU blood cx.  -  Follow fever curve, wbc.  -  Reviewed labs, micro, imaging reports, available old records.  -  Case d/w patient, primary, RN.     History of Present Illness:  44-year-old male with a history of GERD, alcohol use was admitted to the hospital on 3 generalized abdominal pain, hiccups, vomiting with difficulty tolerating orals.  Last drink prior to admission was 1 day prior.  Found to have sepsis with elevated lactic acid, hypotension, WBC 17.5.  Responded to IV fluids and received lactulose for elevated ammonia.  CT A/P with severe fatty liver, stable splenomegaly.  Was lethargic the first few days and had soft restraints with Cedeño, Flexi-Seal, NG tube.  Has been essentially afebrile here with a  temperature on 3/10 of 100.6 but significant hypoxia up and down and currently increased up to 40 L with increase WBC to 17.4.  Initial USMAN has improved.  Was started on IV Zosyn and doxycycline on 3/7 when patient became more hypoxic and chest x-ray showed extensive multifocal pneumonia with CT chest showing similar findings.  Possible aspiration.    Physical Exam:  /67 (BP Location: Right arm)   Pulse 114   Temp (!) 101.1 °F (38.4 °C) (Oral)   Resp 22   Ht 5' 8\" (1.727 m)   Wt 186 lb 11.7 oz (84.7 kg)   SpO2 93%   BMI 28.39 kg/m²     Gen:   Awake, in bed  HEENT:  +scleral icterus, neck supple  CV/lungs:  RRR, lungs clear in all fields  Abdom:  Soft, no TTP  Skin/extrem:  No rashes, jaundiced  :   Primofit+  Lines:  Midline+    Laboratory Data: Reviewed    Microbiology: Reviewed    Radiology: Reviewed      [facility]  [phone]  3/24/2025

## 2025-03-24 NOTE — PAYOR COMM NOTE
--------------  CONTINUED STAY REVIEW----REQUESTING ADDITIONAL DAYS 3/22 3/23 3/24      Payor: [facility]  Subscriber #:  [id number]  Authorization Number: [id number]    Admit date: 3/4/25  Admit time:  3:11 PM    3/22           Date of Service: 3/22/2025  1:53 PM     Signed              Assessment & Plan:   ----------------------------------  Respiratory failure, acute hypoxemic.  Due to pneumonia, ARDS.  Oxygen requirement is improving.  Extubated 3/17  -Supplemental oxygen as needed, titrate down.  Currently down to 1 L  -Pulmonology consultation appreciated  -Treat contributing factors as described     Pneumonia. aspiration.  Clinically improving.  Extubated successfully  -IV antibiotics: Meropenem completed  -Blood cultures: NGTD  -Monitor temperature, blood pressure and fever curve  -Pulmonology consult appreciated  -ID consult appreciated  -Repeat chest imaging to be decided     Acute kidney injury.   Resolved     Chest pain.  Resolved.  Workup for ACS, PE negative     Diarrhea.  Some contribution from antibiotics, tube feeds, acute illness  -GI following  -ID following  -Defer further investigation to them     Acute alcoholic hepatitis.  Bilirubin stabilizing  -GI following     Other problems  Pancreatitis  Alcohol abuse  Cirrhosis  Hepatic steatosis  Hyponatremia     Supplementary Documentation:   DVT Mechanical Prophylaxis:   SCDs,    DVT Pharmacologic Prophylaxis   Medication   None                 Code Status: Full Code  Cedeño: External urinary catheter in place  Cedeño Duration (in days): 14  Central line:    ADOLFO: Reached out to PMR to clarify their recommendation.     I personally reviewed the available laboratories, imaging including. I discussed/will discuss the case with consultants. I ordered laboratories and/or radiographic studies. I adjusted medications as detailed above.  Medical decision making high, risk is high.     Subjective:    ----------------------------------  No new complaints.        Objective:   Chief Complaint:       Chief Complaint   Patient presents with    Vomiting    Hiccups    Eval-D      ----------------------------------  Temp:  [99.1 °F (37.3 °C)-99.7 °F (37.6 °C)] 99.7 °F (37.6 °C)  Pulse:  [102-120] 107  Resp:  [18-20] 20  BP: (110-126)/(67-76) 117/69  SpO2:  [93 %-98 %] 98 %  Gen: A+Ox3.  No distress.  Jaundiced  HEENT: NCAT, neck supple, no carotid bruit.  Scleral icterus  CV: RRR, S1S2, and intact distal pulses. No gallop, rub, murmur.  Pulm: Poor effort, no wheezing or rales  Abd: Soft, NTND, BS normal, no mass, no HSM, no rebound/guarding.  Rectal tube in place, Cedeño catheter in place  Neuro:  Normal reflexes, CN. Sensory/motor exams grossly normal deficit.  Generally weak  MS: No joint effusions.  No peripheral edema.  Skin: Skin is warm and dry. No rashes, erythema, diaphoresis.   Psych:   Normal mood and affect. Calm, cooperative     Labs:        Lab Results   Component Value Date     HGB 8.6 (L) 03/21/2025     WBC 14.5 (H) 03/21/2025     .0 03/21/2025      (L) 03/22/2025     K 3.8 03/22/2025     K 3.8 03/22/2025     CREATSERUM 0.87 03/22/2025     INR 1.93 (H) 03/22/2025      (H) 03/22/2025     ALT 98 (H) 03/22/2025     TROP 0.00 01/31/2017              pantoprazole  40 mg Oral BID AC    epoetin sheela  10,000 Units Subcutaneous Once per day on Tuesday Thursday Saturday    furosemide  20 mg Intravenous BID (Diuretic)    vancomycin  125 mg Per NG Tube Daily    multivitamin  1 tablet Oral Daily    folic acid  1 mg Oral Daily                     3/23           Date of Service: 3/23/2025 12:11 PM     Signed            Assessment & Plan:   ----------------------------------  Respiratory failure, acute hypoxemic.  Due to pneumonia, ARDS.  Oxygen requirement is improving.  Extubated 3/17.  Using between 1 and 3 L of oxygen  -Supplemental oxygen as needed, titrate down.   -Pulmonology consultation  appreciated  -Treat contributing factors as described     Pneumonia. aspiration.  Clinically improving.  Extubated successfully  -IV antibiotics: Meropenem completed  -Blood cultures: NGTD  -Monitor temperature, blood pressure and fever curve  -Pulmonology consult appreciated  -ID consult appreciated  -Repeat chest imaging to be decided     Acute kidney injury.   Resolved     Chest pain.  Resolved.  Workup for ACS, PE negative     Diarrhea.  Some contribution from antibiotics, tube feeds, acute illness  -GI following  -ID following  -Defer further investigation to them     Acute alcoholic hepatitis.  Bilirubin stabilizing  -GI following     Other problems  Pancreatitis  Alcohol abuse  Cirrhosis  Hepatic steatosis  Hyponatremia     Supplementary Documentation:   DVT Mechanical Prophylaxis:   SCDs,    DVT Pharmacologic Prophylaxis   Medication   None                 Code Status: Full Code  Cedeño: External urinary catheter in place  Cedeño Duration (in days): 14  Central line:    ADOLFO: Discussed with PMR, will plan for acute rehab pending continued medical stability     I personally reviewed the available laboratories, imaging including. I discussed/will discuss the case with consultants. I ordered laboratories and/or radiographic studies. I adjusted medications as detailed above.  Medical decision making high, risk is high.     Subjective:   ----------------------------------  No new complaints.        Objective:   Chief Complaint:       Chief Complaint   Patient presents with    Vomiting    Hiccups    Eval-D      ----------------------------------  Temp:  [99.3 °F (37.4 °C)-100.1 °F (37.8 °C)] 99.4 °F (37.4 °C)  Pulse:  [109-117] 115  Resp:  [18-28] 28  BP: (117-131)/(60-73) 117/64  SpO2:  [94 %-96 %] 95 %  Gen: A+Ox3.  No distress.  Jaundiced  HEENT: NCAT, neck supple, no carotid bruit.  Scleral icterus  CV: RRR, S1S2, and intact distal pulses. No gallop, rub, murmur.  Pulm: Poor effort, no wheezing or rales  Abd:  Soft, NTND, BS normal, no mass, no HSM, no rebound/guarding.  Rectal tube in place, Cedeño catheter in place  Neuro:  Normal reflexes, CN. Sensory/motor exams grossly normal deficit.  Generally weak  MS: No joint effusions.  No peripheral edema.  Skin: Skin is warm and dry. No rashes, erythema, diaphoresis.   Psych:   Normal mood and affect. Calm, cooperative     Labs:        Lab Results   Component Value Date     HGB 8.4 (L) 03/23/2025     WBC 19.3 (H) 03/23/2025     .0 03/23/2025      (L) 03/23/2025     K 3.6 03/23/2025     CREATSERUM 0.91 03/23/2025     INR 1.95 (H) 03/23/2025      (H) 03/23/2025     ALT 98 (H) 03/23/2025     TROP 0.00 01/31/2017              pantoprazole  40 mg Oral BID AC    epoetin sheela  10,000 Units Subcutaneous Once per day on Tuesday Thursday Saturday    furosemide  20 mg Intravenous BID (Diuretic)    vancomycin  125 mg Per NG Tube Daily    multivitamin  1 tablet Oral Daily    folic acid  1 mg Oral Daily                         3/24           Date of Service: 3/24/2025 11:53 AM     Signed              Assessment & Plan:   ----------------------------------  Fevers.  New onset 3/23 along with leukocytosis.  Exact source unclear.  -Blood cultures  -UA and culture  -Chest x-ray negative  -ID aware, CT abdomen pelvis ordered  -Respiratory viral panel  -Empiric antibiotics with meropenem, vancomycin     Respiratory failure, acute hypoxemic.  Due to pneumonia, ARDS.  Oxygen requirement is improving.  Extubated 3/17.  Using between 1 and 3 L of oxygen  -Supplemental oxygen as needed, titrate down.   -Pulmonology consultation appreciated  -Treat contributing factors as described     Acute alcoholic hepatitis.  Bilirubin stabilizing, may take a very long time to normalize  -GI following     Other problems  Pancreatitis  Alcohol abuse  Cirrhosis  Hepatic steatosis  Hyponatremia  USMAN resolved  Chest pain, resolved  Aspiration pneumonia, finished antibiotics  Diarrhea, better  Sinus  tachycardia, due to fever     Supplementary Documentation:   DVT Mechanical Prophylaxis:   SCDs,    DVT Pharmacologic Prophylaxis   Medication   None                 Code Status: Full Code  Cedeño: External urinary catheter in place  Cedeño Duration (in days): 14  Central line:    ADOLFO: Discussed with PMR, will plan for acute rehab pending continued medical stability     I personally reviewed the available laboratories, imaging including. I discussed/will discuss the case with consultants. I ordered laboratories and/or radiographic studies. I adjusted medications as detailed above.  Medical decision making high, risk is high.     Subjective:   ----------------------------------  Having fevers which makes him feel uncomfortable.  Generally not feeling as energetic.  Appetite is decent.  Still very weak.  No new cough.  No other localizing signs or symptoms.        Objective:   Chief Complaint:       Chief Complaint   Patient presents with    Vomiting    Hiccups    Eval-D      ----------------------------------  Temp:  [99.6 °F (37.6 °C)-101.8 °F (38.8 °C)] 101.1 °F (38.4 °C)  Pulse:  [108-125] 114  Resp:  [20-22] 22  BP: (110-134)/(63-80) 125/67  SpO2:  [90 %-97 %] 93 %  Gen: Alert, appropriate, appears very weak jaundiced  HEENT: NCAT, neck supple, no carotid bruit.  Scleral icterus  CV: Mild tachycardia, S1S2, and intact distal pulses. No gallop, rub, murmur.  Pulm: Poor effort, no wheezing or rales  Abd: Soft, NTND, BS normal, no mass, no HSM, no rebound/guarding.  Rectal tube in place, Cedeño catheter in place  Neuro:  Normal reflexes, CN. Sensory/motor exams grossly normal deficit.  Generally weak  MS: No joint effusions.  No peripheral edema.  Skin: Skin is warm and dry. No rashes, erythema, diaphoresis.   Psych:   Normal mood and affect. Calm, cooperative     Labs:        Lab Results   Component Value Date     HGB 7.8 (L) 03/24/2025     WBC 20.9 (H) 03/24/2025     .0 03/24/2025      (L) 03/24/2025      K 3.2 (L) 03/24/2025     CREATSERUM 1.05 03/24/2025     INR 1.95 (H) 03/23/2025      (H) 03/24/2025     ALT 89 (H) 03/24/2025     TROP 0.00 01/31/2017              meropenem  500 mg Intravenous Q8H    vancomycin  15 mg/kg Intravenous Q12H    pantoprazole  40 mg Oral BID AC    epoetin sheela  10,000 Units Subcutaneous Once per day on Tuesday Thursday Saturday    furosemide  20 mg Intravenous BID (Diuretic)    vancomycin  125 mg Per NG Tube Daily    multivitamin  1 tablet Oral Daily    folic acid  1 mg Oral Daily                              MEDICATIONS ADMINISTERED IN LAST 1 DAY:  folic acid (Folvite) tab 1 mg       Date Action Dose Route User    3/24/2025 0958 Given 1 mg Oral Faustina Solares RN          furosemide (Lasix) 10 mg/mL injection 20 mg       Date Action Dose Route User    3/24/2025 0958 Given 20 mg Intravenous Faustina Solares RN    3/23/2025 1750 Given 20 mg Intravenous Fidencio De Dios RN          iopamidol 76% (ISOVUE-370) injection for power injector       Date Action Dose Route User    3/24/2025 1432 Given 80 mL Intravenous Elvis Pretty A          meropenem (Merrem) 500 mg in sodium chloride 0.9% 100 mL IVPB-MBP       Date Action Dose Route User    3/24/2025 1213 New Bag 500 mg Intravenous Faustina Solares RN          pantoprazole (Protonix) DR tab 40 mg       Date Action Dose Route User    3/24/2025 0538 Given 40 mg Oral Bharat Hicks RN    3/23/2025 1750 Given 40 mg Oral Fidencio De Dios RN          potassium chloride (Klor-Con M20) tab 40 mEq       Date Action Dose Route User    3/24/2025 0958 Given 40 mEq Oral Faustina Solares RN          multivitamin (Tab-A-Yosef/Beta Carotene) tab 1 tablet       Date Action Dose Route User    3/24/2025 0958 Given 1 tablet Oral Faustina Solares RN          vancomycin (Firvanq) 50 mg/mL oral solution 125 mg       Date Action Dose Route User    3/24/2025 0958 Given 125 mg Per NG Tube Sujatha, Faustina, RN          vancomycin (Vancocin) 1.25 g in sodium chloride  0.9% 250mL IVPB premix       Date Action Dose Route User    3/24/2025 1516 New Bag 1,250 mg Intravenous Faustina Solares, RN            Vitals (last day)       Date/Time Temp Pulse Resp BP SpO2 Weight O2 Device O2 Flow Rate (L/min) [facility]    03/24/25 1513 100.6 °F (38.1 °C) 103 20 108/62 94 % -- High flow nasal cannula 2 L/min GA    03/24/25 1100 100.5 °F (38.1 °C) 106 -- -- -- -- -- -- GA    03/24/25 0833 101.1 °F (38.4 °C) 114 22 125/67 93 % -- High flow nasal cannula 2 L/min GA    03/24/25 0712 -- 108 -- -- 92 % -- High flow nasal cannula 1 L/min RP    03/24/25 0652 -- 110 -- -- -- -- -- -- EO    03/24/25 0529 99.6 °F (37.6 °C) 109 20 110/63 90 % -- None (Room air) -- RP    03/24/25 0518 -- 109 -- -- -- -- -- -- EO    03/24/25 0054 -- 114 -- -- -- -- -- -- EO    03/23/25 2345 99.8 °F (37.7 °C) 111 -- -- -- -- -- -- RP    03/23/25 2139 101.3 °F (38.5 °C) 112 -- -- -- -- -- -- EO    03/23/25 1951 99.6 °F (37.6 °C) 120 20 132/65 94 % -- High flow nasal cannula 3 L/min RP    03/23/25 1900 -- 116 -- -- -- -- -- -- CY    03/23/25 1744 101.8 °F (38.8 °C) -- -- -- -- -- -- -- WW    03/23/25 1617 101.3 °F (38.5 °C) 114 21 122/70 97 % -- High flow nasal cannula 4 L/min TT    03/23/25 1500 -- 125 -- 134/80 -- -- -- -- CM    03/23/25 1215 -- -- -- -- 95 % -- High flow nasal cannula 4 L/min TT    03/23/25 0933 99.4 °F (37.4 °C) 115 28 117/64 95 % -- High flow nasal cannula 3 L/min TT    03/23/25 0718 -- 112 -- -- -- -- -- -- EO    03/23/25 0547 99.3 °F (37.4 °C) 109 18 131/73 96 % -- High flow nasal cannula 3 L/min RP    03/23/25 0403 -- 110 -- -- -- -- -- -- EO          CIWA Scores (since admission)       Date/Time CIWA-Ar Total Who    03/18/25 2000 2 DW    03/18/25 1800 2 EM    03/18/25 1600 2 EM    03/18/25 1400 2 EM    03/18/25 1200 2 EM    03/18/25 1000 2 EM    03/18/25 0800 2 EM    03/17/25 1600 2 LC    03/17/25 1200 5 LC    03/17/25 0700 2 LC    03/16/25 1600 0 EV    03/16/25 1200 0 EV    03/16/25 0800 0 EV    03/15/25 1600  0 EV    03/15/25 1200 0 EV    03/15/25 0800 0 EV    03/12/25 0519 10 TT    03/12/25 0430 8 TT    03/12/25 0330 16 TT    03/11/25 2258 7 TT    03/11/25 2158 15 TT    03/11/25 1944 15 TT    03/11/25 1800 10 RP    03/11/25 1600 10 RP    03/11/25 1400 9 RP    03/11/25 1200 7 RP    03/11/25 1000 7 RP    03/11/25 0800 10 RP    03/11/25 0600 6 CB    03/11/25 0500 11 CB    03/11/25 0400 13 CB    03/11/25 0300 6 CB    03/11/25 0200 4 CB    03/11/25 0040 4 CB    03/11/25 0000 4 CB    03/10/25 2342 6 CB    03/10/25 2242 15 CB    03/10/25 2200 6 CB    03/10/25 2100 6 CB    03/10/25 2000 11 CB    03/10/25 0800 18 NR    03/10/25 0641 16 EDI    03/10/25 0600 6 EDI    03/10/25 0500 6 EDI    03/10/25 0400 13 EDI    03/10/25 0200 4 EDI    03/10/25 0000 4 EDI    03/09/25 2300 13 EDI    03/09/25 2200 6 EDI    03/09/25 2130 11 EDI    03/09/25 2000 6 EDI    03/09/25 1800 1 AH    03/09/25 1400 2 AH    03/09/25 0600 4 EDI    03/09/25 0400 5 EDI    03/09/25 0300 5 EDI    03/09/25 0000 5 EDI    03/08/25 2200 4 EDI    03/08/25 2000 4 EDI    03/08/25 1200 5 ES    03/08/25 1000 5 ES    03/08/25 0800 5 ES    03/08/25 0600 6 EDI    03/08/25 0400 5 EDI    03/08/25 0200 5 EDI    03/08/25 0000 5 EDI    03/07/25 2200 5 EDI    03/07/25 2000 5 EDI    03/07/25 0600 5 CT    03/07/25 0400 5 CT    03/07/25 0200 5 CT    03/07/25 0000 8 CT    03/06/25 2200 8 CT    03/06/25 2000 8 CT    03/06/25 1800 5 AW    03/06/25 1600 8 AW    03/06/25 1400 4 AW    03/06/25 1200 4 AW    03/06/25 1000 5 AW    03/06/25 0800 6 AW    03/06/25 0600 14 CT    03/06/25 0500 6 CT    03/06/25 0400 20 CT    03/06/25 0300 17 CT    03/06/25 0200 20 CT    03/06/25 0100 17 CT    03/06/25 0000 5 CT    03/05/25 2300 17 CT    03/05/25 2000 5 CT    03/05/25 1846 5 LN    03/05/25 1742 13 LN    03/05/25 1642 16 LN    03/05/25 1541 19 LN    03/05/25 1516 6 LN    03/05/25 1416 9 LN    03/05/25 1257 18 LN    03/05/25 1200 14 BD    03/05/25 1100 13 BD    03/05/25 1000 10 BD    03/05/25 0900 17 BD    03/05/25 0800 9 BD     03/05/25 0700 13 MP    03/05/25 0600 29 MP    03/05/25 0500 7 MP    03/05/25 0400 6 MP    03/05/25 0300 10 MP    03/05/25 0200 6 MP    03/05/25 0100 8 MP    03/05/25 0000 14 MP    03/04/25 2300 8 MP    03/04/25 2200 13 MP    03/04/25 2100 8 MP    03/04/25 2000 24 MP    03/04/25 1900 12 BD    03/04/25 1800 12 BD    03/04/25 1600 5 BD    03/04/25 1500 1 RR    03/04/25 1315 0 RR    03/04/25 1200 1 RR    03/04/25 0951 17 KB          Blood Transfusion Record       Product Unit Status Volume Start End            Transfuse RBC       25  [id number]  [id number] Completed 03/16/25 0935 470.7 mL 03/16/25 0615 03/16/25 0930       25  [id number]  [id number] Completed 03/13/25 1245 400 mL 03/13/25 0915 03/13/25 1235

## 2025-03-25 LAB
ALBUMIN SERPL-MCNC: 2.6 G/DL (ref 3.2–4.8)
ALBUMIN/GLOB SERPL: 0.9 {RATIO} (ref 1–2)
ALP LIVER SERPL-CCNC: 220 U/L
ALT SERPL-CCNC: 83 U/L
ANION GAP SERPL CALC-SCNC: 9 MMOL/L (ref 0–18)
AST SERPL-CCNC: 162 U/L (ref ?–34)
BASOPHILS # BLD: 0 X10(3) UL (ref 0–0.2)
BASOPHILS NFR BLD: 0 %
BILIRUB SERPL-MCNC: 15.2 MG/DL (ref 0.3–1.2)
BUN BLD-MCNC: 24 MG/DL (ref 9–23)
BUN/CREAT SERPL: 22.2 (ref 10–20)
CALCIUM BLD-MCNC: 7.9 MG/DL (ref 8.7–10.4)
CHLORIDE SERPL-SCNC: 104 MMOL/L (ref 98–112)
CO2 SERPL-SCNC: 23 MMOL/L (ref 21–32)
CREAT BLD-MCNC: 1.08 MG/DL
DEPRECATED RDW RBC AUTO: 91.4 FL (ref 35.1–46.3)
EGFRCR SERPLBLD CKD-EPI 2021: 86 ML/MIN/1.73M2 (ref 60–?)
EOSINOPHIL # BLD: 1.07 X10(3) UL (ref 0–0.7)
EOSINOPHIL NFR BLD: 5 %
ERYTHROCYTE [DISTWIDTH] IN BLOOD BY AUTOMATED COUNT: 32.8 % (ref 11–15)
GLOBULIN PLAS-MCNC: 2.9 G/DL (ref 2–3.5)
GLUCOSE BLD-MCNC: 103 MG/DL (ref 70–99)
HCT VFR BLD AUTO: 23.4 %
HGB BLD-MCNC: 7.9 G/DL
LYMPHOCYTES NFR BLD: 0.43 X10(3) UL (ref 1–4)
LYMPHOCYTES NFR BLD: 2 %
MCH RBC QN AUTO: 27.1 PG (ref 26–34)
MCHC RBC AUTO-ENTMCNC: 33.8 G/DL (ref 31–37)
MCV RBC AUTO: 80.4 FL
MONOCYTES # BLD: 0.43 X10(3) UL (ref 0.1–1)
MONOCYTES NFR BLD: 2 %
NEUTROPHILS # BLD AUTO: 17.48 X10 (3) UL (ref 1.5–7.7)
NEUTROPHILS NFR BLD: 88 %
NEUTS BAND NFR BLD: 3 %
NEUTS HYPERSEG # BLD: 19.47 X10(3) UL (ref 1.5–7.7)
OSMOLALITY SERPL CALC.SUM OF ELEC: 286 MOSM/KG (ref 275–295)
PLATELET # BLD AUTO: 264 10(3)UL (ref 150–450)
PLATELET MORPHOLOGY: NORMAL
PLATELETS.RETICULATED NFR BLD AUTO: 6.1 % (ref 0–7)
POTASSIUM SERPL-SCNC: 3.4 MMOL/L (ref 3.5–5.1)
POTASSIUM SERPL-SCNC: 3.4 MMOL/L (ref 3.5–5.1)
PROT SERPL-MCNC: 5.5 G/DL (ref 5.7–8.2)
RBC # BLD AUTO: 2.91 X10(6)UL
SODIUM SERPL-SCNC: 136 MMOL/L (ref 136–145)
TOTAL CELLS COUNTED BLD: 100
WBC # BLD AUTO: 21.4 X10(3) UL (ref 4–11)

## 2025-03-25 PROCEDURE — 99233 SBSQ HOSP IP/OBS HIGH 50: CPT | Performed by: INTERNAL MEDICINE

## 2025-03-25 PROCEDURE — 99233 SBSQ HOSP IP/OBS HIGH 50: CPT | Performed by: HOSPITALIST

## 2025-03-25 RX ORDER — PENTOXIFYLLINE 400 MG/1
400 TABLET, EXTENDED RELEASE ORAL
Status: DISCONTINUED | OUTPATIENT
Start: 2025-03-25 | End: 2025-04-02

## 2025-03-25 NOTE — PROGRESS NOTES
Pulmonary/ICU/Critical Care Progress Note        Reason for Consultation: resp failure  Referring Physician: Dr. Diaz      Subjective:  On 2 LNC  Low grade fevers  Has mild cough.     From the initial consultation  Pt is unable to provide info  HPI: 43 yo male with hx of gout, GERD, admitted with acute ETOH hepatitis, ETOH use, emesis, USMAN. Seen by GI.  On CIWA protocol, thiamine, folic acid, MVI, BZDs, lactulose  Has been in the ICU for 2 days  ICU consulted this am for worsening hypoxemia  CXR this am with b/l infiltrates concerning for PNA      REVIEW OF SYSTEMS:  Positives and negatives as noted in HPI. All other review of systems otherwise are either limited (due to pt/family inability to provide) or negative.      PAST MEDICAL HISTORY:  Past Medical History:   Diagnosis Date    Esophageal reflux     Gout     Hernia          PAST SURGICAL HISTORY:  Past Surgical History:   Procedure Laterality Date    Colonoscopy N/A 11/1/2023    Procedure: COLONOSCOPY;  Surgeon: Vandana Austin MD;  Location: [facility] ENDOSCOPY    Egd  02/15/2016    Eh pr repair complex scalp arms legs 1.1 to 2.5 cm  2014    Elbow fracture surgery Right 1991    Hernia surgery      Inguinal hernia repair Left 01/17/2023         PAST SOCIAL HISTORY:  Social History     Socioeconomic History    Marital status: Single    Number of children: 0   Occupational History    Occupation: Supervisor, car wash   Tobacco Use    Smoking status: Former     Types: Cigarettes    Smokeless tobacco: Never   Vaping Use    Vaping status: Some Days   Substance and Sexual Activity    Alcohol use: Yes     Comment: per pt, DAILY HARD LEMONADE- [employer] hard lemonade 24oz cans, 6 cans daily    Drug use: Yes     Types: Cannabis     Comment: last use, couple months ago per pt report         PAST FAMILY HISTORY:  Family History   Problem Relation Age of Onset    Diabetes Father     Hypertension Father     Cancer Mother         liver        ALLERGIES:  Allergies[1]      MEDS:  Home Medications:  Medications Taking[2]    Scheduled Medication:   meropenem  500 mg Intravenous Q8H    pantoprazole  40 mg Oral BID AC    epoetin sheela  10,000 Units Subcutaneous Once per day on Tuesday Thursday Saturday    furosemide  20 mg Intravenous BID (Diuretic)    vancomycin  125 mg Per NG Tube Daily    multivitamin  1 tablet Oral Daily    folic acid  1 mg Oral Daily     Continuous Infusing Medication:      PRN Medications:    baclofen    acetaminophen    haloperidol lactate    polyethylene glycol (PEG 3350)    bisacodyl    prochlorperazine       PHYSICAL EXAM:  /64 (BP Location: Right arm)   Pulse 107   Temp (!) 100.5 °F (38.1 °C) (Oral)   Resp 16   Ht 5' 8\" (1.727 m)   Wt 186 lb 11.7 oz (84.7 kg)   SpO2 95%   BMI 28.39 kg/m²      CONSTITUTIONAL: NAD + jaundice  HEENT: AT/NC  MOUTH: MMM  NECK/THROAT: no JVD. Trachea midline. No obvious thyromegaly  LUNG: clear b/l no wheezing, + b/l crackles. Chest symmetric with respiratory motion  HEART: regular rate and rhythm, no obvious murmers or gallops noted  ABD: soft non tender. + bowel sounds. No organomegaly noted  EXT: no clubbing, cyanosis. Min b/l LE edema      IMAGES:   CT abd/pelvis  CONCLUSION:   1. Findings of mild acute interstitial edematous pancreatitis are very similar since examination from 12 days prior.  There is small volume upper retroperitoneal/peripancreatic free fluid.  No CT evidence of pancreatic necrosis.  No   well-defined/drainable peripancreatic collection.  No significant pancreatic ductal dilation.   2. Mild circumferential wall thickening at the C-loop of the duodenum is most likely reactive to pancreatitis or less likely relates to a primary infectious/inflammatory duodenitis.  No free intraperitoneal air.   3. Nodular liver contours are compatible with cirrhosis.  There are also stigmata of portal hypertension, which include worsening small-moderate volume intra-abdominal  ascites, splenomegaly, and upper abdominal/paraesophageal varices.   4. Small dependent left pleural effusion is new since prior exam.   5. Consolidative opacities in the left lower lobe have improved.  There are, however, worsening patchy consolidative and ground-glass density opacities throughout the imaged right lung base.  Findings could relate to infection/aspiration, pulmonary   edema, or acute respiratory distress syndrome.   6. Colonic diverticulosis.   7. Lesser incidental findings as above.     Chest CT PE 3/21/25 on my review no PE. Overall improving b/l pulmonary infiltrates  CONCLUSION:   1. No large central pulmonary embolus.  Evaluation beyond the proximal segmental level is precluded by heterogeneous contrast bolus and respiratory related motion artifact.   2. Enlargement of main pulmonary artery suggests pulmonary hypertension.   3. Interval improvement in multifocal bilateral pneumonia.   4. Trace left pleural effusion.   5. Herniation of fat into the left inferior mediastinum without significant change.   6. Hepatic cirrhosis with changes related to portal hypertension including small amount of perihepatic ascites.   7. Mild LAD coronary artery calcification.   8. Mild left atrial enlargement     VQ scan indeterminate 3/20/25    LE dopplers neg 3/20/25    CXR 3/20/25  CONCLUSION: Patchy alveolar opacities throughout the lungs, suggesting edema or multifocal pneumonia.  No significant interval change since preceding exam.  Interval extubation and removal of feeding tube.     CXR 3/17/25  Findings and impression:   ETT 6 cm above the em   Enteric tube beneath the diaphragm   Stable heart   Persistent low lung volumes and bilateral diffuse pulmonary opacities.   No pneumothorax     CT C/A/P 3/12/25  CONCLUSION:   1. Diffuse pancreatic enlargement and mild peripancreatic inflammatory stranding are new since abdominal CT from 8 days prior and concerning for acute interstitial edematous  pancreatitis.  No gross pancreatic ductal dilation or peripancreatic collection.     Request correlation with serum lipase levels.   2. Left greater than right lower lobe airspace disease with intrinsic air bronchograms.  Findings are similar on the left and slightly improved on the right since chest CT from 5 days prior; multifocal pneumonia/aspiration is suspected.  Also identified   are new and/or worsening multifocal upper lobe predominant ground-glass density opacities throughout both lungs; these ground-glass density opacities could be infectious in nature or relate to acute respiratory distress syndrome.   3. Endotracheal tube with tip approximately 1 cm above the level of the em.  Enteric tube tip in the gastric fundus.  Rectal tube and Cedeño catheter also noted.   4. Fatty liver and cirrhosis.   5. Stable splenomegaly.   6. Small to moderate volume intra-abdominal ascites is new since prior abdominal CT.  There is also new mild anasarca.   7. Liquid contents in the colon could relate to a malabsorption state or low-grade infectious/inflammatory versus antibiotic associated colitis.  Mild scattered colonic diverticulosis, but with no CT findings of acute diverticulitis.   8. Low-density appearance of the intracardiac blood pool raises the possibility of underlying anemia. Correlate with hematologic parameters.    9. Lesser incidental findings as above.       CXR 3/12/25 with multifocal infiltrates    CXR 3/11/25  FINDINGS/IMPRESSION:   1. There is redemonstration of patchy alveolar opacities throughout both lungs.  The findings could represent alveolar edema, a diffuse multifocal infectious process, or a combination.  The findings have not significantly changed since previous exam.   2. The heart mediastinal structures remain enlarged.  The there are trace bilateral pleural effusions.   3. The thoracic component of an enteric feeding tube is identified traversing the thoracic esophagus.  The distal tip  is not visualized but lies well below the GE junction.     CT head 3/7/25  CONCLUSION:   Motion limits evaluation.  No evidence of acute intracranial abnormality.     CT chest 3/7/25  CONCLUSION:   1. Extensive bilateral airspace disease, concerning for potential multifocal pneumonia. Continued surveillance is advised.   2. Dense bilateral lower airspace consolidation is evident; aspiration pneumonitis is a differential diagnostic possibility. Follow-up is recommended to document resolution.    3. Dilatation of the main pulmonary artery trunk may relate to underlying pulmonary hypertension.    4. Small hiatal hernia with imaging manifestations that may be indicative underlying esophagitis.   5. Marked hepatic steatosis.   6. Lesser incidental findings as above.     CXR 3/7/25 with multifocal infiltrates, possible effusion on the left  CONCLUSION:   1. Cardiomegaly.  Tortuous aorta.   2. Extensive multifocal airspace opacification in the bilateral perihilar and basilar regions with left-sided effusion.  Differential includes pulmonary edema congestive failure versus multifocal pneumonitis.      CT abd/pelvis 3/4/25  CONCLUSION:   Severely fatty liver with subtle undulating contour.  Stable splenomegaly.  Tubular structures around the GE junction are suggestive of lower esophageal varices.  No ascites       LABS:  Recent Labs   Lab 03/21/25  1055 03/23/25  0539 03/24/25  0554   RBC 3.30* 3.09* 2.90*   HGB 8.6* 8.4* 7.8*   HCT 25.9* 25.3* 23.7*   MCV 78.5* 81.9 81.7   MCH 26.1 27.2 26.9   MCHC 33.2 33.2 32.9   RDW 32.4* 32.8* 32.9*   NEPRELIM 11.45* 15.84* 17.06*   WBC 14.5* 19.3* 20.9*   .0 325.0 281.0       Recent Labs   Lab 03/22/25  0441 03/23/25  0539 03/24/25  0554   GLU 95 99 96   BUN 32* 24* 25*   CREATSERUM 0.87 0.91 1.05   EGFRCR 108 105 89   CA 7.9* 8.2* 7.8*   ALB 2.6* 2.7* 2.5*   * 135* 134*   K 3.8  3.8 3.6 3.2*    104 102   CO2 23.0 22.0 22.0   ALKPHO 201* 220* 211*   * 201*  180*   ALT 98* 98* 89*   BILT 15.1* 15.2* 14.9*   TP 5.4* 5.5* 5.3*       ASSESSMENT/PLAN:  Acute hypoxemic resp failure  -secondary to multifocal infiltrates. Given hx of emesis, concern for aspiration  -checked non contrast chest CT showed b/l infiltrates  -initially on zosyn, doxy. Checked urine leg, strep pneumo, mrsa nares, blood cx neg thus far  -initially weaned from airvo to NC but then declined and was intubated on 3/12/25. Was on full MV support   -passed SBT on 3/17/25 and extubated to NC. Now on 4LNC  -ID consulted and stopped doxy and zosyn. Switched to meropenem and completed course  -repeat CT with b/l infiltrates L>R  -underwent bronchoscopy with left BAL on 3/13/25. Cultures NGTD  -aspiration precautions  -experienced chest discomfort last week with taking a deep breath in. CXR pretty unchanged. VQ scan indeterminate. LE dopplers neg. Checked a chest CT PE was neg for PE and showed improving b/l pulmonary infiltrates. Possibly new ones on the right. Concern for aspiration?  -now weaned to 2 LNC supp 02    Acute ETOH hepatitis and now pancreatitis  -GI following. Imaging as above-now with pancreatitis  -PPI  -passed swallow   -persistent leukocytosis and fevers  -ID restarted abx and checked abd CT as noted above    -hypotension (now resolved)-multifactorial from sedation, sepsis, anemia  -likely secondary to sedation and sepsis  -Pt didn't receive sepsis bolus b/c deemed fluid overloaded d/t possible cirrhosis. Was on low dose levophed for MAP > 65 weaned off   -possibly d/t acute anemia. No obvious source seen. S/p transfused 1 unit pRBC on 3/16/25  -off pressors now    ETOH withdrawal  -s/p CIWA protocol  -psych was following   -CT head neg for acute changes    USMAN and hypernatremia  -s/p bicarb infusion  -s/p D5 0.9 NS  -renal previously following. Na improving    Proph  -DVT: elevated INR low PLT, anemia. On SCDS  -nutrition: passed swallow     Dispo  -Full code  -sign other at bedside    Thank  you for the opportunity to care for Sami Grijalva Jr..      JOLYNN Escobedo DO, MPH  Pulmonary Critical Care Medicine  [facility]                       [1]   Allergies  Allergen Reactions    Morphine SWELLING     SHORTNESS OF BREATH   [2]   No outpatient medications have been marked as taking for the 3/4/25 encounter (Hospital Encounter).

## 2025-03-25 NOTE — PROGRESS NOTES
INFECTIOUS DISEASE PROGRESS NOTE  [facility]  part of [facility] ID PROGRESS NOTE    Sami Grijalva . Patient Status:  Inpatient    10/11/1980 MRN [id number]   Location [facility] 2W/SW Attending Natasha Araujo MD   Hosp Day # 21 PCP Kerri Medina MD     Subjective:  ROS reviewed. Fevers improved. On 3L NC. About to get up to chair.    ASSESSMENT:    Antibiotics: OVP, Meropenem  (doxycycline, vancomycin, zosyn) Meropenem 3/14-3/20, vancomycin     # Continued fevers with bandemia   -CT A/P with stable pancreatitis with worsening RLL infiltrate   -CT chest 3/21 without PE with improved PNA  # Acute hypoxic respiratory failure with fever and multifocal pneumonia   -S/p intubation 3/12   -s/p bronch 3/13, cx NGTD   -MRSA nares negative  # Acute aspiration pneumonia  # Acute leukocytosis   # Acute anemia  # Acute pancreatitis  # Alcoholic hepatitis with encephalopathy on admission               - CT A/P with severe fatty liver               - US GB with sludge w/o cholecystitis   - CT C/A/P 3/12 with pancreatitis  # USMAN - improved  # EtOH abuse and withdrawal     PLAN:  -  Continue on meropenem and OVP. Vancomycin stopped.  -  Follow fever curve, wbc.  -  Reviewed labs, micro, imaging reports, available old records.  -  Case d/w patient, RN.     History of Present Illness:  44-year-old male with a history of GERD, alcohol use was admitted to the hospital on 3/4 generalized abdominal pain, hiccups, vomiting with difficulty tolerating orals.  Last drink prior to admission was 1 day prior.  Found to have sepsis with elevated lactic acid, hypotension, WBC 17.5.  Responded to IV fluids and received lactulose for elevated ammonia.  CT A/P with severe fatty liver, stable splenomegaly.  Was lethargic the first few days and had soft restraints with Cedeño, Flexi-Seal, NG tube.  Has been essentially afebrile here with a temperature on 3/10 of 100.6 but significant hypoxia up  and down and currently increased up to 40 L with increase WBC to 17.4.  Initial USMAN has improved.  Was started on IV Zosyn and doxycycline on 3/7 when patient became more hypoxic and chest x-ray showed extensive multifocal pneumonia with CT chest showing similar findings.  Possible aspiration.    Physical Exam:  /64 (BP Location: Right arm)   Pulse 102   Temp 99.8 °F (37.7 °C) (Oral)   Resp 18   Ht 5' 8\" (1.727 m)   Wt 186 lb 11.7 oz (84.7 kg)   SpO2 96%   BMI 28.39 kg/m²     Gen:   Awake, in bed  HEENT:  +scleral icterus, neck supple  CV/lungs:  RRR, lungs with bilateral rhonchi  Abdom:  Soft, no TTP  Skin/extrem:  No rashes, jaundiced  :   Primofit+  Lines:  Midline+    Laboratory Data: Reviewed    Microbiology: Reviewed    Radiology: Reviewed      [facility]  [phone]  3/25/2025

## 2025-03-25 NOTE — CM/SW NOTE
Patient discussed in RN DC Rounds. Anticipated therapy need: Gradual Rehabilitative Therapy. SW asked [facility] to sent [facility] referrals. SW will follow up with patient and family. SW sent [facility] request     SW/CM to remain available for support and/or discharge planning.     Patricia Last, MSW, LSW   x [phone]

## 2025-03-25 NOTE — OCCUPATIONAL THERAPY NOTE
OCCUPATIONAL THERAPY TREATMENT NOTE - INPATIENT        Room Number: 568/568-A     Presenting Problem: USMAN; ETOH; respiratory failure; extubated 3/17    Problem List  Principal Problem:    USMAN (acute kidney injury)  Active Problems:    Metabolic acidosis    Hyperkalemia    Leukocytosis    Thrombocytopenia    Coagulopathy (HCC)    Hyponatremia    Alcoholic (HCC)    Scleral icterus    Acute kidney failure    DTs (delirium tremens) (HCC)    Alcohol use disorder    Acute respiratory failure with hypoxia (HCC)    Wernicke's syndrome    Hypophosphatemia    Hypernatremia    Alcohol-induced acute pancreatitis (HCC)    Alcoholic hepatitis without ascites (HCC)      OCCUPATIONAL THERAPY ASSESSMENT   Patient demonstrates good  progress this session, goals progressing with 1/4 met this session.    Patient is requiring min/moderate assist as a result of the following impairments: decreased functional strength, decreased endurance, and increased O2 needs from baseline.    Patient continues to function below baseline with adls and functional mobility.  Next session anticipate patient to progress upper body dressing, lower body dressing, grooming, static standing balance, and functional standing tolerance.  Occupational Therapy will continue to follow patient for duration of hospitalization.    Patient continues to benefit from continued skilled OT services: to promote return to prior level of function and safety with continuous assistance and gradual rehabilitative therapy.     PLAN DURING HOSPITALIZATION  OT Device Recommendations: TBD  OT Treatment Plan: Compensatory technique education, Patient/Family training, Patient/Family education, Endurance training, Functional transfer training, ADL training, Balance activities     SUBJECTIVE  Pt agreeable to oob activity    OBJECTIVE  Precautions: Bed/chair alarm, Rectal tube, HOB elevated 30 degrees (NG)    WEIGHT BEARING RESTRICTION     PAIN ASSESSMENT  Rating: Unable to  rate  Location: -- (chest pain)  Management Techniques: Breathing techniques (protective bracing)    ACTIVITY TOLERANCE  Good   atrest; 128 w/ mobility    O2 SATURATIONS  Activity on 3L 93%    ACTIVITIES OF DAILY LIVING ASSESSMENT  AM-PAC ‘6-Clicks’ Inpatient Daily Activity Short Form  How much help from another person does the patient currently need…  -   Putting on and taking off regular lower body clothing?: A Lot  -   Bathing (including washing, rinsing, drying)?: A Lot  -   Toileting, which includes using toilet, bedpan or urinal? : A Lot  -   Putting on and taking off regular upper body clothing?: A Little  -   Taking care of personal grooming such as brushing teeth?: A Little  -   Eating meals?: A Little    AM-PAC Score:  Score: 15  Approx Degree of Impairment: 56.46%  Standardized Score (AM-PAC Scale): 34.69  CMS Modifier (G-Code): CK    FUNCTIONAL ADL ASSESSMENT  Eating: setup assist  Grooming: setup assist  UB Dressing: min assist  LB Dressing: max assist  Toileting: na    Skilled Therapy Provided: RN contacted prior to start of care. Treatment coordinated w/ PT. Pt agreeable to participation in therapy. Gait belt used during dynamic activity. Pt received in bed, alert and oriented.  Pt currently requires min a to transfer supine<>sit at eob. Pt maintained unsupported sitting w/ supervision. Pt transferred sit<>stand w/ min a. Pt transferred bed<>chair w/ rw and min a. Activity tolerance limited due to fatigue and  elevated HR. Importance of oob activity stressed    At end of session pt remaining up in chair  w/ all needs in reach and alarm on;partner at bedside. RN aware of pt's status and performance in therapy      EDUCATION PROVIDED  Patient Education : Plan of Care; Functional Transfer Techniques; Fall Prevention  Patient's Response to Education: Verbalized Understanding  Family/Caregiver Education : Role of Occupational Therapy; Plan of Care; Discharge Recommendations  Family/Caregiver's  Response to Education: Verbalized Understanding    The patient's Approx Degree of Impairment: 56.46% has been calculated based on documentation in the [facility] '6 clicks' Inpatient Daily Activity Short Form.  Research supports that patients with this level of impairment may benefit from IRF.  Final disposition will be made by interdisciplinary medical team.    Patient End of Session: Up in chair, Needs met, Call light within reach, RN aware of session/findings, All patient questions and concerns addressed, Alarm set, Family present    OT Goals:   Patient will complete functional transfer with Mod A   Comment: pt required min a w/ rw to transfer 5' bed<>chair    Patient will complete toileting with Mod A   Comment: na    Patient will tolerate standing for 1-2 minutes in prep for adls with Mod A    Comment: na    Patient will tolerate unsupported sitting at eOB with SBA in prep for seated ADLs  Comment:Goal Met            Goals  on: 25  Frequency: 3-5x week     Therapeutic Activity: 20 minutes

## 2025-03-25 NOTE — PROGRESS NOTES
[facility]  part of [facility]  Hospitalist Progress Note     Sami Grijalva  Patient Status:  Inpatient    10/11/1980  44 year old CSN [id number]   Location 217/217-A Attending Yonny Bowers MD   Hosp Day # 21 PCP Kerri Medina MD     Assessment & Plan:   ----------------------------------  Fevers.  New onset 3/23 along with leukocytosis.  Exact source unclear.  White count rising slowly.  Possible new right lower lobe pneumonia seen on CT  -Blood cultures  -ID following  -Respiratory viral panel negative  -Empiric antibiotics with meropenem    Respiratory failure, acute hypoxemic.  Due to pneumonia, ARDS.  Oxygen requirement is improving.  Extubated 3/17.  Using between 1 and 3 L of oxygen  -Supplemental oxygen as needed, titrate down.   -Pulmonology consultation appreciated  -Treat contributing factors as described    Acute alcoholic hepatitis.  Bilirubin stabilizing, may take a very long time to normalize  -GI following    Other problems  Pancreatitis  Alcohol abuse  Cirrhosis  Hepatic steatosis  Hyponatremia  USMAN resolved  Chest pain, resolved  Aspiration pneumonia, finished antibiotics  Diarrhea, better  Sinus tachycardia, due to fever    Supplementary Documentation:   DVT Mechanical Prophylaxis:   SCDs,    DVT Pharmacologic Prophylaxis   Medication   None                Code Status: Full Code  Cedeño: External urinary catheter in place  Cedeño Duration (in days): 14  Central line:    ADOLFO: Discussed with PMR, will plan for acute rehab pending continued medical stability    I personally reviewed the available laboratories, imaging including. I discussed/will discuss the case with consultants. I ordered laboratories and/or radiographic studies. I adjusted medications as detailed above.  Medical decision making high, risk is high.    Subjective:   ----------------------------------  No new complaints, still weak.  Working with physical therapy      Objective:   Chief Complaint:    Chief Complaint   Patient presents with    Vomiting    Hiccups    Eval-D     ----------------------------------  Temp:  [99.8 °F (37.7 °C)-100.5 °F (38.1 °C)] 99.8 °F (37.7 °C)  Pulse:  [102-109] 102  Resp:  [16-18] 18  BP: (106-116)/(64-65) 115/64  SpO2:  [95 %-96 %] 96 %  Gen: Alert, appropriate, appears very weak jaundiced  HEENT: NCAT, neck supple, no carotid bruit.  Scleral icterus  CV: Mild tachycardia, S1S2, and intact distal pulses. No gallop, rub, murmur.  Pulm: Poor effort, no wheezing or rales  Abd: Soft, NTND, BS normal, no mass, no HSM, no rebound/guarding.  Rectal tube in place, Cedeño catheter in place  Neuro: Normal reflexes, CN. Sensory/motor exams grossly normal deficit.  Generally weak  MS: No joint effusions.  No peripheral edema.  Skin: Skin is warm and dry. No rashes, erythema, diaphoresis.   Psych: Normal mood and affect. Calm, cooperative    Labs:  Lab Results   Component Value Date    HGB 7.9 (L) 03/25/2025    WBC 21.4 (H) 03/25/2025    .0 03/25/2025     03/25/2025    K 3.4 (L) 03/25/2025    K 3.4 (L) 03/25/2025    CREATSERUM 1.08 03/25/2025    INR 1.95 (H) 03/23/2025     (H) 03/25/2025    ALT 83 (H) 03/25/2025    TROP 0.00 01/31/2017            potassium chloride  40 mEq Intravenous Once    meropenem  500 mg Intravenous Q8H    pantoprazole  40 mg Oral BID AC    epoetin sheela  10,000 Units Subcutaneous Once per day on Tuesday Thursday Saturday    furosemide  20 mg Intravenous BID (Diuretic)    vancomycin  125 mg Per NG Tube Daily    multivitamin  1 tablet Oral Daily    folic acid  1 mg Oral Daily       baclofen    acetaminophen    haloperidol lactate    polyethylene glycol (PEG 3350)    bisacodyl    prochlorperazine

## 2025-03-25 NOTE — CM/SW NOTE
Department  notified of request for sonny [facility] referrals started. Assigned CM/SW to follow up with pt/family on further discharge planning.     Radha Roldan, DSC

## 2025-03-25 NOTE — PLAN OF CARE
Problem: Patient Centered Care  Goal: Patient preferences are identified and integrated in the patient's plan of care  Description: Interventions:  - What would you like us to know as we care for you?   - Provide timely, complete, and accurate information to patient/family  - Incorporate patient and family knowledge, values, beliefs, and cultural backgrounds into the planning and delivery of care  - Encourage patient/family to participate in care and decision-making at the level they choose  - Honor patient and family perspectives and choices  Outcome: Progressing     Problem: Patient/Family Goals  Goal: Patient/Family Long Term Goal  Description: Patient's Long Term Goal:     Interventions:  -   - See additional Care Plan goals for specific interventions  Outcome: Progressing  Goal: Patient/Family Short Term Goal  Description: Patient's Short Term Goal:     Interventions:   -   - See additional Care Plan goals for specific interventions  Outcome: Progressing     Problem: PAIN - ADULT  Goal: Verbalizes/displays adequate comfort level or patient's stated pain goal  Description: INTERVENTIONS:  - Encourage pt to monitor pain and request assistance  - Assess pain using appropriate pain scale  - Administer analgesics based on type and severity of pain and evaluate response  - Implement non-pharmacological measures as appropriate and evaluate response  - Consider cultural and social influences on pain and pain management  - Manage/alleviate anxiety  - Utilize distraction and/or relaxation techniques  - Monitor for opioid side effects  - Notify MD/LIP if interventions unsuccessful or patient reports new pain  - Anticipate increased pain with activity and pre-medicate as appropriate  Outcome: Progressing     Problem: RISK FOR INFECTION - ADULT  Goal: Absence of fever/infection during anticipated neutropenic period  Description: INTERVENTIONS  - Monitor WBC  - Administer growth factors as ordered  - Implement neutropenic  guidelines  Outcome: Progressing     Problem: SAFETY ADULT - FALL  Goal: Free from fall injury  Description: INTERVENTIONS:  - Assess pt frequently for physical needs  - Identify cognitive and physical deficits and behaviors that affect risk of falls.  - [place] fall precautions as indicated by assessment.  - Educate pt/family on patient safety including physical limitations  - Instruct pt to call for assistance with activity based on assessment  - Modify environment to reduce risk of injury  - Provide assistive devices as appropriate  - Consider OT/PT consult to assist with strengthening/mobility  - Encourage toileting schedule  Outcome: Progressing     Problem: DISCHARGE PLANNING  Goal: Discharge to home or other facility with appropriate resources  Description: INTERVENTIONS:  - Identify barriers to discharge w/pt and caregiver  - Include patient/family/discharge partner in discharge planning  - Arrange for needed discharge resources and transportation as appropriate  - Identify discharge learning needs (meds, wound care, etc)  - Arrange for interpreters to assist at discharge as needed  - Consider post-discharge preferences of patient/family/discharge partner  - Complete POLST form as appropriate  - Assess patient's ability to be responsible for managing their own health  - Refer to Case Management Department for coordinating discharge planning if the patient needs post-hospital services based on physician/LIP order or complex needs related to functional status, cognitive ability or social support system  Outcome: Progressing     Problem: Delirium  Goal: Minimize duration of delirium  Description: Interventions:  - Encourage use of hearing aids, eye glasses  - Promote highest level of mobility daily  - Provide frequent reorientation  - Promote wakefulness i.e. lights on, blinds open  - Promote sleep, encourage patient's normal rest cycle i.e. lights off, TV off, minimize noise and interruptions  - Encourage  family to assist in orientation and promotion of home routines  Outcome: Progressing     Problem: RESPIRATORY - ADULT  Goal: Achieves optimal ventilation and oxygenation  Description: INTERVENTIONS:  - Assess for changes in respiratory status  - Assess for changes in mentation and behavior  - Position to facilitate oxygenation and minimize respiratory effort  - Oxygen supplementation based on oxygen saturation or ABGs  - Provide Smoking Cessation handout, if applicable  - Encourage broncho-pulmonary hygiene including cough, deep breathe, Incentive Spirometry  - Assess the need for suctioning and perform as needed  - Assess and instruct to report SOB or any respiratory difficulty  - Respiratory Therapy support as indicated  - Manage/alleviate anxiety  - Monitor for signs/symptoms of CO2 retention  Outcome: Progressing     Problem: METABOLIC/FLUID AND ELECTROLYTES - ADULT  Goal: Electrolytes maintained within normal limits  Description: INTERVENTIONS:  - Monitor labs and rhythm and assess patient for signs and symptoms of electrolyte imbalances  - Administer electrolyte replacement as ordered  - Monitor response to electrolyte replacements, including rhythm and repeat lab results as appropriate  - Fluid restriction as ordered  - Instruct patient on fluid and nutrition restrictions as appropriate  Outcome: Progressing  Goal: Hemodynamic stability and optimal renal function maintained  Description: INTERVENTIONS:  - Monitor labs and assess for signs and symptoms of volume excess or deficit  - Monitor intake, output and patient weight  - Monitor urine specific gravity, serum osmolarity and serum sodium as indicated or ordered  - Monitor response to interventions for patient's volume status, including labs, urine output, blood pressure (other measures as available)  - Encourage oral intake as appropriate  - Instruct patient on fluid and nutrition restrictions as appropriate  Outcome: Progressing     Problem: Impaired  Swallowing  Goal: Minimize aspiration risk  Description: Interventions:  - Patient should be alert and upright for all feedings (90 degrees preferred)  - Offer food and liquids at a slow rate  - No straws  - Encourage small bites of food and small sips of liquid  - Offer pills one at a time, crush or deliver with applesauce as needed  - Discontinue feeding and notify MD (or speech pathologist) if coughing or persistent throat clearing or wet/gurgly vocal quality is noted  Outcome: Progressing     Problem: Impaired Cognition  Goal: Patient will exhibit improved attention, thought processing and/or memory  Description: Interventions:    Outcome: Progressing     Problem: CARDIOVASCULAR - ADULT  Goal: Maintains optimal cardiac output and hemodynamic stability  Description: INTERVENTIONS:- Monitor vital signs, rhythm, and trends- Monitor for bleeding, hypotension and signs of decreased cardiac output- Evaluate effectiveness of vasoactive medications to optimize hemodynamic stability- Monitor arterial and/or venous puncture sites for bleeding and/or hematoma- Assess quality of pulses, skin color and temperature- Assess for signs of decreased coronary artery perfusion - ex. Angina- Evaluate fluid balance, assess for edema, trend weights  Outcome: Progressing

## 2025-03-25 NOTE — PLAN OF CARE
Problem: Patient Centered Care  Goal: Patient preferences are identified and integrated in the patient's plan of care  Description: Interventions:  - Provide timely, complete, and accurate information to patient/family  - Incorporate patient and family knowledge, values, beliefs, and cultural backgrounds into the planning and delivery of care  - Encourage patient/family to participate in care and decision-making at the level they choose  - Honor patient and family perspectives and choices  Outcome: Progressing     Problem: PAIN - ADULT  Goal: Verbalizes/displays adequate comfort level or patient's stated pain goal  Description: INTERVENTIONS:  - Encourage pt to monitor pain and request assistance  - Assess pain using appropriate pain scale  - Administer analgesics based on type and severity of pain and evaluate response  - Implement non-pharmacological measures as appropriate and evaluate response  - Consider cultural and social influences on pain and pain management  - Manage/alleviate anxiety  - Utilize distraction and/or relaxation techniques  - Monitor for opioid side effects  - Notify MD/LIP if interventions unsuccessful or patient reports new pain  - Anticipate increased pain with activity and pre-medicate as appropriate  Outcome: Progressing     Problem: RISK FOR INFECTION - ADULT  Goal: Absence of fever/infection during anticipated neutropenic period  Description: INTERVENTIONS  - Monitor WBC  - Administer growth factors as ordered  - Implement neutropenic guidelines  Outcome: Progressing     Problem: SAFETY ADULT - FALL  Goal: Free from fall injury  Description: INTERVENTIONS:  - Assess pt frequently for physical needs  - Identify cognitive and physical deficits and behaviors that affect risk of falls.  - [place] fall precautions as indicated by assessment.  - Educate pt/family on patient safety including physical limitations  - Instruct pt to call for assistance with activity based on assessment  -  Modify environment to reduce risk of injury  - Provide assistive devices as appropriate  - Consider OT/PT consult to assist with strengthening/mobility  - Encourage toileting schedule  Outcome: Progressing     Problem: Delirium  Goal: Minimize duration of delirium  Description: Interventions:  - Encourage use of hearing aids, eye glasses  - Promote highest level of mobility daily  - Provide frequent reorientation  - Promote wakefulness i.e. lights on, blinds open  - Promote sleep, encourage patient's normal rest cycle i.e. lights off, TV off, minimize noise and interruptions  - Encourage family to assist in orientation and promotion of home routines  Outcome: Progressing     Problem: Impaired Swallowing  Goal: Minimize aspiration risk  Description: Interventions:  - Patient should be alert and upright for all feedings (90 degrees preferred)  - Offer food and liquids at a slow rate  - No straws  - Encourage small bites of food and small sips of liquid  - Offer pills one at a time, crush or deliver with applesauce as needed  - Discontinue feeding and notify MD (or speech pathologist) if coughing or persistent throat clearing or wet/gurgly vocal quality is noted  Outcome: Progressing

## 2025-03-25 NOTE — PROGRESS NOTES
[facility]     Gastroenterology Progress Note    Sami Grijalva Jr. Patient Status:  Inpatient    10/11/1980 MRN [id number]   Location [facility] 5SW/SE Attending Yonny Bowers MD   Hosp Day # 21 PCP Kerri Medina MD       Assessment and Plan:   Severe alcoholic hepatitis +/- cirrhosis  Complicated by a protracted hospitalization for multifocal pneumonia and ARDS requiring mechanical ventilation.  Ongoing high MELD, Maddrey DF.  Steroids have not been started in light of the patient's recent pneumonia.  Now with increasing leukocytosis and fever, concern for underlying infection again.  Infectious workup has been reinitiated.  Resumption of alcohol intake will be fatal.  I have encouraged nutrition, ambulation and complete abstinence from alcohol.     Recommend:  1.  Infectious workup pending  2.  Restarted on empiric antibiotics  3.  Complete abstinence from alcohol.  4.  Ambulate with assistance  5.  Discussed role of pentoxifylline given high Madrey DF and hesitation for prednisolone therapy in light of ongoing fevers and infection.  He is amenable to initiation.    Vandana Austin MD    Subjective:   Fever this afternoon again.  He denies abdominal pain.  No nausea or vomiting.  Remains on oxygen.    Objective:   Patient Vitals for the past 24 hrs:   BP Temp Temp src Pulse Resp SpO2   25 1215 -- -- -- -- -- 95 %   25 0933 117/64 99.4 °F (37.4 °C) Oral 115 (!) 28 95 %   25 0718 -- -- -- 112 -- --   25 0547 131/73 99.3 °F (37.4 °C) Oral 109 18 96 %   25 0403 -- -- -- 110 -- --   25 2130 -- -- -- 110 -- --   25 1954 120/63 100 °F (37.8 °C) Oral 117 20 94 %   25 1900 -- -- -- 111 -- --   25 1734 -- 100.1 °F (37.8 °C) -- -- -- --   25 1618 119/60 100.1 °F (37.8 °C) Oral 115 -- 96 %     Body mass index is 28.39 kg/m².    General: Chronically ill in appearance.  Deeply icteric.  HEENT: Scleral icterus is present..  Mucous  membranes are moist.  Cardiovascular: Regular rate and rhythm   Lung: Clear to auscultation bilaterally  Abdomen:Non-distended.  Bowel sounds are present.  There is no tenderness to palpation.  There are no masses appreciated.  Liver and spleen are not palpable.  Skin: Deeply icteric.  Warm and dry.  Ext: + edema  Neuro- Alert and interactive, and gross movements of extremities normal  Affect:Normal      Results:     Recent Labs   Lab 03/23/25  0539 03/24/25  0554 03/25/25  0600   RBC 3.09* 2.90* 2.91*   HGB 8.4* 7.8* 7.9*   HCT 25.3* 23.7* 23.4*   MCV 81.9 81.7 80.4   MCH 27.2 26.9 27.1   MCHC 33.2 32.9 33.8   RDW 32.8* 32.9* 32.8*   NEPRELIM 15.84* 17.06* 17.48*   WBC 19.3* 20.9* 21.4*   .0 281.0 264.0         Recent Labs   Lab 03/23/25  0539 03/24/25  0554 03/25/25  0600   GLU 99 96 103*   BUN 24* 25* 24*   CREATSERUM 0.91 1.05 1.08   CA 8.2* 7.8* 7.9*   ALB 2.7* 2.5* 2.6*   * 134* 136   K 3.6 3.2* 3.4*  3.4*    102 104   CO2 22.0 22.0 23.0   ALKPHO 220* 211* 220*   * 180* 162*   ALT 98* 89* 83*   BILT 15.2* 14.9* 15.2*   TP 5.5* 5.3* 5.5*       Lab Results   Component Value Date    INR 1.95 (H) 03/23/2025    INR 1.93 (H) 03/22/2025    INR 1.89 (H) 03/21/2025       CT ABDOMEN+PELVIS(CONTRAST ONLY)(CPT=74177)    Result Date: 3/24/2025  CONCLUSION:  1. Findings of mild acute interstitial edematous pancreatitis are very similar since examination from 12 days prior.  There is small volume upper retroperitoneal/peripancreatic free fluid.  No CT evidence of pancreatic necrosis.  No well-defined/drainable peripancreatic collection.  No significant pancreatic ductal dilation. 2. Mild circumferential wall thickening at the C-loop of the duodenum is most likely reactive to pancreatitis or less likely relates to a primary infectious/inflammatory duodenitis.  No free intraperitoneal air. 3. Nodular liver contours are compatible with cirrhosis.  There are also stigmata of portal hypertension, which  include worsening small-moderate volume intra-abdominal ascites, splenomegaly, and upper abdominal/paraesophageal varices. 4. Small dependent left pleural effusion is new since prior exam. 5. Consolidative opacities in the left lower lobe have improved.  There are, however, worsening patchy consolidative and ground-glass density opacities throughout the imaged right lung base.  Findings could relate to infection/aspiration, pulmonary edema, or acute respiratory distress syndrome. 6. Colonic diverticulosis. 7. Lesser incidental findings as above.   Dictated by (CST): Arcenio Norman MD on 3/24/2025 at 5:40 PM     Finalized by (CST): Arcenio Norman MD on 3/24/2025 at 5:49 PM          XR CHEST AP PORTABLE  (CPT=71045)    Result Date: 3/23/2025  CONCLUSION:   No significant change in the multifocal opacities in both lungs.    Dictated by (CST): Emmett Quan MD on 3/23/2025 at 9:24 PM     Finalized by (CST): Emmett Quan MD on 3/23/2025 at 9:26 PM

## 2025-03-25 NOTE — PHYSICAL THERAPY NOTE
PHYSICAL THERAPY TREATMENT NOTE - INPATIENT     Room Number: 568/568-A       Presenting Problem: USMAN, hiccups, nausea, vomiting, abdominal pain, alcohol hepatitis, pneumonia, intubated on 3/12/25 and extubated on 3/17/25  Co-Morbidities : alcoholism, anxiety, gout, gastroesophageal reflux disease    Problem List  Principal Problem:    USMAN (acute kidney injury)  Active Problems:    Metabolic acidosis    Hyperkalemia    Leukocytosis    Thrombocytopenia    Coagulopathy (HCC)    Hyponatremia    Alcoholic (HCC)    Scleral icterus    Acute kidney failure    DTs (delirium tremens) (HCC)    Alcohol use disorder    Acute respiratory failure with hypoxia (HCC)    Wernicke's syndrome    Hypophosphatemia    Hypernatremia    Alcohol-induced acute pancreatitis (HCC)    Alcoholic hepatitis without ascites (HCC)      PHYSICAL THERAPY ASSESSMENT   Patient demonstrates good  progress this session, goals  updated to reflect patient performance.      Patient is requiring minimal assist as a result of the following impairments: decreased functional strength, decreased endurance/aerobic capacity, impaired standing balance, impaired coordination, impaired motor planning, decreased muscular endurance, and medical status.     Patient continues to function below baseline with bed mobility, transfers, gait, stair negotiation, maintaining seated position, standing prolonged periods, and performing household tasks.  Next session anticipate patient to progress bed mobility, transfers, gait, maintaining seated position, standing prolonged periods, and performing household tasks.  Physical Therapy will continue to follow patient for duration of hospitalization.    Patient continues to benefit from continued skilled PT services: to promote return to prior level of function and safety with continuous assistance and gradual rehabilitative therapy .    PLAN DURING HOSPITALIZATION  Nursing Mobility Recommendation : 1 Assist  PT Device Recommendation:  Mechanical lift  PT Treatment Plan: Bed mobility, Body mechanics, Endurance, Patient education, Energy conservation, Gait training, Range of motion, Strengthening, Transfer training, Balance training  Frequency (Obs): 3-5x/week     SUBJECTIVE  I feel weak but I can sit up a while in the chair I get tiered really fast.     OBJECTIVE  Precautions: Bed/chair alarm, Rectal tube, HOB elevated 30 degrees (NG)    WEIGHT BEARING RESTRICTION       PAIN ASSESSMENT   Ratin  Location: denies pain at this visit  Management Techniques: Breathing techniques, Relaxation    BALANCE  Static Sitting: Fair +  Dynamic Sitting: Fair  Static Standing: Fair -  Dynamic Standing: Fair -    ACTIVITY TOLERANCE  Pulse: 102                       O2 WALK       AM-PAC '6-Clicks' INPATIENT SHORT FORM - BASIC MOBILITY  How much difficulty does the patient currently have...  Patient Difficulty: Turning over in bed (including adjusting bedclothes, sheets and blankets)?: A Little   Patient Difficulty: Sitting down on and standing up from a chair with arms (e.g., wheelchair, bedside commode, etc.): A Little   Patient Difficulty: Moving from lying on back to sitting on the side of the bed?: A Little   How much help from another person does the patient currently need...   Help from Another: Moving to and from a bed to a chair (including a wheelchair)?: A Little   Help from Another: Need to walk in hospital room?: A Lot   Help from Another: Climbing 3-5 steps with a railing?: Total     AM-PAC Score:  Raw Score: 15   Approx Degree of Impairment: 57.7%   Standardized Score (AM-PAC Scale): 39.45   CMS Modifier (G-Code): CK    FUNCTIONAL ABILITY STATUS  Functional Mobility/Gait Assessment  Gait Assistance: Minimum assistance  Distance (ft): 5  Assistive Device: Rolling walker  Pattern: Shuffle (unsteady gait weakness decreased step length)  Rolling: supervision  Supine to Sit: supervision  Sit to Supine: minimal assist  Sit to Stand: minimal  assist    Skilled Therapy Provided: Pt ed with bed mobility with SBA to EOB. Pt ed with transfers with RW with min A. Pt ed with static and dynamic standing balance training x 5 min. Pt ed with amb 5' for pre gait and up to a chair for LE therex in chair x 10 reps reviewed for LE strength and ROM. Pt will benefit from GR with PT, OT to regain his maximal PLOF and safety as medical progress allows. Pt is tolerating low intensity activity progression at this time.     The patient's Approx Degree of Impairment: 57.7% has been calculated based on documentation in the [facility] '6 clicks' Inpatient Daily Activity Short Form.  Research supports that patients with this level of impairment may benefit from IP rehab PT, OT.    Final disposition will be made by interdisciplinary medical team.    THERAPEUTIC EXERCISES  Lower Extremity Ankle pumps  Heel slides  LAQ     Position Sitting & Standing       Patient End of Session: Up in chair, With  staff, Needs met, Call light within reach, RN aware of session/findings, All patient questions and concerns addressed, Family present    CURRENT GOALS   Goals to be met by: 4/15/25   (goals revised 3/25/25 secondary to progress)  Patient Goal Return home   Goal #1 Patient is able to demonstrate supine - sit EOB - indep      Goal #1   Current Status SBA to EOB   Goal #2 Patient is able to demonstrate transfers Sit to/from Stand at assistance level: mod indep with walker - rolling   Goal #2  Current Status Min A with RW   Goal #3  Patient is able to ambulate 150 feet with assist device: walker - rolling at assistance level: mod indep    Goal #3   Current Status Pt amb 5' with RW with min A ongoing   Goal #4 Min A with stair mobility 5 steps with 1 SR support   Goal #4   Current Status Ongoing   Goal #5 Patient to demonstrate independence with home activity/exercise instructions provided to patient in preparation for discharge.   Goal #5   Current Status Ongoing   Goal #6    Goal #6  Current  Status I     Gait Training: 10 minutes  Therapeutic Activity: 15 minutes

## 2025-03-25 NOTE — DIETARY NOTE
ADULT NUTRITION REASSESSMENT    Pt is at high downgraded to moderate nutrition risk (off EN).  Pt does not meet malnutrition criteria.      RECOMMENDATIONS TO MD:   See nutrition intervention for ONS (oral nutrition supplements)    ADMITTING DIAGNOSIS:  Scleral icterus [R17]  USMAN (acute kidney injury) [N17.9]  PERTINENT PAST MEDICAL HISTORY:   Past Medical History:    Esophageal reflux    Gout    Hernia     PATIENT STATUS:   Initial 03/06/25: Pt assessed r/t screened at risk on initial MST due to unintentional wt loss and decreased/poor appetite/intake. Presented to hospital with intractable N/V and poor appetite for the last few days - ETOH hepatitis and USMAN. PMH as listed above including ETOH abuse. Transferred to CCU for high CIWA scores. Scheduled ativan, soft wrist restraints in place. Very lethargic, minimally responsive. Sleeping but restless at time of visit. Not appropriate for diet hx with no family at bedside. Per H&P pt reported N/V, hiccups and inability to tolerate any solid foods x 3 days PTA. Typically consumes 3 24-oz  beers daily. Appears well nourished.  03/07/25 UPDATE: Pt discussed with MD on unit - plan to initiate EN feeds. Consult received for RD to initiate and manage tube feeds. See full assessment from 3/6. Pt remains minimally responsive on ativan and not appropriate to take PO. NPO/CL x6 days this admission. Increasing O2 requirements. Stable on HFNC at 40 L/min. RN to place NGT. Remains on lactulose x4 daily with rectal pouch in place 1650 ml recorded output over the 24 hrs however noted no documentation from day shift on 3/6. Pt with good UO and improving creatinine level. Noted hypophosphotemia with replacement ordered 30 mmol NaPhos rider per protocol. IVF of D5W 0.9NS @ 75 ml/hr provides 1.8 L, 90 g dextrose and 306 kcal. Lasix x1 today. Messaged nephrology regarding plan for IVF and free water from EN - awaiting response.    03/10/25 UPDATE: Pt lethargic. Remains inappropriate for  PO intake. Pt pulled out NG DHT this afternoon. Now replaced by RN with feeds resuming. EN feeds at 30 ml/hr (50% of goal rate). FWF increased to 200 ml q 4 hrs on 3/8 and EN held by MD on 3/9 due to worsening hypernatremia. Feeds resumed overnight 3/10. No improvement in hypernatremia. Pt with large amount of stool output, lactulose QID.    03/13/25 UPDATE:     Pt intubated 3/12, sedated on Propofol (12.4 ml/hr= ~330 kcals via lipids), low dose norepinephrine (3 mcg/min), worsening pneumonia s/p bronch today, new dx Acute Pancreatitis (per CT scan and lipase 444) 3/12 -acceptable. Stooling on lactulose (rectal tube in place), Marine non-oliguric at present.  Discussed with APN re: resuming EN support post bronch. May continue with same formula for impaired GI function.     03/17/2025 UPDATE:  Continues on tube feeds resumed at 6p on 3/14 at goal nutrition. Lactulose stopped 3/14 and stool output decreased/acceptable while ammonia level normalized on 3/12 and 3/14. 3/16 Water flushes decreased per renal service to 200 ml q 4 hrs,and  lasix dose decreased. I/O's still +16L.Sodium level 138 (trending down) today.  Remains intubated and on low dose levo. May further decreased FWF- renal service has been adjusting.     Update 3/20/25: Pt reassessed for follow up. Chart reviewed, pt extubated 3/17. Tube feeding discontinued, accidentally pulled out,  and diet advanced to chopped/ soft and bite sized, thin liquids. Visited pt at bedside today, pt stated appetite was good. Per chart, intake is fair to good since diet advanced to PO. Pt is consuming Mighty Shakes BID, will continue to send.      3/25/2025 Update:      Re-visited, pt lying on bed, partner (Lutheran) at bedside. Po intake increasing slowly, po %-->. Average meal intake 62% in 1-2 meals/day--Marginal. Incomplete documentation of ONS. Pt reports drinking some Mighty shake but not consistent. At lunch today, pt ate 2 large meatballs on pasta, did not  drink Mighty shake. On 2L NC. + BM Stage 2 pressure injury noted.   Hypokalemia replaced with Kcl 40 meq. Leukocytosis up trending. On antibiotics.    Adjusted Mighty shake with 9 g protein & 330 kcal to Ensure Enlive with 20 g protein & 350 kcal as pt wanted to increase protein intake.       FOOD/NUTRITION RELATED HISTORY:  Appetite: Fair  Intake: Po intake increasing slowly, po %-->. Average meal intake 62% in 1-2 meals/day--Marginal.  Intake Meeting Needs: marginal  but oral nutrition supplement given to maximize.  Percent Meals Eaten (last 6 days)       Date/Time Percent Meals Eaten (%)    03/19/25 1515 100 %    03/19/25 2117 20 %     Percent Meals Eaten (%): dinner at 03/19/25 2117 03/20/25 1000 90 %    03/20/25 1214 70 %    03/21/25 1051 25 %    03/22/25 0836 50 %    03/22/25 1325 50 %    03/23/25 0915 90 %    03/23/25 1309 75 %    03/24/25 1015 50 %        Food Allergies: No Known Food Allergies (NKFA)  Cultural/Ethnic/Christianity Preferences: Not Obtained    GASTROINTESTINAL: +BM 3/25 loose soft green medium stool x 2 . Swallow Eval noted.    CT A/P 3/4: \"Severely fatty liver with subtle undulating contour.  Stable splenomegaly.  Tubular structures around the GE junction are suggestive of lower esophageal varices.  No ascites \"    MEDICATIONS: reviewed; Noted non-cardiac electrolyte replacement protocol ordered;..        potassium chloride  40 mEq Intravenous Once    meropenem  500 mg Intravenous Q8H    pantoprazole  40 mg Oral BID AC    epoetin sheela  10,000 Units Subcutaneous Once per day on Tuesday Thursday Saturday    furosemide  20 mg Intravenous BID (Diuretic)    vancomycin  125 mg Per NG Tube Daily    multivitamin  1 tablet Oral Daily    folic acid  1 mg Oral Daily     LABS: reviewed; Lipase: 444 3/13 . . K+ replaced. Liver enzymes elevated. Bili 15.2 is rising.   Recent Labs     03/23/25  0539 03/24/25  0554 03/25/25  0600   GLU 99 96 103*   BUN 24* 25* 24*   CREATSERUM 0.91 1.05 1.08   CA 8.2*  7.8* 7.9*   * 134* 136   K 3.6 3.2* 3.4*  3.4*    102 104   CO2 22.0 22.0 23.0   OSMOCALC 284 282 286     WEIGHT HISTORY:  Patient Weight(s) for the past 336 hrs:   Weight   03/18/25 0600 84.7 kg (186 lb 11.7 oz)   03/17/25 0600 88.5 kg (195 lb 1.7 oz)   03/16/25 0600 82.4 kg (181 lb 10.5 oz)   03/15/25 0600 81 kg (178 lb 9.2 oz)   03/14/25 1100 85.2 kg (187 lb 13.3 oz)   03/12/25 0530 77.9 kg (171 lb 11.8 oz)     Wt Readings from Last 10 Encounters:   03/18/25 84.7 kg (186 lb 11.7 oz)   03/27/24 77.1 kg (170 lb)   12/26/23 89.8 kg (198 lb)   11/01/23 81.6 kg (180 lb)   10/16/23 81.6 kg (180 lb)   10/16/23 82.6 kg (182 lb)   09/06/23 91.6 kg (202 lb)   08/27/23 91.7 kg (202 lb 1.6 oz)   05/31/23 83.9 kg (185 lb)   01/17/23 90.7 kg (200 lb)     ANTHROPOMETRICS:  HT: 172.7 cm (5' 8\")  Wt Readings from Last 1 Encounters:   03/18/25 84.7 kg (186 lb 11.7 oz)   Last weight: unsure of accuracy as up and down (16 lbs up now)     3/25 No new wt, requested from RN to obtain new wt.   Dosing Weight: 77.9 kg (171 lbs) - updated on 3/13 as decreased from admit.-more reflective of dry wt.    BMI: Body mass index is 27.79 kg/m².  BMI CLASSIFICATION: 25-29.9 kg/m2 - overweight  IBW/lbs (Calculated) Male: 154 lbs           119% IBW  Usual Body Wt: 170 lbs (per EMR 3/27/24)      108% UBW on admit.     NUTRITION RELATED PHYSICAL FINDINGS:  - Nutrition Focused Physical Exam (NFPE): no wasting noted  - Fluid Accumulation: +1-2 , non pitting edema on multiple region of the body.  CT A/P: small to moderate ascites, mild anasarca. . See RN documentation for details  - Skin Integrity: at risk and intact. See RN documentation for details    NUTRITION DIAGNOSIS/PROBLEM:   Inadequate protein energy intake related to Decreased ability to consume sufficient energy in the setting of ETOH withdrawals as evidenced by decreased/poor intake x5 days.     NUTRITION DIAGNOSIS PROGRESS:  Improvement (unresolved) - PO intake improving,  appetite increasing.    NUTRITION INTERVENTION:     NUTRITION PRESCRIPTION:   Estimated Nutrition needs: --dosing wt of 77.9 kg - wt taken on 3/12/25  Calories: 2155 - 2351 kcals (MSJ 1703 x 1.15 AF x 1.1 -1.2 IF or 28 -30 calories/kg).  Protein: 93 - 117 g protein/day (1.2-1.5 g protein/kg Dosing wt)    - Diet:       Procedures    Regular/General diet Sodium Restriction: 2 GM NA; Fluid Consistency: Thin Liquids ; Texture Consistency: Soft / Easy to Chew ; Is Patient on Accuchecks? No; Is Patient on Suicide Precautions? No     - ONS (Oral Nutrition Supplements)/Meals/Snacks: Ensure Enlive (350 calories/ 20 g protein each) Daily Strawberry   - Vitamin and mineral supplements: folic acid and multivitamin/mineral/ MD  - Feeding assistance: meal set up  - Nutrition education: assess education needs and Discussed importance of overall consistent increase in nutrition intake for rehabilitative phase.   - Coordination of nutrition care: collaboration with other providers -   - Discharge and transfer of nutrition care to new setting or provider: monitor plans    MONITOR AND EVALUATE/NUTRITION GOALS:  - Food and Nutrient Intake:    Monitor: adequacy of PO intake and adequacy of supplement intake  - Food and Nutrient Administration:    Monitor: N/A  - Anthropometric Measurement:    Monitor weight, fluid wt changes.   - Nutrition Goals:    allow wt loss due to fluid losses, PO and supplement greater than 75% of needs, labs within acceptable limits, support body systems, and maintain true wt within 5%       DIETITIAN FOLLOW UP: RD to follow and monitor nutrition status      Carolyn Padilla RD, LDN, [facility]  Clinical Dietitian  [phone]

## 2025-03-26 LAB
ALBUMIN SERPL-MCNC: 2.5 G/DL (ref 3.2–4.8)
ALBUMIN/GLOB SERPL: 0.9 {RATIO} (ref 1–2)
ALP LIVER SERPL-CCNC: 212 U/L
ALT SERPL-CCNC: 80 U/L
ANION GAP SERPL CALC-SCNC: 9 MMOL/L (ref 0–18)
AST SERPL-CCNC: 166 U/L (ref ?–34)
BASOPHILS # BLD: 0 X10(3) UL (ref 0–0.2)
BASOPHILS NFR BLD: 0 %
BILIRUB SERPL-MCNC: 15.4 MG/DL (ref 0.3–1.2)
BUN BLD-MCNC: 25 MG/DL (ref 9–23)
BUN/CREAT SERPL: 23.6 (ref 10–20)
CALCIUM BLD-MCNC: 7.6 MG/DL (ref 8.7–10.4)
CHLORIDE SERPL-SCNC: 104 MMOL/L (ref 98–112)
CO2 SERPL-SCNC: 22 MMOL/L (ref 21–32)
CREAT BLD-MCNC: 1.06 MG/DL
DEPRECATED RDW RBC AUTO: 91.9 FL (ref 35.1–46.3)
EGFRCR SERPLBLD CKD-EPI 2021: 88 ML/MIN/1.73M2 (ref 60–?)
EOSINOPHIL # BLD: 0 X10(3) UL (ref 0–0.7)
EOSINOPHIL NFR BLD: 0 %
ERYTHROCYTE [DISTWIDTH] IN BLOOD BY AUTOMATED COUNT: 32.9 % (ref 11–15)
GLOBULIN PLAS-MCNC: 2.9 G/DL (ref 2–3.5)
GLUCOSE BLD-MCNC: 108 MG/DL (ref 70–99)
HCT VFR BLD AUTO: 22.9 %
HGB BLD-MCNC: 7.6 G/DL
LYMPHOCYTES NFR BLD: 0.49 X10(3) UL (ref 1–4)
LYMPHOCYTES NFR BLD: 2 %
MCH RBC QN AUTO: 27 PG (ref 26–34)
MCHC RBC AUTO-ENTMCNC: 33.2 G/DL (ref 31–37)
MCV RBC AUTO: 81.2 FL
MONOCYTES # BLD: 0.74 X10(3) UL (ref 0.1–1)
MONOCYTES NFR BLD: 3 %
NEUTROPHILS # BLD AUTO: 21.14 X10 (3) UL (ref 1.5–7.7)
NEUTROPHILS NFR BLD: 91 %
NEUTS BAND NFR BLD: 4 %
NEUTS HYPERSEG # BLD: 23.28 X10(3) UL (ref 1.5–7.7)
OSMOLALITY SERPL CALC.SUM OF ELEC: 285 MOSM/KG (ref 275–295)
PLATELET # BLD AUTO: 222 10(3)UL (ref 150–450)
PLATELET MORPHOLOGY: NORMAL
PLATELETS.RETICULATED NFR BLD AUTO: 5.1 % (ref 0–7)
POTASSIUM SERPL-SCNC: 3.4 MMOL/L (ref 3.5–5.1)
POTASSIUM SERPL-SCNC: 3.4 MMOL/L (ref 3.5–5.1)
PROT SERPL-MCNC: 5.4 G/DL (ref 5.7–8.2)
RBC # BLD AUTO: 2.82 X10(6)UL
SODIUM SERPL-SCNC: 135 MMOL/L (ref 136–145)
TOTAL CELLS COUNTED BLD: 100
WBC # BLD AUTO: 24.5 X10(3) UL (ref 4–11)

## 2025-03-26 PROCEDURE — 99233 SBSQ HOSP IP/OBS HIGH 50: CPT | Performed by: INTERNAL MEDICINE

## 2025-03-26 PROCEDURE — 99233 SBSQ HOSP IP/OBS HIGH 50: CPT | Performed by: HOSPITALIST

## 2025-03-26 RX ORDER — BACLOFEN 10 MG/1
10 TABLET ORAL 3 TIMES DAILY PRN
Status: DISCONTINUED | OUTPATIENT
Start: 2025-03-26 | End: 2025-03-27

## 2025-03-26 RX ORDER — POTASSIUM CHLORIDE 1500 MG/1
40 TABLET, EXTENDED RELEASE ORAL ONCE
Status: COMPLETED | OUTPATIENT
Start: 2025-03-26 | End: 2025-03-26

## 2025-03-26 RX ORDER — BACLOFEN 10 MG/1
10 TABLET ORAL 3 TIMES DAILY
Status: DISCONTINUED | OUTPATIENT
Start: 2025-03-26 | End: 2025-03-30

## 2025-03-26 NOTE — CM/SW NOTE
Patient discussed in RN DC rounds. Anticipated therapy need: Intensive Therapy Program. SW asked MD to place PMR consult - Dr. Medina for follow up. SANDRA reached out to Marce at [facility] to see if Dr. Medina can follow up.     Plan: Pending medical clearance, DC to [facility], *PMR follow up, list/choice/auth, *PT OT VS SONALI, *PASRR, *List/choice/auth    SW/CM to remain available for support and/or discharge planning.     Patricia Last, MSW, LSW   x [phone]

## 2025-03-26 NOTE — PROGRESS NOTES
[facility]     Gastroenterology Progress Note    Sami Grijalva Jr. Patient Status:  Inpatient    10/11/1980 MRN [id number]   Location [facility] 5SW/SE Attending Yonny Bowers MD   Hosp Day # 22 PCP Kerri Medina MD       Assessment and Plan:   Severe alcoholic hepatitis +/- cirrhosis  Complicated by a protracted hospitalization for multifocal pneumonia and ARDS requiring mechanical ventilation.  Ongoing high MELD, Maddrey DF.  Steroids have not been started in light of the patient's recent pneumonia.  Now with increasing leukocytosis and fever, concern for underlying infection again.  Infectious workup has been reinitiated.  Resumption of alcohol intake will be fatal.  I have encouraged nutrition, ambulation and complete abstinence from alcohol.     Recommend:  1.  Infectious workup pending  2.  ID following, will monitor off antibiotics for possible drug fever  3.  Complete abstinence from alcohol.  4.  Ambulate with assistance  5.  Started pentoxifylline on 3/25    Vandana Austin MD    Subjective:   Fever again.  He denies abdominal pain.  No nausea or vomiting.  Remains on oxygen.    Objective:   Patient Vitals for the past 24 hrs:   BP Temp Temp src Pulse Resp SpO2   25 1215 -- -- -- -- -- 95 %   25 0933 117/64 99.4 °F (37.4 °C) Oral 115 (!) 28 95 %   25 0718 -- -- -- 112 -- --   25 0547 131/73 99.3 °F (37.4 °C) Oral 109 18 96 %   25 0403 -- -- -- 110 -- --   25 2130 -- -- -- 110 -- --   25 1954 120/63 100 °F (37.8 °C) Oral 117 20 94 %   25 1900 -- -- -- 111 -- --   25 1734 -- 100.1 °F (37.8 °C) -- -- -- --   25 1618 119/60 100.1 °F (37.8 °C) Oral 115 -- 96 %     Body mass index is 28.39 kg/m².    General: Chronically ill in appearance.  Deeply icteric.  HEENT: Scleral icterus is present..  Mucous membranes are moist.  Cardiovascular: Regular rate and rhythm   Lung: Clear to auscultation  bilaterally  Abdomen:Non-distended.  Bowel sounds are present.  There is no tenderness to palpation.  There are no masses appreciated.  Liver and spleen are not palpable.  Skin: Deeply icteric.  Warm and dry.  Ext: + edema  Neuro- Alert and interactive, and gross movements of extremities normal  Affect:Normal      Results:     Recent Labs   Lab 03/24/25  0554 03/25/25  0600 03/26/25  0532   RBC 2.90* 2.91* 2.82*   HGB 7.8* 7.9* 7.6*   HCT 23.7* 23.4* 22.9*   MCV 81.7 80.4 81.2   MCH 26.9 27.1 27.0   MCHC 32.9 33.8 33.2   RDW 32.9* 32.8* 32.9*   NEPRELIM 17.06* 17.48* 21.14*   WBC 20.9* 21.4* 24.5*   .0 264.0 222.0         Recent Labs   Lab 03/24/25  0554 03/25/25  0600 03/26/25  0532   GLU 96 103* 108*   BUN 25* 24* 25*   CREATSERUM 1.05 1.08 1.06   CA 7.8* 7.9* 7.6*   ALB 2.5* 2.6* 2.5*   * 136 135*   K 3.2* 3.4*  3.4* 3.4*  3.4*    104 104   CO2 22.0 23.0 22.0   ALKPHO 211* 220* 212*   * 162* 166*   ALT 89* 83* 80*   BILT 14.9* 15.2* 15.4*   TP 5.3* 5.5* 5.4*       Lab Results   Component Value Date    INR 1.95 (H) 03/23/2025    INR 1.93 (H) 03/22/2025    INR 1.89 (H) 03/21/2025       CT ABDOMEN+PELVIS(CONTRAST ONLY)(CPT=74177)    Result Date: 3/24/2025  CONCLUSION:  1. Findings of mild acute interstitial edematous pancreatitis are very similar since examination from 12 days prior.  There is small volume upper retroperitoneal/peripancreatic free fluid.  No CT evidence of pancreatic necrosis.  No well-defined/drainable peripancreatic collection.  No significant pancreatic ductal dilation. 2. Mild circumferential wall thickening at the C-loop of the duodenum is most likely reactive to pancreatitis or less likely relates to a primary infectious/inflammatory duodenitis.  No free intraperitoneal air. 3. Nodular liver contours are compatible with cirrhosis.  There are also stigmata of portal hypertension, which include worsening small-moderate volume intra-abdominal ascites, splenomegaly, and  upper abdominal/paraesophageal varices. 4. Small dependent left pleural effusion is new since prior exam. 5. Consolidative opacities in the left lower lobe have improved.  There are, however, worsening patchy consolidative and ground-glass density opacities throughout the imaged right lung base.  Findings could relate to infection/aspiration, pulmonary edema, or acute respiratory distress syndrome. 6. Colonic diverticulosis. 7. Lesser incidental findings as above.   Dictated by (CST): Arcenio Norman MD on 3/24/2025 at 5:40 PM     Finalized by (CST): Arcenio Norman MD on 3/24/2025 at 5:49 PM          XR CHEST AP PORTABLE  (CPT=71045)    Result Date: 3/23/2025  CONCLUSION:   No significant change in the multifocal opacities in both lungs.    Dictated by (CST): Emmett Quan MD on 3/23/2025 at 9:24 PM     Finalized by (CST): Emmett Quan MD on 3/23/2025 at 9:26 PM

## 2025-03-26 NOTE — OCCUPATIONAL THERAPY NOTE
OCCUPATIONAL THERAPY TREATMENT NOTE - INPATIENT        Room Number: 568/568-A     Presenting Problem: USMAN; ETOH; respiratory failure; extubated 3/17    Problem List  Principal Problem:    USMAN (acute kidney injury)  Active Problems:    Metabolic acidosis    Hyperkalemia    Leukocytosis    Thrombocytopenia    Coagulopathy (HCC)    Hyponatremia    Alcoholic (HCC)    Scleral icterus    Acute kidney failure    DTs (delirium tremens) (HCC)    Alcohol use disorder    Acute respiratory failure with hypoxia (HCC)    Wernicke's syndrome    Hypophosphatemia    Hypernatremia    Alcohol-induced acute pancreatitis (HCC)    Alcoholic hepatitis without ascites (HCC)      OCCUPATIONAL THERAPY ASSESSMENT   Patient demonstrates good  progress this session, goals remain in progress.    Patient is requiring minimal assist and maximum assist as a result of the following impairments: decreased functional strength, decreased endurance, impaired standing balance, decreased muscular endurance, and medical status.    Patient continues to function below baseline with toileting, bathing, upper body dressing, lower body dressing, grooming, eating, bed mobility, transfers, static standing balance, dynamic standing balance, functional standing tolerance, and energy conservation strategies.  Next session anticipate patient to progress toileting, bathing, upper body dressing, lower body dressing, grooming, bed mobility, transfers, static standing balance, dynamic standing balance, functional standing tolerance, and energy conservation strategies.  Occupational Therapy will continue to follow patient for duration of hospitalization.    Patient continues to benefit from continued skilled OT services: to facilitate return to prior level of function as patient demonstrates high motivation with excellent tolerance to an intensive therapy program.     PLAN DURING HOSPITALIZATION  OT Device Recommendations: TBD  OT Treatment Plan: Balance activities, ADL  training, Energy conservation/work simplification techniques, IADL training, Functional transfer training, UE strengthening/ROM, Endurance training, Patient/Family education, Patient/Family training, Equipment eval/education, Neuromuscluar reeducation, Compensatory technique education     SUBJECTIVE  \"I would era to try and walk today.\"    OBJECTIVE  Precautions: Bed/chair alarm, Rectal tube, HOB elevated 30 degrees (NG)    PAIN ASSESSMENT  Ratin  Location: -- (chest pain)  Management Techniques: Breathing techniques (protective bracing)    O2 SATURATIONS  Oxygen Therapy  SPO2% on Oxygen at Rest: 98  At rest oxygen flow (liters per minute): 3.5  SPO2% Ambulation on Oxygen: 90  Ambulation oxygen flow (liters per minute): 4    ACTIVITIES OF DAILY LIVING ASSESSMENT  AM-PAC ‘6-Clicks’ Inpatient Daily Activity Short Form  How much help from another person does the patient currently need…  -   Putting on and taking off regular lower body clothing?: A Lot  -   Bathing (including washing, rinsing, drying)?: A Lot  -   Toileting, which includes using toilet, bedpan or urinal? : A Lot  -   Putting on and taking off regular upper body clothing?: A Little  -   Taking care of personal grooming such as brushing teeth?: A Little  -   Eating meals?: A Little    AM-PAC Score:  Score: 15  Approx Degree of Impairment: 56.46%  Standardized Score (AM-PAC Scale): 34.69  CMS Modifier (G-Code): CK    FUNCTIONAL TRANSFER ASSESSMENT  Sit to Stand: Edge of Bed; Chair  Edge of Bed: Moderate Assist  Chair: Moderate Assist    BED MOBILITY  Rolling: Not Tested  Supine to Sit : Minimal Assist  Sit to Supine (OT): Not Tested    BALANCE ASSESSMENT  Static Sitting: Moderate Assist  Static Standing: Not Tested    FUNCTIONAL ADL ASSESSMENT  Eating: Minimal Assist  Grooming Seated: Minimal Assist  Bathing Seated: Maximum Assist  UB Dressing Seated: Minimal Assist  LB Dressing Seated: Maximum Assist  Toileting Seated: Maximum Assist    THERAPEUTIC  EXERCISE     Skilled Therapy Provided: Patient received supine in bed. Patient performing ADLs and functional mobility at a min-max A this session. Patient most limited by overall strength and endurance. Education provided on body mechanics and how that manifests functionally while completing ADLs and functional mobility. Patient with good return demonstration on all education. Patient able to complete sit<>stand with mod A and min-mod A to complete functional mobility.    EDUCATION PROVIDED  Patient Education : Plan of Care; Functional Transfer Techniques; Fall Prevention  Patient's Response to Education: Verbalized Understanding  Family/Caregiver Education : Role of Occupational Therapy; Plan of Care; Discharge Recommendations  Family/Caregiver's Response to Education: Verbalized Understanding    The patient's Approx Degree of Impairment: 56.46% has been calculated based on documentation in the [facility] '6 clicks' Inpatient Daily Activity Short Form.  Research supports that patients with this level of impairment may benefit from Acute Rehab.  Final disposition will be made by interdisciplinary medical team.    Patient End of Session: Up in chair, Needs met, Call light within reach, RN aware of session/findings, All patient questions and concerns addressed, Hospital anti-slip socks, Alarm set, Family present    OT Goals:   Patient will complete functional transfer with Mod A   Comment: ongoing    Patient will complete toileting with Mod A   Comment: ongoing    Patient will tolerate standing for 1-2 minutes in prep for adls with Mod A    Comment: ongoing    Patient will tolerate unsupported sitting at eOB with SBA in prep for seated ADLs  Comment:Goal Met            Goals  on: 25  Frequency: 3-5x week     Self-Care Home Management: 15 minutes  Therapeutic Activity: 15 minutes

## 2025-03-26 NOTE — PROGRESS NOTES
Pulmonary/ICU/Critical Care Progress Note        Reason for Consultation: resp failure  Referring Physician: Dr. Diaz      Subjective:  On NC  Continues to experience fevers  Hiccups back      From the initial consultation  Pt is unable to provide info  HPI: 43 yo male with hx of gout, GERD, admitted with acute ETOH hepatitis, ETOH use, emesis, USMAN. Seen by GI.  On CIWA protocol, thiamine, folic acid, MVI, BZDs, lactulose  Has been in the ICU for 2 days  ICU consulted this am for worsening hypoxemia  CXR this am with b/l infiltrates concerning for PNA      REVIEW OF SYSTEMS:  Positives and negatives as noted in HPI. All other review of systems otherwise are either limited (due to pt/family inability to provide) or negative.      PAST MEDICAL HISTORY:  Past Medical History:   Diagnosis Date    Esophageal reflux     Gout     Hernia          PAST SURGICAL HISTORY:  Past Surgical History:   Procedure Laterality Date    Colonoscopy N/A 11/1/2023    Procedure: COLONOSCOPY;  Surgeon: Vandana Austin MD;  Location: [facility] ENDOSCOPY    Egd  02/15/2016    Eh pr repair complex scalp arms legs 1.1 to 2.5 cm  2014    Elbow fracture surgery Right 1991    Hernia surgery      Inguinal hernia repair Left 01/17/2023         PAST SOCIAL HISTORY:  Social History     Socioeconomic History    Marital status: Single    Number of children: 0   Occupational History    Occupation: Supervisor, car wash   Tobacco Use    Smoking status: Former     Types: Cigarettes    Smokeless tobacco: Never   Vaping Use    Vaping status: Some Days   Substance and Sexual Activity    Alcohol use: Yes     Comment: per pt, DAILY HARD LEMONADE- [employer] hard lemonade 24oz cans, 6 cans daily    Drug use: Yes     Types: Cannabis     Comment: last use, couple months ago per pt report         PAST FAMILY HISTORY:  Family History   Problem Relation Age of Onset    Diabetes Father     Hypertension Father     Cancer Mother         liver        ALLERGIES:  Allergies[1]      MEDS:  Home Medications:  Medications Taking[2]    Scheduled Medication:   pentoxifylline ER  400 mg Oral TID CC    pantoprazole  40 mg Oral BID AC    epoetin sheela  10,000 Units Subcutaneous Once per day on Tuesday Thursday Saturday    furosemide  20 mg Intravenous BID (Diuretic)    vancomycin  125 mg Per NG Tube Daily    multivitamin  1 tablet Oral Daily    folic acid  1 mg Oral Daily     Continuous Infusing Medication:      PRN Medications:    baclofen    acetaminophen    haloperidol lactate    polyethylene glycol (PEG 3350)    bisacodyl    prochlorperazine       PHYSICAL EXAM:  /68 (BP Location: Right arm)   Pulse 108   Temp 99 °F (37.2 °C) (Oral)   Resp 18   Ht 5' 8\" (1.727 m)   Wt 186 lb 11.7 oz (84.7 kg)   SpO2 92%   BMI 28.39 kg/m²      CONSTITUTIONAL: NAD + jaundice  HEENT: AT/NC  MOUTH: MMM  NECK/THROAT: no JVD. Trachea midline. No obvious thyromegaly  LUNG: clear b/l no wheezing, + b/l crackles. Chest symmetric with respiratory motion. Hiccupping   HEART: regular rate and rhythm, no obvious murmers or gallops noted  ABD: soft non tender. + bowel sounds. No organomegaly noted  EXT: no clubbing, cyanosis. Min b/l LE edema      IMAGES:   CT abd/pelvis 3/24/25  CONCLUSION:   1. Findings of mild acute interstitial edematous pancreatitis are very similar since examination from 12 days prior.  There is small volume upper retroperitoneal/peripancreatic free fluid.  No CT evidence of pancreatic necrosis.  No   well-defined/drainable peripancreatic collection.  No significant pancreatic ductal dilation.   2. Mild circumferential wall thickening at the C-loop of the duodenum is most likely reactive to pancreatitis or less likely relates to a primary infectious/inflammatory duodenitis.  No free intraperitoneal air.   3. Nodular liver contours are compatible with cirrhosis.  There are also stigmata of portal hypertension, which include worsening small-moderate volume  intra-abdominal ascites, splenomegaly, and upper abdominal/paraesophageal varices.   4. Small dependent left pleural effusion is new since prior exam.   5. Consolidative opacities in the left lower lobe have improved.  There are, however, worsening patchy consolidative and ground-glass density opacities throughout the imaged right lung base.  Findings could relate to infection/aspiration, pulmonary   edema, or acute respiratory distress syndrome.   6. Colonic diverticulosis.   7. Lesser incidental findings as above.     Chest CT PE 3/21/25 on my review no PE. Overall improving b/l pulmonary infiltrates  CONCLUSION:   1. No large central pulmonary embolus.  Evaluation beyond the proximal segmental level is precluded by heterogeneous contrast bolus and respiratory related motion artifact.   2. Enlargement of main pulmonary artery suggests pulmonary hypertension.   3. Interval improvement in multifocal bilateral pneumonia.   4. Trace left pleural effusion.   5. Herniation of fat into the left inferior mediastinum without significant change.   6. Hepatic cirrhosis with changes related to portal hypertension including small amount of perihepatic ascites.   7. Mild LAD coronary artery calcification.   8. Mild left atrial enlargement     VQ scan indeterminate 3/20/25    LE dopplers neg 3/20/25    CXR 3/20/25  CONCLUSION: Patchy alveolar opacities throughout the lungs, suggesting edema or multifocal pneumonia.  No significant interval change since preceding exam.  Interval extubation and removal of feeding tube.     CXR 3/17/25  Findings and impression:   ETT 6 cm above the em   Enteric tube beneath the diaphragm   Stable heart   Persistent low lung volumes and bilateral diffuse pulmonary opacities.   No pneumothorax     CT C/A/P 3/12/25  CONCLUSION:   1. Diffuse pancreatic enlargement and mild peripancreatic inflammatory stranding are new since abdominal CT from 8 days prior and concerning for acute interstitial  edematous pancreatitis.  No gross pancreatic ductal dilation or peripancreatic collection.     Request correlation with serum lipase levels.   2. Left greater than right lower lobe airspace disease with intrinsic air bronchograms.  Findings are similar on the left and slightly improved on the right since chest CT from 5 days prior; multifocal pneumonia/aspiration is suspected.  Also identified   are new and/or worsening multifocal upper lobe predominant ground-glass density opacities throughout both lungs; these ground-glass density opacities could be infectious in nature or relate to acute respiratory distress syndrome.   3. Endotracheal tube with tip approximately 1 cm above the level of the em.  Enteric tube tip in the gastric fundus.  Rectal tube and Cedeño catheter also noted.   4. Fatty liver and cirrhosis.   5. Stable splenomegaly.   6. Small to moderate volume intra-abdominal ascites is new since prior abdominal CT.  There is also new mild anasarca.   7. Liquid contents in the colon could relate to a malabsorption state or low-grade infectious/inflammatory versus antibiotic associated colitis.  Mild scattered colonic diverticulosis, but with no CT findings of acute diverticulitis.   8. Low-density appearance of the intracardiac blood pool raises the possibility of underlying anemia. Correlate with hematologic parameters.    9. Lesser incidental findings as above.       CXR 3/12/25 with multifocal infiltrates    CXR 3/11/25  FINDINGS/IMPRESSION:   1. There is redemonstration of patchy alveolar opacities throughout both lungs.  The findings could represent alveolar edema, a diffuse multifocal infectious process, or a combination.  The findings have not significantly changed since previous exam.   2. The heart mediastinal structures remain enlarged.  The there are trace bilateral pleural effusions.   3. The thoracic component of an enteric feeding tube is identified traversing the thoracic esophagus.  The  distal tip is not visualized but lies well below the GE junction.     CT head 3/7/25  CONCLUSION:   Motion limits evaluation.  No evidence of acute intracranial abnormality.     CT chest 3/7/25  CONCLUSION:   1. Extensive bilateral airspace disease, concerning for potential multifocal pneumonia. Continued surveillance is advised.   2. Dense bilateral lower airspace consolidation is evident; aspiration pneumonitis is a differential diagnostic possibility. Follow-up is recommended to document resolution.    3. Dilatation of the main pulmonary artery trunk may relate to underlying pulmonary hypertension.    4. Small hiatal hernia with imaging manifestations that may be indicative underlying esophagitis.   5. Marked hepatic steatosis.   6. Lesser incidental findings as above.     CXR 3/7/25 with multifocal infiltrates, possible effusion on the left  CONCLUSION:   1. Cardiomegaly.  Tortuous aorta.   2. Extensive multifocal airspace opacification in the bilateral perihilar and basilar regions with left-sided effusion.  Differential includes pulmonary edema congestive failure versus multifocal pneumonitis.      CT abd/pelvis 3/4/25  CONCLUSION:   Severely fatty liver with subtle undulating contour.  Stable splenomegaly.  Tubular structures around the GE junction are suggestive of lower esophageal varices.  No ascites       LABS:  Recent Labs   Lab 03/24/25  0554 03/25/25  0600 03/26/25  0532   RBC 2.90* 2.91* 2.82*   HGB 7.8* 7.9* 7.6*   HCT 23.7* 23.4* 22.9*   MCV 81.7 80.4 81.2   MCH 26.9 27.1 27.0   MCHC 32.9 33.8 33.2   RDW 32.9* 32.8* 32.9*   NEPRELIM 17.06* 17.48* 21.14*   WBC 20.9* 21.4* 24.5*   .0 264.0 222.0       Recent Labs   Lab 03/24/25  0554 03/25/25  0600 03/26/25  0532   GLU 96 103* 108*   BUN 25* 24* 25*   CREATSERUM 1.05 1.08 1.06   EGFRCR 89 86 88   CA 7.8* 7.9* 7.6*   ALB 2.5* 2.6* 2.5*   * 136 135*   K 3.2* 3.4*  3.4* 3.4*  3.4*    104 104   CO2 22.0 23.0 22.0   ALKPHO 211* 220*  212*   * 162* 166*   ALT 89* 83* 80*   BILT 14.9* 15.2* 15.4*   TP 5.3* 5.5* 5.4*       ASSESSMENT/PLAN:  Acute hypoxemic resp failure  -secondary to multifocal infiltrates. Given hx of emesis, concern for aspiration  -checked non contrast chest CT showed b/l infiltrates  -initially on zosyn, doxy. Checked urine leg, strep pneumo, mrsa nares, blood cx neg thus far  -initially weaned from airvo to NC but then declined and was intubated on 3/12/25. Was on full MV support   -passed SBT on 3/17/25 and extubated to NC. Now on 4LNC  -ID consulted and stopped doxy and zosyn. Switched to meropenem and completed course  -repeat CT with b/l infiltrates L>R  -underwent bronchoscopy with left BAL on 3/13/25. Cultures NGTD  -aspiration precautions  -experienced chest discomfort last week with taking a deep breath in. CXR pretty unchanged. VQ scan indeterminate. LE dopplers neg. Checked a chest CT PE was neg for PE and showed improving b/l pulmonary infiltrates. Possibly new ones on the right. Concern for aspiration?  -now weaned to NC supp 02    Acute ETOH hepatitis and now pancreatitis  -GI following. Imaging as above-now with pancreatitis  -PPI  -passed swallow   -persistent leukocytosis and fevers  -ID restarted abx and checked abd CT as noted above. Abx stopped since    -hypotension (now resolved)-multifactorial from sedation, sepsis, anemia  -likely secondary to sedation and sepsis  -Pt didn't receive sepsis bolus b/c deemed fluid overloaded d/t possible cirrhosis. Was on low dose levophed for MAP > 65 weaned off   -possibly d/t acute anemia. No obvious source seen. S/p transfused 1 unit pRBC on 3/16/25  -off pressors now    Hiccups   -had resolved  -restart baclofen     ETOH withdrawal (recovered)  -s/p CIWA protocol  -psych was following   -CT head neg for acute changes    USMAN and hypernatremia (resolved)  -s/p bicarb infusion  -s/p D5 0.9 NS  -renal previously following. Labs improved    Proph  -DVT: elevated INR  low PLT, anemia. On SCDS  -nutrition: passed swallow     Dispo  -Full code  -sign other at bedside    Thank you for the opportunity to care for Sami Escobedo DO, MPH  Pulmonary Critical Care Medicine  [facility]                       [1]   Allergies  Allergen Reactions    Morphine SWELLING     SHORTNESS OF BREATH   [2]   No outpatient medications have been marked as taking for the 3/4/25 encounter (Hospital Encounter).

## 2025-03-26 NOTE — CONGREGATE LIVING REVIEW
[facility] Living Authorization    The [facility] Review Committee has reviewed this case and the patient IS APPROVED for discharge to a facility for Short Term Skilled once the following procedure is followed:     - The physician discharge instructions (contained within the SAS[facility] note for SNF) must inlcude the below appropriate and approved COVID instructions to the facility    For questions regarding CLRC approval process, please contact the CM assigned to the case.  For questions regarding RN discharge workflow, please contact the unit Clinical Leader.

## 2025-03-26 NOTE — PROGRESS NOTES
INFECTIOUS DISEASE PROGRESS NOTE  [facility]  part of [facility] ID PROGRESS NOTE    Sami Grijalva . Patient Status:  Inpatient    10/11/1980 MRN [id number]   Location [facility] 2W/SW Attending Natasha Araujo MD   Hosp Day # 22 PCP Kerri Medina MD     Subjective:  ROS reviewed. Tmax 102.2 last night. On 4L NC. Has been eating. Having formed Bms.    ASSESSMENT:    Antibiotics: OVP, Meropenem  (doxycycline, vancomycin, zosyn) Meropenem 3/14-3/20, vancomycin     # Continued fevers with bandemia ?drug fever   -CT A/P with stable pancreatitis with worsening RLL infiltrate   -CT chest 3/21 without PE with improved PNA  # Acute hypoxic respiratory failure with fever and multifocal pneumonia   -S/p intubation 3/12   -s/p bronch 3/13, cx NGTD   -MRSA nares negative  # Acute aspiration pneumonia  # Acute leukocytosis   # Acute anemia  # Acute pancreatitis  # Alcoholic hepatitis with encephalopathy on admission               - CT A/P with severe fatty liver               - US GB with sludge w/o cholecystitis   - CT C/A/P 3/12 with pancreatitis  # USMAN - improved  # EtOH abuse and withdrawal     PLAN:  -  Stop meropenem and monitor off abx. Continue on OVP.  -  Follow fever curve, wbc.  -  Reviewed labs, micro, imaging reports, available old records.  -  Case d/w patient, partner at bedside, RN.     History of Present Illness:  44-year-old male with a history of GERD, alcohol use was admitted to the hospital on 3 generalized abdominal pain, hiccups, vomiting with difficulty tolerating orals.  Last drink prior to admission was 1 day prior.  Found to have sepsis with elevated lactic acid, hypotension, WBC 17.5.  Responded to IV fluids and received lactulose for elevated ammonia.  CT A/P with severe fatty liver, stable splenomegaly.  Was lethargic the first few days and had soft restraints with Cedeño, Flexi-Seal, NG tube.  Has been essentially afebrile here with a  temperature on 3/10 of 100.6 but significant hypoxia up and down and currently increased up to 40 L with increase WBC to 17.4.  Initial USMAN has improved.  Was started on IV Zosyn and doxycycline on 3/7 when patient became more hypoxic and chest x-ray showed extensive multifocal pneumonia with CT chest showing similar findings.  Possible aspiration.    Physical Exam:  /66 (BP Location: Right arm)   Pulse 99   Temp 99 °F (37.2 °C) (Oral)   Resp 18   Ht 5' 8\" (1.727 m)   Wt 186 lb 11.7 oz (84.7 kg)   SpO2 92%   BMI 28.39 kg/m²     Gen:   Awake, in bed  HEENT:  +scleral icterus, neck supple  CV/lungs:  Regular rate and rhythm, lungs with bilateral rhonchi  Abdom:  Soft, no TTP  Skin/extrem:  No rashes, jaundiced  :   Primofit+  Lines:  Midline+    Laboratory Data: Reviewed    Microbiology: Reviewed    Radiology: Reviewed      [facility]  [phone]  3/26/2025

## 2025-03-26 NOTE — PLAN OF CARE
Problem: Patient Centered Care  Goal: Patient preferences are identified and integrated in the patient's plan of care  Description: Interventions:  - What would you like us to know as we care for you?   - Provide timely, complete, and accurate information to patient/family  - Incorporate patient and family knowledge, values, beliefs, and cultural backgrounds into the planning and delivery of care  - Encourage patient/family to participate in care and decision-making at the level they choose  - Honor patient and family perspectives and choices  Outcome: Progressing     Problem: Patient/Family Goals  Goal: Patient/Family Long Term Goal  Description: Patient's Long Term Goal:     Interventions:  -   - See additional Care Plan goals for specific interventions  Outcome: Progressing  Goal: Patient/Family Short Term Goal  Description: Patient's Short Term Goal:     Interventions:   -   - See additional Care Plan goals for specific interventions  Outcome: Progressing     Problem: PAIN - ADULT  Goal: Verbalizes/displays adequate comfort level or patient's stated pain goal  Description: INTERVENTIONS:  - Encourage pt to monitor pain and request assistance  - Assess pain using appropriate pain scale  - Administer analgesics based on type and severity of pain and evaluate response  - Implement non-pharmacological measures as appropriate and evaluate response  - Consider cultural and social influences on pain and pain management  - Manage/alleviate anxiety  - Utilize distraction and/or relaxation techniques  - Monitor for opioid side effects  - Notify MD/LIP if interventions unsuccessful or patient reports new pain  - Anticipate increased pain with activity and pre-medicate as appropriate  Outcome: Progressing     Problem: RISK FOR INFECTION - ADULT  Goal: Absence of fever/infection during anticipated neutropenic period  Description: INTERVENTIONS  - Monitor WBC  - Administer growth factors as ordered  - Implement neutropenic  guidelines  Outcome: Progressing     Problem: SAFETY ADULT - FALL  Goal: Free from fall injury  Description: INTERVENTIONS:  - Assess pt frequently for physical needs  - Identify cognitive and physical deficits and behaviors that affect risk of falls.  - Eunice fall precautions as indicated by assessment.  - Educate pt/family on patient safety including physical limitations  - Instruct pt to call for assistance with activity based on assessment  - Modify environment to reduce risk of injury  - Provide assistive devices as appropriate  - Consider OT/PT consult to assist with strengthening/mobility  - Encourage toileting schedule  Outcome: Progressing     Problem: DISCHARGE PLANNING  Goal: Discharge to home or other facility with appropriate resources  Description: INTERVENTIONS:  - Identify barriers to discharge w/pt and caregiver  - Include patient/family/discharge partner in discharge planning  - Arrange for needed discharge resources and transportation as appropriate  - Identify discharge learning needs (meds, wound care, etc)  - Arrange for interpreters to assist at discharge as needed  - Consider post-discharge preferences of patient/family/discharge partner  - Complete POLST form as appropriate  - Assess patient's ability to be responsible for managing their own health  - Refer to Case Management Department for coordinating discharge planning if the patient needs post-hospital services based on physician/LIP order or complex needs related to functional status, cognitive ability or social support system  Outcome: Progressing     Problem: Delirium  Goal: Minimize duration of delirium  Description: Interventions:  - Encourage use of hearing aids, eye glasses  - Promote highest level of mobility daily  - Provide frequent reorientation  - Promote wakefulness i.e. lights on, blinds open  - Promote sleep, encourage patient's normal rest cycle i.e. lights off, TV off, minimize noise and interruptions  - Encourage  family to assist in orientation and promotion of home routines  Outcome: Progressing     Problem: RESPIRATORY - ADULT  Goal: Achieves optimal ventilation and oxygenation  Description: INTERVENTIONS:  - Assess for changes in respiratory status  - Assess for changes in mentation and behavior  - Position to facilitate oxygenation and minimize respiratory effort  - Oxygen supplementation based on oxygen saturation or ABGs  - Provide Smoking Cessation handout, if applicable  - Encourage broncho-pulmonary hygiene including cough, deep breathe, Incentive Spirometry  - Assess the need for suctioning and perform as needed  - Assess and instruct to report SOB or any respiratory difficulty  - Respiratory Therapy support as indicated  - Manage/alleviate anxiety  - Monitor for signs/symptoms of CO2 retention  Outcome: Progressing     Problem: METABOLIC/FLUID AND ELECTROLYTES - ADULT  Goal: Electrolytes maintained within normal limits  Description: INTERVENTIONS:  - Monitor labs and rhythm and assess patient for signs and symptoms of electrolyte imbalances  - Administer electrolyte replacement as ordered  - Monitor response to electrolyte replacements, including rhythm and repeat lab results as appropriate  - Fluid restriction as ordered  - Instruct patient on fluid and nutrition restrictions as appropriate  Outcome: Progressing  Goal: Hemodynamic stability and optimal renal function maintained  Description: INTERVENTIONS:  - Monitor labs and assess for signs and symptoms of volume excess or deficit  - Monitor intake, output and patient weight  - Monitor urine specific gravity, serum osmolarity and serum sodium as indicated or ordered  - Monitor response to interventions for patient's volume status, including labs, urine output, blood pressure (other measures as available)  - Encourage oral intake as appropriate  - Instruct patient on fluid and nutrition restrictions as appropriate  Outcome: Progressing     Problem: Impaired  Swallowing  Goal: Minimize aspiration risk  Description: Interventions:  - Patient should be alert and upright for all feedings (90 degrees preferred)  - Offer food and liquids at a slow rate  - No straws  - Encourage small bites of food and small sips of liquid  - Offer pills one at a time, crush or deliver with applesauce as needed  - Discontinue feeding and notify MD (or speech pathologist) if coughing or persistent throat clearing or wet/gurgly vocal quality is noted  Outcome: Progressing     Problem: Impaired Cognition  Goal: Patient will exhibit improved attention, thought processing and/or memory  Description: Interventions:    Outcome: Progressing     Problem: CARDIOVASCULAR - ADULT  Goal: Maintains optimal cardiac output and hemodynamic stability  Description: INTERVENTIONS:- Monitor vital signs, rhythm, and trends- Monitor for bleeding, hypotension and signs of decreased cardiac output- Evaluate effectiveness of vasoactive medications to optimize hemodynamic stability- Monitor arterial and/or venous puncture sites for bleeding and/or hematoma- Assess quality of pulses, skin color and temperature- Assess for signs of decreased coronary artery perfusion - ex. Angina- Evaluate fluid balance, assess for edema, trend weights  Outcome: Progressing

## 2025-03-26 NOTE — PHYSICAL THERAPY NOTE
PHYSICAL THERAPY TREATMENT NOTE - INPATIENT     Room Number: 568/568-A       Presenting Problem: USMAN, hiccups, nausea, vomiting, abdominal pain, alcohol hepatitis, pneumonia, intubated on 3/12/25 and extubated on 3/17/25  Co-Morbidities : alcoholism, anxiety, gout, gastroesophageal reflux disease    Problem List  Principal Problem:    USMAN (acute kidney injury)  Active Problems:    Metabolic acidosis    Hyperkalemia    Leukocytosis    Thrombocytopenia    Coagulopathy (HCC)    Hyponatremia    Alcoholic (HCC)    Scleral icterus    Acute kidney failure    DTs (delirium tremens) (HCC)    Alcohol use disorder    Acute respiratory failure with hypoxia (HCC)    Wernicke's syndrome    Hypophosphatemia    Hypernatremia    Alcohol-induced acute pancreatitis (HCC)    Alcoholic hepatitis without ascites (HCC)      PHYSICAL THERAPY ASSESSMENT   Patient demonstrates good  progress this session, goals  progressing with bed mobility and ambulation this session.      Patient is requiring contact guard assist, minimal assist, and moderate assist  x1-2 as a result of the following impairments: decreased functional strength, decreased endurance/aerobic capacity, impaired sitting and standing dynamic balance, impaired coordination, decreased muscular endurance, medical status, and increased O2 needs from baseline.     Patient continues to function below baseline with bed mobility, transfers, gait, stair negotiation, maintaining seated position, standing prolonged periods, and performing household tasks.  Next session anticipate patient to progress bed mobility, transfers, gait, stair negotiation, maintaining seated position, and standing prolonged periods.  Physical Therapy will continue to follow patient for duration of hospitalization.    Patient continues to benefit from continued skilled PT services: to facilitate return to prior level of function as patient demonstrates high motivation with excellent tolerance to an intensive  therapy program .    PLAN DURING HOSPITALIZATION  Nursing Mobility Recommendation : 1 Assist (2 assist if ambulating greater than 5 ft)  PT Device Recommendation: Mechanical lift  PT Treatment Plan: Bed mobility, Body mechanics, Endurance, Patient education, Energy conservation, Gait training, Range of motion, Strengthening, Transfer training, Balance training  Frequency (Obs): 3-5x/week     SUBJECTIVE  I want to walk more    OBJECTIVE  Precautions: Bed/chair alarm, Rectal tube, HOB elevated 30 degrees (NG)        PAIN ASSESSMENT   Ratin  Location: denies  Management Techniques: Breathing techniques, Relaxation    BALANCE  Static Sitting: Fair +  Dynamic Sitting: Fair -  Static Standing: Fair -  Dynamic Standing: Poor +    ACTIVITY TOLERANCE  Pulse: 108 (up to 125 with activity staying stable)  Heart Rate Source: Monitor     BP: 118/68  BP Location: Right arm  BP Method: Automatic  Patient Position: Semi-Kelley     O2 WALK  Oxygen Therapy  SPO2% on Oxygen at Rest: 98  At rest oxygen flow (liters per minute): 3.5  SPO2% Ambulation on Oxygen: 90  Ambulation oxygen flow (liters per minute): 4    AM-PAC '6-Clicks' INPATIENT SHORT FORM - BASIC MOBILITY  How much difficulty does the patient currently have...  Patient Difficulty: Turning over in bed (including adjusting bedclothes, sheets and blankets)?: A Little   Patient Difficulty: Sitting down on and standing up from a chair with arms (e.g., wheelchair, bedside commode, etc.): A Little   Patient Difficulty: Moving from lying on back to sitting on the side of the bed?: A Little   How much help from another person does the patient currently need...   Help from Another: Moving to and from a bed to a chair (including a wheelchair)?: A Little   Help from Another: Need to walk in hospital room?: A Lot   Help from Another: Climbing 3-5 steps with a railing?: A Lot     AM-PAC Score:  Raw Score: 16   Approx Degree of Impairment: 54.16%   Standardized Score (AM-PAC Scale):  40.78   CMS Modifier (G-Code): CK    FUNCTIONAL ABILITY STATUS  Functional Mobility/Gait Assessment  Gait Assistance: Minimum assistance, Moderate assistance (with chair follow)  Distance (ft): 15 ft + 30 ft + 40 ft  Assistive Device: Rolling walker  Pattern: Shuffle (NBOS, more unsteady with increased distance)  Rolling: stand-by assist  Supine to Sit: contact guard assist  Sit to Stand: minimal assist from bed height, Mod A from lower chair height to RW, both with cues for hand placement on bed/arm rests prior to standing    Skilled Therapy Provided: Pt rec'd in bed with family member at bedside, agreeable to therapy, identified by name and , gait belt donned for mobility. Coordinated session with OT to maximize pt outcomes. Pt very motivated to participate in therapy this date, denies pain. Pt benefited from increased time to perform mobility tasks and to acclimate to changes in position. VSS and monitored during session, with noted tachycardia with activity, HR up to 126 bpm but remained stable with increased activity. Slight drop in Spo2 from 98% on 3.5L at rest to 90% on 4L with activity. Pt progressing towards ambulation goal, able to ambulate short distances with chair follow and Min A/Mod A and RW, most limited 2/2 fatigue. Seated rest breaks given for recovery. Pt demos slow gait with no noted LOB, however requiring increased hands on assist for RW management and dynamic balance as pt fatigued. Pt up in chair at end of session, encouraged to remain in chair at least 1 hour and to call for staff assist back to bed, with good understanding.       The patient's Approx Degree of Impairment: 54.16% has been calculated based on documentation in the [facility] '6 clicks' Inpatient Daily Activity Short Form.  Research supports that patients with this level of impairment may benefit from rehab.  Final disposition will be made by interdisciplinary medical team.    Patient End of Session: Needs met, Call light within  reach, Up in chair, RN aware of session/findings, Alarm set, Family present    CURRENT GOALS   Goals to be met by: 4/15/25   (goals revised 3/25/25 secondary to progress)  Patient Goal Return home   Goal #1 Patient is able to demonstrate supine - sit EOB - indep      Goal #1   Current Status In progress   Goal #2 Patient is able to demonstrate transfers Sit to/from Stand at assistance level: mod indep with walker - rolling   Goal #2  Current Status In progress   Goal #3  Patient is able to ambulate 150 feet with assist device: walker - rolling at assistance level: mod indep    Goal #3   Current Status In progress   Goal #4 Min A with stair mobility 5 steps with 1 SR support   Goal #4   Current Status NT   Goal #5 Patient to demonstrate independence with home activity/exercise instructions provided to patient in preparation for discharge.   Goal #5   Current Status Ongoing    Goal #6     Goal #6  Current Status      Gait Training: 15 minutes  Therapeutic Activity: 10 minutes

## 2025-03-27 LAB
ALBUMIN SERPL-MCNC: 2.4 G/DL (ref 3.2–4.8)
ALBUMIN/GLOB SERPL: 0.8 {RATIO} (ref 1–2)
ALP LIVER SERPL-CCNC: 218 U/L
ALT SERPL-CCNC: 75 U/L
ANION GAP SERPL CALC-SCNC: 7 MMOL/L (ref 0–18)
AST SERPL-CCNC: 155 U/L (ref ?–34)
BASOPHILS # BLD: 0 X10(3) UL (ref 0–0.2)
BASOPHILS NFR BLD: 0 %
BILIRUB SERPL-MCNC: 15.5 MG/DL (ref 0.3–1.2)
BUN BLD-MCNC: 24 MG/DL (ref 9–23)
BUN/CREAT SERPL: 24 (ref 10–20)
CALCIUM BLD-MCNC: 7.7 MG/DL (ref 8.7–10.4)
CHLORIDE SERPL-SCNC: 105 MMOL/L (ref 98–112)
CO2 SERPL-SCNC: 23 MMOL/L (ref 21–32)
CREAT BLD-MCNC: 1 MG/DL
DEPRECATED RDW RBC AUTO: 94 FL (ref 35.1–46.3)
EGFRCR SERPLBLD CKD-EPI 2021: 94 ML/MIN/1.73M2 (ref 60–?)
EOSINOPHIL # BLD: 0 X10(3) UL (ref 0–0.7)
EOSINOPHIL NFR BLD: 0 %
ERYTHROCYTE [DISTWIDTH] IN BLOOD BY AUTOMATED COUNT: 33.2 % (ref 11–15)
GLOBULIN PLAS-MCNC: 3.1 G/DL (ref 2–3.5)
GLUCOSE BLD-MCNC: 101 MG/DL (ref 70–99)
HCT VFR BLD AUTO: 23.3 %
HGB BLD-MCNC: 7.6 G/DL
LYMPHOCYTES NFR BLD: 0.72 X10(3) UL (ref 1–4)
LYMPHOCYTES NFR BLD: 3 %
MAGNESIUM SERPL-MCNC: 1.7 MG/DL (ref 1.6–2.6)
MCH RBC QN AUTO: 26.6 PG (ref 26–34)
MCHC RBC AUTO-ENTMCNC: 32.6 G/DL (ref 31–37)
MCV RBC AUTO: 81.5 FL
MONOCYTES # BLD: 0.72 X10(3) UL (ref 0.1–1)
MONOCYTES NFR BLD: 3 %
NEUTROPHILS # BLD AUTO: 20.52 X10 (3) UL (ref 1.5–7.7)
NEUTROPHILS NFR BLD: 90 %
NEUTS BAND NFR BLD: 4 %
NEUTS HYPERSEG # BLD: 22.56 X10(3) UL (ref 1.5–7.7)
OSMOLALITY SERPL CALC.SUM OF ELEC: 284 MOSM/KG (ref 275–295)
PLATELET # BLD AUTO: 208 10(3)UL (ref 150–450)
PLATELET MORPHOLOGY: NORMAL
PLATELETS.RETICULATED NFR BLD AUTO: 4.9 % (ref 0–7)
POTASSIUM SERPL-SCNC: 3.5 MMOL/L (ref 3.5–5.1)
POTASSIUM SERPL-SCNC: 3.5 MMOL/L (ref 3.5–5.1)
PROT SERPL-MCNC: 5.5 G/DL (ref 5.7–8.2)
RBC # BLD AUTO: 2.86 X10(6)UL
SODIUM SERPL-SCNC: 135 MMOL/L (ref 136–145)
TOTAL CELLS COUNTED BLD: 100
WBC # BLD AUTO: 24 X10(3) UL (ref 4–11)

## 2025-03-27 PROCEDURE — 99232 SBSQ HOSP IP/OBS MODERATE 35: CPT | Performed by: PHYSICIAN ASSISTANT

## 2025-03-27 PROCEDURE — 99233 SBSQ HOSP IP/OBS HIGH 50: CPT | Performed by: HOSPITALIST

## 2025-03-27 PROCEDURE — 99232 SBSQ HOSP IP/OBS MODERATE 35: CPT | Performed by: INTERNAL MEDICINE

## 2025-03-27 RX ORDER — IBUPROFEN 400 MG/1
400 TABLET, FILM COATED ORAL ONCE
Status: COMPLETED | OUTPATIENT
Start: 2025-03-27 | End: 2025-03-27

## 2025-03-27 RX ORDER — ACETAMINOPHEN 500 MG
1000 TABLET ORAL ONCE
Status: DISCONTINUED | OUTPATIENT
Start: 2025-03-27 | End: 2025-03-29

## 2025-03-27 RX ORDER — MAGNESIUM OXIDE 400 MG/1
400 TABLET ORAL ONCE
Status: COMPLETED | OUTPATIENT
Start: 2025-03-27 | End: 2025-03-27

## 2025-03-27 NOTE — PROGRESS NOTES
Pulmonary/ICU/Critical Care Progress Note        Reason for Consultation: resp failure  Referring Physician: Dr. Diaz      Subjective:  On NC  Fevers improved  Hiccups when startled and eats per pt and sign other      From the initial consultation  Pt is unable to provide info  HPI: 45 yo male with hx of gout, GERD, admitted with acute ETOH hepatitis, ETOH use, emesis, USMAN. Seen by GI.  On CIWA protocol, thiamine, folic acid, MVI, BZDs, lactulose  Has been in the ICU for 2 days  ICU consulted this am for worsening hypoxemia  CXR this am with b/l infiltrates concerning for PNA      REVIEW OF SYSTEMS:  Positives and negatives as noted in HPI. All other review of systems otherwise are either limited (due to pt/family inability to provide) or negative.      PAST MEDICAL HISTORY:  Past Medical History:   Diagnosis Date    Esophageal reflux     Gout     Hernia          PAST SURGICAL HISTORY:  Past Surgical History:   Procedure Laterality Date    Colonoscopy N/A 11/1/2023    Procedure: COLONOSCOPY;  Surgeon: Vandana Austin MD;  Location: [facility] ENDOSCOPY    Egd  02/15/2016    Eh pr repair complex scalp arms legs 1.1 to 2.5 cm  2014    Elbow fracture surgery Right 1991    Hernia surgery      Inguinal hernia repair Left 01/17/2023         PAST SOCIAL HISTORY:  Social History     Socioeconomic History    Marital status: Single    Number of children: 0   Occupational History    Occupation: Supervisor, car [employer]   Tobacco Use    Smoking status: Former     Types: Cigarettes    Smokeless tobacco: Never   Vaping Use    Vaping status: Some Days   Substance and Sexual Activity    Alcohol use: Yes     Comment: per pt, DAILY HARD LEMONADE- [employer] hard lemonade 24oz cans, 6 cans daily    Drug use: Yes     Types: Cannabis     Comment: last use, couple months ago per pt report         PAST FAMILY HISTORY:  Family History   Problem Relation Age of Onset    Diabetes Father     Hypertension Father     Cancer Mother         liver        ALLERGIES:  Allergies[1]      MEDS:  Home Medications:  Medications Taking[2]    Scheduled Medication:   baclofen  10 mg Oral TID    pentoxifylline ER  400 mg Oral TID CC    pantoprazole  40 mg Oral BID AC    epoetin sheela  10,000 Units Subcutaneous Once per day on Tuesday Thursday Saturday    furosemide  20 mg Intravenous BID (Diuretic)    vancomycin  125 mg Per NG Tube Daily    multivitamin  1 tablet Oral Daily    folic acid  1 mg Oral Daily     Continuous Infusing Medication:      PRN Medications:    acetaminophen    haloperidol lactate    polyethylene glycol (PEG 3350)    bisacodyl    prochlorperazine       PHYSICAL EXAM:  /75 (BP Location: Right arm)   Pulse 112   Temp 99 °F (37.2 °C) (Oral)   Resp 18   Ht 5' 8\" (1.727 m)   Wt 188 lb 14.4 oz (85.7 kg)   SpO2 97%   BMI 28.72 kg/m²      CONSTITUTIONAL: NAD + jaundice  HEENT: AT/NC  MOUTH: MMM  NECK/THROAT: no JVD. Trachea midline. No obvious thyromegaly  LUNG: clear b/l no wheezing, + b/l crackles. Chest symmetric with respiratory motion. Hiccupping   HEART: regular rate and rhythm, no obvious murmers or gallops noted  ABD: soft non tender. + bowel sounds. No organomegaly noted  EXT: no clubbing, cyanosis. Min b/l LE edema      IMAGES:   CT abd/pelvis 3/24/25  CONCLUSION:   1. Findings of mild acute interstitial edematous pancreatitis are very similar since examination from 12 days prior.  There is small volume upper retroperitoneal/peripancreatic free fluid.  No CT evidence of pancreatic necrosis.  No   well-defined/drainable peripancreatic collection.  No significant pancreatic ductal dilation.   2. Mild circumferential wall thickening at the C-loop of the duodenum is most likely reactive to pancreatitis or less likely relates to a primary infectious/inflammatory duodenitis.  No free intraperitoneal air.   3. Nodular liver contours are compatible with cirrhosis.  There are also stigmata of portal hypertension, which include worsening  small-moderate volume intra-abdominal ascites, splenomegaly, and upper abdominal/paraesophageal varices.   4. Small dependent left pleural effusion is new since prior exam.   5. Consolidative opacities in the left lower lobe have improved.  There are, however, worsening patchy consolidative and ground-glass density opacities throughout the imaged right lung base.  Findings could relate to infection/aspiration, pulmonary   edema, or acute respiratory distress syndrome.   6. Colonic diverticulosis.   7. Lesser incidental findings as above.     Chest CT PE 3/21/25 on my review no PE. Overall improving b/l pulmonary infiltrates  CONCLUSION:   1. No large central pulmonary embolus.  Evaluation beyond the proximal segmental level is precluded by heterogeneous contrast bolus and respiratory related motion artifact.   2. Enlargement of main pulmonary artery suggests pulmonary hypertension.   3. Interval improvement in multifocal bilateral pneumonia.   4. Trace left pleural effusion.   5. Herniation of fat into the left inferior mediastinum without significant change.   6. Hepatic cirrhosis with changes related to portal hypertension including small amount of perihepatic ascites.   7. Mild LAD coronary artery calcification.   8. Mild left atrial enlargement     VQ scan indeterminate 3/20/25    LE dopplers neg 3/20/25    CXR 3/20/25  CONCLUSION: Patchy alveolar opacities throughout the lungs, suggesting edema or multifocal pneumonia.  No significant interval change since preceding exam.  Interval extubation and removal of feeding tube.     CXR 3/17/25  Findings and impression:   ETT 6 cm above the em   Enteric tube beneath the diaphragm   Stable heart   Persistent low lung volumes and bilateral diffuse pulmonary opacities.   No pneumothorax     CT C/A/P 3/12/25  CONCLUSION:   1. Diffuse pancreatic enlargement and mild peripancreatic inflammatory stranding are new since abdominal CT from 8 days prior and concerning for  acute interstitial edematous pancreatitis.  No gross pancreatic ductal dilation or peripancreatic collection.     Request correlation with serum lipase levels.   2. Left greater than right lower lobe airspace disease with intrinsic air bronchograms.  Findings are similar on the left and slightly improved on the right since chest CT from 5 days prior; multifocal pneumonia/aspiration is suspected.  Also identified   are new and/or worsening multifocal upper lobe predominant ground-glass density opacities throughout both lungs; these ground-glass density opacities could be infectious in nature or relate to acute respiratory distress syndrome.   3. Endotracheal tube with tip approximately 1 cm above the level of the em.  Enteric tube tip in the gastric fundus.  Rectal tube and Cedeño catheter also noted.   4. Fatty liver and cirrhosis.   5. Stable splenomegaly.   6. Small to moderate volume intra-abdominal ascites is new since prior abdominal CT.  There is also new mild anasarca.   7. Liquid contents in the colon could relate to a malabsorption state or low-grade infectious/inflammatory versus antibiotic associated colitis.  Mild scattered colonic diverticulosis, but with no CT findings of acute diverticulitis.   8. Low-density appearance of the intracardiac blood pool raises the possibility of underlying anemia. Correlate with hematologic parameters.    9. Lesser incidental findings as above.       CXR 3/12/25 with multifocal infiltrates    CXR 3/11/25  FINDINGS/IMPRESSION:   1. There is redemonstration of patchy alveolar opacities throughout both lungs.  The findings could represent alveolar edema, a diffuse multifocal infectious process, or a combination.  The findings have not significantly changed since previous exam.   2. The heart mediastinal structures remain enlarged.  The there are trace bilateral pleural effusions.   3. The thoracic component of an enteric feeding tube is identified traversing the thoracic  esophagus.  The distal tip is not visualized but lies well below the GE junction.     CT head 3/7/25  CONCLUSION:   Motion limits evaluation.  No evidence of acute intracranial abnormality.     CT chest 3/7/25  CONCLUSION:   1. Extensive bilateral airspace disease, concerning for potential multifocal pneumonia. Continued surveillance is advised.   2. Dense bilateral lower airspace consolidation is evident; aspiration pneumonitis is a differential diagnostic possibility. Follow-up is recommended to document resolution.    3. Dilatation of the main pulmonary artery trunk may relate to underlying pulmonary hypertension.    4. Small hiatal hernia with imaging manifestations that may be indicative underlying esophagitis.   5. Marked hepatic steatosis.   6. Lesser incidental findings as above.     CXR 3/7/25 with multifocal infiltrates, possible effusion on the left  CONCLUSION:   1. Cardiomegaly.  Tortuous aorta.   2. Extensive multifocal airspace opacification in the bilateral perihilar and basilar regions with left-sided effusion.  Differential includes pulmonary edema congestive failure versus multifocal pneumonitis.      CT abd/pelvis 3/4/25  CONCLUSION:   Severely fatty liver with subtle undulating contour.  Stable splenomegaly.  Tubular structures around the GE junction are suggestive of lower esophageal varices.  No ascites       LABS:  Recent Labs   Lab 03/25/25  0600 03/26/25  0532 03/27/25  0403   RBC 2.91* 2.82* 2.86*   HGB 7.9* 7.6* 7.6*   HCT 23.4* 22.9* 23.3*   MCV 80.4 81.2 81.5   MCH 27.1 27.0 26.6   MCHC 33.8 33.2 32.6   RDW 32.8* 32.9* 33.2*   NEPRELIM 17.48* 21.14* 20.52*   WBC 21.4* 24.5* 24.0*   .0 222.0 208.0       Recent Labs   Lab 03/25/25  0600 03/26/25  0532 03/27/25  0403   * 108* 101*   BUN 24* 25* 24*   CREATSERUM 1.08 1.06 1.00   EGFRCR 86 88 94   CA 7.9* 7.6* 7.7*   ALB 2.6* 2.5* 2.4*    135* 135*   K 3.4*  3.4* 3.4*  3.4* 3.5  3.5    104 105   CO2 23.0 22.0  23.0   ALKPHO 220* 212* 218*   * 166* 155*   ALT 83* 80* 75*   BILT 15.2* 15.4* 15.5*   TP 5.5* 5.4* 5.5*       ASSESSMENT/PLAN:  Acute hypoxemic resp failure  -secondary to multifocal infiltrates. Given hx of emesis, concern for aspiration  -checked non contrast chest CT showed b/l infiltrates  -initially on zosyn, doxy. Checked urine leg, strep pneumo, mrsa nares, blood cx neg thus far  -initially weaned from airvo to NC but then declined and was intubated on 3/12/25. Was on full MV support   -passed SBT on 3/17/25 and extubated to NC. Now on 4LNC  -ID consulted and stopped doxy and zosyn. Switched to meropenem and completed course  -repeat CT with b/l infiltrates L>R  -underwent bronchoscopy with left BAL on 3/13/25. Cultures NGTD  -aspiration precautions  -experienced chest discomfort last week with taking a deep breath in. CXR pretty unchanged. VQ scan indeterminate. LE dopplers neg. Checked a chest CT PE was neg for PE and showed improving b/l pulmonary infiltrates. Possibly new ones on the right. Concern for aspiration?  -now weaned to NC supp 02    Acute ETOH hepatitis and now pancreatitis  -GI following. Imaging as above-now with pancreatitis  -PPI  -passed swallow   -persistent leukocytosis and fevers  -ID restarted abx and checked abd CT as noted above. Abx stopped since  -fever trend improving     -hypotension (now resolved)-multifactorial from sedation, sepsis, anemia  -likely secondary to sedation and sepsis  -Pt didn't receive sepsis bolus b/c deemed fluid overloaded d/t possible cirrhosis. Was on low dose levophed for MAP > 65 weaned off   -possibly d/t acute anemia. No obvious source seen. S/p transfused 1 unit pRBC on 3/16/25  -off pressors now    Hiccups when he eats or is startled  -had resolved  -restarted baclofen     ETOH withdrawal (recovered)  -s/p CIWA protocol  -psych was following   -CT head neg for acute changes    USMAN and hypernatremia (resolved)  -s/p bicarb infusion  -s/p D5  0.9 NS  -renal previously following. Labs improved    Proph  -DVT: elevated INR low PLT, anemia. On SCDS  -nutrition: passed swallow     Dispo  -Full code  -sign other at bedside    Thank you for the opportunity to care for Sami Escobdeo DO, MPH  Pulmonary Critical Care Medicine  [facility]                       [1]   Allergies  Allergen Reactions    Morphine SWELLING     SHORTNESS OF BREATH   [2]   No outpatient medications have been marked as taking for the 3/4/25 encounter (Hospital Encounter).

## 2025-03-27 NOTE — PROGRESS NOTES
[facility]  part of [facility]      Sami Grijalva Jr. Patient Status:  Inpatient    10/11/1980 MRN [id number]   Location [facility] 5SW/SE Attending Maranda Nath MD   Hosp Day # 22 PCP Kerri Medina MD        PMR Daily Progress Note  Subjective     Patients clinical course continues to wax and wane. Being monitored off IV antibiotics, on PO vanco. Still with fevers and elevated WBC count. CTAP shows edematous pancreatis persisting. Started on pentoxifylline as GI.  PO intake slowly improving.       Objective     Vital signs for last 24 hours:  Temp:  [99 °F (37.2 °C)-102.2 °F (39 °C)] 99.1 °F (37.3 °C)  Pulse:  [] 108  Resp:  [16-18] 18  BP: (106-118)/(54-70) 106/70  SpO2:  [92 %-96 %] 94 %    Functional status  AM-PAC Score:  Raw Score: 16   Approx Degree of Impairment: 54.16%   Standardized Score (AM-PAC Scale): 40.78   CMS Modifier (G-Code): CK     FUNCTIONAL ABILITY STATUS  Functional Mobility/Gait Assessment  Gait Assistance: Minimum assistance, Moderate assistance (with chair follow)  Distance (ft): 15 ft + 30 ft + 40 ft  Assistive Device: Rolling walker  Pattern: Shuffle (NBOS, more unsteady with increased distance)  Rolling: stand-by assist  Supine to Sit: contact guard assist  Sit to Stand: minimal assist from bed height, Mod A from lower chair height to RW, both with cues for hand placement on bed/arm rests prior to standing  AM-PAC Score:  Score: 15  Approx Degree of Impairment: 56.46%  Standardized Score (AM-PAC Scale): 34.69  CMS Modifier (G-Code): CK     FUNCTIONAL TRANSFER ASSESSMENT  Sit to Stand: Edge of Bed; Chair  Edge of Bed: Moderate Assist  Chair: Moderate Assist     BED MOBILITY  Rolling: Not Tested  Supine to Sit : Minimal Assist  Sit to Supine (OT): Not Tested     BALANCE ASSESSMENT  Static Sitting: Moderate Assist  Static Standing: Not Tested     FUNCTIONAL ADL ASSESSMENT  Eating: Minimal Assist  Grooming Seated: Minimal Assist  Bathing  Seated: Maximum Assist  UB Dressing Seated: Minimal Assist  LB Dressing Seated: Maximum Assist  Toileting Seated: Maximum Assist         Meds    baclofen  10 mg Oral TID    pentoxifylline ER  400 mg Oral TID CC    pantoprazole  40 mg Oral BID AC    epoetin sheela  10,000 Units Subcutaneous Once per day on Tuesday Thursday Saturday    furosemide  20 mg Intravenous BID (Diuretic)    vancomycin  125 mg Per NG Tube Daily    multivitamin  1 tablet Oral Daily    folic acid  1 mg Oral Daily       Prn meds    baclofen    acetaminophen    haloperidol lactate    polyethylene glycol (PEG 3350)    bisacodyl    prochlorperazine    Physical Exam:  Lungs: Clear  Heart: regular rate and rhythm, S1, S2 normal, no murmur, click, rub or gallop  Abdomen: soft, non-tender; bowel sounds normal; no masses,  no organomegaly  Extremities: + edema  Pulses: 2+ and symmetric  Skin: icteric       Labs  Recent Results (from the past 24 hours)   Potassium    Collection Time: 03/26/25  5:32 AM   Result Value Ref Range    Potassium 3.4 (L) 3.5 - 5.1 mmol/L   CBC With Differential With Platelet    Collection Time: 03/26/25  5:32 AM   Result Value Ref Range    WBC 24.5 (H) 4.0 - 11.0 x10(3) uL    RBC 2.82 (L) 4.30 - 5.70 x10(6)uL    HGB 7.6 (L) 13.0 - 17.5 g/dL    HCT 22.9 (L) 39.0 - 53.0 %    MCV 81.2 80.0 - 100.0 fL    MCH 27.0 26.0 - 34.0 pg    MCHC 33.2 31.0 - 37.0 g/dL    RDW-SD 91.9 (H) 35.1 - 46.3 fL    RDW 32.9 (H) 11.0 - 15.0 %    .0 150.0 - 450.0 10(3)uL    Immature Platelet Fraction 5.1 0.0 - 7.0 %    Neutrophil Absolute Prelim 21.14 (H) 1.50 - 7.70 x10 (3) uL   Comp Metabolic Panel (14)    Collection Time: 03/26/25  5:32 AM   Result Value Ref Range    Glucose 108 (H) 70 - 99 mg/dL    Sodium 135 (L) 136 - 145 mmol/L    Potassium 3.4 (L) 3.5 - 5.1 mmol/L    Chloride 104 98 - 112 mmol/L    CO2 22.0 21.0 - 32.0 mmol/L    Anion Gap 9 0 - 18 mmol/L    BUN 25 (H) 9 - 23 mg/dL    Creatinine 1.06 0.70 - 1.30 mg/dL    BUN/CREA Ratio 23.6 (H)  10.0 - 20.0    Calcium, Total 7.6 (L) 8.7 - 10.4 mg/dL    Calculated Osmolality 285 275 - 295 mOsm/kg    eGFR-Cr 88 >=60 mL/min/1.73m2    ALT 80 (H) 10 - 49 U/L     (H) <34 U/L    Alkaline Phosphatase 212 (H) 45 - 117 U/L    Bilirubin, Total 15.4 (H) 0.3 - 1.2 mg/dL    Total Protein 5.4 (L) 5.7 - 8.2 g/dL    Albumin 2.5 (L) 3.2 - 4.8 g/dL    Globulin  2.9 2.0 - 3.5 g/dL    A/G Ratio 0.9 (L) 1.0 - 2.0   Manual differential    Collection Time: 03/26/25  5:32 AM   Result Value Ref Range    Neutrophil Absolute Manual 23.28 (H) 1.50 - 7.70 x10(3) uL    Lymphocyte Absolute Manual 0.49 (L) 1.00 - 4.00 x10(3) uL    Monocyte Absolute Manual 0.74 0.10 - 1.00 x10(3) uL    Eosinophil Absolute Manual 0.00 0.00 - 0.70 x10(3) uL    Basophil Absolute Manual 0.00 0.00 - 0.20 x10(3) uL    Neutrophils % Manual 91 %    Band % 4 %    Lymphocyte % Manual 2 %    Monocyte % Manual 3 %    Eosinophil % Manual 0 %    Basophil % Manual 0 %    Total Cells Counted 100     RBC Morphology See morphology below (A) Normal, Slide reviewed, see previous RBC morphology.    Platelet Morphology Normal Normal    Microcytosis 2+ (A)      Ovalocytes 1+      Tear Drop Cells 1+      Target Cells 2+ (A)         Radiology    PT Progress      Patient Active Problem List   Diagnosis    Chest pain of uncertain etiology    Hiatal hernia    UGIB (upper gastrointestinal bleed)    Esophagitis    Alcohol use    Alcohol withdrawal syndrome with perceptual disturbance (HCC)    Alcohol dependence, continuous (HCC)    Episodic mood disorder    USMAN (acute kidney injury)    Metabolic acidosis    Hyperkalemia    Leukocytosis    Thrombocytopenia    Coagulopathy (HCC)    Hyponatremia    Alcoholic (HCC)    Scleral icterus    Acute kidney failure    DTs (delirium tremens) (HCC)    Alcohol use disorder    Acute respiratory failure with hypoxia (HCC)    Wernicke's syndrome    Hypophosphatemia    Hypernatremia    Alcohol-induced acute pancreatitis (HCC)    Alcoholic hepatitis  without ascites (HCC)       Cont PT,OT  -Doing much better with therapies, oriented  WBC count still high, still spiking temps  Monitor PO intake  HH trending down  Acute rehab vs home pending functional progress, however still not medically stable.       ADRIÁN PIERCE MD    3/26/2025    7:38 PM

## 2025-03-27 NOTE — PROGRESS NOTES
[facility]     Gastroenterology Progress Note    Sami Grijalva Jr. Patient Status:  Inpatient    10/11/1980 MRN [id number]   Location [facility] 5SW/SE Attending Maranda Nath MD   Hosp Day # 23 PCP Kerri Medina MD       Subjective:   T max 99.3 overnight  Continues to have chills    Tolerating general diet  Denies abdominal pain, nausea and vomiting  Still on O2  Objective:   Blood pressure 111/72, pulse 106, temperature 99.3 °F (37.4 °C), temperature source Oral, resp. rate 16, height 5' 8\" (1.727 m), weight 188 lb 14.4 oz (85.7 kg), SpO2 94%. Body mass index is 28.72 kg/m².    General: Chronically ill in appearance.  Deeply icteric.  HEENT: Scleral icterus is present..  Mucous membranes are moist.  Cardiovascular: Regular rate and rhythm   Lung: Clear to auscultation bilaterally  Abdomen:Non-distended.  Bowel sounds are present.  There is no tenderness to palpation.  There are no masses appreciated.  Liver and spleen are not palpable.  Skin: Deeply icteric.  Warm and dry.  Ext: + edema  Neuro- Alert and interactive, and gross movements of extremities normal  Affect:Normal       Assessment and Plan:     Severe alcoholic hepatitis +/- cirrhosis  Complicated by a protracted hospitalization for multifocal pneumonia and ARDS requiring mechanical ventilation.  Ongoing high MELD, Maddrey DF.  Steroids have not been started in light of the patient's recent pneumonia.  Now with increasing leukocytosis and fever, concern for underlying infection again.  Infectious workup has been reinitiated.  Resumption of alcohol intake will be fatal.  I have encouraged nutrition, ambulation and complete abstinence from alcohol.     Recommend:  1.  Infectious workup pending  2.  ID following, will monitor off antibiotics for possible drug fever  3.  Complete abstinence from alcohol.  4.  Ambulate with assistance  5.  Started pentoxifylline on 3/25      Addendum:  Patient stable form a  liver standpoint. GI will sign off. Patient to continue pentoxyifylline 400 mg TID as outpatient. Importance of complete ETOH cessation. TE sent for patient to follow up with GI as outpatient and hepatology.      Case discussed with Vandana Austin MD and Enedina BALDWIN.    Stephania De La Garza PA-C  [facility] Gastroenterology  3/27/2025      Results:     Lab Results   Component Value Date    WBC 24.0 (H) 03/27/2025    HGB 7.6 (L) 03/27/2025    HCT 23.3 (L) 03/27/2025    .0 03/27/2025    CREATSERUM 1.00 03/27/2025    BUN 24 (H) 03/27/2025     (L) 03/27/2025    K 3.5 03/27/2025    K 3.5 03/27/2025     03/27/2025    CO2 23.0 03/27/2025     (H) 03/27/2025    CA 7.7 (L) 03/27/2025    ALB 2.4 (L) 03/27/2025    ALKPHO 218 (H) 03/27/2025    BILT 15.5 (H) 03/27/2025    TP 5.5 (L) 03/27/2025     (H) 03/27/2025    ALT 75 (H) 03/27/2025    INR 1.95 (H) 03/23/2025     (HH) 03/13/2025    DDIMER 9.14 (H) 03/20/2025    ESRML 27 (H) 10/01/2021    MG 1.7 03/27/2025    PHOS 3.1 03/20/2025    TROP 0.00 01/31/2017     (H) 03/06/2025    ETOH <3 03/04/2025       No results found.

## 2025-03-27 NOTE — PLAN OF CARE
Problem: Patient Centered Care  Goal: Patient preferences are identified and integrated in the patient's plan of care  Description: Interventions:  - What would you like us to know as we care for you? My family is here with me.  - Provide timely, complete, and accurate information to patient/family  - Incorporate patient and family knowledge, values, beliefs, and cultural backgrounds into the planning and delivery of care  - Encourage patient/family to participate in care and decision-making at the level they choose  - Honor patient and family perspectives and choices  Outcome: Progressing

## 2025-03-27 NOTE — PROGRESS NOTES
[facility]  part of [facility]  Hospitalist Progress Note     Sami Grijalva  Patient Status:  Inpatient    10/11/1980  44 year old CSN [id number]   Location 217/217-A Attending Yonny Bowers MD   Hosp Day # 23 PCP Kerri Medina MD     Assessment & Plan:   ----------------------------------  Fevers.  New onset 3/23 along with leukocytosis.  Exact source unclear.    -Possible new right lower lobe pneumonia seen on CT, so Empiric antibiotics with meropenem without any change in fevers, actually fevers worsen  -Blood cultures neg  -Respiratory viral panel negative  -ID following, recommended to stop meropenem for possible drug fever, with improvement of fevers  -Leukocytosis still high, CBC in the morning    Respiratory failure, acute hypoxemic.  Due to pneumonia, ARDS.  Oxygen requirement is improving.  Extubated 3/17.  Using between 1 and 4 L of oxygen  -Supplemental oxygen as needed, titrate down.   -Pulmonology consultation appreciated  -Treat contributing factors as described  -Repeat chest x-ray in the morning, labs, including BNP  -Incentive spirometry  -On IV Lasix    Acute alcoholic hepatitis.  Bilirubin stabilizing, may take a very long time to normalize  -GI following  -started on pentoxifylline 3/25 (instead of steroids given fevers and concern for underlying infection)      Hiccups, on baclofen, better    Other problems  Pancreatitis  Alcohol abuse  Cirrhosis  Hepatic steatosis  Hyponatremia  USMAN resolved  Chest pain, resolved  Aspiration pneumonia, finished antibiotics  Diarrhea, better  Sinus tachycardia, due to fever    Supplementary Documentation:   DVT Mechanical Prophylaxis:   SCDs,    DVT Pharmacologic Prophylaxis   Medication   None                Code Status: Full Code  Cedeño: External urinary catheter in place  Cedeño Duration (in days): 14  Central line:    ADOLFO:  plan for acute rehab pending continued medical stability    I personally reviewed the available  laboratories, imaging including. I discussed/will discuss the case with consultants. I ordered laboratories and/or radiographic studies. I adjusted medications as detailed above.  Medical decision making high, risk is high.    Subjective:   ----------------------------------  High fevers improved, still has chills and feels sweaty at times  No clear new localizing symptoms  still weak.    +hiccups better today  Vomited yesterday    Seen with his partner at bedside      Objective:   Chief Complaint:   Chief Complaint   Patient presents with    Vomiting    Hiccups    Eval-D     ----------------------------------  Temp:  [98.7 °F (37.1 °C)-99.3 °F (37.4 °C)] 99 °F (37.2 °C)  Pulse:  [103-106] 106  Resp:  [16-18] 18  BP: (108-119)/(61-75) 119/75  SpO2:  [94 %-97 %] 97 %  Gen: Alert, appropriate, appears very weak jaundiced, resting in bed  HEENT: NCAT, neck supple, no carotid bruit.  Scleral icterus  CV: Mild tachycardia, S1S2, and intact distal pulses. No gallop, rub, murmur.  Pulm: Poor effort, no wheezing or rales  Abd: Soft, NTND, BS normal, no mass, no HSM, no rebound/guarding.     Neuro: Generally weak, moves all extremities  MS: No joint effusions.  No peripheral edema.  Skin: Skin is warm and dry. No rashes, erythema, diaphoresis.   Psych: Normal mood and affect. Calm, cooperative    Labs:  Lab Results   Component Value Date    HGB 7.6 (L) 03/27/2025    WBC 24.0 (H) 03/27/2025    .0 03/27/2025     (L) 03/27/2025    K 3.5 03/27/2025    K 3.5 03/27/2025    CREATSERUM 1.00 03/27/2025    INR 1.95 (H) 03/23/2025     (H) 03/27/2025    ALT 75 (H) 03/27/2025    TROP 0.00 01/31/2017            baclofen  10 mg Oral TID    pentoxifylline ER  400 mg Oral TID CC    pantoprazole  40 mg Oral BID AC    epoetin sheela  10,000 Units Subcutaneous Once per day on Tuesday Thursday Saturday    furosemide  20 mg Intravenous BID (Diuretic)    vancomycin  125 mg Per NG Tube Daily    multivitamin  1 tablet Oral Daily     folic acid  1 mg Oral Daily       acetaminophen    haloperidol lactate    polyethylene glycol (PEG 3350)    bisacodyl    prochlorperazine

## 2025-03-27 NOTE — PLAN OF CARE
Problem: Patient Centered Care  Goal: Patient preferences are identified and integrated in the patient's plan of care  Description: Interventions:  - What would you like us to know as we care for you?   - Provide timely, complete, and accurate information to patient/family  - Incorporate patient and family knowledge, values, beliefs, and cultural backgrounds into the planning and delivery of care  - Encourage patient/family to participate in care and decision-making at the level they choose  - Honor patient and family perspectives and choices  3/26/2025 1913 by Rachel Tejada, RN  Outcome: Progressing  3/26/2025 1912 by Rachel Tejada, RN  Outcome: Progressing

## 2025-03-27 NOTE — CM/SW NOTE
Care Progression Note:  Length of stay: 23  GMLOS: 23  Avoidable Delays:   Code Status: FULL    Acute Medical Issue/Factors:   Fevers/leukocytosis-ID consulted. Last fever 3/25 at 2100 (102.2), afebrile since that date.Possible drug fever. WB trending up to 24.0 this morning. Repeat labs ordered for the morning. Now off IV abx. ID continues to follow.    Hypoxemic respiratory failure 2/2 aspiration/PNA/ARDS-ID and PULM consulted. Improving...On 3L O2 high flow (RA baseline). Off IV abx.    Acute ETOH hepatitis/pancreatitis/cirrhosis-GI consulted. Billirubin 15.4 and  this am. Repeat labs order for tomorrow morning.    USMAN-NEPHRO consulted. Resolved. Na* 135, K* 3.4, and CR* 1.06 this monring. Repeat labs ordered for tomorrow morning.    ETOH abuse-Psych following.     PM&R recommending AIR vs HH pending patent's progress. [facility] reserved in [facility]. Ins auth will be needed for AIR prior to dc. New evals will be needed for ins auth.    SONALI is back up plan if AIR is not approved by insurance. [facility] approved SONALI on 3/26. There are 4 accepting facilities for SONALI. List of accepting facilities will be needed for choice if AIR is denied. Ins auth will be needed for SONALI prior to dc.     Discharge Barriers: Physical/emotional and/or cognitive functioning   Expected discharge date: 03/29/2025   Expected next site of care: Acute Rehab.    Plan: [facility] for AIR pending re-evals, ins auth, and medical clearance.    / available for discharge planning.     TRENT Andrade    [phone]

## 2025-03-27 NOTE — PROGRESS NOTES
[facility]  part of [facility]  Hospitalist Progress Note     Sami Grijalva JrKushal Patient Status:  Inpatient    10/11/1980  44 year old CSN [id number]   Location 217/217-A Attending Yonny Bowers MD   Hosp Day # 22 PCP Kerri Medina MD     Assessment & Plan:   ----------------------------------  Fevers.  New onset 3/23 along with leukocytosis.  Exact source unclear.  White count rising slowly.  Possible new right lower lobe pneumonia seen on CT  -Blood cultures  -ID following  -Respiratory viral panel negative  -Empiric antibiotics with meropenem without any change in fevers    Respiratory failure, acute hypoxemic.  Due to pneumonia, ARDS.  Oxygen requirement is improving.  Extubated 3/17.  Using between 1 and 3 L of oxygen  -Supplemental oxygen as needed, titrate down.   -Pulmonology consultation appreciated  -Treat contributing factors as described    Acute alcoholic hepatitis.  Bilirubin stabilizing, may take a very long time to normalize  -GI following  -started on pentoxifylline 3/25 (instead of steroids given fevers and concern for underlying infection)      Hiccups, on baclofen    Other problems  Pancreatitis  Alcohol abuse  Cirrhosis  Hepatic steatosis  Hyponatremia  USMAN resolved  Chest pain, resolved  Aspiration pneumonia, finished antibiotics  Diarrhea, better  Sinus tachycardia, due to fever    Supplementary Documentation:   DVT Mechanical Prophylaxis:   SCDs,    DVT Pharmacologic Prophylaxis   Medication   None                Code Status: Full Code  Cedeño: External urinary catheter in place  Cedeño Duration (in days): 14  Central line:    ADOLFO:  plan for acute rehab pending continued medical stability    I personally reviewed the available laboratories, imaging including. I discussed/will discuss the case with consultants. I ordered laboratories and/or radiographic studies. I adjusted medications as detailed above.  Medical decision making high, risk is high.    Subjective:    ----------------------------------  High fevers up to 102.4  No clear new localizing symptoms  still weak.    +hiccups      Seen with his partner at bedside      Objective:   Chief Complaint:   Chief Complaint   Patient presents with    Vomiting    Hiccups    Eval-D     ----------------------------------  Temp:  [99 °F (37.2 °C)-100.2 °F (37.9 °C)] 99.1 °F (37.3 °C)  Pulse:  [] 108  Resp:  [16-18] 18  BP: (106-118)/(61-70) 106/70  SpO2:  [92 %-96 %] 94 %  Gen: Alert, appropriate, appears very weak jaundiced  HEENT: NCAT, neck supple, no carotid bruit.  Scleral icterus  CV: Mild tachycardia, S1S2, and intact distal pulses. No gallop, rub, murmur.  Pulm: Poor effort, no wheezing or rales  Abd: Soft, NTND, BS normal, no mass, no HSM, no rebound/guarding.  Rectal tube in place, Cedeño catheter in place  Neuro: Normal reflexes, CN. Sensory/motor exams grossly normal deficit.  Generally weak  MS: No joint effusions.  No peripheral edema.  Skin: Skin is warm and dry. No rashes, erythema, diaphoresis.   Psych: Normal mood and affect. Calm, cooperative    Labs:  Lab Results   Component Value Date    HGB 7.6 (L) 03/26/2025    WBC 24.5 (H) 03/26/2025    .0 03/26/2025     (L) 03/26/2025    K 3.4 (L) 03/26/2025    K 3.4 (L) 03/26/2025    CREATSERUM 1.06 03/26/2025    INR 1.95 (H) 03/23/2025     (H) 03/26/2025    ALT 80 (H) 03/26/2025    TROP 0.00 01/31/2017            baclofen  10 mg Oral TID    pentoxifylline ER  400 mg Oral TID CC    pantoprazole  40 mg Oral BID AC    epoetin sheela  10,000 Units Subcutaneous Once per day on Tuesday Thursday Saturday    furosemide  20 mg Intravenous BID (Diuretic)    vancomycin  125 mg Per NG Tube Daily    multivitamin  1 tablet Oral Daily    folic acid  1 mg Oral Daily       baclofen    acetaminophen    haloperidol lactate    polyethylene glycol (PEG 3350)    bisacodyl    prochlorperazine

## 2025-03-27 NOTE — PLAN OF CARE
Problem: Patient Centered Care  Goal: Patient preferences are identified and integrated in the patient's plan of care  Description: Interventions:  - What would you like us to know as we care for you? My family is here with me  - Provide timely, complete, and accurate information to patient/family  - Incorporate patient and family knowledge, values, beliefs, and cultural backgrounds into the planning and delivery of care  - Encourage patient/family to participate in care and decision-making at the level they choose  - Honor patient and family perspectives and choices  Outcome: Progressing

## 2025-03-27 NOTE — PLAN OF CARE
Problem: Patient Centered Care  Goal: Patient preferences are identified and integrated in the patient's plan of care  Description: Interventions:  - What would you like us to know as we care for you? None stated  - Provide timely, complete, and accurate information to patient/family  - Incorporate patient and family knowledge, values, beliefs, and cultural backgrounds into the planning and delivery of care  - Encourage patient/family to participate in care and decision-making at the level they choose  - Honor patient and family perspectives and choices  Outcome: Progressing     Problem: Patient/Family Goals  Goal: Patient/Family Long Term Goal  Description: Patient's Long Term Goal: free of falls     Interventions:  - fall precautions in place  - See additional Care Plan goals for specific interventions  Outcome: Progressing  Goal: Patient/Family Short Term Goal  Description: Patient's Short Term Goal: free of pain    Interventions:   - monitor pain score   - See additional Care Plan goals for specific interventions  Outcome: Progressing     Problem: PAIN - ADULT  Goal: Verbalizes/displays adequate comfort level or patient's stated pain goal  Description: INTERVENTIONS:  - Encourage pt to monitor pain and request assistance  - Assess pain using appropriate pain scale  - Administer analgesics based on type and severity of pain and evaluate response  - Implement non-pharmacological measures as appropriate and evaluate response  - Consider cultural and social influences on pain and pain management  - Manage/alleviate anxiety  - Utilize distraction and/or relaxation techniques  - Monitor for opioid side effects  - Notify MD/LIP if interventions unsuccessful or patient reports new pain  - Anticipate increased pain with activity and pre-medicate as appropriate  Outcome: Progressing     Problem: RISK FOR INFECTION - ADULT  Goal: Absence of fever/infection during anticipated neutropenic period  Description:  INTERVENTIONS  - Monitor WBC  - Administer growth factors as ordered  - Implement neutropenic guidelines  Outcome: Progressing     Problem: SAFETY ADULT - FALL  Goal: Free from fall injury  Description: INTERVENTIONS:  - Assess pt frequently for physical needs  - Identify cognitive and physical deficits and behaviors that affect risk of falls.  - [place] fall precautions as indicated by assessment.  - Educate pt/family on patient safety including physical limitations  - Instruct pt to call for assistance with activity based on assessment  - Modify environment to reduce risk of injury  - Provide assistive devices as appropriate  - Consider OT/PT consult to assist with strengthening/mobility  - Encourage toileting schedule  Outcome: Progressing     Problem: DISCHARGE PLANNING  Goal: Discharge to home or other facility with appropriate resources  Description: INTERVENTIONS:  - Identify barriers to discharge w/pt and caregiver  - Include patient/family/discharge partner in discharge planning  - Arrange for needed discharge resources and transportation as appropriate  - Identify discharge learning needs (meds, wound care, etc)  - Arrange for interpreters to assist at discharge as needed  - Consider post-discharge preferences of patient/family/discharge partner  - Complete POLST form as appropriate  - Assess patient's ability to be responsible for managing their own health  - Refer to Case Management Department for coordinating discharge planning if the patient needs post-hospital services based on physician/LIP order or complex needs related to functional status, cognitive ability or social support system  Outcome: Progressing     Problem: Delirium  Goal: Minimize duration of delirium  Description: Interventions:  - Encourage use of hearing aids, eye glasses  - Promote highest level of mobility daily  - Provide frequent reorientation  - Promote wakefulness i.e. lights on, blinds open  - Promote sleep, encourage  patient's normal rest cycle i.e. lights off, TV off, minimize noise and interruptions  - Encourage family to assist in orientation and promotion of home routines  Outcome: Progressing     Problem: RESPIRATORY - ADULT  Goal: Achieves optimal ventilation and oxygenation  Description: INTERVENTIONS:  - Assess for changes in respiratory status  - Assess for changes in mentation and behavior  - Position to facilitate oxygenation and minimize respiratory effort  - Oxygen supplementation based on oxygen saturation or ABGs  - Provide Smoking Cessation handout, if applicable  - Encourage broncho-pulmonary hygiene including cough, deep breathe, Incentive Spirometry  - Assess the need for suctioning and perform as needed  - Assess and instruct to report SOB or any respiratory difficulty  - Respiratory Therapy support as indicated  - Manage/alleviate anxiety  - Monitor for signs/symptoms of CO2 retention  Outcome: Progressing     Problem: METABOLIC/FLUID AND ELECTROLYTES - ADULT  Goal: Electrolytes maintained within normal limits  Description: INTERVENTIONS:  - Monitor labs and rhythm and assess patient for signs and symptoms of electrolyte imbalances  - Administer electrolyte replacement as ordered  - Monitor response to electrolyte replacements, including rhythm and repeat lab results as appropriate  - Fluid restriction as ordered  - Instruct patient on fluid and nutrition restrictions as appropriate  Outcome: Progressing  Goal: Hemodynamic stability and optimal renal function maintained  Description: INTERVENTIONS:  - Monitor labs and assess for signs and symptoms of volume excess or deficit  - Monitor intake, output and patient weight  - Monitor urine specific gravity, serum osmolarity and serum sodium as indicated or ordered  - Monitor response to interventions for patient's volume status, including labs, urine output, blood pressure (other measures as available)  - Encourage oral intake as appropriate  - Instruct patient  on fluid and nutrition restrictions as appropriate  Outcome: Progressing     Problem: Impaired Swallowing  Goal: Minimize aspiration risk  Description: Interventions:  - Patient should be alert and upright for all feedings (90 degrees preferred)  - Offer food and liquids at a slow rate  - No straws  - Encourage small bites of food and small sips of liquid  - Offer pills one at a time, crush or deliver with applesauce as needed  - Discontinue feeding and notify MD (or speech pathologist) if coughing or persistent throat clearing or wet/gurgly vocal quality is noted  Outcome: Progressing     Problem: Impaired Cognition  Goal: Patient will exhibit improved attention, thought processing and/or memory  Description: Interventions:  - Minimize distractions in the room when full attention is required  Outcome: Progressing     Problem: CARDIOVASCULAR - ADULT  Goal: Maintains optimal cardiac output and hemodynamic stability  Description: INTERVENTIONS:- Monitor vital signs, rhythm, and trends- Monitor for bleeding, hypotension and signs of decreased cardiac output- Evaluate effectiveness of vasoactive medications to optimize hemodynamic stability- Monitor arterial and/or venous puncture sites for bleeding and/or hematoma- Assess quality of pulses, skin color and temperature- Assess for signs of decreased coronary artery perfusion - ex. Angina- Evaluate fluid balance, assess for edema, trend weights  Outcome: Progressing

## 2025-03-28 ENCOUNTER — APPOINTMENT (OUTPATIENT)
Dept: GENERAL RADIOLOGY | Facility: HOSPITAL | Age: 45
End: 2025-03-28
Attending: HOSPITALIST
Payer: MEDICAID

## 2025-03-28 LAB
ALBUMIN SERPL-MCNC: 2.4 G/DL (ref 3.2–4.8)
ALBUMIN/GLOB SERPL: 0.8 {RATIO} (ref 1–2)
ALP LIVER SERPL-CCNC: 208 U/L
ALT SERPL-CCNC: 72 U/L
ANION GAP SERPL CALC-SCNC: 6 MMOL/L (ref 0–18)
AST SERPL-CCNC: 155 U/L (ref ?–34)
BASOPHILS # BLD: 0 X10(3) UL (ref 0–0.2)
BASOPHILS NFR BLD: 0 %
BILIRUB SERPL-MCNC: 16.4 MG/DL (ref 0.3–1.2)
BNP SERPL-MCNC: 83 PG/ML (ref ?–100)
BUN BLD-MCNC: 27 MG/DL (ref 9–23)
BUN/CREAT SERPL: 19.7 (ref 10–20)
CALCIUM BLD-MCNC: 7.7 MG/DL (ref 8.7–10.4)
CHLORIDE SERPL-SCNC: 104 MMOL/L (ref 98–112)
CO2 SERPL-SCNC: 24 MMOL/L (ref 21–32)
CREAT BLD-MCNC: 1.37 MG/DL
DEPRECATED RDW RBC AUTO: 94.4 FL (ref 35.1–46.3)
EGFRCR SERPLBLD CKD-EPI 2021: 64 ML/MIN/1.73M2 (ref 60–?)
EOSINOPHIL # BLD: 0 X10(3) UL (ref 0–0.7)
EOSINOPHIL NFR BLD: 0 %
ERYTHROCYTE [DISTWIDTH] IN BLOOD BY AUTOMATED COUNT: 32.8 % (ref 11–15)
GLOBULIN PLAS-MCNC: 3.1 G/DL (ref 2–3.5)
GLUCOSE BLD-MCNC: 107 MG/DL (ref 70–99)
HCT VFR BLD AUTO: 24.2 %
HGB BLD-MCNC: 8.1 G/DL
INR BLD: 2.19 (ref 0.8–1.2)
LYMPHOCYTES NFR BLD: 0.52 X10(3) UL (ref 1–4)
LYMPHOCYTES NFR BLD: 2 %
MAGNESIUM SERPL-MCNC: 1.6 MG/DL (ref 1.6–2.6)
MCH RBC QN AUTO: 28.1 PG (ref 26–34)
MCHC RBC AUTO-ENTMCNC: 33.5 G/DL (ref 31–37)
MCV RBC AUTO: 84 FL
METAMYELOCYTES # BLD: 0.26 X10(3) UL
METAMYELOCYTES NFR BLD: 1 %
MONOCYTES # BLD: 0.77 X10(3) UL (ref 0.1–1)
MONOCYTES NFR BLD: 3 %
MYELOCYTES # BLD: 0.26 X10(3) UL
MYELOCYTES NFR BLD: 1 %
NEUTROPHILS # BLD AUTO: 22.11 X10 (3) UL (ref 1.5–7.7)
NEUTROPHILS NFR BLD: 89 %
NEUTS BAND NFR BLD: 4 %
NEUTS HYPERSEG # BLD: 23.99 X10(3) UL (ref 1.5–7.7)
OSMOLALITY SERPL CALC.SUM OF ELEC: 284 MOSM/KG (ref 275–295)
PHOSPHATE SERPL-MCNC: 3.7 MG/DL (ref 2.4–5.1)
PLATELET # BLD AUTO: 186 10(3)UL (ref 150–450)
PLATELET MORPHOLOGY: NORMAL
PLATELETS.RETICULATED NFR BLD AUTO: 4.3 % (ref 0–7)
POTASSIUM SERPL-SCNC: 3.1 MMOL/L (ref 3.5–5.1)
PROT SERPL-MCNC: 5.5 G/DL (ref 5.7–8.2)
PROTHROMBIN TIME: 25.4 SECONDS (ref 11.6–14.8)
RBC # BLD AUTO: 2.88 X10(6)UL
SODIUM SERPL-SCNC: 134 MMOL/L (ref 136–145)
TOTAL CELLS COUNTED BLD: 100
WBC # BLD AUTO: 25.8 X10(3) UL (ref 4–11)

## 2025-03-28 PROCEDURE — 99232 SBSQ HOSP IP/OBS MODERATE 35: CPT | Performed by: INTERNAL MEDICINE

## 2025-03-28 PROCEDURE — 99233 SBSQ HOSP IP/OBS HIGH 50: CPT | Performed by: HOSPITALIST

## 2025-03-28 PROCEDURE — 71046 X-RAY EXAM CHEST 2 VIEWS: CPT | Performed by: HOSPITALIST

## 2025-03-28 RX ORDER — MAGNESIUM OXIDE 400 MG/1
400 TABLET ORAL ONCE
Status: COMPLETED | OUTPATIENT
Start: 2025-03-28 | End: 2025-03-28

## 2025-03-28 RX ORDER — POTASSIUM CHLORIDE 1500 MG/1
40 TABLET, EXTENDED RELEASE ORAL ONCE
Status: COMPLETED | OUTPATIENT
Start: 2025-03-28 | End: 2025-03-28

## 2025-03-28 NOTE — PAYOR COMM NOTE
--------------  3/28:  CONTINUED STAY REVIEW    Payor: [facility]  Subscriber #:  [id number]  Authorization Number: [id number]    Admit date: 3/4/25  Admit time:  3:11 PM      PULMONARY:    Subjective:  Subjective:  Patient was seen and examined  Awake and alert and comfortable on 2 L of oxygen  Minimal cough  No sputum  No fever  Denied abdominal pain  Feeling better overall     Objective:  Blood pressure 108/54, pulse 92, temperature 98.6 °F (37 °C), temperature source Oral, resp. rate 18, height 5' 8\" (1.727 m), weight 188 lb 14.4 oz (85.7 kg), SpO2 94%.  Physical Exam  Constitutional:       General: He is not in acute distress.  Eyes:      General: No scleral icterus.  Cardiovascular:      Rate and Rhythm: Normal rate.      Heart sounds:      No gallop.   Pulmonary:      Comments: Mild basilar rales otherwise clear  Abdominal:      General: Bowel sounds are normal.      Palpations: Abdomen is soft.      Tenderness: There is no guarding.   Musculoskeletal:      Cervical back: Normal range of motion.      Right lower leg: No edema.      Left lower leg: No edema.   Skin:     General: Skin is dry.   Neurological:      Mental Status: He is oriented to person, place, and time.               Results:        Lab Results   Component Value Date     WBC 25.8 (H) 03/28/2025     HGB 8.1 (L) 03/28/2025     HCT 24.2 (L) 03/28/2025     .0 03/28/2025     CREATSERUM 1.37 (H) 03/28/2025     BUN 27 (H) 03/28/2025      (L) 03/28/2025     K 3.1 (L) 03/28/2025      03/28/2025     CO2 24.0 03/28/2025      (H) 03/28/2025     CA 7.7 (L) 03/28/2025     ALB 2.4 (L) 03/28/2025     ALKPHO 208 (H) 03/28/2025     BILT 16.4 (H) 03/28/2025     TP 5.5 (L) 03/28/2025      (H) 03/28/2025     ALT 72 (H) 03/28/2025     INR 2.19 (H) 03/28/2025      (HH) 03/13/2025     DDIMER 9.14 (H) 03/20/2025     ESRML 27 (H) 10/01/2021     MG 1.6 03/28/2025     PHOS 3.7 03/28/2025     TROP 0.00  01/31/2017     TROPHS 3 03/20/2025      (H) 03/06/2025     ETOH <3 03/04/2025          Assessment & Plan:  1-acute respiratory failure   -  ARDS  - Acute alcohol induced pancreatitis  - Acute alcohol hepatitis  - aspiration pneumonitis   - Withdrawal syndrome     Extubated and tolerated / now on 2-3 L NC   Clinically much better   CXR today edema / chronic scaring /postinflammatory changes        2-alcohol induced acute hepatitis and acute pancreatitis with underlying cirrhosis  S/p Dts and withdrawal syndrome  Better now      3-anemia  Stable      4-DVT prophylaxis  SCD since patient with severe anemia            Rashaun Seaman MD  3/28/2025  MEDICATIONS ADMINISTERED IN LAST 1 DAY:  baclofen (Lioresal) tab 10 mg       Date Action Dose Route User    3/28/2025 0740 Given 10 mg Oral Catalina Rosales RN    3/27/2025 2101 Given 10 mg Oral Sally Diaz RN    3/27/2025 1755 Given 10 mg Oral Tiffanie Wright RN          epoetin sheela (Epogen, Procrit) 88915 UNIT/ML injection 10,000 Units       Date Action Dose Route User    3/27/2025 2101 Given 10,000 Units Subcutaneous (Right Lower Abdomen) Sally Diaz RN          folic acid (Folvite) tab 1 mg       Date Action Dose Route User    3/28/2025 0740 Given 1 mg Oral Catalina Rosales RN          furosemide (Lasix) 10 mg/mL injection 20 mg       Date Action Dose Route User    3/28/2025 0740 Given 20 mg Intravenous Catalina Rosales RN    3/27/2025 1800 Given 20 mg Intravenous Tiffanie Wright RN          ibuprofen (Motrin) tab 400 mg       Date Action Dose Route User    3/27/2025 2101 Given 400 mg Oral Sally Diaz RN          magnesium oxide (Mag-Ox) tab 400 mg       Date Action Dose Route User    3/28/2025 1158 Given 400 mg Oral Catalina Rosales RN          pantoprazole (Protonix) DR tab 40 mg       Date Action Dose Route User    3/28/2025 0531 Given 40 mg Oral Sally Diaz RN    3/27/2025 1800 Given 40 mg Oral Tiffanie Wright RN          pentoxifylline ER  (TRENTal) tab 400 mg       Date Action Dose Route User    3/28/2025 1158 Given 400 mg Oral Catalina Rosales RN    3/28/2025 0740 Given 400 mg Oral Catalina Rosales RN    3/27/2025 1800 Given 400 mg Oral Tiffanie Wright RN          potassium chloride (Klor-Con M20) tab 40 mEq       Date Action Dose Route User    3/28/2025 1158 Given 40 mEq Oral Catalina Rosales RN          multivitamin (Tab-A-Yosef/Beta Carotene) tab 1 tablet       Date Action Dose Route User    3/28/2025 0740 Given 1 tablet Oral Catalina Rosales RN          vancomycin (Firvanq) 50 mg/mL oral solution 125 mg       Date Action Dose Route User    3/28/2025 0957 Given 125 mg Per NG Tube Catalina Rosales RN            Vitals (last day)       Date/Time Temp Pulse Resp BP SpO2 Weight O2 Device O2 Flow Rate (L/min) [facility]    03/28/25 0738 98.6 °F (37 °C) 92 18 108/54 94 % -- Nasal cannula 2.5 L/min RS    03/28/25 0438 98.3 °F (36.8 °C) 96 18 96/57 95 % -- Nasal cannula 3 L/min PS    03/27/25 2233 98.9 °F (37.2 °C) -- -- -- -- -- -- -- PS    03/27/25 2049 100.4 °F (38 °C) 103 18 103/57 95 % -- Nasal cannula 3.5 L/min PS    03/27/25 2003 -- 110 -- -- -- -- -- -- MO    03/27/25 1923 102.1 °F (38.9 °C) -- -- -- -- -- -- -- SR    03/27/25 1800 -- 109 -- -- -- -- -- -- SR    03/27/25 1800 101.6 °F (38.7 °C) -- 20 116/64 98 % -- Nasal cannula 4 L/min MS    03/27/25 0900 99 °F (37.2 °C) 112 18 119/75 97 % -- Nasal cannula -- SR    03/27/25 0404 -- -- -- -- -- 188 lb 14.4 oz (85.7 kg) -- -- RA    03/27/25 0357 99.3 °F (37.4 °C) 106 16 111/72 94 % -- Nasal cannula 4 L/min EDI          CIWA Scores (since admission)       Date/Time CIWA-Ar Total Who    03/18/25 2000 2 DW    03/18/25 1800 2 EM    03/18/25 1600 2 EM    03/18/25 1400 2 EM    03/18/25 1200 2 EM    03/18/25 1000 2 EM    03/18/25 0800 2 EM    03/17/25 1600 2 LC    03/17/25 1200 5 LC    03/17/25 0700 2 LC    03/16/25 1600 0 EV    03/16/25 1200 0 EV    03/16/25 0800 0 EV    03/15/25 1600 0 EV     03/15/25 1200 0 EV    03/15/25 0800 0 EV    03/12/25 0519 10 TT    03/12/25 0430 8 TT    03/12/25 0330 16 TT    03/11/25 2258 7 TT    03/11/25 2158 15 TT    03/11/25 1944 15 TT    03/11/25 1800 10 RP    03/11/25 1600 10 RP    03/11/25 1400 9 RP    03/11/25 1200 7 RP    03/11/25 1000 7 RP    03/11/25 0800 10 RP    03/11/25 0600 6 CB    03/11/25 0500 11 CB    03/11/25 0400 13 CB    03/11/25 0300 6 CB    03/11/25 0200 4 CB    03/11/25 0040 4 CB    03/11/25 0000 4 CB    03/10/25 2342 6 CB    03/10/25 2242 15 CB    03/10/25 2200 6 CB    03/10/25 2100 6 CB    03/10/25 2000 11 CB    03/10/25 0800 18 NR    03/10/25 0641 16 EDI    03/10/25 0600 6 EDI    03/10/25 0500 6 EDI    03/10/25 0400 13 EDI    03/10/25 0200 4 EDI    03/10/25 0000 4 EDI    03/09/25 2300 13 EDI    03/09/25 2200 6 EDI    03/09/25 2130 11 EDI    03/09/25 2000 6 EDI    03/09/25 1800 1 AH    03/09/25 1400 2 AH    03/09/25 0600 4 EDI    03/09/25 0400 5 EDI    03/09/25 0300 5 EDI    03/09/25 0000 5 EDI    03/08/25 2200 4 EDI    03/08/25 2000 4 EDI    03/08/25 1200 5 ES    03/08/25 1000 5 ES    03/08/25 0800 5 ES    03/08/25 0600 6 EDI    03/08/25 0400 5 EDI    03/08/25 0200 5 EDI    03/08/25 0000 5 EDI    03/07/25 2200 5 EDI    03/07/25 2000 5 EDI    03/07/25 0600 5 CT    03/07/25 0400 5 CT    03/07/25 0200 5 CT    03/07/25 0000 8 CT    03/06/25 2200 8 CT    03/06/25 2000 8 CT    03/06/25 1800 5 AW    03/06/25 1600 8 AW    03/06/25 1400 4 AW    03/06/25 1200 4 AW    03/06/25 1000 5 AW    03/06/25 0800 6 AW    03/06/25 0600 14 CT    03/06/25 0500 6 CT    03/06/25 0400 20 CT    03/06/25 0300 17 CT    03/06/25 0200 20 CT    03/06/25 0100 17 CT    03/06/25 0000 5 CT    03/05/25 2300 17 CT    03/05/25 2000 5 CT    03/05/25 1846 5 LN    03/05/25 1742 13 LN    03/05/25 1642 16 LN    03/05/25 1541 19 LN    03/05/25 1516 6 LN    03/05/25 1416 9 LN    03/05/25 1257 18 LN    03/05/25 1200 14 BD    03/05/25 1100 13 BD    03/05/25 1000 10 BD    03/05/25 0900 17 BD    03/05/25 0800 9 BD     03/05/25 0700 13 MP    03/05/25 0600 29 MP    03/05/25 0500 7 MP    03/05/25 0400 6 MP    03/05/25 0300 10 MP    03/05/25 0200 6 MP    03/05/25 0100 8 MP    03/05/25 0000 14 MP    03/04/25 2300 8 MP    03/04/25 2200 13 MP    03/04/25 2100 8 MP    03/04/25 2000 24 MP    03/04/25 1900 12 BD    03/04/25 1800 12 BD    03/04/25 1600 5 BD    03/04/25 1500 1 RR    03/04/25 1315 0 RR    03/04/25 1200 1 RR    03/04/25 0951 17 KB          Blood Transfusion Record       Product Unit Status Volume Start End            Transfuse RBC       25  [id number]  [id number] Completed 03/16/25 0935 470.7 mL 03/16/25 0615 03/16/25 0930       25  [id number]  [id number] Completed 03/13/25 1245 400 mL 03/13/25 0915 03/13/25 1235

## 2025-03-28 NOTE — PLAN OF CARE
Problem: Patient Centered Care  Goal: Patient preferences are identified and integrated in the patient's plan of care  Description: Interventions:  - What would you like us to know as we care for you?   - Provide timely, complete, and accurate information to patient/family  - Incorporate patient and family knowledge, values, beliefs, and cultural backgrounds into the planning and delivery of care  - Encourage patient/family to participate in care and decision-making at the level they choose  - Honor patient and family perspectives and choices  Outcome: Progressing     Problem: Patient/Family Goals  Goal: Patient/Family Long Term Goal  Description: Patient's Long Term Goal    Interventions:  - See additional Care Plan goals for specific interventions  Outcome: Progressing  Goal: Patient/Family Short Term Goal  Description: Patient's Short Term Goal:     Interventions:   - See additional Care Plan goals for specific interventions  Outcome: Progressing     Problem: PAIN - ADULT  Goal: Verbalizes/displays adequate comfort level or patient's stated pain goal  Description: INTERVENTIONS:  - Encourage pt to monitor pain and request assistance  - Assess pain using appropriate pain scale  - Administer analgesics based on type and severity of pain and evaluate response  - Implement non-pharmacological measures as appropriate and evaluate response  - Consider cultural and social influences on pain and pain management  - Manage/alleviate anxiety  - Utilize distraction and/or relaxation techniques  - Monitor for opioid side effects  - Notify MD/LIP if interventions unsuccessful or patient reports new pain  - Anticipate increased pain with activity and pre-medicate as appropriate  Outcome: Progressing     Problem: RISK FOR INFECTION - ADULT  Goal: Absence of fever/infection during anticipated neutropenic period  Description: INTERVENTIONS  - Monitor WBC  - Administer growth factors as ordered  - Implement neutropenic  guidelines  Outcome: Progressing     Problem: SAFETY ADULT - FALL  Goal: Free from fall injury  Description: INTERVENTIONS:  - Assess pt frequently for physical needs  - Identify cognitive and physical deficits and behaviors that affect risk of falls.  - [place] fall precautions as indicated by assessment.  - Educate pt/family on patient safety including physical limitations  - Instruct pt to call for assistance with activity based on assessment  - Modify environment to reduce risk of injury  - Provide assistive devices as appropriate  - Consider OT/PT consult to assist with strengthening/mobility  - Encourage toileting schedule  Outcome: Progressing     Problem: DISCHARGE PLANNING  Goal: Discharge to home or other facility with appropriate resources  Description: INTERVENTIONS:  - Identify barriers to discharge w/pt and caregiver  - Include patient/family/discharge partner in discharge planning  - Arrange for needed discharge resources and transportation as appropriate  - Identify discharge learning needs (meds, wound care, etc)  - Arrange for interpreters to assist at discharge as needed  - Consider post-discharge preferences of patient/family/discharge partner  - Complete POLST form as appropriate  - Assess patient's ability to be responsible for managing their own health  - Refer to Case Management Department for coordinating discharge planning if the patient needs post-hospital services based on physician/LIP order or complex needs related to functional status, cognitive ability or social support system  Outcome: Progressing     Problem: Delirium  Goal: Minimize duration of delirium  Description: Interventions:  - Encourage use of hearing aids, eye glasses  - Promote highest level of mobility daily  - Provide frequent reorientation  - Promote wakefulness i.e. lights on, blinds open  - Promote sleep, encourage patient's normal rest cycle i.e. lights off, TV off, minimize noise and interruptions  - Encourage  family to assist in orientation and promotion of home routines  Outcome: Progressing     Problem: RESPIRATORY - ADULT  Goal: Achieves optimal ventilation and oxygenation  Description: INTERVENTIONS:  - Assess for changes in respiratory status  - Assess for changes in mentation and behavior  - Position to facilitate oxygenation and minimize respiratory effort  - Oxygen supplementation based on oxygen saturation or ABGs  - Provide Smoking Cessation handout, if applicable  - Encourage broncho-pulmonary hygiene including cough, deep breathe, Incentive Spirometry  - Assess the need for suctioning and perform as needed  - Assess and instruct to report SOB or any respiratory difficulty  - Respiratory Therapy support as indicated  - Manage/alleviate anxiety  - Monitor for signs/symptoms of CO2 retention  Outcome: Progressing     Problem: METABOLIC/FLUID AND ELECTROLYTES - ADULT  Goal: Electrolytes maintained within normal limits  Description: INTERVENTIONS:  - Monitor labs and rhythm and assess patient for signs and symptoms of electrolyte imbalances  - Administer electrolyte replacement as ordered  - Monitor response to electrolyte replacements, including rhythm and repeat lab results as appropriate  - Fluid restriction as ordered  - Instruct patient on fluid and nutrition restrictions as appropriate  Outcome: Progressing  Goal: Hemodynamic stability and optimal renal function maintained  Description: INTERVENTIONS:  - Monitor labs and assess for signs and symptoms of volume excess or deficit  - Monitor intake, output and patient weight  - Monitor urine specific gravity, serum osmolarity and serum sodium as indicated or ordered  - Monitor response to interventions for patient's volume status, including labs, urine output, blood pressure (other measures as available)  - Encourage oral intake as appropriate  - Instruct patient on fluid and nutrition restrictions as appropriate  Outcome: Progressing     Problem: Impaired  Swallowing  Goal: Minimize aspiration risk  Description: Interventions:  - Patient should be alert and upright for all feedings (90 degrees preferred)  - Offer food and liquids at a slow rate  - No straws  - Encourage small bites of food and small sips of liquid  - Offer pills one at a time, crush or deliver with applesauce as needed  - Discontinue feeding and notify MD (or speech pathologist) if coughing or persistent throat clearing or wet/gurgly vocal quality is noted  Outcome: Progressing     Problem: Impaired Cognition  Goal: Patient will exhibit improved attention, thought processing and/or memory  Description: Interventions:  Outcome: Progressing     Problem: CARDIOVASCULAR - ADULT  Goal: Maintains optimal cardiac output and hemodynamic stability  Description: INTERVENTIONS:- Monitor vital signs, rhythm, and trends- Monitor for bleeding, hypotension and signs of decreased cardiac output- Evaluate effectiveness of vasoactive medications to optimize hemodynamic stability- Monitor arterial and/or venous puncture sites for bleeding and/or hematoma- Assess quality of pulses, skin color and temperature- Assess for signs of decreased coronary artery perfusion - ex. Angina- Evaluate fluid balance, assess for edema, trend weights  Outcome: Progressing

## 2025-03-28 NOTE — PROGRESS NOTES
INFECTIOUS DISEASE PROGRESS NOTE  [facility]  part of [facility] ID PROGRESS NOTE    Sami Grijalva . Patient Status:  Inpatient    10/11/1980 MRN [id number]   Location [facility] 2W/SW Attending Natasha Araujo MD   Hosp Day # 24 PCP Kerri Medina MD     Subjective:  ROS reviewed. Tmax 102.1 yesterday. Has been eating. Got up to chair yesterday. On 2.5L NC.    ASSESSMENT:    Antibiotics: OVP, Meropenem  (doxycycline, vancomycin, zosyn) Meropenem 3/14-3/20, vancomycin     # Continued fevers with bandemia ?drug fever   -CT A/P with stable pancreatitis with worsening RLL infiltrate   -CT chest 3/21 without PE with improved PNA  # Acute hypoxic respiratory failure with fever and multifocal pneumonia   -S/p intubation 3/12   -s/p bronch 3/13, cx NGTD   -MRSA nares negative  # Acute aspiration pneumonia  # Acute leukocytosis   # Acute anemia  # Acute pancreatitis  # Alcoholic hepatitis with encephalopathy on admission               - CT A/P with severe fatty liver               - US GB with sludge w/o cholecystitis   - CT C/A/P 3/12 with pancreatitis  # USMAN - improved  # EtOH abuse and withdrawal     PLAN:  -  Continue on OVP. Monitor off abx.  -  Follow fever curve, wbc.  -  Reviewed labs, micro, imaging reports, available old records.  -  Case d/w patient, partner at bedside, RN.     History of Present Illness:  44-year-old male with a history of GERD, alcohol use was admitted to the hospital on 3 generalized abdominal pain, hiccups, vomiting with difficulty tolerating orals.  Last drink prior to admission was 1 day prior.  Found to have sepsis with elevated lactic acid, hypotension, WBC 17.5.  Responded to IV fluids and received lactulose for elevated ammonia.  CT A/P with severe fatty liver, stable splenomegaly.  Was lethargic the first few days and had soft restraints with Cedeño, Flexi-Seal, NG tube.  Has been essentially afebrile here with a temperature on  3/10 of 100.6 but significant hypoxia up and down and currently increased up to 40 L with increase WBC to 17.4.  Initial USMAN has improved.  Was started on IV Zosyn and doxycycline on 3/7 when patient became more hypoxic and chest x-ray showed extensive multifocal pneumonia with CT chest showing similar findings.  Possible aspiration.    Physical Exam:  /54 (BP Location: Right arm)   Pulse 92   Temp 98.6 °F (37 °C) (Oral)   Resp 18   Ht 5' 8\" (1.727 m)   Wt 188 lb 14.4 oz (85.7 kg)   SpO2 94%   BMI 28.72 kg/m²     Gen:   Awake, in bed  HEENT:  +scleral icterus, neck supple  CV/lungs:  RRR, lungs with bilateral rhonchi  Abdom:  Soft, no TTP  Skin/extrem:  No rashes, jaundiced  :   Primofit+  Lines:  Midline+    Laboratory Data: Reviewed    Microbiology: Reviewed    Radiology: Reviewed      [facility]  [phone]  3/28/2025

## 2025-03-28 NOTE — PLAN OF CARE
Problem: Patient Centered Care  Goal: Patient preferences are identified and integrated in the patient's plan of care  Description: Interventions:  - What would you like us to know as we care for you? From home   - Provide timely, complete, and accurate information to patient/family  - Incorporate patient and family knowledge, values, beliefs, and cultural backgrounds into the planning and delivery of care  - Encourage patient/family to participate in care and decision-making at the level they choose  - Honor patient and family perspectives and choices  Outcome: Progressing     Problem: Patient/Family Goals  Goal: Patient/Family Long Term Goal  Description: Patient's Long Term Goal: Discharge    Interventions:  - - Monitor vital signs  - Monitor appropriate labs  - Pain management  - Administer medications per order  - Follow MD orders  - Diagnostics per order  - Update / inform patient and family on plan of care  - Discharge planning     - See additional Care Plan goals for specific interventions  Outcome: Progressing  Goal: Patient/Family Short Term Goal  Description: Patient's Short Term Goal: Walking more     Interventions:   - pt/ot coming daily  -plan to discharge to [facility]   - See additional Care Plan goals for specific interventions  Outcome: Progressing     Problem: PAIN - ADULT  Goal: Verbalizes/displays adequate comfort level or patient's stated pain goal  Description: INTERVENTIONS:  - Encourage pt to monitor pain and request assistance  - Assess pain using appropriate pain scale  - Administer analgesics based on type and severity of pain and evaluate response  - Implement non-pharmacological measures as appropriate and evaluate response  - Consider cultural and social influences on pain and pain management  - Manage/alleviate anxiety  - Utilize distraction and/or relaxation techniques  - Monitor for opioid side effects  - Notify MD/LIP if interventions unsuccessful or patient reports new pain  -  Anticipate increased pain with activity and pre-medicate as appropriate  Outcome: Progressing     Problem: RISK FOR INFECTION - ADULT  Goal: Absence of fever/infection during anticipated neutropenic period  Description: INTERVENTIONS  - Monitor WBC  - Administer growth factors as ordered  - Implement neutropenic guidelines  Outcome: Progressing     Problem: SAFETY ADULT - FALL  Goal: Free from fall injury  Description: INTERVENTIONS:  - Assess pt frequently for physical needs  - Identify cognitive and physical deficits and behaviors that affect risk of falls.  - [place] fall precautions as indicated by assessment.  - Educate pt/family on patient safety including physical limitations  - Instruct pt to call for assistance with activity based on assessment  - Modify environment to reduce risk of injury  - Provide assistive devices as appropriate  - Consider OT/PT consult to assist with strengthening/mobility  - Encourage toileting schedule  Outcome: Progressing     Problem: DISCHARGE PLANNING  Goal: Discharge to home or other facility with appropriate resources  Description: INTERVENTIONS:  - Identify barriers to discharge w/pt and caregiver  - Include patient/family/discharge partner in discharge planning  - Arrange for needed discharge resources and transportation as appropriate  - Identify discharge learning needs (meds, wound care, etc)  - Arrange for interpreters to assist at discharge as needed  - Consider post-discharge preferences of patient/family/discharge partner  - Complete POLST form as appropriate  - Assess patient's ability to be responsible for managing their own health  - Refer to Case Management Department for coordinating discharge planning if the patient needs post-hospital services based on physician/LIP order or complex needs related to functional status, cognitive ability or social support system  Outcome: Progressing     Problem: Delirium  Goal: Minimize duration of delirium  Description:  Interventions:  - Encourage use of hearing aids, eye glasses  - Promote highest level of mobility daily  - Provide frequent reorientation  - Promote wakefulness i.e. lights on, blinds open  - Promote sleep, encourage patient's normal rest cycle i.e. lights off, TV off, minimize noise and interruptions  - Encourage family to assist in orientation and promotion of home routines  Outcome: Progressing     Problem: RESPIRATORY - ADULT  Goal: Achieves optimal ventilation and oxygenation  Description: INTERVENTIONS:  - Assess for changes in respiratory status  - Assess for changes in mentation and behavior  - Position to facilitate oxygenation and minimize respiratory effort  - Oxygen supplementation based on oxygen saturation or ABGs  - Provide Smoking Cessation handout, if applicable  - Encourage broncho-pulmonary hygiene including cough, deep breathe, Incentive Spirometry  - Assess the need for suctioning and perform as needed  - Assess and instruct to report SOB or any respiratory difficulty  - Respiratory Therapy support as indicated  - Manage/alleviate anxiety  - Monitor for signs/symptoms of CO2 retention  Outcome: Progressing     Problem: METABOLIC/FLUID AND ELECTROLYTES - ADULT  Goal: Electrolytes maintained within normal limits  Description: INTERVENTIONS:  - Monitor labs and rhythm and assess patient for signs and symptoms of electrolyte imbalances  - Administer electrolyte replacement as ordered  - Monitor response to electrolyte replacements, including rhythm and repeat lab results as appropriate  - Fluid restriction as ordered  - Instruct patient on fluid and nutrition restrictions as appropriate  Outcome: Progressing  Goal: Hemodynamic stability and optimal renal function maintained  Description: INTERVENTIONS:  - Monitor labs and assess for signs and symptoms of volume excess or deficit  - Monitor intake, output and patient weight  - Monitor urine specific gravity, serum osmolarity and serum sodium as  indicated or ordered  - Monitor response to interventions for patient's volume status, including labs, urine output, blood pressure (other measures as available)  - Encourage oral intake as appropriate  - Instruct patient on fluid and nutrition restrictions as appropriate  Outcome: Progressing     Problem: Impaired Swallowing  Goal: Minimize aspiration risk  Description: Interventions:  - Patient should be alert and upright for all feedings (90 degrees preferred)  - Offer food and liquids at a slow rate  - No straws  - Encourage small bites of food and small sips of liquid  - Offer pills one at a time, crush or deliver with applesauce as needed  - Discontinue feeding and notify MD (or speech pathologist) if coughing or persistent throat clearing or wet/gurgly vocal quality is noted  Outcome: Progressing     Problem: Impaired Cognition  Goal: Patient will exhibit improved attention, thought processing and/or memory  Description: Interventions:  - Allow additional time for processing after asking questions or providing instructions  Outcome: Progressing     Problem: CARDIOVASCULAR - ADULT  Goal: Maintains optimal cardiac output and hemodynamic stability  Description: INTERVENTIONS:- Monitor vital signs, rhythm, and trends- Monitor for bleeding, hypotension and signs of decreased cardiac output- Evaluate effectiveness of vasoactive medications to optimize hemodynamic stability- Monitor arterial and/or venous puncture sites for bleeding and/or hematoma- Assess quality of pulses, skin color and temperature- Assess for signs of decreased coronary artery perfusion - ex. Angina- Evaluate fluid balance, assess for edema, trend weights  Outcome: Progressing

## 2025-03-28 NOTE — PROGRESS NOTES
[facility]  part of [facility]    Progress Note    Sami Grijalva Jr. Patient Status:  Inpatient    10/11/1980 MRN [id number]   Location [facility] 5SW/SE Attending Maranda Nath MD   Hosp Day # 24 PCP Kerri Medina MD       Subjective:   Subjective:  Patient was seen and examined  Awake and alert and comfortable on 2 L of oxygen  Minimal cough  No sputum  No fever  Denied abdominal pain  Feeling better overall  Objective:   Blood pressure 108/54, pulse 92, temperature 98.6 °F (37 °C), temperature source Oral, resp. rate 18, height 5' 8\" (1.727 m), weight 188 lb 14.4 oz (85.7 kg), SpO2 94%.  Physical Exam  Constitutional:       General: He is not in acute distress.  Eyes:      General: No scleral icterus.  Cardiovascular:      Rate and Rhythm: Normal rate.      Heart sounds:      No gallop.   Pulmonary:      Comments: Mild basilar rales otherwise clear  Abdominal:      General: Bowel sounds are normal.      Palpations: Abdomen is soft.      Tenderness: There is no guarding.   Musculoskeletal:      Cervical back: Normal range of motion.      Right lower leg: No edema.      Left lower leg: No edema.   Skin:     General: Skin is dry.   Neurological:      Mental Status: He is oriented to person, place, and time.         Results:   Lab Results   Component Value Date    WBC 25.8 (H) 2025    HGB 8.1 (L) 2025    HCT 24.2 (L) 2025    .0 2025    CREATSERUM 1.37 (H) 2025    BUN 27 (H) 2025     (L) 2025    K 3.1 (L) 2025     2025    CO2 24.0 2025     (H) 2025    CA 7.7 (L) 2025    ALB 2.4 (L) 2025    ALKPHO 208 (H) 2025    BILT 16.4 (H) 2025    TP 5.5 (L) 2025     (H) 2025    ALT 72 (H) 2025    INR 2.19 (H) 2025     (HH) 2025    DDIMER 9.14 (H) 2025    ESRML 27 (H) 10/01/2021    MG 1.6 2025    PHOS 3.7  03/28/2025    TROP 0.00 01/31/2017    TROPHS 3 03/20/2025     (H) 03/06/2025    ETOH <3 03/04/2025       XR CHEST PA + LAT CHEST (CPT=71046)    Result Date: 3/28/2025  CONCLUSION:  1. Borderline heart size 2. Pulmonary venous distention.  3. Bilateral mixed alveolar and interstitial multifocal airspace disease with slight improvement    Dictated by (CST): Nolan Dooley MD on 3/28/2025 at 9:24 AM     Finalized by (CST): Nolan Dooley MD on 3/28/2025 at 9:27 AM               Assessment & Plan:       1-acute respiratory failure   -  ARDS  - Acute alcohol induced pancreatitis  - Acute alcohol hepatitis  - aspiration pneumonitis   - Withdrawal syndrome     Extubated and tolerated / now on 2-3 L NC   Clinically much better   CXR today edema / chronic scaring /postinflammatory changes        2-alcohol induced acute hepatitis and acute pancreatitis with underlying cirrhosis  S/p Dts and withdrawal syndrome  Better now      3-anemia  Stable     4-DVT prophylaxis  SCD since patient with severe anemia    Overall pulmonary and hemodynamically stable                  Rashaun Seaman MD  3/28/2025

## 2025-03-28 NOTE — CM/SW NOTE
Per MD, anticipate discharge Monday or Tuesday. Spoke w/ Mel[facility] liaison for acute rehab, confirmed patient is accepted. Requested updated PT/OT notes for insurance authorization. Notified [facility] coordinator.    MAX Schroeder [phone]

## 2025-03-29 LAB
ALBUMIN SERPL-MCNC: 2.4 G/DL (ref 3.2–4.8)
ANION GAP SERPL CALC-SCNC: 9 MMOL/L (ref 0–18)
BASOPHILS # BLD: 0 X10(3) UL (ref 0–0.2)
BASOPHILS NFR BLD: 0 %
BUN BLD-MCNC: 25 MG/DL (ref 9–23)
BUN/CREAT SERPL: 20.5 (ref 10–20)
CALCIUM BLD-MCNC: 7.8 MG/DL (ref 8.7–10.4)
CHLORIDE SERPL-SCNC: 102 MMOL/L (ref 98–112)
CO2 SERPL-SCNC: 23 MMOL/L (ref 21–32)
CREAT BLD-MCNC: 1.22 MG/DL
DEPRECATED RDW RBC AUTO: 91.2 FL (ref 35.1–46.3)
EGFRCR SERPLBLD CKD-EPI 2021: 74 ML/MIN/1.73M2 (ref 60–?)
EOSINOPHIL # BLD: 0 X10(3) UL (ref 0–0.7)
EOSINOPHIL NFR BLD: 0 %
ERYTHROCYTE [DISTWIDTH] IN BLOOD BY AUTOMATED COUNT: 32.8 % (ref 11–15)
GLUCOSE BLD-MCNC: 105 MG/DL (ref 70–99)
HCT VFR BLD AUTO: 23.4 %
HGB BLD-MCNC: 8 G/DL
LYMPHOCYTES NFR BLD: 0 %
LYMPHOCYTES NFR BLD: 0 X10(3) UL (ref 1–4)
MAGNESIUM SERPL-MCNC: 1.7 MG/DL (ref 1.6–2.6)
MCH RBC QN AUTO: 27.6 PG (ref 26–34)
MCHC RBC AUTO-ENTMCNC: 34.2 G/DL (ref 31–37)
MCV RBC AUTO: 80.7 FL
METAMYELOCYTES # BLD: 0.68 X10(3) UL
METAMYELOCYTES NFR BLD: 3 %
MONOCYTES # BLD: 0.46 X10(3) UL (ref 0.1–1)
MONOCYTES NFR BLD: 2 %
MYELOCYTES # BLD: 0.23 X10(3) UL
MYELOCYTES NFR BLD: 1 %
NEUTROPHILS # BLD AUTO: 19.05 X10 (3) UL (ref 1.5–7.7)
NEUTROPHILS NFR BLD: 94 %
NEUTS HYPERSEG # BLD: 21.43 X10(3) UL (ref 1.5–7.7)
OSMOLALITY SERPL CALC.SUM OF ELEC: 283 MOSM/KG (ref 275–295)
PHOSPHATE SERPL-MCNC: 3 MG/DL (ref 2.4–5.1)
PLATELET # BLD AUTO: 173 10(3)UL (ref 150–450)
PLATELET MORPHOLOGY: NORMAL
PLATELETS.RETICULATED NFR BLD AUTO: 4.9 % (ref 0–7)
POTASSIUM SERPL-SCNC: 3.1 MMOL/L (ref 3.5–5.1)
POTASSIUM SERPL-SCNC: 3.1 MMOL/L (ref 3.5–5.1)
RBC # BLD AUTO: 2.9 X10(6)UL
SODIUM SERPL-SCNC: 134 MMOL/L (ref 136–145)
TOTAL CELLS COUNTED BLD: 100
WBC # BLD AUTO: 22.8 X10(3) UL (ref 4–11)

## 2025-03-29 PROCEDURE — 99233 SBSQ HOSP IP/OBS HIGH 50: CPT | Performed by: HOSPITALIST

## 2025-03-29 PROCEDURE — 99232 SBSQ HOSP IP/OBS MODERATE 35: CPT | Performed by: INTERNAL MEDICINE

## 2025-03-29 RX ORDER — MAGNESIUM OXIDE 400 MG/1
400 TABLET ORAL 2 TIMES DAILY WITH MEALS
Status: DISCONTINUED | OUTPATIENT
Start: 2025-03-29 | End: 2025-04-02

## 2025-03-29 RX ORDER — MAGNESIUM OXIDE 400 MG/1
400 TABLET ORAL ONCE
Status: DISCONTINUED | OUTPATIENT
Start: 2025-03-29 | End: 2025-04-02

## 2025-03-29 RX ORDER — POTASSIUM CHLORIDE 1500 MG/1
40 TABLET, EXTENDED RELEASE ORAL ONCE
Status: DISCONTINUED | OUTPATIENT
Start: 2025-03-29 | End: 2025-04-02

## 2025-03-29 RX ORDER — MAGNESIUM OXIDE 400 MG/1
400 TABLET ORAL ONCE
Status: COMPLETED | OUTPATIENT
Start: 2025-03-29 | End: 2025-03-29

## 2025-03-29 RX ORDER — POTASSIUM CHLORIDE 1500 MG/1
40 TABLET, EXTENDED RELEASE ORAL ONCE
Status: COMPLETED | OUTPATIENT
Start: 2025-03-29 | End: 2025-03-29

## 2025-03-29 RX ORDER — FUROSEMIDE 10 MG/ML
20 INJECTION INTRAMUSCULAR; INTRAVENOUS DAILY
Status: DISCONTINUED | OUTPATIENT
Start: 2025-03-30 | End: 2025-04-01

## 2025-03-29 NOTE — PLAN OF CARE
Problem: Patient Centered Care  Goal: Patient preferences are identified and integrated in the patient's plan of care  Description: Interventions:  - What would you like us to know as we care for you? From home wth family   - Provide timely, complete, and accurate information to patient/family  - Incorporate patient and family knowledge, values, beliefs, and cultural backgrounds into the planning and delivery of care  - Encourage patient/family to participate in care and decision-making at the level they choose  - Honor patient and family perspectives and choices  Outcome: Progressing     Problem: Patient/Family Goals  Goal: Patient/Family Long Term Goal  Description: Patient's Long Term Goal: Discharge     Interventions:  - - Monitor vital signs  - Monitor appropriate labs  - Pain management  - Administer medications per order  - Follow MD orders  - Diagnostics per order  - Update / inform patient and family on plan of care  - Discharge planning     - See additional Care Plan goals for specific interventions  Outcome: Progressing  Goal: Patient/Family Short Term Goal  Description: Patient's Short Term Goal: Walk more     Interventions:   - Continual work with PT/OT  -Discharge to sub acute rehab   - See additional Care Plan goals for specific interventions  Outcome: Progressing     Problem: PAIN - ADULT  Goal: Verbalizes/displays adequate comfort level or patient's stated pain goal  Description: INTERVENTIONS:  - Encourage pt to monitor pain and request assistance  - Assess pain using appropriate pain scale  - Administer analgesics based on type and severity of pain and evaluate response  - Implement non-pharmacological measures as appropriate and evaluate response  - Consider cultural and social influences on pain and pain management  - Manage/alleviate anxiety  - Utilize distraction and/or relaxation techniques  - Monitor for opioid side effects  - Notify MD/LIP if interventions unsuccessful or patient reports  new pain  - Anticipate increased pain with activity and pre-medicate as appropriate  Outcome: Progressing     Problem: RISK FOR INFECTION - ADULT  Goal: Absence of fever/infection during anticipated neutropenic period  Description: INTERVENTIONS  - Monitor WBC  - Administer growth factors as ordered  - Implement neutropenic guidelines  Outcome: Progressing     Problem: SAFETY ADULT - FALL  Goal: Free from fall injury  Description: INTERVENTIONS:  - Assess pt frequently for physical needs  - Identify cognitive and physical deficits and behaviors that affect risk of falls.  - [place] fall precautions as indicated by assessment.  - Educate pt/family on patient safety including physical limitations  - Instruct pt to call for assistance with activity based on assessment  - Modify environment to reduce risk of injury  - Provide assistive devices as appropriate  - Consider OT/PT consult to assist with strengthening/mobility  - Encourage toileting schedule  Outcome: Progressing     Problem: DISCHARGE PLANNING  Goal: Discharge to home or other facility with appropriate resources  Description: INTERVENTIONS:  - Identify barriers to discharge w/pt and caregiver  - Include patient/family/discharge partner in discharge planning  - Arrange for needed discharge resources and transportation as appropriate  - Identify discharge learning needs (meds, wound care, etc)  - Arrange for interpreters to assist at discharge as needed  - Consider post-discharge preferences of patient/family/discharge partner  - Complete POLST form as appropriate  - Assess patient's ability to be responsible for managing their own health  - Refer to Case Management Department for coordinating discharge planning if the patient needs post-hospital services based on physician/LIP order or complex needs related to functional status, cognitive ability or social support system  Outcome: Progressing     Problem: Delirium  Goal: Minimize duration of  delirium  Description: Interventions:  - Encourage use of hearing aids, eye glasses  - Promote highest level of mobility daily  - Provide frequent reorientation  - Promote wakefulness i.e. lights on, blinds open  - Promote sleep, encourage patient's normal rest cycle i.e. lights off, TV off, minimize noise and interruptions  - Encourage family to assist in orientation and promotion of home routines  Outcome: Progressing     Problem: RESPIRATORY - ADULT  Goal: Achieves optimal ventilation and oxygenation  Description: INTERVENTIONS:  - Assess for changes in respiratory status  - Assess for changes in mentation and behavior  - Position to facilitate oxygenation and minimize respiratory effort  - Oxygen supplementation based on oxygen saturation or ABGs  - Provide Smoking Cessation handout, if applicable  - Encourage broncho-pulmonary hygiene including cough, deep breathe, Incentive Spirometry  - Assess the need for suctioning and perform as needed  - Assess and instruct to report SOB or any respiratory difficulty  - Respiratory Therapy support as indicated  - Manage/alleviate anxiety  - Monitor for signs/symptoms of CO2 retention  Outcome: Progressing     Problem: METABOLIC/FLUID AND ELECTROLYTES - ADULT  Goal: Electrolytes maintained within normal limits  Description: INTERVENTIONS:  - Monitor labs and rhythm and assess patient for signs and symptoms of electrolyte imbalances  - Administer electrolyte replacement as ordered  - Monitor response to electrolyte replacements, including rhythm and repeat lab results as appropriate  - Fluid restriction as ordered  - Instruct patient on fluid and nutrition restrictions as appropriate  Outcome: Progressing  Goal: Hemodynamic stability and optimal renal function maintained  Description: INTERVENTIONS:  - Monitor labs and assess for signs and symptoms of volume excess or deficit  - Monitor intake, output and patient weight  - Monitor urine specific gravity, serum osmolarity  and serum sodium as indicated or ordered  - Monitor response to interventions for patient's volume status, including labs, urine output, blood pressure (other measures as available)  - Encourage oral intake as appropriate  - Instruct patient on fluid and nutrition restrictions as appropriate  Outcome: Progressing     Problem: Impaired Swallowing  Goal: Minimize aspiration risk  Description: Interventions:  - Patient should be alert and upright for all feedings (90 degrees preferred)  - Offer food and liquids at a slow rate  - No straws  - Encourage small bites of food and small sips of liquid  - Offer pills one at a time, crush or deliver with applesauce as needed  - Discontinue feeding and notify MD (or speech pathologist) if coughing or persistent throat clearing or wet/gurgly vocal quality is noted  Outcome: Progressing     Problem: Impaired Cognition  Goal: Patient will exhibit improved attention, thought processing and/or memory  Description: Interventions:  - Allow additional time for processing after asking questions or providing instructions  Outcome: Progressing     Problem: CARDIOVASCULAR - ADULT  Goal: Maintains optimal cardiac output and hemodynamic stability  Description: INTERVENTIONS:- Monitor vital signs, rhythm, and trends- Monitor for bleeding, hypotension and signs of decreased cardiac output- Evaluate effectiveness of vasoactive medications to optimize hemodynamic stability- Monitor arterial and/or venous puncture sites for bleeding and/or hematoma- Assess quality of pulses, skin color and temperature- Assess for signs of decreased coronary artery perfusion - ex. Angina- Evaluate fluid balance, assess for edema, trend weights  Outcome: Progressing

## 2025-03-29 NOTE — PROGRESS NOTES
[facility]  part of [facility]  Hospitalist Progress Note     Sami Grijalva  Patient Status:  Inpatient    10/11/1980  44 year old CSN [id number]   Location 217/217-A Attending Yonny Bowers MD   Hosp Day # 24 PCP Kerri Medina MD     Assessment & Plan:   ----------------------------------  Fevers.  New onset 3/23 along with leukocytosis.  Exact source unclear.    -Possible new right lower lobe pneumonia seen on CT, so Empiric antibiotics with meropenem without any change in fevers, actually fevers worsen  -Blood cultures neg  -Respiratory viral panel negative  -ID following, recommended to stop meropenem for possible drug fever, with improvement of fevers initially, but then spiked again fevers yesterday evening, repeat culture sent, critically the same  -Leukocytosis still high, trending up, CBC in the morning    Respiratory failure, acute hypoxemic.  Due to pneumonia, ARDS.  Oxygen requirement is improving.  Extubated 3/17.  Using between 1 and 4 L of oxygen  -Supplemental oxygen as needed, titrate down.   -Pulmonology consultation appreciated  -Treat contributing factors as described  -Repeat chest x-ray in the morning, labs, including BNP  -Incentive spirometry  -On IV Lasix--> hold now because of increased creatinine  -CXR repeated reviewed, BNP low, only 83  -Currently on 2-3 L supplemental O2    Acute alcoholic hepatitis.  Bilirubin stabilizing, may take a very long time to normalize  -GI following  -started on pentoxifylline 3/25 (instead of steroids given fevers and concern for underlying infection)      Hiccups, on baclofen, better    Other problems  Pancreatitis  Alcohol abuse  Cirrhosis  Hepatic steatosis  Hyponatremia  USMAN resolved, now up again, hold Lasix  Chest pain, resolved  Aspiration pneumonia, finished antibiotics  Diarrhea, better  Sinus tachycardia, due to fever    Supplementary Documentation:   DVT Mechanical Prophylaxis:   SCDs,    DVT Pharmacologic  Prophylaxis   Medication   None                Code Status: Full Code  Cedeño: External urinary catheter in place  Cedeño Duration (in days): 14  Central line:    ADOLFO:  plan for acute rehab pending continued medical stability    I personally reviewed the available laboratories, imaging including. I discussed/will discuss the case with consultants. I ordered laboratories and/or radiographic studies. I adjusted medications as detailed above.  Medical decision making high, risk is high.    Subjective:   ----------------------------------  High fevers yesterday evening, still has chills and feels sweaty at times  No clear new localizing symptoms  still weak.    +hiccups better today    Seen with his parents at bedside      Objective:   Chief Complaint:   Chief Complaint   Patient presents with    Vomiting    Hiccups    Eval-D     ----------------------------------  Temp:  [98.2 °F (36.8 °C)-100.4 °F (38 °C)] 98.2 °F (36.8 °C)  Pulse:  [] 92  Resp:  [18] 18  BP: ()/(54-69) 118/69  SpO2:  [94 %-95 %] 94 %  Gen: Alert, appropriate, appears very weak jaundiced, resting in bed  HEENT: NCAT, neck supple, no carotid bruit.  Scleral icterus  CV: Mild tachycardia, S1S2, and intact distal pulses. No gallop, rub, murmur.  Pulm: Poor effort, no wheezing or rales  Abd: Soft, NTND, BS normal, no mass, no HSM, no rebound/guarding.     Neuro: Generally weak, moves all extremities  MS: No joint effusions.  No peripheral edema.  Skin: Skin is warm and dry. No rashes, erythema, diaphoresis.   Psych: Normal mood and affect. Calm, cooperative    Labs:  Lab Results   Component Value Date    HGB 8.1 (L) 03/28/2025    WBC 25.8 (H) 03/28/2025    .0 03/28/2025     (L) 03/28/2025    K 3.1 (L) 03/28/2025    CREATSERUM 1.37 (H) 03/28/2025    INR 2.19 (H) 03/28/2025     (H) 03/28/2025    ALT 72 (H) 03/28/2025    TROP 0.00 01/31/2017            acetaminophen  1,000 mg Oral Once    baclofen  10 mg Oral TID     pentoxifylline ER  400 mg Oral TID CC    pantoprazole  40 mg Oral BID AC    epoetin sheela  10,000 Units Subcutaneous Once per day on Tuesday Thursday Saturday    [Held by provider] furosemide  20 mg Intravenous BID (Diuretic)    vancomycin  125 mg Per NG Tube Daily    multivitamin  1 tablet Oral Daily    folic acid  1 mg Oral Daily       acetaminophen    haloperidol lactate    polyethylene glycol (PEG 3350)    bisacodyl    prochlorperazine

## 2025-03-29 NOTE — PLAN OF CARE
Problem: Patient Centered Care  Goal: Patient preferences are identified and integrated in the patient's plan of care  Description: Interventions:  - What would you like us to know as we care for you?   - Provide timely, complete, and accurate information to patient/family  - Incorporate patient and family knowledge, values, beliefs, and cultural backgrounds into the planning and delivery of care  - Encourage patient/family to participate in care and decision-making at the level they choose  - Honor patient and family perspectives and choices  Outcome: Progressing     Problem: Patient/Family Goals  Goal: Patient/Family Long Term Goal  Description: Patient's Long Term Goal:     Interventions:  - See additional Care Plan goals for specific interventions  Outcome: Progressing  Goal: Patient/Family Short Term Goal  Description: Patient's Short Term Goal:     Interventions:   - See additional Care Plan goals for specific interventions  Outcome: Progressing     Problem: PAIN - ADULT  Goal: Verbalizes/displays adequate comfort level or patient's stated pain goal  Description: INTERVENTIONS:  - Encourage pt to monitor pain and request assistance  - Assess pain using appropriate pain scale  - Administer analgesics based on type and severity of pain and evaluate response  - Implement non-pharmacological measures as appropriate and evaluate response  - Consider cultural and social influences on pain and pain management  - Manage/alleviate anxiety  - Utilize distraction and/or relaxation techniques  - Monitor for opioid side effects  - Notify MD/LIP if interventions unsuccessful or patient reports new pain  - Anticipate increased pain with activity and pre-medicate as appropriate  Outcome: Progressing     Problem: RISK FOR INFECTION - ADULT  Goal: Absence of fever/infection during anticipated neutropenic period  Description: INTERVENTIONS  - Monitor WBC  - Administer growth factors as ordered  - Implement neutropenic  guidelines  Outcome: Progressing     Problem: SAFETY ADULT - FALL  Goal: Free from fall injury  Description: INTERVENTIONS:  - Assess pt frequently for physical needs  - Identify cognitive and physical deficits and behaviors that affect risk of falls.  - [place] fall precautions as indicated by assessment.  - Educate pt/family on patient safety including physical limitations  - Instruct pt to call for assistance with activity based on assessment  - Modify environment to reduce risk of injury  - Provide assistive devices as appropriate  - Consider OT/PT consult to assist with strengthening/mobility  - Encourage toileting schedule  Outcome: Progressing     Problem: DISCHARGE PLANNING  Goal: Discharge to home or other facility with appropriate resources  Description: INTERVENTIONS:  - Identify barriers to discharge w/pt and caregiver  - Include patient/family/discharge partner in discharge planning  - Arrange for needed discharge resources and transportation as appropriate  - Identify discharge learning needs (meds, wound care, etc)  - Arrange for interpreters to assist at discharge as needed  - Consider post-discharge preferences of patient/family/discharge partner  - Complete POLST form as appropriate  - Assess patient's ability to be responsible for managing their own health  - Refer to Case Management Department for coordinating discharge planning if the patient needs post-hospital services based on physician/LIP order or complex needs related to functional status, cognitive ability or social support system  Outcome: Progressing     Problem: Delirium  Goal: Minimize duration of delirium  Description: Interventions:  - Encourage use of hearing aids, eye glasses  - Promote highest level of mobility daily  - Provide frequent reorientation  - Promote wakefulness i.e. lights on, blinds open  - Promote sleep, encourage patient's normal rest cycle i.e. lights off, TV off, minimize noise and interruptions  - Encourage  family to assist in orientation and promotion of home routines  Outcome: Progressing     Problem: RESPIRATORY - ADULT  Goal: Achieves optimal ventilation and oxygenation  Description: INTERVENTIONS:  - Assess for changes in respiratory status  - Assess for changes in mentation and behavior  - Position to facilitate oxygenation and minimize respiratory effort  - Oxygen supplementation based on oxygen saturation or ABGs  - Provide Smoking Cessation handout, if applicable  - Encourage broncho-pulmonary hygiene including cough, deep breathe, Incentive Spirometry  - Assess the need for suctioning and perform as needed  - Assess and instruct to report SOB or any respiratory difficulty  - Respiratory Therapy support as indicated  - Manage/alleviate anxiety  - Monitor for signs/symptoms of CO2 retention  Outcome: Progressing     Problem: METABOLIC/FLUID AND ELECTROLYTES - ADULT  Goal: Electrolytes maintained within normal limits  Description: INTERVENTIONS:  - Monitor labs and rhythm and assess patient for signs and symptoms of electrolyte imbalances  - Administer electrolyte replacement as ordered  - Monitor response to electrolyte replacements, including rhythm and repeat lab results as appropriate  - Fluid restriction as ordered  - Instruct patient on fluid and nutrition restrictions as appropriate  Outcome: Progressing  Goal: Hemodynamic stability and optimal renal function maintained  Description: INTERVENTIONS:  - Monitor labs and assess for signs and symptoms of volume excess or deficit  - Monitor intake, output and patient weight  - Monitor urine specific gravity, serum osmolarity and serum sodium as indicated or ordered  - Monitor response to interventions for patient's volume status, including labs, urine output, blood pressure (other measures as available)  - Encourage oral intake as appropriate  - Instruct patient on fluid and nutrition restrictions as appropriate  Outcome: Progressing     Problem: Impaired  Swallowing  Goal: Minimize aspiration risk  Description: Interventions:  - Patient should be alert and upright for all feedings (90 degrees preferred)  - Offer food and liquids at a slow rate  - No straws  - Encourage small bites of food and small sips of liquid  - Offer pills one at a time, crush or deliver with applesauce as needed  - Discontinue feeding and notify MD (or speech pathologist) if coughing or persistent throat clearing or wet/gurgly vocal quality is noted  Outcome: Progressing     Problem: Impaired Cognition  Goal: Patient will exhibit improved attention, thought processing and/or memory  Description: Interventions:    Outcome: Progressing     Problem: CARDIOVASCULAR - ADULT  Goal: Maintains optimal cardiac output and hemodynamic stability  Description: INTERVENTIONS:- Monitor vital signs, rhythm, and trends- Monitor for bleeding, hypotension and signs of decreased cardiac output- Evaluate effectiveness of vasoactive medications to optimize hemodynamic stability- Monitor arterial and/or venous puncture sites for bleeding and/or hematoma- Assess quality of pulses, skin color and temperature- Assess for signs of decreased coronary artery perfusion - ex. Angina- Evaluate fluid balance, assess for edema, trend weights  Outcome: Progressing

## 2025-03-29 NOTE — PROGRESS NOTES
[facility]  part of [facility]  Hospitalist Progress Note     Sami Grijalva  Patient Status:  Inpatient    10/11/1980  44 year old CSN [id number]   Location 217/217-A Attending Yonny Bowers MD   Hosp Day # 25 PCP Kerri Medina MD     Assessment & Plan:   ----------------------------------  Fevers.  New onset 3/23 along with leukocytosis.  Exact source unclear.    -Possible new right lower lobe pneumonia seen on CT, so Empiric antibiotics with meropenem without any change in fevers, actually fevers worsen  -Blood cultures neg  -Respiratory viral panel negative  -ID following, recommended to stop meropenem for possible drug fever, with improvement of fevers initially, but then spiked again fevers 3/27 evening, repeat culture sent  -Leukocytosis still high, trending up&down, current ~22.8  -CBC in the morning    Respiratory failure, acute hypoxemic.  Due to pneumonia, ARDS.  Oxygen requirement is improving.  Extubated 3/17.  Using between 1 and 4 L of oxygen  -Supplemental oxygen as needed, titrate down.   -Pulmonology consultation appreciated  -Treat contributing factors as described  -Repeat chest x-ray in the morning, labs, including BNP now wnl (~83)  -Incentive spirometry  -On IV Lasix--> hold now because of increased creatinine-->restart in am daily (instead of bid)  -CXR repeated reviewed, BNP low, only 83  -Currently on 2-3 L supplemental O2    Acute alcoholic hepatitis.  Bilirubin stabilizing, may take a very long time to normalize  -GI following  -started on pentoxifylline 3/25 (instead of steroids given fevers and concern for underlying infection)      Hiccups, on baclofen, better    Other problems  Pancreatitis  Alcohol abuse  Cirrhosis  Hepatic steatosis  Hyponatremia  USMAN resolved, now up again, hold Lasix  Chest pain, resolved  Aspiration pneumonia, finished antibiotics  Diarrhea, better  Sinus tachycardia, due to fever    Supplementary Documentation:   DVT Mechanical  Prophylaxis:   SCDs,    DVT Pharmacologic Prophylaxis   Medication   None                Code Status: Full Code  Cedeño: External urinary catheter in place  Cedeño Duration (in days): 14  Central line:    ADOLFO:  plan for acute rehab pending continued medical stability    I personally reviewed the available laboratories, imaging including. I discussed/will discuss the case with consultants. I ordered laboratories and/or radiographic studies. I adjusted medications as detailed above.  Medical decision making high, risk is high.    Subjective:   ----------------------------------  Vomited again yesterday  Oral intake low  still weak, spends most of day in bed  +hiccups better      Seen with his mother at bedside      Objective:   Chief Complaint:   Chief Complaint   Patient presents with    Vomiting    Hiccups    Eval-D     ----------------------------------  Temp:  [99 °F (37.2 °C)-99.9 °F (37.7 °C)] 99.8 °F (37.7 °C)  Pulse:  [] 100  Resp:  [16] 16  BP: (103-117)/(62-69) 117/66  SpO2:  [93 %-95 %] 93 %  Gen: Alert, appropriate, appears very weak jaundiced, resting in bed  HEENT: NCAT, neck supple, no carotid bruit.  Scleral icterus  CV: Mild tachycardia, S1S2, and intact distal pulses. No gallop, rub, murmur.  Pulm: Poor effort, no wheezing or rales  Abd: Soft, NTND, BS normal, no mass, no HSM, no rebound/guarding.     Neuro: Generally weak, moves all extremities  MS: No joint effusions.  No peripheral edema.  Skin: Skin is warm and dry. No rashes, erythema, diaphoresis.   Psych: Normal mood and affect. Calm, cooperative    Labs:  Lab Results   Component Value Date    HGB 8.0 (L) 03/29/2025    WBC 22.8 (H) 03/29/2025    .0 03/29/2025     (L) 03/29/2025    K 3.1 (L) 03/29/2025    K 3.1 (L) 03/29/2025    CREATSERUM 1.22 03/29/2025    INR 2.19 (H) 03/28/2025     (H) 03/28/2025    ALT 72 (H) 03/28/2025    TROP 0.00 01/31/2017            acetaminophen  1,000 mg Oral Once    baclofen  10 mg Oral TID     pentoxifylline ER  400 mg Oral TID CC    pantoprazole  40 mg Oral BID AC    epoetin sheela  10,000 Units Subcutaneous Once per day on Tuesday Thursday Saturday    [Held by provider] furosemide  20 mg Intravenous BID (Diuretic)    vancomycin  125 mg Per NG Tube Daily    multivitamin  1 tablet Oral Daily    folic acid  1 mg Oral Daily       acetaminophen    haloperidol lactate    polyethylene glycol (PEG 3350)    bisacodyl    prochlorperazine

## 2025-03-29 NOTE — PROGRESS NOTES
Pulmonary/ICU/Critical Care Progress Note        Reason for Consultation: resp failure  Referring Physician: Dr. Diaz      Subjective:  On 2 LNC  Hiccups have resolved  Fever trend also improving      From the initial consultation  Pt is unable to provide info  HPI: 43 yo male with hx of gout, GERD, admitted with acute ETOH hepatitis, ETOH use, emesis, USMAN. Seen by GI.  On CIWA protocol, thiamine, folic acid, MVI, BZDs, lactulose  Has been in the ICU for 2 days  ICU consulted this am for worsening hypoxemia  CXR this am with b/l infiltrates concerning for PNA      REVIEW OF SYSTEMS:  Positives and negatives as noted in HPI. All other review of systems otherwise are either limited (due to pt/family inability to provide) or negative.      PAST MEDICAL HISTORY:  Past Medical History:   Diagnosis Date    Esophageal reflux     Gout     Hernia          PAST SURGICAL HISTORY:  Past Surgical History:   Procedure Laterality Date    Colonoscopy N/A 11/1/2023    Procedure: COLONOSCOPY;  Surgeon: Vandana Austin MD;  Location: [facility] ENDOSCOPY    Egd  02/15/2016    Eh pr repair complex scalp arms legs 1.1 to 2.5 cm  2014    Elbow fracture surgery Right 1991    Hernia surgery      Inguinal hernia repair Left 01/17/2023         PAST SOCIAL HISTORY:  Social History     Socioeconomic History    Marital status: Single    Number of children: 0   Occupational History    Occupation: Supervisor, car [employer]   Tobacco Use    Smoking status: Former     Types: Cigarettes    Smokeless tobacco: Never   Vaping Use    Vaping status: Some Days   Substance and Sexual Activity    Alcohol use: Yes     Comment: per pt, DAILY HARD LEMONADE- [employer] hard lemonade 24oz cans, 6 cans daily    Drug use: Yes     Types: Cannabis     Comment: last use, couple months ago per pt report         PAST FAMILY HISTORY:  Family History   Problem Relation Age of Onset    Diabetes Father     Hypertension Father     Cancer Mother         liver        ALLERGIES:  Allergies[1]      MEDS:  Home Medications:  Medications Taking[2]    Scheduled Medication:   acetaminophen  1,000 mg Oral Once    baclofen  10 mg Oral TID    pentoxifylline ER  400 mg Oral TID CC    pantoprazole  40 mg Oral BID AC    epoetin sheela  10,000 Units Subcutaneous Once per day on Tuesday Thursday Saturday    [Held by provider] furosemide  20 mg Intravenous BID (Diuretic)    vancomycin  125 mg Per NG Tube Daily    multivitamin  1 tablet Oral Daily    folic acid  1 mg Oral Daily     Continuous Infusing Medication:      PRN Medications:    acetaminophen    haloperidol lactate    polyethylene glycol (PEG 3350)    bisacodyl    prochlorperazine       PHYSICAL EXAM:  /65 (BP Location: Right arm)   Pulse 100   Temp 99.9 °F (37.7 °C) (Oral)   Resp 16   Ht 5' 8\" (1.727 m)   Wt 188 lb 14.4 oz (85.7 kg)   SpO2 95%   BMI 28.72 kg/m²      CONSTITUTIONAL: NAD + jaundice  HEENT: AT/NC  MOUTH: MMM  NECK/THROAT: no JVD. Trachea midline. No obvious thyromegaly  LUNG: clear b/l no wheezing, + b/l crackles. Chest symmetric with respiratory motion  HEART: regular rate and rhythm, no obvious murmers or gallops noted  ABD: soft non tender. + bowel sounds. No organomegaly noted  EXT: no clubbing, cyanosis. Min b/l LE edema      IMAGES:   CXR 3/28/25  CONCLUSION:   1. Borderline heart size   2. Pulmonary venous distention.    3. Bilateral mixed alveolar and interstitial multifocal airspace disease with slight improvement       CT abd/pelvis 3/24/25  CONCLUSION:   1. Findings of mild acute interstitial edematous pancreatitis are very similar since examination from 12 days prior.  There is small volume upper retroperitoneal/peripancreatic free fluid.  No CT evidence of pancreatic necrosis.  No   well-defined/drainable peripancreatic collection.  No significant pancreatic ductal dilation.   2. Mild circumferential wall thickening at the C-loop of the duodenum is most likely reactive to pancreatitis or  less likely relates to a primary infectious/inflammatory duodenitis.  No free intraperitoneal air.   3. Nodular liver contours are compatible with cirrhosis.  There are also stigmata of portal hypertension, which include worsening small-moderate volume intra-abdominal ascites, splenomegaly, and upper abdominal/paraesophageal varices.   4. Small dependent left pleural effusion is new since prior exam.   5. Consolidative opacities in the left lower lobe have improved.  There are, however, worsening patchy consolidative and ground-glass density opacities throughout the imaged right lung base.  Findings could relate to infection/aspiration, pulmonary   edema, or acute respiratory distress syndrome.   6. Colonic diverticulosis.   7. Lesser incidental findings as above.     Chest CT PE 3/21/25 on my review no PE. Overall improving b/l pulmonary infiltrates  CONCLUSION:   1. No large central pulmonary embolus.  Evaluation beyond the proximal segmental level is precluded by heterogeneous contrast bolus and respiratory related motion artifact.   2. Enlargement of main pulmonary artery suggests pulmonary hypertension.   3. Interval improvement in multifocal bilateral pneumonia.   4. Trace left pleural effusion.   5. Herniation of fat into the left inferior mediastinum without significant change.   6. Hepatic cirrhosis with changes related to portal hypertension including small amount of perihepatic ascites.   7. Mild LAD coronary artery calcification.   8. Mild left atrial enlargement     VQ scan indeterminate 3/20/25    LE dopplers neg 3/20/25    CXR 3/20/25  CONCLUSION: Patchy alveolar opacities throughout the lungs, suggesting edema or multifocal pneumonia.  No significant interval change since preceding exam.  Interval extubation and removal of feeding tube.     CXR 3/17/25  Findings and impression:   ETT 6 cm above the em   Enteric tube beneath the diaphragm   Stable heart   Persistent low lung volumes and bilateral  diffuse pulmonary opacities.   No pneumothorax     CT C/A/P 3/12/25  CONCLUSION:   1. Diffuse pancreatic enlargement and mild peripancreatic inflammatory stranding are new since abdominal CT from 8 days prior and concerning for acute interstitial edematous pancreatitis.  No gross pancreatic ductal dilation or peripancreatic collection.     Request correlation with serum lipase levels.   2. Left greater than right lower lobe airspace disease with intrinsic air bronchograms.  Findings are similar on the left and slightly improved on the right since chest CT from 5 days prior; multifocal pneumonia/aspiration is suspected.  Also identified   are new and/or worsening multifocal upper lobe predominant ground-glass density opacities throughout both lungs; these ground-glass density opacities could be infectious in nature or relate to acute respiratory distress syndrome.   3. Endotracheal tube with tip approximately 1 cm above the level of the em.  Enteric tube tip in the gastric fundus.  Rectal tube and Cedeño catheter also noted.   4. Fatty liver and cirrhosis.   5. Stable splenomegaly.   6. Small to moderate volume intra-abdominal ascites is new since prior abdominal CT.  There is also new mild anasarca.   7. Liquid contents in the colon could relate to a malabsorption state or low-grade infectious/inflammatory versus antibiotic associated colitis.  Mild scattered colonic diverticulosis, but with no CT findings of acute diverticulitis.   8. Low-density appearance of the intracardiac blood pool raises the possibility of underlying anemia. Correlate with hematologic parameters.    9. Lesser incidental findings as above.       CXR 3/12/25 with multifocal infiltrates    CXR 3/11/25  FINDINGS/IMPRESSION:   1. There is redemonstration of patchy alveolar opacities throughout both lungs.  The findings could represent alveolar edema, a diffuse multifocal infectious process, or a combination.  The findings have not  significantly changed since previous exam.   2. The heart mediastinal structures remain enlarged.  The there are trace bilateral pleural effusions.   3. The thoracic component of an enteric feeding tube is identified traversing the thoracic esophagus.  The distal tip is not visualized but lies well below the GE junction.     CT head 3/7/25  CONCLUSION:   Motion limits evaluation.  No evidence of acute intracranial abnormality.     CT chest 3/7/25  CONCLUSION:   1. Extensive bilateral airspace disease, concerning for potential multifocal pneumonia. Continued surveillance is advised.   2. Dense bilateral lower airspace consolidation is evident; aspiration pneumonitis is a differential diagnostic possibility. Follow-up is recommended to document resolution.    3. Dilatation of the main pulmonary artery trunk may relate to underlying pulmonary hypertension.    4. Small hiatal hernia with imaging manifestations that may be indicative underlying esophagitis.   5. Marked hepatic steatosis.   6. Lesser incidental findings as above.     CXR 3/7/25 with multifocal infiltrates, possible effusion on the left  CONCLUSION:   1. Cardiomegaly.  Tortuous aorta.   2. Extensive multifocal airspace opacification in the bilateral perihilar and basilar regions with left-sided effusion.  Differential includes pulmonary edema congestive failure versus multifocal pneumonitis.      CT abd/pelvis 3/4/25  CONCLUSION:   Severely fatty liver with subtle undulating contour.  Stable splenomegaly.  Tubular structures around the GE junction are suggestive of lower esophageal varices.  No ascites       LABS:  Recent Labs   Lab 03/27/25  0403 03/28/25  0446 03/29/25  0504   RBC 2.86* 2.88* 2.90*   HGB 7.6* 8.1* 8.0*   HCT 23.3* 24.2* 23.4*   MCV 81.5 84.0 80.7   MCH 26.6 28.1 27.6   MCHC 32.6 33.5 34.2   RDW 33.2* 32.8* 32.8*   NEPRELIM 20.52* 22.11* 19.05*   WBC 24.0* 25.8* 22.8*   .0 186.0 173.0       Recent Labs   Lab 03/26/25  0532  03/27/25  0403 03/28/25  0446 03/29/25  0504   * 101* 107* 105*   BUN 25* 24* 27* 25*   CREATSERUM 1.06 1.00 1.37* 1.22   EGFRCR 88 94 64 74   CA 7.6* 7.7* 7.7* 7.8*   ALB 2.5* 2.4* 2.4* 2.4*   * 135* 134* 134*   K 3.4*  3.4* 3.5  3.5 3.1* 3.1*  3.1*    105 104 102   CO2 22.0 23.0 24.0 23.0   ALKPHO 212* 218* 208*  --    * 155* 155*  --    ALT 80* 75* 72*  --    BILT 15.4* 15.5* 16.4*  --    TP 5.4* 5.5* 5.5*  --        ASSESSMENT/PLAN:  Acute hypoxemic resp failure  -secondary to multifocal infiltrates. Given hx of emesis, concern for aspiration  -checked non contrast chest CT showed b/l infiltrates  -initially on zosyn, doxy. Checked urine leg, strep pneumo, mrsa nares, blood cx neg thus far  -initially weaned from airvo to NC but then declined and was intubated on 3/12/25. Was on full MV support   -passed SBT on 3/17/25 and extubated to NC. Now on 4LNC  -ID consulted and stopped doxy and zosyn. Switched to meropenem and completed course  -repeat CT with b/l infiltrates L>R  -underwent bronchoscopy with left BAL on 3/13/25. Cultures NGTD  -aspiration precautions  -experienced chest discomfort last week with taking a deep breath in. CXR pretty unchanged. VQ scan indeterminate. LE dopplers neg. Checked a chest CT PE was neg for PE and showed improving b/l pulmonary infiltrates. Possibly new ones on the right. Concern for aspiration?  -now weaned to NC supp 02. Currently down to 2 LNC    Acute ETOH hepatitis and now pancreatitis  -GI following. Imaging as above-now with pancreatitis  -PPI  -passed swallow   -persistent leukocytosis and fevers  -ID restarted abx and checked abd CT as noted above. Abx stopped since  -fever trend improving off abx    -hypotension (now resolved)-multifactorial from sedation, sepsis, anemia  -likely secondary to sedation and sepsis  -Pt didn't receive sepsis bolus b/c deemed fluid overloaded d/t possible cirrhosis. Was on low dose levophed for MAP > 65 weaned  off   -possibly d/t acute anemia. No obvious source seen. S/p transfused 1 unit pRBC on 3/16/25  -off pressors now    Hiccups when he eats or is startled  -had resolved  -restarted baclofen can probably stop in the next couple of days    ETOH withdrawal (recovered)  -s/p CIWA protocol  -psych was following   -CT head neg for acute changes    USMAN and hypernatremia (resolved)  -s/p bicarb infusion  -s/p D5 0.9 NS  -renal previously following. Labs improved    Proph  -DVT: elevated INR low PLT, anemia. On SCDS  -nutrition: passed swallow     Dispo  -Full code  -family at bedside    Thank you for the opportunity to care for Sami Grijalva Jr..      JOLYNN Escobedo DO, MPH  Pulmonary Critical Care Medicine  [facility]                         [1]   Allergies  Allergen Reactions    Morphine SWELLING     SHORTNESS OF BREATH   [2]   No outpatient medications have been marked as taking for the 3/4/25 encounter (Hospital Encounter).      Prednisone Counseling:  I discussed with the patient the risks of prolonged use of prednisone including but not limited to weight gain, insomnia, osteoporosis, mood changes, diabetes, susceptibility to infection, glaucoma and high blood pressure.  In cases where prednisone use is prolonged, patients should be monitored with blood pressure checks, serum glucose levels and an eye exam.  Additionally, the patient may need to be placed on GI prophylaxis, PCP prophylaxis, and calcium and vitamin D supplementation and/or a bisphosphonate.  The patient verbalized understanding of the proper use and the possible adverse effects of prednisone.  All of the patient's questions and concerns were addressed.

## 2025-03-30 LAB
MAGNESIUM SERPL-MCNC: 1.9 MG/DL (ref 1.6–2.6)
POTASSIUM SERPL-SCNC: 3.4 MMOL/L (ref 3.5–5.1)

## 2025-03-30 PROCEDURE — 99232 SBSQ HOSP IP/OBS MODERATE 35: CPT | Performed by: INTERNAL MEDICINE

## 2025-03-30 PROCEDURE — 99233 SBSQ HOSP IP/OBS HIGH 50: CPT | Performed by: HOSPITALIST

## 2025-03-30 RX ORDER — BACLOFEN 10 MG/1
10 TABLET ORAL 2 TIMES DAILY
Status: DISCONTINUED | OUTPATIENT
Start: 2025-03-30 | End: 2025-04-01

## 2025-03-30 RX ORDER — POTASSIUM CHLORIDE 1500 MG/1
40 TABLET, EXTENDED RELEASE ORAL ONCE
Status: COMPLETED | OUTPATIENT
Start: 2025-03-30 | End: 2025-03-30

## 2025-03-30 RX ORDER — TEMAZEPAM 7.5 MG/1
15 CAPSULE ORAL NIGHTLY PRN
Status: DISCONTINUED | OUTPATIENT
Start: 2025-03-30 | End: 2025-04-02

## 2025-03-30 NOTE — PLAN OF CARE
Problem: Patient Centered Care  Goal: Patient preferences are identified and integrated in the patient's plan of care  Description: Interventions:  - Provide timely, complete, and accurate information to patient/family  - Incorporate patient and family knowledge, values, beliefs, and cultural backgrounds into the planning and delivery of care  - Encourage patient/family to participate in care and decision-making at the level they choose  - Honor patient and family perspectives and choices  Outcome: Progressing     Problem: Patient/Family Goals  Goal: Patient/Family Long Term Goal  - See additional Care Plan goals for specific interventions  Outcome: Progressing     Problem: PAIN - ADULT  Goal: Verbalizes/displays adequate comfort level or patient's stated pain goal  Description: INTERVENTIONS:  - Encourage pt to monitor pain and request assistance  - Assess pain using appropriate pain scale  - Administer analgesics based on type and severity of pain and evaluate response  - Implement non-pharmacological measures as appropriate and evaluate response  - Consider cultural and social influences on pain and pain management  - Manage/alleviate anxiety  - Utilize distraction and/or relaxation techniques  - Monitor for opioid side effects  - Notify MD/LIP if interventions unsuccessful or patient reports new pain  - Anticipate increased pain with activity and pre-medicate as appropriate  Outcome: Progressing     Problem: RISK FOR INFECTION - ADULT  Goal: Absence of fever/infection during anticipated neutropenic period  Description: INTERVENTIONS  - Monitor WBC  - Administer growth factors as ordered  - Implement neutropenic guidelines  Outcome: Progressing     Problem: SAFETY ADULT - FALL  Goal: Free from fall injury  Description: INTERVENTIONS:  - Assess pt frequently for physical needs  - Identify cognitive and physical deficits and behaviors that affect risk of falls.  - Douglas fall precautions as indicated by  assessment.  - Educate pt/family on patient safety including physical limitations  - Instruct pt to call for assistance with activity based on assessment  - Modify environment to reduce risk of injury  - Provide assistive devices as appropriate  - Consider OT/PT consult to assist with strengthening/mobility  - Encourage toileting schedule  Outcome: Progressing     Problem: RESPIRATORY - ADULT  Goal: Achieves optimal ventilation and oxygenation  Description: INTERVENTIONS:  - Assess for changes in respiratory status  - Assess for changes in mentation and behavior  - Position to facilitate oxygenation and minimize respiratory effort  - Oxygen supplementation based on oxygen saturation or ABGs  - Provide Smoking Cessation handout, if applicable  - Encourage broncho-pulmonary hygiene including cough, deep breathe, Incentive Spirometry  - Assess the need for suctioning and perform as needed  - Assess and instruct to report SOB or any respiratory difficulty  - Respiratory Therapy support as indicated  - Manage/alleviate anxiety  - Monitor for signs/symptoms of CO2 retention  Outcome: Progressing     Problem: Impaired Swallowing  Goal: Minimize aspiration risk  Description: Interventions:  - Patient should be alert and upright for all feedings (90 degrees preferred)  - Offer food and liquids at a slow rate  - No straws  - Encourage small bites of food and small sips of liquid  - Offer pills one at a time, crush or deliver with applesauce as needed  - Discontinue feeding and notify MD (or speech pathologist) if coughing or persistent throat clearing or wet/gurgly vocal quality is noted  Outcome: Progressing     Problem: CARDIOVASCULAR - ADULT  Goal: Maintains optimal cardiac output and hemodynamic stability  Description: INTERVENTIONS:- Monitor vital signs, rhythm, and trends- Monitor for bleeding, hypotension and signs of decreased cardiac output- Evaluate effectiveness of vasoactive medications to optimize hemodynamic  stability- Monitor arterial and/or venous puncture sites for bleeding and/or hematoma- Assess quality of pulses, skin color and temperature- Assess for signs of decreased coronary artery perfusion - ex. Angina- Evaluate fluid balance, assess for edema, trend weights  Outcome: Progressing

## 2025-03-30 NOTE — PROGRESS NOTES
[facility]  part of [facility]  Hospitalist Progress Note     Sami Grijalva  Patient Status:  Inpatient    10/11/1980  44 year old CSN [id number]   Location 217/217-A Attending Yonny Bowers MD   Hosp Day # 26 PCP Kerri Medina MD     Assessment & Plan:   ----------------------------------  Fevers.  New onset 3/23 along with leukocytosis.  Exact source unclear.    -Possible new right lower lobe pneumonia seen on CT, so Empiric antibiotics with meropenem without any change in fevers, actually fevers worsen  -Blood cultures neg  -Respiratory viral panel negative  -ID following, recommended to stop meropenem for possible drug fever, with improvement of fevers initially, but then spiked again fevers 3/27 evening, repeat culture sent, neg  -Leukocytosis still high, trending up&down, current ~22.8  -low grade temps ~100.9  -monitor off abx  -CBC in the morning    Respiratory failure, acute hypoxemic.  Due to pneumonia, ARDS.  Oxygen requirement is improving.  Extubated 3/17.  Using between 1 and 4 L of oxygen  -Supplemental oxygen as needed, titrate down.   -Pulmonology consultation appreciated  -Treat contributing factors as described  -Repeat chest x-ray in the morning, labs, including BNP now wnl (~83)  -Incentive spirometry  -On IV Lasix--> hold now because of increased creatinine-->restart in am daily (instead of bid)  -CXR repeated reviewed, BNP low, only 83  -Currently on 2-3 L -->1 L supplemental O2  -Renal panel in the morning    Acute alcoholic hepatitis.  Bilirubin stabilizing, may take a very long time to normalize  -GI following  -started on pentoxifylline 3/25 (instead of steroids given fevers and concern for underlying infection)    -Labs in the morning    Hiccups, on baclofen, better, decrease dose to twice daily    Other problems  Pancreatitis  Alcohol abuse  Cirrhosis  Hepatic steatosis  Hyponatremia  USMAN resolved, now up again, hold Lasix  Chest pain,  resolved  Aspiration pneumonia, finished antibiotics  Diarrhea, better  Sinus tachycardia, due to fever    Supplementary Documentation:   DVT Mechanical Prophylaxis:   SCDs,    DVT Pharmacologic Prophylaxis   Medication   None                Code Status: Full Code  Cedeño: External urinary catheter in place  Cedeño Duration (in days): 14  Central line:    ADOLFO:  plan for acute rehab pending continued medical stability, and resolution of fevers, keep mobilizing    I personally reviewed the available laboratories, imaging including. I discussed/will discuss the case with consultants. I ordered laboratories and/or radiographic studies. I adjusted medications as detailed above.  Medical decision making high, risk is high.    Subjective:   ----------------------------------  Vomited again yesterday  Oral intake low  Being fed by family  still weak, spends most of day in bed, but was able to get out of the bed to chair today  I encourage patient to get mobilized  D/w RN as well  +hiccups better      Seen with his parents and partner at bedside      Objective:   Chief Complaint:   Chief Complaint   Patient presents with    Vomiting    Hiccups    Eval-D     ----------------------------------  Temp:  [99.1 °F (37.3 °C)-100.9 °F (38.3 °C)] 99.6 °F (37.6 °C)  Pulse:  [105-108] 107  Resp:  [16-18] 18  BP: (119-134)/(67-83) 134/83  SpO2:  [93 %-94 %] 93 %  Gen: Alert, appropriate, appears very weak jaundiced, resting in bed, but seems better  HEENT: NCAT, neck supple, no carotid bruit.  Scleral icterus  CV: Mild tachycardia, S1S2, and intact distal pulses. No gallop, rub, murmur.  Pulm: Poor effort, no wheezing or rales  Abd: Soft, NTND, BS normal, no mass, no HSM, no rebound/guarding.     Neuro: Generally weak, moves all extremities  MS: No joint effusions.  No peripheral edema.  Skin: Skin is warm and dry. No rashes, erythema, diaphoresis.   Psych: Normal mood and affect. Calm, cooperative    Labs:  Lab Results   Component Value  Date    HGB 8.0 (L) 03/29/2025    WBC 22.8 (H) 03/29/2025    .0 03/29/2025     (L) 03/29/2025    K 3.4 (L) 03/30/2025    CREATSERUM 1.22 03/29/2025    INR 2.19 (H) 03/28/2025     (H) 03/28/2025    ALT 72 (H) 03/28/2025    TROP 0.00 01/31/2017            baclofen  10 mg Oral BID    furosemide  20 mg Intravenous Daily    magnesium oxide  400 mg Oral BID with meals    magnesium oxide  400 mg Oral Once    potassium chloride  40 mEq Oral Once    pentoxifylline ER  400 mg Oral TID CC    pantoprazole  40 mg Oral BID AC    epoetin sheela  10,000 Units Subcutaneous Once per day on Tuesday Thursday Saturday    vancomycin  125 mg Per NG Tube Daily    multivitamin  1 tablet Oral Daily    folic acid  1 mg Oral Daily       temazepam    acetaminophen    haloperidol lactate    polyethylene glycol (PEG 3350)    bisacodyl    prochlorperazine

## 2025-03-30 NOTE — PLAN OF CARE
Problem: Patient Centered Care  Goal: Patient preferences are identified and integrated in the patient's plan of care  Description: Interventions:  - What would you like us to know as we care for you?   - Provide timely, complete, and accurate information to patient/family  - Incorporate patient and family knowledge, values, beliefs, and cultural backgrounds into the planning and delivery of care  - Encourage patient/family to participate in care and decision-making at the level they choose  - Honor patient and family perspectives and choices  Outcome: Progressing     Problem: Patient/Family Goals  Goal: Patient/Family Long Term Goal  Description: Patient's Long Term Goal:     Interventions:  - See additional Care Plan goals for specific interventions  Outcome: Progressing  Goal: Patient/Family Short Term Goal  Description: Patient's Short Term Goal:     Interventions:   - See additional Care Plan goals for specific interventions  Outcome: Progressing     Problem: PAIN - ADULT  Goal: Verbalizes/displays adequate comfort level or patient's stated pain goal  Description: INTERVENTIONS:  - Encourage pt to monitor pain and request assistance  - Assess pain using appropriate pain scale  - Administer analgesics based on type and severity of pain and evaluate response  - Implement non-pharmacological measures as appropriate and evaluate response  - Consider cultural and social influences on pain and pain management  - Manage/alleviate anxiety  - Utilize distraction and/or relaxation techniques  - Monitor for opioid side effects  - Notify MD/LIP if interventions unsuccessful or patient reports new pain  - Anticipate increased pain with activity and pre-medicate as appropriate  Outcome: Progressing     Problem: RISK FOR INFECTION - ADULT  Goal: Absence of fever/infection during anticipated neutropenic period  Description: INTERVENTIONS  - Monitor WBC  - Administer growth factors as ordered  - Implement neutropenic  guidelines  Outcome: Progressing     Problem: SAFETY ADULT - FALL  Goal: Free from fall injury  Description: INTERVENTIONS:  - Assess pt frequently for physical needs  - Identify cognitive and physical deficits and behaviors that affect risk of falls.  - [place] fall precautions as indicated by assessment.  - Educate pt/family on patient safety including physical limitations  - Instruct pt to call for assistance with activity based on assessment  - Modify environment to reduce risk of injury  - Provide assistive devices as appropriate  - Consider OT/PT consult to assist with strengthening/mobility  - Encourage toileting schedule  Outcome: Progressing     Problem: DISCHARGE PLANNING  Goal: Discharge to home or other facility with appropriate resources  Description: INTERVENTIONS:  - Identify barriers to discharge w/pt and caregiver  - Include patient/family/discharge partner in discharge planning  - Arrange for needed discharge resources and transportation as appropriate  - Identify discharge learning needs (meds, wound care, etc)  - Arrange for interpreters to assist at discharge as needed  - Consider post-discharge preferences of patient/family/discharge partner  - Complete POLST form as appropriate  - Assess patient's ability to be responsible for managing their own health  - Refer to Case Management Department for coordinating discharge planning if the patient needs post-hospital services based on physician/LIP order or complex needs related to functional status, cognitive ability or social support system  Outcome: Progressing     Problem: Delirium  Goal: Minimize duration of delirium  Description: Interventions:  - Encourage use of hearing aids, eye glasses  - Promote highest level of mobility daily  - Provide frequent reorientation  - Promote wakefulness i.e. lights on, blinds open  - Promote sleep, encourage patient's normal rest cycle i.e. lights off, TV off, minimize noise and interruptions  - Encourage  family to assist in orientation and promotion of home routines  Outcome: Progressing     Problem: RESPIRATORY - ADULT  Goal: Achieves optimal ventilation and oxygenation  Description: INTERVENTIONS:  - Assess for changes in respiratory status  - Assess for changes in mentation and behavior  - Position to facilitate oxygenation and minimize respiratory effort  - Oxygen supplementation based on oxygen saturation or ABGs  - Provide Smoking Cessation handout, if applicable  - Encourage broncho-pulmonary hygiene including cough, deep breathe, Incentive Spirometry  - Assess the need for suctioning and perform as needed  - Assess and instruct to report SOB or any respiratory difficulty  - Respiratory Therapy support as indicated  - Manage/alleviate anxiety  - Monitor for signs/symptoms of CO2 retention  Outcome: Progressing     Problem: Delirium  Goal: Minimize duration of delirium  Description: Interventions:  - Encourage use of hearing aids, eye glasses  - Promote highest level of mobility daily  - Provide frequent reorientation  - Promote wakefulness i.e. lights on, blinds open  - Promote sleep, encourage patient's normal rest cycle i.e. lights off, TV off, minimize noise and interruptions  - Encourage family to assist in orientation and promotion of home routines  Outcome: Progressing     Problem: RESPIRATORY - ADULT  Goal: Achieves optimal ventilation and oxygenation  Description: INTERVENTIONS:  - Assess for changes in respiratory status  - Assess for changes in mentation and behavior  - Position to facilitate oxygenation and minimize respiratory effort  - Oxygen supplementation based on oxygen saturation or ABGs  - Provide Smoking Cessation handout, if applicable  - Encourage broncho-pulmonary hygiene including cough, deep breathe, Incentive Spirometry  - Assess the need for suctioning and perform as needed  - Assess and instruct to report SOB or any respiratory difficulty  - Respiratory Therapy support as  indicated  - Manage/alleviate anxiety  - Monitor for signs/symptoms of CO2 retention  Outcome: Progressing     Problem: METABOLIC/FLUID AND ELECTROLYTES - ADULT  Goal: Electrolytes maintained within normal limits  Description: INTERVENTIONS:  - Monitor labs and rhythm and assess patient for signs and symptoms of electrolyte imbalances  - Administer electrolyte replacement as ordered  - Monitor response to electrolyte replacements, including rhythm and repeat lab results as appropriate  - Fluid restriction as ordered  - Instruct patient on fluid and nutrition restrictions as appropriate  Outcome: Progressing  Goal: Hemodynamic stability and optimal renal function maintained  Description: INTERVENTIONS:  - Monitor labs and assess for signs and symptoms of volume excess or deficit  - Monitor intake, output and patient weight  - Monitor urine specific gravity, serum osmolarity and serum sodium as indicated or ordered  - Monitor response to interventions for patient's volume status, including labs, urine output, blood pressure (other measures as available)  - Encourage oral intake as appropriate  - Instruct patient on fluid and nutrition restrictions as appropriate  Outcome: Progressing     Problem: Impaired Swallowing  Goal: Minimize aspiration risk  Description: Interventions:  - Patient should be alert and upright for all feedings (90 degrees preferred)  - Offer food and liquids at a slow rate  - No straws  - Encourage small bites of food and small sips of liquid  - Offer pills one at a time, crush or deliver with applesauce as needed  - Discontinue feeding and notify MD (or speech pathologist) if coughing or persistent throat clearing or wet/gurgly vocal quality is noted  Outcome: Progressing     Problem: Impaired Cognition  Goal: Patient will exhibit improved attention, thought processing and/or memory  Description: Interventions  Outcome: Progressing     Problem: CARDIOVASCULAR - ADULT  Goal: Maintains optimal  cardiac output and hemodynamic stability  Description: INTERVENTIONS:- Monitor vital signs, rhythm, and trends- Monitor for bleeding, hypotension and signs of decreased cardiac output- Evaluate effectiveness of vasoactive medications to optimize hemodynamic stability- Monitor arterial and/or venous puncture sites for bleeding and/or hematoma- Assess quality of pulses, skin color and temperature- Assess for signs of decreased coronary artery perfusion - ex. Angina- Evaluate fluid balance, assess for edema, trend weights  Outcome: Progressing

## 2025-03-30 NOTE — PROGRESS NOTES
[facility]  part of [facility]    Progress Note      Assessment and Plan:   1.  Acute respiratory failure-aspiration pneumonitis with ARDS phenomenon associated with acute alcoholic hepatitis and withdrawal state.  Status post bronchoscopy.  The patient ultimately tolerated extubation and is vastly improved from a pulmonary perspective.  Still with a couple scattered crackles.  Discussed issues with mother at bedside.  Will sign off.  Please call if we can help further.    Recommendations:  1.  Taper oxygen.  2.  Off empiric antibiotic  3.  Will follow clinically.    2.  Withdrawal state-resolved.    3.  DVT prophylaxis-SCDs in patient with coagulopathy and history of GI bleed.    4.  Cirrhosis and pancreatitis-supportive care with PPI.    5.  USMAN-stabilizing clinically.  As per nephrology.  Lasix.    Subjective:   Sami Grijalva Jr. is a(n) 44 year old male who is wakeful and appropriate    Objective:   Blood pressure 120/75, pulse 107, temperature 99.6 °F (37.6 °C), temperature source Oral, resp. rate 18, height 5' 8\" (1.727 m), weight 188 lb 14.4 oz (85.7 kg), SpO2 93%.    Physical Exam alert white male  HEENT examination is unremarkable with pupils equal round and reactive to light and accommodation.   Neck without adenopathy, thyromegaly, JVD nor bruit.   Lungs diminished to auscultation and percussion.  Cardiac regular rate and rhythm no murmur.   Abdomen nontender, without hepatosplenomegaly and no mass appreciable.   Extremities without clubbing cyanosis nor edema.   Neurologic grossly intact with symmetric tone and strength and reflex.  Skin without gross abnormality     Results:     Lab Results   Component Value Date    K 3.4 03/30/2025    MG 1.9 03/30/2025     Chest x-ray-slightly increased bilateral opacities    Iván Gutiérrez MD  Medical Director, Critical Care, [facility]  Medical Director, [facility]  Pager: [phone]

## 2025-03-31 LAB
ALBUMIN SERPL-MCNC: 2.5 G/DL (ref 3.2–4.8)
ALBUMIN/GLOB SERPL: 0.7 {RATIO} (ref 1–2)
ALP LIVER SERPL-CCNC: 211 U/L
ALT SERPL-CCNC: 65 U/L
ANION GAP SERPL CALC-SCNC: 10 MMOL/L (ref 0–18)
AST SERPL-CCNC: 152 U/L (ref ?–34)
BASOPHILS # BLD: 0 X10(3) UL (ref 0–0.2)
BASOPHILS NFR BLD: 0 %
BILIRUB SERPL-MCNC: 17.6 MG/DL (ref 0.3–1.2)
BUN BLD-MCNC: 20 MG/DL (ref 9–23)
BUN/CREAT SERPL: 19.4 (ref 10–20)
CALCIUM BLD-MCNC: 7.9 MG/DL (ref 8.7–10.4)
CHLORIDE SERPL-SCNC: 106 MMOL/L (ref 98–112)
CO2 SERPL-SCNC: 23 MMOL/L (ref 21–32)
CREAT BLD-MCNC: 1.03 MG/DL
DEPRECATED RDW RBC AUTO: 96.8 FL (ref 35.1–46.3)
EGFRCR SERPLBLD CKD-EPI 2021: 91 ML/MIN/1.73M2 (ref 60–?)
EOSINOPHIL # BLD: 0.4 X10(3) UL (ref 0–0.7)
EOSINOPHIL NFR BLD: 2 %
ERYTHROCYTE [DISTWIDTH] IN BLOOD BY AUTOMATED COUNT: 33.2 % (ref 11–15)
GLOBULIN PLAS-MCNC: 3.4 G/DL (ref 2–3.5)
GLUCOSE BLD-MCNC: 107 MG/DL (ref 70–99)
HCT VFR BLD AUTO: 25 %
HGB BLD-MCNC: 8.2 G/DL
INR BLD: 2.06 (ref 0.8–1.2)
LYMPHOCYTES NFR BLD: 1.2 X10(3) UL (ref 1–4)
LYMPHOCYTES NFR BLD: 6 %
MAGNESIUM SERPL-MCNC: 1.7 MG/DL (ref 1.6–2.6)
MCH RBC QN AUTO: 27.6 PG (ref 26–34)
MCHC RBC AUTO-ENTMCNC: 32.8 G/DL (ref 31–37)
MCV RBC AUTO: 84.2 FL
MONOCYTES # BLD: 1 X10(3) UL (ref 0.1–1)
MONOCYTES NFR BLD: 5 %
NEUTROPHILS # BLD AUTO: 16.51 X10 (3) UL (ref 1.5–7.7)
NEUTROPHILS NFR BLD: 84 %
NEUTS BAND NFR BLD: 3 %
NEUTS HYPERSEG # BLD: 17.4 X10(3) UL (ref 1.5–7.7)
OSMOLALITY SERPL CALC.SUM OF ELEC: 291 MOSM/KG (ref 275–295)
PHOSPHATE SERPL-MCNC: 3 MG/DL (ref 2.4–5.1)
PLATELET # BLD AUTO: 140 10(3)UL (ref 150–450)
PLATELET MORPHOLOGY: NORMAL
PLATELETS.RETICULATED NFR BLD AUTO: 4.8 % (ref 0–7)
POTASSIUM SERPL-SCNC: 3.1 MMOL/L (ref 3.5–5.1)
POTASSIUM SERPL-SCNC: 3.1 MMOL/L (ref 3.5–5.1)
PROT SERPL-MCNC: 5.9 G/DL (ref 5.7–8.2)
PROTHROMBIN TIME: 24.3 SECONDS (ref 11.6–14.8)
RBC # BLD AUTO: 2.97 X10(6)UL
SODIUM SERPL-SCNC: 139 MMOL/L (ref 136–145)
TOTAL CELLS COUNTED BLD: 100
WBC # BLD AUTO: 20 X10(3) UL (ref 4–11)

## 2025-03-31 PROCEDURE — 99233 SBSQ HOSP IP/OBS HIGH 50: CPT | Performed by: HOSPITALIST

## 2025-03-31 RX ORDER — POLYETHYLENE GLYCOL 3350 17 G/17G
17 POWDER, FOR SOLUTION ORAL DAILY PRN
Status: DISCONTINUED | OUTPATIENT
Start: 2025-03-31 | End: 2025-04-02

## 2025-03-31 RX ORDER — POTASSIUM CHLORIDE 1500 MG/1
40 TABLET, EXTENDED RELEASE ORAL ONCE
Status: COMPLETED | OUTPATIENT
Start: 2025-03-31 | End: 2025-03-31

## 2025-03-31 RX ORDER — VANCOMYCIN HYDROCHLORIDE 50 MG/ML
125 KIT ORAL DAILY
Status: DISCONTINUED | OUTPATIENT
Start: 2025-03-31 | End: 2025-03-31

## 2025-03-31 NOTE — OCCUPATIONAL THERAPY NOTE
OCCUPATIONAL THERAPY TREATMENT NOTE - INPATIENT        Room Number: 568/568-A     Presenting Problem: USMAN; ETOH; respiratory failure; extubated 3/17    Problem List  Principal Problem:    USMAN (acute kidney injury)  Active Problems:    Metabolic acidosis    Hyperkalemia    Leukocytosis    Thrombocytopenia    Coagulopathy (HCC)    Hyponatremia    Alcoholic (HCC)    Scleral icterus    Acute kidney failure    DTs (delirium tremens) (HCC)    Alcohol use disorder    Acute respiratory failure with hypoxia (HCC)    Wernicke's syndrome    Hypophosphatemia    Hypernatremia    Alcohol-induced acute pancreatitis (HCC)    Alcoholic hepatitis without ascites (HCC)      OCCUPATIONAL THERAPY ASSESSMENT   Patient demonstrates fair progress this session, goals remain in progress.    Patient is requiring Mod A as a result of the following impairments: generalized weakness, decreased standing balance, decreased insight into deficits.    Patient continues to function below baseline with ADLs and functional mobility.  Next session anticipate patient to progress LE dressing, functional transfers,grooming in standing, item retrieval.  Occupational Therapy will continue to follow patient for duration of hospitalization.    Patient continues to benefit from continued skilled OT services: to facilitate return to prior level of function as patient demonstrates high motivation with excellent tolerance to an intensive therapy program.     PLAN DURING HOSPITALIZATION  OT Device Recommendations: TBD  OT Treatment Plan: Balance activities, ADL training, Energy conservation/work simplification techniques, IADL training, Functional transfer training, UE strengthening/ROM, Endurance training, Patient/Family education, Patient/Family training, Equipment eval/education, Neuromuscluar reeducation, Compensatory technique education     SUBJECTIVE  \"I am going to fall\"    OBJECTIVE  Precautions: Bed/chair alarm, Rectal tube, HOB elevated 30 degrees  (NG)    WEIGHT BEARING RESTRICTION     PAIN ASSESSMENT  Ratin  Location: -- (chest pain)  Management Techniques: Breathing techniques (protective bracing)    ACTIVITY TOLERANCE  Pulse: 101 (133 w/ activity; recovering with seated rest - RN aware)                      O2 SATURATIONS  Oxygen Therapy  SPO2% on Room Air at Rest: 94  SPO2% Ambulation on Room Air: 87 (; x 30 sec to recover to 90% with cues for plb , seated rest)    ACTIVITIES OF DAILY LIVING ASSESSMENT  AM-PAC ‘6-Clicks’ Inpatient Daily Activity Short Form  How much help from another person does the patient currently need…  -   Putting on and taking off regular lower body clothing?: A Lot  -   Bathing (including washing, rinsing, drying)?: A Lot  -   Toileting, which includes using toilet, bedpan or urinal? : A Lot  -   Putting on and taking off regular upper body clothing?: A Little  -   Taking care of personal grooming such as brushing teeth?: A Little  -   Eating meals?: A Little    AM-PAC Score:  Score: 15  Approx Degree of Impairment: 56.46%  Standardized Score (AM-PAC Scale): 34.69  CMS Modifier (G-Code): CK    FUNCTIONAL TRANSFER ASSESSMENT  Sit to Stand: Edge of Bed; Chair  Edge of Bed: Minimal Assist  Chair: Minimal Assist    BED MOBILITY  Rolling: Not Tested  Supine to Sit : Minimal Assist  Sit to Supine (OT): Not Tested    BALANCE ASSESSMENT  Static Sitting: Stand-by Assist  Static Standing: Minimal Assist    FUNCTIONAL ADL ASSESSMENT  Eating: Minimal Assist  Grooming Seated: Minimal Assist  Bathing Seated: Maximum Assist  UB Dressing Seated: Minimal Assist  LB Dressing Seated: Maximum Assist  Toileting Seated: Maximum Assist      Skilled Therapy Provided: RN cleared pt for participation in occupational therapy session. Upon arrival, pt was supine in bed and agreeable to activity. Pt's life partner present during session. Pt benefited from additional time, verbal cues, RW to maximize participation.    To assess pt's safe participation  during daily tasks while also providing intermittent education to maximize safety and participation, occupational therapist facilitated the following: bed mobility, functional transfers - toilet t/f, item retrieval simulation, functional mobility with Min A + RW loss of balance requiring Mod to recover.  WC follow throughout. Pt with elevated heart rate to 130s with activity - recovered mildly with seated rest -cued for. O2 saturations to 87% with activity , recovered to 90% + within 30 seconds again with seated rest. RN made aware of pt's response to activity. Pt benefited from frequent cues for safety and reason for pacing.     30 sec sit<>stand completed to assess pt fall risk - pt completed 3 sit<>Stand which indicates pt is a fall risk.       EDUCATION PROVIDED  Patient Education : Plan of Care; Functional Transfer Techniques; Fall Prevention  Patient's Response to Education: Verbalized Understanding  Family/Caregiver Education : Role of Occupational Therapy; Plan of Care; Discharge Recommendations  Family/Caregiver's Response to Education: Verbalized Understanding    The patient's Approx Degree of Impairment: 56.46% has been calculated based on documentation in the [facility] '6 clicks' Inpatient Daily Activity Short Form.  Research supports that patients with this level of impairment may benefit from rehab.  Final disposition will be made by interdisciplinary medical team.    Patient End of Session: In bed, Needs met, Call light within reach, RN aware of session/findings, Family present    OT Goals:   Patient will complete functional transfer with Mod A   Comment: MET    Patient will complete toileting with Mod A   Comment: ongoing    Patient will tolerate standing for 1-2 minutes in prep for adls with Mod A    Comment: MET    Patient will tolerate unsupported sitting at eOB with SBA in prep for seated ADLs  Comment:Goal Met            Goals  on: 25  Frequency: 3-5x week     OT Session Time: 25  minutes  Self-Care Home Management: 15 minutes  Therapeutic Activity: 10 minutes

## 2025-03-31 NOTE — CM/SW NOTE
Prior Authorization - Destination  Destination Type: Inpatient rehab facility  Service Provider: Critical access hospital - Acute  Payer Communication Destination Comments: [place] [phone]  Prior Authorization Status: Submitted/Pending      Radha Roldan DSC

## 2025-03-31 NOTE — PAYOR COMM NOTE
--------------  CONTINUED STAY REVIEW----REQUESTING ADDITIONAL DAYS 3/29 3/30       Payor: [employer]  Subscriber #:  [id number]  Authorization Number: [id number]    Admit date: 3/4/25  Admit time:  3:11 PM    3/29           Date of Service: 3/29/2025 10:22 AM     Signed            Assessment & Plan:   ----------------------------------  Fevers.  New onset 3/23 along with leukocytosis.  Exact source unclear.    -Possible new right lower lobe pneumonia seen on CT, so Empiric antibiotics with meropenem without any change in fevers, actually fevers worsen  -Blood cultures neg  -Respiratory viral panel negative  -ID following, recommended to stop meropenem for possible drug fever, with improvement of fevers initially, but then spiked again fevers 3/27 evening, repeat culture sent  -Leukocytosis still high, trending up&down, current ~22.8  -CBC in the morning     Respiratory failure, acute hypoxemic.  Due to pneumonia, ARDS.  Oxygen requirement is improving.  Extubated 3/17.  Using between 1 and 4 L of oxygen  -Supplemental oxygen as needed, titrate down.   -Pulmonology consultation appreciated  -Treat contributing factors as described  -Repeat chest x-ray in the morning, labs, including BNP now wnl (~83)  -Incentive spirometry  -On IV Lasix--> hold now because of increased creatinine-->restart in am daily (instead of bid)  -CXR repeated reviewed, BNP low, only 83  -Currently on 2-3 L supplemental O2     Acute alcoholic hepatitis.  Bilirubin stabilizing, may take a very long time to normalize  -GI following  -started on pentoxifylline 3/25 (instead of steroids given fevers and concern for underlying infection)       Hiccups, on baclofen, better     Other problems  Pancreatitis  Alcohol abuse  Cirrhosis  Hepatic steatosis  Hyponatremia  USMAN resolved, now up again, hold Lasix  Chest pain, resolved  Aspiration pneumonia, finished antibiotics  Diarrhea, better  Sinus tachycardia, due to fever      Supplementary Documentation:   DVT Mechanical Prophylaxis:   SCDs,    DVT Pharmacologic Prophylaxis   Medication   None                 Code Status: Full Code  Cedeño: External urinary catheter in place  Cedeño Duration (in days): 14  Central line:    ADOLFO:  plan for acute rehab pending continued medical stability     I personally reviewed the available laboratories, imaging including. I discussed/will discuss the case with consultants. I ordered laboratories and/or radiographic studies. I adjusted medications as detailed above.  Medical decision making high, risk is high.     Subjective:   ----------------------------------  Vomited again yesterday  Oral intake low  still weak, spends most of day in bed  +hiccups better       Seen with his mother at bedside        Objective:   Chief Complaint:       Chief Complaint   Patient presents with    Vomiting    Hiccups    Eval-D      ----------------------------------  Temp:  [99 °F (37.2 °C)-99.9 °F (37.7 °C)] 99.8 °F (37.7 °C)  Pulse:  [] 100  Resp:  [16] 16  BP: (103-117)/(62-69) 117/66  SpO2:  [93 %-95 %] 93 %  Gen: Alert, appropriate, appears very weak jaundiced, resting in bed  HEENT: NCAT, neck supple, no carotid bruit.  Scleral icterus  CV: Mild tachycardia, S1S2, and intact distal pulses. No gallop, rub, murmur.  Pulm: Poor effort, no wheezing or rales  Abd: Soft, NTND, BS normal, no mass, no HSM, no rebound/guarding.     Neuro:  Generally weak, moves all extremities  MS: No joint effusions.  No peripheral edema.  Skin: Skin is warm and dry. No rashes, erythema, diaphoresis.   Psych:   Normal mood and affect. Calm, cooperative     Labs:        Lab Results   Component Value Date     HGB 8.0 (L) 03/29/2025     WBC 22.8 (H) 03/29/2025     .0 03/29/2025      (L) 03/29/2025     K 3.1 (L) 03/29/2025     K 3.1 (L) 03/29/2025     CREATSERUM 1.22 03/29/2025     INR 2.19 (H) 03/28/2025      (H) 03/28/2025     ALT 72 (H) 03/28/2025     TROP 0.00  01/31/2017              acetaminophen  1,000 mg Oral Once    baclofen  10 mg Oral TID    pentoxifylline ER  400 mg Oral TID CC    pantoprazole  40 mg Oral BID AC    epoetin sheela  10,000 Units Subcutaneous Once per day on Tuesday Thursday Saturday    [Held by provider] furosemide  20 mg Intravenous BID (Diuretic)    vancomycin  125 mg Per NG Tube Daily    multivitamin  1 tablet Oral Daily    folic acid  1 mg Oral Daily                         3/30           Date of Service: 3/30/2025  2:04 PM     Signed              Assessment & Plan:   ----------------------------------  Fevers.  New onset 3/23 along with leukocytosis.  Exact source unclear.    -Possible new right lower lobe pneumonia seen on CT, so Empiric antibiotics with meropenem without any change in fevers, actually fevers worsen  -Blood cultures neg  -Respiratory viral panel negative  -ID following, recommended to stop meropenem for possible drug fever, with improvement of fevers initially, but then spiked again fevers 3/27 evening, repeat culture sent, neg  -Leukocytosis still high, trending up&down, current ~22.8  -low grade temps ~100.9  -monitor off abx  -CBC in the morning     Respiratory failure, acute hypoxemic.  Due to pneumonia, ARDS.  Oxygen requirement is improving.  Extubated 3/17.  Using between 1 and 4 L of oxygen  -Supplemental oxygen as needed, titrate down.   -Pulmonology consultation appreciated  -Treat contributing factors as described  -Repeat chest x-ray in the morning, labs, including BNP now wnl (~83)  -Incentive spirometry  -On IV Lasix--> hold now because of increased creatinine-->restart in am daily (instead of bid)  -CXR repeated reviewed, BNP low, only 83  -Currently on 2-3 L -->1 L supplemental O2  -Renal panel in the morning     Acute alcoholic hepatitis.  Bilirubin stabilizing, may take a very long time to normalize  -GI following  -started on pentoxifylline 3/25 (instead of steroids given fevers and concern for underlying  infection)    -Labs in the morning     Hiccups, on baclofen, better, decrease dose to twice daily     Other problems  Pancreatitis  Alcohol abuse  Cirrhosis  Hepatic steatosis  Hyponatremia  USMAN resolved, now up again, hold Lasix  Chest pain, resolved  Aspiration pneumonia, finished antibiotics  Diarrhea, better  Sinus tachycardia, due to fever     Supplementary Documentation:   DVT Mechanical Prophylaxis:   SCDs,    DVT Pharmacologic Prophylaxis   Medication   None                 Code Status: Full Code  Cedeño: External urinary catheter in place  Cedeño Duration (in days): 14  Central line:    ADOLFO:  plan for acute rehab pending continued medical stability, and resolution of fevers, keep mobilizing     I personally reviewed the available laboratories, imaging including. I discussed/will discuss the case with consultants. I ordered laboratories and/or radiographic studies. I adjusted medications as detailed above.  Medical decision making high, risk is high.     Subjective:   ----------------------------------  Vomited again yesterday  Oral intake low  Being fed by family  still weak, spends most of day in bed, but was able to get out of the bed to chair today  I encourage patient to get mobilized  D/w RN as well  +hiccups better       Seen with his parents and partner at bedside        Objective:   Chief Complaint:       Chief Complaint   Patient presents with    Vomiting    Hiccups    Eval-D      ----------------------------------  Temp:  [99.1 °F (37.3 °C)-100.9 °F (38.3 °C)] 99.6 °F (37.6 °C)  Pulse:  [105-108] 107  Resp:  [16-18] 18  BP: (119-134)/(67-83) 134/83  SpO2:  [93 %-94 %] 93 %  Gen: Alert, appropriate, appears very weak jaundiced, resting in bed, but seems better  HEENT: NCAT, neck supple, no carotid bruit.  Scleral icterus  CV: Mild tachycardia, S1S2, and intact distal pulses. No gallop, rub, murmur.  Pulm: Poor effort, no wheezing or rales  Abd: Soft, NTND, BS normal, no mass, no HSM, no  rebound/guarding.     Neuro:  Generally weak, moves all extremities  MS: No joint effusions.  No peripheral edema.  Skin: Skin is warm and dry. No rashes, erythema, diaphoresis.   Psych:   Normal mood and affect. Calm, cooperative     Labs:        Lab Results   Component Value Date     HGB 8.0 (L) 03/29/2025     WBC 22.8 (H) 03/29/2025     .0 03/29/2025      (L) 03/29/2025     K 3.4 (L) 03/30/2025     CREATSERUM 1.22 03/29/2025     INR 2.19 (H) 03/28/2025      (H) 03/28/2025     ALT 72 (H) 03/28/2025     TROP 0.00 01/31/2017              baclofen  10 mg Oral BID    furosemide  20 mg Intravenous Daily    magnesium oxide  400 mg Oral BID with meals    magnesium oxide  400 mg Oral Once    potassium chloride  40 mEq Oral Once    pentoxifylline ER  400 mg Oral TID CC    pantoprazole  40 mg Oral BID AC    epoetin sheela  10,000 Units Subcutaneous Once per day on Tuesday Thursday Saturday    vancomycin  125 mg Per NG Tube Daily    multivitamin  1 tablet Oral Daily    folic acid  1 mg Oral Daily                          MEDICATIONS ADMINISTERED IN LAST 1 DAY:  baclofen (Lioresal) tab 10 mg       Date Action Dose Route User    3/31/2025 0926 Given 10 mg Oral Rachel Tejada RN    3/30/2025 1946 Given 10 mg Oral Patricia Cho RN          folic acid (Folvite) tab 1 mg       Date Action Dose Route User    3/31/2025 0926 Given 1 mg Oral Rachel Tejada RN          furosemide (Lasix) 10 mg/mL injection 20 mg       Date Action Dose Route User    3/31/2025 0925 Given 20 mg Intravenous Rachel Tejada RN          magnesium oxide (Mag-Ox) tab 400 mg       Date Action Dose Route User    3/31/2025 0926 Given 400 mg Oral Rachel Tejada RN    3/30/2025 1708 Given 400 mg Oral Catalina Rosales RN          pantoprazole (Protonix) DR tab 40 mg       Date Action Dose Route User    3/31/2025 0700 Given 40 mg Oral Rachel Tejada RN    3/30/2025 1707 Given 40 mg Oral Catalina Rosales, NICOLASA           pentoxifylline ER (TRENTal) tab 400 mg       Date Action Dose Route User    3/31/2025 0926 Given 400 mg Oral Rachel Tejada RN    3/30/2025 1707 Given 400 mg Oral Catalina Rosales RN          potassium chloride (Klor-Con M20) tab 40 mEq       Date Action Dose Route User    3/31/2025 0926 Given 40 mEq Oral Rachel Tejada RN          multivitamin (Tab-A-Yosef/Beta Carotene) tab 1 tablet       Date Action Dose Route User    3/31/2025 0936 Given 1 tablet Oral Rachel Tejada RN          temazepam (Restoril) cap 15 mg       Date Action Dose Route User    3/30/2025 2238 Given 15 mg Oral Patricia Cho RN          vancomycin (Firvanq) 50 mg/mL oral solution 125 mg       Date Action Dose Route User    3/31/2025 0930 Given 125 mg Oral Rachel Tejada RN            Vitals (last day)       Date/Time Temp Pulse Resp BP SpO2 Weight O2 Device O2 Flow Rate (L/min) [facility]    03/31/25 1616 -- 107 20 136/70 93 % -- None (Room air) -- SR    03/31/25 1345 -- 101 -- -- -- -- -- -- KH    03/31/25 0923 98.7 °F (37.1 °C) -- 18 121/66 96 % -- None (Room air) -- SR    03/31/25 0622 97.7 °F (36.5 °C) 103 16 117/73 91 % -- None (Room air) -- PN    03/30/25 1943 99.8 °F (37.7 °C) 104 16 123/67 96 % -- Nasal cannula 1 L/min PN    03/30/25 1417 99.6 °F (37.6 °C) -- 18 134/83 93 % -- Nasal cannula 1 L/min RS    03/30/25 0741 99.6 °F (37.6 °C) -- 18 120/75 93 % -- None (Room air) -- RS    03/30/25 0509 99.1 °F (37.3 °C) 107 18 126/75 94 % -- Nasal cannula 1 L/min PN    03/30/25 0233 99.6 °F (37.6 °C) 105 -- -- -- -- -- -- PN          CIWA Scores (since admission)       Date/Time CIWA-Ar Total Who    03/18/25 2000 2 DW    03/18/25 1800 2 EM    03/18/25 1600 2 EM    03/18/25 1400 2 EM    03/18/25 1200 2 EM    03/18/25 1000 2 EM    03/18/25 0800 2 EM    03/17/25 1600 2 LC    03/17/25 1200 5 LC    03/17/25 0700 2 LC    03/16/25 1600 0 EV    03/16/25 1200 0 EV    03/16/25 0800 0 EV    03/15/25 1600 0 EV    03/15/25 1200 0 EV     03/15/25 0800 0 EV    03/12/25 0519 10 TT    03/12/25 0430 8 TT    03/12/25 0330 16 TT    03/11/25 2258 7 TT    03/11/25 2158 15 TT    03/11/25 1944 15 TT    03/11/25 1800 10 RP    03/11/25 1600 10 RP    03/11/25 1400 9 RP    03/11/25 1200 7 RP    03/11/25 1000 7 RP    03/11/25 0800 10 RP    03/11/25 0600 6 CB    03/11/25 0500 11 CB    03/11/25 0400 13 CB    03/11/25 0300 6 CB    03/11/25 0200 4 CB    03/11/25 0040 4 CB    03/11/25 0000 4 CB    03/10/25 2342 6 CB    03/10/25 2242 15 CB    03/10/25 2200 6 CB    03/10/25 2100 6 CB    03/10/25 2000 11 CB    03/10/25 0800 18 NR    03/10/25 0641 16 EDI    03/10/25 0600 6 EDI    03/10/25 0500 6 EDI    03/10/25 0400 13 EDI    03/10/25 0200 4 EDI    03/10/25 0000 4 EDI    03/09/25 2300 13 EDI    03/09/25 2200 6 EDI    03/09/25 2130 11 EDI    03/09/25 2000 6 EDI    03/09/25 1800 1 AH    03/09/25 1400 2 AH    03/09/25 0600 4 EDI    03/09/25 0400 5 EDI    03/09/25 0300 5 EDI    03/09/25 0000 5 EDI    03/08/25 2200 4 EDI    03/08/25 2000 4 EDI    03/08/25 1200 5 ES    03/08/25 1000 5 ES    03/08/25 0800 5 ES    03/08/25 0600 6 EDI    03/08/25 0400 5 EDI    03/08/25 0200 5 EDI    03/08/25 0000 5 EDI    03/07/25 2200 5 EDI    03/07/25 2000 5 EDI    03/07/25 0600 5 CT    03/07/25 0400 5 CT    03/07/25 0200 5 CT    03/07/25 0000 8 CT    03/06/25 2200 8 CT    03/06/25 2000 8 CT    03/06/25 1800 5 AW    03/06/25 1600 8 AW    03/06/25 1400 4 AW    03/06/25 1200 4 AW    03/06/25 1000 5 AW    03/06/25 0800 6 AW    03/06/25 0600 14 CT    03/06/25 0500 6 CT    03/06/25 0400 20 CT    03/06/25 0300 17 CT    03/06/25 0200 20 CT    03/06/25 0100 17 CT    03/06/25 0000 5 CT    03/05/25 2300 17 CT    03/05/25 2000 5 CT    03/05/25 1846 5 LN    03/05/25 1742 13 LN    03/05/25 1642 16 LN    03/05/25 1541 19 LN    03/05/25 1516 6 LN    03/05/25 1416 9 LN    03/05/25 1257 18 LN    03/05/25 1200 14 BD    03/05/25 1100 13 BD    03/05/25 1000 10 BD    03/05/25 0900 17 BD    03/05/25 0800 9 BD    03/05/25 0700 13 MP     03/05/25 0600 29 MP    03/05/25 0500 7 MP    03/05/25 0400 6 MP    03/05/25 0300 10 MP    03/05/25 0200 6 MP    03/05/25 0100 8 MP    03/05/25 0000 14 MP    03/04/25 2300 8 MP    03/04/25 2200 13 MP    03/04/25 2100 8 MP    03/04/25 2000 24 MP    03/04/25 1900 12 BD    03/04/25 1800 12 BD    03/04/25 1600 5 BD    03/04/25 1500 1 RR    03/04/25 1315 0 RR    03/04/25 1200 1 RR    03/04/25 0951 17 KB          Blood Transfusion Record       Product Unit Status Volume Start End            Transfuse RBC       25  [id number]  [id number] Completed 03/16/25 0935 470.7 mL 03/16/25 0615 03/16/25 0930       25  [id number]  [id number] Completed 03/13/25 1245 400 mL 03/13/25 0915 03/13/25 1235

## 2025-03-31 NOTE — CM/SW NOTE
SANDRA followed up on DC planning.     SANDRA followed up on insurance auth - auth was not submitted over the weekend    SW asked if D[facility] Radha can submit auth for pt to discharge to acute rehab     Requested updated PT and OT notes - for auth     Insurance Auth needed/ still pending for Rehab    PLAN: DC to [facility] pending insurance auth     Marce DOMINGUEZ LSW, MSW ext. [phone]

## 2025-03-31 NOTE — PLAN OF CARE
Problem: Patient Centered Care  Goal: Patient preferences are identified and integrated in the patient's plan of care  Description: Interventions:  - Provide timely, complete, and accurate information to patient/family  - Incorporate patient and family knowledge, values, beliefs, and cultural backgrounds into the planning and delivery of care  - Encourage patient/family to participate in care and decision-making at the level they choose  - Honor patient and family perspectives and choices  Outcome: Progressing     Problem: Patient/Family Goals  Goal: Patient/Family Short Term Goal  - See additional Care Plan goals for specific interventions  Outcome: Progressing     Problem: PAIN - ADULT  Goal: Verbalizes/displays adequate comfort level or patient's stated pain goal  Description: INTERVENTIONS:  - Encourage pt to monitor pain and request assistance  - Assess pain using appropriate pain scale  - Administer analgesics based on type and severity of pain and evaluate response  - Implement non-pharmacological measures as appropriate and evaluate response  - Consider cultural and social influences on pain and pain management  - Manage/alleviate anxiety  - Utilize distraction and/or relaxation techniques  - Monitor for opioid side effects  - Notify MD/LIP if interventions unsuccessful or patient reports new pain  - Anticipate increased pain with activity and pre-medicate as appropriate  Outcome: Progressing     Problem: RISK FOR INFECTION - ADULT  Goal: Absence of fever/infection during anticipated neutropenic period  Description: INTERVENTIONS  - Monitor WBC  - Administer growth factors as ordered  - Implement neutropenic guidelines  Outcome: Progressing     Problem: SAFETY ADULT - FALL  Goal: Free from fall injury  Description: INTERVENTIONS:  - Assess pt frequently for physical needs  - Identify cognitive and physical deficits and behaviors that affect risk of falls.  - Alsey fall precautions as indicated by  assessment.  - Educate pt/family on patient safety including physical limitations  - Instruct pt to call for assistance with activity based on assessment  - Modify environment to reduce risk of injury  - Provide assistive devices as appropriate  - Consider OT/PT consult to assist with strengthening/mobility  - Encourage toileting schedule  Outcome: Progressing     Problem: DISCHARGE PLANNING  Goal: Discharge to home or other facility with appropriate resources  Description: INTERVENTIONS:  - Identify barriers to discharge w/pt and caregiver  - Include patient/family/discharge partner in discharge planning  - Arrange for needed discharge resources and transportation as appropriate  - Identify discharge learning needs (meds, wound care, etc)  - Arrange for interpreters to assist at discharge as needed  - Consider post-discharge preferences of patient/family/discharge partner  - Complete POLST form as appropriate  - Assess patient's ability to be responsible for managing their own health  - Refer to Case Management Department for coordinating discharge planning if the patient needs post-hospital services based on physician/LIP order or complex needs related to functional status, cognitive ability or social support system  Outcome: Progressing     Problem: Delirium  Goal: Minimize duration of delirium  Description: Interventions:  - Encourage use of hearing aids, eye glasses  - Promote highest level of mobility daily  - Provide frequent reorientation  - Promote wakefulness i.e. lights on, blinds open  - Promote sleep, encourage patient's normal rest cycle i.e. lights off, TV off, minimize noise and interruptions  - Encourage family to assist in orientation and promotion of home routines  Outcome: Progressing     Problem: Impaired Swallowing  Goal: Minimize aspiration risk  Description: Interventions:  - Patient should be alert and upright for all feedings (90 degrees preferred)  - Offer food and liquids at a slow  rate  - No straws  - Encourage small bites of food and small sips of liquid  - Offer pills one at a time, crush or deliver with applesauce as needed  - Discontinue feeding and notify MD (or speech pathologist) if coughing or persistent throat clearing or wet/gurgly vocal quality is noted  Outcome: Progressing     Problem: CARDIOVASCULAR - ADULT  Goal: Maintains optimal cardiac output and hemodynamic stability  Description: INTERVENTIONS:- Monitor vital signs, rhythm, and trends- Monitor for bleeding, hypotension and signs of decreased cardiac output- Evaluate effectiveness of vasoactive medications to optimize hemodynamic stability- Monitor arterial and/or venous puncture sites for bleeding and/or hematoma- Assess quality of pulses, skin color and temperature- Assess for signs of decreased coronary artery perfusion - ex. Angina- Evaluate fluid balance, assess for edema, trend weights  Outcome: Progressing

## 2025-03-31 NOTE — PHYSICAL THERAPY NOTE
PHYSICAL THERAPY TREATMENT NOTE - INPATIENT     Room Number: 568/568-A       Presenting Problem: USMAN, hiccups, nausea, vomiting, abdominal pain, alcohol hepatitis, pneumonia, intubated on 3/12/25 and extubated on 3/17/25  Co-Morbidities : alcoholism, anxiety, gout, gastroesophageal reflux disease    Problem List  Principal Problem:    USMAN (acute kidney injury)  Active Problems:    Metabolic acidosis    Hyperkalemia    Leukocytosis    Thrombocytopenia    Coagulopathy (HCC)    Hyponatremia    Alcoholic (HCC)    Scleral icterus    Acute kidney failure    DTs (delirium tremens) (HCC)    Alcohol use disorder    Acute respiratory failure with hypoxia (HCC)    Wernicke's syndrome    Hypophosphatemia    Hypernatremia    Alcohol-induced acute pancreatitis (HCC)    Alcoholic hepatitis without ascites (HCC)      PHYSICAL THERAPY ASSESSMENT   Patient demonstrates good  progress this session, goals  remain in progress.      Patient is requiring minimal assist as a result of the following impairments: decreased functional strength, decreased endurance/aerobic capacity, impaired standing balance, impaired coordination, impaired motor planning, decreased muscular endurance, and medical status.     Patient continues to function below baseline with bed mobility, transfers, gait, maintaining seated position, standing prolonged periods, and performing household tasks.  Next session anticipate patient to progress bed mobility, transfers, gait, maintaining seated position, standing prolonged periods, and performing household tasks.  Physical Therapy will continue to follow patient for duration of hospitalization.    Patient continues to benefit from continued skilled PT services: to facilitate return to prior level of function as patient demonstrates high motivation with excellent tolerance to an intensive therapy program .    PLAN DURING HOSPITALIZATION  Nursing Mobility Recommendation : 1 Assist  PT Device Recommendation: Mechanical  lift  PT Treatment Plan: Bed mobility, Body mechanics, Endurance, Energy conservation, Patient education, Gait training, Range of motion, Strengthening, Transfer training, Balance training  Frequency (Obs): 3-5x/week     SUBJECTIVE  I feel like trying to walk I am ready to walk and sit up.     OBJECTIVE  Precautions: Bed/chair alarm, Rectal tube, HOB elevated 30 degrees (NG)    WEIGHT BEARING RESTRICTION       PAIN ASSESSMENT   Ratin  Location: denies  Management Techniques: Breathing techniques, Relaxation    BALANCE  Static Sitting: Fair +  Dynamic Sitting: Fair  Static Standing: Fair -  Dynamic Standing: Poor +    ACTIVITY TOLERANCE                          O2 WALK       AM-PAC '6-Clicks' INPATIENT SHORT FORM - BASIC MOBILITY  How much difficulty does the patient currently have...  Patient Difficulty: Turning over in bed (including adjusting bedclothes, sheets and blankets)?: A Little   Patient Difficulty: Sitting down on and standing up from a chair with arms (e.g., wheelchair, bedside commode, etc.): A Little   Patient Difficulty: Moving from lying on back to sitting on the side of the bed?: A Little   How much help from another person does the patient currently need...   Help from Another: Moving to and from a bed to a chair (including a wheelchair)?: A Little   Help from Another: Need to walk in hospital room?: A Little   Help from Another: Climbing 3-5 steps with a railing?: A Lot     AM-PAC Score:  Raw Score: 16   Approx Degree of Impairment: 54.16%   Standardized Score (AM-PAC Scale): 40.78   CMS Modifier (G-Code): CK    FUNCTIONAL ABILITY STATUS  Functional Mobility/Gait Assessment  Gait Assistance: Minimum assistance  Distance (ft): 150  Assistive Device: Rolling walker  Pattern: Shuffle (unsteady gait kyphotic posture limited step length)  Rolling: minimal assist  Supine to Sit: minimal assist  Sit to Supine: minimal assist  Sit to Stand: minimal assist    Skilled Therapy Provided: Pt ed with bed  mobility to EOB. Pt ed with min A with RW for transfers. Pt ed with gait progression 150' with shuffling unsteady gait and min A with a RW. Pt ed with therex in a chair x 10 reps for LE strength and ROM as tolerable. Pt is on track for IR with PT, OT to regain his maximal PLOF and safety.     The patient's Approx Degree of Impairment: 54.16% has been calculated based on documentation in the [facility] '6 clicks' Inpatient Daily Activity Short Form.  Research supports that patients with this level of impairment may benefit from IR with PT, OT.    Final disposition will be made by interdisciplinary medical team.    THERAPEUTIC EXERCISES  Lower Extremity Ankle pumps  Heel slides  LAQ     Position Sitting & Standing       Patient End of Session: Up in chair, With  staff, Needs met, Call light within reach, RN aware of session/findings, All patient questions and concerns addressed, Alarm set, Family present    CURRENT GOALS   Goals to be met by: 4/15/25   (goals revised 3/25/25 secondary to progress)  Patient Goal Return home   Goal #1 Patient is able to demonstrate supine - sit EOB - indep      Goal #1   Current Status Min A   Goal #2 Patient is able to demonstrate transfers Sit to/from Stand at assistance level: mod indep with walker - rolling   Goal #2  Current Status Min A with RW unsteady   Goal #3  Patient is able to ambulate 150 feet with assist device: walker - rolling at assistance level: mod indep    Goal #3   Current Status Min A with amb 150' with unsteady gait    Goal #4 Min A with stair mobility 5 steps with 1 SR support   Goal #4   Current Status Ongoing   Goal #5 Patient to demonstrate independence with home activity/exercise instructions provided to patient in preparation for discharge.   Goal #5   Current Status Ongoing      Gait Training: 15 minutes  Therapeutic Exercise: 8 minutes

## 2025-03-31 NOTE — PROGRESS NOTES
INFECTIOUS DISEASE PROGRESS NOTE  [facility]  part of [facility] ID PROGRESS NOTE    Sami Grijalva . Patient Status:  Inpatient    10/11/1980 MRN [id number]   Location [facility] 2W/SW Attending Natasha Araujo MD   Hosp Day # 27 PCP Kerri Medina MD     Subjective:  ROS reviewed. Tmax 99.8. On room air. Has been eating. Has hiccups this AM.    ASSESSMENT:    Antibiotics: OVP, Meropenem  (doxycycline, vancomycin, zosyn) Meropenem 3/14-3/20, vancomycin     # Continued fevers with bandemia ?drug fever   -CT A/P with stable pancreatitis with worsening RLL infiltrate   -CT chest 3/21 without PE with improved PNA  # Acute hypoxic respiratory failure with fever and multifocal pneumonia   -S/p intubation 3/12   -s/p bronch 3/13, cx NGTD   -MRSA nares negative  # Acute aspiration pneumonia  # Acute leukocytosis   # Acute anemia  # Acute pancreatitis  # Alcoholic hepatitis with encephalopathy on admission               - CT A/P with severe fatty liver               - US GB with sludge w/o cholecystitis   - CT C/A/P 3/12 with pancreatitis  # USMAN - improved  # EtOH abuse and withdrawal     PLAN:  -  Stop OVP and monitor off abx.  -  Follow fever curve, wbc.  -  Reviewed labs, micro, imaging reports, available old records.  -  Case d/w patient, RN.  -  ID will continue to follow along peripherally.     History of Present Illness:  44-year-old male with a history of GERD, alcohol use was admitted to the hospital on 3/ generalized abdominal pain, hiccups, vomiting with difficulty tolerating orals.  Last drink prior to admission was 1 day prior.  Found to have sepsis with elevated lactic acid, hypotension, WBC 17.5.  Responded to IV fluids and received lactulose for elevated ammonia.  CT A/P with severe fatty liver, stable splenomegaly.  Was lethargic the first few days and had soft restraints with Cedeño, Flexi-Seal, NG tube.  Has been essentially afebrile here with a  temperature on 3/10 of 100.6 but significant hypoxia up and down and currently increased up to 40 L with increase WBC to 17.4.  Initial USMAN has improved.  Was started on IV Zosyn and doxycycline on 3/7 when patient became more hypoxic and chest x-ray showed extensive multifocal pneumonia with CT chest showing similar findings.  Possible aspiration.    Physical Exam:  /66 (BP Location: Right arm)   Pulse 103   Temp 98.7 °F (37.1 °C) (Oral)   Resp 18   Ht 5' 8\" (1.727 m)   Wt 188 lb 14.4 oz (85.7 kg)   SpO2 96%   BMI 28.72 kg/m²     Gen:   Awake, in bed  HEENT:  +scleral icterus, neck supple  CV/lungs:  RRR, lungs with bilateral rhonchi  Abdom:  Soft, no TTP  Skin/extrem:  No rashes, jaundiced  :   Primofit+  Lines:  Midline+    Laboratory Data: Reviewed    Microbiology: Reviewed    Radiology: Reviewed      [facility]  [phone]  3/28/2025

## 2025-04-01 LAB — POTASSIUM SERPL-SCNC: 3.1 MMOL/L (ref 3.5–5.1)

## 2025-04-01 PROCEDURE — 99233 SBSQ HOSP IP/OBS HIGH 50: CPT | Performed by: HOSPITALIST

## 2025-04-01 RX ORDER — BACLOFEN 10 MG/1
10 TABLET ORAL 3 TIMES DAILY
Qty: 90 TABLET | Refills: 0 | Status: SHIPPED | OUTPATIENT
Start: 2025-04-01

## 2025-04-01 RX ORDER — POTASSIUM CHLORIDE 1500 MG/1
40 TABLET, EXTENDED RELEASE ORAL ONCE
Status: COMPLETED | OUTPATIENT
Start: 2025-04-01 | End: 2025-04-01

## 2025-04-01 RX ORDER — MAGNESIUM OXIDE 400 MG/1
400 TABLET ORAL DAILY
Status: SHIPPED | COMMUNITY
Start: 2025-04-01

## 2025-04-01 RX ORDER — BACLOFEN 10 MG/1
10 TABLET ORAL 3 TIMES DAILY
Status: DISCONTINUED | OUTPATIENT
Start: 2025-04-01 | End: 2025-04-02

## 2025-04-01 RX ORDER — DOXEPIN HYDROCHLORIDE 50 MG/1
1 CAPSULE ORAL DAILY
Status: SHIPPED | COMMUNITY
Start: 2025-04-02

## 2025-04-01 RX ORDER — PENTOXIFYLLINE 400 MG/1
400 TABLET, EXTENDED RELEASE ORAL
Qty: 90 TABLET | Refills: 0 | Status: SHIPPED | OUTPATIENT
Start: 2025-04-01

## 2025-04-01 RX ORDER — PANTOPRAZOLE SODIUM 40 MG/1
40 TABLET, DELAYED RELEASE ORAL
Qty: 60 TABLET | Refills: 0 | Status: SHIPPED | OUTPATIENT
Start: 2025-04-01

## 2025-04-01 RX ORDER — FOLIC ACID 1 MG/1
1 TABLET ORAL DAILY
Status: SHIPPED | COMMUNITY
Start: 2025-04-02

## 2025-04-01 RX ORDER — TEMAZEPAM 15 MG/1
15 CAPSULE ORAL NIGHTLY PRN
Qty: 20 CAPSULE | Refills: 0 | Status: SHIPPED | OUTPATIENT
Start: 2025-04-01

## 2025-04-01 RX ORDER — POTASSIUM CHLORIDE 1500 MG/1
40 TABLET, EXTENDED RELEASE ORAL DAILY
Qty: 60 TABLET | Refills: 0 | Status: SHIPPED | OUTPATIENT
Start: 2025-04-01

## 2025-04-01 RX ORDER — FUROSEMIDE 20 MG/1
20 TABLET ORAL DAILY
Qty: 30 TABLET | Refills: 0 | Status: SHIPPED | OUTPATIENT
Start: 2025-04-01

## 2025-04-01 RX ORDER — BACLOFEN 5 MG/1
5 TABLET ORAL ONCE
Status: COMPLETED | OUTPATIENT
Start: 2025-04-01 | End: 2025-04-01

## 2025-04-01 RX ORDER — ACETAMINOPHEN 500 MG
500 TABLET ORAL EVERY 6 HOURS PRN
Status: SHIPPED | COMMUNITY
Start: 2025-04-01

## 2025-04-01 RX ORDER — PROCHLORPERAZINE MALEATE 10 MG
10 TABLET ORAL EVERY 8 HOURS PRN
Qty: 10 TABLET | Refills: 0 | Status: SHIPPED | OUTPATIENT
Start: 2025-04-01

## 2025-04-01 NOTE — DIETARY NOTE
ADULT NUTRITION REASSESSMENT    Pt is at low. Pt does not meet malnutrition criteria.      RECOMMENDATIONS TO MD:   See nutrition intervention for ONS (oral nutrition supplements)    ADMITTING DIAGNOSIS:  Scleral icterus [R17]  USMAN (acute kidney injury) [N17.9]  PERTINENT PAST MEDICAL HISTORY:   Past Medical History:    Esophageal reflux    Gout    Hernia     PATIENT STATUS:   Initial 03/06/25: Pt assessed r/t screened at risk on initial MST due to unintentional wt loss and decreased/poor appetite/intake. Presented to hospital with intractable N/V and poor appetite for the last few days - ETOH hepatitis and USMAN. PMH as listed above including ETOH abuse. Transferred to CCU for high CIWA scores. Scheduled ativan, soft wrist restraints in place. Very lethargic, minimally responsive. Sleeping but restless at time of visit. Not appropriate for diet hx with no family at bedside. Per H&P pt reported N/V, hiccups and inability to tolerate any solid foods x 3 days PTA. Typically consumes 3 24-oz  beers daily. Appears well nourished.  03/07/25 UPDATE: Pt discussed with MD on unit - plan to initiate EN feeds. Consult received for RD to initiate and manage tube feeds. See full assessment from 3/6. Pt remains minimally responsive on ativan and not appropriate to take PO. NPO/CL x6 days this admission. Increasing O2 requirements. Stable on HFNC at 40 L/min. RN to place NGT. Remains on lactulose x4 daily with rectal pouch in place 1650 ml recorded output over the 24 hrs however noted no documentation from day shift on 3/6. Pt with good UO and improving creatinine level. Noted hypophosphotemia with replacement ordered 30 mmol NaPhos rider per protocol. IVF of D5W 0.9NS @ 75 ml/hr provides 1.8 L, 90 g dextrose and 306 kcal. Lasix x1 today. Messaged nephrology regarding plan for IVF and free water from EN - awaiting response.    03/10/25 UPDATE: Pt lethargic. Remains inappropriate for PO intake. Pt pulled out NG DHT this afternoon.  Now replaced by RN with feeds resuming. EN feeds at 30 ml/hr (50% of goal rate). FWF increased to 200 ml q 4 hrs on 3/8 and EN held by MD on 3/9 due to worsening hypernatremia. Feeds resumed overnight 3/10. No improvement in hypernatremia. Pt with large amount of stool output, lactulose QID.    03/13/25 UPDATE:     Pt intubated 3/12, sedated on Propofol (12.4 ml/hr= ~330 kcals via lipids), low dose norepinephrine (3 mcg/min), worsening pneumonia s/p bronch today, new dx Acute Pancreatitis (per CT scan and lipase 444) 3/12 -acceptable. Stooling on lactulose (rectal tube in place), Marine non-oliguric at present.  Discussed with APN re: resuming EN support post bronch. May continue with same formula for impaired GI function.   03/17/2025 UPDATE:  Continues on tube feeds resumed at 6p on 3/14 at goal nutrition. Lactulose stopped 3/14 and stool output decreased/acceptable while ammonia level normalized on 3/12 and 3/14. 3/16 Water flushes decreased per renal service to 200 ml q 4 hrs,and  lasix dose decreased. I/O's still +16L.Sodium level 138 (trending down) today.  Remains intubated and on low dose levo. May further decreased FWF- renal service has been adjusting.   Update 3/20/25: Pt reassessed for follow up. Chart reviewed, pt extubated 3/17. Tube feeding discontinued, accidentally pulled out,  and diet advanced to chopped/ soft and bite sized, thin liquids. Visited pt at bedside today, pt stated appetite was good. Per chart, intake is fair to good since diet advanced to PO. Pt is consuming Mighty Shakes BID, will continue to send.    3/25/2025 Update:      Re-visited, pt lying on bed, partner (Islam) at bedside. Po intake increasing slowly, po %-->. Average meal intake 62% in 1-2 meals/day--Marginal. Incomplete documentation of ONS. Pt reports drinking some Mighty shake but not consistent. At lunch today, pt ate 2 large meatballs on pasta, did not drink Mighty shake. On 2L NC. + BM Stage 2 pressure  injury noted.   Hypokalemia replaced with Kcl 40 meq. Leukocytosis up trending. On antibiotics.    Adjusted Mighty shake with 9 g protein & 330 kcal to Ensure Enlive with 20 g protein & 350 kcal as pt wanted to increase protein intake.     Update 04/01/25: RA completed per protocol. Chart reviewed. Discussion with RN, no concerns with good intake/appetite. Possible discharge tomorrow. Intakes reviewed, % x 16 meals since last visit (averaging 79% overall - good). Not drinking ONS due to nausea. Given good/improved oral intake - discontinued ONS. Weight remains stable.      FOOD/NUTRITION RELATED HISTORY:  Appetite: Good and Improved  Intake: % x 16 meals since last visit - averaging 79% overall (good). Not drinking ONS (making him nauseous)  Intake Meeting Needs: Yes. ONS discontinued 4/1  Percent Meals Eaten (last 6 days)       Date/Time Percent Meals Eaten (%)    03/26/25 0951 100 %    03/26/25 1300 100 %    03/28/25 0800 100 %    03/28/25 1100 50 %    03/28/25 1506 25 %    03/29/25 1000 25 %    03/29/25 1438 75 %    03/29/25 1656 50 %    03/30/25 0900 80 %    03/30/25 1430 75 %    03/31/25 0900 100 %    03/31/25 1345 100 %    04/01/25 0839 100 %          Food Allergies: No Known Food Allergies (NKFA)  Cultural/Ethnic/Taoism Preferences: Not Obtained    GASTROINTESTINAL: +BM 4/1/25 -medium;loose . Swallow Eval noted.    CT A/P 3/4: \"Severely fatty liver with subtle undulating contour.  Stable splenomegaly.  Tubular structures around the GE junction are suggestive of lower esophageal varices.  No ascites \"    MEDICATIONS: reviewed; Noted non-cardiac electrolyte replacement protocol ordered;..      baclofen  10 mg Oral TID    magnesium oxide  400 mg Oral BID with meals    magnesium oxide  400 mg Oral Once    potassium chloride  40 mEq Oral Once    pentoxifylline ER  400 mg Oral TID CC    pantoprazole  40 mg Oral BID AC    epoetin sheela  10,000 Units Subcutaneous Once per day on Tuesday Thursday  Saturday    multivitamin  1 tablet Oral Daily    folic acid  1 mg Oral Daily     LABS: reviewed; Lipase: 444 3/13 . . K+ replaced. Liver enzymes elevated. Bili 15.2 is rising.   Recent Labs     03/29/25  0504 03/30/25  0511 03/31/25  0616 04/01/25  0533   *  --  107*  --    BUN 25*  --  20  --    CREATSERUM 1.22  --  1.03  --    CA 7.8*  --  7.9*  --    MG 1.7 1.9 1.7  --    *  --  139  --    K 3.1*  3.1* 3.4* 3.1*  3.1* 3.1*     --  106  --    CO2 23.0  --  23.0  --    PHOS 3.0  --  3.0  --    OSMOCALC 283  --  291  --      WEIGHT HISTORY:  Patient Weight(s) for the past 336 hrs:   Weight   03/27/25 0404 85.7 kg (188 lb 14.4 oz)     Wt Readings from Last 10 Encounters:   03/27/25 85.7 kg (188 lb 14.4 oz)   03/27/24 77.1 kg (170 lb)   12/26/23 89.8 kg (198 lb)   11/01/23 81.6 kg (180 lb)   10/16/23 81.6 kg (180 lb)   10/16/23 82.6 kg (182 lb)   09/06/23 91.6 kg (202 lb)   08/27/23 91.7 kg (202 lb 1.6 oz)   05/31/23 83.9 kg (185 lb)   01/17/23 90.7 kg (200 lb)     ANTHROPOMETRICS:  HT: 172.7 cm (5' 8\")  Wt Readings from Last 1 Encounters:   03/27/25 85.7 kg (188 lb 14.4 oz)   Last weight: unsure of accuracy as up and down (16 lbs up now)     3/25 No new wt, requested from RN to obtain new wt.   Dosing Weight: 77.9 kg (171 lbs) - updated on 3/13 as decreased from admit.-more reflective of dry wt.    BMI: Body mass index is 27.79 kg/m².  BMI CLASSIFICATION: 25-29.9 kg/m2 - overweight  IBW/lbs (Calculated) Male: 154 lbs           119% IBW  Usual Body Wt: 170 lbs (per EMR 3/27/24)      108% UBW on admit.     NUTRITION RELATED PHYSICAL FINDINGS:  - Nutrition Focused Physical Exam (NFPE): no wasting noted  - Fluid Accumulation: Non-pitting right upper extremity, +1 bilateral hands, left upper extremity, bilateral lower extremity/feet and +2 generalized per flowsheet review --> See RN documentation for details  - Skin Integrity: at risk. Stage 2 Coccyx noted per flowsheet review --> See RN documentation  for detail     NUTRITION DIAGNOSIS/PROBLEM:   Inadequate protein energy intake related to Decreased ability to consume sufficient energy in the setting of ETOH withdrawals as evidenced by decreased/poor intake x5 days.     NUTRITION DIAGNOSIS PROGRESS:  Improvement (unresolved) - PO intake improving, appetite increasing.    NUTRITION INTERVENTION:   NUTRITION PRESCRIPTION:   Estimated Nutrition needs: --dosing wt of 77.9 kg - wt taken on 3/12/25  Calories: 2155 - 2351 kcals (MSJ 1703 x 1.15 AF x 1.1 -1.2 IF or 28 -30 calories/kg).  Protein: 93 - 117 g protein/day (1.2-1.5 g protein/kg Dosing wt)    - Diet:       Procedures    Regular/General diet Sodium Restriction: 2 GM NA; Fluid Consistency: Thin Liquids ; Texture Consistency: Soft / Easy to Chew ; Is Patient on Accuchecks? No; Is Patient on Suicide Precautions? No     - ONS (Oral Nutrition Supplements)/Meals/Snacks: None at this time    - Vitamin and mineral supplements: folic acid and multivitamin/mineral/ MD  - Feeding assistance: meal set up  - Nutrition education: assess education needs and Discussed importance of overall consistent increase in nutrition intake for rehabilitative phase.   - Coordination of nutrition care: collaboration with other providers -   - Discharge and transfer of nutrition care to new setting or provider: monitor plans    MONITOR AND EVALUATE/NUTRITION GOALS:  - Food and Nutrient Intake:    Monitor: adequacy of PO intake  - Food and Nutrient Administration:    Monitor: N/A  - Anthropometric Measurement:    Monitor weight, fluid wt changes.   - Nutrition Goals:    allow wt loss due to fluid losses, PO greater than 75% of meals, labs within acceptable limits, euglycemia, minimize lean body mass loss, support wound healing, support body systems, maintain true wt within 5%, and improved GI status       DIETITIAN FOLLOW UP: RD to follow and monitor nutrition status    Haydee James MS, SID, RDN, LDN  Clinical Dietitian  P: [phone]

## 2025-04-01 NOTE — CM/SW NOTE
Patient discussed in RN DC rounds. Insurance auth was initiated on 3/31 Susi [id number] DSC. DSC sent updated clinicals from yesterday to insurance portal.     1221pm- SW was informed that patient insurance auth approved for [facility]. SW updated patient and family. SANDRA was informed that Marce Liaison informed RN that patient has be held 24 hours due to IV Lasik. SANDRA reached out to Marce at [facility] to ask for bed avail for tomorrow.     400pm- SANDRA was informed that [facility] PMR was asking for updated labs. SANDRA notified RN and MD of request    Plan: Pending bed avail  DC to [facility], *bed    SW/CM to remain available for support and/or discharge planning.     Patricia Last, MSW, LSW   x [phone]

## 2025-04-01 NOTE — PAYOR COMM NOTE
--------------  CONTINUED STAY REVIEW----REQUESTING ADDITIONAL DAY 3/31      Payor: [facility]  Subscriber #:  [id number]  Authorization Number: [id number]    Admit date: 3/4/25  Admit time:  3:11 PM            Date of Service: 3/31/2025 11:22 AM     Signed          Assessment & Plan:   ----------------------------------  Fevers.  New onset 3/23 along with leukocytosis.  Exact source unclear.    -Possible new right lower lobe pneumonia seen on CT, so Empiric antibiotics with meropenem without any change in fevers, actually fevers worsen  -Blood cultures neg  -Respiratory viral panel negative  -ID following, recommended to stop meropenem for possible drug fever, with improvement of fevers initially, but then spiked again fevers 3/27 evening, repeat culture sent, neg  -Leukocytosis still high, trending up&down, current ~22.8  -low grade temps ~100.9  -monitor off abx  -CBC in the morning      Respiratory failure, acute hypoxemic.  Due to pneumonia, ARDS.  Oxygen requirement is improving.  Extubated 3/17.  Using between 1 and 4 L of oxygen  -Supplemental oxygen as needed, titrate down.   -Pulmonology consultation appreciated  -Treat contributing factors as described  -Repeat chest x-ray in the morning, labs, including BNP now wnl (~83)  -Incentive spirometry  -On IV Lasix--> hold now because of increased creatinine-->restart in am daily (instead of bid)  -CXR repeated reviewed, BNP low, only 83  -Currently on 2-3 L -->1 L supplemental O2  -Renal panel in the morning     Acute alcoholic hepatitis.  Bilirubin stabilizing, may take a very long time to normalize  -GI following  -started on pentoxifylline 3/25 (instead of steroids given fevers and concern for underlying infection)    -Labs in the morning      Hiccups, on baclofen, better, decrease dose to twice daily     Other problems  Pancreatitis  Alcohol abuse  Cirrhosis  Hepatic steatosis  Hyponatremia  USMAN resolved, now up again, hold  Lasix  Chest pain, resolved  Aspiration pneumonia, finished antibiotics  Diarrhea, better  Sinus tachycardia, due to fever     Supplementary Documentation:   DVT Mechanical Prophylaxis:   SCDs,    DVT Pharmacologic Prophylaxis   Medication   None                 Code Status: Full Code  Cedeño: External urinary catheter in place  Cedeño Duration (in days): 14  Central line:    ADOLFO:  plan for acute rehab pending continued medical stability, and resolution of fevers, keep mobilizing        Discussed with ID and GI, okay to rehab as soon as tomorrow     I personally reviewed the available laboratories, imaging including. I discussed/will discuss the case with consultants. I ordered laboratories and/or radiographic studies. I adjusted medications as detailed above.  Medical decision making high, risk is high.     Subjective:   ----------------------------------  No vomiting yesterday and today  Oral intake low, but improving  Little bit more energy  still weak, spends most of day in bed, but was able to get out of the bed to chair   I encourage patient to get mobilized  D/w RN as well  +hiccups better       Seen with his partner at bedside        Objective:   Chief Complaint:       Chief Complaint   Patient presents with    Vomiting    Hiccups    Eval-D      ----------------------------------  Temp:  [97.7 °F (36.5 °C)-98.7 °F (37.1 °C)] 98.6 °F (37 °C)  Pulse:  [101-107] 105  Resp:  [16-20] 18  BP: (117-136)/(66-73) 117/71  SpO2:  [91 %-96 %] 93 %  Gen: Alert, appropriate, appears very weak jaundiced, resting in bed, but seems better  HEENT: NCAT, neck supple, no carotid bruit.  Scleral icterus  CV: Mild tachycardia, S1S2, and intact distal pulses. No gallop, rub, murmur.  Pulm: Poor effort, no wheezing or rales  Abd: Soft, NTND, BS normal, no mass, no HSM, no rebound/guarding.     Neuro:  Generally weak, moves all extremities  MS: No joint effusions.  No peripheral edema.  Skin: Skin is warm and dry. No rashes, erythema,  diaphoresis.   Psych:   Normal mood and affect. Calm, cooperative     Labs:        Lab Results   Component Value Date     HGB 8.2 (L) 03/31/2025     WBC 20.0 (H) 03/31/2025     .0 (L) 03/31/2025      03/31/2025     K 3.1 (L) 03/31/2025     K 3.1 (L) 03/31/2025     CREATSERUM 1.03 03/31/2025     INR 2.06 (H) 03/31/2025      (H) 03/31/2025     ALT 65 (H) 03/31/2025     TROP 0.00 01/31/2017              baclofen  10 mg Oral BID    furosemide  20 mg Intravenous Daily    magnesium oxide  400 mg Oral BID with meals    magnesium oxide  400 mg Oral Once    potassium chloride  40 mEq Oral Once    pentoxifylline ER  400 mg Oral TID CC    pantoprazole  40 mg Oral BID AC    epoetin sheela  10,000 Units Subcutaneous Once per day on Tuesday Thursday Saturday    multivitamin  1 tablet Oral Daily    folic acid  1 mg Oral Daily                           MEDICATIONS ADMINISTERED IN LAST 1 DAY:  baclofen (Lioresal) tab 10 mg       Date Action Dose Route User    4/1/2025 0832 Given 10 mg Oral Eliana Fowler RN    3/31/2025 2015 Given 10 mg Oral Sally Diaz RN          baclofen (Lioresal) tab 5 mg       Date Action Dose Route User    4/1/2025 0202 Given 5 mg Oral Sally Diaz RN          folic acid (Folvite) tab 1 mg       Date Action Dose Route User    4/1/2025 0832 Given 1 mg Oral Eliana Fowler RN          furosemide (Lasix) 10 mg/mL injection 20 mg       Date Action Dose Route User    4/1/2025 0832 Given 20 mg Intravenous Eliana Fowler RN          magnesium oxide (Mag-Ox) tab 400 mg       Date Action Dose Route User    4/1/2025 0832 Given 400 mg Oral Eliana Fowler RN    3/31/2025 1841 Given 400 mg Oral Rachel Tejada RN          pantoprazole (Protonix) DR tab 40 mg       Date Action Dose Route User    4/1/2025 0526 Given 40 mg Oral Sally Diaz RN    3/31/2025 1842 Given 40 mg Oral Mallory, Rachel, RN          pentoxifylline ER (TRENTal) tab 400 mg       Date Action Dose Route User    4/1/2025 1327 Given 400  mg Oral Eliana Fowler RN    4/1/2025 0832 Given 400 mg Oral Eliana Fowler RN    3/31/2025 1841 Given 400 mg Oral Rachel Tejada RN          potassium chloride (Klor-Con M20) tab 40 mEq       Date Action Dose Route User    4/1/2025 0832 Given 40 mEq Oral Eliana Fowler RN          prochlorperazine (Compazine) 10 MG/2ML injection 5 mg       Date Action Dose Route User    4/1/2025 1022 Given 5 mg Intravenous Eliana Fowler RN    4/1/2025 0314 Given 5 mg Intravenous Sally Diaz RN          multivitamin (Tab-A-Yosef/Beta Carotene) tab 1 tablet       Date Action Dose Route User    4/1/2025 0832 Given 1 tablet Oral Eliana Fowler RN          temazepam (Restoril) cap 15 mg       Date Action Dose Route User    3/31/2025 2236 Given 15 mg Oral Sally Diaz RN            Vitals (last day)       Date/Time Temp Pulse Resp BP SpO2 Weight O2 Device O2 Flow Rate (L/min) [facility]    04/01/25 0828 99.3 °F (37.4 °C) 107 18 123/71 95 % -- None (Room air) -- AR    04/01/25 0525 98.7 °F (37.1 °C) 93 16 114/70 95 % -- None (Room air) -- PS    04/01/25 0300 -- 97 -- -- -- -- -- -- EL    03/31/25 2242 -- 93 -- -- -- -- -- -- EL    03/31/25 2004 98.6 °F (37 °C) 105 18 117/71 93 % -- None (Room air) -- PS    03/31/25 1616 -- 107 20 136/70 93 % -- None (Room air) -- SR    03/31/25 1345 -- 101 -- -- -- -- -- -- KH    03/31/25 0923 98.7 °F (37.1 °C) -- 18 121/66 96 % -- None (Room air) -- SR    03/31/25 0622 97.7 °F (36.5 °C) 103 16 117/73 91 % -- None (Room air) -- PN          CIWA Scores (since admission)       Date/Time CIWA-Ar Total Who    03/18/25 2000 2 DW    03/18/25 1800 2 EM    03/18/25 1600 2 EM    03/18/25 1400 2 EM    03/18/25 1200 2 EM    03/18/25 1000 2 EM    03/18/25 0800 2 EM    03/17/25 1600 2 LC    03/17/25 1200 5 LC    03/17/25 0700 2 LC    03/16/25 1600 0 EV    03/16/25 1200 0 EV    03/16/25 0800 0 EV    03/15/25 1600 0 EV    03/15/25 1200 0 EV    03/15/25 0800 0 EV    03/12/25 0519 10 TT    03/12/25 0430 8 TT    03/12/25 0330  16 TT    03/11/25 2258 7 TT    03/11/25 2158 15 TT    03/11/25 1944 15 TT    03/11/25 1800 10 RP    03/11/25 1600 10 RP    03/11/25 1400 9 RP    03/11/25 1200 7 RP    03/11/25 1000 7 RP    03/11/25 0800 10 RP    03/11/25 0600 6 CB    03/11/25 0500 11 CB    03/11/25 0400 13 CB    03/11/25 0300 6 CB    03/11/25 0200 4 CB    03/11/25 0040 4 CB    03/11/25 0000 4 CB    03/10/25 2342 6 CB    03/10/25 2242 15 CB    03/10/25 2200 6 CB    03/10/25 2100 6 CB    03/10/25 2000 11 CB    03/10/25 0800 18 NR    03/10/25 0641 16 EDI    03/10/25 0600 6 EDI    03/10/25 0500 6 EDI    03/10/25 0400 13 EDI    03/10/25 0200 4 EDI    03/10/25 0000 4 EDI    03/09/25 2300 13 EDI    03/09/25 2200 6 EDI    03/09/25 2130 11 EDI    03/09/25 2000 6 EDI    03/09/25 1800 1 AH    03/09/25 1400 2 AH    03/09/25 0600 4 EDI    03/09/25 0400 5 EDI    03/09/25 0300 5 EDI    03/09/25 0000 5 EDI    03/08/25 2200 4 EDI    03/08/25 2000 4 EDI    03/08/25 1200 5 ES    03/08/25 1000 5 ES    03/08/25 0800 5 ES    03/08/25 0600 6 EDI    03/08/25 0400 5 EDI    03/08/25 0200 5 EDI    03/08/25 0000 5 EDI    03/07/25 2200 5 EDI    03/07/25 2000 5 EDI    03/07/25 0600 5 CT    03/07/25 0400 5 CT    03/07/25 0200 5 CT    03/07/25 0000 8 CT    03/06/25 2200 8 CT    03/06/25 2000 8 CT    03/06/25 1800 5 AW    03/06/25 1600 8 AW    03/06/25 1400 4 AW    03/06/25 1200 4 AW    03/06/25 1000 5 AW    03/06/25 0800 6 AW    03/06/25 0600 14 CT    03/06/25 0500 6 CT    03/06/25 0400 20 CT    03/06/25 0300 17 CT    03/06/25 0200 20 CT    03/06/25 0100 17 CT    03/06/25 0000 5 CT    03/05/25 2300 17 CT    03/05/25 2000 5 CT    03/05/25 1846 5 LN    03/05/25 1742 13 LN    03/05/25 1642 16 LN    03/05/25 1541 19 LN    03/05/25 1516 6 LN    03/05/25 1416 9 LN    03/05/25 1257 18 LN    03/05/25 1200 14 BD    03/05/25 1100 13 BD    03/05/25 1000 10 BD    03/05/25 0900 17 BD    03/05/25 0800 9 BD    03/05/25 0700 13 MP    03/05/25 0600 29 MP    03/05/25 0500 7 MP    03/05/25 0400 6 MP    03/05/25 0300 10  MP    03/05/25 0200 6 MP    03/05/25 0100 8 MP    03/05/25 0000 14 MP    03/04/25 2300 8 MP    03/04/25 2200 13 MP    03/04/25 2100 8 MP    03/04/25 2000 24 MP    03/04/25 1900 12 BD    03/04/25 1800 12 BD    03/04/25 1600 5 BD    03/04/25 1500 1 RR    03/04/25 1315 0 RR    03/04/25 1200 1 RR    03/04/25 0951 17 KB          Blood Transfusion Record       Product Unit Status Volume Start End            Transfuse RBC       25  [id number]  [id number] Completed 03/16/25 0935 470.7 mL 03/16/25 0615 03/16/25 0930       25  [id number]  [id number] Completed 03/13/25 1245 400 mL 03/13/25 0915 03/13/25 1235

## 2025-04-01 NOTE — PLAN OF CARE
Problem: Patient Centered Care  Goal: Patient preferences are identified and integrated in the patient's plan of care  Description: Interventions:  - What would you like us to know as we care for you? From home with family   - Provide timely, complete, and accurate information to patient/family  - Incorporate patient and family knowledge, values, beliefs, and cultural backgrounds into the planning and delivery of care  - Encourage patient/family to participate in care and decision-making at the level they choose  - Honor patient and family perspectives and choices  Outcome: Progressing     Problem: Patient/Family Goals  Goal: Patient/Family Long Term Goal  Description: Patient's Long Term Goal: discharge    Interventions:  - - Monitor vital signs  - Monitor appropriate labs  - Pain management  - Administer medications per order  - Follow MD orders  - Diagnostics per order  - Update / inform patient and family on plan of care  - Discharge planning     - See additional Care Plan goals for specific interventions  Outcome: Progressing  Goal: Patient/Family Short Term Goal  Description: Patient's Short Term Goal: wants hiccups to stop    Interventions:   - one time additional dose of baclofen  - See additional Care Plan goals for specific interventions  Outcome: Progressing     Problem: PAIN - ADULT  Goal: Verbalizes/displays adequate comfort level or patient's stated pain goal  Description: INTERVENTIONS:  - Encourage pt to monitor pain and request assistance  - Assess pain using appropriate pain scale  - Administer analgesics based on type and severity of pain and evaluate response  - Implement non-pharmacological measures as appropriate and evaluate response  - Consider cultural and social influences on pain and pain management  - Manage/alleviate anxiety  - Utilize distraction and/or relaxation techniques  - Monitor for opioid side effects  - Notify MD/LIP if interventions unsuccessful or patient reports new  pain  - Anticipate increased pain with activity and pre-medicate as appropriate  Outcome: Progressing     Problem: RISK FOR INFECTION - ADULT  Goal: Absence of fever/infection during anticipated neutropenic period  Description: INTERVENTIONS  - Monitor WBC  - Administer growth factors as ordered  - Implement neutropenic guidelines  Outcome: Progressing     Problem: SAFETY ADULT - FALL  Goal: Free from fall injury  Description: INTERVENTIONS:  - Assess pt frequently for physical needs  - Identify cognitive and physical deficits and behaviors that affect risk of falls.  - Rena Lara fall precautions as indicated by assessment.  - Educate pt/family on patient safety including physical limitations  - Instruct pt to call for assistance with activity based on assessment  - Modify environment to reduce risk of injury  - Provide assistive devices as appropriate  - Consider OT/PT consult to assist with strengthening/mobility  - Encourage toileting schedule  Outcome: Progressing     Problem: DISCHARGE PLANNING  Goal: Discharge to home or other facility with appropriate resources  Description: INTERVENTIONS:  - Identify barriers to discharge w/pt and caregiver  - Include patient/family/discharge partner in discharge planning  - Arrange for needed discharge resources and transportation as appropriate  - Identify discharge learning needs (meds, wound care, etc)  - Arrange for interpreters to assist at discharge as needed  - Consider post-discharge preferences of patient/family/discharge partner  - Complete POLST form as appropriate  - Assess patient's ability to be responsible for managing their own health  - Refer to Case Management Department for coordinating discharge planning if the patient needs post-hospital services based on physician/LIP order or complex needs related to functional status, cognitive ability or social support system  Outcome: Progressing     Problem: Delirium  Goal: Minimize duration of  delirium  Description: Interventions:  - Encourage use of hearing aids, eye glasses  - Promote highest level of mobility daily  - Provide frequent reorientation  - Promote wakefulness i.e. lights on, blinds open  - Promote sleep, encourage patient's normal rest cycle i.e. lights off, TV off, minimize noise and interruptions  - Encourage family to assist in orientation and promotion of home routines  Outcome: Progressing     Problem: RESPIRATORY - ADULT  Goal: Achieves optimal ventilation and oxygenation  Description: INTERVENTIONS:  - Assess for changes in respiratory status  - Assess for changes in mentation and behavior  - Position to facilitate oxygenation and minimize respiratory effort  - Oxygen supplementation based on oxygen saturation or ABGs  - Provide Smoking Cessation handout, if applicable  - Encourage broncho-pulmonary hygiene including cough, deep breathe, Incentive Spirometry  - Assess the need for suctioning and perform as needed  - Assess and instruct to report SOB or any respiratory difficulty  - Respiratory Therapy support as indicated  - Manage/alleviate anxiety  - Monitor for signs/symptoms of CO2 retention  Outcome: Progressing     Problem: METABOLIC/FLUID AND ELECTROLYTES - ADULT  Goal: Electrolytes maintained within normal limits  Description: INTERVENTIONS:  - Monitor labs and rhythm and assess patient for signs and symptoms of electrolyte imbalances  - Administer electrolyte replacement as ordered  - Monitor response to electrolyte replacements, including rhythm and repeat lab results as appropriate  - Fluid restriction as ordered  - Instruct patient on fluid and nutrition restrictions as appropriate  Outcome: Progressing  Goal: Hemodynamic stability and optimal renal function maintained  Description: INTERVENTIONS:  - Monitor labs and assess for signs and symptoms of volume excess or deficit  - Monitor intake, output and patient weight  - Monitor urine specific gravity, serum osmolarity  and serum sodium as indicated or ordered  - Monitor response to interventions for patient's volume status, including labs, urine output, blood pressure (other measures as available)  - Encourage oral intake as appropriate  - Instruct patient on fluid and nutrition restrictions as appropriate  Outcome: Progressing     Problem: Impaired Swallowing  Goal: Minimize aspiration risk  Description: Interventions:  - Patient should be alert and upright for all feedings (90 degrees preferred)  - Offer food and liquids at a slow rate  - No straws  - Encourage small bites of food and small sips of liquid  - Offer pills one at a time, crush or deliver with applesauce as needed  - Discontinue feeding and notify MD (or speech pathologist) if coughing or persistent throat clearing or wet/gurgly vocal quality is noted  Outcome: Progressing     Problem: Impaired Cognition  Goal: Patient will exhibit improved attention, thought processing and/or memory  Description: Interventions:  - Allow additional time for processing after asking questions or providing instructions  Outcome: Progressing     Problem: CARDIOVASCULAR - ADULT  Goal: Maintains optimal cardiac output and hemodynamic stability  Description: INTERVENTIONS:- Monitor vital signs, rhythm, and trends- Monitor for bleeding, hypotension and signs of decreased cardiac output- Evaluate effectiveness of vasoactive medications to optimize hemodynamic stability- Monitor arterial and/or venous puncture sites for bleeding and/or hematoma- Assess quality of pulses, skin color and temperature- Assess for signs of decreased coronary artery perfusion - ex. Angina- Evaluate fluid balance, assess for edema, trend weights  Outcome: Progressing

## 2025-04-01 NOTE — CM/SW NOTE
Prior Authorization - Destination  Destination Type: Inpatient rehab facility  Service Provider: Critical access hospital - Acute  Payer Communication Destination Comments: Susi sosa [id number]  Prior Authorization Status: Approved  Prior Authorization Start Date: 04/01/25 4/1 TO 4/7    Radha Roldan DSC

## 2025-04-01 NOTE — PROGRESS NOTES
The patient was seen bedside by an ARU admission clinical liaison.  The patient was provided with the following:     Review updated insurance information  What to bring to Rehab form  Brochure of [facility] ARU    Discussed what to expect during the patient's rehabilitation stay.  Answered all questions and concerns.     Joanie Lilly), OTR/L, Clinical Liaison  [facility] Inpatient Rehab Unit  Phone: [phone]  Fax: [phone]

## 2025-04-01 NOTE — PROGRESS NOTES
[facility]  part of [facility]  Hospitalist Progress Note     Sami Grijalva  Patient Status:  Inpatient    10/11/1980  44 year old CSN [id number]   Location 217/217-A Attending Yonny Bowers MD   Hosp Day # 27 PCP Kerri Medina MD     Assessment & Plan:   ----------------------------------  Fevers.  New onset 3/23 along with leukocytosis.  Exact source unclear.    -Possible new right lower lobe pneumonia seen on CT, so Empiric antibiotics with meropenem without any change in fevers, actually fevers worsen  -Blood cultures neg  -Respiratory viral panel negative  -ID following, recommended to stop meropenem for possible drug fever, with improvement of fevers initially, but then spiked again fevers 3/27 evening, repeat culture sent, neg  -Leukocytosis still high, trending up&down, current ~22.8  -low grade temps ~100.9  -monitor off abx  -CBC in the morning     Respiratory failure, acute hypoxemic.  Due to pneumonia, ARDS.  Oxygen requirement is improving.  Extubated 3/17.  Using between 1 and 4 L of oxygen  -Supplemental oxygen as needed, titrate down.   -Pulmonology consultation appreciated  -Treat contributing factors as described  -Repeat chest x-ray in the morning, labs, including BNP now wnl (~83)  -Incentive spirometry  -On IV Lasix--> hold now because of increased creatinine-->restart in am daily (instead of bid)  -CXR repeated reviewed, BNP low, only 83  -Currently on 2-3 L -->1 L supplemental O2  -Renal panel in the morning    Acute alcoholic hepatitis.  Bilirubin stabilizing, may take a very long time to normalize  -GI following  -started on pentoxifylline 3/25 (instead of steroids given fevers and concern for underlying infection)    -Labs in the morning     Hiccups, on baclofen, better, decrease dose to twice daily    Other problems  Pancreatitis  Alcohol abuse  Cirrhosis  Hepatic steatosis  Hyponatremia  USMAN resolved, now up again, hold Lasix  Chest pain,  resolved  Aspiration pneumonia, finished antibiotics  Diarrhea, better  Sinus tachycardia, due to fever    Supplementary Documentation:   DVT Mechanical Prophylaxis:   SCDs,    DVT Pharmacologic Prophylaxis   Medication   None                Code Status: Full Code  Cedeño: External urinary catheter in place  Cedeño Duration (in days): 14  Central line:    ADOLFO:  plan for acute rehab pending continued medical stability, and resolution of fevers, keep mobilizing        Discussed with ID and GI, okay to rehab as soon as tomorrow    I personally reviewed the available laboratories, imaging including. I discussed/will discuss the case with consultants. I ordered laboratories and/or radiographic studies. I adjusted medications as detailed above.  Medical decision making high, risk is high.    Subjective:   ----------------------------------  No vomiting yesterday and today  Oral intake low, but improving  Little bit more energy  still weak, spends most of day in bed, but was able to get out of the bed to chair   I encourage patient to get mobilized  D/w RN as well  +hiccups better      Seen with his partner at bedside      Objective:   Chief Complaint:   Chief Complaint   Patient presents with    Vomiting    Hiccups    Eval-D     ----------------------------------  Temp:  [97.7 °F (36.5 °C)-98.7 °F (37.1 °C)] 98.6 °F (37 °C)  Pulse:  [101-107] 105  Resp:  [16-20] 18  BP: (117-136)/(66-73) 117/71  SpO2:  [91 %-96 %] 93 %  Gen: Alert, appropriate, appears very weak jaundiced, resting in bed, but seems better  HEENT: NCAT, neck supple, no carotid bruit.  Scleral icterus  CV: Mild tachycardia, S1S2, and intact distal pulses. No gallop, rub, murmur.  Pulm: Poor effort, no wheezing or rales  Abd: Soft, NTND, BS normal, no mass, no HSM, no rebound/guarding.     Neuro: Generally weak, moves all extremities  MS: No joint effusions.  No peripheral edema.  Skin: Skin is warm and dry. No rashes, erythema, diaphoresis.   Psych: Normal  mood and affect. Calm, cooperative    Labs:  Lab Results   Component Value Date    HGB 8.2 (L) 03/31/2025    WBC 20.0 (H) 03/31/2025    .0 (L) 03/31/2025     03/31/2025    K 3.1 (L) 03/31/2025    K 3.1 (L) 03/31/2025    CREATSERUM 1.03 03/31/2025    INR 2.06 (H) 03/31/2025     (H) 03/31/2025    ALT 65 (H) 03/31/2025    TROP 0.00 01/31/2017            baclofen  10 mg Oral BID    furosemide  20 mg Intravenous Daily    magnesium oxide  400 mg Oral BID with meals    magnesium oxide  400 mg Oral Once    potassium chloride  40 mEq Oral Once    pentoxifylline ER  400 mg Oral TID CC    pantoprazole  40 mg Oral BID AC    epoetin sheela  10,000 Units Subcutaneous Once per day on Tuesday Thursday Saturday    multivitamin  1 tablet Oral Daily    folic acid  1 mg Oral Daily       polyethylene glycol (PEG 3350)    temazepam    acetaminophen    haloperidol lactate    bisacodyl    prochlorperazine

## 2025-04-01 NOTE — PROGRESS NOTES
[facility]  part of [facility]  Hospitalist Progress Note     Sami Grijalva  Patient Status:  Inpatient    10/11/1980  44 year old CSN [id number]   Location 217/217-A Attending Yonny Bowers MD   Hosp Day # 28 PCP Kerri Medina MD     Assessment & Plan:   ----------------------------------  Fevers.  New onset 3/23 along with leukocytosis.  Exact source unclear.    -Possible new right lower lobe pneumonia seen on CT, so Empiric antibiotics with meropenem without any change in fevers, actually fevers worsen  -Blood cultures neg  -Respiratory viral panel negative  -ID following, recommended to stop meropenem for possible drug fever, with improvement of fevers initially, but then spiked again fevers 3/27 evening, repeat culture sent, neg  -Leukocytosis still high, trending up&down, current ~22.8-->20k  -low grade temps ~100.9-->resolved  -monitor off abx  -CBC in the morning     Respiratory failure, acute hypoxemic.  Due to pneumonia, ARDS.  Oxygen requirement is improving.  Extubated 3/17.  Using between 1 and 4 L of oxygen  -Supplemental oxygen as needed, titrate down.   -Pulmonology consultation appreciated  -Treat contributing factors as described  -Repeat chest x-ray in the morning, labs, including BNP now wnl (~83)  -Incentive spirometry  -On IV Lasix bid initially with improvement of edema and oxygenation--> with increased creatinine dose changed to daily, now ok to completely stop iv lasix-->restart in am daily oral dose    -CXR repeated reviewed, BNP low, only 83  -Currently on 2-3 L -->1 L supplemental O2-->weaned off, on RA       Acute alcoholic hepatitis.  Bilirubin stabilizing, may take a very long time to normalize  -GI following  -started on pentoxifylline 3/25 (instead of steroids given fevers and concern for underlying infection)    -Labs in the morning stable    Hiccups, on baclofen, better, decrease dose to twice daily    Other problems  Pancreatitis  Alcohol  abuse  Cirrhosis  Hepatic steatosis  Hyponatremia  USMAN resolved, now up again, hold Lasix  Chest pain, resolved  Aspiration pneumonia, finished antibiotics  Diarrhea, better  Sinus tachycardia, due to fever    Supplementary Documentation:   DVT Mechanical Prophylaxis:   SCDs,    DVT Pharmacologic Prophylaxis   Medication   None                Code Status: Full Code  Cedeño: External urinary catheter in place  Cedeño Duration (in days): 14  Central line:    ADOLFO:  plan for acute rehab pending arrangements, stable for discharge        Discussed with ID and GI, okay to rehab as soon as tomorrow        D/w pt and family, pt eager to go to rehab    I personally reviewed the available laboratories, imaging including. I discussed/will discuss the case with consultants. I ordered laboratories and/or radiographic studies. I adjusted medications as detailed above.  Medical decision making high, risk is high.    Subjective:   ----------------------------------  No vomiting anymore, oral intake low, but improving  more energy  D/w RN as well  +hiccups better      Seen with his partner at bedside      Objective:   Chief Complaint:   Chief Complaint   Patient presents with    Vomiting    Hiccups    Eval-D     ----------------------------------  Temp:  [98.6 °F (37 °C)-99.9 °F (37.7 °C)] 99.9 °F (37.7 °C)  Pulse:  [] 100  Resp:  [16-20] 20  BP: (114-123)/(70-72) 114/72  SpO2:  [93 %-95 %] 95 %  Gen: Alert, appropriate, appears very weak jaundiced, resting in bed, but seems better  HEENT: NCAT, neck supple, no carotid bruit.  Scleral icterus  CV: Mild tachycardia, S1S2, and intact distal pulses. No gallop, rub, murmur.  Pulm: Poor effort, no wheezing or rales  Abd: Soft, NTND, BS normal, no mass, no HSM, no rebound/guarding.     Neuro: Generally weak, moves all extremities  MS: No joint effusions.  No peripheral edema.  Skin: Skin is warm and dry. No rashes, erythema, diaphoresis.   Psych: Normal mood and affect. Calm,  cooperative    Labs:  Lab Results   Component Value Date    HGB 8.2 (L) 03/31/2025    WBC 20.0 (H) 03/31/2025    .0 (L) 03/31/2025     03/31/2025    K 3.1 (L) 04/01/2025    CREATSERUM 1.03 03/31/2025    INR 2.06 (H) 03/31/2025     (H) 03/31/2025    ALT 65 (H) 03/31/2025    TROP 0.00 01/31/2017            baclofen  10 mg Oral TID    magnesium oxide  400 mg Oral BID with meals    magnesium oxide  400 mg Oral Once    potassium chloride  40 mEq Oral Once    pentoxifylline ER  400 mg Oral TID CC    pantoprazole  40 mg Oral BID AC    epoetin sheela  10,000 Units Subcutaneous Once per day on Tuesday Thursday Saturday    multivitamin  1 tablet Oral Daily    folic acid  1 mg Oral Daily       polyethylene glycol (PEG 3350)    temazepam    acetaminophen    haloperidol lactate    bisacodyl    prochlorperazine

## 2025-04-01 NOTE — PLAN OF CARE
A&OX4. SP to wheel chair. Incontinent X2. Rehab when bed available   Problem: Patient Centered Care  Goal: Patient preferences are identified and integrated in the patient's plan of care  Description: Interventions:  - What would you like us to know as we care for you?   - Provide timely, complete, and accurate information to patient/family  - Incorporate patient and family knowledge, values, beliefs, and cultural backgrounds into the planning and delivery of care  - Encourage patient/family to participate in care and decision-making at the level they choose  - Honor patient and family perspectives and choices  Outcome: Progressing     Problem: Patient/Family Goals  Goal: Patient/Family Long Term Goal  Description: Patient's Long Term Goal:     Interventions:  -   - See additional Care Plan goals for specific interventions  Outcome: Progressing  Goal: Patient/Family Short Term Goal  Description: Patient's Short Term Goal:     Interventions:   -   - See additional Care Plan goals for specific interventions  Outcome: Progressing     Problem: PAIN - ADULT  Goal: Verbalizes/displays adequate comfort level or patient's stated pain goal  Description: INTERVENTIONS:  - Encourage pt to monitor pain and request assistance  - Assess pain using appropriate pain scale  - Administer analgesics based on type and severity of pain and evaluate response  - Implement non-pharmacological measures as appropriate and evaluate response  - Consider cultural and social influences on pain and pain management  - Manage/alleviate anxiety  - Utilize distraction and/or relaxation techniques  - Monitor for opioid side effects  - Notify MD/LIP if interventions unsuccessful or patient reports new pain  - Anticipate increased pain with activity and pre-medicate as appropriate  Outcome: Progressing     Problem: RISK FOR INFECTION - ADULT  Goal: Absence of fever/infection during anticipated neutropenic period  Description: INTERVENTIONS  - Monitor  WBC  - Administer growth factors as ordered  - Implement neutropenic guidelines  Outcome: Progressing     Problem: SAFETY ADULT - FALL  Goal: Free from fall injury  Description: INTERVENTIONS:  - Assess pt frequently for physical needs  - Identify cognitive and physical deficits and behaviors that affect risk of falls.  - [place] fall precautions as indicated by assessment.  - Educate pt/family on patient safety including physical limitations  - Instruct pt to call for assistance with activity based on assessment  - Modify environment to reduce risk of injury  - Provide assistive devices as appropriate  - Consider OT/PT consult to assist with strengthening/mobility  - Encourage toileting schedule  Outcome: Progressing     Problem: DISCHARGE PLANNING  Goal: Discharge to home or other facility with appropriate resources  Description: INTERVENTIONS:  - Identify barriers to discharge w/pt and caregiver  - Include patient/family/discharge partner in discharge planning  - Arrange for needed discharge resources and transportation as appropriate  - Identify discharge learning needs (meds, wound care, etc)  - Arrange for interpreters to assist at discharge as needed  - Consider post-discharge preferences of patient/family/discharge partner  - Complete POLST form as appropriate  - Assess patient's ability to be responsible for managing their own health  - Refer to Case Management Department for coordinating discharge planning if the patient needs post-hospital services based on physician/LIP order or complex needs related to functional status, cognitive ability or social support system  Outcome: Progressing     Problem: Delirium  Goal: Minimize duration of delirium  Description: Interventions:  - Encourage use of hearing aids, eye glasses  - Promote highest level of mobility daily  - Provide frequent reorientation  - Promote wakefulness i.e. lights on, blinds open  - Promote sleep, encourage patient's normal rest cycle i.e.  lights off, TV off, minimize noise and interruptions  - Encourage family to assist in orientation and promotion of home routines  Outcome: Progressing     Problem: RESPIRATORY - ADULT  Goal: Achieves optimal ventilation and oxygenation  Description: INTERVENTIONS:  - Assess for changes in respiratory status  - Assess for changes in mentation and behavior  - Position to facilitate oxygenation and minimize respiratory effort  - Oxygen supplementation based on oxygen saturation or ABGs  - Provide Smoking Cessation handout, if applicable  - Encourage broncho-pulmonary hygiene including cough, deep breathe, Incentive Spirometry  - Assess the need for suctioning and perform as needed  - Assess and instruct to report SOB or any respiratory difficulty  - Respiratory Therapy support as indicated  - Manage/alleviate anxiety  - Monitor for signs/symptoms of CO2 retention  Outcome: Progressing     Problem: METABOLIC/FLUID AND ELECTROLYTES - ADULT  Goal: Electrolytes maintained within normal limits  Description: INTERVENTIONS:  - Monitor labs and rhythm and assess patient for signs and symptoms of electrolyte imbalances  - Administer electrolyte replacement as ordered  - Monitor response to electrolyte replacements, including rhythm and repeat lab results as appropriate  - Fluid restriction as ordered  - Instruct patient on fluid and nutrition restrictions as appropriate  Outcome: Progressing  Goal: Hemodynamic stability and optimal renal function maintained  Description: INTERVENTIONS:  - Monitor labs and assess for signs and symptoms of volume excess or deficit  - Monitor intake, output and patient weight  - Monitor urine specific gravity, serum osmolarity and serum sodium as indicated or ordered  - Monitor response to interventions for patient's volume status, including labs, urine output, blood pressure (other measures as available)  - Encourage oral intake as appropriate  - Instruct patient on fluid and nutrition  restrictions as appropriate  Outcome: Progressing     Problem: Impaired Swallowing  Goal: Minimize aspiration risk  Description: Interventions:  - Patient should be alert and upright for all feedings (90 degrees preferred)  - Offer food and liquids at a slow rate  - No straws  - Encourage small bites of food and small sips of liquid  - Offer pills one at a time, crush or deliver with applesauce as needed  - Discontinue feeding and notify MD (or speech pathologist) if coughing or persistent throat clearing or wet/gurgly vocal quality is noted  Outcome: Progressing     Problem: Impaired Cognition  Goal: Patient will exhibit improved attention, thought processing and/or memory  Description: Interventions:  - Minimize distractions in the room when full attention is required  Outcome: Progressing     Problem: CARDIOVASCULAR - ADULT  Goal: Maintains optimal cardiac output and hemodynamic stability  Description: INTERVENTIONS:- Monitor vital signs, rhythm, and trends- Monitor for bleeding, hypotension and signs of decreased cardiac output- Evaluate effectiveness of vasoactive medications to optimize hemodynamic stability- Monitor arterial and/or venous puncture sites for bleeding and/or hematoma- Assess quality of pulses, skin color and temperature- Assess for signs of decreased coronary artery perfusion - ex. Angina- Evaluate fluid balance, assess for edema, trend weights  Outcome: Progressing

## 2025-04-02 VITALS
HEART RATE: 105 BPM | RESPIRATION RATE: 18 BRPM | WEIGHT: 188.88 LBS | DIASTOLIC BLOOD PRESSURE: 74 MMHG | SYSTOLIC BLOOD PRESSURE: 118 MMHG | OXYGEN SATURATION: 93 % | HEIGHT: 68 IN | BODY MASS INDEX: 28.62 KG/M2 | TEMPERATURE: 100 F

## 2025-04-02 LAB
ALBUMIN SERPL-MCNC: 2.5 G/DL (ref 3.2–4.8)
ALBUMIN SERPL-MCNC: 2.5 G/DL (ref 3.2–4.8)
ALP LIVER SERPL-CCNC: 194 U/L
ALT SERPL-CCNC: 57 U/L
ANION GAP SERPL CALC-SCNC: 9 MMOL/L (ref 0–18)
AST SERPL-CCNC: 149 U/L (ref ?–34)
BASOPHILS # BLD AUTO: 0.03 X10(3) UL (ref 0–0.2)
BASOPHILS NFR BLD AUTO: 0.2 %
BILIRUB DIRECT SERPL-MCNC: 10.9 MG/DL (ref ?–0.3)
BILIRUB SERPL-MCNC: 14.3 MG/DL (ref 0.3–1.2)
BUN BLD-MCNC: 14 MG/DL (ref 9–23)
BUN/CREAT SERPL: 15.1 (ref 10–20)
CALCIUM BLD-MCNC: 7.9 MG/DL (ref 8.7–10.4)
CHLORIDE SERPL-SCNC: 108 MMOL/L (ref 98–112)
CO2 SERPL-SCNC: 22 MMOL/L (ref 21–32)
CREAT BLD-MCNC: 0.93 MG/DL
DEPRECATED RDW RBC AUTO: 95.6 FL (ref 35.1–46.3)
EGFRCR SERPLBLD CKD-EPI 2021: 103 ML/MIN/1.73M2 (ref 60–?)
EOSINOPHIL # BLD AUTO: 0.21 X10(3) UL (ref 0–0.7)
EOSINOPHIL NFR BLD AUTO: 1.7 %
ERYTHROCYTE [DISTWIDTH] IN BLOOD BY AUTOMATED COUNT: 32.4 % (ref 11–15)
GLUCOSE BLD-MCNC: 97 MG/DL (ref 70–99)
HCT VFR BLD AUTO: 22.8 %
HGB BLD-MCNC: 7.6 G/DL
IMM GRANULOCYTES # BLD AUTO: 0.29 X10(3) UL (ref 0–1)
IMM GRANULOCYTES NFR BLD: 2.3 %
LYMPHOCYTES # BLD AUTO: 1.1 X10(3) UL (ref 1–4)
LYMPHOCYTES NFR BLD AUTO: 8.9 %
MCH RBC QN AUTO: 28.1 PG (ref 26–34)
MCHC RBC AUTO-ENTMCNC: 33.3 G/DL (ref 31–37)
MCV RBC AUTO: 84.4 FL
MONOCYTES # BLD AUTO: 0.71 X10(3) UL (ref 0.1–1)
MONOCYTES NFR BLD AUTO: 5.7 %
NEUTROPHILS # BLD AUTO: 10.07 X10 (3) UL (ref 1.5–7.7)
NEUTROPHILS # BLD AUTO: 10.07 X10(3) UL (ref 1.5–7.7)
NEUTROPHILS NFR BLD AUTO: 81.2 %
OSMOLALITY SERPL CALC.SUM OF ELEC: 288 MOSM/KG (ref 275–295)
PHOSPHATE SERPL-MCNC: 3.4 MG/DL (ref 2.4–5.1)
PLATELET # BLD AUTO: 118 10(3)UL (ref 150–450)
PLATELETS.RETICULATED NFR BLD AUTO: 4.9 % (ref 0–7)
POTASSIUM SERPL-SCNC: 3.3 MMOL/L (ref 3.5–5.1)
POTASSIUM SERPL-SCNC: 3.3 MMOL/L (ref 3.5–5.1)
PROT SERPL-MCNC: 5.6 G/DL (ref 5.7–8.2)
RBC # BLD AUTO: 2.7 X10(6)UL
SODIUM SERPL-SCNC: 139 MMOL/L (ref 136–145)
WBC # BLD AUTO: 12.4 X10(3) UL (ref 4–11)

## 2025-04-02 PROCEDURE — 99239 HOSP IP/OBS DSCHRG MGMT >30: CPT | Performed by: HOSPITALIST

## 2025-04-02 RX ORDER — POTASSIUM CHLORIDE 1500 MG/1
40 TABLET, EXTENDED RELEASE ORAL ONCE
Status: COMPLETED | OUTPATIENT
Start: 2025-04-02 | End: 2025-04-02

## 2025-04-02 NOTE — PLAN OF CARE
Problem: Patient Centered Care  Goal: Patient preferences are identified and integrated in the patient's plan of care  Description: Interventions:  - What would you like us to know as we care for you?   - Provide timely, complete, and accurate information to patient/family  - Incorporate patient and family knowledge, values, beliefs, and cultural backgrounds into the planning and delivery of care  - Encourage patient/family to participate in care and decision-making at the level they choose  - Honor patient and family perspectives and choices  Outcome: Progressing     Problem: PAIN - ADULT  Goal: Verbalizes/displays adequate comfort level or patient's stated pain goal  Description: INTERVENTIONS:  - Encourage pt to monitor pain and request assistance  - Assess pain using appropriate pain scale  - Administer analgesics based on type and severity of pain and evaluate response  - Implement non-pharmacological measures as appropriate and evaluate response  - Consider cultural and social influences on pain and pain management  - Manage/alleviate anxiety  - Utilize distraction and/or relaxation techniques  - Monitor for opioid side effects  - Notify MD/LIP if interventions unsuccessful or patient reports new pain  - Anticipate increased pain with activity and pre-medicate as appropriate  Outcome: Progressing     Problem: RISK FOR INFECTION - ADULT  Goal: Absence of fever/infection during anticipated neutropenic period  Description: INTERVENTIONS  - Monitor WBC  - Administer growth factors as ordered  - Implement neutropenic guidelines  Outcome: Progressing     Problem: SAFETY ADULT - FALL  Goal: Free from fall injury  Description: INTERVENTIONS:  - Assess pt frequently for physical needs  - Identify cognitive and physical deficits and behaviors that affect risk of falls.  - [place] fall precautions as indicated by assessment.  - Educate pt/family on patient safety including physical limitations  - Instruct pt to call  for assistance with activity based on assessment  - Modify environment to reduce risk of injury  - Provide assistive devices as appropriate  - Consider OT/PT consult to assist with strengthening/mobility  - Encourage toileting schedule  Outcome: Progressing     Problem: DISCHARGE PLANNING  Goal: Discharge to home or other facility with appropriate resources  Description: INTERVENTIONS:  - Identify barriers to discharge w/pt and caregiver  - Include patient/family/discharge partner in discharge planning  - Arrange for needed discharge resources and transportation as appropriate  - Identify discharge learning needs (meds, wound care, etc)  - Arrange for interpreters to assist at discharge as needed  - Consider post-discharge preferences of patient/family/discharge partner  - Complete POLST form as appropriate  - Assess patient's ability to be responsible for managing their own health  - Refer to Case Management Department for coordinating discharge planning if the patient needs post-hospital services based on physician/LIP order or complex needs related to functional status, cognitive ability or social support system  Outcome: Progressing     Problem: Delirium  Goal: Minimize duration of delirium  Description: Interventions:  - Encourage use of hearing aids, eye glasses  - Promote highest level of mobility daily  - Provide frequent reorientation  - Promote wakefulness i.e. lights on, blinds open  - Promote sleep, encourage patient's normal rest cycle i.e. lights off, TV off, minimize noise and interruptions  - Encourage family to assist in orientation and promotion of home routines  Outcome: Progressing     Problem: RESPIRATORY - ADULT  Goal: Achieves optimal ventilation and oxygenation  Description: INTERVENTIONS:  - Assess for changes in respiratory status  - Assess for changes in mentation and behavior  - Position to facilitate oxygenation and minimize respiratory effort  - Oxygen supplementation based on oxygen  saturation or ABGs  - Provide Smoking Cessation handout, if applicable  - Encourage broncho-pulmonary hygiene including cough, deep breathe, Incentive Spirometry  - Assess the need for suctioning and perform as needed  - Assess and instruct to report SOB or any respiratory difficulty  - Respiratory Therapy support as indicated  - Manage/alleviate anxiety  - Monitor for signs/symptoms of CO2 retention  Outcome: Progressing     Problem: METABOLIC/FLUID AND ELECTROLYTES - ADULT  Goal: Electrolytes maintained within normal limits  Description: INTERVENTIONS:  - Monitor labs and rhythm and assess patient for signs and symptoms of electrolyte imbalances  - Administer electrolyte replacement as ordered  - Monitor response to electrolyte replacements, including rhythm and repeat lab results as appropriate  - Fluid restriction as ordered  - Instruct patient on fluid and nutrition restrictions as appropriate  Outcome: Progressing  Goal: Hemodynamic stability and optimal renal function maintained  Description: INTERVENTIONS:  - Monitor labs and assess for signs and symptoms of volume excess or deficit  - Monitor intake, output and patient weight  - Monitor urine specific gravity, serum osmolarity and serum sodium as indicated or ordered  - Monitor response to interventions for patient's volume status, including labs, urine output, blood pressure (other measures as available)  - Encourage oral intake as appropriate  - Instruct patient on fluid and nutrition restrictions as appropriate  Outcome: Progressing     Problem: Impaired Swallowing  Goal: Minimize aspiration risk  Description: Interventions:  - Patient should be alert and upright for all feedings (90 degrees preferred)  - Offer food and liquids at a slow rate  - No straws  - Encourage small bites of food and small sips of liquid  - Offer pills one at a time, crush or deliver with applesauce as needed  - Discontinue feeding and notify MD (or speech pathologist) if  coughing or persistent throat clearing or wet/gurgly vocal quality is noted  Outcome: Progressing        Problem: CARDIOVASCULAR - ADULT  Goal: Maintains optimal cardiac output and hemodynamic stability  Description: INTERVENTIONS:- Monitor vital signs, rhythm, and trends- Monitor for bleeding, hypotension and signs of decreased cardiac output- Evaluate effectiveness of vasoactive medications to optimize hemodynamic stability- Monitor arterial and/or venous puncture sites for bleeding and/or hematoma- Assess quality of pulses, skin color and temperature- Assess for signs of decreased coronary artery perfusion - ex. Angina- Evaluate fluid balance, assess for edema, trend weights  Outcome: Progressing

## 2025-04-02 NOTE — DISCHARGE SUMMARY
[facility]  part of [facility]    Discharge Summary    Sami Grijalva Jr. Patient Status:  Inpatient    10/11/1980 MRN [id number]   Location [facility] 5SW/SE Attending Kylee Soares, DO   Hosp Day # 29 PCP Kerri Medina MD     Date of Admission: 3/4/2025 Disposition: Home or Self Care     Date of Discharge: 2025      Admitting Diagnosis: Scleral icterus [R17]  USMAN (acute kidney injury) [N17.9]    Hospital Discharge Diagnoses: cirrhosis        Hospital Discharge Diagnoses:  cirrhosis     Lace+ Score: 78  59-90 High Risk  29-58 Medium Risk  0-28   Low Risk.    TCM Follow-Up Recommendation:  LACE > 58: High Risk of readmission after discharge from the hospital.          Lace+ Score: 78  59-90 High Risk  29-58 Medium Risk  0-28   Low Risk    Risk of readmission: Sami Grijalva Jr. has High Risk of readmission after discharge from the hospital.    Problem List:   Patient Active Problem List   Diagnosis    Chest pain of uncertain etiology    Hiatal hernia    UGIB (upper gastrointestinal bleed)    Esophagitis    Alcohol use    Alcohol withdrawal syndrome with perceptual disturbance (HCC)    Alcohol dependence, continuous (HCC)    Episodic mood disorder    USMAN (acute kidney injury)    Metabolic acidosis    Hyperkalemia    Leukocytosis    Thrombocytopenia    Coagulopathy (HCC)    Hyponatremia    Alcoholic (HCC)    Scleral icterus    Acute kidney failure    DTs (delirium tremens) (HCC)    Alcohol use disorder    Acute respiratory failure with hypoxia (HCC)    Wernicke's syndrome    Hypophosphatemia    Hypernatremia    Alcohol-induced acute pancreatitis (HCC)    Alcoholic hepatitis without ascites (HCC)       Reason for Admission: cirrhosis     Physical Exam:   General appearance: alert, appears stated age and cooperative  Pulmonary:  clear to auscultation bilaterally  Cardiovascular: S1, S2 normal, no murmur, click, rub or gallop, regular rate and rhythm  Abdominal: soft,  non-tender; bowel sounds normal; no masses,  no organomegaly  Extremities: extremities normal, atraumatic, no cyanosis or edema  Psychiatric: calm        History of Present Illness:     HISTORY OF PRESENT ILLNESS:  The patient is a 44-year-old  male who came in today to the emergency room for evaluation of intractable nausea and vomiting, poor appetite for the last few days, with hiccups.  Chemistry showed potassium 5.5, sodium 127, bicarb 15, chloride 86, anion gap 26, BUN and creatinine of 30 and 4.2, GFR 17, below his baseline.  ALT and AST of 40 and 105, alkaline phosphatase 175, and total bilirubin 10.5.  CBC showed white blood cell count of 17.5 with left shift, hemoglobin 10.1, and platelet count 65.  INR elevated at 3.86.  Urinalysis turbid and dark in color, leukocyte esterase positive.  Cultures were sent.  CT scan of the abdomen and pelvis showed severe fatty liver with subtle undulating contour, stable splenomegaly, tubular structures around the gastroesophageal junction suggestive of lower esophageal varices.  No ascites was identified.  The patient was started on IV fluids, and he will be admitted to the hospital for further management.  Also was given IV Ativan for alcohol withdrawal plus Lokelma.       Hospital Course:     Fevers.  New onset 3/23 along with leukocytosis.  Exact source unclear.    -Possible new right lower lobe pneumonia seen on CT, so Empiric antibiotics with meropenem without any change in fevers, actually fevers worsen  -Blood cultures neg  -Respiratory viral panel negative  -ID following, recommended to stop meropenem for possible drug fever, with improvement of fevers initially, but then spiked again fevers 3/27 evening, repeat culture sent, neg; afebrile now   -Leukocytosis still high, trending up&down, current ~22.8-->20k-> 12  -low grade temps ~100.9-->resolved  -monitor off abx  -CBC in the morning - wbc 12      Respiratory failure, acute hypoxemic.  Due to pneumonia,  ARDS.  Oxygen requirement is improving.  Extubated 3/17.  Using between 1 and 4 L of oxygen  -Supplemental oxygen as needed, titrate down.   -Pulmonology consultation appreciated  -Treat contributing factors as described  -Repeat chest x-ray in the morning, labs, including BNP now wnl (~83)  -Incentive spirometry  -On IV Lasix bid initially with improvement of edema and oxygenation--> with increased creatinine dose changed to daily, now ok to completely stop iv lasix-->restart in am daily oral dose    -CXR repeated reviewed, BNP low, only 83  -Currently on 2-3 L -->1 L supplemental O2-->weaned off, on RA        Acute alcoholic hepatitis.  Bilirubin stabilizing, may take a very long time to normalize  -GI following  -started on pentoxifylline 3/25 (instead of steroids given fevers and concern for underlying infection)    -Labs in the morning stable     Hiccups, on baclofen, better, decrease dose to twice daily    Anemia - chronic. Cont epo per renal      Other problems  Pancreatitis  Alcohol abuse  Cirrhosis  Hepatic steatosis  Hyponatremia  USMAN resolved, now up again, hold Lasix  Chest pain, resolved  Aspiration pneumonia, finished antibiotics  Diarrhea, better  Sinus tachycardia, due to fever          Consultations: Dr Austin, Dr Aftab Queen, Dr Davila, Dr Escobedo, Dr Boucher, Dr Medina     Procedures: egd, cscope     Complications: none    Discharge Condition: Good    Discharge Medications:      Discharge Medications        START taking these medications        Instructions Prescription details   acetaminophen 500 MG Tabs  Commonly known as: Tylenol Extra Strength      Take 1 tablet (500 mg total) by mouth every 6 (six) hours as needed for Pain or Fever.   Refills: 0     baclofen 10 MG Tabs  Commonly known as: Lioresal      Take 1 tablet (10 mg total) by mouth 3 (three) times daily. For hiccups   Quantity: 90 tablet  Refills: 0     folic acid 1 MG Tabs  Commonly known as: Folvite      Take 1 tablet (1 mg total) by mouth  daily.   Refills: 0     furosemide 20 MG Tabs  Commonly known as: Lasix      Take 1 tablet (20 mg total) by mouth daily.   Quantity: 30 tablet  Refills: 0     magnesium oxide 400 MG Tabs  Commonly known as: Mag-Ox      Take 1 tablet (400 mg total) by mouth daily.   Refills: 0     multivitamin Tabs      Take 1 tablet by mouth daily.   Refills: 0     pantoprazole 40 MG Tbec  Commonly known as: Protonix      Take 1 tablet (40 mg total) by mouth 2 (two) times daily before meals.   Quantity: 60 tablet  Refills: 0     pentoxifylline  MG Tbcr  Commonly known as: TRENTal      Take 1 tablet (400 mg total) by mouth 3 (three) times daily with meals.   Quantity: 90 tablet  Refills: 0     potassium chloride 20 MEQ Tbcr  Commonly known as: Klor-Con M20      Take 2 tablets (40 mEq total) by mouth daily.   Quantity: 60 tablet  Refills: 0     prochlorperazine 10 mg tablet  Commonly known as: Compazine      Take 1 tablet (10 mg total) by mouth every 8 (eight) hours as needed for Nausea or vomiting.   Quantity: 10 tablet  Refills: 0     temazepam 15 MG Caps  Commonly known as: Restoril      Take 1 capsule (15 mg total) by mouth nightly as needed for Sleep.   Quantity: 20 capsule  Refills: 0               Where to Get Your Medications        Please  your prescriptions at the location directed by your doctor or nurse    Bring a paper prescription for each of these medications  baclofen 10 MG Tabs  furosemide 20 MG Tabs  pantoprazole 40 MG Tbec  pentoxifylline  MG Tbcr  potassium chloride 20 MEQ Tbcr  prochlorperazine 10 mg tablet  temazepam 15 MG Caps         Follow up Visits: Follow-up with pcp dr house in 1 week    Follow up Labs: none     Other Discharge Instructions: f/u with GI 1-2 weeks  and hepatology     Kylee Soares DO  4/2/2025  10:58 AM    > 35 min

## 2025-04-02 NOTE — CM/SW NOTE
04/02/25 1033   Discharge disposition   Expected discharge disposition Rehab Facili   Post Acute Care Provider   ([facility])   Discharge transportation [facility]     SANDRA was informed by Marce at [facility] that patient is medically appropriate to discharge to [facility] today. SANDRA ordered transportation of a Medic[facility] through [facility]. [facility] will transport the pt at 2:00 PM to  [facility] form/ flow sheet completed. RN to attach and print out with After Visit Summary Packet. Patient has medicaid, therefore medicar is covered under medicaid. SANDRA was informed by [facility] Marce that their RN will be calling RN JEOVANNY. RN EM is aware of above. Family member at bedside is asking for a ride with the patient to [facility], [facility] is aware. Sandra informed family member that it is up to the attendant's personnel.     [facility] [phone]    SW/ARTEMIO to remain available for support and/or discharge planning.     Patricia Last, MSW, LSW   x [phone]

## 2025-04-02 NOTE — PLAN OF CARE
Pt cleared for discharge to rehab. Report given to RN. Pt discharging via [facility] at 1445.  Problem: Patient Centered Care  Goal: Patient preferences are identified and integrated in the patient's plan of care  Description: Interventions:  - What would you like us to know as we care for you?   - Provide timely, complete, and accurate information to patient/family  - Incorporate patient and family knowledge, values, beliefs, and cultural backgrounds into the planning and delivery of care  - Encourage patient/family to participate in care and decision-making at the level they choose  - Honor patient and family perspectives and choices  Outcome: Adequate for Discharge    Problem: SAFETY ADULT - FALL  Goal: Free from fall injury  Description: INTERVENTIONS:  - Assess pt frequently for physical needs  - Identify cognitive and physical deficits and behaviors that affect risk of falls.  - [place] fall precautions as indicated by assessment.  - Educate pt/family on patient safety including physical limitations  - Instruct pt to call for assistance with activity based on assessment  - Modify environment to reduce risk of injury  - Provide assistive devices as appropriate  - Consider OT/PT consult to assist with strengthening/mobility  - Encourage toileting schedule  Outcome: Adequate for Discharge     Problem: DISCHARGE PLANNING  Goal: Discharge to home or other facility with appropriate resources  Description: INTERVENTIONS:  - Identify barriers to discharge w/pt and caregiver  - Include patient/family/discharge partner in discharge planning  - Arrange for needed discharge resources and transportation as appropriate  - Identify discharge learning needs (meds, wound care, etc)  - Arrange for interpreters to assist at discharge as needed  - Consider post-discharge preferences of patient/family/discharge partner  - Complete POLST form as appropriate  - Assess patient's ability to be responsible for managing their  own health  - Refer to Case Management Department for coordinating discharge planning if the patient needs post-hospital services based on physician/LIP order or complex needs related to functional status, cognitive ability or social support system  Outcome: Adequate for Discharge     Problem: ALTERED NUTRIENT INTAKE - ADULT  Goal: Nutrient intake appropriate for improving, restoring or maintaining nutritional needs  Description: INTERVENTIONS:  - Assess nutritional status and recommend course of action  - Monitor oral intake, labs, and treatment plans  - Recommend appropriate diets, oral nutritional supplements, and vitamin/mineral supplements  - Recommend, monitor, and adjust tube feedings and TPN/PPN based on assessed needs  - Provide specific nutrition education as appropriate  Outcome: Adequate for Discharge     Problem: Delirium  Goal: Minimize duration of delirium  Description: Interventions:  - Encourage use of hearing aids, eye glasses  - Promote highest level of mobility daily  - Provide frequent reorientation  - Promote wakefulness i.e. lights on, blinds open  - Promote sleep, encourage patient's normal rest cycle i.e. lights off, TV off, minimize noise and interruptions  - Encourage family to assist in orientation and promotion of home routines  Outcome: Adequate for Discharge     Problem: RESPIRATORY - ADULT  Goal: Achieves optimal ventilation and oxygenation  Description: INTERVENTIONS:  - Assess for changes in respiratory status  - Assess for changes in mentation and behavior  - Position to facilitate oxygenation and minimize respiratory effort  - Oxygen supplementation based on oxygen saturation or ABGs  - Provide Smoking Cessation handout, if applicable  - Encourage broncho-pulmonary hygiene including cough, deep breathe, Incentive Spirometry  - Assess the need for suctioning and perform as needed  - Assess and instruct to report SOB or any respiratory difficulty  - Respiratory Therapy support as  indicated  - Manage/alleviate anxiety  - Monitor for signs/symptoms of CO2 retention  Outcome: Adequate for Discharge     Problem: METABOLIC/FLUID AND ELECTROLYTES - ADULT  Goal: Electrolytes maintained within normal limits  Description: INTERVENTIONS:  - Monitor labs and rhythm and assess patient for signs and symptoms of electrolyte imbalances  - Administer electrolyte replacement as ordered  - Monitor response to electrolyte replacements, including rhythm and repeat lab results as appropriate  - Fluid restriction as ordered  - Instruct patient on fluid and nutrition restrictions as appropriate  Outcome: Adequate for Discharge  Goal: Hemodynamic stability and optimal renal function maintained  Description: INTERVENTIONS:  - Monitor labs and assess for signs and symptoms of volume excess or deficit  - Monitor intake, output and patient weight  - Monitor urine specific gravity, serum osmolarity and serum sodium as indicated or ordered  - Monitor response to interventions for patient's volume status, including labs, urine output, blood pressure (other measures as available)  - Encourage oral intake as appropriate  - Instruct patient on fluid and nutrition restrictions as appropriate  Outcome: Adequate for Discharge     Problem: Impaired Swallowing  Goal: Minimize aspiration risk  Description: Interventions:  - Patient should be alert and upright for all feedings (90 degrees preferred)  - Offer food and liquids at a slow rate  - No straws  - Encourage small bites of food and small sips of liquid  - Offer pills one at a time, crush or deliver with applesauce as needed  - Discontinue feeding and notify MD (or speech pathologist) if coughing or persistent throat clearing or wet/gurgly vocal quality is noted  Outcome: Adequate for Discharge     Problem: Impaired Cognition  Goal: Patient will exhibit improved attention, thought processing and/or memory  Description: Interventions:  - Minimize distractions in the room when  full attention is required  - Allow additional time for processing after asking questions or providing instructions  Outcome: Adequate for Discharge     Problem: CARDIOVASCULAR - ADULT  Goal: Maintains optimal cardiac output and hemodynamic stability  Description: INTERVENTIONS:- Monitor vital signs, rhythm, and trends- Monitor for bleeding, hypotension and signs of decreased cardiac output- Evaluate effectiveness of vasoactive medications to optimize hemodynamic stability- Monitor arterial and/or venous puncture sites for bleeding and/or hematoma- Assess quality of pulses, skin color and temperature- Assess for signs of decreased coronary artery perfusion - ex. Angina- Evaluate fluid balance, assess for edema, trend weights  Outcome: Adequate for Discharge     Problem: PAIN - ADULT  Goal: Verbalizes/displays adequate comfort level or patient's stated pain goal  Description: INTERVENTIONS:  - Encourage pt to monitor pain and request assistance  - Assess pain using appropriate pain scale  - Administer analgesics based on type and severity of pain and evaluate response  - Implement non-pharmacological measures as appropriate and evaluate response  - Consider cultural and social influences on pain and pain management  - Manage/alleviate anxiety  - Utilize distraction and/or relaxation techniques  - Monitor for opioid side effects  - Notify MD/LIP if interventions unsuccessful or patient reports new pain  - Anticipate increased pain with activity and pre-medicate as appropriate  Outcome: Adequate for Discharge

## 2025-04-11 ENCOUNTER — ORDER TRANSCRIPTION (OUTPATIENT)
Dept: PHYSICAL THERAPY | Facility: HOSPITAL | Age: 45
End: 2025-04-11

## 2025-04-11 DIAGNOSIS — Z78.9 IMPAIRED MOBILITY AND ADLS: ICD-10-CM

## 2025-04-11 DIAGNOSIS — Z74.09 IMPAIRED MOBILITY AND ADLS: ICD-10-CM

## 2025-04-11 DIAGNOSIS — K76.82 HEPATIC ENCEPHALOPATHY (HCC): Primary | ICD-10-CM

## 2025-04-29 ENCOUNTER — OFFICE VISIT (OUTPATIENT)
Dept: INTERNAL MEDICINE CLINIC | Facility: CLINIC | Age: 45
End: 2025-04-29
Payer: MEDICAID

## 2025-04-29 VITALS
DIASTOLIC BLOOD PRESSURE: 76 MMHG | HEIGHT: 68 IN | BODY MASS INDEX: 25.76 KG/M2 | SYSTOLIC BLOOD PRESSURE: 122 MMHG | WEIGHT: 170 LBS | HEART RATE: 83 BPM

## 2025-04-29 DIAGNOSIS — D69.6 THROMBOCYTOPENIA: ICD-10-CM

## 2025-04-29 DIAGNOSIS — K70.10 ALCOHOLIC HEPATITIS WITHOUT ASCITES (HCC): ICD-10-CM

## 2025-04-29 DIAGNOSIS — F10.20 ALCOHOL DEPENDENCE, CONTINUOUS (HCC): ICD-10-CM

## 2025-04-29 DIAGNOSIS — D64.9 ANEMIA, UNSPECIFIED TYPE: ICD-10-CM

## 2025-04-29 DIAGNOSIS — Z00.00 ANNUAL PHYSICAL EXAM: Primary | ICD-10-CM

## 2025-04-29 PROCEDURE — 99396 PREV VISIT EST AGE 40-64: CPT | Performed by: INTERNAL MEDICINE

## 2025-04-29 RX ORDER — GABAPENTIN 300 MG/1
300 CAPSULE ORAL 3 TIMES DAILY
COMMUNITY
Start: 2025-04-11

## 2025-04-29 NOTE — PROGRESS NOTES
Sami Grijalva Jr. is a 44 year old male.  Chief Complaint   Patient presents with    Physical     HPI:   Patient presented today for annual physical.  He was recently discharged from the hospital after he was admitted with alcohol intoxication and, alcohol withdrawal elevated liver enzymes, aspiration pneumonia ARDS requiring intubation, alcoholic hepatitis.  Once stable patient was discharged to acute rehab hospital from where he was discharged on April 13.  Last alcohol intake was March 3rd.  He states that he was told by the doctors that his liver cannot tolerate any more alcohol therefore he is trying to stay away from it.   He denies any other complaints at this time.  Denies any abdominal pain distention nausea vomiting.  Hospital chart reviewed    Current Medications[1]   Past Medical History[2]   Past Surgical History[3]   Social History:  Short Social Hx on File[4]   Family History[5]   Allergies[6]     REVIEW OF SYSTEMS:   Review of Systems   Review of Systems   Constitutional: Negative for activity change, appetite change and fever.   HENT: Negative for congestion and voice change.    Respiratory: Negative for cough and shortness of breath.    Cardiovascular: Negative for chest pain.   Gastrointestinal: Negative for abdominal distention, abdominal pain and vomiting.   Genitourinary: Negative for hematuria.   Skin: Negative for wound.   Psychiatric/Behavioral: Negative for behavioral problems.   Wt Readings from Last 5 Encounters:   04/29/25 170 lb (77.1 kg)   03/27/25 188 lb 14.4 oz (85.7 kg)   03/27/24 170 lb (77.1 kg)   12/26/23 198 lb (89.8 kg)   11/01/23 180 lb (81.6 kg)     Body mass index is 25.85 kg/m².      EXAM:   /76   Pulse 83   Ht 5' 8\" (1.727 m)   Wt 170 lb (77.1 kg)   BMI 25.85 kg/m²   Physical Exam   Constitutional:       Appearance: Normal appearance.   HENT:      Head: Normocephalic.   Eyes:      Conjunctiva/sclera: Conjunctivae normal.   Breast:  Normal bilateral breast exam. No  palpable masses or nodules.   No nipples asymmetry or discharge. No skin changes   Cardiovascular:      Rate and Rhythm: Normal rate and regular rhythm.      Heart sounds: Normal heart sounds. No murmur heard.  Pulmonary:      Effort: Pulmonary effort is normal.      Breath sounds: Normal breath sounds. No rhonchi or rales.   Abdominal:      General: Bowel sounds are normal.      Palpations: Abdomen is soft.      Tenderness: There is no abdominal tenderness.   Musculoskeletal:      Cervical back: Neck supple.      Right lower leg: No edema.      Left lower leg: No edema.   Skin:     General: Skin is warm and dry.   Neurological:      General: No focal deficit present.      Mental Status: He is alert and oriented to person, place, and time. Mental status is at baseline.   Psychiatric:         Mood and Affect: Mood normal.         Behavior: Behavior normal.       ASSESSMENT AND PLAN:     Assessment & Plan  Annual physical exam  - check annual fasting labs  - immunizations reviewed  - general diet and exercise counseling   Orders:    Comp Metabolic Panel (14); Future    TSH W Reflex To Free T4; Future    Lipid Panel; Future    CBC With Differential With Platelet; Future    Anemia, unspecified type  - check levels  - check hgb again   Orders:    Folic Acid Serum [E]; Future    Ferritin [E]; Future    Iron And Tibc [E]; Future    Vitamin B12; Future    Alcohol dependence, continuous (HCC)  - patient has currently been off alcohol and states that he has been going to an outpatient alcohol rehab        Thrombocytopenia  - check labs        Alcoholic hepatitis without ascites (HCC)  - on pentoxifylline  - follow up with Gi         The patient indicates understanding of these issues and agrees to the plan.  Follow up in 3 months     Crista Guerrier MD     This note was created by Dragon voice recognition. Errors in content may be related to improper recognition by the system; efforts to review and correct have been done  but errors may still exist. Please be advised the primary purpose of this note is for me to communicate medical care. Standard sentence structure is not always used. Medical terminology and medical abbreviations may be used. There may be grammatical, typographical, and automated fill ins that may have errors missed in proofreading.          [1]   Current Outpatient Medications   Medication Sig Dispense Refill    gabapentin 300 MG Oral Cap Take 1 capsule (300 mg total) by mouth 3 (three) times daily.      baclofen 10 MG Oral Tab Take 1 tablet (10 mg total) by mouth 3 (three) times daily. For hiccups 90 tablet 0    folic acid 1 MG Oral Tab Take 1 tablet (1 mg total) by mouth daily.      furosemide 20 MG Oral Tab Take 1 tablet (20 mg total) by mouth daily. 30 tablet 0    multivitamin Oral Tab Take 1 tablet by mouth daily.      pantoprazole 40 MG Oral Tab EC Take 1 tablet (40 mg total) by mouth 2 (two) times daily before meals. 60 tablet 0    pentoxifylline  MG Oral Tab CR Take 1 tablet (400 mg total) by mouth 3 (three) times daily with meals. 90 tablet 0    potassium chloride 20 MEQ Oral Tab CR Take 2 tablets (40 mEq total) by mouth daily. 60 tablet 0    acetaminophen 500 MG Oral Tab Take 1 tablet (500 mg total) by mouth every 6 (six) hours as needed for Pain or Fever. (Patient not taking: Reported on 4/29/2025)      magnesium oxide 400 MG Oral Tab Take 1 tablet (400 mg total) by mouth daily. (Patient not taking: Reported on 4/29/2025)      temazepam 15 MG Oral Cap Take 1 capsule (15 mg total) by mouth nightly as needed for Sleep. (Patient not taking: Reported on 4/29/2025) 20 capsule 0    prochlorperazine (COMPAZINE) 10 mg tablet Take 1 tablet (10 mg total) by mouth every 8 (eight) hours as needed for Nausea or vomiting. (Patient not taking: Reported on 4/29/2025) 10 tablet 0   [2]   Past Medical History:   Esophageal reflux    Gout    Hernia   [3]   Past Surgical History:  Procedure Laterality Date     Colonoscopy N/A 11/1/2023    Procedure: COLONOSCOPY;  Surgeon: Vandana Austin MD;  Location: [facility] ENDOSCOPY    Egd  02/15/2016    Eh pr repair complex scalp arms legs 1.1 to 2.5 cm  2014    Elbow fracture surgery Right 1991    Hernia surgery      Inguinal hernia repair Left 01/17/2023   [4]   Social History  Socioeconomic History    Marital status: Single    Number of children: 0   Occupational History    Occupation: Supervisor, car [employer]   Tobacco Use    Smoking status: Former     Types: Cigarettes    Smokeless tobacco: Never   Vaping Use    Vaping status: Some Days   Substance and Sexual Activity    Alcohol use: Yes     Comment: per pt, DAILY HARD LEMONADE- [employer] hard lemonade 24oz cans, 6 cans daily    Drug use: Yes     Types: Cannabis     Comment: last use, couple months ago per pt report     Social Drivers of Health     Food Insecurity: No Food Insecurity (4/2/2025)    Received from [facility] - Food Insecurity     Worried About Running Out of Food in the Last Year: No     Ran Out of Food in the Last Year: No   Transportation Needs: Unmet Transportation Needs (4/2/2025)    [facility]     Lack of Transportation: Yes   Housing Stability: Not At Risk (4/2/2025)    Received from [facility] - Housing/Utilities     Has Housing: Yes     Worried About Losing Housing: No     Unable to Get Utilities: No   [5]   Family History  Problem Relation Age of Onset    Diabetes Father     Hypertension Father     Cancer Mother         liver   [6]   Allergies  Allergen Reactions    Morphine SWELLING     SHORTNESS OF BREATH

## 2025-04-30 NOTE — ASSESSMENT & PLAN NOTE
- patient has currently been off alcohol and states that he has been going to an outpatient alcohol rehab

## 2025-05-01 ENCOUNTER — LAB ENCOUNTER (OUTPATIENT)
Dept: LAB | Age: 45
End: 2025-05-01
Attending: INTERNAL MEDICINE
Payer: MEDICAID

## 2025-05-01 ENCOUNTER — APPOINTMENT (OUTPATIENT)
Dept: LAB | Age: 45
End: 2025-05-01
Attending: INTERNAL MEDICINE
Payer: MEDICAID

## 2025-05-01 DIAGNOSIS — D64.9 ANEMIA, UNSPECIFIED TYPE: ICD-10-CM

## 2025-05-01 DIAGNOSIS — Z00.00 ANNUAL PHYSICAL EXAM: ICD-10-CM

## 2025-05-01 LAB
ALBUMIN SERPL-MCNC: 3.6 G/DL (ref 3.2–4.8)
ALBUMIN/GLOB SERPL: 0.9 {RATIO} (ref 1–2)
ALP LIVER SERPL-CCNC: 177 U/L (ref 45–117)
ALT SERPL-CCNC: 25 U/L (ref 10–49)
ANION GAP SERPL CALC-SCNC: 10 MMOL/L (ref 0–18)
AST SERPL-CCNC: 51 U/L (ref ?–34)
BASOPHILS # BLD AUTO: 0.03 X10(3) UL (ref 0–0.2)
BASOPHILS NFR BLD AUTO: 0.7 %
BILIRUB SERPL-MCNC: 4.6 MG/DL (ref 0.3–1.2)
BUN BLD-MCNC: 12 MG/DL (ref 9–23)
BUN/CREAT SERPL: 10.5 (ref 10–20)
CALCIUM BLD-MCNC: 9.3 MG/DL (ref 8.7–10.4)
CHLORIDE SERPL-SCNC: 104 MMOL/L (ref 98–112)
CHOLEST SERPL-MCNC: 166 MG/DL (ref ?–200)
CO2 SERPL-SCNC: 24 MMOL/L (ref 21–32)
CREAT BLD-MCNC: 1.14 MG/DL (ref 0.7–1.3)
DEPRECATED HBV CORE AB SER IA-ACNC: 35 NG/ML (ref 50–336)
DEPRECATED RDW RBC AUTO: 56.7 FL (ref 35.1–46.3)
EGFRCR SERPLBLD CKD-EPI 2021: 80 ML/MIN/1.73M2 (ref 60–?)
EOSINOPHIL # BLD AUTO: 0.04 X10(3) UL (ref 0–0.7)
EOSINOPHIL NFR BLD AUTO: 0.9 %
ERYTHROCYTE [DISTWIDTH] IN BLOOD BY AUTOMATED COUNT: 16.8 % (ref 11–15)
FASTING PATIENT LIPID ANSWER: NO
FASTING STATUS PATIENT QL REPORTED: NO
GLOBULIN PLAS-MCNC: 3.9 G/DL (ref 2–3.5)
GLUCOSE BLD-MCNC: 98 MG/DL (ref 70–99)
HCT VFR BLD AUTO: 28.6 % (ref 39–53)
HDLC SERPL-MCNC: 54 MG/DL (ref 40–59)
HGB BLD-MCNC: 9.1 G/DL (ref 13–17.5)
IMM GRANULOCYTES # BLD AUTO: 0.01 X10(3) UL (ref 0–1)
IMM GRANULOCYTES NFR BLD: 0.2 %
IRON SATN MFR SERPL: 31 % (ref 20–50)
IRON SERPL-MCNC: 104 UG/DL (ref 65–175)
LDLC SERPL CALC-MCNC: 97 MG/DL (ref ?–100)
LYMPHOCYTES # BLD AUTO: 1.4 X10(3) UL (ref 1–4)
LYMPHOCYTES NFR BLD AUTO: 32 %
MCH RBC QN AUTO: 29.9 PG (ref 26–34)
MCHC RBC AUTO-ENTMCNC: 31.8 G/DL (ref 31–37)
MCV RBC AUTO: 94.1 FL (ref 80–100)
MONOCYTES # BLD AUTO: 0.35 X10(3) UL (ref 0.1–1)
MONOCYTES NFR BLD AUTO: 8 %
NEUTROPHILS # BLD AUTO: 2.55 X10 (3) UL (ref 1.5–7.7)
NEUTROPHILS # BLD AUTO: 2.55 X10(3) UL (ref 1.5–7.7)
NEUTROPHILS NFR BLD AUTO: 58.2 %
NONHDLC SERPL-MCNC: 112 MG/DL (ref ?–130)
OSMOLALITY SERPL CALC.SUM OF ELEC: 286 MOSM/KG (ref 275–295)
PLATELET # BLD AUTO: 86 10(3)UL (ref 150–450)
PLATELET MORPHOLOGY: NORMAL
PLATELETS.RETICULATED NFR BLD AUTO: 7.1 % (ref 0–7)
POTASSIUM SERPL-SCNC: 3.4 MMOL/L (ref 3.5–5.1)
PROT SERPL-MCNC: 7.5 G/DL (ref 5.7–8.2)
RBC # BLD AUTO: 3.04 X10(6)UL (ref 4.3–5.7)
SODIUM SERPL-SCNC: 138 MMOL/L (ref 136–145)
TOTAL IRON BINDING CAPACITY: 334 UG/DL (ref 250–425)
TRANSFERRIN SERPL-MCNC: 261 MG/DL (ref 215–365)
TRIGL SERPL-MCNC: 82 MG/DL (ref 30–149)
TSI SER-ACNC: 1.11 UIU/ML (ref 0.55–4.78)
VLDLC SERPL CALC-MCNC: 13 MG/DL (ref 0–30)
WBC # BLD AUTO: 4.4 X10(3) UL (ref 4–11)

## 2025-05-01 PROCEDURE — 80053 COMPREHEN METABOLIC PANEL: CPT

## 2025-05-01 PROCEDURE — 84466 ASSAY OF TRANSFERRIN: CPT

## 2025-05-01 PROCEDURE — 82746 ASSAY OF FOLIC ACID SERUM: CPT

## 2025-05-01 PROCEDURE — 82607 VITAMIN B-12: CPT

## 2025-05-01 PROCEDURE — 84443 ASSAY THYROID STIM HORMONE: CPT

## 2025-05-01 PROCEDURE — 80061 LIPID PANEL: CPT

## 2025-05-01 PROCEDURE — 36415 COLL VENOUS BLD VENIPUNCTURE: CPT

## 2025-05-01 PROCEDURE — 85025 COMPLETE CBC W/AUTO DIFF WBC: CPT

## 2025-05-01 PROCEDURE — 83540 ASSAY OF IRON: CPT

## 2025-05-01 PROCEDURE — 82728 ASSAY OF FERRITIN: CPT

## 2025-05-02 LAB
FOLATE SERPL-MCNC: 43 NG/ML (ref 5.4–?)
VIT B12 SERPL-MCNC: 1036 PG/ML (ref 211–911)

## 2025-05-13 ENCOUNTER — PATIENT MESSAGE (OUTPATIENT)
Dept: INTERNAL MEDICINE CLINIC | Facility: CLINIC | Age: 45
End: 2025-05-13

## 2025-05-13 NOTE — TELEPHONE ENCOUNTER
Per chart review patient should continue with the furosemide.    Assessment & Plan  Alcoholic hepatitis without ascites (HCC)  - Liver enzymes are improving  -Alcohol abstinence discussed  -Continue with pentoxifylline  -Follow-up with gastroenterology  -Check labs next week  - Although no clinical ascites on exam, will continue with lasix for now, given evidence of ascites at presentation to the hospital.   Orders:    pentoxifylline  MG Oral Tab CR; Take 1 tablet (400 mg total) by mouth 3 (three) times daily with meals.    Gastro Referral - In Network    Comp Metabolic Panel (14); Future

## 2025-05-21 ENCOUNTER — LAB ENCOUNTER (OUTPATIENT)
Dept: LAB | Age: 45
End: 2025-05-21
Attending: INTERNAL MEDICINE
Payer: MEDICAID

## 2025-05-21 DIAGNOSIS — D69.6 THROMBOCYTOPENIA: ICD-10-CM

## 2025-05-21 DIAGNOSIS — K70.10 ALCOHOLIC HEPATITIS WITHOUT ASCITES (HCC): ICD-10-CM

## 2025-05-21 LAB
ALBUMIN SERPL-MCNC: 4 G/DL (ref 3.2–4.8)
ALBUMIN/GLOB SERPL: 1.3 {RATIO} (ref 1–2)
ALP LIVER SERPL-CCNC: 155 U/L (ref 45–117)
ALT SERPL-CCNC: 15 U/L (ref 10–49)
ANION GAP SERPL CALC-SCNC: 12 MMOL/L (ref 0–18)
AST SERPL-CCNC: 35 U/L (ref ?–34)
BASOPHILS # BLD AUTO: 0.03 X10(3) UL (ref 0–0.2)
BASOPHILS NFR BLD AUTO: 0.9 %
BILIRUB SERPL-MCNC: 2.5 MG/DL (ref 0.3–1.2)
BUN BLD-MCNC: 10 MG/DL (ref 9–23)
BUN/CREAT SERPL: 8.5 (ref 10–20)
CALCIUM BLD-MCNC: 9.6 MG/DL (ref 8.7–10.4)
CHLORIDE SERPL-SCNC: 104 MMOL/L (ref 98–112)
CO2 SERPL-SCNC: 24 MMOL/L (ref 21–32)
CREAT BLD-MCNC: 1.18 MG/DL (ref 0.7–1.3)
DEPRECATED RDW RBC AUTO: 45.3 FL (ref 35.1–46.3)
EGFRCR SERPLBLD CKD-EPI 2021: 77 ML/MIN/1.73M2 (ref 60–?)
EOSINOPHIL # BLD AUTO: 0.07 X10(3) UL (ref 0–0.7)
EOSINOPHIL NFR BLD AUTO: 2.1 %
ERYTHROCYTE [DISTWIDTH] IN BLOOD BY AUTOMATED COUNT: 13.7 % (ref 11–15)
FASTING STATUS PATIENT QL REPORTED: NO
GLOBULIN PLAS-MCNC: 3.2 G/DL (ref 2–3.5)
GLUCOSE BLD-MCNC: 96 MG/DL (ref 70–99)
HCT VFR BLD AUTO: 30.2 % (ref 39–53)
HGB BLD-MCNC: 9.9 G/DL (ref 13–17.5)
IMM GRANULOCYTES # BLD AUTO: 0.01 X10(3) UL (ref 0–1)
IMM GRANULOCYTES NFR BLD: 0.3 %
LYMPHOCYTES # BLD AUTO: 1.4 X10(3) UL (ref 1–4)
LYMPHOCYTES NFR BLD AUTO: 42.9 %
MCH RBC QN AUTO: 29.6 PG (ref 26–34)
MCHC RBC AUTO-ENTMCNC: 32.8 G/DL (ref 31–37)
MCV RBC AUTO: 90.4 FL (ref 80–100)
MONOCYTES # BLD AUTO: 0.27 X10(3) UL (ref 0.1–1)
MONOCYTES NFR BLD AUTO: 8.3 %
NEUTROPHILS # BLD AUTO: 1.48 X10 (3) UL (ref 1.5–7.7)
NEUTROPHILS # BLD AUTO: 1.48 X10(3) UL (ref 1.5–7.7)
NEUTROPHILS NFR BLD AUTO: 45.5 %
OSMOLALITY SERPL CALC.SUM OF ELEC: 289 MOSM/KG (ref 275–295)
PLATELET # BLD AUTO: 81 10(3)UL (ref 150–450)
PLATELETS.RETICULATED NFR BLD AUTO: 9.1 % (ref 0–7)
POTASSIUM SERPL-SCNC: 3.6 MMOL/L (ref 3.5–5.1)
PROT SERPL-MCNC: 7.2 G/DL (ref 5.7–8.2)
RBC # BLD AUTO: 3.34 X10(6)UL (ref 4.3–5.7)
SODIUM SERPL-SCNC: 140 MMOL/L (ref 136–145)
WBC # BLD AUTO: 3.3 X10(3) UL (ref 4–11)

## 2025-05-21 PROCEDURE — 85025 COMPLETE CBC W/AUTO DIFF WBC: CPT

## 2025-05-21 PROCEDURE — 80053 COMPREHEN METABOLIC PANEL: CPT

## 2025-05-21 PROCEDURE — 36415 COLL VENOUS BLD VENIPUNCTURE: CPT

## 2025-05-29 RX ORDER — FUROSEMIDE 20 MG/1
20 TABLET ORAL DAILY
Qty: 90 TABLET | Refills: 3 | OUTPATIENT
Start: 2025-05-29

## 2025-05-29 NOTE — TELEPHONE ENCOUNTER
Disp Refills Start End    furosemide 20 MG Oral Tab 90 tablet 3 5/12/2025 --    Sig - Route: Take 1 tablet (20 mg total) by mouth daily. - Oral    Sent to pharmacy as: Furosemide 20 MG Oral Tablet (Lasix)    E-Prescribing Status: Receipt confirmed by pharmacy (5/12/2025  3:11 PM CDT)      Pharmacy    [id number] - [place], [place] - [address]  AT [facility], [phone], [phone]

## 2025-06-06 DIAGNOSIS — K70.10 ALCOHOLIC HEPATITIS WITHOUT ASCITES (HCC): ICD-10-CM

## 2025-06-09 RX ORDER — PENTOXIFYLLINE 400 MG/1
400 TABLET, EXTENDED RELEASE ORAL
Qty: 90 TABLET | Refills: 3 | Status: SHIPPED | OUTPATIENT
Start: 2025-06-09

## 2025-06-09 RX ORDER — PENTOXIFYLLINE 400 MG/1
400 TABLET, EXTENDED RELEASE ORAL
Qty: 90 TABLET | Refills: 0 | Status: SHIPPED | OUTPATIENT
Start: 2025-06-09 | End: 2025-06-09

## 2025-06-10 ENCOUNTER — TELEPHONE (OUTPATIENT)
Facility: CLINIC | Age: 45
End: 2025-06-10

## 2025-06-10 ENCOUNTER — LAB ENCOUNTER (OUTPATIENT)
Dept: LAB | Facility: HOSPITAL | Age: 45
End: 2025-06-10
Attending: NURSE PRACTITIONER
Payer: MEDICAID

## 2025-06-10 DIAGNOSIS — K20.80 ESOPHAGITIS, LOS ANGELES GRADE C: ICD-10-CM

## 2025-06-10 DIAGNOSIS — K70.10 ACUTE ALCOHOLIC HEPATITIS (HCC): ICD-10-CM

## 2025-06-10 DIAGNOSIS — K74.60 CIRRHOSIS OF LIVER WITHOUT ASCITES, UNSPECIFIED HEPATIC CIRRHOSIS TYPE (HCC): Primary | ICD-10-CM

## 2025-06-10 DIAGNOSIS — K74.60 CIRRHOSIS OF LIVER WITHOUT ASCITES, UNSPECIFIED HEPATIC CIRRHOSIS TYPE (HCC): ICD-10-CM

## 2025-06-10 LAB
ALBUMIN SERPL-MCNC: 4.1 G/DL (ref 3.2–4.8)
ALP LIVER SERPL-CCNC: 129 U/L (ref 45–117)
ALT SERPL-CCNC: 11 U/L (ref 10–49)
AST SERPL-CCNC: 28 U/L (ref ?–34)
BILIRUB DIRECT SERPL-MCNC: 1 MG/DL (ref ?–0.3)
BILIRUB SERPL-MCNC: 1.7 MG/DL (ref 0.3–1.2)
PROT SERPL-MCNC: 7.2 G/DL (ref 5.7–8.2)

## 2025-06-10 PROCEDURE — 36415 COLL VENOUS BLD VENIPUNCTURE: CPT

## 2025-06-10 PROCEDURE — 80076 HEPATIC FUNCTION PANEL: CPT

## 2025-06-10 NOTE — TELEPHONE ENCOUNTER
Scheduled for:  EGD [id number]   Provider Name:  Dr. Lara   Date:  9/23/2025   Location:    [facility]  Sedation:  Mac  Time:  2:25 (pt is aware that ENDO will call the day before to confirm arrival time)  Prep:  NPO after midnight   Meds/Allergies Reconciled?:  Physician reviewed   Diagnosis with codes:  cirrhosis K74.60  , grade c esophagitis K20.80   Was patient informed to call insurance with codes (Y/N):  Yes, I confirmed Medicaid insurance with the patient.   Referral sent?:  Referral was sent at the time of electronic surgical scheduling.  EM or [facility] notified?:  I sent an electronic request to Endo Scheduling and received a confirmation today.   Medication Orders:  This patient verbally confirmed that he is not taking:   Iron, blood thinners, BP meds, and is not diabetic   Not latex allergy, Not PCN allergy and does not have a pacemaker  Misc Orders:  I discussed the prep instructions with the patient which he verbally understood and is aware that I will mychart  the instructions today.    Further instructions given by staff:

## 2025-06-10 NOTE — TELEPHONE ENCOUNTER
Patient was seen in office today. Patient was provided prep instruction sheet. Informed patient he/she will receive a phone call to schedule procedure(s) and he/she verbalized understanding. Please schedule patient per below orders. Thank you!       Orders:  Pancho w/ mac w/ gen pool md  Dx: cirrhosis, grade c esophagitis

## 2025-06-11 RX ORDER — PANTOPRAZOLE SODIUM 40 MG/1
40 TABLET, DELAYED RELEASE ORAL
Qty: 180 TABLET | Refills: 3 | Status: SHIPPED | OUTPATIENT
Start: 2025-06-11

## 2025-06-12 ENCOUNTER — LAB ENCOUNTER (OUTPATIENT)
Dept: LAB | Age: 45
End: 2025-06-12
Attending: NURSE PRACTITIONER
Payer: MEDICAID

## 2025-06-14 ENCOUNTER — LAB ENCOUNTER (OUTPATIENT)
Dept: LAB | Age: 45
End: 2025-06-14
Attending: NURSE PRACTITIONER
Payer: MEDICAID

## 2025-06-14 DIAGNOSIS — K74.60 CIRRHOSIS OF LIVER WITHOUT ASCITES, UNSPECIFIED HEPATIC CIRRHOSIS TYPE (HCC): ICD-10-CM

## 2025-06-14 DIAGNOSIS — K70.10 ACUTE ALCOHOLIC HEPATITIS (HCC): ICD-10-CM

## 2025-06-14 PROCEDURE — 82525 ASSAY OF COPPER: CPT

## 2025-06-19 LAB
COPPER 24H UR: 7 UG/24 HR
COPPER UR: 8 UG/L
COPPER/CRT RATIO: 8 UG/G CREAT
CREATININE UR: 0.97 G/L

## 2025-06-26 ENCOUNTER — TELEPHONE (OUTPATIENT)
Facility: CLINIC | Age: 45
End: 2025-06-26

## 2025-06-26 NOTE — TELEPHONE ENCOUNTER
I contacted the pt with the results of his urine testing. Low suspicion for Hansel's. Do recommend hepatology consult to consider biopsy sampling, etc.    Detailed message left for the pt with the above results and recommendations.

## 2025-08-16 ENCOUNTER — PATIENT MESSAGE (OUTPATIENT)
Dept: INTERNAL MEDICINE CLINIC | Facility: CLINIC | Age: 45
End: 2025-08-16

## 2025-08-18 ENCOUNTER — TELEPHONE (OUTPATIENT)
Facility: CLINIC | Age: 45
End: 2025-08-18

## 2025-08-21 ENCOUNTER — PATIENT MESSAGE (OUTPATIENT)
Dept: INTERNAL MEDICINE CLINIC | Facility: CLINIC | Age: 45
End: 2025-08-21

## 2025-08-21 RX ORDER — OMEPRAZOLE 20 MG/1
20 CAPSULE, DELAYED RELEASE ORAL EVERY MORNING
Qty: 90 CAPSULE | Refills: 3 | Status: SHIPPED | OUTPATIENT
Start: 2025-08-21

## (undated) DEVICE — REM POLYHESIVE ADULT PATIENT RETURN ELECTRODE: Brand: VALLEYLAB

## (undated) DEVICE — CLOSURE EXOFIN 1.0ML

## (undated) DEVICE — YANKAUER SUCTION INSTRUMENT NO CONTROL VENT, BULB TIP, CLEAR: Brand: YANKAUER

## (undated) DEVICE — CONMED SCOPE SAVER BITE BLOCK, 20X27 MM: Brand: SCOPE SAVER

## (undated) DEVICE — SPONGE: SPECIALTY PEANUT XR 100/CS: Brand: MEDICAL ACTION INDUSTRIES

## (undated) DEVICE — KIT ENDO ORCAPOD 160/180/190

## (undated) DEVICE — FORCEPS BX L240CM DIA2.4MM L NDL RAD JAW 4

## (undated) DEVICE — VIOLET BRAIDED (POLYGLACTIN 910), SYNTHETIC ABSORBABLE SUTURE: Brand: COATED VICRYL

## (undated) DEVICE — SNARE CAPTIFLEX MICRO-OVL OLY

## (undated) DEVICE — PENROSE TUBING RADIOPAQUE: Brand: ARGYLE

## (undated) DEVICE — Device: Brand: DUAL NARE NASAL CANNULAE FEMALE LUER CON 7FT O2 TUBE

## (undated) DEVICE — ENCORE® LATEX MICRO SIZE 8, STERILE LATEX POWDER-FREE SURGICAL GLOVE: Brand: ENCORE

## (undated) DEVICE — KIT CLEAN ENDOKIT 1.1OZ GOWNX2

## (undated) DEVICE — MEDI-VAC NON-CONDUCTIVE SUCTION TUBING: Brand: CARDINAL HEALTH

## (undated) DEVICE — CLIP ENDOSCP WRK L235CM CHN 2.8MM OPN 11MM

## (undated) DEVICE — 60 ML SYRINGE REGULAR TIP: Brand: MONOJECT

## (undated) DEVICE — SOL NACL IRRIG 0.9% 1000ML BTL

## (undated) DEVICE — DRAPE SHEET TRANSVERSE LAP

## (undated) DEVICE — UNDYED BRAIDED (POLYGLACTIN 910), SYNTHETIC ABSORBABLE SUTURE: Brand: COATED VICRYL

## (undated) DEVICE — MINOR GENERAL: Brand: MEDLINE INDUSTRIES, INC.

## (undated) DEVICE — ENCORE® LATEX ACCLAIM SIZE 8, STERILE LATEX POWDER-FREE SURGICAL GLOVE: Brand: ENCORE

## (undated) DEVICE — GOWN SURG AERO BLUE PERF LG

## (undated) DEVICE — SNARE OPTMZ PLPCTM TRP

## (undated) DEVICE — MEDI-VAC NON-CONDUCTIVE SUCTION TUBING 6MM X 1.8M (6FT.) L: Brand: CARDINAL HEALTH

## (undated) DEVICE — SKIN PREP TRAY 4 COMPARTM TRAY: Brand: MEDLINE INDUSTRIES, INC.

## (undated) DEVICE — INTENDED FOR TISSUE SEPARATION, AND OTHER PROCEDURES THAT REQUIRE A SHARP SURGICAL BLADE TO PUNCTURE OR CUT.: Brand: BARD-PARKER ® STAINLESS STEEL BLADES

## (undated) NOTE — ED AVS SNAPSHOT
Rylie Mercado   MRN: [id number]    Department:  [facility]   Date of Visit:  8/20/2017           Disclosure     Insurance plans vary and the physician(s) referred by the ER may not be covered by your plan.  Please contact your CARE PHYSICIAN AT ONCE OR RETURN IMMEDIATELY TO THE EMERGENCY DEPARTMENT. If you have been prescribed any medication(s), please fill your prescription right away and begin taking the medication(s) as directed.   If you believe that any of the medications

## (undated) NOTE — ED AVS SNAPSHOT
Shree Lane   MRN: [id number]    Department:  [facility] Emergency Department   Date of Visit:  3/13/2019           Disclosure     Insurance plans vary and the physician(s) referred by the ER may not be covered by your plan.  Please contact your CARE PHYSICIAN AT ONCE OR RETURN IMMEDIATELY TO THE EMERGENCY DEPARTMENT. If you have been prescribed any medication(s), please fill your prescription right away and begin taking the medication(s) as directed.   If you believe that any of the medications

## (undated) NOTE — ED AVS SNAPSHOT
Gonzalo Goldstein   MRN: [id number]    Department:  [facility] Emergency Department   Date of Visit:  1/28/2018           Disclosure     Insurance plans vary and the physician(s) referred by the ER may not be covered by your plan.  Please contact your CARE PHYSICIAN AT ONCE OR RETURN IMMEDIATELY TO THE EMERGENCY DEPARTMENT. If you have been prescribed any medication(s), please fill your prescription right away and begin taking the medication(s) as directed.   If you believe that any of the medications

## (undated) NOTE — LETTER
Date & Time: 9/20/2021, 3:43 PM  Patient: Rylie Mercado  Encounter Provider(s):    MARIA ISABEL Ruiz       To Whom It May Concern:    Femi Limfidencio was seen and treated in our department on 9/20/2021.  He may return to work on 09-      If you have

## (undated) NOTE — LETTER
Date & Time: 8/27/2023, 3:48 PM  Patient: Batsheva Willis. Encounter Provider(s):    MD Clara Rico MD Gennette Cerise, MD       To Whom It May Concern:    Lia Calvillo was seen and treated in our department on 8/13/2023 until 8/27/2023.      If you have any questions or concerns, please do not hesitate to call.        _____________________________  Physician/APC Signature

## (undated) NOTE — ED AVS SNAPSHOT
Shree Lane   MRN: [id number]    Department:  [facility] Emergency Department   Date of Visit:  2/29/2020           Disclosure     Insurance plans vary and the physician(s) referred by the ER may not be covered by your plan.  Please contact your CARE PHYSICIAN AT ONCE OR RETURN IMMEDIATELY TO THE EMERGENCY DEPARTMENT. If you have been prescribed any medication(s), please fill your prescription right away and begin taking the medication(s) as directed.   If you believe that any of the medications

## (undated) NOTE — LETTER
Date & Time: 2/18/2022, 6:38 PM  Patient: Lanre Leak  Encounter Provider(s):    Mercedes Wyatt MD       To Whom It May Concern:    Jules Rodriguez was seen and treated in our department on 2/18/2022. He should not return to work until Monday February 21, 2022.     If you have any questions or concerns, please do not hesitate to call.        _____________________________  Physician/APC Signature

## (undated) NOTE — ED AVS SNAPSHOT
[facility] Emergency Department    [address]     [address] [place] [address]    Phone:  [phone]    Fax:  [phone] [facility]   MRN: [id number]    Department:  [facility] Emergency Department   Date of Visit:  1/31/201 and Class Registration line at [phone] or find a doctor online by visiting [email or web address].    IF THERE IS ANY CHANGE OR WORSENING OF YOUR CONDITION, CALL YOUR PRIMARY CARE PHYSICIAN AT ONCE OR RETURN IMMEDIATELY TO [address] [place] [address].     If

## (undated) NOTE — LETTER
[address] [place], [place]  Authorization for Surgical Operation and Procedure                                                                                           I hereby authorize Baljit Ch MD, my physician and his/her assistants (if applicable), which may include medical students, residents, and/or fellows, to perform the following surgical operation/ procedure and administer such anesthesia as may be determined necessary by my physician: Operation/Procedure name (s) ESOPHAGOGASTRODUODENOSCOPY (EGD) on [address].   2.   I recognize that during the surgical operation/procedure, unforeseen conditions may necessitate additional or different procedures than those listed above. I, therefore, further authorize and request that the above-named surgeon, assistants, or designees perform such procedures as are, in their judgment, necessary and desirable. 3.   My surgeon/physician has discussed prior to my surgery the potential benefits, risks and side effects of this procedure; the likelihood of achieving goals; and potential problems that might occur during recuperation. They also discussed reasonable alternatives to the procedure, including risks, benefits, and side effects related to the alternatives and risks related to not receiving this procedure. I have had all my questions answered and I acknowledge that no guarantee has been made as to the result that may be obtained. 4.   Should the need arise during my operation/procedure, which includes change of level of care prior to discharge, I also consent to the administration of blood and/or blood products. Further, I understand that despite careful testing and screening of blood or blood products by collecting agencies, I may still be subject to ill effects as a result of receiving a blood transfusion and/or blood products.   The following are some, but not all, of the potential risks that can occur: fever and allergic reactions, hemolytic reactions, transmission of diseases such as Hepatitis, AIDS and Cytomegalovirus (CMV) and fluid overload. In the event that I wish to have an autologous transfusion of my own blood, or a directed donor transfusion, I will discuss this with my physician. Check only if Refusing Blood or Blood Products  I understand refusal of blood or blood products as deemed necessary by my physician may have serious consequences to my condition to include possible death. I hereby assume responsibility for my refusal and release the hospital, its personnel, and my physicians from any responsibility for the consequences of my refusal.    o  Refuse   5. I authorize the use of any specimen, organs, tissues, body parts or foreign objects that may be removed from my body during the operation/procedure for diagnosis, research or teaching purposes and their subsequent disposal by hospital authorities. I also authorize the release of specimen test results and/or written reports to my treating physician on the hospital medical staff or other referring or consulting physicians involved in my care, at the discretion of the Pathologist or my treating physician. 6.   I consent to the photographing or videotaping of the operations or procedures to be performed, including appropriate portions of my body for medical, scientific, or educational purposes, provided my identity is not revealed by the pictures or by descriptive texts accompanying them. If the procedure has been photographed/videotaped, the surgeon will obtain the original picture, image, videotape or CD. The hospital will not be responsible for storage, release or maintenance of the picture, image, tape or CD.    7.   I consent to the presence of a  or observers in the operating room as deemed necessary by my physician or their designees.     8.   I recognize that in the event my procedure results in extended X-Ray/fluoroscopy time, I may develop a skin reaction. 9. If I have a Do Not Attempt Resuscitation (DNAR) order in place, that status will be suspended while in the operating room, procedural suite, and during the recovery period unless otherwise explicitly stated by me (or a person authorized to consent on my behalf). The surgeon or my attending physician will determine when the applicable recovery period ends for purposes of reinstating the DNAR order. 10. Patients having a sterilization procedure: I understand that if the procedure is successful the results will be permanent and it will therefore be impossible for me to inseminate, conceive, or bear children. I also understand that the procedure is intended to result in sterility, although the result has not been guaranteed. 11. I acknowledge that my physician has explained sedation/analgesia administration to me including the risk and benefits I consent to the administration of sedation/analgesia as may be necessary or desirable in the judgment of my physician. I CERTIFY THAT I HAVE READ AND FULLY UNDERSTAND THE ABOVE CONSENT TO OPERATION and/or OTHER PROCEDURE.     _________________________________________ _________________________________     ___________________________________  Signature of Patient     Signature of Responsible Person                   Printed Name of Responsible Person                              _________________________________________ ______________________________        ___________________________________  Signature of Witness         Date  Time         Relationship to Patient    STATEMENT OF PHYSICIAN My signature below affirms that prior to the time of the procedure; I have explained to the patient and/or his/her legal representative, the risks and benefits involved in the proposed treatment and any reasonable alternative to the proposed treatment.  I have also explained the risks and benefits involved in refusal of the proposed treatment and alternatives to the proposed treatment and have answered the patient's questions. If I have a significant financial interest in a co-management agreement or a significant financial interest in any product or implant, or other significant relationship used in this procedure/surgery, I have disclosed this and had a discussion with my patient.     _______________________________________________________________ _____________________________  (Signature of Physician)                                                                                         (Date)                                   (Time)  Patient Name: Ananth Toscano.     : 10/11/1980   Printed: 2023      Medical Record #: [id number]                                              Page 1 of 1

## (undated) NOTE — ED AVS SNAPSHOT
[facility] Emergency Department    [address]     [place] [address]    Phone:  [phone]    Fax:  [phone]   MRN: [id number]    Department:  [facility] Emergency Department   Date of Visit:  4/10/201 and Class Registration line at [phone] or find a doctor online by visiting [email or web address].    IF THERE IS ANY CHANGE OR WORSENING OF YOUR CONDITION, CALL YOUR PRIMARY CARE PHYSICIAN AT ONCE OR RETURN IMMEDIATELY TO [address] [place] [address].     If

## (undated) NOTE — ED AVS SNAPSHOT
[facility] Emergency Department    [address]     [address] [place] [address]    Phone:  [phone]    Fax:  [phone] [facility]   MRN: [id number]    Department:  [facility] Emergency Department   Date of Visit:  1/31/201 01/31/2017  12:09 Orphenadrine Citrate (NORFLEX) injection 60 mg 60 mg                Admin Date Administration Dose                   01/31/2017  12:10 ChlorproMAZINE HCl (THORAZINE) tab 25 mg 25 mg                     Medication Information       Follow related to the care you received in our emergency department. Please call our [address] at [phone]. Your Emergency Department team is here to serve you. You are our top priority. You were examined and treated today on an urgent basis only.   Mila Aguilar that these instructions have been explained to me; all questions pertaining to these instructions have been answered in a satisfactory manner. 24-Hour Pharmacies        Pharmacy Address Phone Number   [address] ([address] [address] not sign up before the expiration date, you must request a new code. Your unique [employer] Access Code: [email or web address]  Expires: 4/1/2017 12:41 PM    If you have questions, you can call [phone] to talk to our [facility] Staff.  Remember, Lauryn sabillon

## (undated) NOTE — LETTER
Date & Time: 10/1/2021, 12:22 PM  Patient: Johnie Kelly  Encounter Provider(s):    Rona Dixon MD       To Whom It May Concern:    Jaime Gipson was seen and treated in our department on 10/1/2021. He cannot work for the next 3 to 5 days.     If you have any qu

## (undated) NOTE — LETTER
To Whom It May Concern:    Sami Grijalva Jr. has been under our care regarding ongoing medical issues. Because of this, he has been required to restrict his physical activities. He has been hospitalized from 3/4/25 - present. His daughter Nuzhat Grijalva should be excused from work 3/12/25-3/18/25 to visit and assist in his care.    Please feel free to contact us if there are any questions.      Sincerely,    Yonny Palma MD    [facility] HOSPITALIST  [address] [facility] [address]  [phone]        Document generated by:  YONNY PALMA MD

## (undated) NOTE — LETTER
[address]  Authorization for Surgical Operation and Procedure                                                                                           I hereby authorize Evelyn Dolan MD, my physician and his/her assistants (if applicable), which may include medical students, residents, and/or fellows, to perform the following surgical operation/ procedure and administer such anesthesia as may be determined necessary by my physician: Operation/Procedure name (s) COLONOSCOPY / ESOPHAGOGASTRODUODENOSCOPY on [address].   2.   I recognize that during the surgical operation/procedure, unforeseen conditions may necessitate additional or different procedures than those listed above. I, therefore, further authorize and request that the above-named surgeon, assistants, or designees perform such procedures as are, in their judgment, necessary and desirable. 3.   My surgeon/physician has discussed prior to my surgery the potential benefits, risks and side effects of this procedure; the likelihood of achieving goals; and potential problems that might occur during recuperation. They also discussed reasonable alternatives to the procedure, including risks, benefits, and side effects related to the alternatives and risks related to not receiving this procedure. I have had all my questions answered and I acknowledge that no guarantee has been made as to the result that may be obtained. 4.   Should the need arise during my operation/procedure, which includes change of level of care prior to discharge, I also consent to the administration of blood and/or blood products. Further, I understand that despite careful testing and screening of blood or blood products by collecting agencies, I may still be subject to ill effects as a result of receiving a blood transfusion and/or blood products.   The following are some, but not all, of the potential risks that can occur: fever and allergic reactions, hemolytic reactions, transmission of diseases such as Hepatitis, AIDS and Cytomegalovirus (CMV) and fluid overload. In the event that I wish to have an autologous transfusion of my own blood, or a directed donor transfusion, I will discuss this with my physician. Check only if Refusing Blood or Blood Products  I understand refusal of blood or blood products as deemed necessary by my physician may have serious consequences to my condition to include possible death. I hereby assume responsibility for my refusal and release the hospital, its personnel, and my physicians from any responsibility for the consequences of my refusal.    o  Refuse   5. I authorize the use of any specimen, organs, tissues, body parts or foreign objects that may be removed from my body during the operation/procedure for diagnosis, research or teaching purposes and their subsequent disposal by hospital authorities. I also authorize the release of specimen test results and/or written reports to my treating physician on the hospital medical staff or other referring or consulting physicians involved in my care, at the discretion of the Pathologist or my treating physician. 6.   I consent to the photographing or videotaping of the operations or procedures to be performed, including appropriate portions of my body for medical, scientific, or educational purposes, provided my identity is not revealed by the pictures or by descriptive texts accompanying them. If the procedure has been photographed/videotaped, the surgeon will obtain the original picture, image, videotape or CD. The hospital will not be responsible for storage, release or maintenance of the picture, image, tape or CD.    7.   I consent to the presence of a  or observers in the operating room as deemed necessary by my physician or their designees.     8.   I recognize that in the event my procedure results in extended X-Ray/fluoroscopy time, I may develop a skin reaction. 9. If I have a Do Not Attempt Resuscitation (DNAR) order in place, that status will be suspended while in the operating room, procedural suite, and during the recovery period unless otherwise explicitly stated by me (or a person authorized to consent on my behalf). The surgeon or my attending physician will determine when the applicable recovery period ends for purposes of reinstating the DNAR order. 10. Patients having a sterilization procedure: I understand that if the procedure is successful the results will be permanent and it will therefore be impossible for me to inseminate, conceive, or bear children. I also understand that the procedure is intended to result in sterility, although the result has not been guaranteed. 11. I acknowledge that my physician has explained sedation/analgesia administration to me including the risk and benefits I consent to the administration of sedation/analgesia as may be necessary or desirable in the judgment of my physician. I CERTIFY THAT I HAVE READ AND FULLY UNDERSTAND THE ABOVE CONSENT TO OPERATION and/or OTHER PROCEDURE.     _________________________________________ _________________________________     ___________________________________  Signature of Patient     Signature of Responsible Person                   Printed Name of Responsible Person                              _________________________________________ ______________________________        ___________________________________  Signature of Witness         Date  Time         Relationship to Patient    STATEMENT OF PHYSICIAN My signature below affirms that prior to the time of the procedure; I have explained to the patient and/or his/her legal representative, the risks and benefits involved in the proposed treatment and any reasonable alternative to the proposed treatment.  I have also explained the risks and benefits involved in refusal of the proposed treatment and alternatives to the proposed treatment and have answered the patient's questions. If I have a significant financial interest in a co-management agreement or a significant financial interest in any product or implant, or other significant relationship used in this procedure/surgery, I have disclosed this and had a discussion with my patient.     _______________________________________________________________ _____________________________  (Signature of Physician)                                                                                         (Date)                                   (Time)  Patient Name: Jarrett Vines.     : 10/11/1980   Printed: 10/30/2023      Medical Record #: [id number]                                              Page 1 of 1

## (undated) NOTE — LETTER
Date & Time: 4/23/2023, 8:18 PM  Patient: Jas Reilly. Encounter Provider(s):    Wendy Montiel MD       To Whom It May Concern:    Yanni Faria was seen and treated in our department on 4/23/2023. He can return to work on 4/26/23.     If you have any questions or concerns, please do not hesitate to call.        _____________________________  Physician/APC Signature

## (undated) NOTE — LETTER
01/26/23        Patient: Shamar Haynes. YOB: 1980   Date of Visit: 1/25/2023       Dear  Dr. Gabo Conthe MD,      Thank you for referring Shamar Haynes. to my practice. Please find my assessment and plan below. Non-recurrent unilateral inguinal hernia without obstruction or gangrene  (primary encounter diagnosis)    Steady progress, early after mesh repair of a large L direct inguinal hernia. Limited activity for one month postop, note for work, routine medical screening.                   Sincerely,   Eliza Elias MD   [facility], [place]en [place], [facility] [address]  [facility] [address]    Document electronically generated by:  Eliza Elias MD

## (undated) NOTE — LETTER
Date & Time: 3/3/2022, 7:08 PM  Patient: Antonio Antony  Encounter Provider(s):    MARIA ISABEL Temple       To Whom It May Concern:    Luís Mckeon was seen and treated in our department on 3/3/2022. He can return to work Monday 03/07/2022.     If you have any questions or concerns, please do not hesitate to call.        _____________________________  Physician/APC Signature

## (undated) NOTE — LETTER
[address] [place] [address]  Authorization for Invasive Procedures  Date: ***           Time: ***    {[facility] ivs consent:[id number]}

## (undated) NOTE — LETTER
[address]  Authorization for Surgical Operation and Procedure                                                                                           I hereby authorize Cheryl Tripathi MD, my physician and his/her assistants (if applicable), which may include medical students, residents, and/or fellows, to perform the following surgical operation/ procedure and administer such anesthesia as may be determined necessary by my physician: Operation/Procedure name (s) ESOPHAGOGASTRODUODENOSCOPY (EGD) on [address]   2.   I recognize that during the surgical operation/procedure, unforeseen conditions may necessitate additional or different procedures than those listed above. I, therefore, further authorize and request that the above-named surgeon, assistants, or designees perform such procedures as are, in their judgment, necessary and desirable. 3.   My surgeon/physician has discussed prior to my surgery the potential benefits, risks and side effects of this procedure; the likelihood of achieving goals; and potential problems that might occur during recuperation. They also discussed reasonable alternatives to the procedure, including risks, benefits, and side effects related to the alternatives and risks related to not receiving this procedure. I have had all my questions answered and I acknowledge that no guarantee has been made as to the result that may be obtained. 4.   Should the need arise during my operation/procedure, which includes change of level of care prior to discharge, I also consent to the administration of blood and/or blood products. Further, I understand that despite careful testing and screening of blood or blood products by collecting agencies, I may still be subject to ill effects as a result of receiving a blood transfusion and/or blood products.   The following are some, but not all, of the potential risks that can occur: fever and allergic reactions, hemolytic reactions, transmission of diseases such as Hepatitis, AIDS and Cytomegalovirus (CMV) and fluid overload. In the event that I wish to have an autologous transfusion of my own blood, or a directed donor transfusion, I will discuss this with my physician. Check only if Refusing Blood or Blood Products  I understand refusal of blood or blood products as deemed necessary by my physician may have serious consequences to my condition to include possible death. I hereby assume responsibility for my refusal and release the hospital, its personnel, and my physicians from any responsibility for the consequences of my refusal.    o  Refuse   5. I authorize the use of any specimen, organs, tissues, body parts or foreign objects that may be removed from my body during the operation/procedure for diagnosis, research or teaching purposes and their subsequent disposal by hospital authorities. I also authorize the release of specimen test results and/or written reports to my treating physician on the hospital medical staff or other referring or consulting physicians involved in my care, at the discretion of the Pathologist or my treating physician. 6.   I consent to the photographing or videotaping of the operations or procedures to be performed, including appropriate portions of my body for medical, scientific, or educational purposes, provided my identity is not revealed by the pictures or by descriptive texts accompanying them. If the procedure has been photographed/videotaped, the surgeon will obtain the original picture, image, videotape or CD. The hospital will not be responsible for storage, release or maintenance of the picture, image, tape or CD.    7.   I consent to the presence of a  or observers in the operating room as deemed necessary by my physician or their designees.     8.   I recognize that in the event my procedure results in extended X-Ray/fluoroscopy time, I may develop a skin reaction. 9. If I have a Do Not Attempt Resuscitation (DNAR) order in place, that status will be suspended while in the operating room, procedural suite, and during the recovery period unless otherwise explicitly stated by me (or a person authorized to consent on my behalf). The surgeon or my attending physician will determine when the applicable recovery period ends for purposes of reinstating the DNAR order. 10. Patients having a sterilization procedure: I understand that if the procedure is successful the results will be permanent and it will therefore be impossible for me to inseminate, conceive, or bear children. I also understand that the procedure is intended to result in sterility, although the result has not been guaranteed. 11. I acknowledge that my physician has explained sedation/analgesia administration to me including the risk and benefits I consent to the administration of sedation/analgesia as may be necessary or desirable in the judgment of my physician. I CERTIFY THAT I HAVE READ AND FULLY UNDERSTAND THE ABOVE CONSENT TO OPERATION and/or OTHER PROCEDURE.     _________________________________________ _________________________________     ___________________________________  Signature of Patient     Signature of Responsible Person                   Printed Name of Responsible Person                              _________________________________________ ______________________________        ___________________________________  Signature of Witness         Date  Time         Relationship to Patient    STATEMENT OF PHYSICIAN My signature below affirms that prior to the time of the procedure; I have explained to the patient and/or his/her legal representative, the risks and benefits involved in the proposed treatment and any reasonable alternative to the proposed treatment.  I have also explained the risks and benefits involved in refusal of the proposed treatment and alternatives to the proposed treatment and have answered the patient's questions. If I have a significant financial interest in a co-management agreement or a significant financial interest in any product or implant, or other significant relationship used in this procedure/surgery, I have disclosed this and had a discussion with my patient.     _______________________________________________________________ _____________________________  (Signature of Physician)                                                                                         (Date)                                   (Time)  Patient Name: Catracho Lloyd.     : 10/11/1980   Printed: 2023      Medical Record #: [id number]                                              Page 1 of 1

## (undated) NOTE — LETTER
Date & Time: 3/13/2019, 7:05 PM  Patient: Shira Crockett  Encounter Provider(s):    Terri Sykes MD       To Whom It May Concern:    Mela Garcia was seen and treated in our department on 3/13/2019. He should not return to work until 3/15/2019.     If yo

## (undated) NOTE — LETTER
[facility] 2W/SW  [address] [facility] [address]  [phone]    Blood Transfusion Consent    In the course of your treatment, it may become necessary to administer a transfusion of blood or blood components. This form provides basic information concerning this procedure and, if signed by you, authorizes its administration. By signing this form, you agree that all of your questions about the administration of blood or blood products have been answered by the ordering medical professional or designee.    Description of Procedure  Blood is introduced into one of your veins, commonly in the arm, using a sterilized disposable needle. The amount of blood transfused, and whether the transfusion will be of blood or blood components is a judgement the physician will make based on your particular needs.    Risks  The transfusion is a common procedure of low risk.  MINOR AND TEMPORARY REACTIONS ARE NOT UNCOMMON, including a slight bruise, swelling or local reaction in the area where the needle pierces your skin, or a nonserious reaction to the transfused material itself, including headache, fever or mild skin reaction, such as rash.  Serious reactions are possible, though very unlikely, and include severe allergic reaction (shock) and destruction (hemolysis) of transfused blood cells.  Infectious diseases which are known to be transmitted by blood transfusion include certain types of viral Hepatitis(liver infection from a virus), Human Immunodeficiency Virus (HIV-1,2) infection, a viral infection known to cause Acquired Immunodeficiency Syndrome (AIDS), as well as certain other bacterial, viral, and parasitic diseases. While a minimal risk of acquiring an infectious disease from transfused blood exists, in accordance with the Federal and State law, all due care has been taken in donor selection and testing to avoid transmission of disease.    Alternatives  If loss of blood poses serious threats during your  treatment, THERE IS NO EFFECTIVE ALTERNATIVE TO BLOOD TRANSFUSION. However, if you have any further questions on this matter, your provider will fully explain the alternatives to you if it has not already been done.    I, ______________________________, have read/had read to me the above. I understand the matters bearing on the decision whether or not to authorize a transfusion of blood or blood components. I have no questions which have not been answered to my full satisfaction. I hereby consent to such transfusion as my physician may deem necessary or advisable in the course of my treatment.    ______________________________________________                    ___________________________  (Signature of Patient or Responsible party in case of minor,                 (Printed Name of Patient or incompetent, or unconscious patient)              Responsible Party)    ___________________________               _____________________  (Relationship to Patient if not self)                                    (Date and Time)    __________________________                                                           ______________________              (Signature of Witness)               (Printed Name of Witness)     Language line ()    Telephone/Verbal/Video Consent    __________________________                     ____________________  (Signature of 2nd Witness           (Printed Name of 2nd  Telephone/Verbal/Video Consent)           Witness)    Patient Name: Sami Grijalva      : 10/11/1980                 Printed: 2025     Medical Record #: [id number]      Rev: 2023

## (undated) NOTE — LETTER
[facility]  [address], [place], [place]    Authorization for Surgical Operation and Procedure                               I hereby authorize Dr. Escobedo,  my physician and his/her assistants (if applicable), which may include medical students, residents, and/or fellows, to perform the following surgical operation/ procedure Bronchoscopy with Bronchoalveolar lavage and/or specimen collection and administer such anesthesia as may be determined necessary by my physician: Operation/Procedure name (s)  on AnthonyNELLIE Grijalva Jr.   2.   I recognize that during the surgical operation/procedure, unforeseen conditions may necessitate additional or different procedures than those listed above.  I, therefore, further authorize and request that the above-named surgeon, assistants, or designees perform such procedures as are, in their judgment, necessary and desirable.    3.   My surgeon/physician has discussed prior to my surgery the potential benefits, risks and side effects of this procedure; the likelihood of achieving goals; and potential problems that might occur during recuperation.  They also discussed reasonable alternatives to the procedure, including risks, benefits, and side effects related to the alternatives and risks related to not receiving this procedure.  I have had all my questions answered and I acknowledge that no guarantee has been made as to the result that may be obtained.    4.   Should the need arise during my operation/procedure, which includes change of level of care prior to discharge, I also consent to the administration of blood and/or blood products.  Further, I understand that despite careful testing and screening of blood or blood products by collecting agencies, I may still be subject to ill effects as a result of receiving a blood transfusion and/or blood products.  The following are some, but not all, of the potential risks that can occur: fever and allergic reactions, hemolytic  reactions, transmission of diseases such as Hepatitis, AIDS and Cytomegalovirus (CMV) and fluid overload.  In the event that I wish to have an autologous transfusion of my own blood, or a directed donor transfusion, I will discuss this with my physician.  Check only if Refusing Blood or Blood Products  I understand refusal of blood or blood products as deemed necessary by my physician may have serious consequences to my condition to include possible death. I hereby assume responsibility for my refusal and release the hospital, its personnel, and my physicians from any responsibility for the consequences of my refusal.    o  Refuse   5.   I authorize the use of any specimen, organs, tissues, body parts or foreign objects that may be removed from my body during the operation/procedure for diagnosis, research or teaching purposes and their subsequent disposal by hospital authorities.  I also authorize the release of specimen test results and/or written reports to my treating physician on the hospital medical staff or other referring or consulting physicians involved in my care, at the discretion of the Pathologist or my treating physician.    6.   I consent to the photographing or videotaping of the operations or procedures to be performed, including appropriate portions of my body for medical, scientific, or educational purposes, provided my identity is not revealed by the pictures or by descriptive texts accompanying them.  If the procedure has been photographed/videotaped, the surgeon will obtain the original picture, image, videotape or CD.  The hospital will not be responsible for storage, release or maintenance of the picture, image, tape or CD.    7.   I consent to the presence of a  or observers in the operating room as deemed necessary by my physician or their designees.    8.   I recognize that in the event my procedure results in extended X-Ray/fluoroscopy time, I may develop a skin  reaction.    9. If I have a Do Not Attempt Resuscitation (DNAR) order in place, that status will be suspended while in the operating room, procedural suite, and during the recovery period unless otherwise explicitly stated by me (or a person authorized to consent on my behalf). The surgeon or my attending physician will determine when the applicable recovery period ends for purposes of reinstating the DNAR order.  10. Patients having a sterilization procedure: I understand that if the procedure is successful the results will be permanent and it will therefore be impossible for me to inseminate, conceive, or bear children.  I also understand that the procedure is intended to result in sterility, although the result has not been guaranteed.   11. I acknowledge that my physician has explained sedation/analgesia administration to me including the risk and benefits I consent to the administration of sedation/analgesia as may be necessary or desirable in the judgment of my physician.    I CERTIFY THAT I HAVE READ AND FULLY UNDERSTAND THE ABOVE CONSENT TO OPERATION and/or OTHER PROCEDURE.     ____________________________________  _________________________________        ______________________________  Signature of Patient    Signature of Responsible Person                Printed Name of Responsible Person                                      ____________________________________  _____________________________                ________________________________  Signature of Witness        Date  Time         Relationship to Patient    STATEMENT OF PHYSICIAN My signature below affirms that prior to the time of the procedure; I have explained to the patient and/or his/her legal representative, the risks and benefits involved in the proposed treatment and any reasonable alternative to the proposed treatment. I have also explained the risks and benefits involved in refusal of the proposed treatment and alternatives to the proposed  treatment and have answered the patient's questions. If I have a significant financial interest in a co-management agreement or a significant financial interest in any product or implant, or other significant relationship used in this procedure/surgery, I have disclosed this and had a discussion with my patient.     _____________________________________________________              _____________________________  (Signature of Physician)                                                                                         (Date)                                   (Time)  Patient Name: Sami Grijalva Jr.      : 10/11/1980      Printed: 3/13/2025     Medical Record #: [id number]                                      Page 1 of 1

## (undated) NOTE — LETTER
11/23/2022          To Whom It May Concern:    Sandoval Jorge is currently under my medical care and may not return to work at this time. He may return to work on 11/23/2022. Activity is restricted as follows: light duty. No carrying/lfting more than 10 lbs from 11/23/022 - 1/01/2023. If you require additional information please contact our office.         Sincerely,    Ramírez Silva PA-C          Document generated by:  BISI RendonC

## (undated) NOTE — ED AVS SNAPSHOT
Sujit Rodriguez   MRN: [id number]    Department:  [facility] Emergency Department   Date of Visit:  5/3/2019           Disclosure     Insurance plans vary and the physician(s) referred by the ER may not be covered by your plan.  Please contact your CARE PHYSICIAN AT ONCE OR RETURN IMMEDIATELY TO THE EMERGENCY DEPARTMENT. If you have been prescribed any medication(s), please fill your prescription right away and begin taking the medication(s) as directed.   If you believe that any of the medications

## (undated) NOTE — LETTER
1/25/2023          To Whom It May Concern:    Janes Doan. is currently under my medical care and may not return to work at this time. He may return to work on 2/6/2023. Activity is restricted as follows: No lifting over 15 lbs. or strenuous activity until 2/20/2023. If you require additional information please contact our office.         Sincerely,    Sujit Cardona MD          Document generated by:  TREASURE SANCHEZ

## (undated) NOTE — ED AVS SNAPSHOT
Georges Lucas   MRN: [id number]    Department:  [facility] Emergency Department   Date of Visit:  12/3/2018           Disclosure     Insurance plans vary and the physician(s) referred by the ER may not be covered by your plan.  Please contact your CARE PHYSICIAN AT ONCE OR RETURN IMMEDIATELY TO THE EMERGENCY DEPARTMENT. If you have been prescribed any medication(s), please fill your prescription right away and begin taking the medication(s) as directed.   If you believe that any of the medications

## (undated) NOTE — ED AVS SNAPSHOT
[facility] Emergency Department    [address]     [address] [place] [address]    Phone:  [phone]    Fax:  [phone] [facility]   MRN: [id number]    Department:  [facility] Emergency Department   Date of Visit:  4/10/201 please call our  at [phone]. Si tiene problemas para programar rey shyam de seguimiento según lo indicado, llame al encargado de robinson al [phone].     It is our goal to assure that you are completely satisfied with every aspect o doctor until you can check with your doctor. Please bring the medication list to your next doctor's appointment. Any imaging studies and labs completed today can be reviewed in your [employer] account.   You may have had testing done that requires us to co and ask to get set up for an insurance coverage that is in-network with [employer] [facility]. [facility]     Sign up for [facility], your secure online medical record.   [employer] will allow you to access patient instructions from your recent visit,  view

## (undated) NOTE — LETTER
9/6/2023              Gilbert Camacho. [address]         To Whom it may concern: This is to certify that Gilbert Camacho. had an appointment on 9/6/2023 at 10:30 AM with DWAINE Moss. Patient is able to go back to work on 9/7/2023 with no restrictions.         Sincerely,      DWAINE Moss  [facility] [id number] [phone]

## (undated) NOTE — LETTER
To Whom It May Concern:    Sami Grijalva Jr. has been under our care regarding ongoing medical issues. Because of this, he has been required to restrict his physical activities. He has been hospitalized from 3/4/25 - present. His grandson William Grijalva should be excused from school 3/12/25-3/18/25 to visit and assist in his care.    Please feel free to contact us if there are any questions.      Sincerely,    Yonny Palma MD    [facility] HOSPITALIST  [address] [facility] [address]  [phone]        Document generated by:  YONNY PALMA MD

## (undated) NOTE — ED AVS SNAPSHOT
Julito Hoyos   MRN: [id number]    Department:  [facility] Emergency Department   Date of Visit:  11/22/2017           Disclosure     Insurance plans vary and the physician(s) referred by the ER may not be covered by your plan.  Please contact you CARE PHYSICIAN AT ONCE OR RETURN IMMEDIATELY TO THE EMERGENCY DEPARTMENT. If you have been prescribed any medication(s), please fill your prescription right away and begin taking the medication(s) as directed.   If you believe that any of the medications